# Patient Record
Sex: FEMALE | Race: WHITE | NOT HISPANIC OR LATINO | Employment: OTHER | URBAN - METROPOLITAN AREA
[De-identification: names, ages, dates, MRNs, and addresses within clinical notes are randomized per-mention and may not be internally consistent; named-entity substitution may affect disease eponyms.]

---

## 2017-01-23 DIAGNOSIS — I10 ESSENTIAL HYPERTENSION: ICD-10-CM

## 2017-01-23 RX ORDER — LOSARTAN POTASSIUM 100 MG/1
100 TABLET ORAL DAILY
Qty: 90 TAB | Refills: 3 | Status: SHIPPED | OUTPATIENT
Start: 2017-01-23 | End: 2018-01-26 | Stop reason: SDUPTHER

## 2017-04-10 ENCOUNTER — OFFICE VISIT (OUTPATIENT)
Dept: MEDICAL GROUP | Facility: LAB | Age: 72
End: 2017-04-10
Payer: COMMERCIAL

## 2017-04-10 VITALS
WEIGHT: 127.4 LBS | TEMPERATURE: 99.1 F | HEART RATE: 88 BPM | DIASTOLIC BLOOD PRESSURE: 68 MMHG | OXYGEN SATURATION: 94 % | RESPIRATION RATE: 12 BRPM | BODY MASS INDEX: 23.45 KG/M2 | SYSTOLIC BLOOD PRESSURE: 160 MMHG | HEIGHT: 62 IN

## 2017-04-10 DIAGNOSIS — M81.0 OSTEOPOROSIS, POST-MENOPAUSAL: ICD-10-CM

## 2017-04-10 DIAGNOSIS — R60.0 BILATERAL EDEMA OF LOWER EXTREMITY: ICD-10-CM

## 2017-04-10 DIAGNOSIS — Z12.31 ENCOUNTER FOR SCREENING MAMMOGRAM FOR BREAST CANCER: ICD-10-CM

## 2017-04-10 DIAGNOSIS — I73.9 PERIPHERAL VASCULAR DISEASE (HCC): ICD-10-CM

## 2017-04-10 DIAGNOSIS — I77.9 BILATERAL CAROTID ARTERY DISEASE (HCC): ICD-10-CM

## 2017-04-10 DIAGNOSIS — I10 ESSENTIAL HYPERTENSION: ICD-10-CM

## 2017-04-10 PROCEDURE — 99214 OFFICE O/P EST MOD 30 MIN: CPT | Performed by: NURSE PRACTITIONER

## 2017-04-10 RX ORDER — HYDROCHLOROTHIAZIDE 25 MG/1
25 TABLET ORAL DAILY
Qty: 90 TAB | Refills: 3 | Status: SHIPPED | OUTPATIENT
Start: 2017-04-10 | End: 2018-04-11 | Stop reason: SDUPTHER

## 2017-04-10 RX ORDER — ROSUVASTATIN CALCIUM 10 MG/1
TABLET, COATED ORAL
Qty: 90 TAB | Refills: 3 | Status: SHIPPED | OUTPATIENT
Start: 2017-04-10 | End: 2018-02-16 | Stop reason: SDUPTHER

## 2017-04-10 ASSESSMENT — PATIENT HEALTH QUESTIONNAIRE - PHQ9: CLINICAL INTERPRETATION OF PHQ2 SCORE: 0

## 2017-04-10 NOTE — PROGRESS NOTES
"Chief Complaint   Patient presents with   • Medication Refill   • Orders Needed     mammogram        VENESSA Quesada is a 71-year-old established female here to follow-up on a few issues:  #1-Hypertension  Chronic issue. Requesting a renewal of hydrochlorothiazide as she's been off of this for several months. She notices swelling in her ankles without hydrochlorothiazide. She is not having a chest pain. Not checking BP at home.  Taking losartan 100 mg and norvasc 10 mg daily.  Not eating high salt diet.  Walking for exercise.  She has known peripheral vascular disease. She has seen vascular surgery who recommended refraining from any surgical procedures at this time.  #2-carotid artery disease:  Chronic issue. Due for an updated ultrasound. Continues on Crestor at 10 mg daily.  #3-osteoporosis:  Chronic issue. Did poorly with oral bisphosphonates as well as IV Reclast. Does great with prolia without any side effects and needs it sent to her mail order which is much less expensive for her than going to our infusion center. No recent fractures.    Past medical, surgical, family, and social history is reviewed and updated in Epic chart by me today.   Medications and allergies reviewed and updated in Epic chart by me today.     ROS:   As documented in history of present illness above    Exam:  Blood pressure 160/68, pulse 88, temperature 37.3 °C (99.1 °F), resp. rate 12, height 1.575 m (5' 2\"), weight 57.788 kg (127 lb 6.4 oz), SpO2 94 %.  Constitutional: Alert, no distress, plus 3 vital signs  Skin:  Warm, dry. 7 mm x 6 mm scabbed lesion to left distal lateral calf.    Eye: Equal, round and reactive, conjunctiva clear  ENMT: Lips without lesions, good dentition, oropharynx clear    Neck: Trachea midline. Positive bilateral carotid bruits.  Respiratory: Unlabored respiration, lungs clear to auscultation, no wheezes, no rhonchi  Cardiovascular: Normal rate and rhythm  Abdomen: Soft, nontender  Psych: Alert, pleasant, " well-groomed, normal affect    A/P:  1. Essential hypertension  -Elevated today. She reports that she did take her medications this morning but that her blood pressures always more elevated in our office than it is on her friends blood pressure monitor or at the grocery store. She is agreeable to following up of her blood pressures consistently greater than 140/90 and is agreeable to checking her blood pressure more regularly at home.  - hydrochlorothiazide (HYDRODIURIL) 25 MG Tab; Take 1 Tab by mouth every day.  Dispense: 90 Tab; Refill: 3    2. Bilateral edema of lower extremity  -Discussed elevating her legs when seated. She typically wears compression stockings. She is up-to-date with vascular.  - hydrochlorothiazide (HYDRODIURIL) 25 MG Tab; Take 1 Tab by mouth every day.  Dispense: 90 Tab; Refill: 3    3. Bilateral carotid artery disease (CMS-HCC)  -Recommend an updated carotid artery ultrasound and continuation of Crestor.  - rosuvastatin (CRESTOR) 10 MG Tab; TAKE 1 TABLET BY MOUTH IN THE EVENING  Dispense: 90 Tab; Refill: 3  - US-CAROTID DOPPLER; Future    4. Encounter for screening mammogram for breast cancer  - MA-SCREEN MAMMO W/CAD-BILAT; Future    5. Peripheral vascular disease (CMS-HCC)  - CBC WITH DIFFERENTIAL; Future  - LIPID PROFILE; Future  - COMP METABOLIC PANEL; Future    6. Osteoporosis, post-menopausal  -Sent through prolia to her mail order pharmacy to make it more affordable for her. Recommended bone density next year.  - denosumab (PROLIA) 60 MG/ML Solution; Inject 1 mL as instructed Once for 1 dose.  Dispense: 1 mL; Refill: 0

## 2017-04-10 NOTE — MR AVS SNAPSHOT
"        Sangita Priceedna   4/10/2017 2:00 PM   Office Visit   MRN: [id number]    Department:  [facility]   Dept Phone:  [phone]    Description:  Female : 1945   Provider:  THALIA Guaman           Reason for Visit     Medication Refill     Orders Needed mammogram       Allergies as of 4/10/2017     Allergen Noted Reactions    Penicillins 2007   Hives    hands    Lipitor [Atorvastatin] 09/15/2014       myalgias    Other Environmental 2013   Hives    Hair dye      You were diagnosed with     Essential hypertension   [0357444]       Bilateral edema of lower extremity   [394640]       Bilateral carotid artery disease (CMS-HCC)   [226881]       Encounter for screening mammogram for breast cancer   [6934321]       Peripheral vascular disease (CMS-HCC)   [164954]       Osteoporosis, post-menopausal   [749001]         Vital Signs     Blood Pressure Pulse Temperature Respirations Height Weight    160/68 mmHg 88 37.3 °C (99.1 °F) 12 1.575 m (5' 2\") 57.788 kg (127 lb 6.4 oz)    Body Mass Index Oxygen Saturation Smoking Status             23.30 kg/m2 94% Current Some Day Smoker         Basic Information     Date Of Birth Sex Race Ethnicity Preferred Language    1945 Female White Non- English      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   Unknown - Present    Lumbar foraminal stenosis M99.83   2013 - Present    DDD (degenerative disc disease), lumbar M51.36   2013 - Present    Hypertension I10   2013 - Present    Carotid artery disease (CMS-HCC) I77.9   11/10/2013 - Present    Heart murmur R01.1   11/10/2013 - Present    Carotid artery stenosis I65.29   2015 - Present    S/P insertion of iliac artery stent Z95.828   2015 - Present    Peripheral vascular disease (CMS-HCC) I73.9   3/11/2016 - Present      Health Maintenance        Date Due Completion Dates    PAP SMEAR 1964 ---   " MAMMOGRAM 2/3/2017 2/3/2016, 5/15/2014, 2/12/2013, 9/23/2011, 6/4/2010, 6/4/2010, 1/29/2009, 1/29/2009, 9/4/2007, 9/4/2007, 6/27/2006    BONE DENSITY 2/13/2020 2/13/2015, 2/12/2013, 10/7/2011, 6/4/2010, 1/29/2009, 9/4/2007, 6/27/2006    IMM DTaP/Tdap/Td Vaccine (2 - Td) 1/17/2022 1/17/2012    COLONOSCOPY 2/2/2025 2/2/2017            Current Immunizations     13-VALENT PCV PREVNAR 12/31/2015 10:57 AM    Influenza TIV (IM) 10/28/2014, 11/13/2012    Influenza Vaccine Adult HD 12/31/2015 10:54 AM    Influenza Vaccine Pediatric 10/8/2010    Pneumococcal polysaccharide vaccine (PPSV-23) 9/9/2014    SHINGLES VACCINE 1/9/2012  2:53 PM    Tdap Vaccine 1/17/2012 10:25 AM      Below and/or attached are the medications your provider expects you to take. Review all of your home medications and newly ordered medications with your provider and/or pharmacist. Follow medication instructions as directed by your provider and/or pharmacist. Please keep your medication list with you and share with your provider. Update the information when medications are discontinued, doses are changed, or new medications (including over-the-counter products) are added; and carry medication information at all times in the event of emergency situations     Allergies:  PENICILLINS - Hives     LIPITOR - (reactions not documented)     OTHER ENVIRONMENTAL - Hives               Medications  Valid as of: April 10, 2017 -  3:28 PM    Generic Name Brand Name Tablet Size Instructions for use    AmLODIPine Besylate (Tab) NORVASC 10 MG Take 1 Tab by mouth every day.        Aspirin Buf(WuAnn-BiHwx-KoSor) (Tab) ASCRIPTIN 325 MG Take 650 mg by mouth every day.        Calcium Carbonate-Vitamin D (Tab) OSCAL-250 250-125 MG-UNIT Take 1 Tab by mouth every day.          Denosumab (Solution) PROLIA 60 MG/ML Inject 1 mL as instructed Once for 1 dose.        HydroCHLOROthiazide (Tab) HYDRODIURIL 25 MG Take 1 Tab by mouth every day.        HydrOXYzine HCl (Tab) ATARAX 25 MG  Take 1 Tab by mouth at bedtime as needed for Itching.        Losartan Potassium (Tab) COZAAR 100 MG Take 1 Tab by mouth every day.        Rosuvastatin Calcium (Tab) CRESTOR 10 MG TAKE 1 TABLET BY MOUTH IN THE EVENING        Simvastatin (Tab) ZOCOR 40 MG Take 1 Tab by mouth every evening.        Triamcinolone Acetonide (Cream) KENALOG 0.1 % AAA twice daily        .                 Medicines prescribed today were sent to:     [id number] - [place], [place] - [address]    [address] [place] [address]    Phone: [phone] Fax: [phone]    Open 24 Hours?: No    [facility] [place], [place] - [address] AT [address] TO [facility]    [address] [facility] [address]    Phone: [phone] Fax: [phone]    Open 24 Hours?: No      Medication refill instructions:       If your prescription bottle indicates you have medication refills left, it is not necessary to call your provider’s office. Please contact your pharmacy and they will refill your medication.    If your prescription bottle indicates you do not have any refills left, you may request refills at any time through one of the following ways: The online Happy Hour Pal system (except Urgent Care), by calling your provider’s office, or by asking your pharmacy to contact your provider’s office with a refill request. Medication refills are processed only during regular business hours and may not be available until the next business day. Your provider may request additional information or to have a follow-up visit with you prior to refilling your medication.   *Please Note: Medication refills are assigned a new Rx number when refilled electronically. Your pharmacy may indicate that no refills were authorized even though a new prescription for the same medication is available at the pharmacy. Please request the medicine by name with the pharmacy before contacting your provider for a refill.        Your To Do List      Future Labs/Procedures Complete By Expires    CBC WITH DIFFERENTIAL  As directed 4/11/2018    COMP METABOLIC PANEL  As directed 4/11/2018    LIPID PROFILE  As directed 4/11/2018    MA-SCREEN MAMMO W/CAD-BILAT  As directed 5/12/2018    US-CAROTID DOPPLER  As directed 10/11/2017         Tactics Cloud Access Code: [email or web address]  Expires: 5/10/2017  3:28 PM    Tactics Cloud  A secure, online tool to manage your health information     [employer]’s Tactics Cloud® is a secure, online tool that connects you to your personalized health information from the privacy of your home -- day or night - making it very easy for you to manage your healthcare. Once the activation process is completed, you can even access your medical information using the Tactics Cloud savannah, which is available for free in the Apple Savannah store or Google Play store.     Tactics Cloud provides the following levels of access (as shown below):   My Chart Features   [facility]  Specialists [facility]  Urgent  Care Non-[facility]  Primary Care  Doctor   Email your healthcare team securely and privately 24/7 X X X    Manage appointments: schedule your next appointment; view details of past/upcoming appointments X      Request prescription refills. X      View recent personal medical records, including lab and immunizations X X X X   View health record, including health history, allergies, medications X X X X   Read reports about your outpatient visits, procedures, consult and ER notes X X X X   See your discharge summary, which is a recap of your hospital and/or ER visit that includes your diagnosis, lab results, and care plan. X X       How to register for Tactics Cloud:  1. Go to  [email or web address].  2. Click on the Sign Up Now box, which takes you to the New Member Sign Up page. You will need to provide the following information:  a. Enter your Tactics Cloud Access Code exactly as it appears at the top of this page. (You will not need to use this code after you’ve  completed the sign-up process. If you do not sign up before the expiration date, you must request a new code.)   b. Enter your date of birth.   c. Enter your home email address.   d. Click Submit, and follow the next screen’s instructions.  3. Create a Resverlogixt ID. This will be your Resverlogixt login ID and cannot be changed, so think of one that is secure and easy to remember.  4. Create a Flit password. You can change your password at any time.  5. Enter your Password Reset Question and Answer. This can be used at a later time if you forget your password.   6. Enter your e-mail address. This allows you to receive e-mail notifications when new information is available in Flit.  7. Click Sign Up. You can now view your health information.    For assistance activating your Flit account, call [phone]

## 2017-04-10 NOTE — ASSESSMENT & PLAN NOTE
Not checking BP at home.  Taking losartan 100 mg and norvasc 10 mg daily.  Not eating high salt diet.  Walking for exercise .

## 2017-05-01 ENCOUNTER — HOSPITAL ENCOUNTER (OUTPATIENT)
Dept: RADIOLOGY | Facility: MEDICAL CENTER | Age: 72
End: 2017-05-01
Attending: NURSE PRACTITIONER
Payer: COMMERCIAL

## 2017-05-01 DIAGNOSIS — I77.9 BILATERAL CAROTID ARTERY DISEASE (HCC): ICD-10-CM

## 2017-05-01 PROCEDURE — 93880 EXTRACRANIAL BILAT STUDY: CPT

## 2017-05-02 ENCOUNTER — TELEPHONE (OUTPATIENT)
Dept: MEDICAL GROUP | Facility: LAB | Age: 72
End: 2017-05-02

## 2017-05-02 DIAGNOSIS — I77.9 BILATERAL CAROTID ARTERY DISEASE (HCC): ICD-10-CM

## 2017-05-02 NOTE — TELEPHONE ENCOUNTER
----- Message from THALIA Guaman sent at 5/1/2017  5:56 PM PDT -----  Please let Sangita know that her right carotid artery is between 50-69% blocked. I would like her to have a yearly follow up with a vascular surgeon - please let me know if she does not have one that she is est with.

## 2017-05-02 NOTE — TELEPHONE ENCOUNTER
Spoke to patient, she has seen Dr. Cabrera in the past and would like to go back to him. Would you like her to schedule an appt with him? Does she need a referral? Also, she states the sore on leg is still there but maybe a little better. Should she come back and see you for a possible biopsy, please advise

## 2017-05-03 NOTE — TELEPHONE ENCOUNTER
Referral placed - ask her to go ahead and call their office for an appt in 3-4 days.  Is the lesion scabbed?

## 2017-05-04 NOTE — TELEPHONE ENCOUNTER
Let's have her take a break from vaseline and allow the lesion to scab, then let me look at it in 2-3 weeks (4pm appt incase we need to biopsy)

## 2017-05-04 NOTE — TELEPHONE ENCOUNTER
Patient states that she has been putting Vasolin on lesions so she doesn't know if it has scabbed over yet.

## 2017-05-09 ENCOUNTER — NON-PROVIDER VISIT (OUTPATIENT)
Dept: MEDICAL GROUP | Facility: LAB | Age: 72
End: 2017-05-09
Payer: COMMERCIAL

## 2017-05-09 DIAGNOSIS — M81.0 OSTEOPOROSIS, POST-MENOPAUSAL: ICD-10-CM

## 2017-05-09 NOTE — MR AVS SNAPSHOT
Sangita Bolton   2017 2:40 PM   Appointment   MRN: [id number]    Department:  [facility]   Dept Phone:  [phone]    Description:  Female : 1945   Provider:  KALIE MENON MA           Allergies as of 2017     Allergen Noted Reactions    Penicillins 2007   Hives    hands    Lipitor [Atorvastatin] 09/15/2014       myalgias    Other Environmental 2013   Hives    Hair dye      Vital Signs     Smoking Status                   Current Some Day Smoker           Basic Information     Date Of Birth Sex Race Ethnicity Preferred Language    1945 Female White Non- English      Your appointments     May 17, 2017  4:00 PM   Established Patient with THALIA Guaman   [facility] (--)    [address]  [place] [id number] [phone]           You will be receiving a confirmation call a few days before your appointment from our automated call confirmation system.            2017 11:20 AM   MA [id number] with DAVI JONES MG 1   [facility] ([place])    [address]  [place] [address] [phone]           No deodorant, powder, perfume or lotion under the arm or breast area.              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   Unknown - Present    Lumbar foraminal stenosis M99.83   2013 - Present    DDD (degenerative disc disease), lumbar M51.36   2013 - Present    Hypertension I10   2013 - Present    Carotid artery disease (CMS-HCC) I77.9   11/10/2013 - Present    Heart murmur R01.1   11/10/2013 - Present    Carotid artery stenosis I65.29   2015 - Present    S/P insertion of iliac artery stent Z95.828   2015 - Present    Peripheral vascular disease (CMS-HCC) I73.9   3/11/2016 - Present      Health Maintenance        Date Due Completion Dates    PAP SMEAR 1964 ---    MAMMOGRAM 2/3/2017 2/3/2016, 5/15/2014, 2/12/2013, 9/23/2011, 6/4/2010, 6/4/2010, 1/29/2009, 1/29/2009, 9/4/2007, 9/4/2007, 6/27/2006    BONE DENSITY 2/13/2020 2/13/2015, 2/12/2013, 10/7/2011, 6/4/2010, 1/29/2009, 9/4/2007, 6/27/2006    IMM DTaP/Tdap/Td Vaccine (2 - Td) 1/17/2022 1/17/2012    COLONOSCOPY 2/2/2025 2/2/2017            Current Immunizations     13-VALENT PCV PREVNAR 12/31/2015 10:57 AM    Influenza TIV (IM) 10/28/2014, 11/13/2012    Influenza Vaccine Adult HD 12/31/2015 10:54 AM    Influenza Vaccine Pediatric 10/8/2010    Pneumococcal polysaccharide vaccine (PPSV-23) 9/9/2014    SHINGLES VACCINE 1/9/2012  2:53 PM    Tdap Vaccine 1/17/2012 10:25 AM      Below and/or attached are the medications your provider expects you to take. Review all of your home medications and newly ordered medications with your provider and/or pharmacist. Follow medication instructions as directed by your provider and/or pharmacist. Please keep your medication list with you and share with your provider. Update the information when medications are discontinued, doses are changed, or new medications (including over-the-counter products) are added; and carry medication information at all times in the event of emergency situations     Allergies:  PENICILLINS - Hives     LIPITOR - (reactions not documented)     OTHER ENVIRONMENTAL - Hives               Medications  Valid as of: May 09, 2017 -  3:44 PM    Generic Name Brand Name Tablet Size Instructions for use    AmLODIPine Besylate (Tab) NORVASC 10 MG Take 1 Tab by mouth every day.        Aspirin Buf(QkIla-EfQdh-VgIrk) (Tab) ASCRIPTIN 325 MG Take 650 mg by mouth every day.        Calcium Carbonate-Vitamin D (Tab) OSCAL-250 250-125 MG-UNIT Take 1 Tab by mouth every day.          HydroCHLOROthiazide (Tab) HYDRODIURIL 25 MG Take 1 Tab by mouth every day.        HydrOXYzine HCl (Tab) ATARAX 25 MG Take 1 Tab by mouth at bedtime as needed for Itching.        Losartan Potassium (Tab)  COZAAR 100 MG Take 1 Tab by mouth every day.        Rosuvastatin Calcium (Tab) CRESTOR 10 MG TAKE 1 TABLET BY MOUTH IN THE EVENING        Simvastatin (Tab) ZOCOR 40 MG Take 1 Tab by mouth every evening.        Triamcinolone Acetonide (Cream) KENALOG 0.1 % AAA twice daily        .                 Medicines prescribed today were sent to:     [facility] - [place], [place] - [address]    [address] [place] [address]    Phone: [phone] Fax: [phone]    Open 24 Hours?: No    [facility] - [place], [place] - [address] AT [address]AL TO [facility]    [address] [address]    Phone: [phone] Fax: [phone]    Open 24 Hours?: No      Medication refill instructions:       If your prescription bottle indicates you have medication refills left, it is not necessary to call your provider’s office. Please contact your pharmacy and they will refill your medication.    If your prescription bottle indicates you do not have any refills left, you may request refills at any time through one of the following ways: The online Surge Performance Training system (except Urgent Care), by calling your provider’s office, or by asking your pharmacy to contact your provider’s office with a refill request. Medication refills are processed only during regular business hours and may not be available until the next business day. Your provider may request additional information or to have a follow-up visit with you prior to refilling your medication.   *Please Note: Medication refills are assigned a new Rx number when refilled electronically. Your pharmacy may indicate that no refills were authorized even though a new prescription for the same medication is available at the pharmacy. Please request the medicine by name with the pharmacy before contacting your provider for a refill.           Surge Performance Training Access Code: [email or web address]  Expires: 5/10/2017  3:28 PM    Surge Performance Training  A secure, online tool to  manage your health information     [employer]’s Isomark® is a secure, online tool that connects you to your personalized health information from the privacy of your home -- day or night - making it very easy for you to manage your healthcare. Once the activation process is completed, you can even access your medical information using the Isomark savannah, which is available for free in the Apple Savannah store or Google Play store.     Isomark provides the following levels of access (as shown below):   My Chart Features   [facility] Doctor [facility]  Specialists [facility]  Urgent  Care Non-[facility]  Primary Care  Doctor   Email your healthcare team securely and privately 24/7 X X X    Manage appointments: schedule your next appointment; view details of past/upcoming appointments X      Request prescription refills. X      View recent personal medical records, including lab and immunizations X X X X   View health record, including health history, allergies, medications X X X X   Read reports about your outpatient visits, procedures, consult and ER notes X X X X   See your discharge summary, which is a recap of your hospital and/or ER visit that includes your diagnosis, lab results, and care plan. X X       How to register for Isomark:  1. Go to  [email or web address].  2. Click on the Sign Up Now box, which takes you to the New Member Sign Up page. You will need to provide the following information:  a. Enter your Isomark Access Code exactly as it appears at the top of this page. (You will not need to use this code after you’ve completed the sign-up process. If you do not sign up before the expiration date, you must request a new code.)   b. Enter your date of birth.   c. Enter your home email address.   d. Click Submit, and follow the next screen’s instructions.  3. Create a Isomark ID. This will be your Isomark login ID and cannot be changed, so think of one that is secure and easy to remember.  4. Create a Studio Katet  password. You can change your password at any time.  5. Enter your Password Reset Question and Answer. This can be used at a later time if you forget your password.   6. Enter your e-mail address. This allows you to receive e-mail notifications when new information is available in AdHack.  7. Click Sign Up. You can now view your health information.    For assistance activating your AdHack account, call [phone]

## 2017-05-17 ENCOUNTER — OFFICE VISIT (OUTPATIENT)
Dept: MEDICAL GROUP | Facility: LAB | Age: 72
End: 2017-05-17
Payer: COMMERCIAL

## 2017-05-17 VITALS
DIASTOLIC BLOOD PRESSURE: 68 MMHG | WEIGHT: 126 LBS | BODY MASS INDEX: 23.19 KG/M2 | HEIGHT: 62 IN | SYSTOLIC BLOOD PRESSURE: 142 MMHG | OXYGEN SATURATION: 91 % | HEART RATE: 86 BPM | TEMPERATURE: 98.2 F | RESPIRATION RATE: 12 BRPM

## 2017-05-17 DIAGNOSIS — D22.9 ATYPICAL NEVI: ICD-10-CM

## 2017-05-17 PROCEDURE — 99213 OFFICE O/P EST LOW 20 MIN: CPT | Performed by: NURSE PRACTITIONER

## 2017-05-17 NOTE — PROGRESS NOTES
"Chief Complaint   Patient presents with   • Nevus     left       HPI  Sangita is a 71-year-old established female here with complaint of a persistent lesion to her left distal calf. Denies any pain or bleeding from the area. No personal history of skin cancer. She is interested in seeing a dermatologist to have it removed. She does have a past medical history of peripheral vascular disease and has an appointment to see her vascular specialist on May 30.      Past medical, surgical, family, and social history is reviewed and updated in Epic chart by me today.   Medications and allergies reviewed and updated in Epic chart by me today.     ROS:   As documented in history of present illness above    Exam:  Blood pressure 142/68, pulse 86, temperature 36.8 °C (98.2 °F), resp. rate 12, height 1.575 m (5' 2.01\"), weight 57.153 kg (126 lb), SpO2 91 %.  Constitutional: Alert, no distress, plus 3 vital signs  Skin:  Warm, dry.  Erythematous, scaling to left outer distal calf - erythema measures 3 cm x 3 cm with scabbed center measuring 1 cm x 1 cm.  No bleeding, purulent d/c expressed with pressure to area or any other abnormalities.   Eye: Equal, round and reactive, conjunctiva clear  ENMT: Lips without lesions  Neck: Trachea midline  Respiratory: Unlabored respiration, lungs clear to auscultation, no wheezes, no rhonchi  Cardiovascular: Normal rate  Psych: Alert, pleasant, well-groomed, normal affect    A/P:  1. Atypical nevi  - REFERRAL TO DERMATOLOGY  -Discussed that it may take her up to 3 months to get in with dermatology and if the lesion is starting to bleed, become painful or growing quickly is agreeable to returning here for biopsy.      "

## 2017-05-17 NOTE — MR AVS SNAPSHOT
"        Sangita Priceedna   2017 4:00 PM   Office Visit   MRN: [id number]    Department:  [facility]   Dept Phone:  [phone]    Description:  Female : 1945   Provider:  THALIA Guaman           Reason for Visit     Nevus left      Allergies as of 2017     Allergen Noted Reactions    Penicillins 2007   Hives    hands    Lipitor [Atorvastatin] 09/15/2014       myalgias    Other Environmental 2013   Hives    Hair dye      You were diagnosed with     Atypical nevi   [679997]         Vital Signs     Blood Pressure Pulse Temperature Respirations Height Weight    142/68 mmHg 86 36.8 °C (98.2 °F) 12 1.575 m (5' 2.01\") 57.153 kg (126 lb)    Body Mass Index Oxygen Saturation Smoking Status             23.04 kg/m2 91% Current Some Day Smoker         Basic Information     Date Of Birth Sex Race Ethnicity Preferred Language    1945 Female White Non- English      Your appointments     2017 11:20 AM   MA SCRN10 with DAVI JONES MG 1   [facility] ([place])    [address]  [place] [id number] [phone]           No deodorant, powder, perfume or lotion under the arm or breast area.              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   Unknown - Present    Lumbar foraminal stenosis M99.83   2013 - Present    DDD (degenerative disc disease), lumbar M51.36   2013 - Present    Hypertension I10   2013 - Present    Carotid artery disease (CMS-HCC) I77.9   11/10/2013 - Present    Heart murmur R01.1   11/10/2013 - Present    Carotid artery stenosis I65.29   2015 - Present    S/P insertion of iliac artery stent Z95.828   2015 - Present    Peripheral vascular disease (CMS-HCC) I73.9   3/11/2016 - Present      Health Maintenance        Date Due Completion Dates    PAP SMEAR 1964 ---    MAMMOGRAM 2/3/2017 2/3/2016, " 5/15/2014, 2/12/2013, 9/23/2011, 6/4/2010, 6/4/2010, 1/29/2009, 1/29/2009, 9/4/2007, 9/4/2007, 6/27/2006    BONE DENSITY 2/13/2020 2/13/2015, 2/12/2013, 10/7/2011, 6/4/2010, 1/29/2009, 9/4/2007, 6/27/2006    IMM DTaP/Tdap/Td Vaccine (2 - Td) 1/17/2022 1/17/2012    COLONOSCOPY 2/2/2025 2/2/2017            Current Immunizations     13-VALENT PCV PREVNAR 12/31/2015 10:57 AM    Influenza TIV (IM) 10/28/2014, 11/13/2012    Influenza Vaccine Adult HD 12/31/2015 10:54 AM    Influenza Vaccine Pediatric 10/8/2010    Pneumococcal polysaccharide vaccine (PPSV-23) 9/9/2014    SHINGLES VACCINE 1/9/2012  2:53 PM    Tdap Vaccine 1/17/2012 10:25 AM      Below and/or attached are the medications your provider expects you to take. Review all of your home medications and newly ordered medications with your provider and/or pharmacist. Follow medication instructions as directed by your provider and/or pharmacist. Please keep your medication list with you and share with your provider. Update the information when medications are discontinued, doses are changed, or new medications (including over-the-counter products) are added; and carry medication information at all times in the event of emergency situations     Allergies:  PENICILLINS - Hives     LIPITOR - (reactions not documented)     OTHER ENVIRONMENTAL - Hives               Medications  Valid as of: May 17, 2017 -  4:24 PM    Generic Name Brand Name Tablet Size Instructions for use    AmLODIPine Besylate (Tab) NORVASC 10 MG Take 1 Tab by mouth every day.        Aspirin Buf(XtAfr-NzAwx-GbAny) (Tab) ASCRIPTIN 325 MG Take 650 mg by mouth every day.        Calcium Carbonate-Vitamin D (Tab) OSCAL-250 250-125 MG-UNIT Take 1 Tab by mouth every day.          HydroCHLOROthiazide (Tab) HYDRODIURIL 25 MG Take 1 Tab by mouth every day.        HydrOXYzine HCl (Tab) ATARAX 25 MG Take 1 Tab by mouth at bedtime as needed for Itching.        Losartan Potassium (Tab) COZAAR 100 MG Take 1 Tab by mouth  every day.        Rosuvastatin Calcium (Tab) CRESTOR 10 MG TAKE 1 TABLET BY MOUTH IN THE EVENING        Simvastatin (Tab) ZOCOR 40 MG Take 1 Tab by mouth every evening.        Triamcinolone Acetonide (Cream) KENALOG 0.1 % AAA twice daily        .                 Medicines prescribed today were sent to:     [facility] - [place], [place] - [address]    [address] [place] [address]    Phone: [phone] Fax: [phone]    Open 24 Hours?: No    [facility] - [place], [place] - [address] AT PORTAL TO REGISTERED [facility]    [address] [address]    Phone: [phone] Fax: [phone]    Open 24 Hours?: No      Medication refill instructions:       If your prescription bottle indicates you have medication refills left, it is not necessary to call your provider’s office. Please contact your pharmacy and they will refill your medication.    If your prescription bottle indicates you do not have any refills left, you may request refills at any time through one of the following ways: The online [employer] system (except Urgent Care), by calling your provider’s office, or by asking your pharmacy to contact your provider’s office with a refill request. Medication refills are processed only during regular business hours and may not be available until the next business day. Your provider may request additional information or to have a follow-up visit with you prior to refilling your medication.   *Please Note: Medication refills are assigned a new Rx number when refilled electronically. Your pharmacy may indicate that no refills were authorized even though a new prescription for the same medication is available at the pharmacy. Please request the medicine by name with the pharmacy before contacting your provider for a refill.        Referral     A referral request has been sent to our patient care coordination department. Please allow 3-5 business days for us to process this  request and contact you either by phone or mail. If you do not hear from us by the 5th business day, please call us at [phone].           Vendobots Access Code: [email or web address]  Expires: 6/16/2017  4:24 PM    Vendobots  A secure, online tool to manage your health information     [employer]’s Vendobots® is a secure, online tool that connects you to your personalized health information from the privacy of your home -- day or night - making it very easy for you to manage your healthcare. Once the activation process is completed, you can even access your medical information using the Vendobots savannah, which is available for free in the Apple Savannah store or Google Play store.     Vendobots provides the following levels of access (as shown below):   My Chart Features   [facility]  Specialists [facility]  Urgent  Care Non-[facility]  Primary Care  Doctor   Email your healthcare team securely and privately 24/7 X X X    Manage appointments: schedule your next appointment; view details of past/upcoming appointments X      Request prescription refills. X      View recent personal medical records, including lab and immunizations X X X X   View health record, including health history, allergies, medications X X X X   Read reports about your outpatient visits, procedures, consult and ER notes X X X X   See your discharge summary, which is a recap of your hospital and/or ER visit that includes your diagnosis, lab results, and care plan. X X       How to register for Vendobots:  1. Go to  [email or web address].  2. Click on the Sign Up Now box, which takes you to the New Member Sign Up page. You will need to provide the following information:  a. Enter your Vendobots Access Code exactly as it appears at the top of this page. (You will not need to use this code after you’ve completed the sign-up process. If you do not sign up before the expiration date, you must request a new code.)   b. Enter your date of birth.    c. Enter your home email address.   d. Click Submit, and follow the next screen’s instructions.  3. Create a My Study Rewardst ID. This will be your [employer] login ID and cannot be changed, so think of one that is secure and easy to remember.  4. Create a My Study Rewardst password. You can change your password at any time.  5. Enter your Password Reset Question and Answer. This can be used at a later time if you forget your password.   6. Enter your e-mail address. This allows you to receive e-mail notifications when new information is available in [employer].  7. Click Sign Up. You can now view your health information.    For assistance activating your [employer] account, call [phone]

## 2017-06-06 ENCOUNTER — OFFICE VISIT (OUTPATIENT)
Dept: MEDICAL GROUP | Facility: LAB | Age: 72
End: 2017-06-06
Payer: COMMERCIAL

## 2017-06-06 VITALS
OXYGEN SATURATION: 95 % | TEMPERATURE: 98.5 F | RESPIRATION RATE: 12 BRPM | SYSTOLIC BLOOD PRESSURE: 142 MMHG | HEART RATE: 87 BPM | HEIGHT: 62 IN | DIASTOLIC BLOOD PRESSURE: 56 MMHG | BODY MASS INDEX: 22.12 KG/M2 | WEIGHT: 120.2 LBS

## 2017-06-06 DIAGNOSIS — I65.23 BILATERAL CAROTID ARTERY STENOSIS: ICD-10-CM

## 2017-06-06 DIAGNOSIS — D22.9 ATYPICAL NEVI: ICD-10-CM

## 2017-06-06 PROCEDURE — 99213 OFFICE O/P EST LOW 20 MIN: CPT | Performed by: NURSE PRACTITIONER

## 2017-06-06 NOTE — PROGRESS NOTES
"Chief Complaint   Patient presents with   • Other     pt has lesion on the right side of her nose, & left lower leg, no pain, not sure of onset        HPI  Sangita is a 71-year-old established female here for recheck of a lesion on her left lower leg. The lesion is not itchy or painful. She did not make an appointment with dermatology. She would also like a raised area on her nose examined, she newly appreciated this in the mirror although it is not painful, bleeding or itching.    Carotid artery stenosis: Chronic issue for the patient. Recently followed up with vascular and was told that everything is stable, return in one year.    Past medical, surgical, family, and social history is reviewed and updated in Epic chart by me today.   Medications and allergies reviewed and updated in Epic chart by me today.     ROS:   As documented in history of present illness above    Exam:  Blood pressure 142/56, pulse 87, temperature 36.9 °C (98.5 °F), resp. rate 12, height 1.575 m (5' 2.01\"), weight 54.522 kg (120 lb 3.2 oz), SpO2 95 %.  Constitutional: Alert, no distress, plus 3 vital signs  Skin:  Warm, dry.  2 cm x 2 cm erythematous, scaling lesion to left outer calf. Make up removed from nose - raised, flesh colored lesion to left side of nose - measuring 7-8 mm    Eye: Equal, round and reactive, conjunctiva clear  ENMT: Lips without lesions  Neck: Trachea midline  Respiratory: Unlabored respiration  Cardiovascular: Normal rate   Psych: Alert, pleasant, well-groomed, normal affect    A/P:  1. Bilateral carotid artery stenosis  -stable.  Reviewed vascular note with pt.  F/u yearly as instructed    2. 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  Discussed moisturizers.  Given # for skin cancer screening with dermatology - Dr. eVga.  Discussed benign appearance of nasal lesion      "

## 2017-06-06 NOTE — MR AVS SNAPSHOT
"        Sangita Priceedna   2017 2:40 PM   Office Visit   MRN: [id number]    Department:  [facility]   Dept Phone:  [phone]    Description:  Female : 1945   Provider:  THALIA Guaman           Reason for Visit     Other pt has lesion on the right side of her nose, & left lower leg, no pain, not sure of onset       Allergies as of 2017     Allergen Noted Reactions    Penicillins 2007   Hives    hands    Lipitor [Atorvastatin] 09/15/2014       myalgias    Other Environmental 2013   Hives    Hair dye      Vital Signs     Blood Pressure Pulse Temperature Respirations Height Weight    142/56 mmHg 87 36.9 °C (98.5 °F) 12 1.575 m (5' 2.01\") 54.522 kg (120 lb 3.2 oz)    Body Mass Index Oxygen Saturation Smoking Status             21.98 kg/m2 95% Current Some Day Smoker         Basic Information     Date Of Birth Sex Race Ethnicity Preferred Language    1945 Female White Non- English      Your appointments     2017 11:20 AM   MA SCRN10 with DAVI JONES MG 1   [facility] ([place])    [address]  [place] [address] [phone]           No deodorant, powder, perfume or lotion under the arm or breast area.              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   Unknown - Present    Lumbar foraminal stenosis M99.83   2013 - Present    DDD (degenerative disc disease), lumbar M51.36   2013 - Present    Hypertension I10   2013 - Present    Carotid artery disease (CMS-[facility]) I77.9   11/10/2013 - Present    Heart murmur R01.1   11/10/2013 - Present    Carotid artery stenosis I65.29   2015 - Present    S/P insertion of iliac artery stent Z95.828   2015 - Present    Peripheral vascular disease (CMS-[facility]) I73.9   3/11/2016 - Present      Health Maintenance        Date Due Completion Dates    PAP SMEAR 1964 ---   " MAMMOGRAM 2/3/2017 2/3/2016, 5/15/2014, 2/12/2013, 9/23/2011, 6/4/2010, 6/4/2010, 1/29/2009, 1/29/2009, 9/4/2007, 9/4/2007, 6/27/2006    BONE DENSITY 2/13/2020 2/13/2015, 2/12/2013, 10/7/2011, 6/4/2010, 1/29/2009, 9/4/2007, 6/27/2006    IMM DTaP/Tdap/Td Vaccine (2 - Td) 1/17/2022 1/17/2012    COLONOSCOPY 2/2/2025 2/2/2017            Current Immunizations     13-VALENT PCV PREVNAR 12/31/2015 10:57 AM    Influenza TIV (IM) 10/28/2014, 11/13/2012    Influenza Vaccine Adult HD 12/31/2015 10:54 AM    Influenza Vaccine Pediatric 10/8/2010    Pneumococcal polysaccharide vaccine (PPSV-23) 9/9/2014    SHINGLES VACCINE 1/9/2012  2:53 PM    Tdap Vaccine 1/17/2012 10:25 AM      Below and/or attached are the medications your provider expects you to take. Review all of your home medications and newly ordered medications with your provider and/or pharmacist. Follow medication instructions as directed by your provider and/or pharmacist. Please keep your medication list with you and share with your provider. Update the information when medications are discontinued, doses are changed, or new medications (including over-the-counter products) are added; and carry medication information at all times in the event of emergency situations     Allergies:  PENICILLINS - Hives     LIPITOR - (reactions not documented)     OTHER ENVIRONMENTAL - Hives               Medications  Valid as of: June 06, 2017 -  3:01 PM    Generic Name Brand Name Tablet Size Instructions for use    AmLODIPine Besylate (Tab) NORVASC 10 MG Take 1 Tab by mouth every day.        Aspirin Buf(MoEfe-JvSyb-HvMoj) (Tab) ASCRIPTIN 325 MG Take 650 mg by mouth every day.        Calcium Carbonate-Vitamin D (Tab) OSCAL-250 250-125 MG-UNIT Take 1 Tab by mouth every day.          HydroCHLOROthiazide (Tab) HYDRODIURIL 25 MG Take 1 Tab by mouth every day.        HydrOXYzine HCl (Tab) ATARAX 25 MG Take 1 Tab by mouth at bedtime as needed for Itching.        Losartan Potassium (Tab)  COZAAR 100 MG Take 1 Tab by mouth every day.        Rosuvastatin Calcium (Tab) CRESTOR 10 MG TAKE 1 TABLET BY MOUTH IN THE EVENING        Simvastatin (Tab) ZOCOR 40 MG Take 1 Tab by mouth every evening.        Triamcinolone Acetonide (Cream) KENALOG 0.1 % AAA twice daily        .                 Medicines prescribed today were sent to:     [facility] - [place], [place] - [address]    [address] [place] [address]    Phone: [phone] Fax: [phone]    Open 24 Hours?: No    [facility] - [place], [place] - [address] AT PORTAL TO REGISTERED [facility]    [address] [facility] [address] [address]    Phone: [phone] Fax: [phone]    Open 24 Hours?: No      Medication refill instructions:       If your prescription bottle indicates you have medication refills left, it is not necessary to call your provider’s office. Please contact your pharmacy and they will refill your medication.    If your prescription bottle indicates you do not have any refills left, you may request refills at any time through one of the following ways: The online [employer] system (except Urgent Care), by calling your provider’s office, or by asking your pharmacy to contact your provider’s office with a refill request. Medication refills are processed only during regular business hours and may not be available until the next business day. Your provider may request additional information or to have a follow-up visit with you prior to refilling your medication.   *Please Note: Medication refills are assigned a new Rx number when refilled electronically. Your pharmacy may indicate that no refills were authorized even though a new prescription for the same medication is available at the pharmacy. Please request the medicine by name with the pharmacy before contacting your provider for a refill.           [employer] Access Code: [email or web address]  Expires: 6/16/2017  4:24 PM    [employer]  A secure, online tool to  manage your health information     [employer]’s Graftys® is a secure, online tool that connects you to your personalized health information from the privacy of your home -- day or night - making it very easy for you to manage your healthcare. Once the activation process is completed, you can even access your medical information using the Graftys savannah, which is available for free in the [employer] Savannah store or Google Play store.     Graftys provides the following levels of access (as shown below):   My Chart Features   [facility]  Specialists [facility]  Urgent  Care [facility]  Primary Care  Doctor   Email your healthcare team securely and privately 24/7 X X X    Manage appointments: schedule your next appointment; view details of past/upcoming appointments X      Request prescription refills. X      View recent personal medical records, including lab and immunizations X X X X   View health record, including health history, allergies, medications X X X X   Read reports about your outpatient visits, procedures, consult and ER notes X X X X   See your discharge summary, which is a recap of your hospital and/or ER visit that includes your diagnosis, lab results, and care plan. X X       How to register for Graftys:  1. Go to  [email or web address].  2. Click on the Sign Up Now box, which takes you to the New Member Sign Up page. You will need to provide the following information:  a. Enter your Graftys Access Code exactly as it appears at the top of this page. (You will not need to use this code after you’ve completed the sign-up process. If you do not sign up before the expiration date, you must request a new code.)   b. Enter your date of birth.   c. Enter your home email address.   d. Click Submit, and follow the next screen’s instructions.  3. Create a Graftys ID. This will be your Graftys login ID and cannot be changed, so think of one that is secure and easy to remember.  4. Create a [employer]  password. You can change your password at any time.  5. Enter your Password Reset Question and Answer. This can be used at a later time if you forget your password.   6. Enter your e-mail address. This allows you to receive e-mail notifications when new information is available in [employer].  7. Click Sign Up. You can now view your health information.    For assistance activating your [employer] account, call [phone]

## 2017-06-07 ENCOUNTER — HOSPITAL ENCOUNTER (OUTPATIENT)
Dept: RADIOLOGY | Facility: MEDICAL CENTER | Age: 72
End: 2017-06-07
Attending: NURSE PRACTITIONER
Payer: COMMERCIAL

## 2017-06-07 DIAGNOSIS — Z12.31 ENCOUNTER FOR SCREENING MAMMOGRAM FOR BREAST CANCER: ICD-10-CM

## 2017-06-07 PROCEDURE — G0202 SCR MAMMO BI INCL CAD: HCPCS

## 2017-06-14 DIAGNOSIS — M81.0 OSTEOPOROSIS, POST-MENOPAUSAL: ICD-10-CM

## 2017-06-14 NOTE — NON-PROVIDER
Sangita Bolton is a 71 y.o. female here for a non-provider visit for Prolia injection.    Reason for injection: Osteoporosis, post-menopausal   Order in MAR?: Yes  Patient supplied?:Yes  Minimum interval has been met for this injection (per MAR order): Yes    Order and dose verified by: TJF  Patient tolerated injection and no adverse effects were observed or reported: Yes    # of Administrations remaining in MAR: 0

## 2017-06-14 NOTE — PROGRESS NOTES
I have placed the below orders and discussed them with Dr. Lim. the MA is performing the below orders under the direction of Dr. Lim

## 2017-07-05 ENCOUNTER — RX ONLY (OUTPATIENT)
Age: 72
Setting detail: RX ONLY
End: 2017-07-05

## 2017-07-05 PROBLEM — D22.71 MELANOCYTIC NEVI OF RIGHT LOWER LIMB, INCLUDING HIP: Status: ACTIVE | Noted: 2017-07-05

## 2017-07-05 PROBLEM — D22.62 MELANOCYTIC NEVI OF LEFT UPPER LIMB, INCLUDING SHOULDER: Status: ACTIVE | Noted: 2017-07-05

## 2017-07-05 PROBLEM — D22.5 MELANOCYTIC NEVI OF TRUNK: Status: ACTIVE | Noted: 2017-07-05

## 2017-07-13 DIAGNOSIS — I10 ESSENTIAL HYPERTENSION: ICD-10-CM

## 2017-07-13 RX ORDER — AMLODIPINE BESYLATE 10 MG/1
10 TABLET ORAL DAILY
Qty: 90 TAB | Refills: 3 | Status: SHIPPED | OUTPATIENT
Start: 2017-07-13 | End: 2018-04-11 | Stop reason: SDUPTHER

## 2017-11-07 DIAGNOSIS — L30.9 DERMATITIS: ICD-10-CM

## 2017-11-08 RX ORDER — HYDROXYZINE HYDROCHLORIDE 25 MG/1
25 TABLET, FILM COATED ORAL NIGHTLY PRN
Qty: 30 TAB | Refills: 1 | Status: SHIPPED | OUTPATIENT
Start: 2017-11-08 | End: 2018-04-13 | Stop reason: SDUPTHER

## 2018-01-26 DIAGNOSIS — I10 ESSENTIAL HYPERTENSION: ICD-10-CM

## 2018-01-26 RX ORDER — LOSARTAN POTASSIUM 100 MG/1
100 TABLET ORAL DAILY
Qty: 90 TAB | Refills: 3 | Status: SHIPPED | OUTPATIENT
Start: 2018-01-26 | End: 2018-04-11 | Stop reason: SDUPTHER

## 2018-02-16 DIAGNOSIS — I77.9 BILATERAL CAROTID ARTERY DISEASE (HCC): ICD-10-CM

## 2018-02-20 RX ORDER — ROSUVASTATIN CALCIUM 10 MG/1
TABLET, COATED ORAL
Qty: 90 TAB | Refills: 3 | Status: SHIPPED | OUTPATIENT
Start: 2018-02-20 | End: 2018-04-11 | Stop reason: SDUPTHER

## 2018-04-11 ENCOUNTER — OFFICE VISIT (OUTPATIENT)
Dept: MEDICAL GROUP | Facility: MEDICAL CENTER | Age: 73
End: 2018-04-11
Payer: COMMERCIAL

## 2018-04-11 VITALS
OXYGEN SATURATION: 91 % | BODY MASS INDEX: 21.86 KG/M2 | HEIGHT: 63 IN | RESPIRATION RATE: 16 BRPM | TEMPERATURE: 99.6 F | SYSTOLIC BLOOD PRESSURE: 144 MMHG | DIASTOLIC BLOOD PRESSURE: 62 MMHG | HEART RATE: 86 BPM | WEIGHT: 123.35 LBS

## 2018-04-11 DIAGNOSIS — I10 ESSENTIAL HYPERTENSION: ICD-10-CM

## 2018-04-11 DIAGNOSIS — I77.9 BILATERAL CAROTID ARTERY DISEASE (HCC): ICD-10-CM

## 2018-04-11 DIAGNOSIS — I73.9 PERIPHERAL VASCULAR DISEASE (HCC): ICD-10-CM

## 2018-04-11 DIAGNOSIS — L85.3 DRY SKIN DERMATITIS: ICD-10-CM

## 2018-04-11 DIAGNOSIS — R60.0 BILATERAL EDEMA OF LOWER EXTREMITY: ICD-10-CM

## 2018-04-11 PROCEDURE — 99214 OFFICE O/P EST MOD 30 MIN: CPT | Mod: 25 | Performed by: FAMILY MEDICINE

## 2018-04-11 PROCEDURE — G0439 PPPS, SUBSEQ VISIT: HCPCS | Performed by: FAMILY MEDICINE

## 2018-04-11 RX ORDER — AMLODIPINE BESYLATE 10 MG/1
10 TABLET ORAL DAILY
Qty: 90 TAB | Refills: 3 | Status: SHIPPED | OUTPATIENT
Start: 2018-04-11 | End: 2019-06-10 | Stop reason: SDUPTHER

## 2018-04-11 RX ORDER — LOSARTAN POTASSIUM 100 MG/1
100 TABLET ORAL DAILY
Qty: 90 TAB | Refills: 3 | Status: SHIPPED | OUTPATIENT
Start: 2018-04-11 | End: 2018-05-11

## 2018-04-11 RX ORDER — HYDROCHLOROTHIAZIDE 25 MG/1
25 TABLET ORAL DAILY
Qty: 90 TAB | Refills: 3 | Status: SHIPPED | OUTPATIENT
Start: 2018-04-11 | End: 2018-05-11

## 2018-04-11 RX ORDER — ROSUVASTATIN CALCIUM 10 MG/1
TABLET, COATED ORAL
Qty: 90 TAB | Refills: 3 | Status: ON HOLD | OUTPATIENT
Start: 2018-04-11 | End: 2019-04-27

## 2018-04-11 ASSESSMENT — PATIENT HEALTH QUESTIONNAIRE - PHQ9: CLINICAL INTERPRETATION OF PHQ2 SCORE: 0

## 2018-04-11 NOTE — PROGRESS NOTES
This medical record contains text that has been entered with the assistance of computer voice recognition and dictation software.  Therefore, it may contain unintended errors in text, spelling, punctuation, or grammar        Chief Complaint   Patient presents with   • Establish Care   • Fifi Bolton is a 72 y.o. female here evaluation and management of: est care medicare wellness HTN HL h/o CAD chornic itchy skin from time to time when dry       HPI:     Her  worked internationally so she lived in [place] and [place]   She has been abscent from healthcare for a while ran out of 1 bp medication   No cp  No headache  No sob      Current Outpatient Prescriptions   Medication Sig Dispense Refill   • hydroCHLOROthiazide (HYDRODIURIL) 25 MG Tab Take 1 Tab by mouth every day. 90 Tab 3   • amLODIPine (NORVASC) 10 MG Tab Take 1 Tab by mouth every day. 90 Tab 3   • losartan (COZAAR) 100 MG Tab Take 1 Tab by mouth every day. 90 Tab 3   • rosuvastatin (CRESTOR) 10 MG Tab TAKE 1 TABLET BY MOUTH IN THE EVENING 90 Tab 3   • camphor-menthol (SARNA) 0.5-0.5 % lotion Apply pea sized amount to affected areas bid prn 1 Bottle 3   • hydrOXYzine HCl (ATARAX) 25 MG Tab Take 1 Tab by mouth at bedtime as needed for Itching. 30 Tab 1   • aspirin buffered (ASCRIPTIN) 325 MG TABS Take 650 mg by mouth every day.     • calcium-vitamin D (OSCAL-250) 250-125 MG-UNIT TABS Take 1 Tab by mouth every day.       • triamcinolone acetonide (KENALOG) 0.1 % Cream AAA twice daily 120 g 1     No current facility-administered medications for this visit.      Patient Active Problem List    Diagnosis Date Noted   • Dry skin dermatitis 04/11/2018   • Peripheral vascular disease (CMS-[facility]) 03/11/2016   • Carotid artery stenosis 12/31/2015   • S/P insertion of iliac artery stent 12/31/2015   • Carotid artery disease (CMS-[facility]) 11/10/2013   • Heart murmur 11/10/2013   • Hypertension 07/01/2013   • Lumbar foraminal stenosis 01/09/2013   • DDD  "(degenerative disc disease), lumbar 01/09/2013   • Osteoporosis, post-menopausal      Past Surgical History:   Procedure Laterality Date   • RECOVERY  2/9/2015    Performed by [facility] at SURGERY SAME DAY [facility] ORS   • FEMORAL ARTERY REPAIR  2/9/2015    Performed by Yuly Chirinos M.D. at SURGERY [facility] ORS   • HYSTEROSCOPY WITH VIDEO DIAGNOSTIC  11/17/2010    Performed by ALIS DEGROOT at SURGERY SAME DAY [facility] ORS   • DILATION AND CURETTAGE  11/17/2010    Performed by ALIS DEGROOT at SURGERY SAME DAY [facility] ORS   • HUMERUS ORIF  9/5/08    Performed by LAURA CARTER at SURGERY [facility] ORS   • COLONOSCOPY  2008    recheck 4 years   • OTHER ORTHOPEDIC SURGERY      rt leg fx   • OTHER ORTHOPEDIC SURGERY      broken right wrist      Social History   Substance Use Topics   • Smoking status: Current Some Day Smoker     Packs/day: 0.50     Years: 40.00     Types: Cigarettes     Start date: 2/2/1975   • Smokeless tobacco: Never Used   • Alcohol use Yes      Comment: 4/week     Family History   Problem Relation Age of Onset   • Cancer Mother      leukemia           ROS    all review of system completed and negative except for those listed above     Objective:     Blood pressure 144/62, pulse 86, temperature 37.6 °C (99.6 °F), resp. rate 16, height 1.6 m (5' 3\"), weight 56 kg (123 lb 5.6 oz), SpO2 91 %. Body mass index is 21.85 kg/m².  Physical Exam:    Constitutional: Alert, no distress.  Skin: Warm, dry, good turgor, no rashes in visible areas.  Eye: Equal, round and reactive, conjunctiva clear, lids normal.  ENMT: Lips without lesions, good dentition, oropharynx clear.  Neck: Trachea midline, no masses, no thyromegaly. No cervical or supraclavicular lymphadenopathy.  Respiratory: Unlabored respiratory effort, lungs clear to auscultation, no wheezes, no ronchi.  Cardiovascular: Normal S1, S2, no murmur, no edema.  Abdomen: Soft, non-tender, no masses, no " hepatosplenomegaly.  Psych: Alert and oriented x3, normal affect and mood.              Assessment and Plan:   The following treatment plan was discussed        Problem List Items Addressed This Visit     Hypertension     Will restart her HCTZ   Continue CCB and ARB    Labs follow up 1 mo                Relevant Medications    hydroCHLOROthiazide (HYDRODIURIL) 25 MG Tab    amLODIPine (NORVASC) 10 MG Tab    losartan (COZAAR) 100 MG Tab    rosuvastatin (CRESTOR) 10 MG Tab    Other Relevant Orders    BASIC METABOLIC PANEL    LDL, DIRECT    HDL CHOLESTEROL    CHOLESTEROL    Carotid artery disease (CMS-HCC)     Restart statin and asa             Relevant Medications    hydroCHLOROthiazide (HYDRODIURIL) 25 MG Tab    amLODIPine (NORVASC) 10 MG Tab    losartan (COZAAR) 100 MG Tab    rosuvastatin (CRESTOR) 10 MG Tab    Peripheral vascular disease (CMS-HCC)    Relevant Medications    hydroCHLOROthiazide (HYDRODIURIL) 25 MG Tab    amLODIPine (NORVASC) 10 MG Tab    losartan (COZAAR) 100 MG Tab    rosuvastatin (CRESTOR) 10 MG Tab    Dry skin dermatitis      Nl exam today  Can try sarna  rec lotion BID               Other Visit Diagnoses     Bilateral edema of lower extremity        Relevant Medications    hydroCHLOROthiazide (HYDRODIURIL) 25 MG Tab                Instructed to follow up if symptoms worsen or fail to improve, ER/UC precautions discussed as well    Claire Hernández MD  [facility], Family Medicine   [address] [place], [place] [address]   [place] [address]  Phone: [phone]

## 2018-04-13 DIAGNOSIS — L30.9 DERMATITIS: ICD-10-CM

## 2018-04-13 RX ORDER — HYDROXYZINE HYDROCHLORIDE 25 MG/1
TABLET, FILM COATED ORAL
Qty: 30 TAB | Refills: 0 | Status: SHIPPED | OUTPATIENT
Start: 2018-04-13 | End: 2018-08-27 | Stop reason: SDUPTHER

## 2018-05-11 ENCOUNTER — OFFICE VISIT (OUTPATIENT)
Dept: MEDICAL GROUP | Facility: MEDICAL CENTER | Age: 73
End: 2018-05-11
Payer: COMMERCIAL

## 2018-05-11 VITALS
TEMPERATURE: 98.7 F | BODY MASS INDEX: 21.79 KG/M2 | HEIGHT: 63 IN | RESPIRATION RATE: 14 BRPM | DIASTOLIC BLOOD PRESSURE: 60 MMHG | SYSTOLIC BLOOD PRESSURE: 140 MMHG | WEIGHT: 123 LBS | OXYGEN SATURATION: 95 % | HEART RATE: 80 BPM

## 2018-05-11 DIAGNOSIS — I10 HYPERTENSION, UNSPECIFIED TYPE: ICD-10-CM

## 2018-05-11 DIAGNOSIS — E87.1 LOW SODIUM LEVELS: ICD-10-CM

## 2018-05-11 LAB
BUN SERPL-MCNC: 19 MG/DL (ref 8–27)
BUN/CREAT SERPL: 20 (ref 12–28)
CALCIUM SERPL-MCNC: 10.4 MG/DL (ref 8.7–10.3)
CHLORIDE SERPL-SCNC: 84 MMOL/L (ref 96–106)
CHOLEST SERPL-MCNC: 247 MG/DL (ref 100–199)
CO2 SERPL-SCNC: 24 MMOL/L (ref 18–29)
CREAT SERPL-MCNC: 0.97 MG/DL (ref 0.57–1)
GFR SERPLBLD CREATININE-BSD FMLA CKD-EPI: 58 ML/MIN/1.73
GFR SERPLBLD CREATININE-BSD FMLA CKD-EPI: 67 ML/MIN/1.73
GLUCOSE SERPL-MCNC: 93 MG/DL (ref 65–99)
HDLC SERPL-MCNC: 150 MG/DL
LABORATORY COMMENT REPORT: NORMAL
LDLC SERPL DIRECT ASSAY-MCNC: 81 MG/DL (ref 0–99)
POTASSIUM SERPL-SCNC: 3.6 MMOL/L (ref 3.5–5.2)
SODIUM SERPL-SCNC: 129 MMOL/L (ref 134–144)

## 2018-05-11 PROCEDURE — 99214 OFFICE O/P EST MOD 30 MIN: CPT | Performed by: FAMILY MEDICINE

## 2018-05-11 RX ORDER — LOSARTAN POTASSIUM AND HYDROCHLOROTHIAZIDE 25; 100 MG/1; MG/1
1 TABLET ORAL DAILY
Qty: 90 TAB | Refills: 3 | Status: SHIPPED | OUTPATIENT
Start: 2018-05-11 | End: 2018-06-19

## 2018-05-11 RX ORDER — SPIRONOLACTONE 25 MG/1
25 TABLET ORAL DAILY
Qty: 30 TAB | Refills: 3 | Status: SHIPPED | OUTPATIENT
Start: 2018-05-11 | End: 2018-06-19

## 2018-05-11 NOTE — PROGRESS NOTES
This medical record contains text that has been entered with the assistance of computer voice recognition and dictation software.  Therefore, it may contain unintended errors in text, spelling, punctuation, or grammar        Chief Complaint   Patient presents with   • Establish Care   • Fifi Bolton is a 72 y.o. female here evaluation and management of:  HTN HL h/o CAD chronic itchy skin from time to time when dry       HPI:     Her  worked internationally so she lived in [place] [facility]   She has been abscent from healthcare for a while ran out of 1 bp medication   No cp  No headache  No sob    We increased bp med last visit  Here for follow up   Feels well no night sweats            Current Outpatient Prescriptions   Medication Sig Dispense Refill   • losartan-hydrochlorothiazide (HYZAAR) 100-25 MG per tablet Take 1 Tab by mouth every day. 90 Tab 3   • spironolactone (ALDACTONE) 25 MG Tab Take 1 Tab by mouth every day. 30 Tab 3   • amLODIPine (NORVASC) 10 MG Tab Take 1 Tab by mouth every day. 90 Tab 3   • rosuvastatin (CRESTOR) 10 MG Tab TAKE 1 TABLET BY MOUTH IN THE EVENING 90 Tab 3   • aspirin buffered (ASCRIPTIN) 325 MG TABS Take 650 mg by mouth every day.     • calcium-vitamin D (OSCAL-250) 250-125 MG-UNIT TABS Take 1 Tab by mouth every day.       • hydrOXYzine HCl (ATARAX) 25 MG Tab TAKE ONE TABLET BY MOUTH AT BEDTIME AS NEEDED FOR ITCHING 30 Tab 0   • camphor-menthol (SARNA) 0.5-0.5 % lotion Apply pea sized amount to affected areas bid prn 1 Bottle 3   • triamcinolone acetonide (KENALOG) 0.1 % Cream AAA twice daily 120 g 1     No current facility-administered medications for this visit.      Patient Active Problem List    Diagnosis Date Noted   • Dry skin dermatitis 04/11/2018   • Peripheral vascular disease (CMS-HCC) 03/11/2016   • Carotid artery stenosis 12/31/2015   • S/P insertion of iliac artery stent 12/31/2015   • Carotid artery disease (HCC) 11/10/2013   • Heart murmur  "11/10/2013   • Hypertension 07/01/2013   • Lumbar foraminal stenosis 01/09/2013   • DDD (degenerative disc disease), lumbar 01/09/2013   • Osteoporosis, post-menopausal      Past Surgical History:   Procedure Laterality Date   • RECOVERY  2/9/2015    Performed by [facility] at SURGERY SAME DAY [facility] ORS   • FEMORAL ARTERY REPAIR  2/9/2015    Performed by Yuly Chirinos M.D. at SURGERY [facility]   • HYSTEROSCOPY WITH VIDEO DIAGNOSTIC  11/17/2010    Performed by ALIS DEGROOT at SURGERY SAME DAY [facility] ORS   • DILATION AND CURETTAGE  11/17/2010    Performed by ALIS DEGROOT at SURGERY SAME DAY [facility] ORS   • HUMERUS ORIF  9/5/08    Performed by LAURA CARTER at SURGERY [facility]   • COLONOSCOPY  2008    recheck 4 years   • OTHER ORTHOPEDIC SURGERY      rt leg fx   • OTHER ORTHOPEDIC SURGERY      broken right wrist      Social History   Substance Use Topics   • Smoking status: Current Some Day Smoker     Packs/day: 0.50     Years: 40.00     Types: Cigarettes     Start date: 2/2/1975   • Smokeless tobacco: Never Used   • Alcohol use Yes      Comment: 4/week     Family History   Problem Relation Age of Onset   • Cancer Mother      leukemia           ROS    all review of system completed and negative except for those listed above     Objective:     Blood pressure 140/60, pulse 80, temperature 37.1 °C (98.7 °F), resp. rate 14, height 1.6 m (5' 3\"), weight 55.8 kg (123 lb), SpO2 95 %. Body mass index is 21.79 kg/m².  Physical Exam:    Constitutional: Alert, no distress.  Skin: Warm, dry, good turgor, no rashes in visible areas.  Eye: Equal, round and reactive, conjunctiva clear, lids normal.  ENMT: Lips without lesions, good dentition, oropharynx clear.  Neck: Trachea midline, no masses, no thyromegaly. No cervical or supraclavicular lymphadenopathy.  Respiratory: Unlabored respiratory effort, lungs clear to auscultation, no wheezes, no ronchi.  Cardiovascular: Normal S1, S2, " no murmur, no edema.  Abdomen: Soft, non-tender, no masses, no hepatosplenomegaly.  Psych: Alert and oriented x3, normal affect and mood.              Assessment and Plan:   The following treatment plan was discussed        Problem List Items Addressed This Visit     Hypertension     Will check renin/angiotensin  Will check morning cortisol  Start spirinolactone  Follow up1 mo   If we are not able to control bp will need to involve nephrology                  Relevant Medications    losartan-hydrochlorothiazide (HYZAAR) 100-25 MG per tablet    spironolactone (ALDACTONE) 25 MG Tab    Other Relevant Orders    RENIN ACTIVITY    ALDOSTERONE    BASIC METABOLIC PANEL    CORTISOL                Instructed to follow up if symptoms worsen or fail to improve, ER/UC precautions discussed as well    Clarie Hernández MD  [facility], Family Medicine   [address] [place] [address]   [place] [address]  Phone: [phone]

## 2018-05-11 NOTE — ASSESSMENT & PLAN NOTE
Will check renin/angiotensin  Will check morning cortisol  Start spirinolactone  Follow up1 mo   If we are not able to control bp will need to involve nephrology

## 2018-06-09 LAB
ALBUMIN SERPL-MCNC: 4.8 G/DL (ref 3.5–4.8)
ALBUMIN/GLOB SERPL: 1.5 {RATIO} (ref 1.2–2.2)
ALP SERPL-CCNC: 88 IU/L (ref 39–117)
ALT SERPL-CCNC: 30 IU/L (ref 0–32)
AST SERPL-CCNC: 34 IU/L (ref 0–40)
BASOPHILS # BLD AUTO: 0.1 X10E3/UL (ref 0–0.2)
BASOPHILS NFR BLD AUTO: 1 %
BILIRUB SERPL-MCNC: 0.8 MG/DL (ref 0–1.2)
BUN SERPL-MCNC: 29 MG/DL (ref 8–27)
BUN/CREAT SERPL: 23 (ref 12–28)
CALCIUM SERPL-MCNC: 10.6 MG/DL (ref 8.7–10.3)
CHLORIDE SERPL-SCNC: 86 MMOL/L (ref 96–106)
CHOLEST SERPL-MCNC: 230 MG/DL (ref 100–199)
CO2 SERPL-SCNC: 21 MMOL/L (ref 18–29)
CREAT SERPL-MCNC: 1.24 MG/DL (ref 0.57–1)
EOSINOPHIL # BLD AUTO: 0.5 X10E3/UL (ref 0–0.4)
EOSINOPHIL NFR BLD AUTO: 6 %
ERYTHROCYTE [DISTWIDTH] IN BLOOD BY AUTOMATED COUNT: 13.7 % (ref 12.3–15.4)
GFR SERPLBLD CREATININE-BSD FMLA CKD-EPI: 43 ML/MIN/1.73
GFR SERPLBLD CREATININE-BSD FMLA CKD-EPI: 49 ML/MIN/1.73
GLOBULIN SER CALC-MCNC: 3.3 G/DL (ref 1.5–4.5)
GLUCOSE SERPL-MCNC: 106 MG/DL (ref 65–99)
HCT VFR BLD AUTO: 42.3 % (ref 34–46.6)
HDLC SERPL-MCNC: 135 MG/DL
HGB BLD-MCNC: 15.3 G/DL (ref 11.1–15.9)
IMM GRANULOCYTES # BLD: 0 X10E3/UL (ref 0–0.1)
IMM GRANULOCYTES NFR BLD: 0 %
IMMATURE CELLS  115398: ABNORMAL
LABORATORY COMMENT REPORT: ABNORMAL
LDLC SERPL CALC-MCNC: 82 MG/DL (ref 0–99)
LYMPHOCYTES # BLD AUTO: 0.7 X10E3/UL (ref 0.7–3.1)
LYMPHOCYTES NFR BLD AUTO: 8 %
MCH RBC QN AUTO: 33.9 PG (ref 26.6–33)
MCHC RBC AUTO-ENTMCNC: 36.2 G/DL (ref 31.5–35.7)
MCV RBC AUTO: 94 FL (ref 79–97)
MONOCYTES # BLD AUTO: 0.9 X10E3/UL (ref 0.1–0.9)
MONOCYTES NFR BLD AUTO: 11 %
MORPHOLOGY BLD-IMP: ABNORMAL
NEUTROPHILS # BLD AUTO: 5.9 X10E3/UL (ref 1.4–7)
NEUTROPHILS NFR BLD AUTO: 74 %
NRBC BLD AUTO-RTO: ABNORMAL %
PLATELET # BLD AUTO: 295 X10E3/UL (ref 150–379)
POTASSIUM SERPL-SCNC: 4.5 MMOL/L (ref 3.5–5.2)
PROT SERPL-MCNC: 8.1 G/DL (ref 6–8.5)
RBC # BLD AUTO: 4.51 X10E6/UL (ref 3.77–5.28)
SODIUM SERPL-SCNC: 127 MMOL/L (ref 134–144)
TRIGL SERPL-MCNC: 67 MG/DL (ref 0–149)
VLDLC SERPL CALC-MCNC: 13 MG/DL (ref 5–40)
WBC # BLD AUTO: 8.1 X10E3/UL (ref 3.4–10.8)

## 2018-06-11 ENCOUNTER — OFFICE VISIT (OUTPATIENT)
Dept: MEDICAL GROUP | Facility: MEDICAL CENTER | Age: 73
End: 2018-06-11
Payer: COMMERCIAL

## 2018-06-11 VITALS
RESPIRATION RATE: 16 BRPM | TEMPERATURE: 99 F | SYSTOLIC BLOOD PRESSURE: 144 MMHG | HEIGHT: 63 IN | DIASTOLIC BLOOD PRESSURE: 60 MMHG | WEIGHT: 111 LBS | OXYGEN SATURATION: 94 % | BODY MASS INDEX: 19.67 KG/M2 | HEART RATE: 85 BPM

## 2018-06-11 DIAGNOSIS — I10 ESSENTIAL HYPERTENSION: ICD-10-CM

## 2018-06-11 PROCEDURE — 99214 OFFICE O/P EST MOD 30 MIN: CPT | Performed by: FAMILY MEDICINE

## 2018-06-11 NOTE — ASSESSMENT & PLAN NOTE
She did not do labs I ordered (work up for other cause of htn)   She is still not well controlled despite our interventions  (she has not been compliant with the spironolactone however)  Referral to nephrology   Follow up with me 3-4 mo after nephrology appointment   Encouraged her to do labs prior

## 2018-06-11 NOTE — PROGRESS NOTES
This medical record contains text that has been entered with the assistance of computer voice recognition and dictation software.  Therefore, it may contain unintended errors in text, spelling, punctuation, or grammar        Chief Complaint   Patient presents with   • Establish Care   • Fifi Bolton is a 72 y.o. female here evaluation and management of:  Follow up HTN HL h/o CAD       HPI:     Her  worked internationally so she [place] [facility]   She has been abscent from healthcare for a while ran out of 1 bp medication   No cp  No headache  No sob    We increased bp med a couple visits ago and started spirinolactone  She did not pick this up from pharmacy   Here for follow up   Feels well no night sweats  No headache  No angina        Current Outpatient Prescriptions   Medication Sig Dispense Refill   • losartan-hydrochlorothiazide (HYZAAR) 100-25 MG per tablet Take 1 Tab by mouth every day. 90 Tab 3   • amLODIPine (NORVASC) 10 MG Tab Take 1 Tab by mouth every day. 90 Tab 3   • rosuvastatin (CRESTOR) 10 MG Tab TAKE 1 TABLET BY MOUTH IN THE EVENING 90 Tab 3   • aspirin buffered (ASCRIPTIN) 325 MG TABS Take 650 mg by mouth every day.     • calcium-vitamin D (OSCAL-250) 250-125 MG-UNIT TABS Take 1 Tab by mouth every day.       • spironolactone (ALDACTONE) 25 MG Tab Take 1 Tab by mouth every day. 30 Tab 3   • hydrOXYzine HCl (ATARAX) 25 MG Tab TAKE ONE TABLET BY MOUTH AT BEDTIME AS NEEDED FOR ITCHING 30 Tab 0   • camphor-menthol (SARNA) 0.5-0.5 % lotion Apply pea sized amount to affected areas bid prn 1 Bottle 3   • triamcinolone acetonide (KENALOG) 0.1 % Cream AAA twice daily 120 g 1     No current facility-administered medications for this visit.      Patient Active Problem List    Diagnosis Date Noted   • Dry skin dermatitis 04/11/2018   • Peripheral vascular disease (CMS-HCC) 03/11/2016   • Carotid artery stenosis 12/31/2015   • S/P insertion of iliac artery stent 12/31/2015   •  "Carotid artery disease (HCC) 11/10/2013   • Heart murmur 11/10/2013   • Essential hypertension 07/01/2013   • Lumbar foraminal stenosis 01/09/2013   • DDD (degenerative disc disease), lumbar 01/09/2013   • Osteoporosis, post-menopausal      Past Surgical History:   Procedure Laterality Date   • RECOVERY  2/9/2015    Performed by -Recovery Surgery at SURGERY SAME DAY [facility]   • FEMORAL ARTERY REPAIR  2/9/2015    Performed by Yuly Chirinos M.D. at SURGERY [facility] ORS   • HYSTEROSCOPY WITH VIDEO DIAGNOSTIC  11/17/2010    Performed by ALIS DEGROOT at SURGERY SAME DAY [facility] ORS   • DILATION AND CURETTAGE  11/17/2010    Performed by ALIS DEGROOT at SURGERY SAME DAY [facility] ORS   • HUMERUS ORIF  9/5/08    Performed by LAURA CARTER at SURGERY [facility] ORS   • COLONOSCOPY  2008    recheck 4 years   • OTHER ORTHOPEDIC SURGERY      rt leg fx   • OTHER ORTHOPEDIC SURGERY      broken right wrist      Social History   Substance Use Topics   • Smoking status: Current Some Day Smoker     Packs/day: 0.50     Years: 40.00     Types: Cigarettes     Start date: 2/2/1975   • Smokeless tobacco: Never Used   • Alcohol use Yes      Comment: 4/week     Family History   Problem Relation Age of Onset   • Cancer Mother      leukemia           ROS    all review of system completed and negative except for those listed above     Objective:     Blood pressure 144/60, pulse 85, temperature 37.2 °C (99 °F), resp. rate 16, height 1.6 m (5' 3\"), weight 50.3 kg (111 lb), SpO2 94 %. Body mass index is 19.66 kg/m².  Physical Exam:    Constitutional: Alert, no distress.  Skin: Warm, dry, good turgor, no rashes in visible areas.  Eye: Equal, round and reactive, conjunctiva clear, lids normal.  ENMT: Lips without lesions, good dentition, oropharynx clear.  Neck: Trachea midline, no masses, no thyromegaly. No cervical or supraclavicular lymphadenopathy.  Respiratory: Unlabored respiratory effort, lungs clear to " auscultation, no wheezes, no ronchi.  Cardiovascular: Normal S1, S2, no murmur, no edema.  Abdomen: Soft, non-tender, no masses, no hepatosplenomegaly.  Psych: Alert and oriented x3, normal affect and mood.              Assessment and Plan:   The following treatment plan was discussed        Problem List Items Addressed This Visit     Essential hypertension     She did not do labs I ordered (work up for other cause of htn)   She is still not well controlled despite our interventions  (she has not been compliant with the spironolactone however)  Referral to nephrology   Follow up with me 3-4 mo after nephrology appointment   Encouraged her to do labs prior                    Relevant Orders    REFERRAL TO NEPHROLOGY                Instructed to follow up if symptoms worsen or fail to improve, ER/UC precautions discussed as well    Claire Hernández MD  [facility], Family Medicine   [address] [place] [address]   [place] [address]  Phone: [phone]

## 2018-06-14 LAB
ALBUMIN SERPL-MCNC: 5 G/DL (ref 3.5–4.8)
ALBUMIN/GLOB SERPL: 1.7 {RATIO} (ref 1.2–2.2)
ALDOST SERPL-MCNC: 26.8 NG/DL (ref 0–30)
ALP SERPL-CCNC: 90 IU/L (ref 39–117)
ALT SERPL-CCNC: 31 IU/L (ref 0–32)
AST SERPL-CCNC: 37 IU/L (ref 0–40)
BILIRUB SERPL-MCNC: 0.8 MG/DL (ref 0–1.2)
BUN SERPL-MCNC: 30 MG/DL (ref 8–27)
BUN/CREAT SERPL: 26 (ref 12–28)
CALCIUM SERPL-MCNC: 10.5 MG/DL (ref 8.7–10.3)
CHLORIDE SERPL-SCNC: 87 MMOL/L (ref 96–106)
CO2 SERPL-SCNC: 20 MMOL/L (ref 18–29)
CORTIS SERPL-MCNC: 23.7 UG/DL
CREAT SERPL-MCNC: 1.14 MG/DL (ref 0.57–1)
GFR SERPLBLD CREATININE-BSD FMLA CKD-EPI: 47 ML/MIN/1.73
GFR SERPLBLD CREATININE-BSD FMLA CKD-EPI: 55 ML/MIN/1.73
GLOBULIN SER CALC-MCNC: 3 G/DL (ref 1.5–4.5)
GLUCOSE SERPL-MCNC: 105 MG/DL (ref 65–99)
POTASSIUM SERPL-SCNC: 4.4 MMOL/L (ref 3.5–5.2)
PROT SERPL-MCNC: 8 G/DL (ref 6–8.5)
RENIN PLAS-CCNC: 35.1 NG/ML/HR (ref 0.17–5.38)
SODIUM SERPL-SCNC: 128 MMOL/L (ref 134–144)

## 2018-06-19 ENCOUNTER — OFFICE VISIT (OUTPATIENT)
Dept: NEPHROLOGY | Facility: MEDICAL CENTER | Age: 73
End: 2018-06-19
Payer: COMMERCIAL

## 2018-06-19 VITALS
BODY MASS INDEX: 19.31 KG/M2 | WEIGHT: 109 LBS | DIASTOLIC BLOOD PRESSURE: 62 MMHG | OXYGEN SATURATION: 99 % | RESPIRATION RATE: 14 BRPM | TEMPERATURE: 97.5 F | HEIGHT: 63 IN | HEART RATE: 74 BPM | SYSTOLIC BLOOD PRESSURE: 124 MMHG

## 2018-06-19 DIAGNOSIS — N17.9 AKI (ACUTE KIDNEY INJURY) (HCC): ICD-10-CM

## 2018-06-19 DIAGNOSIS — N18.30 STAGE 3 CHRONIC KIDNEY DISEASE (HCC): ICD-10-CM

## 2018-06-19 DIAGNOSIS — I10 ESSENTIAL HYPERTENSION: ICD-10-CM

## 2018-06-19 DIAGNOSIS — E87.1 HYPONATREMIA: ICD-10-CM

## 2018-06-19 PROCEDURE — 99204 OFFICE O/P NEW MOD 45 MIN: CPT | Performed by: INTERNAL MEDICINE

## 2018-06-19 RX ORDER — LOSARTAN POTASSIUM 100 MG/1
100 TABLET ORAL DAILY
Qty: 90 TAB | Refills: 3 | Status: SHIPPED | OUTPATIENT
Start: 2018-06-19 | End: 2019-05-29

## 2018-06-19 ASSESSMENT — ENCOUNTER SYMPTOMS
HYPERTENSION: 1
SHORTNESS OF BREATH: 0
NAUSEA: 0
VOMITING: 0
CHILLS: 0

## 2018-06-19 NOTE — PROGRESS NOTES
"Subjective:      Sangita Bolton is a 72 y.o. female who presents with Hypertension and Chronic Kidney Disease            Hypertension   This is a chronic problem. The current episode started more than 1 year ago. The problem has been waxing and waning since onset. The problem is controlled. Pertinent negatives include no chest pain, peripheral edema or shortness of breath. There are no associated agents to hypertension. Risk factors for coronary artery disease include post-menopausal state. Past treatments include angiotensin blockers and diuretics. The current treatment provides significant improvement. There are no compliance problems.  Hypertensive end-organ damage includes kidney disease. Identifiable causes of hypertension include chronic renal disease.   Chronic Kidney Disease   This is a chronic problem. The current episode started more than 1 year ago. The problem occurs constantly. The problem has been rapidly worsening. Pertinent negatives include no chest pain, chills, nausea, urinary symptoms or vomiting.       Review of Systems   Constitutional: Negative for chills.   Respiratory: Negative for shortness of breath.    Cardiovascular: Negative for chest pain and leg swelling.   Gastrointestinal: Negative for nausea and vomiting.   Genitourinary: Negative for dysuria, frequency and urgency.   All other systems reviewed and are negative.         Objective:     /62   Pulse 74   Temp 36.4 °C (97.5 °F)   Resp 14   Ht 1.6 m (5' 3\")   Wt 49.4 kg (109 lb)   SpO2 99%   BMI 19.31 kg/m²      Physical Exam   Constitutional: She appears well-developed and well-nourished. No distress.   HENT:   Head: Normocephalic and atraumatic.   Right Ear: External ear normal.   Left Ear: External ear normal.   Nose: Nose normal.   Mouth/Throat: No oropharyngeal exudate.   Eyes: Conjunctivae are normal. Right eye exhibits no discharge. Left eye exhibits no discharge. No scleral icterus.   Neck: Neck supple. No JVD " present. No tracheal deviation present. No thyromegaly present.   Cardiovascular: Normal rate, regular rhythm and normal heart sounds.    No murmur heard.  Pulmonary/Chest: Effort normal and breath sounds normal. No respiratory distress. She has no wheezes. She has no rales.   Abdominal: Soft. Bowel sounds are normal. She exhibits no distension. There is no tenderness.   Musculoskeletal: She exhibits no edema or tenderness.   Lymphadenopathy:     She has no cervical adenopathy.   Neurological: She is alert. No cranial nerve deficit.   Skin: Skin is warm and dry. She is not diaphoretic. No erythema.   Psychiatric: She has a normal mood and affect. Her behavior is normal. Thought content normal.   Nursing note and vitals reviewed.              Assessment/Plan:     1. Essential hypertension  Blood pressure is on the lower side  I advised the patient to cut down the diuretics  Continue low-sodium diet  Patient to check blood pressure at home regularly    2. RASHAD (acute kidney injury) (HCC)  Most likely prerenal component secondary to aggressive diuresis  Patient has no uremic symptoms  Renal dose all medication  Avoid nephrotoxins  No acute need for dialysis  Stopped diuretics  Recheck labs    3. Stage 3 chronic kidney disease  Secondary to hypertensive nephrosclerosis   No uremic symptoms  No acute need for dialysis    4. Hyponatremia  Most likely secondary to hydrochlorothiazide  Patient was advised to stop hydrochlorothiazide  Recheck labs

## 2018-07-27 NOTE — TELEPHONE ENCOUNTER
Was the patient seen in the last year in this department? Yes    Does patient have an active prescription for medications requested? No     Received Request Via: Pharmacy     Last visit:6/1/18

## 2018-07-30 RX ORDER — HYDROCHLOROTHIAZIDE 25 MG/1
TABLET ORAL
Qty: 90 TAB | Refills: 3 | Status: ON HOLD | OUTPATIENT
Start: 2018-07-30 | End: 2019-04-27

## 2018-08-27 DIAGNOSIS — L30.9 DERMATITIS: ICD-10-CM

## 2018-08-28 ENCOUNTER — OFFICE VISIT (OUTPATIENT)
Dept: NEPHROLOGY | Facility: MEDICAL CENTER | Age: 73
End: 2018-08-28
Payer: COMMERCIAL

## 2018-08-28 VITALS
WEIGHT: 114 LBS | HEART RATE: 92 BPM | OXYGEN SATURATION: 98 % | SYSTOLIC BLOOD PRESSURE: 122 MMHG | DIASTOLIC BLOOD PRESSURE: 62 MMHG | HEIGHT: 63 IN | BODY MASS INDEX: 20.2 KG/M2 | RESPIRATION RATE: 14 BRPM | TEMPERATURE: 98.2 F

## 2018-08-28 DIAGNOSIS — N17.9 AKI (ACUTE KIDNEY INJURY) (HCC): ICD-10-CM

## 2018-08-28 DIAGNOSIS — I10 ESSENTIAL HYPERTENSION: ICD-10-CM

## 2018-08-28 DIAGNOSIS — N18.9 CHRONIC KIDNEY DISEASE, UNSPECIFIED CKD STAGE: ICD-10-CM

## 2018-08-28 PROCEDURE — 99214 OFFICE O/P EST MOD 30 MIN: CPT | Performed by: INTERNAL MEDICINE

## 2018-08-28 ASSESSMENT — ENCOUNTER SYMPTOMS
NAUSEA: 0
SHORTNESS OF BREATH: 0
HYPERTENSION: 1
VOMITING: 0

## 2018-08-28 NOTE — PROGRESS NOTES
"Subjective:      Sangita Bolton is a 73 y.o. female who presents with Hypertension and Chronic Kidney Disease            Hypertension   This is a chronic problem. The current episode started more than 1 year ago. The problem is unchanged. The problem is controlled. Pertinent negatives include no chest pain, peripheral edema or shortness of breath. Risk factors for coronary artery disease include post-menopausal state. Past treatments include angiotensin blockers and diuretics. The current treatment provides significant improvement. There are no compliance problems.  Hypertensive end-organ damage includes kidney disease. Identifiable causes of hypertension include chronic renal disease.   Chronic Kidney Disease   This is a chronic problem. The current episode started more than 1 year ago. The problem occurs constantly. The problem has been unchanged. Pertinent negatives include no chest pain, nausea, urinary symptoms or vomiting.       Review of Systems   Respiratory: Negative for shortness of breath.    Cardiovascular: Negative for chest pain and leg swelling.   Gastrointestinal: Negative for nausea and vomiting.   Genitourinary: Negative for dysuria, frequency and urgency.          Objective:     /62   Pulse 92   Temp 36.8 °C (98.2 °F)   Resp 14   Ht 1.6 m (5' 3\")   Wt 51.7 kg (114 lb)   SpO2 98%   BMI 20.19 kg/m²      Physical Exam   Constitutional: She is oriented to person, place, and time. She appears well-developed and well-nourished. No distress.   HENT:   Head: Normocephalic and atraumatic.   Right Ear: External ear normal.   Left Ear: External ear normal.   Nose: Nose normal.   Eyes: Conjunctivae are normal. Right eye exhibits no discharge. Left eye exhibits no discharge. No scleral icterus.   Cardiovascular: Normal rate and regular rhythm.    Murmur heard.  Pulmonary/Chest: Effort normal and breath sounds normal. No respiratory distress.   Musculoskeletal: She exhibits no edema.   Neurological: " She is alert and oriented to person, place, and time.   Skin: Skin is warm. She is not diaphoretic.   Psychiatric: She has a normal mood and affect. Her behavior is normal.   Nursing note and vitals reviewed.              Assessment/Plan:     1. Essential hypertension  Blood pressure is controlled  Continue low-sodium diet    2. RASHAD (acute kidney injury) (HCC)  Unfortunately patient did not repeat blood test  We will check kidney function test    3. Chronic kidney disease, unspecified CKD stage  No uremic symptoms  Renal dose all medication  Avoid nephrotoxins

## 2018-08-29 RX ORDER — HYDROXYZINE HYDROCHLORIDE 25 MG/1
TABLET, FILM COATED ORAL
Qty: 30 TAB | Refills: 3 | Status: SHIPPED | OUTPATIENT
Start: 2018-08-29 | End: 2018-10-04 | Stop reason: SDUPTHER

## 2018-10-04 DIAGNOSIS — L30.9 DERMATITIS: ICD-10-CM

## 2018-10-04 RX ORDER — HYDROXYZINE HYDROCHLORIDE 25 MG/1
TABLET, FILM COATED ORAL
Qty: 30 TAB | Refills: 1 | Status: ON HOLD | OUTPATIENT
Start: 2018-10-04 | End: 2019-04-27

## 2019-04-27 ENCOUNTER — APPOINTMENT (OUTPATIENT)
Dept: RADIOLOGY | Facility: MEDICAL CENTER | Age: 74
DRG: 469 | End: 2019-04-27
Attending: EMERGENCY MEDICINE
Payer: MEDICARE

## 2019-04-27 ENCOUNTER — APPOINTMENT (OUTPATIENT)
Dept: RADIOLOGY | Facility: MEDICAL CENTER | Age: 74
DRG: 469 | End: 2019-04-27
Attending: ORTHOPAEDIC SURGERY
Payer: MEDICARE

## 2019-04-27 ENCOUNTER — HOSPITAL ENCOUNTER (INPATIENT)
Facility: MEDICAL CENTER | Age: 74
LOS: 5 days | DRG: 469 | End: 2019-05-02
Attending: EMERGENCY MEDICINE | Admitting: INTERNAL MEDICINE
Payer: MEDICARE

## 2019-04-27 DIAGNOSIS — S72.001A CLOSED FRACTURE OF NECK OF RIGHT FEMUR, INITIAL ENCOUNTER (HCC): ICD-10-CM

## 2019-04-27 DIAGNOSIS — S72.001A CLOSED FRACTURE OF RIGHT HIP, INITIAL ENCOUNTER (HCC): ICD-10-CM

## 2019-04-27 PROBLEM — F17.210 DEPENDENCE ON NICOTINE FROM CIGARETTES: Status: ACTIVE | Noted: 2019-04-27

## 2019-04-27 PROBLEM — E87.29 HIGH ANION GAP METABOLIC ACIDOSIS: Status: ACTIVE | Noted: 2019-04-27

## 2019-04-27 PROBLEM — F10.10 ALCOHOL ABUSE: Status: ACTIVE | Noted: 2019-04-27

## 2019-04-27 PROBLEM — E87.1 HYPONATREMIA: Status: ACTIVE | Noted: 2019-04-27

## 2019-04-27 PROBLEM — E87.6 HYPOKALEMIA: Status: ACTIVE | Noted: 2019-04-27

## 2019-04-27 PROBLEM — D72.829 LEUKOCYTOSIS: Status: ACTIVE | Noted: 2019-04-27

## 2019-04-27 LAB
ANION GAP SERPL CALC-SCNC: 20 MMOL/L (ref 0–11.9)
BASOPHILS # BLD AUTO: 0.3 % (ref 0–1.8)
BASOPHILS # BLD: 0.04 K/UL (ref 0–0.12)
BUN SERPL-MCNC: 15 MG/DL (ref 8–22)
CALCIUM SERPL-MCNC: 10.3 MG/DL (ref 8.5–10.5)
CHLORIDE SERPL-SCNC: 85 MMOL/L (ref 96–112)
CO2 SERPL-SCNC: 17 MMOL/L (ref 20–33)
CREAT SERPL-MCNC: 0.87 MG/DL (ref 0.5–1.4)
EOSINOPHIL # BLD AUTO: 0.04 K/UL (ref 0–0.51)
EOSINOPHIL NFR BLD: 0.3 % (ref 0–6.9)
ERYTHROCYTE [DISTWIDTH] IN BLOOD BY AUTOMATED COUNT: 38.4 FL (ref 35.9–50)
GLUCOSE SERPL-MCNC: 144 MG/DL (ref 65–99)
HCT VFR BLD AUTO: 37.4 % (ref 37–47)
HGB BLD-MCNC: 14 G/DL (ref 12–16)
IMM GRANULOCYTES # BLD AUTO: 0.06 K/UL (ref 0–0.11)
IMM GRANULOCYTES NFR BLD AUTO: 0.4 % (ref 0–0.9)
INR PPP: 0.91 (ref 0.87–1.13)
LYMPHOCYTES # BLD AUTO: 0.42 K/UL (ref 1–4.8)
LYMPHOCYTES NFR BLD: 2.8 % (ref 22–41)
MCH RBC QN AUTO: 33.8 PG (ref 27–33)
MCHC RBC AUTO-ENTMCNC: 37.4 G/DL (ref 33.6–35)
MCV RBC AUTO: 90.3 FL (ref 81.4–97.8)
MONOCYTES # BLD AUTO: 0.55 K/UL (ref 0–0.85)
MONOCYTES NFR BLD AUTO: 3.7 % (ref 0–13.4)
NEUTROPHILS # BLD AUTO: 13.86 K/UL (ref 2–7.15)
NEUTROPHILS NFR BLD: 92.5 % (ref 44–72)
NRBC # BLD AUTO: 0 K/UL
NRBC BLD-RTO: 0 /100 WBC
PLATELET # BLD AUTO: 324 K/UL (ref 164–446)
PMV BLD AUTO: 8.7 FL (ref 9–12.9)
POTASSIUM SERPL-SCNC: 2.7 MMOL/L (ref 3.6–5.5)
PROTHROMBIN TIME: 12.4 SEC (ref 12–14.6)
RBC # BLD AUTO: 4.14 M/UL (ref 4.2–5.4)
SODIUM SERPL-SCNC: 122 MMOL/L (ref 135–145)
WBC # BLD AUTO: 15 K/UL (ref 4.8–10.8)

## 2019-04-27 PROCEDURE — 700102 HCHG RX REV CODE 250 W/ 637 OVERRIDE(OP): Performed by: PHYSICIAN ASSISTANT

## 2019-04-27 PROCEDURE — 96365 THER/PROPH/DIAG IV INF INIT: CPT

## 2019-04-27 PROCEDURE — 80048 BASIC METABOLIC PNL TOTAL CA: CPT

## 2019-04-27 PROCEDURE — 770020 HCHG ROOM/CARE - TELE (206)

## 2019-04-27 PROCEDURE — 71045 X-RAY EXAM CHEST 1 VIEW: CPT

## 2019-04-27 PROCEDURE — 700105 HCHG RX REV CODE 258: Performed by: EMERGENCY MEDICINE

## 2019-04-27 PROCEDURE — 72170 X-RAY EXAM OF PELVIS: CPT

## 2019-04-27 PROCEDURE — 700111 HCHG RX REV CODE 636 W/ 250 OVERRIDE (IP): Performed by: EMERGENCY MEDICINE

## 2019-04-27 PROCEDURE — 85025 COMPLETE CBC W/AUTO DIFF WBC: CPT

## 2019-04-27 PROCEDURE — 73552 X-RAY EXAM OF FEMUR 2/>: CPT | Mod: RT

## 2019-04-27 PROCEDURE — 93970 EXTREMITY STUDY: CPT

## 2019-04-27 PROCEDURE — A9270 NON-COVERED ITEM OR SERVICE: HCPCS | Performed by: INTERNAL MEDICINE

## 2019-04-27 PROCEDURE — 96375 TX/PRO/DX INJ NEW DRUG ADDON: CPT

## 2019-04-27 PROCEDURE — 700111 HCHG RX REV CODE 636 W/ 250 OVERRIDE (IP): Performed by: INTERNAL MEDICINE

## 2019-04-27 PROCEDURE — A9270 NON-COVERED ITEM OR SERVICE: HCPCS | Performed by: PHYSICIAN ASSISTANT

## 2019-04-27 PROCEDURE — 93005 ELECTROCARDIOGRAM TRACING: CPT | Performed by: EMERGENCY MEDICINE

## 2019-04-27 PROCEDURE — 700101 HCHG RX REV CODE 250: Performed by: INTERNAL MEDICINE

## 2019-04-27 PROCEDURE — 99285 EMERGENCY DEPT VISIT HI MDM: CPT

## 2019-04-27 PROCEDURE — 99407 BEHAV CHNG SMOKING > 10 MIN: CPT | Performed by: INTERNAL MEDICINE

## 2019-04-27 PROCEDURE — 700102 HCHG RX REV CODE 250 W/ 637 OVERRIDE(OP): Performed by: INTERNAL MEDICINE

## 2019-04-27 PROCEDURE — 85610 PROTHROMBIN TIME: CPT

## 2019-04-27 PROCEDURE — 51701 INSERT BLADDER CATHETER: CPT

## 2019-04-27 PROCEDURE — 93970 EXTREMITY STUDY: CPT | Mod: 26 | Performed by: SURGERY

## 2019-04-27 PROCEDURE — 36415 COLL VENOUS BLD VENIPUNCTURE: CPT

## 2019-04-27 PROCEDURE — 99223 1ST HOSP IP/OBS HIGH 75: CPT | Mod: 25 | Performed by: INTERNAL MEDICINE

## 2019-04-27 RX ORDER — CHLORDIAZEPOXIDE HYDROCHLORIDE 25 MG/1
50 CAPSULE, GELATIN COATED ORAL EVERY 6 HOURS
Status: COMPLETED | OUTPATIENT
Start: 2019-04-27 | End: 2019-04-28

## 2019-04-27 RX ORDER — BISACODYL 10 MG
10 SUPPOSITORY, RECTAL RECTAL
Status: DISCONTINUED | OUTPATIENT
Start: 2019-04-27 | End: 2019-05-02 | Stop reason: HOSPADM

## 2019-04-27 RX ORDER — SODIUM CHLORIDE 9 MG/ML
2000 INJECTION, SOLUTION INTRAVENOUS ONCE
Status: COMPLETED | OUTPATIENT
Start: 2019-04-27 | End: 2019-04-27

## 2019-04-27 RX ORDER — CHLORDIAZEPOXIDE HYDROCHLORIDE 25 MG/1
25 CAPSULE, GELATIN COATED ORAL EVERY 6 HOURS
Status: DISCONTINUED | OUTPATIENT
Start: 2019-04-28 | End: 2019-04-29

## 2019-04-27 RX ORDER — ONDANSETRON 4 MG/1
4 TABLET, ORALLY DISINTEGRATING ORAL EVERY 4 HOURS PRN
Status: DISCONTINUED | OUTPATIENT
Start: 2019-04-27 | End: 2019-05-02 | Stop reason: HOSPADM

## 2019-04-27 RX ORDER — POTASSIUM CHLORIDE 7.45 MG/ML
10 INJECTION INTRAVENOUS
Status: COMPLETED | OUTPATIENT
Start: 2019-04-27 | End: 2019-04-27

## 2019-04-27 RX ORDER — FOLIC ACID 1 MG/1
1 TABLET ORAL DAILY
Status: COMPLETED | OUTPATIENT
Start: 2019-04-27 | End: 2019-04-30

## 2019-04-27 RX ORDER — POLYETHYLENE GLYCOL 3350 17 G/17G
1 POWDER, FOR SOLUTION ORAL
Status: DISCONTINUED | OUTPATIENT
Start: 2019-04-27 | End: 2019-05-02 | Stop reason: HOSPADM

## 2019-04-27 RX ORDER — ACETAMINOPHEN 325 MG/1
650 TABLET ORAL EVERY 6 HOURS PRN
Status: DISCONTINUED | OUTPATIENT
Start: 2019-04-27 | End: 2019-05-02 | Stop reason: HOSPADM

## 2019-04-27 RX ORDER — MORPHINE SULFATE 4 MG/ML
2 INJECTION, SOLUTION INTRAMUSCULAR; INTRAVENOUS EVERY 4 HOURS PRN
Status: DISCONTINUED | OUTPATIENT
Start: 2019-04-27 | End: 2019-05-02 | Stop reason: HOSPADM

## 2019-04-27 RX ORDER — ONDANSETRON 2 MG/ML
4 INJECTION INTRAMUSCULAR; INTRAVENOUS EVERY 4 HOURS PRN
Status: DISCONTINUED | OUTPATIENT
Start: 2019-04-27 | End: 2019-04-28

## 2019-04-27 RX ORDER — ROSUVASTATIN CALCIUM 10 MG/1
10 TABLET, COATED ORAL EVERY EVENING
Status: DISCONTINUED | OUTPATIENT
Start: 2019-04-27 | End: 2019-05-02 | Stop reason: HOSPADM

## 2019-04-27 RX ORDER — HEPARIN SODIUM 5000 [USP'U]/ML
5000 INJECTION, SOLUTION INTRAVENOUS; SUBCUTANEOUS EVERY 8 HOURS
Status: DISCONTINUED | OUTPATIENT
Start: 2019-04-27 | End: 2019-05-01

## 2019-04-27 RX ORDER — OXYCODONE HYDROCHLORIDE 5 MG/1
5 TABLET ORAL
Status: DISCONTINUED | OUTPATIENT
Start: 2019-04-27 | End: 2019-05-02 | Stop reason: HOSPADM

## 2019-04-27 RX ORDER — SODIUM CHLORIDE AND POTASSIUM CHLORIDE 150; 900 MG/100ML; MG/100ML
INJECTION, SOLUTION INTRAVENOUS CONTINUOUS
Status: DISCONTINUED | OUTPATIENT
Start: 2019-04-27 | End: 2019-04-29

## 2019-04-27 RX ORDER — AMOXICILLIN 250 MG
2 CAPSULE ORAL 2 TIMES DAILY
Status: DISCONTINUED | OUTPATIENT
Start: 2019-04-27 | End: 2019-05-02 | Stop reason: HOSPADM

## 2019-04-27 RX ORDER — OXYCODONE HYDROCHLORIDE 10 MG/1
10 TABLET ORAL
Status: DISCONTINUED | OUTPATIENT
Start: 2019-04-27 | End: 2019-05-02 | Stop reason: HOSPADM

## 2019-04-27 RX ORDER — THIAMINE MONONITRATE (VIT B1) 100 MG
100 TABLET ORAL DAILY
Status: COMPLETED | OUTPATIENT
Start: 2019-04-27 | End: 2019-04-30

## 2019-04-27 RX ADMIN — Medication 100 MG: at 10:33

## 2019-04-27 RX ADMIN — POTASSIUM CHLORIDE AND SODIUM CHLORIDE: 900; 150 INJECTION, SOLUTION INTRAVENOUS at 09:23

## 2019-04-27 RX ADMIN — ONDANSETRON 4 MG: 4 TABLET, ORALLY DISINTEGRATING ORAL at 17:09

## 2019-04-27 RX ADMIN — POTASSIUM CHLORIDE 10 MEQ: 7.46 INJECTION, SOLUTION INTRAVENOUS at 11:49

## 2019-04-27 RX ADMIN — OXYCODONE HYDROCHLORIDE 10 MG: 10 TABLET ORAL at 17:06

## 2019-04-27 RX ADMIN — HEPARIN SODIUM 5000 UNITS: 5000 INJECTION, SOLUTION INTRAVENOUS; SUBCUTANEOUS at 22:43

## 2019-04-27 RX ADMIN — CHLORDIAZEPOXIDE HYDROCHLORIDE 50 MG: 25 CAPSULE ORAL at 10:32

## 2019-04-27 RX ADMIN — FENTANYL CITRATE 50 MCG: 50 INJECTION, SOLUTION INTRAMUSCULAR; INTRAVENOUS at 07:19

## 2019-04-27 RX ADMIN — OXYCODONE HYDROCHLORIDE 10 MG: 10 TABLET ORAL at 12:55

## 2019-04-27 RX ADMIN — ROSUVASTATIN CALCIUM 10 MG: 20 TABLET, FILM COATED ORAL at 17:06

## 2019-04-27 RX ADMIN — HEPARIN SODIUM 5000 UNITS: 5000 INJECTION, SOLUTION INTRAVENOUS; SUBCUTANEOUS at 13:06

## 2019-04-27 RX ADMIN — POTASSIUM CHLORIDE 10 MEQ: 7.46 INJECTION, SOLUTION INTRAVENOUS at 10:35

## 2019-04-27 RX ADMIN — POTASSIUM CHLORIDE 10 MEQ: 7.46 INJECTION, SOLUTION INTRAVENOUS at 08:04

## 2019-04-27 RX ADMIN — POTASSIUM CHLORIDE AND SODIUM CHLORIDE: 900; 150 INJECTION, SOLUTION INTRAVENOUS at 22:41

## 2019-04-27 RX ADMIN — SENNOSIDES,DOCUSATE SODIUM 2 TABLET: 8.6; 5 TABLET, FILM COATED ORAL at 17:07

## 2019-04-27 RX ADMIN — FOLIC ACID 1 MG: 1 TABLET ORAL at 10:34

## 2019-04-27 RX ADMIN — CHLORDIAZEPOXIDE HYDROCHLORIDE 50 MG: 25 CAPSULE ORAL at 17:06

## 2019-04-27 RX ADMIN — THERA TABS 1 TABLET: TAB at 10:33

## 2019-04-27 RX ADMIN — MORPHINE SULFATE 2 MG: 4 INJECTION INTRAVENOUS at 09:32

## 2019-04-27 RX ADMIN — SODIUM CHLORIDE 2000 ML: 9 INJECTION, SOLUTION INTRAVENOUS at 07:55

## 2019-04-27 RX ADMIN — POTASSIUM CHLORIDE 10 MEQ: 7.46 INJECTION, SOLUTION INTRAVENOUS at 09:27

## 2019-04-27 RX ADMIN — ONDANSETRON 4 MG: 2 INJECTION INTRAMUSCULAR; INTRAVENOUS at 11:51

## 2019-04-27 RX ADMIN — MORPHINE SULFATE 2 MG: 4 INJECTION INTRAVENOUS at 14:41

## 2019-04-27 RX ADMIN — CHLORDIAZEPOXIDE HYDROCHLORIDE 50 MG: 25 CAPSULE ORAL at 22:40

## 2019-04-27 ASSESSMENT — COGNITIVE AND FUNCTIONAL STATUS - GENERAL
MOBILITY SCORE: 15
SUGGESTED CMS G CODE MODIFIER DAILY ACTIVITY: CN
TOILETING: TOTAL
CLIMB 3 TO 5 STEPS WITH RAILING: TOTAL
SUGGESTED CMS G CODE MODIFIER MOBILITY: CK
WALKING IN HOSPITAL ROOM: TOTAL
DRESSING REGULAR LOWER BODY CLOTHING: TOTAL
EATING MEALS: TOTAL
PERSONAL GROOMING: TOTAL
DRESSING REGULAR UPPER BODY CLOTHING: TOTAL
HELP NEEDED FOR BATHING: TOTAL
DAILY ACTIVITIY SCORE: 6
STANDING UP FROM CHAIR USING ARMS: TOTAL

## 2019-04-27 ASSESSMENT — LIFESTYLE VARIABLES
EVER_SMOKED: YES
TOTAL SCORE: 0
ANXIETY: NO ANXIETY (AT EASE)
ALCOHOL_USE: YES
HAVE YOU EVER FELT YOU SHOULD CUT DOWN ON YOUR DRINKING: NO
TOTAL SCORE: 4
AGITATION: NORMAL ACTIVITY
PAROXYSMAL SWEATS: NO SWEAT VISIBLE
TOTAL SCORE: 0
ORIENTATION AND CLOUDING OF SENSORIUM: DATE DISORIENTATION BY MORE THAN TWO CALENDAR DAYS
ANXIETY: NO ANXIETY (AT EASE)
TREMOR: NO TREMOR
AUDITORY DISTURBANCES: NOT PRESENT
TOTAL SCORE: 4
NAUSEA AND VOMITING: MILD NAUSEA WITH NO VOMITING
VISUAL DISTURBANCES: NOT PRESENT
ORIENTATION AND CLOUDING OF SENSORIUM: DATE DISORIENTATION BY MORE THAN TWO CALENDAR DAYS
HEADACHE, FULLNESS IN HEAD: NOT PRESENT
HEADACHE, FULLNESS IN HEAD: NOT PRESENT
AUDITORY DISTURBANCES: NOT PRESENT
NAUSEA AND VOMITING: MILD NAUSEA WITH NO VOMITING
TOTAL SCORE: 0
HOW MANY TIMES IN THE PAST YEAR HAVE YOU HAD 5 OR MORE DRINKS IN A DAY: 0
HAVE PEOPLE ANNOYED YOU BY CRITICIZING YOUR DRINKING: NO
AGITATION: NORMAL ACTIVITY
VISUAL DISTURBANCES: NOT PRESENT
TREMOR: NO TREMOR
PAROXYSMAL SWEATS: NO SWEAT VISIBLE
ON A TYPICAL DAY WHEN YOU DRINK ALCOHOL HOW MANY DRINKS DO YOU HAVE: 2
EVER HAD A DRINK FIRST THING IN THE MORNING TO STEADY YOUR NERVES TO GET RID OF A HANGOVER: NO
AVERAGE NUMBER OF DAYS PER WEEK YOU HAVE A DRINK CONTAINING ALCOHOL: 7
EVER FELT BAD OR GUILTY ABOUT YOUR DRINKING: NO
CONSUMPTION TOTAL: POSITIVE

## 2019-04-27 ASSESSMENT — ENCOUNTER SYMPTOMS
ORTHOPNEA: 0
NECK PAIN: 0
VOMITING: 0
HEARTBURN: 0
SEIZURES: 0
FALLS: 1
BLURRED VISION: 0
EYE REDNESS: 0
DEPRESSION: 0
FEVER: 0
WEIGHT LOSS: 0
NERVOUS/ANXIOUS: 0
DIZZINESS: 0
HEADACHES: 0
ABDOMINAL PAIN: 0
SPUTUM PRODUCTION: 0
PALPITATIONS: 0
NAUSEA: 0
SHORTNESS OF BREATH: 0
INSOMNIA: 0
COUGH: 0
DIARRHEA: 0
BACK PAIN: 0
CHILLS: 0
STRIDOR: 0
EYE DISCHARGE: 0
EYE PAIN: 0
FOCAL WEAKNESS: 0
MYALGIAS: 0

## 2019-04-27 ASSESSMENT — COPD QUESTIONNAIRES
IN THE PAST 12 MONTHS DO YOU DO LESS THAN YOU USED TO BECAUSE OF YOUR BREATHING PROBLEMS: DISAGREE/UNSURE
HAVE YOU SMOKED AT LEAST 100 CIGARETTES IN YOUR ENTIRE LIFE: YES
DO YOU EVER COUGH UP ANY MUCUS OR PHLEGM?: NO/ONLY WITH OCCASIONAL COLDS OR INFECTIONS
DURING THE PAST 4 WEEKS HOW MUCH DID YOU FEEL SHORT OF BREATH: NONE/LITTLE OF THE TIME
COPD SCREENING SCORE: 4

## 2019-04-27 ASSESSMENT — PATIENT HEALTH QUESTIONNAIRE - PHQ9
1. LITTLE INTEREST OR PLEASURE IN DOING THINGS: NOT AT ALL
SUM OF ALL RESPONSES TO PHQ9 QUESTIONS 1 AND 2: 0
2. FEELING DOWN, DEPRESSED, IRRITABLE, OR HOPELESS: NOT AT ALL

## 2019-04-27 NOTE — H&P
Hospital Medicine History & Physical Note    Date of Service  4/27/2019    Primary Care Physician  Claire Hernández M.D.    Consultants  ortho    Code Status  full    Chief Complaint  fall    History of Presenting Illness  73 y.o. female with history of chronic alcohol drinking, essential hypertension who presented 4/27/2019 with fall.  She stated that she woke up in the middle of the night to get up to use the restroom and suddenly she tripped and fell on her right hip.  Right after the fall she started having acute pain in the area.  Therefore she decided to come to ER for checkup.  In the ER she had imaging done and found to have Acute angulated and impacted fracture of the right femoral neck.  She stated that she did drink a few short scotch every day and her last drink was yesterday.  In the ER she was found to have hyponatremia, hypokalemia, anion gap metabolic acidosis.  She will will be given 2 L of IV fluid bolus followed by maintenance fluid.  She will be admitted for further management.    Review of Systems  Review of Systems   Constitutional: Negative for chills, fever and weight loss.   HENT: Negative for congestion and nosebleeds.    Eyes: Negative for blurred vision, pain, discharge and redness.   Respiratory: Negative for cough, sputum production, shortness of breath and stridor.    Cardiovascular: Negative for chest pain, palpitations and orthopnea.   Gastrointestinal: Negative for abdominal pain, diarrhea, heartburn, nausea and vomiting.   Genitourinary: Negative for dysuria, frequency and urgency.   Musculoskeletal: Positive for falls and joint pain. Negative for back pain, myalgias and neck pain.   Skin: Negative for itching and rash.   Neurological: Negative for dizziness, focal weakness, seizures and headaches.   Psychiatric/Behavioral: Negative for depression. The patient is not nervous/anxious and does not have insomnia.        Past Medical History   has a past medical history of Arthritis;  Carotid artery stenosis (12/31/2015); Gluten intolerance; Hypertension; OSTEOPOROSIS; Other specified symptom associated with female genital organs; Pain (02/06/15); and Unspecified cataract. She also has no past medical history of Arrhythmia; Asthma; Back pain; Breast cancer ([facility]); Bronchitis; Chronic airway obstruction, not elsewhere classified; Cold; Congestive heart failure ([facility]); Dental disorder; Encounter for renal dialysis; Fall; Glaucoma; Heart valve disease; Indigestion; Infectious disease; Jaundice; Myocardial infarct ([facility]); Other and unspecified angina pectoris; Pacemaker; Personal history of venous thrombosis and embolism; Pneumonia; Psychiatric problem; Renal disorder; Rheumatic fever; Seizure ([facility]); Type II or unspecified type diabetes mellitus without mention of complication, not stated as uncontrolled; Unspecified disorder of thyroid; Unspecified hemorrhagic conditions; or Unspecified urinary incontinence.    Surgical History   has a past surgical history that includes colonoscopy (2008); other orthopedic surgery; humerus orif (9/5/08); other orthopedic surgery; hysteroscopy with video diagnostic (11/17/2010); dilation and curettage (11/17/2010); recovery (2/9/2015); and femoral artery repair (2/9/2015).     Family History  family history includes Cancer in her mother.     Social History   reports that she has been smoking Cigarettes.  She started smoking about 44 years ago. She has a 20.00 pack-year smoking history. She has never used smokeless tobacco. She reports that she drinks alcohol. She reports that she does not use drugs.    Allergies  Allergies   Allergen Reactions   • Penicillins Hives     hands   • Lipitor [Atorvastatin]      myalgias   • Other Environmental Hives     Hair dye       Medications  Prior to Admission Medications   Prescriptions Last Dose Informant Patient Reported? Taking?   amLODIPine (NORVASC) 10 MG Tab   No No   Sig: Take 1 Tab by mouth every day.   aspirin buffered  (ASCRIPTIN) 325 MG TABS   Yes No   Sig: Take 650 mg by mouth every day.   calcium-vitamin D (OSCAL-250) 250-125 MG-UNIT TABS   Yes No   Sig: Take 1 Tab by mouth every day.     camphor-menthol (SARNA) 0.5-0.5 % lotion   No No   Sig: Apply pea sized amount to affected areas bid prn   hydrOXYzine HCl (ATARAX) 25 MG Tab   No No   Sig: TAKE ONE TABLET BY MOUTH AT BEDTIME AS NEEDED FOR ITCHING   hydroCHLOROthiazide (HYDRODIURIL) 25 MG Tab   No No   Sig: TAKE ONE (1) TABLET BY MOUTH EVERY DAY   losartan (COZAAR) 100 MG Tab   No No   Sig: Take 1 Tab by mouth every day.   rosuvastatin (CRESTOR) 10 MG Tab   No No   Sig: TAKE 1 TABLET BY MOUTH IN THE EVENING   triamcinolone acetonide (KENALOG) 0.1 % Cream   No No   Sig: AAA twice daily      Facility-Administered Medications: None       Physical Exam  Temp:  [36.7 °C (98.1 °F)] 36.7 °C (98.1 °F)  Pulse:  [78-85] 78  Resp:  [20-24] 24  BP: (116)/(62) 116/62  SpO2:  [99 %-100 %] 100 %    Physical Exam   Constitutional: She is oriented to person, place, and time. No distress.   HENT:   Head: Normocephalic and atraumatic.   Mouth/Throat: Oropharynx is clear and moist.   Eyes: Pupils are equal, round, and reactive to light. Conjunctivae and EOM are normal.   Neck: Normal range of motion. Neck supple. No tracheal deviation present. No thyromegaly present.   Cardiovascular: Normal rate and regular rhythm.    No murmur heard.  Pulmonary/Chest: Effort normal and breath sounds normal. No respiratory distress. She has no wheezes.   Abdominal: Soft. Bowel sounds are normal. She exhibits no distension. There is no tenderness.   Musculoskeletal: She exhibits tenderness. She exhibits no edema.   Right hip pain   Neurological: She is alert and oriented to person, place, and time. No cranial nerve deficit.   Skin: Skin is warm and dry. She is not diaphoretic. No erythema.   Psychiatric: She has a normal mood and affect. Her behavior is normal. Thought content normal.       Laboratory:  Recent  Labs      04/27/19   0652   WBC  15.0*   RBC  4.14*   HEMOGLOBIN  14.0   HEMATOCRIT  37.4   MCV  90.3   MCH  33.8*   MCHC  37.4*   RDW  38.4   PLATELETCT  324   MPV  8.7*     Recent Labs      04/27/19   0652   SODIUM  122*   POTASSIUM  2.7*   CHLORIDE  85*   CO2  17*   GLUCOSE  144*   BUN  15   CREATININE  0.87   CALCIUM  10.3     Recent Labs      04/27/19   0652   GLUCOSE  144*     Recent Labs      04/27/19   0652   INR  0.91             No results for input(s): TROPONINI in the last 72 hours.    Urinalysis:    No results found     Imaging:  DX-FEMUR-2+ RIGHT   Final Result            Acute impacted fracture of the right femoral neck with foreshortening      DX-PELVIS-1 OR 2 VIEWS   Final Result         Acute angulated and impacted fracture of the right femoral neck      DX-CHEST-PORTABLE (1 VIEW)   Final Result         Ill-defined nodular opacities in the right lung are favored to represent healing rib fractures rather than pulmonary nodules. CT could be helpful for confirmation.            Assessment/Plan:  I anticipate this patient will require at least two midnights for appropriate medical management, necessitating inpatient admission.    Closed fracture of neck of right femur (HCC)- (present on admission)   Assessment & Plan    Acute angulated and impacted fracture of the right femoral neck on x-ray  N.p.o.  Pain control  Orthopedic consulted  PT OT after surgery     High anion gap metabolic acidosis- (present on admission)   Assessment & Plan    Could be related to alcohol  Plan as above     Hyponatremia- (present on admission)   Assessment & Plan    On IV fluid with normal saline  Follow CMP in the morning     Hypokalemia- (present on admission)   Assessment & Plan    Repeating potassium as needed     Leukocytosis- (present on admission)   Assessment & Plan    Likely reactive     Dependence on nicotine from cigarettes- (present on admission)   Assessment & Plan    The patient smokes half a pack a day for her  whole life  Tobacco cessation education provided for more than 11 minutes, discussed options of nicotine patch, medical treatment with wellbutrin and chantix. Discussed the risks of smoking including increased risk of heart disease, stroke, cancer and COPD. Discussed the benefits of quitting smoking. Nicotine replacement protocol will be provided to the patient.     Alcohol abuse- (present on admission)   Assessment & Plan    Monitor closely for possible alcohol withdrawal symptoms         VTE prophylaxis: heparin

## 2019-04-27 NOTE — PROGRESS NOTES
Med rec complete per pt S/O   Pt has not filled scripts sine 11/2018 but states she takes them daily   Allergies reviewed  No oral ABX within last 30 days

## 2019-04-27 NOTE — ED TRIAGE NOTES
Pt bib ems c/o right hip pain. Pt states it started this morning with no fall or trauma occurring to cause this pain. Pt was given 4mg zofran and 100 mcg fentanyl IV enroute to hospital by ems. Upon assessment, right leg is slightly shorter and externally rotated. Pt hooked to monitor, resting on room air and changed into gown. Erp to see.

## 2019-04-27 NOTE — ASSESSMENT & PLAN NOTE
Monitored for possible alcohol withdrawal symptoms on tele  More than 4 days have passed from last drink

## 2019-04-27 NOTE — ED PROVIDER NOTES
ED Provider Note    Scribed for Gary Rogers M.D. by Nadia Romero. 4/27/2019  6:53 AM    Primary care provider: Claire Hernández M.D.  Means of arrival: Ambulance  History obtained from: Patient  History limited by: None    CHIEF COMPLAINT  Chief Complaint   Patient presents with   • Hip Pain     right hip       HPI  Sangita Bolton is a 73 y.o. female who was brought via EMS who presents to the Emergency Department with right hip pain secondary to a fall that occurred earlier today. Patient does not remember falling. She states she remembers being in her bed and then waking up on the floor. Her current pain is at a 7/10 on the pain scale. She denies chest pain, shortness of breath, headache, abdominal pain, fever, chills, vomiting, diarrhea, or any other injuries. She currently lives with her . Patient endorses being an everyday smoker and drinker. She drinks about three drinks per day.     REVIEW OF SYSTEMS  Pertinent positives include right hip pain, altered level of consciusness. Pertinent negatives include no chest pain, shortness of breath, headache, abdominal pain, fever, chills, vomiting, diarrhea.  All other systems reviewed and negative.    PAST MEDICAL HISTORY   has a past medical history of Arthritis; Carotid artery stenosis (12/31/2015); Gluten intolerance; Hypertension; OSTEOPOROSIS; Other specified symptom associated with female genital organs; Pain (02/06/15); and Unspecified cataract.    SURGICAL HISTORY   has a past surgical history that includes colonoscopy (2008); other orthopedic surgery; humerus orif (9/5/08); other orthopedic surgery; hysteroscopy with video diagnostic (11/17/2010); dilation and curettage (11/17/2010); recovery (2/9/2015); and femoral artery repair (2/9/2015).    SOCIAL HISTORY  Social History   Substance Use Topics   • Smoking status: Current Some Day Smoker     Packs/day: 0.50     Years: 40.00     Types: Cigarettes     Start date: 2/2/1975   • Smokeless tobacco:  "Never Used   • Alcohol use Yes      Comment: 4/week      History   Drug Use No       FAMILY HISTORY  Family History   Problem Relation Age of Onset   • Cancer Mother         leukemia       CURRENT MEDICATIONS  Home Medications    **Home medications have not yet been reviewed for this encounter**         ALLERGIES  Allergies   Allergen Reactions   • Penicillins Hives     hands   • Lipitor [Atorvastatin]      myalgias   • Other Environmental Hives     Hair dye       PHYSICAL EXAM  VITAL SIGNS: /62   Pulse 85   Temp 36.7 °C (98.1 °F) (Temporal)   Resp 20   Ht 1.6 m (5' 3\")   Wt 54.6 kg (120 lb 7.7 oz)   SpO2 99%   BMI 21.34 kg/m²     Vital signs reviewed.  Constitutional:  73 year old female who appears to be in pain.   Head: Pupils 3 mm minimally reactive  Mouth/Throat: Oropharynx dry  Cardiovascular: Regular rate and rhythm   Pulmonary/Chest: Clear to auscultation   Abdominal: Soft, nontender  Musculoskeletal: Right lower extremity rotated with marked pain. Neurovascular intact  Back: No cervical spine tenderness  Lymphadenopathy: No lymphadenopathy  Skin: Warm, dry, no rash    LABS  Results for orders placed or performed during the hospital encounter of 04/27/19   CBC WITH DIFFERENTIAL   Result Value Ref Range    WBC 15.0 (H) 4.8 - 10.8 K/uL    RBC 4.14 (L) 4.20 - 5.40 M/uL    Hemoglobin 14.0 12.0 - 16.0 g/dL    Hematocrit 37.4 37.0 - 47.0 %    MCV 90.3 81.4 - 97.8 fL    MCH 33.8 (H) 27.0 - 33.0 pg    MCHC 37.4 (H) 33.6 - 35.0 g/dL    RDW 38.4 35.9 - 50.0 fL    Platelet Count 324 164 - 446 K/uL    MPV 8.7 (L) 9.0 - 12.9 fL    Neutrophils-Polys 92.50 (H) 44.00 - 72.00 %    Lymphocytes 2.80 (L) 22.00 - 41.00 %    Monocytes 3.70 0.00 - 13.40 %    Eosinophils 0.30 0.00 - 6.90 %    Basophils 0.30 0.00 - 1.80 %    Immature Granulocytes 0.40 0.00 - 0.90 %    Nucleated RBC 0.00 /100 WBC    Neutrophils (Absolute) 13.86 (H) 2.00 - 7.15 K/uL    Lymphs (Absolute) 0.42 (L) 1.00 - 4.80 K/uL    Monos (Absolute) 0.55 " 0.00 - 0.85 K/uL    Eos (Absolute) 0.04 0.00 - 0.51 K/uL    Baso (Absolute) 0.04 0.00 - 0.12 K/uL    Immature Granulocytes (abs) 0.06 0.00 - 0.11 K/uL    NRBC (Absolute) 0.00 K/uL   BASIC METABOLIC PANEL   Result Value Ref Range    Sodium 122 (L) 135 - 145 mmol/L    Potassium 2.7 (LL) 3.6 - 5.5 mmol/L    Chloride 85 (L) 96 - 112 mmol/L    Co2 17 (L) 20 - 33 mmol/L    Glucose 144 (H) 65 - 99 mg/dL    Bun 15 8 - 22 mg/dL    Creatinine 0.87 0.50 - 1.40 mg/dL    Calcium 10.3 8.5 - 10.5 mg/dL    Anion Gap 20.0 (H) 0.0 - 11.9   PROTHROMBIN TIME (INR)   Result Value Ref Range    PT 12.4 12.0 - 14.6 sec    INR 0.91 0.87 - 1.13   ESTIMATED GFR   Result Value Ref Range    GFR If African American >60 >60 mL/min/1.73 m 2    GFR If Non African American >60 >60 mL/min/1.73 m 2   EKG (NOW)   Result Value Ref Range    Report       [facility] Emergency Dept.    Test Date:  2019  Pt Name:    AGUSTIN PORRAS                Department: ER  MRN:        [id number]                      Room:       [facility]  Gender:     Female                       Technician: [id number]  :        [id number]                   Requested By:HUSSEIN RODRIGUEZ  Order #:    [id number]                    Reading MD:    Measurements  Intervals                                Axis  Rate:       84                           P:          -17  TX:         144                          QRS:        -14  QRSD:       100                          T:          63  QT:         388  QTc:        459    Interpretive Statements  SINUS RHYTHM  ATRIAL PREMATURE COMPLEX  PROBABLE LVH WITH SECONDARY REPOL ABNRM  ARTIFACT IN LEAD(S) I,III,aVR,aVL,aVF,V3  Compared to ECG 10/21/2010 12:34:38  Atrial premature complex(es) now present       All labs reviewed by me.    EKG  12 Lead EKG interpreted by me to show sinus rhythm at 80. Normal P waves. Abnormal QRS with LVH. St depression in V5 and V6. Normal T waves. Abnormal EKG. Compared to an old EKG there is no acute  change.    RADIOLOGY  DX-FEMUR-2+ RIGHT   Final Result            Acute impacted fracture of the right femoral neck with foreshortening      DX-PELVIS-1 OR 2 VIEWS   Final Result         Acute angulated and impacted fracture of the right femoral neck      DX-CHEST-PORTABLE (1 VIEW)   Final Result         Ill-defined nodular opacities in the right lung are favored to represent healing rib fractures rather than pulmonary nodules. CT could be helpful for confirmation.        The radiologist's interpretation of all radiological studies have been reviewed by me.    COURSE & MEDICAL DECISION MAKING  Pertinent Labs & Imaging studies reviewed. (See chart for details) The patient's [facility] and past medical  records were reviewed    6:53 AM - Patient seen and examined at bedside. Ordered estimated GFR, CBC with differential, basic metabolic panel, prothrombin time, DX-chest-portable, DX-hip-complete-unilateral 2+  Right, DX-pelvis-1 or 2 views, IV saline lock, and an EKG to evaluate her symptoms. The differential diagnoses include but are not limited to: Fracture versus dislocation versus metabolic abnormality    7:30 AM - Ordered 50 mcg fentanyl injection.  Patient was given IV fluids as well as potassium.    7:45 AM - I discussed the patient's case and the above findings with Dr. Franky Zaidi (Hospitalist) who is going to admit the patient.     HYDRATION: Based on the patient's presentation of Dehydration as well as hypo-natremia the patient was given IV fluids. IV Hydration was used because oral hydration was not adequate alone. Upon recheck following hydration, the patient was improved.        DISPOSITION:  Patient will be admitted to Dr. Franky Zaidi in guarded condition.    FINAL IMPRESSION  1. Closed fracture of right hip, initial encounter ([facility])    2.  Hyponatremia  3.  Hypokalemia      Nadia MORA (Scribe), am scribing for, and in the presence of, Gary Rogers M.D..    Electronically signed by:  Nadia Romero (Scribe), 4/27/2019    IGary M.D. personally performed the services described in this documentation, as scribed by Nadia Romero in my presence, and it is both accurate and complete.    The note accurately reflects work and decisions made by me.  Gary Rogers  4/27/2019  11:31 AM

## 2019-04-27 NOTE — CONSULTS
ORTHOPEDIC SURGERY CONSULT NOTE  4/27/19    Reason for Consult  R hip pain    Consulting Physician  Dr. Rogers    HPI  73 y.o. female with h/o alcohol abuse, osteoporosis, and HTN presented to the ER after falling late last night.  The patient does not remember what happened but states that she woke up on the floor with R hip pain.  She states that she normally ambulates at baseline without assistive devices and denies previous right hip pain before the fall.  She states that she drinks about 3 drinks per day and smokes 1 ppd.  She lives with her .    PMH/PSH  Past Medical History:   Diagnosis Date   • Alcohol abuse 4/27/2019   • Arthritis     generalized-Osteo   • Carotid artery stenosis 12/31/2015   • Gluten intolerance    • Hypertension    • OSTEOPOROSIS    • Other specified symptom associated with female genital organs     post menaupausal bleeding   • Pain 02/06/15    bilateral legs-chronic>4/10   • Unspecified cataract       Past Surgical History:   Procedure Laterality Date   • RECOVERY  2/9/2015    Performed by [facility] at SURGERY SAME DAY [facility] ORS   • FEMORAL ARTERY REPAIR  2/9/2015    Performed by Yuly Chirinos M.D. at SURGERY [facility] ORS   • HYSTEROSCOPY WITH VIDEO DIAGNOSTIC  11/17/2010    Performed by ALIS DEGROOT at SURGERY SAME DAY [facility] ORS   • DILATION AND CURETTAGE  11/17/2010    Performed by ALIS DEGROOT at SURGERY SAME DAY [facility] ORS   • HUMERUS ORIF  9/5/08    Performed by LAURA CARTER at SURGERY [facility] ORS   • COLONOSCOPY  2008    recheck 4 years   • OTHER ORTHOPEDIC SURGERY      rt leg fx   • OTHER ORTHOPEDIC SURGERY      broken right wrist        Meds  Current Discharge Medication List      CONTINUE these medications which have NOT CHANGED    Details   hydrOXYzine HCl (ATARAX) 25 MG Tab TAKE ONE TABLET BY MOUTH AT BEDTIME AS NEEDED FOR ITCHING  Qty: 30 Tab, Refills: 1    Associated Diagnoses: Dermatitis      hydroCHLOROthiazide  "(HYDRODIURIL) 25 MG Tab TAKE ONE (1) TABLET BY MOUTH EVERY DAY  Qty: 90 Tab, Refills: 3      losartan (COZAAR) 100 MG Tab Take 1 Tab by mouth every day.  Qty: 90 Tab, Refills: 3    Associated Diagnoses: Essential hypertension; RASHAD (acute kidney injury) (HCC); Stage 3 chronic kidney disease (HCC); Hyponatremia      amLODIPine (NORVASC) 10 MG Tab Take 1 Tab by mouth every day.  Qty: 90 Tab, Refills: 3    Associated Diagnoses: Essential hypertension      rosuvastatin (CRESTOR) 10 MG Tab TAKE 1 TABLET BY MOUTH IN THE EVENING  Qty: 90 Tab, Refills: 3    Associated Diagnoses: Bilateral carotid artery disease (HCC)      camphor-menthol (SARNA) 0.5-0.5 % lotion Apply pea sized amount to affected areas bid prn  Qty: 1 Bottle, Refills: 3      triamcinolone acetonide (KENALOG) 0.1 % Cream AAA twice daily  Qty: 120 g, Refills: 1    Associated Diagnoses: Dermatitis      aspirin buffered (ASCRIPTIN) 325 MG TABS Take 650 mg by mouth every day.      calcium-vitamin D (OSCAL-250) 250-125 MG-UNIT TABS Take 1 Tab by mouth every day.                Allergies  Allergies   Allergen Reactions   • Penicillins Hives       hands   • Lipitor [Atorvastatin]         myalgias   • Other Environmental Hives       Hair dye          Social History  Smokinppd x 40 years.  Alcohol: 3 drinks per day      ROS  Constitutional: Denies fevers, chills, weight loss   ENT: negative for eye changes, oral lesions, difficulty swallowing   Cardiac: Denies chest pain or palpitations   Lungs: Denies cough or shortness of breath   GI: Negative except noted in the HPI   : Denies dysuria, hematuria   Skin: Denies rashes or other skin lesions   MSK: R hip pain  Lymph: Denies enlarged nodes, night sweats, abnormal bleeding   Psych: negative for depressed mood, anxiety   Neuro: negative for confusion, weakness, sensory changes      Physical Exam  /67   Pulse 91   Temp 36.8 °C (98.3 °F) (Temporal)   Resp 18   Ht 1.6 m (5' 3\")   Wt 54.6 kg (120 lb 7.7 oz)  "  SpO2 93%   BMI 21.34 kg/m²   General - alert, oriented, NAD  HEENT - AT/NC  CV - RRR  Respiratory - no increased work of breath  Abdominal - no bloating  RLE - leg shortened, skin intact over hip, but she does have a pressure ulcer posteriorly that is purple and non-blanchable, TTP over hip, + log roll, + stinchfield, 5/5 ehl/fhl/g/s/ta, SILT s/s/sp/dp/t, 1+ dp/pt, toes wwp    Laboratory:      Recent Labs      04/27/19   0652   WBC  15.0*   RBC  4.14*   HEMOGLOBIN  14.0   HEMATOCRIT  37.4   MCV  90.3   MCH  33.8*   MCHC  37.4*   RDW  38.4   PLATELETCT  324   MPV  8.7*      Recent Labs      04/27/19   0652   SODIUM  122*   POTASSIUM  2.7*   CHLORIDE  85*   CO2  17*   GLUCOSE  144*   BUN  15   CREATININE  0.87   CALCIUM  10.3          Recent Labs      04/27/19   0652   GLUCOSE  144*          Recent Labs      04/27/19   0652   INR  0.91            Imaging  XRAY R hip/femur - displaced femoral neck fracture       Assessment  73 y.o. female s/p GLF with R displaced femoral neck fracture, history of alcohol abuse and smoking    Plan  - please medically optimize prior to surgery  - NPO at midnight for surgery tomorrow (4/28/19), ok to eat today  - please get BLE ultrasound as patient was on the ground for an unknown period of time, r/o DVT  - please check UA/culture  - pain control per primary team        Felisha Ramirez MD

## 2019-04-27 NOTE — ASSESSMENT & PLAN NOTE
Acute angulated and impacted fracture of the right femoral neck on x-ray  Diet as tolerated   Pain control   Orthopedic consulted, status post surgery  PT OT > SNF

## 2019-04-28 ENCOUNTER — APPOINTMENT (OUTPATIENT)
Dept: RADIOLOGY | Facility: MEDICAL CENTER | Age: 74
DRG: 469 | End: 2019-04-28
Attending: ORTHOPAEDIC SURGERY
Payer: MEDICARE

## 2019-04-28 ENCOUNTER — ANESTHESIA EVENT (OUTPATIENT)
Dept: SURGERY | Facility: MEDICAL CENTER | Age: 74
DRG: 469 | End: 2019-04-28
Payer: MEDICARE

## 2019-04-28 ENCOUNTER — ANESTHESIA (OUTPATIENT)
Dept: SURGERY | Facility: MEDICAL CENTER | Age: 74
DRG: 469 | End: 2019-04-28
Payer: MEDICARE

## 2019-04-28 LAB
ALBUMIN SERPL BCP-MCNC: 3.9 G/DL (ref 3.2–4.9)
ALBUMIN/GLOB SERPL: 1.4 G/DL
ALP SERPL-CCNC: 88 U/L (ref 30–99)
ALT SERPL-CCNC: 11 U/L (ref 2–50)
ANION GAP SERPL CALC-SCNC: 11 MMOL/L (ref 0–11.9)
AST SERPL-CCNC: 20 U/L (ref 12–45)
BILIRUB SERPL-MCNC: 1 MG/DL (ref 0.1–1.5)
BUN SERPL-MCNC: 13 MG/DL (ref 8–22)
CALCIUM SERPL-MCNC: 9.3 MG/DL (ref 8.5–10.5)
CHLORIDE SERPL-SCNC: 93 MMOL/L (ref 96–112)
CO2 SERPL-SCNC: 24 MMOL/L (ref 20–33)
CREAT SERPL-MCNC: 0.85 MG/DL (ref 0.5–1.4)
ERYTHROCYTE [DISTWIDTH] IN BLOOD BY AUTOMATED COUNT: 41.9 FL (ref 35.9–50)
GLOBULIN SER CALC-MCNC: 2.8 G/DL (ref 1.9–3.5)
GLUCOSE SERPL-MCNC: 98 MG/DL (ref 65–99)
HCT VFR BLD AUTO: 37.1 % (ref 37–47)
HGB BLD-MCNC: 13 G/DL (ref 12–16)
MCH RBC QN AUTO: 32.7 PG (ref 27–33)
MCHC RBC AUTO-ENTMCNC: 35 G/DL (ref 33.6–35)
MCV RBC AUTO: 93.2 FL (ref 81.4–97.8)
PLATELET # BLD AUTO: 252 K/UL (ref 164–446)
PMV BLD AUTO: 8.9 FL (ref 9–12.9)
POTASSIUM SERPL-SCNC: 3.4 MMOL/L (ref 3.6–5.5)
PROT SERPL-MCNC: 6.7 G/DL (ref 6–8.2)
RBC # BLD AUTO: 3.98 M/UL (ref 4.2–5.4)
SODIUM SERPL-SCNC: 128 MMOL/L (ref 135–145)
WBC # BLD AUTO: 9.3 K/UL (ref 4.8–10.8)

## 2019-04-28 PROCEDURE — 0SRR01A REPLACEMENT OF RIGHT HIP JOINT, FEMORAL SURFACE WITH METAL SYNTHETIC SUBSTITUTE, UNCEMENTED, OPEN APPROACH: ICD-10-PCS | Performed by: ORTHOPAEDIC SURGERY

## 2019-04-28 PROCEDURE — 700111 HCHG RX REV CODE 636 W/ 250 OVERRIDE (IP): Performed by: ANESTHESIOLOGY

## 2019-04-28 PROCEDURE — 85027 COMPLETE CBC AUTOMATED: CPT

## 2019-04-28 PROCEDURE — 501838 HCHG SUTURE GENERAL: Performed by: ORTHOPAEDIC SURGERY

## 2019-04-28 PROCEDURE — 160002 HCHG RECOVERY MINUTES (STAT): Performed by: ORTHOPAEDIC SURGERY

## 2019-04-28 PROCEDURE — 700101 HCHG RX REV CODE 250: Performed by: ANESTHESIOLOGY

## 2019-04-28 PROCEDURE — 700111 HCHG RX REV CODE 636 W/ 250 OVERRIDE (IP): Performed by: HOSPITALIST

## 2019-04-28 PROCEDURE — 160022 HCHG BLOCK: Performed by: ORTHOPAEDIC SURGERY

## 2019-04-28 PROCEDURE — 160035 HCHG PACU - 1ST 60 MINS PHASE I: Performed by: ORTHOPAEDIC SURGERY

## 2019-04-28 PROCEDURE — 160036 HCHG PACU - EA ADDL 30 MINS PHASE I: Performed by: ORTHOPAEDIC SURGERY

## 2019-04-28 PROCEDURE — 36415 COLL VENOUS BLD VENIPUNCTURE: CPT

## 2019-04-28 PROCEDURE — A9270 NON-COVERED ITEM OR SERVICE: HCPCS | Performed by: ANESTHESIOLOGY

## 2019-04-28 PROCEDURE — 700101 HCHG RX REV CODE 250

## 2019-04-28 PROCEDURE — 160009 HCHG ANES TIME/MIN: Performed by: ORTHOPAEDIC SURGERY

## 2019-04-28 PROCEDURE — 700111 HCHG RX REV CODE 636 W/ 250 OVERRIDE (IP): Performed by: INTERNAL MEDICINE

## 2019-04-28 PROCEDURE — A9270 NON-COVERED ITEM OR SERVICE: HCPCS | Performed by: INTERNAL MEDICINE

## 2019-04-28 PROCEDURE — 80053 COMPREHEN METABOLIC PANEL: CPT

## 2019-04-28 PROCEDURE — 502240 HCHG MISC OR SUPPLY RC 0272: Performed by: ORTHOPAEDIC SURGERY

## 2019-04-28 PROCEDURE — 700102 HCHG RX REV CODE 250 W/ 637 OVERRIDE(OP): Performed by: ANESTHESIOLOGY

## 2019-04-28 PROCEDURE — 160042 HCHG SURGERY MINUTES - EA ADDL 1 MIN LEVEL 5: Performed by: ORTHOPAEDIC SURGERY

## 2019-04-28 PROCEDURE — 700105 HCHG RX REV CODE 258: Performed by: ANESTHESIOLOGY

## 2019-04-28 PROCEDURE — 160048 HCHG OR STATISTICAL LEVEL 1-5: Performed by: ORTHOPAEDIC SURGERY

## 2019-04-28 PROCEDURE — 700101 HCHG RX REV CODE 250: Performed by: INTERNAL MEDICINE

## 2019-04-28 PROCEDURE — 502000 HCHG MISC OR IMPLANTS RC 0278: Performed by: ORTHOPAEDIC SURGERY

## 2019-04-28 PROCEDURE — 770020 HCHG ROOM/CARE - TELE (206)

## 2019-04-28 PROCEDURE — 160031 HCHG SURGERY MINUTES - 1ST 30 MINS LEVEL 5: Performed by: ORTHOPAEDIC SURGERY

## 2019-04-28 PROCEDURE — 99232 SBSQ HOSP IP/OBS MODERATE 35: CPT | Performed by: HOSPITALIST

## 2019-04-28 PROCEDURE — 72170 X-RAY EXAM OF PELVIS: CPT

## 2019-04-28 PROCEDURE — 700102 HCHG RX REV CODE 250 W/ 637 OVERRIDE(OP): Performed by: INTERNAL MEDICINE

## 2019-04-28 DEVICE — IMPLANT TANDEM BIPOLAR COCR 46OD 28ID: Type: IMPLANTABLE DEVICE | Site: HIP | Status: FUNCTIONAL

## 2019-04-28 DEVICE — IMPLANTABLE DEVICE: Type: IMPLANTABLE DEVICE | Site: HIP | Status: FUNCTIONAL

## 2019-04-28 DEVICE — STEM POLAR CEMENTLESS WITH COLLAR 2: Type: IMPLANTABLE DEVICE | Site: HIP | Status: FUNCTIONAL

## 2019-04-28 DEVICE — IMPLANT COCR 12/14 FEM HEAD 28 +0: Type: IMPLANTABLE DEVICE | Site: HIP | Status: FUNCTIONAL

## 2019-04-28 RX ORDER — OXYCODONE HCL 5 MG/5 ML
10 SOLUTION, ORAL ORAL
Status: DISCONTINUED | OUTPATIENT
Start: 2019-04-28 | End: 2019-04-28 | Stop reason: HOSPADM

## 2019-04-28 RX ORDER — SODIUM CHLORIDE, SODIUM LACTATE, POTASSIUM CHLORIDE, CALCIUM CHLORIDE 600; 310; 30; 20 MG/100ML; MG/100ML; MG/100ML; MG/100ML
INJECTION, SOLUTION INTRAVENOUS CONTINUOUS
Status: DISCONTINUED | OUTPATIENT
Start: 2019-04-28 | End: 2019-04-28 | Stop reason: HOSPADM

## 2019-04-28 RX ORDER — HALOPERIDOL 5 MG/ML
1 INJECTION INTRAMUSCULAR EVERY 6 HOURS PRN
Status: DISCONTINUED | OUTPATIENT
Start: 2019-04-28 | End: 2019-05-02 | Stop reason: HOSPADM

## 2019-04-28 RX ORDER — IPRATROPIUM BROMIDE AND ALBUTEROL SULFATE 2.5; .5 MG/3ML; MG/3ML
3 SOLUTION RESPIRATORY (INHALATION)
Status: DISCONTINUED | OUTPATIENT
Start: 2019-04-28 | End: 2019-04-28 | Stop reason: HOSPADM

## 2019-04-28 RX ORDER — PHENYLEPHRINE HYDROCHLORIDE 10 MG/ML
INJECTION, SOLUTION INTRAMUSCULAR; INTRAVENOUS; SUBCUTANEOUS PRN
Status: DISCONTINUED | OUTPATIENT
Start: 2019-04-28 | End: 2019-04-28 | Stop reason: SURG

## 2019-04-28 RX ORDER — ONDANSETRON 2 MG/ML
4 INJECTION INTRAMUSCULAR; INTRAVENOUS
Status: DISCONTINUED | OUTPATIENT
Start: 2019-04-28 | End: 2019-04-28 | Stop reason: HOSPADM

## 2019-04-28 RX ORDER — POTASSIUM CHLORIDE 7.45 MG/ML
10 INJECTION INTRAVENOUS
Status: COMPLETED | OUTPATIENT
Start: 2019-04-28 | End: 2019-04-28

## 2019-04-28 RX ORDER — HYDROMORPHONE HYDROCHLORIDE 1 MG/ML
0.5 INJECTION, SOLUTION INTRAMUSCULAR; INTRAVENOUS; SUBCUTANEOUS
Status: DISCONTINUED | OUTPATIENT
Start: 2019-04-28 | End: 2019-04-28 | Stop reason: HOSPADM

## 2019-04-28 RX ORDER — MEPERIDINE HYDROCHLORIDE 25 MG/ML
12.5 INJECTION INTRAMUSCULAR; INTRAVENOUS; SUBCUTANEOUS
Status: DISCONTINUED | OUTPATIENT
Start: 2019-04-28 | End: 2019-04-28 | Stop reason: HOSPADM

## 2019-04-28 RX ORDER — ONDANSETRON 2 MG/ML
INJECTION INTRAMUSCULAR; INTRAVENOUS PRN
Status: DISCONTINUED | OUTPATIENT
Start: 2019-04-28 | End: 2019-04-28 | Stop reason: SURG

## 2019-04-28 RX ORDER — TRANEXAMIC ACID 100 MG/ML
INJECTION, SOLUTION INTRAVENOUS PRN
Status: DISCONTINUED | OUTPATIENT
Start: 2019-04-28 | End: 2019-04-28 | Stop reason: SURG

## 2019-04-28 RX ORDER — CEFAZOLIN SODIUM 1 G/3ML
INJECTION, POWDER, FOR SOLUTION INTRAMUSCULAR; INTRAVENOUS PRN
Status: DISCONTINUED | OUTPATIENT
Start: 2019-04-28 | End: 2019-04-28 | Stop reason: SURG

## 2019-04-28 RX ORDER — MIDAZOLAM HYDROCHLORIDE 1 MG/ML
1 INJECTION INTRAMUSCULAR; INTRAVENOUS
Status: DISCONTINUED | OUTPATIENT
Start: 2019-04-28 | End: 2019-04-28 | Stop reason: HOSPADM

## 2019-04-28 RX ORDER — DEXAMETHASONE SODIUM PHOSPHATE 4 MG/ML
INJECTION, SOLUTION INTRA-ARTICULAR; INTRALESIONAL; INTRAMUSCULAR; INTRAVENOUS; SOFT TISSUE PRN
Status: DISCONTINUED | OUTPATIENT
Start: 2019-04-28 | End: 2019-04-28 | Stop reason: SURG

## 2019-04-28 RX ORDER — HYDROMORPHONE HYDROCHLORIDE 1 MG/ML
0.2 INJECTION, SOLUTION INTRAMUSCULAR; INTRAVENOUS; SUBCUTANEOUS
Status: DISCONTINUED | OUTPATIENT
Start: 2019-04-28 | End: 2019-04-28 | Stop reason: HOSPADM

## 2019-04-28 RX ORDER — SCOLOPAMINE TRANSDERMAL SYSTEM 1 MG/1
1 PATCH, EXTENDED RELEASE TRANSDERMAL
Status: DISCONTINUED | OUTPATIENT
Start: 2019-04-28 | End: 2019-05-02 | Stop reason: HOSPADM

## 2019-04-28 RX ORDER — CELECOXIB 200 MG/1
200 CAPSULE ORAL ONCE
Status: COMPLETED | OUTPATIENT
Start: 2019-04-28 | End: 2019-04-28

## 2019-04-28 RX ORDER — HALOPERIDOL 5 MG/ML
1 INJECTION INTRAMUSCULAR
Status: DISCONTINUED | OUTPATIENT
Start: 2019-04-28 | End: 2019-04-28 | Stop reason: HOSPADM

## 2019-04-28 RX ORDER — HYDROMORPHONE HYDROCHLORIDE 2 MG/ML
INJECTION, SOLUTION INTRAMUSCULAR; INTRAVENOUS; SUBCUTANEOUS PRN
Status: DISCONTINUED | OUTPATIENT
Start: 2019-04-28 | End: 2019-04-28 | Stop reason: SURG

## 2019-04-28 RX ORDER — HYDRALAZINE HYDROCHLORIDE 20 MG/ML
5 INJECTION INTRAMUSCULAR; INTRAVENOUS
Status: DISCONTINUED | OUTPATIENT
Start: 2019-04-28 | End: 2019-04-28 | Stop reason: HOSPADM

## 2019-04-28 RX ORDER — HYDROMORPHONE HYDROCHLORIDE 1 MG/ML
0.4 INJECTION, SOLUTION INTRAMUSCULAR; INTRAVENOUS; SUBCUTANEOUS
Status: DISCONTINUED | OUTPATIENT
Start: 2019-04-28 | End: 2019-04-28 | Stop reason: HOSPADM

## 2019-04-28 RX ORDER — OXYCODONE HCL 5 MG/5 ML
5 SOLUTION, ORAL ORAL
Status: DISCONTINUED | OUTPATIENT
Start: 2019-04-28 | End: 2019-04-28 | Stop reason: HOSPADM

## 2019-04-28 RX ORDER — BUPIVACAINE HYDROCHLORIDE 2.5 MG/ML
INJECTION, SOLUTION EPIDURAL; INFILTRATION; INTRACAUDAL PRN
Status: DISCONTINUED | OUTPATIENT
Start: 2019-04-28 | End: 2019-04-28 | Stop reason: SURG

## 2019-04-28 RX ORDER — ACETAMINOPHEN 500 MG
1000 TABLET ORAL ONCE
Status: COMPLETED | OUTPATIENT
Start: 2019-04-28 | End: 2019-04-28

## 2019-04-28 RX ORDER — ONDANSETRON 2 MG/ML
4 INJECTION INTRAMUSCULAR; INTRAVENOUS EVERY 4 HOURS PRN
Status: DISCONTINUED | OUTPATIENT
Start: 2019-04-28 | End: 2019-05-02 | Stop reason: HOSPADM

## 2019-04-28 RX ORDER — GABAPENTIN 300 MG/1
300 CAPSULE ORAL ONCE
Status: COMPLETED | OUTPATIENT
Start: 2019-04-28 | End: 2019-04-28

## 2019-04-28 RX ORDER — DEXAMETHASONE SODIUM PHOSPHATE 4 MG/ML
4 INJECTION, SOLUTION INTRA-ARTICULAR; INTRALESIONAL; INTRAMUSCULAR; INTRAVENOUS; SOFT TISSUE
Status: DISCONTINUED | OUTPATIENT
Start: 2019-04-28 | End: 2019-05-02 | Stop reason: HOSPADM

## 2019-04-28 RX ORDER — SODIUM CHLORIDE, SODIUM LACTATE, POTASSIUM CHLORIDE, CALCIUM CHLORIDE 600; 310; 30; 20 MG/100ML; MG/100ML; MG/100ML; MG/100ML
INJECTION, SOLUTION INTRAVENOUS
Status: DISCONTINUED | OUTPATIENT
Start: 2019-04-28 | End: 2019-04-28 | Stop reason: SURG

## 2019-04-28 RX ADMIN — CHLORDIAZEPOXIDE HYDROCHLORIDE 25 MG: 25 CAPSULE ORAL at 20:26

## 2019-04-28 RX ADMIN — POTASSIUM CHLORIDE 10 MEQ: 7.46 INJECTION, SOLUTION INTRAVENOUS at 08:34

## 2019-04-28 RX ADMIN — ONDANSETRON 4 MG: 2 INJECTION INTRAMUSCULAR; INTRAVENOUS at 13:54

## 2019-04-28 RX ADMIN — HYDROMORPHONE HYDROCHLORIDE 0.5 MG: 2 INJECTION, SOLUTION INTRAMUSCULAR; INTRAVENOUS; SUBCUTANEOUS at 12:46

## 2019-04-28 RX ADMIN — FOLIC ACID 1 MG: 1 TABLET ORAL at 06:03

## 2019-04-28 RX ADMIN — CEFAZOLIN 2 G: 330 INJECTION, POWDER, FOR SOLUTION INTRAMUSCULAR; INTRAVENOUS at 12:43

## 2019-04-28 RX ADMIN — PHENYLEPHRINE HYDROCHLORIDE 100 MCG: 10 INJECTION INTRAVENOUS at 12:48

## 2019-04-28 RX ADMIN — DEXAMETHASONE SODIUM PHOSPHATE 4 MG: 4 INJECTION, SOLUTION INTRA-ARTICULAR; INTRALESIONAL; INTRAMUSCULAR; INTRAVENOUS; SOFT TISSUE at 13:18

## 2019-04-28 RX ADMIN — HEPARIN SODIUM 5000 UNITS: 5000 INJECTION, SOLUTION INTRAVENOUS; SUBCUTANEOUS at 21:02

## 2019-04-28 RX ADMIN — Medication 100 MG: at 06:03

## 2019-04-28 RX ADMIN — POTASSIUM CHLORIDE AND SODIUM CHLORIDE: 900; 150 INJECTION, SOLUTION INTRAVENOUS at 18:02

## 2019-04-28 RX ADMIN — CHLORDIAZEPOXIDE HYDROCHLORIDE 50 MG: 25 CAPSULE ORAL at 05:36

## 2019-04-28 RX ADMIN — MIDAZOLAM HYDROCHLORIDE 2 MG: 1 INJECTION, SOLUTION INTRAMUSCULAR; INTRAVENOUS at 12:43

## 2019-04-28 RX ADMIN — SENNOSIDES,DOCUSATE SODIUM 2 TABLET: 8.6; 5 TABLET, FILM COATED ORAL at 20:25

## 2019-04-28 RX ADMIN — POTASSIUM CHLORIDE 10 MEQ: 7.46 INJECTION, SOLUTION INTRAVENOUS at 10:10

## 2019-04-28 RX ADMIN — DEXAMETHASONE SODIUM PHOSPHATE 2 MG: 4 INJECTION, SOLUTION INTRA-ARTICULAR; INTRALESIONAL; INTRAMUSCULAR; INTRAVENOUS; SOFT TISSUE at 12:51

## 2019-04-28 RX ADMIN — SODIUM CHLORIDE, POTASSIUM CHLORIDE, SODIUM LACTATE AND CALCIUM CHLORIDE: 600; 310; 30; 20 INJECTION, SOLUTION INTRAVENOUS at 12:43

## 2019-04-28 RX ADMIN — SODIUM CHLORIDE, POTASSIUM CHLORIDE, SODIUM LACTATE AND CALCIUM CHLORIDE: 600; 310; 30; 20 INJECTION, SOLUTION INTRAVENOUS at 13:33

## 2019-04-28 RX ADMIN — ACETAMINOPHEN 1000 MG: 500 TABLET, FILM COATED ORAL at 10:59

## 2019-04-28 RX ADMIN — ROSUVASTATIN CALCIUM 10 MG: 20 TABLET, FILM COATED ORAL at 20:26

## 2019-04-28 RX ADMIN — GABAPENTIN 300 MG: 300 CAPSULE ORAL at 11:00

## 2019-04-28 RX ADMIN — PROPOFOL 100 MG: 10 INJECTION, EMULSION INTRAVENOUS at 12:46

## 2019-04-28 RX ADMIN — PHENYLEPHRINE HYDROCHLORIDE 100 MCG: 10 INJECTION INTRAVENOUS at 13:45

## 2019-04-28 RX ADMIN — PHENYLEPHRINE HYDROCHLORIDE 100 MCG: 10 INJECTION INTRAVENOUS at 13:03

## 2019-04-28 RX ADMIN — THERA TABS 1 TABLET: TAB at 06:03

## 2019-04-28 RX ADMIN — TRANEXAMIC ACID 1000 MG: 100 INJECTION, SOLUTION INTRAVENOUS at 12:43

## 2019-04-28 RX ADMIN — BUPIVACAINE HYDROCHLORIDE 30 ML: 2.5 INJECTION, SOLUTION EPIDURAL; INFILTRATION; INTRACAUDAL; PERINEURAL at 12:51

## 2019-04-28 RX ADMIN — LIDOCAINE HYDROCHLORIDE 60 MG: 20 INJECTION, SOLUTION INFILTRATION; PERINEURAL at 12:46

## 2019-04-28 RX ADMIN — CELECOXIB 200 MG: 200 CAPSULE ORAL at 10:59

## 2019-04-28 RX ADMIN — HYDROMORPHONE HYDROCHLORIDE 0.5 MG: 2 INJECTION, SOLUTION INTRAMUSCULAR; INTRAVENOUS; SUBCUTANEOUS at 13:13

## 2019-04-28 ASSESSMENT — ENCOUNTER SYMPTOMS
PSYCHIATRIC NEGATIVE: 1
EYES NEGATIVE: 1
CARDIOVASCULAR NEGATIVE: 1
NEUROLOGICAL NEGATIVE: 1
MYALGIAS: 1
CONSTITUTIONAL NEGATIVE: 1

## 2019-04-28 ASSESSMENT — PAIN SCALES - GENERAL: PAIN_LEVEL: 0

## 2019-04-28 NOTE — ANESTHESIA PROCEDURE NOTES
Airway  Date/Time: 4/28/2019 12:46 PM  Performed by: MUNIRA LOWE  Authorized by: MUNIRA LOWE     Location:  OR  Urgency:  Elective  Indications for Airway Management:  Anesthesia  Spontaneous Ventilation: absent    Sedation Level:  Deep  Preoxygenated: Yes    Final Airway Type:  Supraglottic airway  Final Supraglottic Airway:  Standard LMA  SGA Size:  3  Number of Attempts at Approach:  1   Atraumatic insertion, good seal

## 2019-04-28 NOTE — CARE PLAN
Problem: Safety  Goal: Will remain free from falls  Outcome: PROGRESSING AS EXPECTED  Pt safety precautions in place; bed in lowest lock position, 2 side rails up, non slip socks on, call light within reach- educated on when and how to use call light.      Problem: Pain Management  Goal: Pain level will decrease to patient's comfort goal  Outcome: PROGRESSING AS EXPECTED   04/28/19 1250   OTHER   Comfort Goal Comfort at Rest;Comfort with Movement;Sleep Comfortably

## 2019-04-28 NOTE — PROGRESS NOTES
Called Cricket at pharmacy to clarify librium dosage timing.   50mg was scheduled at 430 which was given, but he said to skip the  6am dosage of 25mg of librium as it is too close together

## 2019-04-28 NOTE — PROGRESS NOTES
· 2 RN skin check complete with STEPHANI Hurst.  · Devices in place silicone oxygen tubing.  · Skin assessed under devices intact.  · Confirmed pressure ulcers found on n/a.  · New potential pressure ulcers noted on sacrum and R elbow. Wound consult placed and wound reported. LDAs opened up and pictures uploaded 4/27  · The following interventions in place pt placed on a waffle cushion, Q2 turns, silicone oxygen tubing, R elbow has mepilex in place, sacrum has mepilex in place    R elbow is red, non blanching. Sacrum is purple bruised areas of non blanching

## 2019-04-28 NOTE — ANESTHESIA PREPROCEDURE EVALUATION
Chronic alcoholism dependence and abuse, smoker, HTN, peripheral artery disease, hyponatremia (stable, mental status ok), prior GA with no issues.    Relevant Problems   (+) Essential hypertension   (+) Heart murmur       Physical Exam    Airway   Mallampati: II  TM distance: >3 FB  Neck ROM: full       Cardiovascular - normal exam  Rhythm: regular  Rate: normal  (-) murmur     Dental - normal exam         Pulmonary - normal exam  Breath sounds clear to auscultation     Abdominal    Neurological - normal exam                 Anesthesia Plan    ASA 3   ASA physical status 3 criteria: alcohol and/or substance dependence or abuse    Plan - general and peripheral nerve block     Peripheral nerve block will be post-op pain control  Airway plan will be LMA        Induction: intravenous    Postoperative Plan: Postoperative administration of opioids is intended.    Pertinent diagnostic labs and testing reviewed    Informed Consent:    Anesthetic plan and risks discussed with patient.    Use of blood products discussed with: patient whom consented to blood products.

## 2019-04-28 NOTE — ANESTHESIA TIME REPORT
Anesthesia Start and Stop Event Times     Date Time Event    4/28/2019 1243 Anesthesia Start     1415 Anesthesia Stop        Responsible Staff  04/28/19    Name Role Begin End    Darrell Marino M.D. Anesth 1243 1415        Preop Diagnosis (Free Text):  Pre-op Diagnosis     RIGHT DISPLACED FEMORAL NECK FRACTURE        Preop Diagnosis (Codes):  Diagnosis Information     Diagnosis Code(s):         Post op Diagnosis  Fracture of femoral neck, right, closed (HCC)  closed, displaced right femoral neck fracture    Premium Reason  E. Weekend    Comments:

## 2019-04-28 NOTE — PROGRESS NOTES
· 2 RN skin check complete with STEPHANI Mack.  · Devices in place silicone oxygen tubing.  · Skin assessed under devices intact.  · Confirmed pressure ulcers found on n/a.  · New potential pressure ulcers noted on scrum, and R elbow. Wound consult placed and wound reported. Pictures taken, and uploaded  · The following interventions in place waffle cushion, Q2 turns, silicone oxygen tubing, R elbow mepilex in place, sacrum mepilex in pleace    Elbow right side red, non blanching; sacrum bruised purple, non blanching

## 2019-04-28 NOTE — ANESTHESIA POSTPROCEDURE EVALUATION
Patient: Sangita Bolton    Procedure Summary     Date:  04/28/19 Room / Location:  [place] [address] / SURGERY [facility]    Anesthesia Start:  1243 Anesthesia Stop:  1415    Procedure:  HEMIARTHROPLASTY, HIP (Right Hip) Diagnosis:  (RIGHT DISPLACED FEMORAL NECK FRACTURE)    Surgeon:  Evens Sarabia M.D. Responsible Provider:  Darrell Marino M.D.    Anesthesia Type:  general, peripheral nerve block ASA Status:  3          Final Anesthesia Type: general, peripheral nerve block  Last vitals  BP   Blood Pressure : 154/63, NIBP: 133/51    Temp   36.8 °C (98.2 °F)    Pulse   Pulse: 67, Heart Rate (Monitored): 71   Resp   (!) 11    SpO2   98 %      Anesthesia Post Evaluation    Patient location during evaluation: PACU  Patient participation: complete - patient participated  Level of consciousness: awake and alert  Pain score: 0    Airway patency: patent  Anesthetic complications: no  Cardiovascular status: hemodynamically stable  Respiratory status: acceptable  Hydration status: euvolemic    PONV: none           Nurse Pain Score: 0 (NPRS)

## 2019-04-28 NOTE — PROGRESS NOTES
"Assuming care for Dr. Ramirez.  Right displaced femoral neck fracture.  Pain controlled.  Denies antecedent pain.    /63   Pulse 78   Temp 36.6 °C (97.8 °F) (Temporal)   Resp 18   Ht 1.6 m (5' 3\")   Wt 55.4 kg (122 lb 2.2 oz)   SpO2 98%     Exam: Pain with movement of the right lower extremity, 2+ DP on right    #1 Right hip femoral neck fracture    Plan:  - To OR today for R hip hemiarthroplasty  - NPO  "

## 2019-04-28 NOTE — OP REPORT
DATE OF OPERATION: 4/28/2019     PREOPERATIVE DIAGNOSIS: right displaced femoral neck fracture    POSTOPERATIVE DIAGNOSIS: Same    PROCEDURE PERFORMED: right hip hemiarthroplasty    SURGEON: Evens Sarabia M.D.     ASSISTANT:  Shawn Bennett PA-C    ANESTHESIOLOGIST: Darrell Marino MD.    ANESTHESIA: General    ASA CLASSIFICATION: IV    INDICATIONS: The patient is a 73 y.o. female with a right femoral neck fracture resulting from a ground level fall.  The patient denies antecedent pain, and was found to have a normal neurovascular exam and skin envelope.  Radiographs demonstrated the displaced femoral neck fracture.  Given these findings, the patient is an appropriately indicated candidate for surgical treatment of the femoral neck fracture with hemiarthroplasty.  I discussed the risks and benefits of the procedure, including the risks of infection, wound healing complication, neurovascular injury, instability, limb length discrepancy, need for revision surgery, and the medical risks of anesthesia including MI, stroke, and death.  Benefits include early mobilization, improved chance of union, and reduction in the medical risks of hip fractures.  Alternatives to surgery were also discussed, including non-operative management, which I did not recommend.  The patient was in agreement with the plan to proceed, and the informed consent was signed and documented.  I met with the patient pre-operatively and marked the operative extremity with their agreement.  We proceeded to the operating room.     WOUND CLASSIFICATION: Class I    SPECIMEN: None    ESTIMATED BLOOD LOSS: 75 mL    PROCEDURE:  The patient underwent anesthesia, and was positioned in the left lateral decubitus position with all bony prominences well padded and an axillary roll placed.  Sequential compression devices were employed.  The correct operative site was prepped and draped into a sterile field, and the surgical team scrubbed in.  A procedural  pause was conducted to verify correct patient, correct extremity, presence of the surgeons initials on the operative extremity, and administration of IV antibiotics, in this case, Ancef.    After generalized agreement, a posterior, or Southern, approach to the hip was performed.  The fascia was split in line with the incision to the tip of the greater troch, then curved posteriorly to parallel the gluteal fibers.  There short external rotators were taken down subperiosteally using cautery, and a posterior capsulotomy was performed, preserving the labrum.    The femoral head was then removed, and sized to a size 46 mm.  We selected a size 46 mm bipolar shell.  The acetabulum was inspected and cleared of foreign material.  We then placed our femoral neck retractor, and sequentially reamed and broached the femur to a size 2 Polar collared broach trial.  We selected a size 2 collared standard Polar Smith and Nephew press fit stem.  This was impacted into place, achieving good fit with no motion.     We trialed with a +0 mm inner head trial and size 46 bipolar shell trial, and were quite satisfied with limb lengths and soft tissue tension.  The implant was stable in all positions, dislocating only with maximal flexion, adduction, and internal rotation.    We then thoroughly cleaned the trunion, and impacted the real +0, 28 mm head and 46 mm shell into place.  The hip was reduced, and the wound was flooded with a dilute betadine solution.  This was irrigated away after several minutes.  We performed our side to side posterior capsule repair with #5 Ticron sutures.  The wound was then closed in layers, with #1 Vicryl in the fascia, 2-0 vicryl in the subcutaneous tissue, and staples in the skin.  Sterile dressings were applied, and the patient was transferred to PACU in stable condition.    The patient tolerated the procedure well. There were no apparent complications. All sponge, needle, and instrument counts were correct  on two separate occasions. She was awakened, extubated, and transferred to the recovery room in satisfactory condition.     The use of Shawn Bennett PA-C as a surgical assistant was necessary for assistance with exposure, retraction, fracture reduction, instrumentation, and closure.      Post-Operative Plan:    1.  The patient should bear weight as tolerated on their operative extremity.  Gait aids (crutch or crutches, cane, walker) may be used as needed, and may be discontinued when no longer required.  2.  IV antibiotics - may be continued for 24 hours, but are not required.  3.  DVT prophylaxis - SCD's and Lovenox 40 mg SQ daily while inpatient.  The patient may transition to Aspirin 325 mg PO BID as an outpatient  4.  Discharge planning  ____________________________________   Evens Sarabia M.D.   DD: 4/28/2019  2:30 PM

## 2019-04-28 NOTE — ANESTHESIA PROCEDURE NOTES
Peripheral Block  Performed by: MUNIRA LOWE  Authorized by: MUNIRA LOWE     Patient Location:  OR  Start Time:  4/28/2019 12:49 PM  End Time:  4/28/2019 12:52 PM  Reason for Block: at surgeon's request and post-op pain management    patient identified, IV checked, site marked, risks and benefits discussed, surgical consent, monitors and equipment checked, pre-op evaluation and timeout performed    Patient Position:  Supine  Prep: ChloraPrep    Monitoring:  Heart rate, continuous pulse ox and cardiac monitor  Block Region:  Lower Extremity  Lower Extremity - Block Type:  Fascia iliaca (right superinguinal fascia iliaca block (type of femoral block))  Laterality:  Right  Procedures: ultrasound guided  Image captured, interpreted and electronically stored.  Anesthesia block local: no skin infiltration.  Block Type:  Single-shot  Needle Length:  100mm  Needle Gauge:  21 G  Needle Localization:  Ultrasound guidance  Injection Assessment:  Negative aspiration for heme, incremental injection and local visualized surrounding nerve on ultrasound  Evidence of intravascular injection: No     **Please note, this block was done after induction of general anesthesia    Suprainguinal Fascia Iliaca Block   With the patient supine, the US transducer was placed perpendicular to the ASIS of the operative side. The ASIS was identified at a point where the internal oblique, transversus abdominis and psoas major are stacked medial to the iliacus muscle. The plane in between was the fascia iliaca. A nerve block needle was advanced into the fascia iliaca and local anesthetic was injected deep/lateral to the fascia iliaca, just above the iliacus muscle.

## 2019-04-28 NOTE — PROGRESS NOTES
Bedside report received from night RN; assumed pt care. Pt assessment complete. Pt A&OX3 disoriented to time (year). Reviewed plan of care with pt. Tele box on and rhythm verified. Chart and labs reviewed. Bed in lowest position, and 2 side rails up. Pt educated on call light; call light with in reach.

## 2019-04-28 NOTE — PROGRESS NOTES
Bedside report received from day shift RN, assumed pt care. Pt assessment complete. Pt disoriented to time, no signs of distress. Reviewed plan of care with pt. Tele box on and rhythm verified.  Chart and labs reviewed.  Bed in lowest position, call light within reach. Hourly rounding in place. Educated about calling for assistance.

## 2019-04-28 NOTE — CARE PLAN
Problem: Safety  Goal: Will remain free from injury  Outcome: PROGRESSING AS EXPECTED  Pt calls appropriately, call light within reach. Bed in lowest and locked position, with hourly rounding in place. Bed alarm on and treaded slipper socks on. Mobility assessed with appropriate signs in place. Hourly rounding in place.        Problem: Infection  Goal: Will remain free from infection  Outcome: PROGRESSING AS EXPECTED  Standard precautions in place. Hand hygiene completed before entering room and exiting room.

## 2019-04-29 LAB
ANION GAP SERPL CALC-SCNC: 12 MMOL/L (ref 0–11.9)
BUN SERPL-MCNC: 17 MG/DL (ref 8–22)
CALCIUM SERPL-MCNC: 8.8 MG/DL (ref 8.5–10.5)
CHLORIDE SERPL-SCNC: 99 MMOL/L (ref 96–112)
CO2 SERPL-SCNC: 19 MMOL/L (ref 20–33)
CREAT SERPL-MCNC: 0.89 MG/DL (ref 0.5–1.4)
ERYTHROCYTE [DISTWIDTH] IN BLOOD BY AUTOMATED COUNT: 44.6 FL (ref 35.9–50)
GLUCOSE SERPL-MCNC: 103 MG/DL (ref 65–99)
HCT VFR BLD AUTO: 36.7 % (ref 37–47)
HGB BLD-MCNC: 12.1 G/DL (ref 12–16)
MCH RBC QN AUTO: 32.8 PG (ref 27–33)
MCHC RBC AUTO-ENTMCNC: 33 G/DL (ref 33.6–35)
MCV RBC AUTO: 99.5 FL (ref 81.4–97.8)
PLATELET # BLD AUTO: 223 K/UL (ref 164–446)
PMV BLD AUTO: 8.9 FL (ref 9–12.9)
POTASSIUM SERPL-SCNC: 4.4 MMOL/L (ref 3.6–5.5)
RBC # BLD AUTO: 3.69 M/UL (ref 4.2–5.4)
SODIUM SERPL-SCNC: 130 MMOL/L (ref 135–145)
WBC # BLD AUTO: 10.6 K/UL (ref 4.8–10.8)

## 2019-04-29 PROCEDURE — 700101 HCHG RX REV CODE 250: Performed by: INTERNAL MEDICINE

## 2019-04-29 PROCEDURE — 97162 PT EVAL MOD COMPLEX 30 MIN: CPT

## 2019-04-29 PROCEDURE — 36415 COLL VENOUS BLD VENIPUNCTURE: CPT

## 2019-04-29 PROCEDURE — 97165 OT EVAL LOW COMPLEX 30 MIN: CPT

## 2019-04-29 PROCEDURE — 80048 BASIC METABOLIC PNL TOTAL CA: CPT

## 2019-04-29 PROCEDURE — 99232 SBSQ HOSP IP/OBS MODERATE 35: CPT | Performed by: HOSPITALIST

## 2019-04-29 PROCEDURE — 51798 US URINE CAPACITY MEASURE: CPT

## 2019-04-29 PROCEDURE — 85027 COMPLETE CBC AUTOMATED: CPT

## 2019-04-29 PROCEDURE — A9270 NON-COVERED ITEM OR SERVICE: HCPCS | Performed by: INTERNAL MEDICINE

## 2019-04-29 PROCEDURE — 770020 HCHG ROOM/CARE - TELE (206)

## 2019-04-29 PROCEDURE — 700111 HCHG RX REV CODE 636 W/ 250 OVERRIDE (IP): Performed by: INTERNAL MEDICINE

## 2019-04-29 PROCEDURE — 700102 HCHG RX REV CODE 250 W/ 637 OVERRIDE(OP): Performed by: INTERNAL MEDICINE

## 2019-04-29 RX ADMIN — Medication 100 MG: at 05:34

## 2019-04-29 RX ADMIN — HEPARIN SODIUM 5000 UNITS: 5000 INJECTION, SOLUTION INTRAVENOUS; SUBCUTANEOUS at 21:54

## 2019-04-29 RX ADMIN — HEPARIN SODIUM 5000 UNITS: 5000 INJECTION, SOLUTION INTRAVENOUS; SUBCUTANEOUS at 13:30

## 2019-04-29 RX ADMIN — CHLORDIAZEPOXIDE HYDROCHLORIDE 25 MG: 25 CAPSULE ORAL at 13:30

## 2019-04-29 RX ADMIN — ROSUVASTATIN CALCIUM 10 MG: 20 TABLET, FILM COATED ORAL at 17:11

## 2019-04-29 RX ADMIN — HEPARIN SODIUM 5000 UNITS: 5000 INJECTION, SOLUTION INTRAVENOUS; SUBCUTANEOUS at 05:33

## 2019-04-29 RX ADMIN — POTASSIUM CHLORIDE AND SODIUM CHLORIDE: 900; 150 INJECTION, SOLUTION INTRAVENOUS at 05:33

## 2019-04-29 RX ADMIN — CHLORDIAZEPOXIDE HYDROCHLORIDE 25 MG: 25 CAPSULE ORAL at 05:34

## 2019-04-29 RX ADMIN — CHLORDIAZEPOXIDE HYDROCHLORIDE 25 MG: 25 CAPSULE ORAL at 00:15

## 2019-04-29 RX ADMIN — THERA TABS 1 TABLET: TAB at 05:34

## 2019-04-29 RX ADMIN — FOLIC ACID 1 MG: 1 TABLET ORAL at 05:34

## 2019-04-29 ASSESSMENT — COGNITIVE AND FUNCTIONAL STATUS - GENERAL
TOILETING: A LITTLE
STANDING UP FROM CHAIR USING ARMS: A LITTLE
HELP NEEDED FOR BATHING: A LOT
SUGGESTED CMS G CODE MODIFIER MOBILITY: CK
DRESSING REGULAR LOWER BODY CLOTHING: A LOT
SUGGESTED CMS G CODE MODIFIER DAILY ACTIVITY: CK
WALKING IN HOSPITAL ROOM: A LITTLE
MOVING FROM LYING ON BACK TO SITTING ON SIDE OF FLAT BED: A LITTLE
MOVING TO AND FROM BED TO CHAIR: A LITTLE
CLIMB 3 TO 5 STEPS WITH RAILING: A LOT
DAILY ACTIVITIY SCORE: 19
TURNING FROM BACK TO SIDE WHILE IN FLAT BAD: A LITTLE
MOBILITY SCORE: 17

## 2019-04-29 ASSESSMENT — GAIT ASSESSMENTS
GAIT LEVEL OF ASSIST: MINIMAL ASSIST
DEVIATION: STEP TO;DECREASED BASE OF SUPPORT;DECREASED HEEL STRIKE;DECREASED TOE OFF;OTHER (COMMENT)
ASSISTIVE DEVICE: FRONT WHEEL WALKER
DISTANCE (FEET): 20

## 2019-04-29 ASSESSMENT — ENCOUNTER SYMPTOMS
PSYCHIATRIC NEGATIVE: 1
CONSTITUTIONAL NEGATIVE: 1
CARDIOVASCULAR NEGATIVE: 1
NEUROLOGICAL NEGATIVE: 1
MYALGIAS: 1
EYES NEGATIVE: 1

## 2019-04-29 ASSESSMENT — ACTIVITIES OF DAILY LIVING (ADL): TOILETING: INDEPENDENT

## 2019-04-29 NOTE — CARE PLAN
Problem: Venous Thromboembolism (VTW)/Deep Vein Thrombosis (DVT) Prevention:  Goal: Patient will participate in Venous Thrombosis (VTE)/Deep Vein Thrombosis (DVT)Prevention Measures  Outcome: PROGRESSING AS EXPECTED  SQ Heparin administered    Problem: Pain Management  Goal: Pain level will decrease to patient's comfort goal  Outcome: PROGRESSING AS EXPECTED  Ice pack utilized as non pharmacological intervention for pain

## 2019-04-29 NOTE — OR NURSING
Dr Marino at bedside. Tolerated nasal cannula wean at 3l. Placed back on oxymask per ERAS protocol. Okay to go to floor per MD. Pt repsonds to voice. Denies pain or nausea. Prevena to right hip CDI with seal intact.

## 2019-04-29 NOTE — PROGRESS NOTES
[facility] Medicine Daily Progress Note    Date of Service  4/28/2019    Chief Complaint  73 y.o. female admitted 4/27/2019 with traumatic right hip fracture    Hospital Course        Interval Problem Update  Low potassium  3.4    Afebrile    Right hip pain, intensity 9/10, sharp, constant, worse with any movement, no radiation      Surgery today    Consultants/Specialty  ortho    Code Status  full    Disposition  pending    Review of Systems  Review of Systems   Constitutional: Negative.    HENT: Negative.    Eyes: Negative.    Cardiovascular: Negative.    Genitourinary: Negative.    Musculoskeletal: Positive for joint pain and myalgias.   Skin: Negative.    Neurological: Negative.    Psychiatric/Behavioral: Negative.         Physical Exam  Temp:  [36.1 °C (96.9 °F)-36.8 °C (98.3 °F)] 36.1 °C (96.9 °F)  Pulse:  [61-92] 63  Resp:  [10-20] 12  BP: (121-157)/(60-73) 121/60  SpO2:  [92 %-100 %] 98 %    Physical Exam   Constitutional: She is oriented to person, place, and time. She appears distressed.   Eyes: Left eye exhibits no discharge.   Neck: No tracheal deviation present. No thyromegaly present.   Cardiovascular: Normal rate and regular rhythm.  Exam reveals no gallop and no friction rub.    No murmur heard.  Pulmonary/Chest: Effort normal and breath sounds normal. No respiratory distress. She has no rales.   Abdominal: She exhibits no distension. There is no tenderness. There is no guarding.   Musculoskeletal: She exhibits tenderness.   Neurological: She is alert and oriented to person, place, and time.   Skin: No rash noted. She is not diaphoretic.       Fluids    Intake/Output Summary (Last 24 hours) at 04/28/19 1953  Last data filed at 04/28/19 1940   Gross per 24 hour   Intake             2700 ml   Output             2325 ml   Net              375 ml       Laboratory  Recent Labs      04/27/19   0652  04/28/19   0232   WBC  15.0*  9.3   RBC  4.14*  3.98*   HEMOGLOBIN  14.0  13.0   HEMATOCRIT  37.4  37.1   MCV   90.3  93.2   MCH  33.8*  32.7   MCHC  37.4*  35.0   RDW  38.4  41.9   PLATELETCT  324  252   MPV  8.7*  8.9*     Recent Labs      04/27/19   0652  04/28/19   0232   SODIUM  122*  128*   POTASSIUM  2.7*  3.4*   CHLORIDE  85*  93*   CO2  17*  24   GLUCOSE  144*  98   BUN  15  13   CREATININE  0.87  0.85   CALCIUM  10.3  9.3     Recent Labs      04/27/19   0652   INR  0.91               Imaging  DX-PELVIS-1 OR 2 VIEWS   Final Result      New baseline status post right hip hemiarthroplasty, which appears well seated.      US-EXTREMITY VENOUS LOWER BILAT   Final Result      DX-FEMUR-2+ RIGHT   Final Result            Acute impacted fracture of the right femoral neck with foreshortening      DX-PELVIS-1 OR 2 VIEWS   Final Result         Acute angulated and impacted fracture of the right femoral neck      DX-CHEST-PORTABLE (1 VIEW)   Final Result         Ill-defined nodular opacities in the right lung are favored to represent healing rib fractures rather than pulmonary nodules. CT could be helpful for confirmation.           Assessment/Plan  Closed fracture of neck of right femur (HCC)- (present on admission)   Assessment & Plan    Acute angulated and impacted fracture of the right femoral neck on x-ray  N.p.o.  Pain control  Orthopedic consulted  PT OT after surgery     High anion gap metabolic acidosis- (present on admission)   Assessment & Plan    Could be related to alcohol  hydrate     Hyponatremia- (present on admission)   Assessment & Plan    On IV fluid with normal saline  Follow bMP in the morning     Hypokalemia- (present on admission)   Assessment & Plan    Replace 20 meq iv x 2 today         Leukocytosis- (present on admission)   Assessment & Plan    Likely reactive     Dependence on nicotine from cigarettes- (present on admission)   Assessment & Plan    Smoking cessation advised     Alcohol abuse- (present on admission)   Assessment & Plan    Monitor closely for possible alcohol withdrawal symptoms           VTE prophylaxis: scd

## 2019-04-29 NOTE — PROGRESS NOTES
"   Orthopaedic Progress Note    Interval changes:  Patient doing well post op  Vac dressing CDI with no leak  Cleared for DC to SNF vs home pending therapy recs    ROS - Patient denies any new issues.  Pain well controlled.    /66   Pulse 83   Temp 36.6 °C (97.8 °F) (Temporal)   Resp 16   Ht 1.6 m (5' 3\")   Wt 60.2 kg (132 lb 11.5 oz)   SpO2 99%       Patient seen and examined  No acute distress  Breathing non labored  RRR  Right hip provina dressing in place with no leak. Patient clearly fires tibialis anterior, EHL, and gastrocnemius/soleus. Sensation is intact to light touch throughout superficial peroneal, deep peroneal, tibial, saphenous, and sural nerve distributions. Strong and palpable 2+ dorsalis pedis and posterior tibial pulses with capillary refill less than 2 seconds. No lower leg tenderness or discomfort.        Recent Labs      04/27/19   0652  04/28/19   0232  04/29/19   0345   WBC  15.0*  9.3  10.6   RBC  4.14*  3.98*  3.69*   HEMOGLOBIN  14.0  13.0  12.1   HEMATOCRIT  37.4  37.1  36.7*   MCV  90.3  93.2  99.5*   MCH  33.8*  32.7  32.8   MCHC  37.4*  35.0  33.0*   RDW  38.4  41.9  44.6   PLATELETCT  324  252  223   MPV  8.7*  8.9*  8.9*       Active Hospital Problems    Diagnosis   • Closed fracture of neck of right femur (HCC) [S72.001A]     Priority: High   • Alcohol abuse [F10.10]   • Dependence on nicotine from cigarettes [F17.210]   • Leukocytosis [D72.829]   • Hypokalemia [E87.6]   • Hyponatremia [E87.1]   • High anion gap metabolic acidosis [E87.2]       Assessment/Plan:  Cleared for DC to SNF vs home pending therapy recs  POD#1 S/P right hip hemiarthroplasty  Wt bearing status - WBAT  Wound care/Drains - vac dressing left in place for 7 days then remove  Future Procedures - none planned   Sutures/Staples out- 10-14 days post operatively  PT/OT-initiated  Antibiotics: completed  DVT Prophylaxis- TEDS/SCDs/Foot pumps  Hernandez-none  Case Coordination for Discharge Planning - " Disposition home vs SNF

## 2019-04-29 NOTE — PROGRESS NOTES
Notified MD of pt being confused. Pt AOx3, disoriented to time. Pt was A&Ox4 during morning assessment. No new orders received at this time.

## 2019-04-29 NOTE — PROGRESS NOTES
Bedside report received, pt care assumed, tele box on. Pt aaox4, no signs of distress noted at this time. POC discussed with pt and verbalizes no questions. Pt denies any additional needs at this time. Bed in lowest position, bed alarm on, pt educated on fall risk and verbalized understanding, call light within reach.

## 2019-04-29 NOTE — DISCHARGE PLANNING
Anticipated Discharge Disposition: SNF vs Home with home health for skilled therapies.    Action: RN JUVENTINO met with pt to discuss dc plan. Pt states she is agreeable to snf if therapies recommend that instead of home first with home health. If pt dc'ing to home, spouse will be ride home. [facility] [place] [facility] [place].    Barriers to Discharge: Medical clearance, dispo unknown.    Plan: Monitor therapy notes and continue to assist with dc planning.    Care Transition Team Assessment    Information Source  Orientation : Oriented x 4  Information Given By: Patient  Who is responsible for making decisions for patient? : Patient    Readmission Evaluation  Is this a readmission?: No    Elopement Risk  Legal Hold: No  Ambulatory or Self Mobile in Wheelchair: No-Not an Elopement Risk  Elopement Risk: Not at Risk for Elopement    Interdisciplinary Discharge Planning  Does Admitting Nurse Feel This Could be a Complex Discharge?: No  Primary Care Physician: not sure of the name  Lives with - Patient's Self Care Capacity: Spouse  Patient or legal guardian wants to designate a caregiver (see row info): No  Support Systems: Spouse / Significant Other  Housing / Facility: [address]  Do You Take your Prescribed Medications Regularly: Yes  Able to Return to Previous ADL's: Other  Mobility Issues: Yes  Prior Services: None  Patient Expects to be Discharged to:: home vs snf  Assistance Needed: Yes  Durable Medical Equipment: Walker    Discharge Preparedness  What is your plan after discharge?: Home with help, Skilled nursing facility  What are your discharge supports?: Spouse  Prior Functional Level: Independent with Activities of Daily Living  Difficulity with ADLs: Toileting, Walking  Difficulty with ADLs Comment:  (fracture right hip)  Difficulity with IADLs: Driving, Shopping  Difficulity with IADL Comments:  (fracture right hip)    Functional Assesment  Prior Functional Level: Independent with Activities of Daily  Living    Finances  Financial Barriers to Discharge: No  Prescription Coverage: No    Vision / Hearing Impairment  Vision Impairment : Yes  Right Eye Vision: Wears Glasses  Left Eye Vision: Wears Glasses  Hearing Impairment : No         Advance Directive  Advance Directive?: None    Domestic Abuse  Have you ever been the victim of abuse or violence?: No    Psychological Assessment  History of Substance Abuse: Alcohol  Date Last Used - Alcohol: 4/27/19    Discharge Risks or Barriers  Discharge risks or barriers?: Post-acute placement / services    Anticipated Discharge Information  Anticipated discharge disposition: SNF

## 2019-04-29 NOTE — PROGRESS NOTES
Bedside report received from night RN; assumed pt care. Pt assessment complete.Reviewed plan of care with pt. Tele box on and rhythm verified. Chart and labs reviewed. Bed in lowest position, and 2 side rails up. Pt educated on call light; call light with in reach.

## 2019-04-29 NOTE — PROGRESS NOTES
[facility] Medicine Daily Progress Note    Date of Service  4/29/2019    Chief Complaint  73 y.o. female admitted 4/27/2019 with traumatic right hip fracture    Hospital Course        Interval Problem Update      Afebrile    POD #1    She has no pain if she does not move    Right leg pain intensity 7/10, sharp , with any movement          Consultants/Specialty  ortho    Code Status  full    Disposition  pending    Review of Systems  Review of Systems   Constitutional: Negative.    HENT: Negative.    Eyes: Negative.    Cardiovascular: Negative.    Genitourinary: Negative.    Musculoskeletal: Positive for joint pain and myalgias.   Skin: Negative.    Neurological: Negative.    Psychiatric/Behavioral: Negative.         Physical Exam  Temp:  [35.8 °C (96.5 °F)-36.6 °C (97.8 °F)] 36.6 °C (97.8 °F)  Pulse:  [61-83] 83  Resp:  [10-17] 16  BP: (105-133)/(50-70) 133/66  SpO2:  [96 %-100 %] 99 %    Physical Exam   Constitutional: She is oriented to person, place, and time. She appears distressed.   Eyes: Left eye exhibits no discharge.   Neck: No tracheal deviation present. No thyromegaly present.   Cardiovascular: Normal rate and regular rhythm.  Exam reveals no gallop and no friction rub.    No murmur heard.  Pulmonary/Chest: Effort normal and breath sounds normal. No respiratory distress. She has no rales.   Abdominal: She exhibits no distension. There is no tenderness. There is no guarding.   Musculoskeletal: She exhibits tenderness.   Neurological: She is alert and oriented to person, place, and time.   Skin: No rash noted. She is not diaphoretic.       Fluids    Intake/Output Summary (Last 24 hours) at 04/29/19 1442  Last data filed at 04/29/19 0800   Gross per 24 hour   Intake              340 ml   Output             1350 ml   Net            -1010 ml       Laboratory  Recent Labs      04/27/19   0652  04/28/19   0232  04/29/19   0345   WBC  15.0*  9.3  10.6   RBC  4.14*  3.98*  3.69*   HEMOGLOBIN  14.0  13.0  12.1    HEMATOCRIT  37.4  37.1  36.7*   MCV  90.3  93.2  99.5*   MCH  33.8*  32.7  32.8   MCHC  37.4*  35.0  33.0*   RDW  38.4  41.9  44.6   PLATELETCT  324  252  223   MPV  8.7*  8.9*  8.9*     Recent Labs      04/27/19   0652  04/28/19   0232  04/29/19   0345   SODIUM  122*  128*  130*   POTASSIUM  2.7*  3.4*  4.4   CHLORIDE  85*  93*  99   CO2  17*  24  19*   GLUCOSE  144*  98  103*   BUN  15  13  17   CREATININE  0.87  0.85  0.89   CALCIUM  10.3  9.3  8.8     Recent Labs      04/27/19   0652   INR  0.91               Imaging  DX-PELVIS-1 OR 2 VIEWS   Final Result      New baseline status post right hip hemiarthroplasty, which appears well seated.      US-EXTREMITY VENOUS LOWER BILAT   Final Result      DX-FEMUR-2+ RIGHT   Final Result            Acute impacted fracture of the right femoral neck with foreshortening      DX-PELVIS-1 OR 2 VIEWS   Final Result         Acute angulated and impacted fracture of the right femoral neck      DX-CHEST-PORTABLE (1 VIEW)   Final Result         Ill-defined nodular opacities in the right lung are favored to represent healing rib fractures rather than pulmonary nodules. CT could be helpful for confirmation.           Assessment/Plan  Closed fracture of neck of right femur (HCC)- (present on admission)   Assessment & Plan    Acute angulated and impacted fracture of the right femoral neck on x-ray  Diet as tolerated  Pain control  Orthopedic consulted  PT OT after surgery     High anion gap metabolic acidosis- (present on admission)   Assessment & Plan    Could be related to alcohol  Already hydrated     Hyponatremia- (present on admission)   Assessment & Plan    heplock IV  Follow bMP in the morning     Hypokalemia- (present on admission)   Assessment & Plan    Replaced         Leukocytosis- (present on admission)   Assessment & Plan    Likely reactive     Dependence on nicotine from cigarettes- (present on admission)   Assessment & Plan    Smoking cessation advised     Alcohol abuse-  (present on admission)   Assessment & Plan    Monitor closely for possible alcohol withdrawal symptoms          VTE prophylaxis: scd

## 2019-04-29 NOTE — PROGRESS NOTES
Patient resting between care. No complaints of pain. Fall precautions in place. Call light in reach. Will continue to monitor closely.
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
## 2019-04-29 NOTE — CARE PLAN
Problem: Communication  Goal: The ability to communicate needs accurately and effectively will improve  Outcome: PROGRESSING AS EXPECTED    Intervention: [place] patient and significant other/support system to call light to alert staff of needs   04/29/19 5086   OTHER   Oriented to: All of the Following : Location of Bathroom, Visiting Policy, Unit Routine, Call Light and Bedside Controls, Bedside Rail Policy, Smoking Policy, Rights and Responsibilities, Bedside Report, and Patient Education Notebook         Problem: Safety  Goal: Will remain free from falls  Outcome: PROGRESSING AS EXPECTED  Fall risk assessed, fall precautions in place, bed alarm on, pt verbalizes understanding of fall risk.

## 2019-04-30 PROBLEM — I16.0 HYPERTENSIVE URGENCY: Status: ACTIVE | Noted: 2019-04-30

## 2019-04-30 PROCEDURE — 770020 HCHG ROOM/CARE - TELE (206)

## 2019-04-30 PROCEDURE — A9270 NON-COVERED ITEM OR SERVICE: HCPCS | Performed by: INTERNAL MEDICINE

## 2019-04-30 PROCEDURE — 306263 VAC CANNISTER W/GEL 500ML: Performed by: HOSPITALIST

## 2019-04-30 PROCEDURE — 700111 HCHG RX REV CODE 636 W/ 250 OVERRIDE (IP): Performed by: INTERNAL MEDICINE

## 2019-04-30 PROCEDURE — A9270 NON-COVERED ITEM OR SERVICE: HCPCS | Performed by: HOSPITALIST

## 2019-04-30 PROCEDURE — 700102 HCHG RX REV CODE 250 W/ 637 OVERRIDE(OP): Performed by: INTERNAL MEDICINE

## 2019-04-30 PROCEDURE — 99232 SBSQ HOSP IP/OBS MODERATE 35: CPT | Performed by: HOSPITALIST

## 2019-04-30 PROCEDURE — 700102 HCHG RX REV CODE 250 W/ 637 OVERRIDE(OP): Performed by: HOSPITALIST

## 2019-04-30 RX ORDER — AMLODIPINE BESYLATE 5 MG/1
5 TABLET ORAL
Status: DISCONTINUED | OUTPATIENT
Start: 2019-04-30 | End: 2019-04-30

## 2019-04-30 RX ORDER — LOSARTAN POTASSIUM 50 MG/1
100 TABLET ORAL DAILY
Status: DISCONTINUED | OUTPATIENT
Start: 2019-04-30 | End: 2019-05-02 | Stop reason: HOSPADM

## 2019-04-30 RX ORDER — AMLODIPINE BESYLATE 10 MG/1
10 TABLET ORAL DAILY
Status: DISCONTINUED | OUTPATIENT
Start: 2019-04-30 | End: 2019-04-30

## 2019-04-30 RX ORDER — HYDRALAZINE HYDROCHLORIDE 10 MG/1
5 TABLET, FILM COATED ORAL EVERY 6 HOURS PRN
Status: DISCONTINUED | OUTPATIENT
Start: 2019-04-30 | End: 2019-05-02 | Stop reason: HOSPADM

## 2019-04-30 RX ORDER — AMLODIPINE BESYLATE 10 MG/1
10 TABLET ORAL DAILY
Status: DISCONTINUED | OUTPATIENT
Start: 2019-05-01 | End: 2019-05-02 | Stop reason: HOSPADM

## 2019-04-30 RX ADMIN — HEPARIN SODIUM 5000 UNITS: 5000 INJECTION, SOLUTION INTRAVENOUS; SUBCUTANEOUS at 06:09

## 2019-04-30 RX ADMIN — ROSUVASTATIN CALCIUM 10 MG: 20 TABLET, FILM COATED ORAL at 17:51

## 2019-04-30 RX ADMIN — AMLODIPINE BESYLATE 5 MG: 5 TABLET ORAL at 12:56

## 2019-04-30 RX ADMIN — HEPARIN SODIUM 5000 UNITS: 5000 INJECTION, SOLUTION INTRAVENOUS; SUBCUTANEOUS at 12:56

## 2019-04-30 RX ADMIN — FOLIC ACID 1 MG: 1 TABLET ORAL at 06:09

## 2019-04-30 RX ADMIN — LOSARTAN POTASSIUM 100 MG: 50 TABLET ORAL at 17:51

## 2019-04-30 RX ADMIN — THERA TABS 1 TABLET: TAB at 06:09

## 2019-04-30 RX ADMIN — Medication 100 MG: at 06:09

## 2019-04-30 RX ADMIN — HEPARIN SODIUM 5000 UNITS: 5000 INJECTION, SOLUTION INTRAVENOUS; SUBCUTANEOUS at 22:00

## 2019-04-30 RX ADMIN — SENNOSIDES,DOCUSATE SODIUM 2 TABLET: 8.6; 5 TABLET, FILM COATED ORAL at 17:51

## 2019-04-30 ASSESSMENT — ENCOUNTER SYMPTOMS
LOSS OF CONSCIOUSNESS: 0
EYE PAIN: 0
WEAKNESS: 1
PSYCHIATRIC NEGATIVE: 1
FEVER: 0
SEIZURES: 0
EYE DISCHARGE: 0
CHILLS: 0
CLAUDICATION: 0
HALLUCINATIONS: 0
EYE REDNESS: 0
POLYDIPSIA: 0
BLOOD IN STOOL: 0
PALPITATIONS: 0
MYALGIAS: 1
NERVOUS/ANXIOUS: 0
STRIDOR: 0
SHORTNESS OF BREATH: 0
COUGH: 0
SORE THROAT: 0
VOMITING: 0
HEMOPTYSIS: 0
SINUS PAIN: 0
FLANK PAIN: 0
ABDOMINAL PAIN: 0

## 2019-04-30 NOTE — PROGRESS NOTES
Hospital Medicine Daily Progress Note    Date of Service  4/30/2019    Chief Complaint  73 y.o. female admitted 4/27/2019 with traumatic right hip fracture    Hospital Course      73 y.o. female admitted 4/27/2019 with traumatic right hip fracture.  The patient underwent right hip hemiarthroplasty April 28.  Physical and Occupational Therapy recommending rehab at a skilled nursing facility.      Interval Problem Update  Heart rate 80s-90s, saturating well on room air.  Blood pressure slowly increasing systolic running 152-182.  I am adding as needed hydralazine and labetalol.  I am starting losartan 100 mg, and amlodipine 10 mg.    Still having pain with movement.  Wound VAC on the right hip.  Need to stay in place for 7 days.   Skilled nursing facility was recommended, ordered  Discussed with patient, patient's nurse and with multidisciplinary team during rounds including , pharmacist and charge nurse.      Consultants/Specialty  Ortho    Code Status  Full    Disposition  Skilled nursing facility    Review of Systems  Review of Systems   Constitutional: Positive for malaise/fatigue. Negative for chills and fever.   HENT: Negative for sinus pain and sore throat.    Eyes: Negative for pain, discharge and redness.   Respiratory: Negative for cough, hemoptysis, shortness of breath and stridor.    Cardiovascular: Negative for chest pain, palpitations and claudication.   Gastrointestinal: Negative for abdominal pain, blood in stool, melena and vomiting.   Genitourinary: Negative for flank pain and hematuria.   Musculoskeletal: Positive for joint pain and myalgias.   Skin: Negative.    Neurological: Positive for weakness. Negative for seizures and loss of consciousness.   Endo/Heme/Allergies: Negative for polydipsia.   Psychiatric/Behavioral: Negative.  Negative for hallucinations. The patient is not nervous/anxious.         Physical Exam  Temp:  [36.1 °C (97 °F)-36.8 °C (98.2 °F)] 36.3 °C (97.3 °F)  Pulse:   [82-93] 84  Resp:  [17-18] 18  BP: (147-182)/(55-80) 152/55  SpO2:  [90 %-98 %] 92 %    Physical Exam   Constitutional:   Elderly woman   HENT:   Right Ear: External ear normal.   Left Ear: External ear normal.   Eyes: Right eye exhibits no discharge. Left eye exhibits no discharge. No scleral icterus.   Neck: No tracheal deviation present. No thyromegaly present.   Cardiovascular: Normal rate and regular rhythm.  Exam reveals no gallop and no friction rub.    No murmur heard.  Pulmonary/Chest: Effort normal and breath sounds normal. No respiratory distress. She has no rales.   Abdominal: She exhibits no distension. There is no tenderness. There is no guarding.   Musculoskeletal: She exhibits tenderness.   Neurological: She is alert.   Skin: No rash noted. She is not diaphoretic.   Psychiatric: She has a normal mood and affect.   Vitals reviewed.      Fluids    Intake/Output Summary (Last 24 hours) at 04/30/19 1717  Last data filed at 04/30/19 1300   Gross per 24 hour   Intake              660 ml   Output              390 ml   Net              270 ml       Laboratory  Recent Labs      04/28/19   0232  04/29/19   0345   WBC  9.3  10.6   RBC  3.98*  3.69*   HEMOGLOBIN  13.0  12.1   HEMATOCRIT  37.1  36.7*   MCV  93.2  99.5*   MCH  32.7  32.8   MCHC  35.0  33.0*   RDW  41.9  44.6   PLATELETCT  252  223   MPV  8.9*  8.9*     Recent Labs      04/28/19   0232  04/29/19   0345   SODIUM  128*  130*   POTASSIUM  3.4*  4.4   CHLORIDE  93*  99   CO2  24  19*   GLUCOSE  98  103*   BUN  13  17   CREATININE  0.85  0.89   CALCIUM  9.3  8.8                   Imaging  DX-PELVIS-1 OR 2 VIEWS   Final Result      New baseline status post right hip hemiarthroplasty, which appears well seated.      US-EXTREMITY VENOUS LOWER BILAT   Final Result      DX-FEMUR-2+ RIGHT   Final Result            Acute impacted fracture of the right femoral neck with foreshortening      DX-PELVIS-1 OR 2 VIEWS   Final Result         Acute angulated and  impacted fracture of the right femoral neck      DX-CHEST-PORTABLE (1 VIEW)   Final Result         Ill-defined nodular opacities in the right lung are favored to represent healing rib fractures rather than pulmonary nodules. CT could be helpful for confirmation.           Assessment/Plan  Closed fracture of neck of right femur (HCC)- (present on admission)   Assessment & Plan    Acute angulated and impacted fracture of the right femoral neck on x-ray  Diet as tolerated   Pain control  Orthopedic consulted, status post surgery  PT OT > SNF      Hypertensive urgency   Assessment & Plan    I am adding as needed hydralazine and labetalol.  I am starting losartan 100 mg, and amlodipine 10 mg.        High anion gap metabolic acidosis- (present on admission)   Assessment & Plan    Could be related to alcohol  Re-hydrated       Hyponatremia- (present on admission)   Assessment & Plan    Hypovolemic, improving with intravenous fluids.       Hypokalemia- (present on admission)   Assessment & Plan    Replaced  Continue to monitor and replace as needed      Leukocytosis- (present on admission)   Assessment & Plan    Likely reactive       Dependence on nicotine from cigarettes- (present on admission)   Assessment & Plan    Smoking cessation advised      Alcohol abuse- (present on admission)   Assessment & Plan    Monitor closely for possible alcohol withdrawal symptoms      Essential hypertension- (present on admission)   Assessment & Plan    I am adding as needed hydralazine and labetalol.  I am starting losartan 100 mg, and amlodipine 10 mg.             VTE prophylaxis: SCDs, as recommended by orthopedics

## 2019-04-30 NOTE — THERAPY
"Occupational Therapy Evaluation completed.   Functional Status:  Pt required Min A for supine to sit EOB.  Pt was Max A to don socks.  Pt had difficulty following 1 step commands.  Pt was not oriented to month or year, thought it was 2005?  Pt stated she has problems with her memory.  Pt amb with FWW & CGA to bathroom with constant V/Q's to sequence FWW.  Pt was Min A for toilet transfer with Max V/Q's to maintain Post Hip precautions.  Pt left seated up in chair for lunch.  Pt with poor insight into deficits & unable to recall Post hip precautions despite being educated multiple times during tx.  Plan of Care: Will benefit from Occupational Therapy 3 times per week  Discharge Recommendations:  Equipment: Will Continue to Assess for Equipment Needs. Post-acute therapy Discharge to a transitional care facility for continued skilled therapy services.    Highly recommend Post Acute OT services prior to D/C home.  If pt was to D/C home she would need close 24/7 supervision & assist with ADL's & functional mobility.    See \"Rehab Therapy-Acute\" Patient Summary Report for complete documentation.    "

## 2019-04-30 NOTE — DISCHARGE PLANNING
RN CM went to discuss dc plan with pt. Pt on bsc and getting ready for lunch. RN CM will visit after meal time.

## 2019-04-30 NOTE — DISCHARGE PLANNING
Anticipated Discharge Disposition: SNF for skilled therapies then home with spouse.    Action: RN CM met with pt and spouse to discuss dc plan. Spouse gave Choice which was faxed to [facility] at ext. [phone]. Pt in agreement.    Barriers to Discharge: Medical clearance, accepting snf.    Plan: Notify team when an accepting snf has been obtained.

## 2019-04-30 NOTE — DISCHARGE PLANNING
Received Choice form at 3580  Agency/Facility Name: [facility]  Referral sent per Choice form @ [phone]

## 2019-04-30 NOTE — PROGRESS NOTES
Patient resting between care. No complaints of pain. Patient still confused, no evidence of learning regarding patient not pulling medical equipment. Restraints continued. Fall precautions in place. Call light in reach. Will continue to monitor closely

## 2019-04-30 NOTE — PROGRESS NOTES
Notified Dr. Barnes of pt's /71 and 175/73. HR 83s. No scheduled or prn BP meds. MD to enter in orders.

## 2019-04-30 NOTE — WOUND TEAM
In to see patient for wound consult of sacrum and right elbow.  Patient also has prevena incisional VAC to right hip.  Bilateral elbows assessed, left intact, right with scattered bruising, no pressure injuries noted.  Patient turned to left side, buttocks and sacrum assessed.  Buttocks is discolored, appears more as venous pooling from sitting in chair long periods.  Patient incontinent of urine, linens changed with RN, barrier paste applied.  No pressure noted.  Prevena incisional VAC running loudly.  Was reinforced by RN with tegaderm.  Foam no longer suctioned down.  Removed track pad and replaced.  Order hospital VAC and dickson, RN agreed to attach when arrives and RN agreed to call ortho PA with update.

## 2019-04-30 NOTE — PROGRESS NOTES
0700-RN notified wound RN that the prevena is not working properly. Machine making loud noise and flashing yellow cautionary button. RN noticed this during change of shift report. Per wound RN, to reinforce dressing.       1110-Wound RN at bedside to assess prevena wound vac. Per Wound RN, prevena vac not working despite reinforcement. VacUlta has been ordered and to be placed on pt.     1115-RN notified Shawn SMITH of this switch. No further orders ordered.

## 2019-04-30 NOTE — PROGRESS NOTES
Pt attempting to jump out of bed, pulling off heart monitor multiple times, and attempted to pull off wound vac. Pt A&Ox2, disoriented to time and situation. MD notified. New orders received for bilateral wrist restraints. Educated pt on discontinuation criteria. Notified pts , Jeremy of restraints.

## 2019-04-30 NOTE — CARE PLAN
Problem: Infection  Goal: Will remain free from infection  Outcome: PROGRESSING AS EXPECTED  Hand hygiene performed before and after each contact with this patient.    Problem: Safety - Medical Restraint  Goal: Remains free of injury from restraints (Restraint for Interference with Medical Device)  INTERVENTIONS:  1. Determine that other, less restrictive measures have been tried or would not be effective before applying the restraint  2. Evaluate the patient's condition at the time of restraint application  3. Inform patient/family regarding the reason for restraint  4. Q2H: Monitor safety, psychosocial status, comfort, nutrition and hydration   Outcome: PROGRESSING AS EXPECTED  Patient monitored every hour and safety, psychosocial status, comfort, nutrition, and hydration assessed every two hours.

## 2019-04-30 NOTE — PROGRESS NOTES
Bedside report received from night RN; assumed pt care. Pt assessment complete.Reviewed plan of care with pt. Tele box on and rhythm verified. Patient disoriented to situation and time. In restraint due to pulling medical equipment. Chart and labs reviewed. Bed in lowest position, and 2 side rails up. Pt educated on call light; call light with in reach.

## 2019-05-01 PROCEDURE — 770006 HCHG ROOM/CARE - MED/SURG/GYN SEMI*

## 2019-05-01 PROCEDURE — 700111 HCHG RX REV CODE 636 W/ 250 OVERRIDE (IP): Performed by: INTERNAL MEDICINE

## 2019-05-01 PROCEDURE — A9270 NON-COVERED ITEM OR SERVICE: HCPCS | Performed by: INTERNAL MEDICINE

## 2019-05-01 PROCEDURE — 97116 GAIT TRAINING THERAPY: CPT

## 2019-05-01 PROCEDURE — A9270 NON-COVERED ITEM OR SERVICE: HCPCS | Performed by: HOSPITALIST

## 2019-05-01 PROCEDURE — 99232 SBSQ HOSP IP/OBS MODERATE 35: CPT | Performed by: HOSPITALIST

## 2019-05-01 PROCEDURE — 700102 HCHG RX REV CODE 250 W/ 637 OVERRIDE(OP): Performed by: INTERNAL MEDICINE

## 2019-05-01 PROCEDURE — 700102 HCHG RX REV CODE 250 W/ 637 OVERRIDE(OP): Performed by: HOSPITALIST

## 2019-05-01 PROCEDURE — 97530 THERAPEUTIC ACTIVITIES: CPT

## 2019-05-01 RX ADMIN — HEPARIN SODIUM 5000 UNITS: 5000 INJECTION, SOLUTION INTRAVENOUS; SUBCUTANEOUS at 13:59

## 2019-05-01 RX ADMIN — SENNOSIDES,DOCUSATE SODIUM 2 TABLET: 8.6; 5 TABLET, FILM COATED ORAL at 05:34

## 2019-05-01 RX ADMIN — ROSUVASTATIN CALCIUM 10 MG: 20 TABLET, FILM COATED ORAL at 17:16

## 2019-05-01 RX ADMIN — HEPARIN SODIUM 5000 UNITS: 5000 INJECTION, SOLUTION INTRAVENOUS; SUBCUTANEOUS at 05:35

## 2019-05-01 RX ADMIN — SENNOSIDES,DOCUSATE SODIUM 2 TABLET: 8.6; 5 TABLET, FILM COATED ORAL at 17:16

## 2019-05-01 RX ADMIN — LOSARTAN POTASSIUM 100 MG: 50 TABLET ORAL at 05:34

## 2019-05-01 RX ADMIN — AMLODIPINE BESYLATE 10 MG: 10 TABLET ORAL at 05:34

## 2019-05-01 ASSESSMENT — GAIT ASSESSMENTS
ASSISTIVE DEVICE: FRONT WHEEL WALKER
DISTANCE (FEET): 25
GAIT LEVEL OF ASSIST: MINIMAL ASSIST
DEVIATION: STEP TO;DECREASED HEEL STRIKE;DECREASED TOE OFF

## 2019-05-01 ASSESSMENT — ENCOUNTER SYMPTOMS
CHILLS: 0
SEIZURES: 0
EYE REDNESS: 0
BLOOD IN STOOL: 0
PSYCHIATRIC NEGATIVE: 1
WEAKNESS: 1
COUGH: 0
POLYDIPSIA: 0
SORE THROAT: 0
FEVER: 0
LOSS OF CONSCIOUSNESS: 0
ABDOMINAL PAIN: 0
VOMITING: 0
MYALGIAS: 1
EYE PAIN: 0
SHORTNESS OF BREATH: 0
NERVOUS/ANXIOUS: 0
STRIDOR: 0
PALPITATIONS: 0
HALLUCINATIONS: 0
FLANK PAIN: 0
SINUS PAIN: 0
CLAUDICATION: 0
EYE DISCHARGE: 0
HEMOPTYSIS: 0

## 2019-05-01 ASSESSMENT — COGNITIVE AND FUNCTIONAL STATUS - GENERAL
SUGGESTED CMS G CODE MODIFIER MOBILITY: CK
MOVING TO AND FROM BED TO CHAIR: A LITTLE
WALKING IN HOSPITAL ROOM: A LITTLE
STANDING UP FROM CHAIR USING ARMS: A LITTLE
CLIMB 3 TO 5 STEPS WITH RAILING: A LOT
MOVING FROM LYING ON BACK TO SITTING ON SIDE OF FLAT BED: A LITTLE
MOBILITY SCORE: 17
TURNING FROM BACK TO SIDE WHILE IN FLAT BAD: A LITTLE

## 2019-05-01 NOTE — PROGRESS NOTES
[facility] Medicine Daily Progress Note    Date of Service  5/1/2019    Chief Complaint  73 y.o. female admitted 4/27/2019 with traumatic right hip fracture    Hospital Course      73 y.o. female admitted 4/27/2019 with traumatic right hip fracture.  The patient underwent right hip hemiarthroplasty April 28.  Physical and Occupational Therapy recommending rehab at a skilled nursing facility.       Interval Problem Update  Heart rate 70s-80s, saturating well on room air.  Blood pressure better controlled today, after starting losartan 100 mg, and amlodipine 10 mg.    Wound VAC on the right hip.  Need to stay in place for 7 days.   Skilled nursing facility was recommended, ordered  Discussed with patient, patient's nurse and with multidisciplinary team during rounds including , pharmacist and charge nurse.      Consultants/Specialty  Ortho    Code Status  Full    Disposition  Skilled nursing facility    Review of Systems  Review of Systems   Constitutional: Positive for malaise/fatigue. Negative for chills and fever.   HENT: Negative for sinus pain and sore throat.    Eyes: Negative for pain, discharge and redness.   Respiratory: Negative for cough, hemoptysis, shortness of breath and stridor.    Cardiovascular: Negative for chest pain, palpitations and claudication.   Gastrointestinal: Negative for abdominal pain, blood in stool, melena and vomiting.   Genitourinary: Negative for flank pain and hematuria.   Musculoskeletal: Positive for joint pain and myalgias.   Skin: Negative.    Neurological: Positive for weakness. Negative for seizures and loss of consciousness.   Endo/Heme/Allergies: Negative for polydipsia.   Psychiatric/Behavioral: Negative.  Negative for hallucinations. The patient is not nervous/anxious.         Physical Exam  Temp:  [35.9 °C (96.6 °F)-37 °C (98.6 °F)] 36.4 °C (97.5 °F)  Pulse:  [75-85] 83  Resp:  [17-18] 17  BP: (130-164)/(53-70) 144/68  SpO2:  [91 %-95 %] 94 %    Physical Exam    Constitutional:   Elderly woman   HENT:   Right Ear: External ear normal.   Left Ear: External ear normal.   Eyes: Right eye exhibits no discharge. Left eye exhibits no discharge. No scleral icterus.   Neck: No tracheal deviation present. No thyromegaly present.   Cardiovascular: Normal rate and regular rhythm.  Exam reveals no gallop and no friction rub.    No murmur heard.  Pulmonary/Chest: Effort normal and breath sounds normal. No respiratory distress. She has no rales.   Abdominal: She exhibits no distension. There is no tenderness. There is no guarding.   Musculoskeletal: She exhibits tenderness.   Neurological: She is alert.   Skin: No rash noted. She is not diaphoretic.   Psychiatric: She has a normal mood and affect.   Vitals reviewed.      Fluids    Intake/Output Summary (Last 24 hours) at 05/01/19 1548  Last data filed at 05/01/19 1340   Gross per 24 hour   Intake              240 ml   Output              300 ml   Net              -60 ml       Laboratory  Recent Labs      04/29/19   0345   WBC  10.6   RBC  3.69*   HEMOGLOBIN  12.1   HEMATOCRIT  36.7*   MCV  99.5*   MCH  32.8   MCHC  33.0*   RDW  44.6   PLATELETCT  223   MPV  8.9*     Recent Labs      04/29/19   0345   SODIUM  130*   POTASSIUM  4.4   CHLORIDE  99   CO2  19*   GLUCOSE  103*   BUN  17   CREATININE  0.89   CALCIUM  8.8                   Imaging  DX-PELVIS-1 OR 2 VIEWS   Final Result      New baseline status post right hip hemiarthroplasty, which appears well seated.      US-EXTREMITY VENOUS LOWER BILAT   Final Result      DX-FEMUR-2+ RIGHT   Final Result            Acute impacted fracture of the right femoral neck with foreshortening      DX-PELVIS-1 OR 2 VIEWS   Final Result         Acute angulated and impacted fracture of the right femoral neck      DX-CHEST-PORTABLE (1 VIEW)   Final Result         Ill-defined nodular opacities in the right lung are favored to represent healing rib fractures rather than pulmonary nodules. CT could be  helpful for confirmation.           Assessment/Plan  Closed fracture of neck of right femur (HCC)- (present on admission)   Assessment & Plan    Acute angulated and impacted fracture of the right femoral neck on x-ray  Diet as tolerated   Pain control   Orthopedic consulted, status post surgery  PT OT > SNF      Hypertensive urgency   Assessment & Plan    As needed hydralazine and labetalol.  Losartan 100 mg, and amlodipine 10 mg.         High anion gap metabolic acidosis- (present on admission)   Assessment & Plan    Could be related to alcohol  Re-hydrated        Hyponatremia- (present on admission)   Assessment & Plan    Hypovolemic, improving with intravenous fluids.       Hypokalemia- (present on admission)   Assessment & Plan    Replaced  Continue to monitor and replace as needed      Leukocytosis- (present on admission)   Assessment & Plan    Likely reactive       Dependence on nicotine from cigarettes- (present on admission)   Assessment & Plan    Smoking cessation advised       Alcohol abuse- (present on admission)   Assessment & Plan    Monitored for possible alcohol withdrawal symptoms on tele  More than 4 days have passed from last drink      Essential hypertension- (present on admission)   Assessment & Plan    As needed hydralazine and labetalol.  Losartan 100 mg, and amlodipine 10 mg.              VTE prophylaxis: SCDs, Lovenox

## 2019-05-01 NOTE — DISCHARGE PLANNING
Anticipated Discharge Disposition: SNF    Action: CCA notified LSW late in the afternoon that pt transferred from Tele to Ortho and transport arrangements have been arranged for tomorrow 5/2/19 to transfer to [facility] at 11:30am.     LSW attempted to call Tele RN CM/LSW for report but was not able to reach anyone.     Barriers to Discharge: N/A.     Plan: As above, left report for unit LSW to f/u in the am with COBRA, chart copy and d/c summary.

## 2019-05-01 NOTE — PROGRESS NOTES
RN called down to central supply for any updates on the wound vac. Per dept, it was sent up earlier. RN searched the unit and was unable to locate it. RN notified dept again and then day charge, Meenakshi STYLES. Meenakshi elizondo RN spoke with supply dept. Per dept, they will contact transport dept to attempt to locate the wound vac and the personnel who handled it. In the mean time, they will send up another wound vac.

## 2019-05-01 NOTE — PROGRESS NOTES
"S:  Seen and examined.  POD #2 s/p R hip ernie.  Sleeping comfortably.    O: BP (!) 164/70   Pulse 82   Temp 35.9 °C (96.6 °F) (Temporal)   Resp 18   Ht 1.6 m (5' 3\")   Wt 65.6 kg (144 lb 10 oz)   SpO2 92% .    Intake/Output Summary (Last 24 hours) at 04/30/19 1854  Last data filed at 04/30/19 1300   Gross per 24 hour   Intake              660 ml   Output              390 ml   Net              270 ml   .    Operative/injured extremity examined.  VacUlta to suction, wound OK    Recent Labs      04/28/19   0232  04/29/19   0345   WBC  9.3  10.6   RBC  3.98*  3.69*   HEMOGLOBIN  13.0  12.1   HEMATOCRIT  37.1  36.7*   MCV  93.2  99.5*   MCH  32.7  32.8   MCHC  35.0  33.0*   RDW  41.9  44.6   PLATELETCT  252  223   MPV  8.9*  8.9*       A/P:    POD #2 s/p R hip ernie    Antibiotics: None required  Activity: WBAT, posterior precautions operative extremity.  PT today.  Diet: General  DVT: Mechanical (SCDs) + Pharmacologic (Lovenox or per medicine)  Dispo: D/C planning    "

## 2019-05-01 NOTE — DISCHARGE PLANNING
Agency/Facility Name: [facility]  Outcome: Accepted    @0940  Agency/Facility Name: [facility]  Spoke To: Kalie  Outcome: Accepted pending bed.

## 2019-05-01 NOTE — DISCHARGE PLANNING
Agency/Facility Name: [facility]  Outcome: Left message, awaiting call back.    @1140  Agency/Facility Name: [facility]  Spoke To: Arthur  Outcome: Accepted as long as not going through active withdrawl.    @141  Agency/Facility Name: [facility]  Spoke To:  Tatiana  Outcome: Accepted, accepting MD Sun.

## 2019-05-01 NOTE — CARE PLAN
Problem: Communication  Goal: The ability to communicate needs accurately and effectively will improve  Outcome: PROGRESSING SLOWER THAN EXPECTED  Patient educated to utilize call light. Patient and family oriented to hospital room. Patient encouraged to ask questions about plan of care. Patient effectively uses call light and is involved in POC. Patient is often forgetful and is not always able to repeat plan of care or show understanding of plan of care.     Problem: Safety  Goal: Will remain free from injury  Outcome: PROGRESSING AS EXPECTED  Patient's risk for injury and falls assessed. Appropriate safety precautions in place. Patient educated to utilize call light for needs. Patient verbalizes understanding.

## 2019-05-01 NOTE — DISCHARGE PLANNING
Received Transport Form @ 7842  Spoke to Sugey @ [facility]    Transport is scheduled for 5/2 @1130 going to [facility].    STEVEN Khan notified.    Left voicemail with Arthur at [facility] to confirm 1130 transport time.

## 2019-05-01 NOTE — DISCHARGE PLANNING
Care Transition Team Discharge Planning     Anticipated Discharge Disposition: [facility]     Action: This RN CM spoke with Tatiana in admissions. Patient has been accepted and accepting MD is Patrice.     Tatiana will call back with transport time for tomorrow morning.       Barriers to Discharge: Transport time.      Plan: Waiting for returned call from Tatiana regarding transport time.

## 2019-05-01 NOTE — PROGRESS NOTES
"   Orthopaedic PA Progress Note    Interval changes:Did well overnight, so s/sx active withdrawal. OK for transfer from Ortho standpoint    ROS - Patient denies any new issues. No chest pain, dyspnea, or fever.  Pain well controlled.    /63   Pulse 76   Temp 36.2 °C (97.1 °F) (Temporal)   Resp 18   Ht 1.6 m (5' 3\")   Wt 59.6 kg (131 lb 6.3 oz)   SpO2 95%     Patient seen and examined  No acute distress  Breathing non labored  RRR  Surgical dressing is clean, dry, and intact. Patient clearly fires tibialis anterior, EHL, and gastrocnemius/soleus. Sensation is intact to light touch throughout superficial peroneal, deep peroneal, tibial, saphenous, and sural nerve distributions. Strong and palpable 2+ dorsalis pedis and posterior tibial pulses with capillary refill less than 2 seconds. No lower leg tenderness or discomfort.    Recent Labs      04/29/19   0345   WBC  10.6   RBC  3.69*   HEMOGLOBIN  12.1   HEMATOCRIT  36.7*   MCV  99.5*   MCH  32.8   MCHC  33.0*   RDW  44.6   PLATELETCT  223   MPV  8.9*       Active Hospital Problems    Diagnosis   • Closed fracture of neck of right femur (HCC) [S72.001A]     Priority: High   • Hypertensive urgency [I16.0]   • Alcohol abuse [F10.10]   • Dependence on nicotine from cigarettes [F17.210]   • Leukocytosis [D72.829]   • Hypokalemia [E87.6]   • Hyponatremia [E87.1]   • High anion gap metabolic acidosis [E87.2]   • Essential hypertension [I10]       Assessment/Plan:  POD#3 S/ P R hip ernie  Wt bearing status - AT with PHP  PT/OT-initiated  Wound care:Prevena system on DC  Drains - out   Hernandez-no  Sutures/Staples out- 10-14 days post operatively  Antibiotics: complete  DVT Prophylaxis- TEDS/SCDs/Foot pumps.   Lovenox: Start UFH, Duration-until ambulatory > 150' of discharge, then transition to  PO BID if tolerated   Future Procedures - none anticipated  Case Coordination for Discharge Planning - Disposition OK for SNf from Ortho standpoint      "

## 2019-05-01 NOTE — PROGRESS NOTES
Bilateral soft wrist restraints discontinued on pt. Pt a/o x3, assisted pt to chair for breakfast. Spouse at bedside.

## 2019-05-01 NOTE — PROGRESS NOTES
12 hour chart check   Increase po fluids wash hands cover mouth when coughing. Motrin or tylenol for pain and fever see pcp in 3 days.  Fill and take po meds

## 2019-05-01 NOTE — PROGRESS NOTES
Patient supply dept stating they need pt's MRN number so they can send wound vac to pt. MRN number given to them. Waiting for the wound vac.

## 2019-05-01 NOTE — DISCHARGE PLANNING
Care Transition Team Discharge Planning     Anticipated Discharge Disposition: [facility]     Action:   1500: This RN CM left VM for Tatiana in admissions to follow up on tomorrow AM transport time.     1550: This RN CM left second VM for Tatiana in admissions to follow up on tomorrow AM transport time.     1600: Tatiana requesting [facility] transport set-up due to [facility] not having any available transport until 1800 tomorrow night.     This RN CM faced transport communication form to MERLENE Pisano and left VM explaining D/C plan.     Barriers to Discharge: Transport set-up     Plan: Follow up with [facility] on transport time.

## 2019-05-01 NOTE — THERAPY
"Physical Therapy Treatment completed.   Bed Mobility:  Supine to Sit:  (NT, in chair upon entry and exit)  Transfers: Sit to Stand: Moderate Assist  Gait: Level Of Assist: Minimal Assist with Front-Wheel Walker       Plan of Care: Will benefit from Physical Therapy 3 times per week  Discharge Recommendations: Equipment: Will Continue to Assess for Equipment Needs. Post-acute therapy: see below.    See \"Rehab Therapy-Acute\" Patient Summary Report for complete documentation.     Patient continuing to demonstrate progress with functional mobility. Pain appeared to be increased as compared to initial evaluation but patient not able to state what caused discomfort. She ambulated approximately 25ft x2 with FWW and min A. Patient with difficulty maintaining R posterior hip precautions, required physical assist and repetitive verbal cues for R hip ROM with sit<>stand. Continue to recommend post acute placement. Will continue to follow.  "

## 2019-05-01 NOTE — PROGRESS NOTES
Received bedside report from RN, pt care assumed, VSS, pt assessment complete. Pt AAOx1-2, pt c/o mild right leg pain at this time. No signs of acute distress noted at this time. POC discussed with pt. Pt denies any additional needs at this time. Bed in lowest position, bed alarm on, pt educated on fall risk, call light within reach, hourly rounding initiated.

## 2019-05-01 NOTE — PROGRESS NOTES
Received bedside report from RN, pt care assumed, VSS, pt assessment complete. Pt AAOx4, pt c/o mild right leg pain at this time. No signs of acute distress noted at this time. POC discussed with pt and verbalizes no questions. Pt denies any additional needs at this time. Bed in lowest position, bed alarm on, pt educated on fall risk and verbalized understanding, call light within reach, hourly rounding initiated.

## 2019-05-02 VITALS
WEIGHT: 131.39 LBS | RESPIRATION RATE: 17 BRPM | SYSTOLIC BLOOD PRESSURE: 117 MMHG | HEIGHT: 63 IN | TEMPERATURE: 97.5 F | HEART RATE: 70 BPM | BODY MASS INDEX: 23.28 KG/M2 | OXYGEN SATURATION: 94 % | DIASTOLIC BLOOD PRESSURE: 65 MMHG

## 2019-05-02 LAB
ANION GAP SERPL CALC-SCNC: 12 MMOL/L (ref 0–11.9)
BUN SERPL-MCNC: 17 MG/DL (ref 8–22)
CALCIUM SERPL-MCNC: 8.8 MG/DL (ref 8.5–10.5)
CHLORIDE SERPL-SCNC: 98 MMOL/L (ref 96–112)
CO2 SERPL-SCNC: 22 MMOL/L (ref 20–33)
CREAT SERPL-MCNC: 0.95 MG/DL (ref 0.5–1.4)
GLUCOSE SERPL-MCNC: 107 MG/DL (ref 65–99)
POTASSIUM SERPL-SCNC: 3.1 MMOL/L (ref 3.6–5.5)
SODIUM SERPL-SCNC: 132 MMOL/L (ref 135–145)

## 2019-05-02 PROCEDURE — 80048 BASIC METABOLIC PNL TOTAL CA: CPT

## 2019-05-02 PROCEDURE — 700102 HCHG RX REV CODE 250 W/ 637 OVERRIDE(OP): Performed by: HOSPITALIST

## 2019-05-02 PROCEDURE — 700111 HCHG RX REV CODE 636 W/ 250 OVERRIDE (IP): Performed by: HOSPITALIST

## 2019-05-02 PROCEDURE — A9270 NON-COVERED ITEM OR SERVICE: HCPCS | Performed by: HOSPITALIST

## 2019-05-02 PROCEDURE — 99239 HOSP IP/OBS DSCHRG MGMT >30: CPT | Performed by: HOSPITALIST

## 2019-05-02 PROCEDURE — 700102 HCHG RX REV CODE 250 W/ 637 OVERRIDE(OP): Performed by: INTERNAL MEDICINE

## 2019-05-02 PROCEDURE — 36415 COLL VENOUS BLD VENIPUNCTURE: CPT

## 2019-05-02 PROCEDURE — 700105 HCHG RX REV CODE 258

## 2019-05-02 PROCEDURE — A9270 NON-COVERED ITEM OR SERVICE: HCPCS | Performed by: INTERNAL MEDICINE

## 2019-05-02 RX ORDER — ROSUVASTATIN CALCIUM 10 MG/1
10 TABLET, COATED ORAL EVERY EVENING
Qty: 30 TAB | Refills: 11
Start: 2019-05-02 | End: 2019-06-20 | Stop reason: SDUPTHER

## 2019-05-02 RX ORDER — POTASSIUM CHLORIDE 7.45 MG/ML
10 INJECTION INTRAVENOUS
Status: COMPLETED | OUTPATIENT
Start: 2019-05-02 | End: 2019-05-02

## 2019-05-02 RX ORDER — ACETAMINOPHEN 325 MG/1
650 TABLET ORAL EVERY 6 HOURS PRN
Qty: 30 TAB | Refills: 0
Start: 2019-05-02 | End: 2022-09-23

## 2019-05-02 RX ORDER — POTASSIUM CHLORIDE 20 MEQ/1
40 TABLET, EXTENDED RELEASE ORAL ONCE
Status: COMPLETED | OUTPATIENT
Start: 2019-05-02 | End: 2019-05-02

## 2019-05-02 RX ORDER — SODIUM CHLORIDE 9 MG/ML
INJECTION, SOLUTION INTRAVENOUS
Status: COMPLETED
Start: 2019-05-02 | End: 2019-05-02

## 2019-05-02 RX ORDER — OXYCODONE HYDROCHLORIDE 5 MG/1
5 TABLET ORAL EVERY 8 HOURS PRN
Qty: 9 TAB | Refills: 0 | Status: SHIPPED | OUTPATIENT
Start: 2019-05-02 | End: 2019-05-05

## 2019-05-02 RX ADMIN — AMLODIPINE BESYLATE 10 MG: 10 TABLET ORAL at 05:40

## 2019-05-02 RX ADMIN — POTASSIUM CHLORIDE 40 MEQ: 1500 TABLET, EXTENDED RELEASE ORAL at 07:34

## 2019-05-02 RX ADMIN — POTASSIUM CHLORIDE 10 MEQ: 7.46 INJECTION, SOLUTION INTRAVENOUS at 08:53

## 2019-05-02 RX ADMIN — ACETAMINOPHEN 650 MG: 325 TABLET, FILM COATED ORAL at 05:40

## 2019-05-02 RX ADMIN — ENOXAPARIN SODIUM 40 MG: 100 INJECTION SUBCUTANEOUS at 05:39

## 2019-05-02 RX ADMIN — SENNOSIDES,DOCUSATE SODIUM 2 TABLET: 8.6; 5 TABLET, FILM COATED ORAL at 05:39

## 2019-05-02 RX ADMIN — POTASSIUM CHLORIDE 10 MEQ: 7.46 INJECTION, SOLUTION INTRAVENOUS at 07:34

## 2019-05-02 RX ADMIN — LOSARTAN POTASSIUM 100 MG: 50 TABLET ORAL at 05:40

## 2019-05-02 RX ADMIN — SODIUM CHLORIDE 500 ML: 9 INJECTION, SOLUTION INTRAVENOUS at 07:33

## 2019-05-02 NOTE — PROGRESS NOTES
Notified Dr. Barnes regarding potassium supplements completed, no recheck lab potassium indicated per Dr. Barnes.     [facility] packet completed, attempted to give report to RN at [facility], multiple calls made to phone number provided by  to [phone], no answer. Extension provided in packet for RN to call for report. Patient discharged with [facility] via wheelchair.

## 2019-05-02 NOTE — DISCHARGE INSTRUCTIONS
Discharge Instructions    Discharged to other by medical transportation with escort. Discharged via wheelchair, hospital escort: Yes.  Special equipment needed: Walker    Be sure to schedule a follow-up appointment with your primary care doctor or any specialists as instructed.     Discharge Plan:   Diet Plan: Discussed  Activity Level: Discussed  Smoking Cessation Offered: Patient Refused  Confirmed Follow up Appointment: Patient to Call and Schedule Appointment  Confirmed Symptoms Management: Discussed  Medication Reconciliation Updated: Yes  Pneumococcal Vaccine Administered/Refused: Not given - Patient refused pneumococcal vaccine  Influenza Vaccine Indication: Patient Refuses    I understand that a diet low in cholesterol, fat, and sodium is recommended for good health. Unless I have been given specific instructions below for another diet, I accept this instruction as my diet prescription.   Other diet: Regular    Special Instructions: Discharge instructions for the Orthopedic Patient    Follow up with Primary Care Physician within 2 weeks of discharge to home, regarding:  Review of medications and diagnostic testing.  Surveillance for medical complications.  Workup and treatment of osteoporosis, if appropriate.     -Is this a Joint Replacement patient? No    -Is this patient being discharged with medication to prevent blood clots?  Yes, Lovenox Enoxaparin injection  What is this medicine?  ENOXAPARIN (ee nox a PA rin) is used after knee, hip, or abdominal surgeries to prevent blood clotting. It is also used to treat existing blood clots in the lungs or in the veins.  This medicine may be used for other purposes; ask your health care provider or pharmacist if you have questions.  COMMON BRAND NAME(S): Lovenox  What should I tell my health care provider before I take this medicine?  They need to know if you have any of these conditions:  -bleeding disorders, hemorrhage, or hemophilia  -infection of the heart or  heart valves  -kidney or liver disease  -previous stroke  -prosthetic heart valve  -recent surgery or delivery of a baby  -ulcer in the stomach or intestine, diverticulitis, or other bowel disease  -an unusual or allergic reaction to enoxaparin, heparin, pork or pork products, other medicines, foods, dyes, or preservatives  -pregnant or trying to get pregnant  -breast-feeding  How should I use this medicine?  This medicine is for injection under the skin. It is usually given by a health-care professional. You or a family member may be trained on how to give the injections. If you are to give yourself injections, make sure you understand how to use the syringe, measure the dose if necessary, and give the injection. To avoid bruising, do not rub the site where this medicine has been injected. Do not take your medicine more often than directed. Do not stop taking except on the advice of your doctor or health care professional.  Make sure you receive a puncture-resistant container to dispose of the needles and syringes once you have finished with them. Do not reuse these items. Return the container to your doctor or health care professional for proper disposal.  Talk to your pediatrician regarding the use of this medicine in children. Special care may be needed.  Overdosage: If you think you have taken too much of this medicine contact a poison control center or emergency room at once.  NOTE: This medicine is only for you. Do not share this medicine with others.  What if I miss a dose?  If you miss a dose, take it as soon as you can. If it is almost time for your next dose, take only that dose. Do not take double or extra doses.  What may interact with this medicine?  -aspirin and aspirin-like medicines  -certain medicines that treat or prevent blood clots  -dipyridamole  -NSAIDs, medicines for pain and inflammation, like ibuprofen or naproxen  This list may not describe all possible interactions. Give your health care  provider a list of all the medicines, herbs, non-prescription drugs, or dietary supplements you use. Also tell them if you smoke, drink alcohol, or use illegal drugs. Some items may interact with your medicine.  What should I watch for while using this medicine?  Visit your doctor or health care professional for regular checks on your progress. Your condition will be monitored carefully while you are receiving this medicine.  Notify your doctor or health care professional and seek emergency treatment if you develop breathing problems; changes in vision; chest pain; severe, sudden headache; pain, swelling, warmth in the leg; trouble speaking; sudden numbness or weakness of the face, arm, or leg. These can be signs that your condition has gotten worse.  If you are going to have surgery, tell your doctor or health care professional that you are taking this medicine.  Do not stop taking this medicine without first talking to your doctor. Be sure to refill your prescription before you run out of medicine.  Avoid sports and activities that might cause injury while you are using this medicine. Severe falls or injuries can cause unseen bleeding. Be careful when using sharp tools or knives. Consider using an electric razor. Take special care brushing or flossing your teeth. Report any injuries, bruising, or red spots on the skin to your doctor or health care professional.  What side effects may I notice from receiving this medicine?  Side effects that you should report to your doctor or health care professional as soon as possible:  -allergic reactions like skin rash, itching or hives, swelling of the face, lips, or tongue  -feeling faint or lightheaded, falls  -signs and symptoms of bleeding such as bloody or black, tarry stools; red or dark-brown urine; spitting up blood or brown material that looks like coffee grounds; red spots on the skin; unusual bruising or bleeding from the eye, gums, or nose  Side effects that  "usually do not require medical attention (report to your doctor or health care professional if they continue or are bothersome):  -pain, redness, or irritation at site where injected  This list may not describe all possible side effects. Call your doctor for medical advice about side effects. You may report side effects to FDA at 0-853-GLQ-9612.  Where should I keep my medicine?  Keep out of the reach of children.  Store at room temperature between 15 and 30 degrees C (59 and 86 degrees F). Do not freeze. If your injections have been specially prepared, you may need to store them in the refrigerator. Ask your pharmacist. Throw away any unused medicine after the expiration date.  NOTE: This sheet is a summary. It may not cover all possible information. If you have questions about this medicine, talk to your doctor, pharmacist, or health care provider.  © 2018 Elsevier/Gold Standard (2015-04-21 16:06:21)      · Is patient discharged on Warfarin / Coumadin?   No       Hip Hemiarthroplasty  The hip joint is located where the upper end of the femur meets the pelvis socket (acetabulum). The femur, or thigh bone, looks like a long stem with a ball on the end. The acetabulum is a socket or cup-like structure in the pelvis, or hip bone. This \"ball and socket\" allows your hip to move.  During Hip hemiarthroplasty, your surgeon removes the diseased bone tissue and cartilage from the hip joint. The healthy parts of the hip are left intact. The head of the femur (the ball) and the socket (acetabulum) is replaced with new, artificial parts. The new hip is made of materials that allow a normal movement of the joint. This surgery usually lasts 2 to 3 hours.   The purpose of this surgery is to reduce pain and improve range of motion. It is one of the most successful joint replacement surgeries. It most often greatly improves the quality of life.  LET YOUR CAREGIVERS KNOW ABOUT:  · Allergies  · Medications taken including herbs, eye " drops, over the counter medications, and creams  · Use of steroids (by mouth or creams)  · Previous problems with anesthetics or Novocaine  · Possibility of pregnancy, if this applies  · History of blood clots (thrombophlebitis)  · History of bleeding or blood problems  · Previous surgery  · Other health problems  BEFORE THE PROCEDURE  · Do not eat or drink anything for as long as directed by your caregiver prior to surgery.  · You should be present 60 minutes prior to your procedure or as directed.  Prior to surgery an IV (intravenous line for giving fluids) may be started. You may be given an anesthetic (medications and gas to help you sleep) during the procedure.   AFTER THE PROCEDURE  After surgery, you will be taken to the recovery area. There a nurse will watch and check your progress. You may have a catheter (a long, narrow, hollow tube) in your bladder following surgery. The catheter helps you pass your water. Once you're awake, stable, and taking fluids well, barring other problems you'll be returned to your room. You will receive physical therapy until you are doing well and your caregiver feels it is safe for you to be transferred home or to an extended care facility.  HOME CARE INSTRUCTIONS   · You may resume normal diet and activities as directed or allowed.  · Change dressings if necessary or as directed.  · Only take over-the-counter or prescription medicines for pain, discomfort, or fever as directed by your caregiver.  SEEK IMMEDIATE MEDICAL CARE IF:  You develop:  · Swelling of your calf or leg or develop shortness of breath or chest pain.  · Redness, swelling, or increasing pain in the wound.  · Pus coming from wound.  · An unexplained oral temperature over 102° F (38.9° C) develops.  · A foul smell coming from the wound or dressing.  · A breaking open of the wound (edges not staying together) after sutures or staples have been removed.  MAKE SURE YOU:   · Understand these instructions.  · Will  watch your condition.  · Will get help right away if you are not doing well or get worse.  Document Released: 01/02/2008 Document Revised: 03/11/2013 Document Reviewed: 01/29/2008  ExitCare® Patient Information ©2014 [email or web address].      Depression / Suicide Risk    As you are discharged from this [facility] facility, it is important to learn how to keep safe from harming yourself.    Recognize the warning signs:  · Abrupt changes in personality, positive or negative- including increase in energy   · Giving away possessions  · Change in eating patterns- significant weight changes-  positive or negative  · Change in sleeping patterns- unable to sleep or sleeping all the time   · Unwillingness or inability to communicate  · Depression  · Unusual sadness, discouragement and loneliness  · Talk of wanting to die  · Neglect of personal appearance   · Rebelliousness- reckless behavior  · Withdrawal from people/activities they love  · Confusion- inability to concentrate     If you or a loved one observes any of these behaviors or has concerns about self-harm, here's what you can do:  · Talk about it- your feelings and reasons for harming yourself  · Remove any means that you might use to hurt yourself (examples: pills, rope, extension cords, firearm)  · Get professional help from the community (Mental Health, Substance Abuse, psychological counseling)  · Do not be alone:Call your Safe Contact- someone whom you trust who will be there for you.  · Call your local CRISIS HOTLINE 169-9417 or 948-215-0339  · Call your local Children's Mobile Crisis Response Team [place] (173) 737-9157 or www.[employer]  · Call the toll free National Suicide Prevention Hotlines   · National Suicide Prevention Lifeline 147-646-EKOS (4302)  · National Hope Line Network 800-SUICIDE (028-5700)

## 2019-05-02 NOTE — CARE PLAN
Problem: Safety  Goal: Will remain free from falls    Intervention: Implement fall precautions  Call light and belongings within reach, bed down and locked, hourly rounding in progress. No DME at bedside. Pt calls appropriately. Bed alarm in place.       Problem: Mobility  Goal: Risk for activity intolerance will decrease    Intervention: Assess and monitor signs of activity intolerance  Pt stood and ambulated to the commode and back to the bed, gait unsteady, needed frequent prompting in proper use of the FWW.

## 2019-05-02 NOTE — DISCHARGE PLANNING
Agency/Facility Name: [facility]   Spoke To: Arthur   Outcome: Voicemail left confirming transport time.

## 2019-05-02 NOTE — DOCUMENTATION QUERY
"                                                                         [facility]                                                                       Query Response Note      PATIENT:               AGUSTIN PORRAS  ACCT #:                  [id number]  MRN:                     [id number]  :                      1945  ADMIT DATE:       2019 6:43 AM  DISCH DATE:        2019 11:40 AM  RESPONDING  PROVIDER #:        [id number]           QUERY TEXT:    \"Developed worsening confusion after the procedure required restraints for 1-2 days\" is documented in the Medical Record.  Can a more specific diagnosis be provided?    NOTE:  If the appropriate response is not listed below, please respond with a new note.    The patient's Clinical Indicators include:   Anesthesia Note: Chronic alcoholism dependence and abuse   RN Note: Pt confused. Attempting to jump out of bed, pulling off heart monitors and  attempted to pull off wound vac. Order for maría elena wrist restraints.  DC Summary: patient developed worsening confusion after the procedure required restraints for 1-2 days  Treatment: Restraints, PT/OT eval, Monitor labs, Cozaar, Librium, Thiamine, Multivitamin, Folic Acid  Risk Factors: Hypertensive urgency, Alcohol abuse, s/p Rt hip replacement    Query created by: Charisse Roberts on 2019 9:40 AM    RESPONSE TEXT:    Acute metabolic encephalopathy          Electronically signed by:  ANDREIA SALAZAR MD 2019 1:01 PM              "

## 2019-05-02 NOTE — DISCHARGE PLANNING
Anticipated Discharge Disposition: [facility]    Action: LSW met with the Pt and her spouse and informed her of the discharge transportation time. Pt signed COBRA form.    Barriers to Discharge: none    Plan: DC to [facility]

## 2019-05-02 NOTE — DISCHARGE PLANNING
Agency/Facility Name: [facility]   Spoke To: Arthur   Outcome: Confirmed 1130 transport time. DC Summary faxed at 0915. LSW Georgiana notified.

## 2019-05-02 NOTE — CARE PLAN
Problem: Knowledge Deficit  Goal: Knowledge of disease process/condition, treatment plan, diagnostic tests, and medications will improve    Intervention: Explain information regarding disease process/condition, treatment plan, diagnostic tests, and medications and document in education  Explained indication for potassium supplements, patient verbalized understanding, potassium IV bag currently running.       Problem: Discharge Barriers/Planning  Goal: Patient's continuum of care needs will be met    Intervention: Involve patient and significant other/support system in setting and prioritizing goals for hospital stay and discharge  Plan for discharge today to [facility], discharge planning in progress.

## 2019-05-02 NOTE — DISCHARGE SUMMARY
Discharge Summary    CHIEF COMPLAINT ON ADMISSION  Chief Complaint   Patient presents with   • Hip Pain     right hip       Reason for Admission  Right hip pain    Admission Date  4/27/2019    CODE STATUS  Full Code    HPI & HOSPITAL COURSE  73 y.o. female admitted 4/27/2019 with traumatic right hip fracture.  The patient underwent right hip hemiarthroplasty April 28.  Physical and Occupational Therapy recommending rehab at a skilled nursing facility.  The patient developed worsening confusion after the procedure required restraints for 1-2 days, then gradually improved to the point that she was no longer requiring restraints. Blood pressures were high on her postoperative day.  Then they have been controlled with losartan and amlodipine.  Patient had hypokalemia that was replaced.  Patient had leukocytosis that was mostly reactive no sign of focal infection. The patient had hypokalem and was replaced, it is recommended that she gets a follow up lab in 2-3 days.Therefore, she is discharged in fair and stable condition to skilled nursing facility.    The patient met 2-midnight criteria for an inpatient stay at the time of discharge.    Discharge Date  5/2/2019     FOLLOW UP ITEMS POST DISCHARGE  Follow-up with primary care within 1 week after discharge from skilled nursing.  Follow-up with orthopedics as instructed    DISCHARGE DIAGNOSES  Active Problems:    Closed fracture of neck of right femur (HCC) POA: Yes    Essential hypertension POA: Yes    Alcohol abuse POA: Yes    Dependence on nicotine from cigarettes POA: Yes    Leukocytosis POA: Yes    Hypokalemia POA: Yes    Hyponatremia POA: Yes    High anion gap metabolic acidosis POA: Yes    Hypertensive urgency POA: No  Resolved Problems:    * No resolved hospital problems. *      FOLLOW UP  Follow-up with primary care within 1 week after discharge from skilled nursing.  Follow-up with orthopedics as instructed    MEDICATIONS ON DISCHARGE     Medication List       START taking these medications      Instructions   acetaminophen 325 MG Tabs  Commonly known as:  TYLENOL   Take 2 Tabs by mouth every 6 hours as needed (Mild Pain; (Pain scale 1-3); Temp greater than 100.5 F).  Dose:  650 mg     enoxaparin 40 MG/0.4ML Soln inj  Start taking on:  5/3/2019  Commonly known as:  LOVENOX   Inject 40 mg as instructed every day.  Dose:  40 mg     oxyCODONE immediate-release 5 MG Tabs  Commonly known as:  ROXICODONE   Take 1 Tab by mouth every 8 hours as needed for up to 3 days.  Dose:  5 mg        CHANGE how you take these medications      Instructions   rosuvastatin 10 MG Tabs  What changed:  · how much to take  · how to take this  · when to take this  · additional instructions  Commonly known as:  CRESTOR   Take 1 Tab by mouth every evening.  Dose:  10 mg        CONTINUE taking these medications      Instructions   amLODIPine 10 MG Tabs  Commonly known as:  NORVASC   Take 1 Tab by mouth every day.  Dose:  10 mg     aspirin EC 81 MG Tbec  Commonly known as:  ECOTRIN   Take 81 mg by mouth every day.  Dose:  81 mg     losartan 100 MG Tabs  Commonly known as:  COZAAR   Take 1 Tab by mouth every day.  Dose:  100 mg          In prescribing controlled substances to this patient, I certify that I have obtained and reviewed the medical history of Sangita Bolton. I have also made a good joseph effort to obtain applicable records from other providers who have treated the patient and records did not demonstrate any increased risk of substance abuse that would prevent me from prescribing controlled substances.     I have conducted a physical exam and documented it. I have reviewed Ms. Bolton’s prescription history as maintained by the [place] Prescription Monitoring Program.     I have assessed the patient’s risk for abuse, dependency, and addiction using the validated Opioid Risk Tool available at https://www.mdcalc.com/fnwbrh-duun-joky-ort-narcotic-abuse.     Given the above, I believe the  "benefits of controlled substance therapy outweigh the risks. The reasons for prescribing controlled substances include non-narcotic, oral analgesic alternatives have been inadequate for pain control. Accordingly, I have discussed the risk and benefits, treatment plan, and alternative therapies with the patient.         Allergies  Allergies   Allergen Reactions   • Penicillins Hives     hands   • Other Environmental Hives     Hair dye       DIET  Orders Placed This Encounter   Procedures   • Diet Order Regular     Standing Status:   Standing     Number of Occurrences:   1     Order Specific Question:   Diet:     Answer:   Regular [1]       ACTIVITY  As tolerated and directed by skilled nursing.  Weight bearing as instructed by orthopedics \"AT with PHP\"    CONSULTATIONS  Orthopedic surgery    PROCEDURES  Right hip hemiarthroplasty     LABORATORY  Lab Results   Component Value Date    SODIUM 132 (L) 05/02/2019    POTASSIUM 3.1 (L) 05/02/2019    CHLORIDE 98 05/02/2019    CO2 22 05/02/2019    GLUCOSE 107 (H) 05/02/2019    BUN 17 05/02/2019    CREATININE 0.95 05/02/2019    CREATININE 0.58 02/22/2013    GLOMRATE >59 06/22/2010        Lab Results   Component Value Date    WBC 10.6 04/29/2019    WBC 6.6 02/22/2013    HEMOGLOBIN 12.1 04/29/2019    HEMATOCRIT 36.7 (L) 04/29/2019    PLATELETCT 223 04/29/2019        Total time of the discharge process exceeds 34 minutes.  "

## 2019-05-22 ENCOUNTER — HOME HEALTH ADMISSION (OUTPATIENT)
Dept: HOME HEALTH SERVICES | Facility: HOME HEALTHCARE | Age: 74
End: 2019-05-22
Payer: MEDICARE

## 2019-05-25 ENCOUNTER — HOME CARE VISIT (OUTPATIENT)
Dept: HOME HEALTH SERVICES | Facility: HOME HEALTHCARE | Age: 74
End: 2019-05-25
Payer: MEDICARE

## 2019-05-25 PROCEDURE — 665999 HH PPS REVENUE DEBIT

## 2019-05-25 PROCEDURE — G0493 RN CARE EA 15 MIN HH/HOSPICE: HCPCS

## 2019-05-25 PROCEDURE — 665998 HH PPS REVENUE CREDIT

## 2019-05-25 PROCEDURE — 665001 SOC-HOME HEALTH

## 2019-05-26 VITALS
WEIGHT: 130 LBS | SYSTOLIC BLOOD PRESSURE: 132 MMHG | OXYGEN SATURATION: 98 % | TEMPERATURE: 98.3 F | HEIGHT: 63 IN | BODY MASS INDEX: 23.04 KG/M2 | DIASTOLIC BLOOD PRESSURE: 70 MMHG | HEART RATE: 78 BPM | RESPIRATION RATE: 18 BRPM

## 2019-05-26 PROCEDURE — 665998 HH PPS REVENUE CREDIT

## 2019-05-26 PROCEDURE — 665999 HH PPS REVENUE DEBIT

## 2019-05-26 ASSESSMENT — PATIENT HEALTH QUESTIONNAIRE - PHQ9
1. LITTLE INTEREST OR PLEASURE IN DOING THINGS: 00
CLINICAL INTERPRETATION OF PHQ2 SCORE: 0
2. FEELING DOWN, DEPRESSED, IRRITABLE, OR HOPELESS: 00

## 2019-05-26 ASSESSMENT — ACTIVITIES OF DAILY LIVING (ADL): OASIS_M1830: 02

## 2019-05-27 PROCEDURE — 665998 HH PPS REVENUE CREDIT

## 2019-05-27 PROCEDURE — 665999 HH PPS REVENUE DEBIT

## 2019-05-28 ENCOUNTER — HOME CARE VISIT (OUTPATIENT)
Dept: HOME HEALTH SERVICES | Facility: HOME HEALTHCARE | Age: 74
End: 2019-05-28
Payer: MEDICARE

## 2019-05-28 VITALS
OXYGEN SATURATION: 98 % | SYSTOLIC BLOOD PRESSURE: 134 MMHG | RESPIRATION RATE: 16 BRPM | DIASTOLIC BLOOD PRESSURE: 72 MMHG | TEMPERATURE: 97.5 F | HEART RATE: 85 BPM

## 2019-05-28 PROCEDURE — 665998 HH PPS REVENUE CREDIT

## 2019-05-28 PROCEDURE — 665999 HH PPS REVENUE DEBIT

## 2019-05-28 PROCEDURE — G0151 HHCP-SERV OF PT,EA 15 MIN: HCPCS

## 2019-05-28 PROCEDURE — G0495 RN CARE TRAIN/EDU IN HH: HCPCS

## 2019-05-28 ASSESSMENT — ENCOUNTER SYMPTOMS
NAUSEA: DENIES
VOMITING: DENIES

## 2019-05-28 ASSESSMENT — ACTIVITIES OF DAILY LIVING (ADL)
ADLS_COMMENTS: <!--EPICS-->SEE OT EVAL<!--EPICE-->
IADLS_COMMENTS: <!--EPICS-->SEE OT EVAL<!--EPICE-->

## 2019-05-29 ENCOUNTER — OFFICE VISIT (OUTPATIENT)
Dept: MEDICAL GROUP | Facility: MEDICAL CENTER | Age: 74
End: 2019-05-29
Payer: COMMERCIAL

## 2019-05-29 VITALS
DIASTOLIC BLOOD PRESSURE: 82 MMHG | RESPIRATION RATE: 14 BRPM | HEIGHT: 63 IN | WEIGHT: 128 LBS | TEMPERATURE: 98.1 F | SYSTOLIC BLOOD PRESSURE: 138 MMHG | OXYGEN SATURATION: 98 % | HEART RATE: 89 BPM | BODY MASS INDEX: 22.68 KG/M2

## 2019-05-29 DIAGNOSIS — I77.9 BILATERAL CAROTID ARTERY DISEASE, UNSPECIFIED TYPE (HCC): ICD-10-CM

## 2019-05-29 DIAGNOSIS — I73.9 PERIPHERAL VASCULAR DISEASE (HCC): ICD-10-CM

## 2019-05-29 DIAGNOSIS — I10 ESSENTIAL HYPERTENSION: ICD-10-CM

## 2019-05-29 DIAGNOSIS — S72.001A CLOSED FRACTURE OF NECK OF RIGHT FEMUR, INITIAL ENCOUNTER (HCC): ICD-10-CM

## 2019-05-29 PROCEDURE — 665998 HH PPS REVENUE CREDIT

## 2019-05-29 PROCEDURE — 99214 OFFICE O/P EST MOD 30 MIN: CPT | Performed by: FAMILY MEDICINE

## 2019-05-29 PROCEDURE — 665999 HH PPS REVENUE DEBIT

## 2019-05-29 ASSESSMENT — PAIN SCALES - GENERAL: PAINLEVEL: 2=MINIMAL-SLIGHT

## 2019-05-29 NOTE — ASSESSMENT & PLAN NOTE
Had surgery I the past   Taking statin   If an additional agent is needed for BP would choose BB      Stable continue current management

## 2019-05-29 NOTE — ASSESSMENT & PLAN NOTE
Home from SNF   Doing well   Stable continue current management plan extra time spent on forms   She needs DMV forms filled out for handicap placcard  Ref to home health placed they are coming out tomorrow  Follow up 1 mo       Over 25 minutes spent with patient face to face, greater than 50% time spent with plan/coordination of care regarding that which is discussed in the HPI and A&P

## 2019-05-29 NOTE — PROGRESS NOTES
"This medical record contains text that has been entered with the assistance of computer voice recognition and dictation software.  Therefore, it may contain unintended errors in text, spelling, punctuation, or grammar        Chief Complaint   Patient presents with   follow up SNF                 Sangita Bolton is a 73 y.o. female here evaluation and management of:  Follow up HTN HL h/o CAD and follow up after discharge from SNf           \"CHIEF COMPLAINT ON ADMISSION       Chief Complaint   Patient presents with   • Hip Pain       right hip         Reason for Admission  Right hip pain     Admission Date  4/27/2019     CODE STATUS  Full Code     HPI & HOSPITAL COURSE  73 y.o. female admitted 4/27/2019 with traumatic right hip fracture.  The patient underwent right hip hemiarthroplasty April 28.  Physical and Occupational Therapy recommending rehab at a skilled nursing facility.  The patient developed worsening confusion after the procedure required restraints for 1-2 days, then gradually improved to the point that she was no longer requiring restraints. Blood pressures were high on her postoperative day.  Then they have been controlled with losartan and amlodipine.  Patient had hypokalemia that was replaced.  Patient had leukocytosis that was mostly reactive no sign of focal infection. The patient had hypokalem and was replaced, it is recommended that she gets a follow up lab in 2-3 days.Therefore, she is discharged in fair and stable condition to skilled nursing facility.     The patient met 2-midnight criteria for an inpatient stay at the time of discharge.     Discharge Date  5/2/2019      FOLLOW UP ITEMS POST DISCHARGE  Follow-up with primary care within 1 week after discharge from skilled nursing.  Follow-up with orthopedics as instructed     DISCHARGE DIAGNOSES  Active Problems:    Closed fracture of neck of right femur (HCC) POA: Yes    Essential hypertension POA: Yes    Alcohol abuse POA: Yes    Dependence on " "nicotine from cigarettes POA: Yes    Leukocytosis POA: Yes    Hypokalemia POA: Yes    Hyponatremia POA: Yes    High anion gap metabolic acidosis POA: Yes    Hypertensive urgency POA: No  Resolved Problems:    * No resolved hospital problems. *        FOLLOW UP  Follow-up with primary care within 1 week after discharge from skilled nursing.  Follow-up with orthopedics as instructed\"                  Social history   Her  worked internationally so she lived in [place] and [place]               Current Outpatient Prescriptions   Medication Sig Dispense Refill   • losartan-hydrochlorothiazide (HYZAAR) 100-25 MG per tablet Take 1 Tab by mouth every day.     • hydrOXYzine HCl (ATARAX) 25 MG Tab Take 25 mg by mouth at bedtime as needed for Itching.     • acetaminophen (TYLENOL) 325 MG Tab Take 2 Tabs by mouth every 6 hours as needed (Mild Pain; (Pain scale 1-3); Temp greater than 100.5 F). 30 Tab 0   • rosuvastatin (CRESTOR) 10 MG Tab Take 1 Tab by mouth every evening. 30 Tab 11   • aspirin EC (ECOTRIN) 81 MG Tablet Delayed Response Take 81 mg by mouth every day.     • amLODIPine (NORVASC) 10 MG Tab Take 1 Tab by mouth every day. 90 Tab 3     No current facility-administered medications for this visit.      Patient Active Problem List    Diagnosis Date Noted   • Closed fracture of neck of right femur ([facility]) 04/27/2019     Priority: High   • Hypertensive urgency 04/30/2019   • Alcohol abuse 04/27/2019   • Dependence on nicotine from cigarettes 04/27/2019   • Leukocytosis 04/27/2019   • Hypokalemia 04/27/2019   • Hyponatremia 04/27/2019   • High anion gap metabolic acidosis 04/27/2019   • Dry skin dermatitis 04/11/2018   • Peripheral vascular disease (CMS-[facility]) 03/11/2016   • Carotid artery stenosis 12/31/2015   • S/P insertion of iliac artery stent 12/31/2015   • Carotid artery disease ([facility]) 11/10/2013   • Heart murmur 11/10/2013   • Essential hypertension 07/01/2013   • Lumbar foraminal stenosis 01/09/2013   • DDD " "(degenerative disc disease), lumbar 01/09/2013   • Osteoporosis, post-menopausal      Past Surgical History:   Procedure Laterality Date   • PB PARTIAL HIP REPLACEMENT Right 4/28/2019    Procedure: HEMIARTHROPLASTY, HIP;  Surgeon: Evens Sarabia M.D.;  Location: SURGERY [facility];  Service: Orthopedics   • RECOVERY  2/9/2015    Performed by [facility] at SURGERY SAME DAY [facility]   • FEMORAL ARTERY REPAIR  2/9/2015    Performed by Yuly Chirinos M.D. at SURGERY [facility]   • HYSTEROSCOPY WITH VIDEO DIAGNOSTIC  11/17/2010    Performed by ALIS DEGROOT at SURGERY SAME DAY [facility]   • DILATION AND CURETTAGE  11/17/2010    Performed by ALIS DEGROOT at SURGERY SAME DAY [facility]   • HUMERUS ORIF  9/5/08    Performed by LAURA CARTER at SURGERY [facility]   • COLONOSCOPY  2008    recheck 4 years   • OTHER ORTHOPEDIC SURGERY      rt leg fx   • OTHER ORTHOPEDIC SURGERY      broken right wrist      Social History   Substance Use Topics   • Smoking status: Current Some Day Smoker     Packs/day: 0.50     Years: 40.00     Types: Cigarettes     Start date: 2/2/1975   • Smokeless tobacco: Never Used   • Alcohol use Yes      Comment: 4/week     Family History   Problem Relation Age of Onset   • Cancer Mother         leukemia           ROS    all review of system completed and negative except for those listed above     Objective:     /82 (BP Location: Right arm, Patient Position: Sitting, BP Cuff Size: Adult)   Pulse 89   Temp 36.7 °C (98.1 °F) (Temporal)   Resp 14   Ht 1.6 m (5' 3\")   Wt 58.1 kg (128 lb)   SpO2 98%  Body mass index is 22.67 kg/m².  Physical Exam:    Constitutional: Alert, no distress.  Skin: Warm, dry, good turgor, no rashes in visible areas.  Eye: Equal, round and reactive, conjunctiva clear, lids normal.  ENMT: Lips without lesions, good dentition, oropharynx clear.  Neck: Trachea midline, no masses, no thyromegaly. No cervical or " supraclavicular lymphadenopathy.  Respiratory: Unlabored respiratory effort, lungs clear to auscultation, no wheezes, no ronchi.  Cardiovascular: Normal S1, S2, no murmur, no edema.  Abdomen: Soft, non-tender, no masses, no hepatosplenomegaly.  Psych: Alert and oriented x3, normal affect and mood.              Assessment and Plan:   The following treatment plan was discussed        Problem List Items Addressed This Visit     Closed fracture of neck of right femur (HCC)     Home from SNF   Doing well   Stable continue current management plan extra time spent on forms   She needs DMV forms filled out for handicap placcard  Ref to home health placed they are coming out tomorrow  Follow up 1 mo       Over 25 minutes spent with patient face to face, greater than 50% time spent with plan/coordination of care regarding that which is discussed in the HPI and A&P           Relevant Orders    REFERRAL TO HOME HEALTH    Essential hypertension     Follow up 6 mo labs prior for BP check               Relevant Orders    Lipid Profile    Basic Metabolic Panel    Carotid artery disease (HCC)     On statin   On asa  Stable continue current management plan             Peripheral vascular disease (CMS-HCC)     Had surgery I the past   Taking statin   If an additional agent is needed for BP would choose BB      Stable continue current management                            Instructed to follow up if symptoms worsen or fail to improve, ER/UC precautions discussed as well    Claire Hernández MD  [facility], Family Medicine   [address] [place] [address]   [place] [address]  Phone: [phone]

## 2019-05-30 PROCEDURE — 665999 HH PPS REVENUE DEBIT

## 2019-05-30 PROCEDURE — 665998 HH PPS REVENUE CREDIT

## 2019-05-31 ENCOUNTER — HOME CARE VISIT (OUTPATIENT)
Dept: HOME HEALTH SERVICES | Facility: HOME HEALTHCARE | Age: 74
End: 2019-05-31
Payer: MEDICARE

## 2019-05-31 VITALS
RESPIRATION RATE: 14 BRPM | DIASTOLIC BLOOD PRESSURE: 70 MMHG | OXYGEN SATURATION: 96 % | SYSTOLIC BLOOD PRESSURE: 136 MMHG | TEMPERATURE: 98.3 F | HEART RATE: 77 BPM

## 2019-05-31 PROCEDURE — 665999 HH PPS REVENUE DEBIT

## 2019-05-31 PROCEDURE — 665998 HH PPS REVENUE CREDIT

## 2019-05-31 PROCEDURE — G0299 HHS/HOSPICE OF RN EA 15 MIN: HCPCS

## 2019-05-31 ASSESSMENT — ENCOUNTER SYMPTOMS
NAUSEA: DENIES
VOMITING: DENIES

## 2019-06-01 PROCEDURE — 665999 HH PPS REVENUE DEBIT

## 2019-06-01 PROCEDURE — 665998 HH PPS REVENUE CREDIT

## 2019-06-02 PROCEDURE — 665998 HH PPS REVENUE CREDIT

## 2019-06-02 PROCEDURE — 665999 HH PPS REVENUE DEBIT

## 2019-06-03 ENCOUNTER — HOME CARE VISIT (OUTPATIENT)
Dept: HOME HEALTH SERVICES | Facility: HOME HEALTHCARE | Age: 74
End: 2019-06-03
Payer: MEDICARE

## 2019-06-03 VITALS
TEMPERATURE: 98 F | HEART RATE: 88 BPM | SYSTOLIC BLOOD PRESSURE: 155 MMHG | OXYGEN SATURATION: 98 % | DIASTOLIC BLOOD PRESSURE: 70 MMHG | RESPIRATION RATE: 16 BRPM

## 2019-06-03 VITALS
DIASTOLIC BLOOD PRESSURE: 70 MMHG | HEART RATE: 88 BPM | TEMPERATURE: 98 F | RESPIRATION RATE: 16 BRPM | SYSTOLIC BLOOD PRESSURE: 155 MMHG | OXYGEN SATURATION: 98 %

## 2019-06-03 PROCEDURE — 665999 HH PPS REVENUE DEBIT

## 2019-06-03 PROCEDURE — G0151 HHCP-SERV OF PT,EA 15 MIN: HCPCS

## 2019-06-03 PROCEDURE — 665998 HH PPS REVENUE CREDIT

## 2019-06-03 PROCEDURE — G0152 HHCP-SERV OF OT,EA 15 MIN: HCPCS

## 2019-06-03 ASSESSMENT — ACTIVITIES OF DAILY LIVING (ADL)
DRESSING_UB_ASSISTANCE: 0
TRANSPORTATION_ASSISTANCE: 6
TOILETING_EQUIPMENT_NEEDED: GRAB BAR
HOUSEKEEPING_ASSISTANCE: 6
TOILETING_EQUIPMENT_USED: ELEVATED TOILET
SHOPPING_ASSISTANCE: 6
ORAL_CARE_ASSISTANCE: 0
MEAL_PREP_ASSISTANCE: 4
TOILETING_ASSISTANCE: 0
LAUNDRY_ASSISTANCE: 6
EATING_ASSISTANCE: 0
BATHING_ASSISTANCE: 6
DRESSING_LB_ASSISTANCE: 6
TELEPHONE_ASSISTANCE: 0
GROOMING_ASSISTANCE: 0

## 2019-06-04 ENCOUNTER — HOME CARE VISIT (OUTPATIENT)
Dept: HOME HEALTH SERVICES | Facility: HOME HEALTHCARE | Age: 74
End: 2019-06-04
Payer: MEDICARE

## 2019-06-04 VITALS
OXYGEN SATURATION: 98 % | TEMPERATURE: 97.5 F | SYSTOLIC BLOOD PRESSURE: 150 MMHG | DIASTOLIC BLOOD PRESSURE: 70 MMHG | RESPIRATION RATE: 16 BRPM | HEART RATE: 83 BPM

## 2019-06-04 PROCEDURE — 665998 HH PPS REVENUE CREDIT

## 2019-06-04 PROCEDURE — G0495 RN CARE TRAIN/EDU IN HH: HCPCS

## 2019-06-04 PROCEDURE — 665999 HH PPS REVENUE DEBIT

## 2019-06-04 SDOH — ECONOMIC STABILITY: HOUSING INSECURITY: HOME SAFETY: CAUTIONED RE: FALL RISK W/ THROW RUGS.

## 2019-06-04 ASSESSMENT — ENCOUNTER SYMPTOMS
VOMITING: DENIES
NAUSEA: DENIES

## 2019-06-05 ENCOUNTER — HOME CARE VISIT (OUTPATIENT)
Dept: HOME HEALTH SERVICES | Facility: HOME HEALTHCARE | Age: 74
End: 2019-06-05
Payer: MEDICARE

## 2019-06-05 VITALS
DIASTOLIC BLOOD PRESSURE: 70 MMHG | SYSTOLIC BLOOD PRESSURE: 160 MMHG | RESPIRATION RATE: 16 BRPM | HEART RATE: 84 BPM | OXYGEN SATURATION: 94 % | TEMPERATURE: 98.4 F

## 2019-06-05 PROCEDURE — G0157 HHC PT ASSISTANT EA 15: HCPCS

## 2019-06-05 PROCEDURE — 665998 HH PPS REVENUE CREDIT

## 2019-06-05 PROCEDURE — 665999 HH PPS REVENUE DEBIT

## 2019-06-06 ENCOUNTER — TELEPHONE (OUTPATIENT)
Dept: VASCULAR LAB | Facility: MEDICAL CENTER | Age: 74
End: 2019-06-06

## 2019-06-06 ENCOUNTER — HOME CARE VISIT (OUTPATIENT)
Dept: HOME HEALTH SERVICES | Facility: HOME HEALTHCARE | Age: 74
End: 2019-06-06
Payer: MEDICARE

## 2019-06-06 PROCEDURE — G0152 HHCP-SERV OF OT,EA 15 MIN: HCPCS

## 2019-06-06 PROCEDURE — 665999 HH PPS REVENUE DEBIT

## 2019-06-06 PROCEDURE — 665998 HH PPS REVENUE CREDIT

## 2019-06-06 PROCEDURE — G0180 MD CERTIFICATION HHA PATIENT: HCPCS | Performed by: FAMILY MEDICINE

## 2019-06-06 NOTE — TELEPHONE ENCOUNTER
Medications reviewed  No clinically significant interactions          Brittany Merrill, Clinical Pharmacist, CDE, CACP

## 2019-06-07 VITALS
OXYGEN SATURATION: 98 % | TEMPERATURE: 98.8 F | SYSTOLIC BLOOD PRESSURE: 162 MMHG | DIASTOLIC BLOOD PRESSURE: 72 MMHG | HEART RATE: 79 BPM | RESPIRATION RATE: 16 BRPM

## 2019-06-07 PROCEDURE — 665999 HH PPS REVENUE DEBIT

## 2019-06-07 PROCEDURE — 665998 HH PPS REVENUE CREDIT

## 2019-06-07 ASSESSMENT — ENCOUNTER SYMPTOMS: DIFFICULTY THINKING: 1

## 2019-06-08 PROCEDURE — 665998 HH PPS REVENUE CREDIT

## 2019-06-08 PROCEDURE — 665999 HH PPS REVENUE DEBIT

## 2019-06-09 PROCEDURE — 665999 HH PPS REVENUE DEBIT

## 2019-06-09 PROCEDURE — 665998 HH PPS REVENUE CREDIT

## 2019-06-10 ENCOUNTER — HOME CARE VISIT (OUTPATIENT)
Dept: HOME HEALTH SERVICES | Facility: HOME HEALTHCARE | Age: 74
End: 2019-06-10
Payer: MEDICARE

## 2019-06-10 ENCOUNTER — TELEPHONE (OUTPATIENT)
Dept: MEDICAL GROUP | Facility: MEDICAL CENTER | Age: 74
End: 2019-06-10

## 2019-06-10 VITALS
DIASTOLIC BLOOD PRESSURE: 72 MMHG | SYSTOLIC BLOOD PRESSURE: 164 MMHG | HEART RATE: 83 BPM | RESPIRATION RATE: 16 BRPM | OXYGEN SATURATION: 96 % | TEMPERATURE: 98.4 F

## 2019-06-10 DIAGNOSIS — I10 ESSENTIAL HYPERTENSION: ICD-10-CM

## 2019-06-10 PROCEDURE — 665998 HH PPS REVENUE CREDIT

## 2019-06-10 PROCEDURE — 665999 HH PPS REVENUE DEBIT

## 2019-06-10 PROCEDURE — G0152 HHCP-SERV OF OT,EA 15 MIN: HCPCS

## 2019-06-10 RX ORDER — AMLODIPINE BESYLATE 10 MG/1
10 TABLET ORAL DAILY
Qty: 90 TAB | Refills: 3 | Status: SHIPPED | OUTPATIENT
Start: 2019-06-10 | End: 2021-03-19 | Stop reason: SDUPTHER

## 2019-06-10 RX ORDER — SPIRONOLACTONE 25 MG/1
25 TABLET ORAL DAILY
Qty: 90 TAB | Refills: 3 | Status: SHIPPED | OUTPATIENT
Start: 2019-06-10 | End: 2021-04-11 | Stop reason: SDUPTHER

## 2019-06-10 ASSESSMENT — ENCOUNTER SYMPTOMS: DIFFICULTY THINKING: 1

## 2019-06-11 ENCOUNTER — HOME CARE VISIT (OUTPATIENT)
Dept: HOME HEALTH SERVICES | Facility: HOME HEALTHCARE | Age: 74
End: 2019-06-11
Payer: MEDICARE

## 2019-06-11 VITALS
DIASTOLIC BLOOD PRESSURE: 60 MMHG | TEMPERATURE: 98.6 F | OXYGEN SATURATION: 94 % | SYSTOLIC BLOOD PRESSURE: 120 MMHG | HEART RATE: 92 BPM | RESPIRATION RATE: 16 BRPM

## 2019-06-11 PROCEDURE — G0157 HHC PT ASSISTANT EA 15: HCPCS

## 2019-06-11 PROCEDURE — 665998 HH PPS REVENUE CREDIT

## 2019-06-11 PROCEDURE — 665999 HH PPS REVENUE DEBIT

## 2019-06-12 PROCEDURE — 665999 HH PPS REVENUE DEBIT

## 2019-06-12 PROCEDURE — 665998 HH PPS REVENUE CREDIT

## 2019-06-13 ENCOUNTER — HOME CARE VISIT (OUTPATIENT)
Dept: HOME HEALTH SERVICES | Facility: HOME HEALTHCARE | Age: 74
End: 2019-06-13
Payer: MEDICARE

## 2019-06-13 PROCEDURE — 665999 HH PPS REVENUE DEBIT

## 2019-06-13 PROCEDURE — G0152 HHCP-SERV OF OT,EA 15 MIN: HCPCS

## 2019-06-13 PROCEDURE — 665998 HH PPS REVENUE CREDIT

## 2019-06-14 ENCOUNTER — HOME CARE VISIT (OUTPATIENT)
Dept: HOME HEALTH SERVICES | Facility: HOME HEALTHCARE | Age: 74
End: 2019-06-14
Payer: MEDICARE

## 2019-06-14 VITALS
SYSTOLIC BLOOD PRESSURE: 130 MMHG | OXYGEN SATURATION: 96 % | HEART RATE: 86 BPM | RESPIRATION RATE: 16 BRPM | TEMPERATURE: 98.1 F | DIASTOLIC BLOOD PRESSURE: 62 MMHG

## 2019-06-14 VITALS
OXYGEN SATURATION: 97 % | TEMPERATURE: 98.5 F | RESPIRATION RATE: 16 BRPM | SYSTOLIC BLOOD PRESSURE: 148 MMHG | HEART RATE: 85 BPM | DIASTOLIC BLOOD PRESSURE: 68 MMHG

## 2019-06-14 PROCEDURE — 665998 HH PPS REVENUE CREDIT

## 2019-06-14 PROCEDURE — G0151 HHCP-SERV OF PT,EA 15 MIN: HCPCS

## 2019-06-14 PROCEDURE — 665999 HH PPS REVENUE DEBIT

## 2019-06-14 ASSESSMENT — ENCOUNTER SYMPTOMS: DIFFICULTY THINKING: 1

## 2019-06-15 PROCEDURE — 665999 HH PPS REVENUE DEBIT

## 2019-06-15 PROCEDURE — 665998 HH PPS REVENUE CREDIT

## 2019-06-16 PROCEDURE — 665999 HH PPS REVENUE DEBIT

## 2019-06-16 PROCEDURE — 665998 HH PPS REVENUE CREDIT

## 2019-06-17 LAB — EKG IMPRESSION: NORMAL

## 2019-06-17 PROCEDURE — 665999 HH PPS REVENUE DEBIT

## 2019-06-17 PROCEDURE — 665998 HH PPS REVENUE CREDIT

## 2019-06-18 PROCEDURE — 665999 HH PPS REVENUE DEBIT

## 2019-06-18 PROCEDURE — 665998 HH PPS REVENUE CREDIT

## 2019-06-19 ENCOUNTER — HOME CARE VISIT (OUTPATIENT)
Dept: HOME HEALTH SERVICES | Facility: HOME HEALTHCARE | Age: 74
End: 2019-06-19
Payer: MEDICARE

## 2019-06-19 VITALS
OXYGEN SATURATION: 96 % | TEMPERATURE: 97.8 F | DIASTOLIC BLOOD PRESSURE: 64 MMHG | HEART RATE: 83 BPM | RESPIRATION RATE: 18 BRPM | SYSTOLIC BLOOD PRESSURE: 122 MMHG

## 2019-06-19 PROCEDURE — G0151 HHCP-SERV OF PT,EA 15 MIN: HCPCS

## 2019-06-19 PROCEDURE — 665999 HH PPS REVENUE DEBIT

## 2019-06-19 PROCEDURE — 665997 HH PPS REVENUE ADJ

## 2019-06-19 PROCEDURE — 665998 HH PPS REVENUE CREDIT

## 2019-06-19 ASSESSMENT — PATIENT HEALTH QUESTIONNAIRE - PHQ9: CLINICAL INTERPRETATION OF PHQ2 SCORE: 0

## 2019-06-19 ASSESSMENT — ACTIVITIES OF DAILY LIVING (ADL)
OASIS_M1830: 01
HOME_HEALTH_OASIS: 00

## 2019-06-20 PROCEDURE — 665998 HH PPS REVENUE CREDIT

## 2019-06-20 PROCEDURE — 665999 HH PPS REVENUE DEBIT

## 2019-06-21 PROCEDURE — 665998 HH PPS REVENUE CREDIT

## 2019-06-21 PROCEDURE — 665999 HH PPS REVENUE DEBIT

## 2019-06-22 PROCEDURE — 665998 HH PPS REVENUE CREDIT

## 2019-06-22 PROCEDURE — 665999 HH PPS REVENUE DEBIT

## 2019-06-23 PROCEDURE — 665999 HH PPS REVENUE DEBIT

## 2019-06-23 PROCEDURE — 665998 HH PPS REVENUE CREDIT

## 2019-06-24 PROCEDURE — 665998 HH PPS REVENUE CREDIT

## 2019-06-24 PROCEDURE — 665999 HH PPS REVENUE DEBIT

## 2019-06-24 RX ORDER — ROSUVASTATIN CALCIUM 10 MG/1
10 TABLET, COATED ORAL EVERY EVENING
Qty: 30 TAB | Refills: 0 | Status: SHIPPED | OUTPATIENT
Start: 2019-06-24 | End: 2019-09-02 | Stop reason: SDUPTHER

## 2019-06-25 PROCEDURE — 665998 HH PPS REVENUE CREDIT

## 2019-06-25 PROCEDURE — 665999 HH PPS REVENUE DEBIT

## 2019-08-20 ENCOUNTER — OFFICE VISIT (OUTPATIENT)
Dept: NEPHROLOGY | Facility: MEDICAL CENTER | Age: 74
End: 2019-08-20
Payer: COMMERCIAL

## 2019-08-20 VITALS
SYSTOLIC BLOOD PRESSURE: 140 MMHG | WEIGHT: 104 LBS | RESPIRATION RATE: 14 BRPM | HEART RATE: 61 BPM | BODY MASS INDEX: 18.43 KG/M2 | DIASTOLIC BLOOD PRESSURE: 70 MMHG | HEIGHT: 63 IN | TEMPERATURE: 98.4 F | OXYGEN SATURATION: 96 %

## 2019-08-20 DIAGNOSIS — R63.4 WEIGHT LOSS: ICD-10-CM

## 2019-08-20 DIAGNOSIS — N18.9 CHRONIC KIDNEY DISEASE, UNSPECIFIED CKD STAGE: ICD-10-CM

## 2019-08-20 DIAGNOSIS — E87.1 HYPONATREMIA: ICD-10-CM

## 2019-08-20 DIAGNOSIS — I10 ESSENTIAL HYPERTENSION: ICD-10-CM

## 2019-08-20 PROCEDURE — 99214 OFFICE O/P EST MOD 30 MIN: CPT | Performed by: INTERNAL MEDICINE

## 2019-08-20 ASSESSMENT — ENCOUNTER SYMPTOMS
FEVER: 0
NAUSEA: 0
WEIGHT LOSS: 1
COUGH: 0
SHORTNESS OF BREATH: 0
VOMITING: 0
HYPERTENSION: 1
CHILLS: 0

## 2019-08-20 NOTE — PROGRESS NOTES
"Subjective:      Sangita Bolton is a 74 y.o. female who presents with Chronic Kidney Disease and Hypertension            Chronic Kidney Disease   This is a chronic problem. The current episode started more than 1 year ago. The problem occurs constantly. The problem has been unchanged. Pertinent negatives include no chest pain, chills, coughing, fever, nausea, urinary symptoms or vomiting.   Hypertension   This is a chronic problem. The current episode started more than 1 year ago. The problem has been waxing and waning since onset. The problem is uncontrolled. Pertinent negatives include no chest pain, malaise/fatigue, peripheral edema or shortness of breath. Risk factors for coronary artery disease include post-menopausal state. Past treatments include calcium channel blockers, angiotensin blockers and lifestyle changes. The current treatment provides moderate improvement. Compliance problems include diet.  Hypertensive end-organ damage includes kidney disease. Identifiable causes of hypertension include chronic renal disease.       Review of Systems   Constitutional: Positive for weight loss. Negative for chills, fever and malaise/fatigue.   Respiratory: Negative for cough and shortness of breath.    Cardiovascular: Negative for chest pain and leg swelling.   Gastrointestinal: Negative for nausea and vomiting.   Genitourinary: Negative for dysuria, frequency and urgency.          Objective:     /70   Pulse 61   Temp 36.9 °C (98.4 °F) (Temporal)   Resp 14   Ht 1.6 m (5' 3\")   Wt 47.2 kg (104 lb)   SpO2 96%   BMI 18.42 kg/m²      Physical Exam   Constitutional: She is oriented to person, place, and time. She appears well-developed and well-nourished. No distress.   HENT:   Head: Normocephalic and atraumatic.   Right Ear: External ear normal.   Left Ear: External ear normal.   Nose: Nose normal.   Eyes: Conjunctivae are normal. Right eye exhibits no discharge. Left eye exhibits no discharge. No scleral " icterus.   Cardiovascular: Normal rate and regular rhythm.   Pulmonary/Chest: Effort normal and breath sounds normal. No respiratory distress.   Musculoskeletal: She exhibits no edema.   Neurological: She is alert and oriented to person, place, and time.   Skin: Skin is warm. She is not diaphoretic.   Psychiatric: She has a normal mood and affect. Her behavior is normal.   Nursing note and vitals reviewed.    Past Medical History:   Diagnosis Date   • Alcohol abuse 4/27/2019   • Arthritis     generalized-Osteo   • Carotid artery stenosis 12/31/2015   • Gluten intolerance    • Hypertension    • OSTEOPOROSIS    • Other specified symptom associated with female genital organs     post menaupausal bleeding   • Pain 02/06/15    bilateral legs-chronic>4/10   • Unspecified cataract      Social History     Socioeconomic History   • Marital status:      Spouse name: Not on file   • Number of children: Not on file   • Years of education: Not on file   • Highest education level: Not on file   Occupational History   • Not on file   Social Needs   • Financial resource strain: Not on file   • Food insecurity:     Worry: Not on file     Inability: Not on file   • Transportation needs:     Medical: Not on file     Non-medical: Not on file   Tobacco Use   • Smoking status: Current Some Day Smoker     Packs/day: 0.50     Years: 40.00     Pack years: 20.00     Types: Cigarettes     Start date: 2/2/1975   • Smokeless tobacco: Never Used   Substance and Sexual Activity   • Alcohol use: Yes     Comment: 4/week   • Drug use: No   • Sexual activity: Not on file     Comment: ,    Lifestyle   • Physical activity:     Days per week: Not on file     Minutes per session: Not on file   • Stress: Not on file   Relationships   • Social connections:     Talks on phone: Not on file     Gets together: Not on file     Attends L[employer] service: Not on file     Active member of club or organization: Not on file     Attends meetings of  clubs or organizations: Not on file     Relationship status: Not on file   • Intimate partner violence:     Fear of current or ex partner: Not on file     Emotionally abused: Not on file     Physically abused: Not on file     Forced sexual activity: Not on file   Other Topics Concern   • Not on file   Social History Narrative   • Not on file     Family History   Problem Relation Age of Onset   • Cancer Mother         leukemia     Recent Labs     04/28/19  0232 04/29/19  0345 05/02/19  0315   ALBUMIN 3.9  --   --    SODIUM 128* 130* 132*   POTASSIUM 3.4* 4.4 3.1*   CHLORIDE 93* 99 98   CO2 24 19* 22   BUN 13 17 17   CREATININE 0.85 0.89 0.95               Assessment/Plan:     1. Chronic kidney disease, unspecified CKD stage  Stable  No uremic symptoms  Renal dose of medication  Avoid nephrotoxins  Continue same medication regimen      2. Hyponatremia  No clear etiology  Check chest CT scan to rule out any malignancy(considering she had a chest x-ray done in April 2019 which was suggestive for possible lung nodule)      3. Essential hypertension  Uncontrolled  Continue low-sodium diet  Patient was advise to take her medication regularly and check her blood pressure at home and call us with the results    4. Weight loss  No clear etiology  Rule out malignancy by age-appropriate screening  Rule out liver disease /malnutrition as a side effect of alcohol(patient states she drinks 2 glasses of wine on a daily basis)

## 2019-08-21 ENCOUNTER — TELEPHONE (OUTPATIENT)
Dept: MEDICAL GROUP | Facility: MEDICAL CENTER | Age: 74
End: 2019-08-21

## 2019-08-21 LAB
ALBUMIN/CREAT UR: 1862.5 MG/G CREAT (ref 0–30)
BASOPHILS # BLD AUTO: 0.1 X10E3/UL (ref 0–0.2)
BASOPHILS NFR BLD AUTO: 1 %
BUN SERPL-MCNC: 17 MG/DL (ref 8–27)
BUN/CREAT SERPL: 14 (ref 12–28)
CALCIUM SERPL-MCNC: 10.2 MG/DL (ref 8.7–10.3)
CHLORIDE SERPL-SCNC: 96 MMOL/L (ref 96–106)
CO2 SERPL-SCNC: 17 MMOL/L (ref 20–29)
CREAT SERPL-MCNC: 1.2 MG/DL (ref 0.57–1)
CREAT UR-MCNC: 54.7 MG/DL
EOSINOPHIL # BLD AUTO: 0.2 X10E3/UL (ref 0–0.4)
EOSINOPHIL NFR BLD AUTO: 2 %
ERYTHROCYTE [DISTWIDTH] IN BLOOD BY AUTOMATED COUNT: 14.1 % (ref 12.3–15.4)
GLUCOSE SERPL-MCNC: 99 MG/DL (ref 65–99)
HCT VFR BLD AUTO: 41 % (ref 34–46.6)
HGB BLD-MCNC: 13.5 G/DL (ref 11.1–15.9)
IMM GRANULOCYTES # BLD AUTO: 0 X10E3/UL (ref 0–0.1)
IMM GRANULOCYTES NFR BLD AUTO: 0 %
IMMATURE CELLS  115398: ABNORMAL
LYMPHOCYTES # BLD AUTO: 1.6 X10E3/UL (ref 0.7–3.1)
LYMPHOCYTES NFR BLD AUTO: 13 %
MCH RBC QN AUTO: 29.7 PG (ref 26.6–33)
MCHC RBC AUTO-ENTMCNC: 32.9 G/DL (ref 31.5–35.7)
MCV RBC AUTO: 90 FL (ref 79–97)
MICROALBUMIN UR-MCNC: 1018.8 UG/ML
MONOCYTES # BLD AUTO: 0.7 X10E3/UL (ref 0.1–0.9)
MONOCYTES NFR BLD AUTO: 6 %
MORPHOLOGY BLD-IMP: ABNORMAL
NEUTROPHILS # BLD AUTO: 9.4 X10E3/UL (ref 1.4–7)
NEUTROPHILS NFR BLD AUTO: 78 %
NRBC BLD AUTO-RTO: ABNORMAL %
PLATELET # BLD AUTO: 454 X10E3/UL (ref 150–450)
POTASSIUM SERPL-SCNC: 4.1 MMOL/L (ref 3.5–5.2)
RBC # BLD AUTO: 4.55 X10E6/UL (ref 3.77–5.28)
SODIUM SERPL-SCNC: 134 MMOL/L (ref 134–144)
WBC # BLD AUTO: 12 X10E3/UL (ref 3.4–10.8)

## 2019-08-21 NOTE — TELEPHONE ENCOUNTER
----- Message from Claire Hernández M.D. sent at 8/21/2019 11:37 AM PDT -----  Can we schedule appointment with her to focus on wt loss       ----- Message -----  From: Fadi Najjar, M.D.  Sent: 8/20/2019   2:29 PM PDT  To: Claire Hernández M.D.    Good afternoon Dr Hernández,  I saw Mrs Bolton in my office today, I am concerned about her weight loss and hyponatremia.  I am repeating labs, checking chest CT(question lung nodule on CXR).  She mentioned that she drinks wine daily which might be a part of the problem.  I just want to share my concerns with you.  Regards  Fadi Najjar

## 2019-08-21 NOTE — TELEPHONE ENCOUNTER
Phone Number Called: [phone] (home) [phone] (work)    Call outcome: spoke to patient regarding message below    Message: Pt scheduled for next Wednesday

## 2019-08-28 ENCOUNTER — OFFICE VISIT (OUTPATIENT)
Dept: MEDICAL GROUP | Facility: MEDICAL CENTER | Age: 74
End: 2019-08-28
Payer: COMMERCIAL

## 2019-08-28 VITALS
SYSTOLIC BLOOD PRESSURE: 126 MMHG | HEIGHT: 62 IN | TEMPERATURE: 97.7 F | BODY MASS INDEX: 19.32 KG/M2 | RESPIRATION RATE: 14 BRPM | WEIGHT: 105 LBS | DIASTOLIC BLOOD PRESSURE: 62 MMHG | HEART RATE: 82 BPM | OXYGEN SATURATION: 97 %

## 2019-08-28 DIAGNOSIS — R63.4 WEIGHT LOSS: ICD-10-CM

## 2019-08-28 DIAGNOSIS — R79.89 ABNORMAL CBC: ICD-10-CM

## 2019-08-28 DIAGNOSIS — Z12.39 SCREENING FOR BREAST CANCER: ICD-10-CM

## 2019-08-28 DIAGNOSIS — Z87.891 HISTORY OF SMOKING: ICD-10-CM

## 2019-08-28 DIAGNOSIS — Z12.2 ENCOUNTER FOR SCREENING FOR LUNG CANCER: ICD-10-CM

## 2019-08-28 DIAGNOSIS — I10 ESSENTIAL HYPERTENSION: ICD-10-CM

## 2019-08-28 DIAGNOSIS — S72.001A CLOSED FRACTURE OF NECK OF RIGHT FEMUR, INITIAL ENCOUNTER (HCC): ICD-10-CM

## 2019-08-28 DIAGNOSIS — F10.10 ALCOHOL ABUSE: ICD-10-CM

## 2019-08-28 PROCEDURE — 99214 OFFICE O/P EST MOD 30 MIN: CPT | Performed by: FAMILY MEDICINE

## 2019-08-28 NOTE — PROGRESS NOTES
This medical record contains text that has been entered with the assistance of computer voice recognition and dictation software.  Therefore, it may contain unintended errors in text, spelling, punctuation, or grammar        Chief Complaint   Patient presents with   Wt loss contacted by nephrology     Sangita Bolton is a 73 y.o. female here evaluation and management of:  Follow up HTN HL h/o CAD and follow up SNF and fracture recovery     New issue   Was seen by nephrology who contacted me over concenrs for wt loss  No cough   No stomach pain   No Lad  No b symptoms no fatigue fever malaise              Current Outpatient Medications   Medication Sig Dispense Refill   • rosuvastatin (CRESTOR) 10 MG Tab Take 1 Tab by mouth every evening. 30 Tab 0   • amLODIPine (NORVASC) 10 MG Tab Take 1 Tab by mouth every day. 90 Tab 3   • spironolactone (ALDACTONE) 25 MG Tab Take 1 Tab by mouth every day. 90 Tab 3   • losartan-hydrochlorothiazide (HYZAAR) 100-25 MG per tablet Take 1 Tab by mouth every day.     • hydrOXYzine HCl (ATARAX) 25 MG Tab Take 25 mg by mouth at bedtime as needed for Itching.     • aspirin EC (ECOTRIN) 81 MG Tablet Delayed Response Take 81 mg by mouth every day.     • acetaminophen (TYLENOL) 325 MG Tab Take 2 Tabs by mouth every 6 hours as needed (Mild Pain; (Pain scale 1-3); Temp greater than 100.5 F). 30 Tab 0     No current facility-administered medications for this visit.      Patient Active Problem List    Diagnosis Date Noted   • Closed fracture of neck of right femur (HCC) 04/27/2019     Priority: High   • Weight loss 08/28/2019   • Hypertensive urgency 04/30/2019   • Alcohol abuse 04/27/2019   • Dependence on nicotine from cigarettes 04/27/2019   • Leukocytosis 04/27/2019   • Hypokalemia 04/27/2019   • Hyponatremia 04/27/2019   • High anion gap metabolic acidosis 04/27/2019   • Dry skin dermatitis 04/11/2018   • Peripheral vascular disease (CMS-HCC) 03/11/2016   • Carotid artery stenosis 12/31/2015  "  • S/P insertion of iliac artery stent 12/31/2015   • Carotid artery disease (HCC) 11/10/2013   • Heart murmur 11/10/2013   • Essential hypertension 07/01/2013   • Lumbar foraminal stenosis 01/09/2013   • DDD (degenerative disc disease), lumbar 01/09/2013   • Osteoporosis, post-menopausal      Past Surgical History:   Procedure Laterality Date   • PB PARTIAL HIP REPLACEMENT Right 4/28/2019    Procedure: HEMIARTHROPLASTY, HIP;  Surgeon: Evens Sarabia M.D.;  Location: SURGERY [facility];  Service: Orthopedics   • RECOVERY  2/9/2015    Performed by [facility] at SURGERY SAME DAY [facility]   • FEMORAL ARTERY REPAIR  2/9/2015    Performed by Yuly Chirinos M.D. at SURGERY [facility]   • HYSTEROSCOPY WITH VIDEO DIAGNOSTIC  11/17/2010    Performed by ALIS DEGROOT at SURGERY SAME DAY [facility]   • DILATION AND CURETTAGE  11/17/2010    Performed by ALIS DEGROOT at SURGERY SAME DAY [facility]   • HUMERUS ORIF  9/5/08    Performed by LAURA CARTER at SURGERY [facility]   • COLONOSCOPY  2008    recheck 4 years   • OTHER ORTHOPEDIC SURGERY      rt leg fx   • OTHER ORTHOPEDIC SURGERY      broken right wrist      Social History     Tobacco Use   • Smoking status: Current Some Day Smoker     Packs/day: 0.50     Years: 40.00     Pack years: 20.00     Types: Cigarettes     Start date: 2/2/1975   • Smokeless tobacco: Never Used   Substance Use Topics   • Alcohol use: Yes     Comment: 4/week   • Drug use: No     Family History   Problem Relation Age of Onset   • Cancer Mother         leukemia           ROS  No n/v  all review of system completed and negative except for those listed above     Objective:     /62 (BP Location: Left arm, Patient Position: Sitting, BP Cuff Size: Adult)   Pulse 82   Temp 36.5 °C (97.7 °F) (Temporal)   Resp 14   Ht 1.575 m (5' 2\")   Wt 47.6 kg (105 lb)   SpO2 97%  Body mass index is 19.2 kg/m².  Physical Exam:    Constitutional: Alert, no " distress.  Skin: Warm, dry, good turgor, no rashes in visible areas.  Eye: Equal, round and reactive, conjunctiva clear, lids normal.  ENMT: Lips without lesions, good dentition, oropharynx clear.  Neck: Trachea midline, no masses, no thyromegaly. No cervical or supraclavicular lymphadenopathy.  Respiratory: Unlabored respiratory effort, lungs clear to auscultation, no wheezes, no ronchi.  Cardiovascular: Normal S1, S2, no murmur, no edema.  Abdomen: Soft, non-tender, no masses, no hepatosplenomegaly.  Psych: Alert and oriented x3, normal affect and mood.              Assessment and Plan:   The following treatment plan was discussed        Problem List Items Addressed This Visit     Closed fracture of neck of right femur (HCC)     She is doing very well from this standpoint  No longer using assisted device               Essential hypertension     With the help of nephrology well controlled now  I would like her to continue to see nephrology q6 mo                Alcohol abuse    Weight loss     Unintentional wt loss   Most likely 2/2 etoh use  However we should make sure cancer screening is up to date including lung cancer screening and mammogram   Her colon cancer screening is up to date    Cbc a little abnormal will repeat and including smear review      Over 25 minutes spent with patient face to face, greater than 50% time spent with plan/coordination of care regarding that which is discussed in the HPI and A&P        Follow up for result review             Relevant Orders    CBC WITH DIFFERENTIAL    PERIPHERAL SMEAR REVIEW      Other Visit Diagnoses     Encounter for screening for lung cancer        Relevant Orders    REFERRAL TO LUNG CANCER SCREENING PROGRAM    History of smoking        Relevant Orders    REFERRAL TO LUNG CANCER SCREENING PROGRAM    Screening for breast cancer        Relevant Orders    MA-SCREEN MAMMO W/CAD-BILAT    Abnormal CBC                    Instructed to follow up if symptoms worsen or  fail to improve, ER/UC precautions discussed as well    Claire Hernández MD  [facility], Family [address]   [place] [address]  Phone: [phone]

## 2019-08-28 NOTE — ASSESSMENT & PLAN NOTE
Unintentional wt loss   Most likely 2/2 etoh use  However we should make sure cancer screening is up to date including lung cancer screening and mammogram   Her colon cancer screening is up to date    Cbc a little abnormal will repeat and including smear review      Over 25 minutes spent with patient face to face, greater than 50% time spent with plan/coordination of care regarding that which is discussed in the HPI and A&P        Follow up for result review

## 2019-09-04 ENCOUNTER — TELEPHONE (OUTPATIENT)
Dept: HEMATOLOGY ONCOLOGY | Facility: MEDICAL CENTER | Age: 74
End: 2019-09-04

## 2019-09-04 RX ORDER — ROSUVASTATIN CALCIUM 10 MG/1
TABLET, COATED ORAL
Qty: 30 TAB | Refills: 0 | Status: SHIPPED | OUTPATIENT
Start: 2019-09-04 | End: 2019-10-09 | Stop reason: SDUPTHER

## 2019-09-07 LAB
ALBUMIN/CREAT UR: 950.9 MG/G CREAT (ref 0–30)
BASOPHILS # BLD AUTO: 0.1 X10E3/UL (ref 0–0.2)
BASOPHILS # BLD MANUAL: 0.4 X10E3/UL (ref 0–0.2)
BASOPHILS NFR BLD AUTO: 1 %
BASOPHILS NFR BLD MANUAL: 4 %
BUN SERPL-MCNC: 32 MG/DL (ref 8–27)
BUN/CREAT SERPL: 21 (ref 12–28)
CALCIUM SERPL-MCNC: 9.5 MG/DL (ref 8.7–10.3)
CHLORIDE SERPL-SCNC: 94 MMOL/L (ref 96–106)
CO2 SERPL-SCNC: 19 MMOL/L (ref 20–29)
CREAT SERPL-MCNC: 1.5 MG/DL (ref 0.57–1)
CREAT UR-MCNC: 32.4 MG/DL
EOSINOPHIL # BLD AUTO: 0.1 X10E3/UL (ref 0–0.4)
EOSINOPHIL # BLD MANUAL: 0.2 X10E3/UL (ref 0–0.4)
EOSINOPHIL NFR BLD AUTO: 1 %
EOSINOPHIL NFR BLD MANUAL: 2 %
ERYTHROCYTE [DISTWIDTH] IN BLOOD BY AUTOMATED COUNT: 14.8 % (ref 12.3–15.4)
GLUCOSE SERPL-MCNC: 148 MG/DL (ref 65–99)
HCT VFR BLD AUTO: 40 % (ref 34–46.6)
HEMATOLOGY STUDIES (SET): ABNORMAL
HGB BLD-MCNC: 13.2 G/DL (ref 11.1–15.9)
IMM GRANULOCYTES # BLD AUTO: 0 X10E3/UL (ref 0–0.1)
IMM GRANULOCYTES NFR BLD AUTO: 0 %
IMMATURE CELLS  115398: ABNORMAL
LYMPHOCYTES # BLD AUTO: 1.6 X10E3/UL (ref 0.7–3.1)
LYMPHOCYTES # BLD MANUAL: 1.8 X10E3/UL (ref 0.7–3.1)
LYMPHOCYTES NFR BLD AUTO: 17 %
LYMPHOCYTES NFR BLD MANUAL: 19 %
MCH RBC QN AUTO: 30.3 PG (ref 26.6–33)
MCHC RBC AUTO-ENTMCNC: 33 G/DL (ref 31.5–35.7)
MCV RBC AUTO: 92 FL (ref 79–97)
MICROALBUMIN UR-MCNC: 308.1 UG/ML
MONOCYTES # BLD AUTO: 0.7 X10E3/UL (ref 0.1–0.9)
MONOCYTES # BLD MANUAL: 0.5 X10E3/UL (ref 0.1–0.9)
MONOCYTES NFR BLD AUTO: 7 %
MONOCYTES NFR BLD MANUAL: 5 %
MORPHOLOGY BLD-IMP: ABNORMAL
NEUTROPHILS # BLD AUTO: 7.1 X10E3/UL (ref 1.4–7)
NEUTROPHILS # BLD MANUAL: 6.7 X10E3/UL (ref 1.4–7)
NEUTROPHILS NFR BLD AUTO: 74 %
NEUTROPHILS NFR BLD MANUAL: 70 %
NRBC BLD AUTO-RTO: ABNORMAL %
ONE SPECIMEN IDENTIFIER 977740: NORMAL
PLATELET # BLD AUTO: 398 X10E3/UL (ref 150–450)
PLATELET BLD QL SMEAR: ABNORMAL
PLEASE NOTE   199999: NORMAL
POTASSIUM SERPL-SCNC: 3.9 MMOL/L (ref 3.5–5.2)
RBC # BLD AUTO: 4.35 X10E6/UL (ref 3.77–5.28)
RBC MORPH BLD: ABNORMAL
SODIUM SERPL-SCNC: 135 MMOL/L (ref 134–144)
WBC # BLD AUTO: 9.6 X10E3/UL (ref 3.4–10.8)

## 2019-09-09 ENCOUNTER — TELEPHONE (OUTPATIENT)
Dept: NEPHROLOGY | Facility: MEDICAL CENTER | Age: 74
End: 2019-09-09

## 2019-09-09 DIAGNOSIS — N17.9 AKI (ACUTE KIDNEY INJURY) (HCC): ICD-10-CM

## 2019-09-09 DIAGNOSIS — R80.9 PROTEINURIA, UNSPECIFIED TYPE: ICD-10-CM

## 2019-09-09 NOTE — TELEPHONE ENCOUNTER
I spoke to patient about her recent kidney function tests, creatinine has worsened, also patient has nonnephrotic range proteinuria.  Considering her recent weight loss I recommended to the patient to undergo a kidney biopsy, also we will check OPHELIA, complement level and ANCA level.

## 2019-10-07 ENCOUNTER — HOSPITAL ENCOUNTER (OUTPATIENT)
Dept: RADIOLOGY | Facility: MEDICAL CENTER | Age: 74
End: 2019-10-07
Attending: INTERNAL MEDICINE
Payer: COMMERCIAL

## 2019-10-07 ENCOUNTER — HOSPITAL ENCOUNTER (OUTPATIENT)
Dept: RADIOLOGY | Facility: MEDICAL CENTER | Age: 74
End: 2019-10-07
Attending: FAMILY MEDICINE
Payer: COMMERCIAL

## 2019-10-07 DIAGNOSIS — E87.1 HYPONATREMIA: ICD-10-CM

## 2019-10-07 DIAGNOSIS — Z12.31 OTHER SCREENING MAMMOGRAM: ICD-10-CM

## 2019-10-07 PROCEDURE — 77063 BREAST TOMOSYNTHESIS BI: CPT

## 2019-10-07 PROCEDURE — 71250 CT THORAX DX C-: CPT

## 2019-10-09 RX ORDER — ROSUVASTATIN CALCIUM 10 MG/1
TABLET, COATED ORAL
Qty: 90 TAB | Refills: 0 | Status: ON HOLD | OUTPATIENT
Start: 2019-10-09 | End: 2020-02-24

## 2019-10-09 RX ORDER — ROSUVASTATIN CALCIUM 10 MG/1
TABLET, COATED ORAL
Qty: 30 TAB | Refills: 0 | Status: SHIPPED | OUTPATIENT
Start: 2019-10-09 | End: 2021-02-22 | Stop reason: SDUPTHER

## 2019-10-09 NOTE — TELEPHONE ENCOUNTER
----- Message from Malissa Falcon sent at 10/9/2019 11:17 AM PDT -----  Regarding: Refill [facility]  Contact: [phone]  Hello!    Patient came by today and would like a refill on medication: rosuvastatin Calcium 10 MG tabs    Same pharmacy as Neema khalil    Thank you  Malissa

## 2019-10-09 NOTE — TELEPHONE ENCOUNTER
Phone Number Called: [phone] (home) [phone] (work)    Call outcome: spoke to patient regarding message below    Message: Pt notified medication sent to the pharmacy.

## 2020-02-11 ENCOUNTER — OFFICE VISIT (OUTPATIENT)
Dept: NEPHROLOGY | Facility: MEDICAL CENTER | Age: 75
End: 2020-02-11
Payer: COMMERCIAL

## 2020-02-11 VITALS
RESPIRATION RATE: 16 BRPM | HEIGHT: 63 IN | HEART RATE: 86 BPM | BODY MASS INDEX: 20.02 KG/M2 | OXYGEN SATURATION: 96 % | TEMPERATURE: 97.8 F | DIASTOLIC BLOOD PRESSURE: 74 MMHG | WEIGHT: 113 LBS | SYSTOLIC BLOOD PRESSURE: 134 MMHG

## 2020-02-11 DIAGNOSIS — I10 ESSENTIAL HYPERTENSION: ICD-10-CM

## 2020-02-11 DIAGNOSIS — R63.4 WEIGHT LOSS: ICD-10-CM

## 2020-02-11 DIAGNOSIS — N17.9 AKI (ACUTE KIDNEY INJURY) (HCC): ICD-10-CM

## 2020-02-11 DIAGNOSIS — R80.9 PROTEINURIA, UNSPECIFIED TYPE: ICD-10-CM

## 2020-02-11 PROCEDURE — 99214 OFFICE O/P EST MOD 30 MIN: CPT | Performed by: INTERNAL MEDICINE

## 2020-02-11 ASSESSMENT — ENCOUNTER SYMPTOMS
NAUSEA: 0
SHORTNESS OF BREATH: 0
HYPERTENSION: 1
FEVER: 0
VOMITING: 0
COUGH: 0
CHILLS: 0

## 2020-02-12 RX ORDER — SODIUM CHLORIDE 9 MG/ML
INJECTION, SOLUTION INTRAVENOUS CONTINUOUS
Status: CANCELLED | OUTPATIENT
Start: 2020-02-24

## 2020-02-12 NOTE — PROGRESS NOTES
"Subjective:      Sangita Bolton is a 74 y.o. female who presents with Hypertension and Chronic Kidney Disease            Hypertension   This is a chronic problem. The current episode started more than 1 year ago. The problem is unchanged. The problem is controlled. Pertinent negatives include no chest pain, malaise/fatigue, peripheral edema or shortness of breath. Risk factors for coronary artery disease include post-menopausal state. Past treatments include angiotensin blockers and diuretics. The current treatment provides significant improvement. There are no compliance problems.  Hypertensive end-organ damage includes kidney disease. Identifiable causes of hypertension include chronic renal disease.   Chronic Kidney Disease   This is a chronic problem. The current episode started more than 1 year ago. The problem occurs constantly. The problem has been gradually worsening. Pertinent negatives include no chest pain, chills, coughing, fever, nausea, urinary symptoms or vomiting.       Review of Systems   Constitutional: Negative for chills, fever and malaise/fatigue.   Respiratory: Negative for cough and shortness of breath.    Cardiovascular: Negative for chest pain and leg swelling.   Gastrointestinal: Negative for nausea and vomiting.   Genitourinary: Negative for dysuria, frequency and urgency.          Objective:     /74 (BP Location: Right arm, Patient Position: Sitting)   Pulse 86   Temp 36.6 °C (97.8 °F)   Resp 16   Ht 1.6 m (5' 3\")   Wt 51.3 kg (113 lb)   SpO2 96%   BMI 20.02 kg/m²      Physical Exam  Vitals signs and nursing note reviewed.   Constitutional:       General: She is not in acute distress.     Appearance: Normal appearance. She is well-developed. She is not diaphoretic.   HENT:      Head: Normocephalic and atraumatic.      Right Ear: External ear normal.      Left Ear: External ear normal.      Nose: Nose normal.   Eyes:      General: No scleral icterus.        Right eye: No " discharge.         Left eye: No discharge.      Conjunctiva/sclera: Conjunctivae normal.   Cardiovascular:      Rate and Rhythm: Normal rate and regular rhythm.      Heart sounds: No murmur.   Pulmonary:      Effort: Pulmonary effort is normal. No respiratory distress.      Breath sounds: Normal breath sounds.   Musculoskeletal:         General: No swelling or tenderness.   Skin:     General: Skin is warm and dry.      Findings: No erythema.   Neurological:      General: No focal deficit present.      Mental Status: She is alert and oriented to person, place, and time.      Cranial Nerves: No cranial nerve deficit.   Psychiatric:         Mood and Affect: Mood normal.         Behavior: Behavior normal.       Past Medical History:   Diagnosis Date   • Alcohol abuse 4/27/2019   • Arthritis     generalized-Osteo   • Carotid artery stenosis 12/31/2015   • Gluten intolerance    • Hypertension    • OSTEOPOROSIS    • Other specified symptom associated with female genital organs     post menaupausal bleeding   • Pain 02/06/15    bilateral legs-chronic>4/10   • Unspecified cataract      Social History     Socioeconomic History   • Marital status:      Spouse name: Not on file   • Number of children: Not on file   • Years of education: Not on file   • Highest education level: Not on file   Occupational History   • Not on file   Social Needs   • Financial resource strain: Not on file   • Food insecurity:     Worry: Not on file     Inability: Not on file   • Transportation needs:     Medical: Not on file     Non-medical: Not on file   Tobacco Use   • Smoking status: Current Some Day Smoker     Packs/day: 0.50     Years: 40.00     Pack years: 20.00     Types: Cigarettes     Start date: 2/2/1975   • Smokeless tobacco: Never Used   Substance and Sexual Activity   • Alcohol use: Yes     Comment: 4/week   • Drug use: No   • Sexual activity: Not on file     Comment: ,    Lifestyle   • Physical activity:     Days per  week: Not on file     Minutes per session: Not on file   • Stress: Not on file   Relationships   • Social connections:     Talks on phone: Not on file     Gets together: Not on file     Attends Restorationism service: Not on file     Active member of club or organization: Not on file     Attends meetings of clubs or organizations: Not on file     Relationship status: Not on file   • Intimate partner violence:     Fear of current or ex partner: Not on file     Emotionally abused: Not on file     Physically abused: Not on file     Forced sexual activity: Not on file   Other Topics Concern   • Not on file   Social History Narrative   • Not on file     Family History   Problem Relation Age of Onset   • Cancer Mother         leukemia     Recent Labs     04/28/19  0232  05/02/19  0315 08/20/19  0759 09/05/19  1102   ALBUMIN 3.9  --   --   --   --    SODIUM 128*   < > 132* 134 135   POTASSIUM 3.4*   < > 3.1* 4.1 3.9   CHLORIDE 93*   < > 98 96 94*   CO2 24   < > 22 17* 19*   BUN 13   < > 17 17 32*   CREATININE 0.85   < > 0.95 1.20* 1.50*    < > = values in this interval not displayed.               Assessment/Plan:       1. RASHAD (acute kidney injury) (HCC)  Patient had labs done yesterday, results are pending  Check labs  Plan for kidney biopsy because of significant proteinuria  No uremic symptoms  Renal dose of medication  Avoid nephrotoxins  Continue same medication regimen    2. Proteinuria, unspecified type  Continue losartan  - CT-NEEDLE BX-RENAL; Future    3. Essential hypertension  Controlled  Continue same medication regimen  Continue low-sodium diet      4. Weight loss

## 2020-02-13 LAB
ANA SER QL: POSITIVE
C-ANCA TITR SER IF: NORMAL TITER
C3 SERPL-MCNC: 93 MG/DL (ref 82–167)
C4 SERPL-MCNC: 28 MG/DL (ref 14–44)
CCP IGA+IGG SERPL IA-ACNC: 13 UNITS (ref 0–19)
CH50 SERPL-ACNC: >60 U/ML
MYELOPEROXIDASE AB SER IA-ACNC: <9 U/ML (ref 0–9)
P-ANCA ATYPICAL TITR SER IF: NORMAL TITER
P-ANCA TITR SER IF: NORMAL TITER
PROTEINASE3 AB SER IA-ACNC: <3.5 U/ML (ref 0–3.5)

## 2020-02-24 ENCOUNTER — HOSPITAL ENCOUNTER (OUTPATIENT)
Facility: MEDICAL CENTER | Age: 75
End: 2020-02-24
Attending: INTERNAL MEDICINE | Admitting: INTERNAL MEDICINE
Payer: COMMERCIAL

## 2020-02-24 ENCOUNTER — APPOINTMENT (OUTPATIENT)
Dept: RADIOLOGY | Facility: MEDICAL CENTER | Age: 75
End: 2020-02-24
Attending: INTERNAL MEDICINE
Payer: COMMERCIAL

## 2020-02-24 VITALS
WEIGHT: 111.11 LBS | BODY MASS INDEX: 19.69 KG/M2 | DIASTOLIC BLOOD PRESSURE: 58 MMHG | TEMPERATURE: 98.7 F | RESPIRATION RATE: 18 BRPM | HEIGHT: 63 IN | OXYGEN SATURATION: 95 % | SYSTOLIC BLOOD PRESSURE: 155 MMHG | HEART RATE: 77 BPM

## 2020-02-24 DIAGNOSIS — N17.9 AKI (ACUTE KIDNEY INJURY) (HCC): ICD-10-CM

## 2020-02-24 DIAGNOSIS — R80.9 PROTEINURIA, UNSPECIFIED TYPE: ICD-10-CM

## 2020-02-24 LAB
ERYTHROCYTE [DISTWIDTH] IN BLOOD BY AUTOMATED COUNT: 42.4 FL (ref 35.9–50)
HCT VFR BLD AUTO: 40 % (ref 37–47)
HGB BLD-MCNC: 14.4 G/DL (ref 12–16)
INR PPP: 0.87 (ref 0.87–1.13)
MCH RBC QN AUTO: 33.3 PG (ref 27–33)
MCHC RBC AUTO-ENTMCNC: 36 G/DL (ref 33.6–35)
MCV RBC AUTO: 92.6 FL (ref 81.4–97.8)
PATHOLOGY CONSULT NOTE: NORMAL
PLATELET # BLD AUTO: 219 K/UL (ref 164–446)
PMV BLD AUTO: 8.7 FL (ref 9–12.9)
PROTHROMBIN TIME: 12 SEC (ref 12–14.6)
RBC # BLD AUTO: 4.32 M/UL (ref 4.2–5.4)
WBC # BLD AUTO: 7.9 K/UL (ref 4.8–10.8)

## 2020-02-24 PROCEDURE — 77012 CT SCAN FOR NEEDLE BIOPSY: CPT

## 2020-02-24 PROCEDURE — 88348 ELECTRON MICROSCOPY DX: CPT

## 2020-02-24 PROCEDURE — 88300 SURGICAL PATH GROSS: CPT

## 2020-02-24 PROCEDURE — 700111 HCHG RX REV CODE 636 W/ 250 OVERRIDE (IP): Performed by: RADIOLOGY

## 2020-02-24 PROCEDURE — 700111 HCHG RX REV CODE 636 W/ 250 OVERRIDE (IP)

## 2020-02-24 PROCEDURE — 88313 SPECIAL STAINS GROUP 2: CPT

## 2020-02-24 PROCEDURE — 85027 COMPLETE CBC AUTOMATED: CPT

## 2020-02-24 PROCEDURE — 700105 HCHG RX REV CODE 258: Performed by: NURSE PRACTITIONER

## 2020-02-24 PROCEDURE — 85610 PROTHROMBIN TIME: CPT

## 2020-02-24 PROCEDURE — 88305 TISSUE EXAM BY PATHOLOGIST: CPT

## 2020-02-24 PROCEDURE — 88346 IMFLUOR 1ST 1ANTB STAIN PX: CPT

## 2020-02-24 PROCEDURE — 160002 HCHG RECOVERY MINUTES (STAT)

## 2020-02-24 RX ORDER — MIDAZOLAM HYDROCHLORIDE 1 MG/ML
INJECTION INTRAMUSCULAR; INTRAVENOUS
Status: COMPLETED
Start: 2020-02-24 | End: 2020-02-24

## 2020-02-24 RX ORDER — OXYCODONE HYDROCHLORIDE 5 MG/1
2.5 TABLET ORAL
Status: DISCONTINUED | OUTPATIENT
Start: 2020-02-24 | End: 2020-02-24 | Stop reason: HOSPADM

## 2020-02-24 RX ORDER — NALOXONE HYDROCHLORIDE 0.4 MG/ML
INJECTION, SOLUTION INTRAMUSCULAR; INTRAVENOUS; SUBCUTANEOUS
Status: COMPLETED
Start: 2020-02-24 | End: 2020-02-24

## 2020-02-24 RX ORDER — MIDAZOLAM HYDROCHLORIDE 1 MG/ML
.5-2 INJECTION INTRAMUSCULAR; INTRAVENOUS PRN
Status: DISCONTINUED | OUTPATIENT
Start: 2020-02-24 | End: 2020-02-24 | Stop reason: HOSPADM

## 2020-02-24 RX ORDER — HYDROMORPHONE HYDROCHLORIDE 2 MG/ML
0.25 INJECTION, SOLUTION INTRAMUSCULAR; INTRAVENOUS; SUBCUTANEOUS
Status: DISCONTINUED | OUTPATIENT
Start: 2020-02-24 | End: 2020-02-24 | Stop reason: HOSPADM

## 2020-02-24 RX ORDER — OXYCODONE HYDROCHLORIDE 5 MG/1
5 TABLET ORAL
Status: DISCONTINUED | OUTPATIENT
Start: 2020-02-24 | End: 2020-02-24 | Stop reason: HOSPADM

## 2020-02-24 RX ORDER — SODIUM CHLORIDE 9 MG/ML
INJECTION, SOLUTION INTRAVENOUS CONTINUOUS
Status: DISCONTINUED | OUTPATIENT
Start: 2020-02-24 | End: 2020-02-24 | Stop reason: HOSPADM

## 2020-02-24 RX ORDER — SODIUM CHLORIDE 9 MG/ML
500 INJECTION, SOLUTION INTRAVENOUS
Status: DISCONTINUED | OUTPATIENT
Start: 2020-02-24 | End: 2020-02-24 | Stop reason: HOSPADM

## 2020-02-24 RX ADMIN — MIDAZOLAM HYDROCHLORIDE 1 MG: 1 INJECTION, SOLUTION INTRAMUSCULAR; INTRAVENOUS at 11:34

## 2020-02-24 RX ADMIN — MIDAZOLAM HYDROCHLORIDE 1 MG: 1 INJECTION, SOLUTION INTRAMUSCULAR; INTRAVENOUS at 11:39

## 2020-02-24 RX ADMIN — SODIUM CHLORIDE: 9 INJECTION, SOLUTION INTRAVENOUS at 10:00

## 2020-02-24 RX ADMIN — FENTANYL CITRATE 50 MCG: 0.05 INJECTION, SOLUTION INTRAMUSCULAR; INTRAVENOUS at 11:34

## 2020-02-24 NOTE — DISCHARGE INSTRUCTIONS
ACTIVITY: Rest and take it easy for the first 24 hours.  A responsible adult is recommended to remain with you during that time.  It is normal to feel sleepy.  We encourage you to not do anything that requires balance, judgment or coordination.    MILD FLU-LIKE SYMPTOMS ARE NORMAL. YOU MAY EXPERIENCE GENERALIZED MUSCLE ACHES, THROAT IRRITATION, HEADACHE AND/OR SOME NAUSEA.    FOR 24 HOURS DO NOT:  Drive, operate machinery or run household appliances.  Drink beer or alcoholic beverages.   Make important decisions or sign legal documents.    SPECIAL INSTRUCTIONS:     Discharge Instructions for Kidney Biopsy  You had a procedure called a kidney biopsy. Your healthcare provider used a needle to remove a small piece of tissue from your kidney. The tissue will be checked for signs of damage and disease. A kidney biopsy is done if other tests have shown that there may be a problem with your kidney. A kidney biopsy is also done if a healthcare provider thinks you may have kidney disease. And it's done to check for cancer.   Home care  · Rest for 24 hours. Get up only to use the bathroom.   · Don’t drive for 24 hours.   · Don’t shower for 24 hours. If you wish, you may wash with a sponge or washcloth. When you are able to shower, don’t scrub the site. Gently wash the area and pat it dry.   · Remove the bandage from the biopsy site 24 hours after the procedure.   · Don’t lift anything heavier than 10 pounds for 3 to 4 days.   · Ask your healthcare provider when you can return to work. Tell him or her if your job involves heavy lifting.   · If you take blood thinner medicine (anticoagulant or antiplatelet medicine) and you stopped taking it a few days before your procedure, ask when you can take it again.     When to call your healthcare provider  Call your healthcare provider right away if you have any of these:   · Blood in your urine  · Extreme tiredness   · Extreme weakness  · Dizziness or lightheadedness   · Sudden or  worse shortness of breath   · Sudden chest pain  · Fever of 100.4°F (38°C) or higher, or as directed by your healthcare provider   · Chills  · Redness, tenderness, or swelling at the biopsy site that gets worse   · Opening up of the biopsy site   · Fluid or bleeding from the biopsy site   · Pain that gets worse, with or without activity     DIET: To avoid nausea, slowly advance diet as tolerated, avoiding spicy or greasy foods for the first day.  Add more substantial food to your diet according to your physician's instructions.  Babies can be fed formula or breast milk as soon as they are hungry.  INCREASE FLUIDS AND FIBER TO AVOID CONSTIPATION.    SURGICAL DRESSING/BATHING: Keep dressing and incision dry and intact for 24 hours, may remove dressing and shower after 12 PM on 2/25, do not need to replace.  Do not submerge site in water for 7 days.    FOLLOW-UP APPOINTMENT:  A follow-up appointment should be arranged with your Dr. Najjar [phone]; call to schedule.    You should CALL YOUR PHYSICIAN if you develop:  Fever greater than 101 degrees F.  Pain not relieved by medication, or persistent nausea or vomiting.  Excessive bleeding (blood soaking through dressing) or unexpected drainage from the wound.  Extreme redness or swelling around the incision site, drainage of pus or foul smelling drainage.  Inability to urinate or empty your bladder within 8 hours.  Problems with breathing or chest pain.    You should call 911 if you develop problems with breathing or chest pain.  If you are unable to contact your doctor or surgical center, you should go to the nearest emergency room or urgent care center.  Physician's telephone #: [phone]    If any questions arise, call your doctor.  If your doctor is not available, please feel free to call the Surgical Center at [phone].  The Center is open Monday through Friday from 7AM to 7PM.  You can also call the [employer] HOTLINE open 24 hours/day, 7 days/week and speak to a  nurse at [phone], or toll free at [phone].    A registered nurse may call you a few days after your surgery to see how you are doing after your procedure.    MEDICATIONS: Resume taking daily medication.  Take prescribed pain medication with food.  If no medication is prescribed, you may take non-aspirin pain medication if needed.  PAIN MEDICATION CAN BE VERY CONSTIPATING.  Take a stool softener or laxative such as senokot, pericolace, or milk of magnesia if needed.    If your physician has prescribed pain medication that includes Acetaminophen (Tylenol), do not take additional Acetaminophen (Tylenol) while taking the prescribed medication.    Depression / Suicide Risk    As you are discharged from this [facility] facility, it is important to learn how to keep safe from harming yourself.    Recognize the warning signs:  · Abrupt changes in personality, positive or negative- including increase in energy   · Giving away possessions  · Change in eating patterns- significant weight changes-  positive or negative  · Change in sleeping patterns- unable to sleep or sleeping all the time   · Unwillingness or inability to communicate  · Depression  · Unusual sadness, discouragement and loneliness  · Talk of wanting to die  · Neglect of personal appearance   · Rebelliousness- reckless behavior  · Withdrawal from people/activities they love  · Confusion- inability to concentrate     If you or a loved one observes any of these behaviors or has concerns about self-harm, here's what you can do:  · Talk about it- your feelings and reasons for harming yourself  · Remove any means that you might use to hurt yourself (examples: pills, rope, extension cords, firearm)  · Get professional help from the community (Mental Health, Substance Abuse, psychological counseling)  · Do not be alone:Call your Safe Contact- someone whom you trust who will be there for you.  · Call your local CRISIS HOTLINE 389-9246 or  853-153-6072  · Call your local Children's Mobile Crisis Response Team [place] (936) 424-8670 or www.[employer].MedLink  · Call the toll free National Suicide Prevention Hotlines   · National Suicide Prevention Lifeline 880-313-FFIT (1656)  · National [employer] 800-SUICIDE (677-0233)

## 2020-02-24 NOTE — OR NURSING
1201 Patient arrived to unit, awake and alert.  Left flank access site clean, dry and soft.  Patient denies pain at this time.  Updated on plan of care, verbalized understanding.  1208 Patient ambulated to bathroom, voided clear yellow urine.  Back to [facility], denies discomfort.  1225 Patient's  Jeremy updated on patient status and expected discharge time.  1250 [place], access site clean, dry and soft.  1325 Patient taken out to meet  via wheelchair by RN.  All lines and monitors discontinued. Reviewed discharge paperwork with pt and . Discussed diet, activity, medications, follow up care and worsening symptoms. No questions at this time.  Pt discharged home with .

## 2020-02-24 NOTE — PROGRESS NOTES
IR RN note    Patient underwent a Renal biopsy- nontargeted by Dr. Weston.  Procedure site was verified by MD using imaging guidance. Consent was signed.  MIKEY Lopez assisted. Patient was placed in a prone position.  Vitals were taken every 5 minutes and remained stable during procedure (see doc flow sheet for results).  CO2 waveform capnography was monitored throughout procedure, see chart.  Left flank  access site.   A gauze and tegaderm dressing was placed over surgical site. Report called to Dona STYLES. Pt transported by anibal with RN to [facility], with monitor .     x2 core biopsy of left kidney.

## 2020-02-24 NOTE — OR SURGEON
Immediate Post- Operative Note        PostOp Diagnosis: Proteinuria      Procedure(s):CT guided left renal bx      Estimated Blood Loss: Less than 5 ml        Complications: None            2/24/2020     11:46 AM     Elliot Weston M.D.

## 2020-05-19 RX ORDER — LOSARTAN POTASSIUM AND HYDROCHLOROTHIAZIDE 25; 100 MG/1; MG/1
1 TABLET ORAL DAILY
Qty: 90 TAB | Refills: 3 | Status: SHIPPED
Start: 2020-05-19 | End: 2021-03-31

## 2020-05-20 ENCOUNTER — OFFICE VISIT (OUTPATIENT)
Dept: NEPHROLOGY | Facility: MEDICAL CENTER | Age: 75
End: 2020-05-20
Payer: COMMERCIAL

## 2020-05-20 ENCOUNTER — APPOINTMENT (OUTPATIENT)
Dept: MEDICAL GROUP | Facility: MEDICAL CENTER | Age: 75
End: 2020-05-20
Payer: COMMERCIAL

## 2020-05-20 VITALS
SYSTOLIC BLOOD PRESSURE: 130 MMHG | RESPIRATION RATE: 14 BRPM | HEART RATE: 84 BPM | BODY MASS INDEX: 20.02 KG/M2 | HEIGHT: 63 IN | TEMPERATURE: 98 F | WEIGHT: 113 LBS | OXYGEN SATURATION: 96 % | DIASTOLIC BLOOD PRESSURE: 74 MMHG

## 2020-05-20 DIAGNOSIS — R80.9 PROTEINURIA, UNSPECIFIED TYPE: ICD-10-CM

## 2020-05-20 DIAGNOSIS — I10 ESSENTIAL HYPERTENSION: ICD-10-CM

## 2020-05-20 DIAGNOSIS — N18.30 STAGE 3 CHRONIC KIDNEY DISEASE: ICD-10-CM

## 2020-05-20 PROCEDURE — 99214 OFFICE O/P EST MOD 30 MIN: CPT | Performed by: INTERNAL MEDICINE

## 2020-05-20 ASSESSMENT — ENCOUNTER SYMPTOMS
SHORTNESS OF BREATH: 0
CHILLS: 0
VOMITING: 0
NAUSEA: 0
HYPERTENSION: 1
COUGH: 0
FEVER: 0

## 2020-05-20 ASSESSMENT — FIBROSIS 4 INDEX: FIB4 SCORE: 2.09

## 2020-05-20 NOTE — PROGRESS NOTES
"Subjective:      Sangita Bolton is a 74 y.o. female who presents with Hypertension and Chronic Kidney Disease            Hypertension   This is a chronic problem. The current episode started more than 1 year ago. The problem is unchanged. The problem is controlled. Pertinent negatives include no chest pain, malaise/fatigue, peripheral edema or shortness of breath. Risk factors for coronary artery disease include post-menopausal state. Past treatments include angiotensin blockers and diuretics. The current treatment provides significant improvement. There are no compliance problems.  Hypertensive end-organ damage includes kidney disease. Identifiable causes of hypertension include chronic renal disease.   Chronic Kidney Disease   This is a new problem. The current episode started more than 1 month ago. The problem occurs constantly. The problem has been unchanged. Pertinent negatives include no chest pain, chills, coughing, fever, nausea, urinary symptoms or vomiting.       Review of Systems   Constitutional: Negative for chills, fever and malaise/fatigue.   Respiratory: Negative for cough and shortness of breath.    Cardiovascular: Negative for chest pain and leg swelling.   Gastrointestinal: Negative for nausea and vomiting.   Genitourinary: Negative for dysuria, frequency and urgency.          Objective:     /74 (BP Location: Right arm, Patient Position: Sitting)   Pulse 84   Temp 36.7 °C (98 °F)   Resp 14   Ht 1.6 m (5' 3\")   Wt 51.3 kg (113 lb)   SpO2 96%   BMI 20.02 kg/m²      Physical Exam  Vitals signs and nursing note reviewed.   Constitutional:       General: She is not in acute distress.     Appearance: Normal appearance. She is well-developed. She is not diaphoretic.   HENT:      Head: Normocephalic and atraumatic.      Right Ear: External ear normal.      Left Ear: External ear normal.      Nose: Nose normal.   Eyes:      General: No scleral icterus.        Right eye: No discharge.         " Left eye: No discharge.      Conjunctiva/sclera: Conjunctivae normal.   Cardiovascular:      Rate and Rhythm: Normal rate and regular rhythm.      Heart sounds: No murmur.   Pulmonary:      Effort: Pulmonary effort is normal. No respiratory distress.      Breath sounds: Normal breath sounds.   Musculoskeletal:         General: No tenderness.      Right lower leg: No edema.      Left lower leg: No edema.   Skin:     General: Skin is warm and dry.      Findings: No erythema.   Neurological:      General: No focal deficit present.      Mental Status: She is alert and oriented to person, place, and time.      Cranial Nerves: No cranial nerve deficit.   Psychiatric:         Mood and Affect: Mood normal.         Behavior: Behavior normal.       Past Medical History:   Diagnosis Date   • Alcohol abuse 4/27/2019   • Arthritis     generalized-Osteo   • Carotid artery stenosis 12/31/2015   • Gluten intolerance    • Hypertension    • OSTEOPOROSIS    • Other specified symptom associated with female genital organs     post menaupausal bleeding   • Pain 02/06/15    bilateral legs-chronic>4/10   • Unspecified cataract      Social History     Socioeconomic History   • Marital status:      Spouse name: Not on file   • Number of children: Not on file   • Years of education: Not on file   • Highest education level: Not on file   Occupational History   • Not on file   Social Needs   • Financial resource strain: Not on file   • Food insecurity     Worry: Not on file     Inability: Not on file   • Transportation needs     Medical: Not on file     Non-medical: Not on file   Tobacco Use   • Smoking status: Current Some Day Smoker     Packs/day: 0.50     Years: 40.00     Pack years: 20.00     Types: Cigarettes     Start date: 2/2/1975   • Smokeless tobacco: Never Used   Substance and Sexual Activity   • Alcohol use: Yes     Comment: 4/week   • Drug use: No   • Sexual activity: Not on file     Comment: ,    Lifestyle   •  Physical activity     Days per week: Not on file     Minutes per session: Not on file   • Stress: Not on file   Relationships   • Social connections     Talks on phone: Not on file     Gets together: Not on file     Attends Sikh service: Not on file     Active member of club or organization: Not on file     Attends meetings of clubs or organizations: Not on file     Relationship status: Not on file   • Intimate partner violence     Fear of current or ex partner: Not on file     Emotionally abused: Not on file     Physically abused: Not on file     Forced sexual activity: Not on file   Other Topics Concern   • Not on file   Social History Narrative   • Not on file     Family History   Problem Relation Age of Onset   • Cancer Mother         leukemia     Recent Labs     08/20/19  0759 09/05/19  1102   SODIUM 134 135   POTASSIUM 4.1 3.9   CHLORIDE 96 94*   CO2 17* 19*   BUN 17 32*   CREATININE 1.20* 1.50*                 Assessment/Plan:       1. Essential hypertension  Controlled  Continue same medication regimen  Continue low-sodium diet      2. Stage 3 chronic kidney disease (HCC)  Biopsy showed arterionephrosclerosis with possible component of glucose intolerance/diabetic nephropathy  Unfortunately patient did not do lab tests  No uremic symptoms  Renal dose of medication  Avoid nephrotoxins  Continue same medication regimen  Check kidney function test  Check hemoglobin A1c    3. Proteinuria, unspecified type  Continue angiotensin receptor blocker  - HEMOGLOBIN A1C; Future

## 2020-11-13 LAB
BASOPHILS # BLD AUTO: 0.1 X10E3/UL (ref 0–0.2)
BASOPHILS NFR BLD AUTO: 1 %
BUN SERPL-MCNC: 22 MG/DL (ref 8–27)
BUN/CREAT SERPL: 15 (ref 12–28)
CALCIUM SERPL-MCNC: 9.7 MG/DL (ref 8.7–10.3)
CHLORIDE SERPL-SCNC: 84 MMOL/L (ref 96–106)
CO2 SERPL-SCNC: 20 MMOL/L (ref 20–29)
CREAT SERPL-MCNC: 1.48 MG/DL (ref 0.57–1)
EOSINOPHIL # BLD AUTO: 0 X10E3/UL (ref 0–0.4)
EOSINOPHIL NFR BLD AUTO: 0 %
ERYTHROCYTE [DISTWIDTH] IN BLOOD BY AUTOMATED COUNT: 12.7 % (ref 11.7–15.4)
GLUCOSE SERPL-MCNC: 111 MG/DL (ref 65–99)
HCT VFR BLD AUTO: 38.9 % (ref 34–46.6)
HGB BLD-MCNC: 13.7 G/DL (ref 11.1–15.9)
IMM GRANULOCYTES # BLD AUTO: 0 X10E3/UL (ref 0–0.1)
IMM GRANULOCYTES NFR BLD AUTO: 0 %
IMMATURE CELLS  115398: ABNORMAL
LYMPHOCYTES # BLD AUTO: 0.5 X10E3/UL (ref 0.7–3.1)
LYMPHOCYTES NFR BLD AUTO: 5 %
MCH RBC QN AUTO: 33.2 PG (ref 26.6–33)
MCHC RBC AUTO-ENTMCNC: 35.2 G/DL (ref 31.5–35.7)
MCV RBC AUTO: 94 FL (ref 79–97)
MONOCYTES # BLD AUTO: 0.6 X10E3/UL (ref 0.1–0.9)
MONOCYTES NFR BLD AUTO: 6 %
MORPHOLOGY BLD-IMP: ABNORMAL
NEUTROPHILS # BLD AUTO: 8.5 X10E3/UL (ref 1.4–7)
NEUTROPHILS NFR BLD AUTO: 88 %
NRBC BLD AUTO-RTO: ABNORMAL %
PLATELET # BLD AUTO: 328 X10E3/UL (ref 150–450)
POTASSIUM SERPL-SCNC: 2.9 MMOL/L (ref 3.5–5.2)
RBC # BLD AUTO: 4.13 X10E6/UL (ref 3.77–5.28)
SODIUM SERPL-SCNC: 126 MMOL/L (ref 134–144)
WBC # BLD AUTO: 9.7 X10E3/UL (ref 3.4–10.8)

## 2020-11-14 LAB
ALBUMIN/CREAT UR: 510 MG/G CREAT (ref 0–29)
CREAT UR-MCNC: 60.2 MG/DL
MICROALBUMIN UR-MCNC: 306.9 UG/ML

## 2020-11-25 ENCOUNTER — APPOINTMENT (OUTPATIENT)
Dept: RADIOLOGY | Facility: MEDICAL CENTER | Age: 75
DRG: 467 | End: 2020-11-25
Attending: EMERGENCY MEDICINE
Payer: MEDICARE

## 2020-11-25 ENCOUNTER — HOSPITAL ENCOUNTER (INPATIENT)
Facility: MEDICAL CENTER | Age: 75
LOS: 7 days | DRG: 467 | End: 2020-12-04
Attending: EMERGENCY MEDICINE | Admitting: STUDENT IN AN ORGANIZED HEALTH CARE EDUCATION/TRAINING PROGRAM
Payer: MEDICARE

## 2020-11-25 DIAGNOSIS — S72.001A CLOSED FRACTURE OF NECK OF RIGHT FEMUR, INITIAL ENCOUNTER (HCC): ICD-10-CM

## 2020-11-25 PROBLEM — N18.30 STAGE 3 CHRONIC KIDNEY DISEASE: Status: ACTIVE | Noted: 2020-11-25

## 2020-11-25 PROBLEM — I25.10 CAD (CORONARY ARTERY DISEASE): Status: ACTIVE | Noted: 2020-11-25
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
## 2020-11-25 LAB
25(OH)D3 SERPL-MCNC: 9 NG/ML (ref 30–100)
ALBUMIN SERPL BCP-MCNC: 4 G/DL (ref 3.2–4.9)
ALBUMIN/GLOB SERPL: 1.5 G/DL
ALP SERPL-CCNC: 82 U/L (ref 30–99)
ALT SERPL-CCNC: 24 U/L (ref 2–50)
ANION GAP SERPL CALC-SCNC: 17 MMOL/L (ref 7–16)
APTT PPP: 27 SEC (ref 24.7–36)
AST SERPL-CCNC: 34 U/L (ref 12–45)
BASOPHILS # BLD AUTO: 0.1 % (ref 0–1.8)
BASOPHILS # BLD: 0.02 K/UL (ref 0–0.12)
BILIRUB SERPL-MCNC: 1.4 MG/DL (ref 0.1–1.5)
BUN SERPL-MCNC: 16 MG/DL (ref 8–22)
CALCIUM SERPL-MCNC: 9.8 MG/DL (ref 8.5–10.5)
CHLORIDE SERPL-SCNC: 86 MMOL/L (ref 96–112)
CK SERPL-CCNC: 870 U/L (ref 0–154)
CO2 SERPL-SCNC: 21 MMOL/L (ref 20–33)
COVID ORDER STATUS COVID19: NORMAL
CREAT SERPL-MCNC: 1.08 MG/DL (ref 0.5–1.4)
EKG IMPRESSION: NORMAL
EOSINOPHIL # BLD AUTO: 0 K/UL (ref 0–0.51)
EOSINOPHIL NFR BLD: 0 % (ref 0–6.9)
ERYTHROCYTE [DISTWIDTH] IN BLOOD BY AUTOMATED COUNT: 43.6 FL (ref 35.9–50)
FOLATE SERPL-MCNC: 12.5 NG/ML
GLOBULIN SER CALC-MCNC: 2.7 G/DL (ref 1.9–3.5)
GLUCOSE SERPL-MCNC: 145 MG/DL (ref 65–99)
HCT VFR BLD AUTO: 30.9 % (ref 37–47)
HGB BLD-MCNC: 10.9 G/DL (ref 12–16)
IMM GRANULOCYTES # BLD AUTO: 0.06 K/UL (ref 0–0.11)
IMM GRANULOCYTES NFR BLD AUTO: 0.4 % (ref 0–0.9)
INR PPP: 0.99 (ref 0.87–1.13)
LYMPHOCYTES # BLD AUTO: 0.22 K/UL (ref 1–4.8)
LYMPHOCYTES NFR BLD: 1.6 % (ref 22–41)
MAGNESIUM SERPL-MCNC: 1.6 MG/DL (ref 1.5–2.5)
MCH RBC QN AUTO: 33.2 PG (ref 27–33)
MCHC RBC AUTO-ENTMCNC: 35.3 G/DL (ref 33.6–35)
MCV RBC AUTO: 94.2 FL (ref 81.4–97.8)
MONOCYTES # BLD AUTO: 0.88 K/UL (ref 0–0.85)
MONOCYTES NFR BLD AUTO: 6.5 % (ref 0–13.4)
NEUTROPHILS # BLD AUTO: 12.46 K/UL (ref 2–7.15)
NEUTROPHILS NFR BLD: 91.4 % (ref 44–72)
NRBC # BLD AUTO: 0 K/UL
NRBC BLD-RTO: 0 /100 WBC
PHOSPHATE SERPL-MCNC: 3.4 MG/DL (ref 2.5–4.5)
PLATELET # BLD AUTO: 286 K/UL (ref 164–446)
PMV BLD AUTO: 9.1 FL (ref 9–12.9)
POTASSIUM SERPL-SCNC: 3.1 MMOL/L (ref 3.6–5.5)
PROT SERPL-MCNC: 6.7 G/DL (ref 6–8.2)
PROTHROMBIN TIME: 13.4 SEC (ref 12–14.6)
RBC # BLD AUTO: 3.28 M/UL (ref 4.2–5.4)
SARS-COV+SARS-COV-2 AG RESP QL IA.RAPID: NOTDETECTED
SODIUM SERPL-SCNC: 124 MMOL/L (ref 135–145)
SODIUM SERPL-SCNC: 126 MMOL/L (ref 135–145)
SPECIMEN SOURCE: NORMAL
TSH SERPL DL<=0.005 MIU/L-ACNC: 2.47 UIU/ML (ref 0.38–5.33)
VIT B12 SERPL-MCNC: 347 PG/ML (ref 211–911)
WBC # BLD AUTO: 13.6 K/UL (ref 4.8–10.8)

## 2020-11-25 PROCEDURE — 73552 X-RAY EXAM OF FEMUR 2/>: CPT | Mod: RT

## 2020-11-25 PROCEDURE — 700111 HCHG RX REV CODE 636 W/ 250 OVERRIDE (IP): Performed by: EMERGENCY MEDICINE

## 2020-11-25 PROCEDURE — 82746 ASSAY OF FOLIC ACID SERUM: CPT

## 2020-11-25 PROCEDURE — 84295 ASSAY OF SERUM SODIUM: CPT

## 2020-11-25 PROCEDURE — 82550 ASSAY OF CK (CPK): CPT

## 2020-11-25 PROCEDURE — G0378 HOSPITAL OBSERVATION PER HR: HCPCS

## 2020-11-25 PROCEDURE — 700111 HCHG RX REV CODE 636 W/ 250 OVERRIDE (IP): Performed by: STUDENT IN AN ORGANIZED HEALTH CARE EDUCATION/TRAINING PROGRAM

## 2020-11-25 PROCEDURE — 80053 COMPREHEN METABOLIC PANEL: CPT

## 2020-11-25 PROCEDURE — 83735 ASSAY OF MAGNESIUM: CPT

## 2020-11-25 PROCEDURE — 85730 THROMBOPLASTIN TIME PARTIAL: CPT

## 2020-11-25 PROCEDURE — 700102 HCHG RX REV CODE 250 W/ 637 OVERRIDE(OP): Performed by: STUDENT IN AN ORGANIZED HEALTH CARE EDUCATION/TRAINING PROGRAM

## 2020-11-25 PROCEDURE — 70450 CT HEAD/BRAIN W/O DYE: CPT

## 2020-11-25 PROCEDURE — 36415 COLL VENOUS BLD VENIPUNCTURE: CPT

## 2020-11-25 PROCEDURE — 96375 TX/PRO/DX INJ NEW DRUG ADDON: CPT

## 2020-11-25 PROCEDURE — 84443 ASSAY THYROID STIM HORMONE: CPT

## 2020-11-25 PROCEDURE — 82607 VITAMIN B-12: CPT

## 2020-11-25 PROCEDURE — 96365 THER/PROPH/DIAG IV INF INIT: CPT

## 2020-11-25 PROCEDURE — 82306 VITAMIN D 25 HYDROXY: CPT

## 2020-11-25 PROCEDURE — 73700 CT LOWER EXTREMITY W/O DYE: CPT | Mod: RT

## 2020-11-25 PROCEDURE — 72170 X-RAY EXAM OF PELVIS: CPT

## 2020-11-25 PROCEDURE — 87426 SARSCOV CORONAVIRUS AG IA: CPT

## 2020-11-25 PROCEDURE — 85025 COMPLETE CBC W/AUTO DIFF WBC: CPT

## 2020-11-25 PROCEDURE — 700101 HCHG RX REV CODE 250: Performed by: STUDENT IN AN ORGANIZED HEALTH CARE EDUCATION/TRAINING PROGRAM

## 2020-11-25 PROCEDURE — 84100 ASSAY OF PHOSPHORUS: CPT

## 2020-11-25 PROCEDURE — 99220 PR INITIAL OBSERVATION CARE,LEVL III: CPT | Performed by: STUDENT IN AN ORGANIZED HEALTH CARE EDUCATION/TRAINING PROGRAM

## 2020-11-25 PROCEDURE — 85610 PROTHROMBIN TIME: CPT

## 2020-11-25 PROCEDURE — 96376 TX/PRO/DX INJ SAME DRUG ADON: CPT

## 2020-11-25 PROCEDURE — A9270 NON-COVERED ITEM OR SERVICE: HCPCS | Performed by: STUDENT IN AN ORGANIZED HEALTH CARE EDUCATION/TRAINING PROGRAM

## 2020-11-25 PROCEDURE — 99285 EMERGENCY DEPT VISIT HI MDM: CPT

## 2020-11-25 PROCEDURE — 96372 THER/PROPH/DIAG INJ SC/IM: CPT

## 2020-11-25 PROCEDURE — 71045 X-RAY EXAM CHEST 1 VIEW: CPT

## 2020-11-25 PROCEDURE — 93005 ELECTROCARDIOGRAM TRACING: CPT | Performed by: EMERGENCY MEDICINE

## 2020-11-25 RX ORDER — SPIRONOLACTONE 25 MG/1
25 TABLET ORAL
Status: DISCONTINUED | OUTPATIENT
Start: 2020-11-26 | End: 2020-11-29

## 2020-11-25 RX ORDER — VITAMIN B COMPLEX
1000 TABLET ORAL DAILY
Status: DISCONTINUED | OUTPATIENT
Start: 2020-11-25 | End: 2020-11-26

## 2020-11-25 RX ORDER — POTASSIUM CHLORIDE 20 MEQ/1
40 TABLET, EXTENDED RELEASE ORAL ONCE
Status: COMPLETED | OUTPATIENT
Start: 2020-11-25 | End: 2020-11-25

## 2020-11-25 RX ORDER — ONDANSETRON 2 MG/ML
4 INJECTION INTRAMUSCULAR; INTRAVENOUS EVERY 4 HOURS PRN
Status: DISCONTINUED | OUTPATIENT
Start: 2020-11-25 | End: 2020-12-04 | Stop reason: HOSPADM

## 2020-11-25 RX ORDER — HYDROCHLOROTHIAZIDE 25 MG/1
25 TABLET ORAL
Status: DISCONTINUED | OUTPATIENT
Start: 2020-11-26 | End: 2020-11-29

## 2020-11-25 RX ORDER — HYDROXYZINE 50 MG/1
25 TABLET, FILM COATED ORAL
Status: DISCONTINUED | OUTPATIENT
Start: 2020-11-25 | End: 2020-11-30

## 2020-11-25 RX ORDER — ACETAMINOPHEN 325 MG/1
650 TABLET ORAL EVERY 6 HOURS PRN
Status: DISCONTINUED | OUTPATIENT
Start: 2020-11-25 | End: 2020-12-04 | Stop reason: HOSPADM

## 2020-11-25 RX ORDER — ONDANSETRON 2 MG/ML
4 INJECTION INTRAMUSCULAR; INTRAVENOUS ONCE
Status: COMPLETED | OUTPATIENT
Start: 2020-11-25 | End: 2020-11-25

## 2020-11-25 RX ORDER — ROSUVASTATIN CALCIUM 10 MG/1
10 TABLET, COATED ORAL EVERY EVENING
Status: DISCONTINUED | OUTPATIENT
Start: 2020-11-25 | End: 2020-12-04 | Stop reason: HOSPADM

## 2020-11-25 RX ORDER — HEPARIN SODIUM 5000 [USP'U]/ML
5000 INJECTION, SOLUTION INTRAVENOUS; SUBCUTANEOUS EVERY 8 HOURS
Status: DISCONTINUED | OUTPATIENT
Start: 2020-11-25 | End: 2020-11-27

## 2020-11-25 RX ORDER — MORPHINE SULFATE 4 MG/ML
2 INJECTION, SOLUTION INTRAMUSCULAR; INTRAVENOUS EVERY 6 HOURS PRN
Status: DISCONTINUED | OUTPATIENT
Start: 2020-11-25 | End: 2020-11-29

## 2020-11-25 RX ORDER — AMLODIPINE BESYLATE 10 MG/1
10 TABLET ORAL DAILY
Status: DISCONTINUED | OUTPATIENT
Start: 2020-11-25 | End: 2020-12-04 | Stop reason: HOSPADM

## 2020-11-25 RX ORDER — LABETALOL HYDROCHLORIDE 5 MG/ML
10 INJECTION, SOLUTION INTRAVENOUS EVERY 6 HOURS PRN
Status: DISCONTINUED | OUTPATIENT
Start: 2020-11-25 | End: 2020-12-04 | Stop reason: HOSPADM

## 2020-11-25 RX ORDER — LOSARTAN POTASSIUM 50 MG/1
100 TABLET ORAL
Status: DISCONTINUED | OUTPATIENT
Start: 2020-11-26 | End: 2020-12-04 | Stop reason: HOSPADM

## 2020-11-25 RX ORDER — OXYCODONE HYDROCHLORIDE 5 MG/1
5 TABLET ORAL EVERY 4 HOURS PRN
Status: DISCONTINUED | OUTPATIENT
Start: 2020-11-25 | End: 2020-12-04 | Stop reason: HOSPADM

## 2020-11-25 RX ORDER — SODIUM CHLORIDE AND POTASSIUM CHLORIDE 300; 900 MG/100ML; MG/100ML
INJECTION, SOLUTION INTRAVENOUS CONTINUOUS
Status: DISCONTINUED | OUTPATIENT
Start: 2020-11-25 | End: 2020-11-27

## 2020-11-25 RX ORDER — ONDANSETRON 4 MG/1
4 TABLET, ORALLY DISINTEGRATING ORAL EVERY 4 HOURS PRN
Status: DISCONTINUED | OUTPATIENT
Start: 2020-11-25 | End: 2020-12-04 | Stop reason: HOSPADM

## 2020-11-25 RX ORDER — HYDROMORPHONE HYDROCHLORIDE 1 MG/ML
0.5 INJECTION, SOLUTION INTRAMUSCULAR; INTRAVENOUS; SUBCUTANEOUS
Status: DISCONTINUED | OUTPATIENT
Start: 2020-11-25 | End: 2020-11-29

## 2020-11-25 RX ADMIN — POTASSIUM CHLORIDE 40 MEQ: 1500 TABLET, EXTENDED RELEASE ORAL at 15:08

## 2020-11-25 RX ADMIN — ROSUVASTATIN CALCIUM 10 MG: 10 TABLET, FILM COATED ORAL at 17:33

## 2020-11-25 RX ADMIN — ONDANSETRON 4 MG: 2 INJECTION INTRAMUSCULAR; INTRAVENOUS at 18:35

## 2020-11-25 RX ADMIN — HYDROMORPHONE HYDROCHLORIDE 0.5 MG: 1 INJECTION, SOLUTION INTRAMUSCULAR; INTRAVENOUS; SUBCUTANEOUS at 13:30

## 2020-11-25 RX ADMIN — LABETALOL HYDROCHLORIDE 10 MG: 5 INJECTION, SOLUTION INTRAVENOUS at 16:47

## 2020-11-25 RX ADMIN — Medication 1000 UNITS: at 16:51

## 2020-11-25 RX ADMIN — HEPARIN SODIUM 5000 UNITS: 5000 INJECTION, SOLUTION INTRAVENOUS; SUBCUTANEOUS at 16:59

## 2020-11-25 RX ADMIN — AMLODIPINE BESYLATE 10 MG: 10 TABLET ORAL at 16:51

## 2020-11-25 RX ADMIN — ASPIRIN 81 MG: 81 TABLET, COATED ORAL at 16:50

## 2020-11-25 RX ADMIN — POTASSIUM CHLORIDE AND SODIUM CHLORIDE: 900; 300 INJECTION, SOLUTION INTRAVENOUS at 15:08

## 2020-11-25 RX ADMIN — ONDANSETRON 4 MG: 2 INJECTION INTRAMUSCULAR; INTRAVENOUS at 13:29

## 2020-11-25 ASSESSMENT — ENCOUNTER SYMPTOMS
NAUSEA: 1
VOMITING: 0
FOCAL WEAKNESS: 0
DEPRESSION: 0
DIARRHEA: 0
FEVER: 0
COUGH: 0
DOUBLE VISION: 0
ABDOMINAL PAIN: 0
SHORTNESS OF BREATH: 0
BLURRED VISION: 0
FLANK PAIN: 0
DIZZINESS: 0
SEIZURES: 0
CHILLS: 0
FALLS: 1

## 2020-11-25 ASSESSMENT — COGNITIVE AND FUNCTIONAL STATUS - GENERAL
TOILETING: A LITTLE
DRESSING REGULAR UPPER BODY CLOTHING: A LITTLE
DRESSING REGULAR LOWER BODY CLOTHING: A LITTLE
MOVING TO AND FROM BED TO CHAIR: A LITTLE
HELP NEEDED FOR BATHING: A LITTLE
WALKING IN HOSPITAL ROOM: A LOT
STANDING UP FROM CHAIR USING ARMS: A LOT
DAILY ACTIVITIY SCORE: 19
CLIMB 3 TO 5 STEPS WITH RAILING: A LOT
SUGGESTED CMS G CODE MODIFIER DAILY ACTIVITY: CK
MOVING FROM LYING ON BACK TO SITTING ON SIDE OF FLAT BED: A LITTLE
PERSONAL GROOMING: A LITTLE
SUGGESTED CMS G CODE MODIFIER MOBILITY: CK
MOBILITY SCORE: 16

## 2020-11-25 ASSESSMENT — LIFESTYLE VARIABLES
TOTAL SCORE: 0
DOES PATIENT WANT TO STOP DRINKING: NO
EVER FELT BAD OR GUILTY ABOUT YOUR DRINKING: NO
TOTAL SCORE: 0
ALCOHOL_USE: YES
HAVE PEOPLE ANNOYED YOU BY CRITICIZING YOUR DRINKING: NO
AVERAGE NUMBER OF DAYS PER WEEK YOU HAVE A DRINK CONTAINING ALCOHOL: 7
HAVE YOU EVER FELT YOU SHOULD CUT DOWN ON YOUR DRINKING: NO
HOW MANY TIMES IN THE PAST YEAR HAVE YOU HAD 5 OR MORE DRINKS IN A DAY: 0
DO YOU DRINK ALCOHOL: NO
CONSUMPTION TOTAL: POSITIVE
EVER HAD A DRINK FIRST THING IN THE MORNING TO STEADY YOUR NERVES TO GET RID OF A HANGOVER: NO
TOTAL SCORE: 0
ON A TYPICAL DAY WHEN YOU DRINK ALCOHOL HOW MANY DRINKS DO YOU HAVE: 2

## 2020-11-25 ASSESSMENT — FIBROSIS 4 INDEX: FIB4 SCORE: 1.42

## 2020-11-25 ASSESSMENT — PAIN DESCRIPTION - PAIN TYPE: TYPE: ACUTE PAIN

## 2020-11-25 NOTE — H&P
Hospital Medicine History & Physical Note    Date of Service  11/25/2020    Primary Care Physician  Claire Hernández M.D.    Consultants  Orthopedic    Code Status  Full Code    Chief Complaint  Chief Complaint   Patient presents with   • Fall     deforminty right upper leg   • Leg Pain       History of Presenting Illness  75 y.o. female who presented 11/25/2020 with ground-level fall. She has h/o HTN, HLD, CAD and multiple orthopedic surgical interventions, most recent right hemiarthoplasty in 4/2019. Patient reported falling earlier prior to arrival, but unable to provide much detail. I personally called patient's , who stated he saw patient in bed around 10:15AM, she had told him that she fell, in pain - EMS was called. Patient denies chest pain, SOB, palpitations, dizziness/lightheadedness at time of my evaluation. No tongue bite, loss of bladder/bowel incontinence. In ED, she was hypertensive but otherwise VS WNL. Xray of right hip noted periprosthetic fracture of proximal right femoral diaphysis. Orthopedic was notified by ED provider - requested CT hip and keep patient NPO. I requested CT brain for complete workup.  Na 124, K 3.1 - IVF with KCL  Replacement, pain meds. Patient was admitted to medicine service for further evaluation.    Review of Systems  Review of Systems   Constitutional: Negative for chills and fever.   HENT: Positive for hearing loss. Negative for tinnitus.    Eyes: Negative for blurred vision and double vision.   Respiratory: Negative for cough and shortness of breath.    Cardiovascular: Negative for chest pain and leg swelling.   Gastrointestinal: Positive for nausea. Negative for abdominal pain, diarrhea and vomiting.   Genitourinary: Negative for dysuria and flank pain.   Musculoskeletal: Positive for falls and joint pain.   Neurological: Negative for dizziness, focal weakness and seizures.   Psychiatric/Behavioral: Negative for depression.       Past Medical History   has a past  medical history of Alcohol abuse (4/27/2019), Arthritis, Carotid artery stenosis (12/31/2015), Fall (11/25/2020), Gluten intolerance, Hypertension, OSTEOPOROSIS, Other specified symptom associated with female genital organs, Pain (02/06/15), Stage 3 chronic kidney disease (11/25/2020), and Unspecified cataract.    Surgical History   has a past surgical history that includes colonoscopy (2008); other orthopedic surgery; humerus orif (9/5/08); other orthopedic surgery; hysteroscopy with video diagnostic (11/17/2010); dilation and curettage (11/17/2010); recovery (2/9/2015); femoral artery repair (2/9/2015); and pr partial hip replacement (Right, 4/28/2019).     Family History  family history includes Cancer in her mother.     Social History   reports that she has been smoking cigarettes. She started smoking about 45 years ago. She has a 20.00 pack-year smoking history. She has never used smokeless tobacco. She reports current alcohol use. She reports that she does not use drugs.    Allergies  Allergies   Allergen Reactions   • Penicillins Hives     hands   • Other Environmental Hives     Hair dye       Medications  Prior to Admission Medications   Prescriptions Last Dose Informant Patient Reported? Taking?   acetaminophen (TYLENOL) 325 MG Tab   No No   Sig: Take 2 Tabs by mouth every 6 hours as needed (Mild Pain; (Pain scale 1-3); Temp greater than 100.5 F).   amLODIPine (NORVASC) 10 MG Tab   No No   Sig: Take 1 Tab by mouth every day.   aspirin EC (ECOTRIN) 81 MG Tablet Delayed Response  Significant Other Yes No   Sig: Take 81 mg by mouth every day.   hydrOXYzine HCl (ATARAX) 25 MG Tab   Yes No   Sig: Take 25 mg by mouth at bedtime as needed for Itching.   losartan-hydrochlorothiazide (HYZAAR) 100-25 MG per tablet   No No   Sig: Take 1 Tab by mouth every day.   rosuvastatin (CRESTOR) 10 MG Tab   No No   Sig: TAKE ONE TABLET BY MOUTH EVERY EVENING   spironolactone (ALDACTONE) 25 MG Tab   No No   Sig: Take 1 Tab by  mouth every day.      Facility-Administered Medications: None       Physical Exam  Temp:  [36.4 °C (97.5 °F)-36.8 °C (98.3 °F)] 36.4 °C (97.5 °F)  Pulse:  [68-97] 68  Resp:  [15-20] 15  BP: (152-201)/(65-91) 157/78  SpO2:  [86 %-100 %] 100 %    Physical Exam  Vitals signs and nursing note reviewed.   Constitutional:       General: She is not in acute distress.     Appearance: Normal appearance.   HENT:      Head: Atraumatic.      Mouth/Throat:      Mouth: Mucous membranes are dry.   Eyes:      Extraocular Movements: Extraocular movements intact.   Neck:      Musculoskeletal: Neck supple. No muscular tenderness.   Cardiovascular:      Rate and Rhythm: Normal rate and regular rhythm.      Pulses: Normal pulses.      Heart sounds: Normal heart sounds. No murmur.   Pulmonary:      Effort: Pulmonary effort is normal.      Breath sounds: No wheezing or rales.   Abdominal:      Palpations: Abdomen is soft.      Tenderness: There is no abdominal tenderness. There is no guarding or rebound.   Musculoskeletal:      Comments: Tender in right hip, limited ROM of RLE secondary to pain   Skin:     General: Skin is warm and dry.      Capillary Refill: Capillary refill takes 2 to 3 seconds.   Neurological:      General: No focal deficit present.      Mental Status: She is alert.      Cranial Nerves: No cranial nerve deficit.      Comments: AAO to self and place. Follows commands         Laboratory:  Recent Labs     11/25/20  1253   WBC 13.6*   RBC 3.28*   HEMOGLOBIN 10.9*   HEMATOCRIT 30.9*   MCV 94.2   MCH 33.2*   MCHC 35.3*   RDW 43.6   PLATELETCT 286   MPV 9.1     Recent Labs     11/25/20  1253 11/25/20  1805   SODIUM 124* 126*   POTASSIUM 3.1*  --    CHLORIDE 86*  --    CO2 21  --    GLUCOSE 145*  --    BUN 16  --    CREATININE 1.08  --    CALCIUM 9.8  --      Recent Labs     11/25/20  1253   ALTSGPT 24   ASTSGOT 34   ALKPHOSPHAT 82   TBILIRUBIN 1.4   GLUCOSE 145*     Recent Labs     11/25/20  1253   APTT 27.0   INR 0.99     No  results for input(s): NTPROBNP in the last 72 hours.      No results for input(s): TROPONINT in the last 72 hours.    Imaging:  CT-HIP W/O PLUS RECONS RIGHT   Final Result      Periprosthetic fracture in the proximal right femur.      CT-HEAD W/O   Final Result      No acute intracranial findings.         DX-FEMUR-2+ RIGHT   Final Result      1.  Periprosthetic fracture of the proximal RIGHT femoral diaphysis   2.  Osteopenia      DX-PELVIS-1 OR 2 VIEWS   Final Result      1.  Periprosthetic fracture of the proximal RIGHT femur   2.  Osteopenia   3.  LEFT hip osteoarthritis      DX-CHEST-PORTABLE (1 VIEW)   Final Result      1.  No acute cardiac or pulmonary abnormalities are identified.   2.  Emphysema   3.  Atherosclerosis      EC-ECHOCARDIOGRAM COMPLETE W/O CONT    (Results Pending)         Assessment/Plan:  I anticipate this patient is appropriate for observation status at this time.    * Closed fracture of neck of right femur (HCC)- (present on admission)  Assessment & Plan  H/o right hemiarthroplasty on 4/28/19  Admission Xray hip and pelvis: periprosthetic fracture of proximal right femoral diaphysis  CT right hip: Periprosthetic fracture in the proximal right femur  Pain control: PRN tylenol/oxycodone/morphine  PT/OT, likely SNF as pt lives alone at home with   Orthopedic f/u appreciated - planned for revision + ORIF Friday (11/27)    Fall  Assessment & Plan  Likely mechanical  CT head: negative acute  F/u echo, UDS, ETOH  Fall precautions  Orthostatic VS  PT/OT  Vitamin D    CAD (coronary artery disease)  Assessment & Plan  Cont ASA/statin, ARB, spironolactone  F/u echo    Stage 3 chronic kidney disease  Assessment & Plan  CKD III, followed by nephrology and recent renal bx  Admission Cr 1.08, monitor    Hypertensive urgency- (present on admission)  Assessment & Plan  SBP > 180 in ED  Pain likely contributory - pain control  Resume home anti-HTN meds: amlodipine, losartan, HCTZ,  spironolactone    Hyponatremia- (present on admission)  Assessment & Plan  Na 124  F/u Na q6hr with correction goal of 6-8mEq over 24hrs  IVF with NS-KCL 40mEq @75cc/hr, adjust IVF accordingly    Hypokalemia- (present on admission)  Assessment & Plan  K3.1 - PO 40mEq replacement and on NS-KCl 40mEq  Replace as appropriate    Leukocytosis- (present on admission)  Assessment & Plan  Likely reactive, monitor    VTE ppx: heparin SQ  Diet: renal, diabetic  Code: full - confirmed status with   Dispo: admit for observation

## 2020-11-25 NOTE — ASSESSMENT & PLAN NOTE
SBP > 180 in ED likely related to pain and new fall  Due to risk of fall and hypotension we will hold hydrochlorothiazide and spironolactone  Hold losartan due to RASHAD  Continue monitoring: And amlodipine,

## 2020-11-25 NOTE — ED NOTES
Chief Complaint   Patient presents with   • Fall     deforminty right upper leg   • Leg Pain     Pt bib ems, per report pt had glf unwitnessed . +deformity, + swelling right upper leg, shortening and rotation. Cms+. She has history of dementia, pt could not tell me what happened. Denies hitting head or loss of consciousness.

## 2020-11-25 NOTE — DISCHARGE PLANNING
VALERIE completed assessment with Pt spouse, Pt noted to have Dementia.  Pt lives with spouse in their home,he is her primary caregiver. She had a fall today at home.    Jeremy states he is having increased difficulty caring for Pt with her Dementia and may have to look at alternate care. They have no children, so little support. Pt had a previous fracture in 2019 she was discharged to [facility]. Currently at home she uses a walker, she does not use oxygen. PCP is Omayra Hernández and She sees Dr. Najjar for her Kidney Disease.                 Care Transition Team Assessment    Jeremy Bolton  (Spouse)   [phone] or [phone]    Information Source  Orientation : Unable to Assess(Noted to have Dementia)  Information Given By: Spouse  Informant's Name: Jeremy Bolton  Who is responsible for making decisions for patient? : Patient    Readmission Evaluation  Is this a readmission?: No    Elopement Risk  Legal Hold: No         Discharge Preparedness  What is your plan after discharge?: Uncertain - pending medical team collaboration, Skilled nursing facility, Memory Care Group Home or Facility.   What are your discharge supports?: Spouse  Prior Functional Level: Ambulatory, Needs Assist with Activities of Daily Living, Needs Assist with Medication Management, Uses Walker  Difficulity with ADLs: (Does Bathing, Dressing with promting)  Difficulity with IADLs: Cooking, Driving, Keeping track of finances, Laundry, Managing medication, Shopping, Using the telephone or computer  Difficulity with IADL Comments: (Spouse helps with all of this)    Functional Assesment  Prior Functional Level: Ambulatory, Needs Assist with Activities of Daily Living, Needs Assist with Medication Management, Uses Walker    Finances  Financial Barriers to Discharge: No  Prescription Coverage: Yes([facility]              Advance Directive  Advance Directive?: None    Domestic Abuse  Have you ever been the victim of  abuse or violence?: No    Psychological Assessment  History of Substance Abuse: Alcohol(Noted in assessment with visit in 2019)  Date Last Used - Alcohol: unknown  History of Psychiatric Problems: No  Newly Diagnosed Illness: Yes    Discharge Risks or Barriers  Discharge risks or barriers?: Post-acute placement / services, Complex medical needs  Patient risk factors: Cognitive / sensory / physical deficit    Anticipated Discharge Information  Discharge Disposition: Still a Patient (30)  Discharge Address: [address] [place] [address]  Discharge Contact Phone Number: [phone] or [phone]

## 2020-11-25 NOTE — ASSESSMENT & PLAN NOTE
Likely mechanical  Echo is pending  EKG no arrhythmia  PT and OT after surgery  Start vitamin D and B12 supplementation

## 2020-11-25 NOTE — ASSESSMENT & PLAN NOTE
Worsening leukocytosis with inflammation markers  UA showed white blood cell  Waiting for chest x-ray and blood culture  Will start ceftriaxone and IV fluid  Monitor closely

## 2020-11-25 NOTE — ED NOTES
Unable to complete med rec at this time   Pt unable to participate in an interview   Called pt's  no answer   Called pharmacy and pt has only filled Amlodipine and Losartan/Hydrochlorothiazide recently

## 2020-11-25 NOTE — ED PROVIDER NOTES
ED Provider Note    CHIEF COMPLAINT  Chief Complaint   Patient presents with   • Fall     deforminty right upper leg   • Leg Pain       HPI  Sangita Bolton is a 75 y.o. female who presents For evaluation of ground-level fall with right leg deformity.  The patient was brought in from a care facility, apparently she had a ground-level fall. There is a deformity noted to the right proximal femur.  There is no report of injury to the head neck chest abdomen or pelvis.  Patient does have some underlying dementia.  She cannot provide any meaningful history.    REVIEW OF SYSTEMS  See HPI for further details.No loss of consciousness numbness weakness tingling All other systems are negative.     PAST MEDICAL HISTORY  Past Medical History:   Diagnosis Date   • Alcohol abuse 4/27/2019   • Carotid artery stenosis 12/31/2015   • Pain 02/06/15    bilateral legs-chronic>4/10   • Arthritis     generalized-Osteo   • Gluten intolerance    • Hypertension    • OSTEOPOROSIS    • Other specified symptom associated with female genital organs     post menaupausal bleeding   • Unspecified cataract        FAMILY HISTORY  Noncontributory    SOCIAL HISTORY  Social History     Socioeconomic History   • Marital status:      Spouse name: Not on file   • Number of children: Not on file   • Years of education: Not on file   • Highest education level: Not on file   Occupational History   • Not on file   Social Needs   • Financial resource strain: Not on file   • Food insecurity     Worry: Not on file     Inability: Not on file   • Transportation needs     Medical: Not on file     Non-medical: Not on file   Tobacco Use   • Smoking status: Current Some Day Smoker     Packs/day: 0.50     Years: 40.00     Pack years: 20.00     Types: Cigarettes     Start date: 2/2/1975   • Smokeless tobacco: Never Used   Substance and Sexual Activity   • Alcohol use: Yes     Comment: 4/week   • Drug use: No   • Sexual activity: Not on file     Comment: ,     Lifestyle   • Physical activity     Days per week: Not on file     Minutes per session: Not on file   • Stress: Not on file   Relationships   • Social connections     Talks on phone: Not on file     Gets together: Not on file     Attends [employer]ism service: Not on file     Active member of club or organization: Not on file     Attends meetings of clubs or organizations: Not on file     Relationship status: Not on file   • Intimate partner violence     Fear of current or ex partner: Not on file     Emotionally abused: Not on file     Physically abused: Not on file     Forced sexual activity: Not on file   Other Topics Concern   • Not on file   Social History Narrative   • Not on file     Former smoker  SURGICAL HISTORY  Past Surgical History:   Procedure Laterality Date   • PB PARTIAL HIP REPLACEMENT Right 4/28/2019    Procedure: HEMIARTHROPLASTY, HIP;  Surgeon: Evens Sarabia M.D.;  Location: SURGERY [facility];  Service: Orthopedics   • RECOVERY  2/9/2015    Performed by [facility] at SURGERY SAME DAY [facility]   • FEMORAL ARTERY REPAIR  2/9/2015    Performed by Yuly Chirinos M.D. at SURGERY [facility]   • HYSTEROSCOPY WITH VIDEO DIAGNOSTIC  11/17/2010    Performed by ALIS DEGROOT at SURGERY SAME DAY [facility]   • DILATION AND CURETTAGE  11/17/2010    Performed by ALIS DEGROOT at SURGERY SAME DAY [facility]   • HUMERUS ORIF  9/5/08    Performed by LAURA CARTER at SURGERY [facility]   • COLONOSCOPY  2008    recheck 4 years   • OTHER ORTHOPEDIC SURGERY      rt leg fx   • OTHER ORTHOPEDIC SURGERY      broken right wrist       CURRENT MEDICATIONS  Home Medications     Reviewed by Celine Hermosillo R.N. (Registered Nurse) on 11/25/20 at 1242  Med List Status: Unable to Obtain   Medication Last Dose Status   acetaminophen (TYLENOL) 325 MG Tab  Active   amLODIPine (NORVASC) 10 MG Tab  Active   aspirin EC (ECOTRIN) 81 MG Tablet Delayed Response  Active   hydrOXYzine  "HCl (ATARAX) 25 MG Tab  Active   losartan-hydrochlorothiazide (HYZAAR) 100-25 MG per tablet  Active   rosuvastatin (CRESTOR) 10 MG Tab  Active   spironolactone (ALDACTONE) 25 MG Tab  Active                ALLERGIES  Allergies   Allergen Reactions   • Penicillins Hives     hands   • Other Environmental Hives     Hair dye       PHYSICAL EXAM  VITAL SIGNS: /65   Pulse 84   Temp 36.5 °C (97.7 °F) (Temporal)   Resp 17   Ht 1.6 m (5' 3\")   Wt 59 kg (130 lb)   SpO2 93%   BMI 23.03 kg/m²       Constitutional: Elderly frail patient appears uncomfortable  HENT: Normocephalic, Atraumatic, Bilateral external ears normal, Oropharynx moist, No oral exudates, Nose normal.   Eyes: PERRLA, EOMI, Conjunctiva normal, No discharge.   Neck: Normal range of motion, No tenderness, Supple, No stridor.   Cardiovascular: Normal heart rate, Normal rhythm, No murmurs, No rubs, No gallops.   Thorax & Lungs: Normal breath sounds, No respiratory distress, No wheezing, No chest tenderness.   Abdomen: Bowel sounds normal, Soft, No tenderness, No masses, No pulsatile masses.   Skin: Warm, Dry, No erythema, No rash.   Back: No tenderness, No CVA tenderness.   Extremities:Right lower extremity exam is notable for a deformity to the proximal right hip with hematoma on the anterior proximal thigh.  The leg is shortened and externally rotated.  Dorsalis pedal pulses intact sensation is normal  Neurologic: Alert & oriented x 3, Normal motor function, Normal sensory function, No focal deficits noted.   Psychiatric: Affect normal, Judgment normal, Mood normal.     DX-FEMUR-2+ RIGHT   Final Result      1.  Periprosthetic fracture of the proximal RIGHT femoral diaphysis   2.  Osteopenia      DX-PELVIS-1 OR 2 VIEWS   Final Result      1.  Periprosthetic fracture of the proximal RIGHT femur   2.  Osteopenia   3.  LEFT hip osteoarthritis      DX-CHEST-PORTABLE (1 VIEW)   Final Result      1.  No acute cardiac or pulmonary abnormalities are " identified.   2.  Emphysema   3.  Atherosclerosis        Results for orders placed or performed during the hospital encounter of 11/25/20   COVID/SARS CoV-2 PCR    Specimen: Nasopharyngeal; Respirate   Result Value Ref Range    COVID Order Status Received    CBC WITH DIFFERENTIAL   Result Value Ref Range    WBC 13.6 (H) 4.8 - 10.8 K/uL    RBC 3.28 (L) 4.20 - 5.40 M/uL    Hemoglobin 10.9 (L) 12.0 - 16.0 g/dL    Hematocrit 30.9 (L) 37.0 - 47.0 %    MCV 94.2 81.4 - 97.8 fL    MCH 33.2 (H) 27.0 - 33.0 pg    MCHC 35.3 (H) 33.6 - 35.0 g/dL    RDW 43.6 35.9 - 50.0 fL    Platelet Count 286 164 - 446 K/uL    MPV 9.1 9.0 - 12.9 fL    Neutrophils-Polys 91.40 (H) 44.00 - 72.00 %    Lymphocytes 1.60 (L) 22.00 - 41.00 %    Monocytes 6.50 0.00 - 13.40 %    Eosinophils 0.00 0.00 - 6.90 %    Basophils 0.10 0.00 - 1.80 %    Immature Granulocytes 0.40 0.00 - 0.90 %    Nucleated RBC 0.00 /100 WBC    Neutrophils (Absolute) 12.46 (H) 2.00 - 7.15 K/uL    Lymphs (Absolute) 0.22 (L) 1.00 - 4.80 K/uL    Monos (Absolute) 0.88 (H) 0.00 - 0.85 K/uL    Eos (Absolute) 0.00 0.00 - 0.51 K/uL    Baso (Absolute) 0.02 0.00 - 0.12 K/uL    Immature Granulocytes (abs) 0.06 0.00 - 0.11 K/uL    NRBC (Absolute) 0.00 K/uL   Comp Metabolic Panel   Result Value Ref Range    Sodium 124 (L) 135 - 145 mmol/L    Potassium 3.1 (L) 3.6 - 5.5 mmol/L    Chloride 86 (L) 96 - 112 mmol/L    Co2 21 20 - 33 mmol/L    Anion Gap 17.0 (H) 7.0 - 16.0    Glucose 145 (H) 65 - 99 mg/dL    Bun 16 8 - 22 mg/dL    Creatinine 1.08 0.50 - 1.40 mg/dL    Calcium 9.8 8.5 - 10.5 mg/dL    AST(SGOT) 34 12 - 45 U/L    ALT(SGPT) 24 2 - 50 U/L    Alkaline Phosphatase 82 30 - 99 U/L    Total Bilirubin 1.4 0.1 - 1.5 mg/dL    Albumin 4.0 3.2 - 4.9 g/dL    Total Protein 6.7 6.0 - 8.2 g/dL    Globulin 2.7 1.9 - 3.5 g/dL    A-G Ratio 1.5 g/dL   SARS-COV Antigen ARNAUD   Result Value Ref Range    SARS-CoV-2 Source Nasal Swab     SARS-COV ANTIGEN ARNAUD NotDetected Not-Detected   ESTIMATED GFR   Result  Value Ref Range    GFR If  59 (A) >60 mL/min/1.73 m 2    GFR If Non African American 49 (A) >60 mL/min/1.73 m 2   EKG   Result Value Ref Range    Report       [facility] Emergency Dept.    Test Date:  2020  Pt Name:    AGUSTIN PORRAS                Department: ER  MRN:        [id number]                      Room:       [facility]  Gender:     Female                       Technician: [id number]  :        [id number]                   Requested By:MANOLO DOVE  Order #:    [id number]                    Reading MD:    Measurements  Intervals                                Axis  Rate:       83                           P:          0  NH:         124                          QRS:        54  QRSD:       90                           T:          263  QT:         404  QTc:        475    Interpretive Statements  SINUS RHYTHM  ATRIAL PREMATURE COMPLEX  CONSIDER RIGHT ATRIAL ABNORMALITY  REPOL ABNRM SUGGESTS ISCHEMIA, LATERAL LEADS  Compared to ECG 2019 07:41:36  Possible ischemia now present       COURSE & MEDICAL DECISION MAKING  Pertinent Labs & Imaging studies reviewed. (See chart for details)  An IV is established.  The patient was given scheduled doses of pain medicine as needed.  Consultation with Dr. Pratt orthopedics was obtained.  He is recommended admission to internal medicine as well as a CT scan of the right hip.  The patient will be admitted.  They expect to fix the hip likely tomorrow and patient can eat and drink normally.  Covid test is negative  FINAL IMPRESSION  1.Acute right periprosthetic hip fracture      Electronically signed by: Manolo Dove M.D., 2020 12:44 PM

## 2020-11-25 NOTE — ASSESSMENT & PLAN NOTE
Likely after mechanical fall   The patient has history of right hemiarthroplasty on 4/28/19  X-ray showed periprosthetic fracture of proximal right femoral diaphysis  surgery on 11/28 for ORIF and revision  PT and OT SNF, medically and surgically cleared  Pain control and prevent constipation  No pharmacological for possible delirium  Lovenox for DVT prophylaxis

## 2020-11-25 NOTE — ASSESSMENT & PLAN NOTE
Acute on chronic kidney disease stage III   Avoid nephrotoxic medication  IV hydration   labs daily

## 2020-11-26 ENCOUNTER — APPOINTMENT (OUTPATIENT)
Dept: CARDIOLOGY | Facility: MEDICAL CENTER | Age: 75
DRG: 467 | End: 2020-11-26
Attending: STUDENT IN AN ORGANIZED HEALTH CARE EDUCATION/TRAINING PROGRAM
Payer: MEDICARE

## 2020-11-26 PROBLEM — F03.90 DEMENTIA (HCC): Status: ACTIVE | Noted: 2020-11-26

## 2020-11-26 LAB
ALBUMIN SERPL BCP-MCNC: 3.4 G/DL (ref 3.2–4.9)
BASOPHILS # BLD AUTO: 0.3 % (ref 0–1.8)
BASOPHILS # BLD: 0.03 K/UL (ref 0–0.12)
BUN SERPL-MCNC: 20 MG/DL (ref 8–22)
CALCIUM SERPL-MCNC: 9.2 MG/DL (ref 8.5–10.5)
CHLORIDE SERPL-SCNC: 93 MMOL/L (ref 96–112)
CO2 SERPL-SCNC: 22 MMOL/L (ref 20–33)
CREAT SERPL-MCNC: 1.25 MG/DL (ref 0.5–1.4)
EOSINOPHIL # BLD AUTO: 0 K/UL (ref 0–0.51)
EOSINOPHIL NFR BLD: 0 % (ref 0–6.9)
ERYTHROCYTE [DISTWIDTH] IN BLOOD BY AUTOMATED COUNT: 46.1 FL (ref 35.9–50)
GLUCOSE SERPL-MCNC: 107 MG/DL (ref 65–99)
HCT VFR BLD AUTO: 24.7 % (ref 37–47)
HGB BLD-MCNC: 8.7 G/DL (ref 12–16)
IMM GRANULOCYTES # BLD AUTO: 0.02 K/UL (ref 0–0.11)
IMM GRANULOCYTES NFR BLD AUTO: 0.2 % (ref 0–0.9)
LV EJECT FRACT  99904: 70
LV EJECT FRACT MOD 2C 99903: 70.09
LV EJECT FRACT MOD 4C 99902: 71.77
LV EJECT FRACT MOD BP 99901: 71.35
LYMPHOCYTES # BLD AUTO: 0.77 K/UL (ref 1–4.8)
LYMPHOCYTES NFR BLD: 8.7 % (ref 22–41)
MAGNESIUM SERPL-MCNC: 1.7 MG/DL (ref 1.5–2.5)
MCH RBC QN AUTO: 34.5 PG (ref 27–33)
MCHC RBC AUTO-ENTMCNC: 35.2 G/DL (ref 33.6–35)
MCV RBC AUTO: 98 FL (ref 81.4–97.8)
MONOCYTES # BLD AUTO: 0.94 K/UL (ref 0–0.85)
MONOCYTES NFR BLD AUTO: 10.7 % (ref 0–13.4)
NEUTROPHILS # BLD AUTO: 7.05 K/UL (ref 2–7.15)
NEUTROPHILS NFR BLD: 80.1 % (ref 44–72)
NRBC # BLD AUTO: 0 K/UL
NRBC BLD-RTO: 0 /100 WBC
PHOSPHATE SERPL-MCNC: 3.1 MG/DL (ref 2.5–4.5)
PLATELET # BLD AUTO: 220 K/UL (ref 164–446)
PMV BLD AUTO: 9.3 FL (ref 9–12.9)
POTASSIUM SERPL-SCNC: 4.4 MMOL/L (ref 3.6–5.5)
RBC # BLD AUTO: 2.52 M/UL (ref 4.2–5.4)
SODIUM SERPL-SCNC: 127 MMOL/L (ref 135–145)
SODIUM SERPL-SCNC: 127 MMOL/L (ref 135–145)
SODIUM SERPL-SCNC: 128 MMOL/L (ref 135–145)
WBC # BLD AUTO: 8.8 K/UL (ref 4.8–10.8)

## 2020-11-26 PROCEDURE — 80069 RENAL FUNCTION PANEL: CPT

## 2020-11-26 PROCEDURE — 99226 PR SUBSEQUENT OBSERVATION CARE,LEVEL III: CPT | Performed by: INTERNAL MEDICINE

## 2020-11-26 PROCEDURE — 700101 HCHG RX REV CODE 250: Performed by: STUDENT IN AN ORGANIZED HEALTH CARE EDUCATION/TRAINING PROGRAM

## 2020-11-26 PROCEDURE — 83735 ASSAY OF MAGNESIUM: CPT

## 2020-11-26 PROCEDURE — 85025 COMPLETE CBC W/AUTO DIFF WBC: CPT

## 2020-11-26 PROCEDURE — 700102 HCHG RX REV CODE 250 W/ 637 OVERRIDE(OP): Performed by: INTERNAL MEDICINE

## 2020-11-26 PROCEDURE — A9270 NON-COVERED ITEM OR SERVICE: HCPCS | Performed by: INTERNAL MEDICINE

## 2020-11-26 PROCEDURE — 96372 THER/PROPH/DIAG INJ SC/IM: CPT

## 2020-11-26 PROCEDURE — 93306 TTE W/DOPPLER COMPLETE: CPT | Mod: 26 | Performed by: INTERNAL MEDICINE

## 2020-11-26 PROCEDURE — 700111 HCHG RX REV CODE 636 W/ 250 OVERRIDE (IP): Performed by: STUDENT IN AN ORGANIZED HEALTH CARE EDUCATION/TRAINING PROGRAM

## 2020-11-26 PROCEDURE — 36415 COLL VENOUS BLD VENIPUNCTURE: CPT

## 2020-11-26 PROCEDURE — 93306 TTE W/DOPPLER COMPLETE: CPT

## 2020-11-26 PROCEDURE — 84295 ASSAY OF SERUM SODIUM: CPT

## 2020-11-26 PROCEDURE — 700102 HCHG RX REV CODE 250 W/ 637 OVERRIDE(OP): Performed by: STUDENT IN AN ORGANIZED HEALTH CARE EDUCATION/TRAINING PROGRAM

## 2020-11-26 PROCEDURE — A9270 NON-COVERED ITEM OR SERVICE: HCPCS | Performed by: STUDENT IN AN ORGANIZED HEALTH CARE EDUCATION/TRAINING PROGRAM

## 2020-11-26 PROCEDURE — G0378 HOSPITAL OBSERVATION PER HR: HCPCS

## 2020-11-26 RX ORDER — VITAMIN B COMPLEX
2000 TABLET ORAL DAILY
Status: DISCONTINUED | OUTPATIENT
Start: 2020-11-27 | End: 2020-12-04 | Stop reason: HOSPADM

## 2020-11-26 RX ORDER — SODIUM CHLORIDE 1 G/1
1 TABLET ORAL 2 TIMES DAILY WITH MEALS
Status: DISCONTINUED | OUTPATIENT
Start: 2020-11-26 | End: 2020-11-28

## 2020-11-26 RX ORDER — CHOLECALCIFEROL (VITAMIN D3) 125 MCG
1000 CAPSULE ORAL DAILY
Status: DISCONTINUED | OUTPATIENT
Start: 2020-11-26 | End: 2020-12-04 | Stop reason: HOSPADM

## 2020-11-26 RX ADMIN — ROSUVASTATIN CALCIUM 10 MG: 10 TABLET, FILM COATED ORAL at 17:23

## 2020-11-26 RX ADMIN — POTASSIUM CHLORIDE AND SODIUM CHLORIDE: 900; 300 INJECTION, SOLUTION INTRAVENOUS at 05:39

## 2020-11-26 RX ADMIN — HEPARIN SODIUM 5000 UNITS: 5000 INJECTION, SOLUTION INTRAVENOUS; SUBCUTANEOUS at 09:51

## 2020-11-26 RX ADMIN — OXYCODONE HYDROCHLORIDE 5 MG: 5 TABLET ORAL at 21:00

## 2020-11-26 RX ADMIN — POTASSIUM CHLORIDE AND SODIUM CHLORIDE: 900; 300 INJECTION, SOLUTION INTRAVENOUS at 17:25

## 2020-11-26 RX ADMIN — CYANOCOBALAMIN TAB 500 MCG 1000 MCG: 500 TAB at 17:23

## 2020-11-26 RX ADMIN — HYDROCHLOROTHIAZIDE 25 MG: 25 TABLET ORAL at 05:05

## 2020-11-26 RX ADMIN — Medication 1000 UNITS: at 05:04

## 2020-11-26 RX ADMIN — Medication 1 G: at 17:23

## 2020-11-26 RX ADMIN — ACETAMINOPHEN 650 MG: 325 TABLET, FILM COATED ORAL at 21:00

## 2020-11-26 RX ADMIN — LOSARTAN POTASSIUM 100 MG: 50 TABLET, FILM COATED ORAL at 05:05

## 2020-11-26 RX ADMIN — AMLODIPINE BESYLATE 10 MG: 10 TABLET ORAL at 05:04

## 2020-11-26 RX ADMIN — HEPARIN SODIUM 5000 UNITS: 5000 INJECTION, SOLUTION INTRAVENOUS; SUBCUTANEOUS at 00:50

## 2020-11-26 RX ADMIN — HEPARIN SODIUM 5000 UNITS: 5000 INJECTION, SOLUTION INTRAVENOUS; SUBCUTANEOUS at 21:00

## 2020-11-26 RX ADMIN — SPIRONOLACTONE 25 MG: 25 TABLET ORAL at 05:05

## 2020-11-26 RX ADMIN — HEPARIN SODIUM 5000 UNITS: 5000 INJECTION, SOLUTION INTRAVENOUS; SUBCUTANEOUS at 13:41

## 2020-11-26 RX ADMIN — ASPIRIN 81 MG: 81 TABLET, COATED ORAL at 05:04

## 2020-11-26 RX ADMIN — Medication 1 G: at 10:40

## 2020-11-26 ASSESSMENT — ENCOUNTER SYMPTOMS
RESPIRATORY NEGATIVE: 1
NEUROLOGICAL NEGATIVE: 1
EYES NEGATIVE: 1
FEVER: 0
BACK PAIN: 0
CARDIOVASCULAR NEGATIVE: 1
MYALGIAS: 0
FALLS: 1
PSYCHIATRIC NEGATIVE: 1
CHILLS: 0
GASTROINTESTINAL NEGATIVE: 1
WEIGHT LOSS: 0

## 2020-11-26 NOTE — PROGRESS NOTES
[facility] Medicine Daily Progress Note    Date of Service  11/26/2020    Chief Complaint  75 y.o. female admitted 11/25/2020 with fall    Hospital Course    75-year-old female with history of dementia, hypertension and coronary artery disease presented 11/25 after ground-level fall, the patient had dementia not able to give a good history of her fall however she denied any dizziness or loss of consciousness, denied any chest pain, the patient lives with her  who did not see her when she fell, on admission the patient was found to have dehydration with RASHAD and x-ray showed right hip fracture, the patient was evaluated by Ortho plan for surgery on 11/27.      Interval Problem Update  -Evaluated examined the patient at bedside, she has some pain on her right hip.   -The patient is alert to herself and place however confused, and not oriented to time, likely her baseline.  -Plan for surgery tomorrow.  -Labs reviewed continue IV fluid and start with salt tablets for hyponatremia.    Consultants/Specialty  Orthopedic    Code Status  Full Code    Disposition  The patient needs PT and OT after surgery.    Review of Systems  Review of Systems   Constitutional: Positive for malaise/fatigue. Negative for chills, fever and weight loss.   HENT: Negative.    Eyes: Negative.    Respiratory: Negative.    Cardiovascular: Negative.    Gastrointestinal: Negative.    Genitourinary: Negative.    Musculoskeletal: Positive for falls and joint pain. Negative for back pain and myalgias.   Neurological: Negative.    Psychiatric/Behavioral: Negative.         Physical Exam  Temp:  [36.4 °C (97.5 °F)-36.8 °C (98.3 °F)] 36.6 °C (97.8 °F)  Pulse:  [65-97] 76  Resp:  [15-20] 16  BP: (104-201)/(65-91) 104/70  SpO2:  [86 %-100 %] 90 %    Physical Exam  Constitutional:       Appearance: She is not ill-appearing.   HENT:      Head: Normocephalic and atraumatic.   Eyes:      General: No scleral icterus.  Neck:      Musculoskeletal: No neck  rigidity.   Cardiovascular:      Rate and Rhythm: Normal rate.      Heart sounds: No murmur.   Pulmonary:      Effort: No respiratory distress.      Breath sounds: No wheezing or rales.   Abdominal:      General: Abdomen is flat. Bowel sounds are normal. There is no distension.      Palpations: Abdomen is soft.      Tenderness: There is no abdominal tenderness.   Musculoskeletal:         General: No swelling or deformity.      Right lower leg: No edema.      Left lower leg: No edema.   Skin:     Coloration: Skin is not pale.      Findings: No bruising or lesion.   Neurological:      General: No focal deficit present.      Mental Status: She is alert. Mental status is at baseline.      Cranial Nerves: No cranial nerve deficit.      Sensory: No sensory deficit.      Motor: No weakness.      Gait: Gait normal.         Fluids    Intake/Output Summary (Last 24 hours) at 11/26/2020 1044  Last data filed at 11/26/2020 0539  Gross per 24 hour   Intake 990 ml   Output 100 ml   Net 890 ml       Laboratory  Recent Labs     11/25/20  1253 11/26/20  0129   WBC 13.6* 8.8   RBC 3.28* 2.52*   HEMOGLOBIN 10.9* 8.7*   HEMATOCRIT 30.9* 24.7*   MCV 94.2 98.0*   MCH 33.2* 34.5*   MCHC 35.3* 35.2*   RDW 43.6 46.1   PLATELETCT 286 220   MPV 9.1 9.3     Recent Labs     11/25/20  1253 11/25/20  1805 11/26/20  0558   SODIUM 124* 126* 127*   POTASSIUM 3.1*  --  4.4   CHLORIDE 86*  --  93*   CO2 21  --  22   GLUCOSE 145*  --  107*   BUN 16  --  20   CREATININE 1.08  --  1.25   CALCIUM 9.8  --  9.2     Recent Labs     11/25/20  1253   APTT 27.0   INR 0.99               Imaging  CT-HIP W/O PLUS RECONS RIGHT   Final Result      Periprosthetic fracture in the proximal right femur.      CT-HEAD W/O   Final Result      No acute intracranial findings.         DX-FEMUR-2+ RIGHT   Final Result      1.  Periprosthetic fracture of the proximal RIGHT femoral diaphysis   2.  Osteopenia      DX-PELVIS-1 OR 2 VIEWS   Final Result      1.  Periprosthetic  fracture of the proximal RIGHT femur   2.  Osteopenia   3.  LEFT hip osteoarthritis      DX-CHEST-PORTABLE (1 VIEW)   Final Result      1.  No acute cardiac or pulmonary abnormalities are identified.   2.  Emphysema   3.  Atherosclerosis      EC-ECHOCARDIOGRAM COMPLETE W/O CONT    (Results Pending)        Assessment/Plan  * Closed fracture of neck of right femur (HCC)- (present on admission)  Assessment & Plan  Likely after mechanical fall   The patient has history of right hemiarthroplasty on 4/28/19  X-ray showed periprosthetic fracture of proximal right femoral diaphysis  Orthopedic, plan for surgery on 11/27 for ORIF and revision  PT and OT after surgery and placement if needed  Pain control and prevent constipation  No pharmacological for possible delirium  Lovenox after surgery    Dementia (HCC)  Assessment & Plan  Like mild to moderate  The patient is at high risk for delirium during this hospitalization  Pain control and nonpharmacological treatment  Avoid benzo and antihistamine  PT and OT and placement      Fall  Assessment & Plan  Likely mechanical  Echo is pending  EKG no arrhythmia  PT and OT after surgery  Start vitamin D and B12 supplementation    Stage 3 chronic kidney disease  Assessment & Plan  Acute on chronic kidney disease stage III   Avoid nephrotoxic medication  IV hydration   labs daily    Leukocytosis- (present on admission)  Assessment & Plan  Likely reactive.   Procalcitonin is negative  No signs of infection and no need for antibiotics  Monitoring    Hyponatremia- (present on admission)  Assessment & Plan  Mild, chronic with no symptoms   Salt tablets  Continue monitoring and start with free water restriction if needed.    CAD (coronary artery disease)  Assessment & Plan  Cont ASA/statin, ARB, spironolactone  F/u echo    Hypertensive urgency- (present on admission)  Assessment & Plan  SBP > 180 in ED likely related to pain and new fall  Resume home anti-HTN meds: amlodipine, losartan,  HCTZ, spironolactone with good control    Hypokalemia- (present on admission)  Assessment & Plan  Replaced       VTE prophylaxis: lovenox

## 2020-11-26 NOTE — THERAPY
Physical Therapy Contact Note    PT consult received and acknowledged. Per chart review patient has R proximal femur fracture and is planned for revision of hip ernie and ORIF 11/27. Will initiate PT eval post op as able and appropriate.    Ida Hunt, PT, DPT  [phone]

## 2020-11-26 NOTE — PROGRESS NOTES
"   Orthopaedic PA Progress Note    Interval changes:States she is prepared for surgery tomorrow with Dr. Thomas. All questions answered.    ROS - Patient denies any new issues. No chest pain, dyspnea, or fever.  Pain well controlled.    /70   Pulse 76   Temp 36.6 °C (97.8 °F) (Temporal)   Resp 16   Ht 1.6 m (5' 3\")   Wt 59 kg (130 lb)   SpO2 90%     Patient seen and examined  No acute distress  Breathing non labored  RRR  ACE wrap is clean, dry, and intact. Patient clearly fires tibialis anterior, EHL, and gastrocnemius/soleus. Sensation is intact to light touch throughout superficial peroneal, deep peroneal, tibial, saphenous, and sural nerve distributions. Strong and palpable 2+ dorsalis pedis and posterior tibial pulses with capillary refill less than 2 seconds. No lower leg tenderness or discomfort.    Recent Labs     11/25/20  1253 11/26/20  0129   WBC 13.6* 8.8   RBC 3.28* 2.52*   HEMOGLOBIN 10.9* 8.7*   HEMATOCRIT 30.9* 24.7*   MCV 94.2 98.0*   MCH 33.2* 34.5*   MCHC 35.3* 35.2*   RDW 43.6 46.1   PLATELETCT 286 220   MPV 9.1 9.3     Active Hospital Problems    Diagnosis   • Dementia (HCC) [F03.90]     Priority: High   • Fall [W19.XXXA]     Priority: High   • Closed fracture of neck of right femur (HCC) [S72.001A]     Priority: High   • Stage 3 chronic kidney disease [N18.30]     Priority: Medium   • Leukocytosis [D72.829]     Priority: Medium   • CAD (coronary artery disease) [I25.10]     Priority: Low   • Hypertensive urgency [I16.0]     Priority: Low   • Hypokalemia [E87.6]     Priority: Low   • Hyponatremia [E87.1]       Assessment/Plan:  Periprosthetic Mid-shaft R femur fracture ; 2 mo post-op R cemented hip ernie; 12 years post right humeral shaft ORIF; 15 years post hardware removal, R tibia; 16 years S/P IMN R tibia    Wt bearing status - NWB RLE  PT/OT-initiated  Wound care:NA  OR tomorrow with Dr. Thomas for revision R ernie + ORIF    "

## 2020-11-26 NOTE — ASSESSMENT & PLAN NOTE
Like mild to moderate  The patient is at high risk for delirium during this hospitalization  Pain control and nonpharmacological treatment  Avoid benzo and antihistamine  PT and OT and placement

## 2020-11-26 NOTE — CARE PLAN
Problem: Pain Management  Goal: Pain level will decrease to patient's comfort goal  Outcome: PROGRESSING AS EXPECTED  Note: Pt declines PRN pain meds.     Problem: Safety  Goal: Will remain free from injury  Outcome: PROGRESSING AS EXPECTED  Goal: Will remain free from falls  Outcome: PROGRESSING AS EXPECTED  Note: Pt calls for help and has made no out of bed attempts this shift.     Problem: Venous Thromboembolism (VTW)/Deep Vein Thrombosis (DVT) Prevention:  Goal: Patient will participate in Venous Thrombosis (VTE)/Deep Vein Thrombosis (DVT)Prevention Measures  Outcome: PROGRESSING AS EXPECTED  Note: Heparin given as ordered

## 2020-11-26 NOTE — PROGRESS NOTES
Comminuted right periprosthetic right proximal femur fx around uncemented hemiarthroplasty after GLF, no pre-existing problems  Limited ambulator usually with walker  Looks like stem is subsided and probably destabilized  Will plan revision + ORIF for Friday  Discussed with patient

## 2020-11-26 NOTE — PROGRESS NOTES
2 RN Skin Check    2 RN skin check complete with Aylin RN  Devices in place: Nasal Cannula.  Skin assessed under devices: yes.  Confirmed pressure ulcers found on: none.  New potential pressure ulcers noted on none. Wound consult placed No.  The following interventions in place Pillows, pt turns self in bed independently, picture taken, silicone dressing to left elbow skin tear, frequent incontinence checks, silicone O2 tubing with grey foam, barrier cream.    · Bruising to BUE  · Bruising to right posterior thigh. Right thigh edema.  · Skin tear to LUE-silicone dressing applied  · Sacrum, behind ears, b/l heels-pink and blanching  · Old abrasion WILL.

## 2020-11-26 NOTE — THERAPY
OT consult received and acknowledged. Per chart review, pt with planned R hip ernie revision and ORIF 11/27. OT to see pt post op as medically appropriate to accurately assess functional status and discharge needs.

## 2020-11-27 ENCOUNTER — ANESTHESIA EVENT (OUTPATIENT)
Dept: SURGERY | Facility: MEDICAL CENTER | Age: 75
End: 2020-11-27

## 2020-11-27 ENCOUNTER — APPOINTMENT (OUTPATIENT)
Dept: RADIOLOGY | Facility: MEDICAL CENTER | Age: 75
DRG: 467 | End: 2020-11-27
Attending: ORTHOPAEDIC SURGERY
Payer: MEDICARE

## 2020-11-27 ENCOUNTER — ANESTHESIA (OUTPATIENT)
Dept: SURGERY | Facility: MEDICAL CENTER | Age: 75
End: 2020-11-27

## 2020-11-27 PROBLEM — D64.9 ANEMIA: Status: ACTIVE | Noted: 2020-11-27

## 2020-11-27 LAB
ABO GROUP BLD: NORMAL
ALBUMIN SERPL BCP-MCNC: 3.4 G/DL (ref 3.2–4.9)
ALBUMIN/GLOB SERPL: 1.3 G/DL
ALP SERPL-CCNC: 63 U/L (ref 30–99)
ALT SERPL-CCNC: 20 U/L (ref 2–50)
ANION GAP SERPL CALC-SCNC: 11 MMOL/L (ref 7–16)
ANION GAP SERPL CALC-SCNC: 9 MMOL/L (ref 7–16)
AST SERPL-CCNC: 42 U/L (ref 12–45)
BARCODED ABORH UBTYP: 5100
BARCODED ABORH UBTYP: 9500
BARCODED PRD CODE UBPRD: NORMAL
BARCODED PRD CODE UBPRD: NORMAL
BARCODED UNIT NUM UBUNT: NORMAL
BARCODED UNIT NUM UBUNT: NORMAL
BASOPHILS # BLD AUTO: 0.3 % (ref 0–1.8)
BASOPHILS # BLD: 0.04 K/UL (ref 0–0.12)
BILIRUB SERPL-MCNC: 0.8 MG/DL (ref 0.1–1.5)
BLD GP AB SCN SERPL QL: NORMAL
BUN SERPL-MCNC: 18 MG/DL (ref 8–22)
BUN SERPL-MCNC: 18 MG/DL (ref 8–22)
CALCIUM SERPL-MCNC: 9 MG/DL (ref 8.5–10.5)
CALCIUM SERPL-MCNC: 9.1 MG/DL (ref 8.5–10.5)
CHLORIDE SERPL-SCNC: 96 MMOL/L (ref 96–112)
CHLORIDE SERPL-SCNC: 97 MMOL/L (ref 96–112)
CO2 SERPL-SCNC: 21 MMOL/L (ref 20–33)
CO2 SERPL-SCNC: 22 MMOL/L (ref 20–33)
COMPONENT R 8504R: NORMAL
COMPONENT R 8504R: NORMAL
CREAT SERPL-MCNC: 1.07 MG/DL (ref 0.5–1.4)
CREAT SERPL-MCNC: 1.11 MG/DL (ref 0.5–1.4)
EOSINOPHIL # BLD AUTO: 0.01 K/UL (ref 0–0.51)
EOSINOPHIL NFR BLD: 0.1 % (ref 0–6.9)
ERYTHROCYTE [DISTWIDTH] IN BLOOD BY AUTOMATED COUNT: 49.1 FL (ref 35.9–50)
ERYTHROCYTE [DISTWIDTH] IN BLOOD BY AUTOMATED COUNT: 50.1 FL (ref 35.9–50)
ERYTHROCYTE [DISTWIDTH] IN BLOOD BY AUTOMATED COUNT: 51.3 FL (ref 35.9–50)
GLOBULIN SER CALC-MCNC: 2.6 G/DL (ref 1.9–3.5)
GLUCOSE SERPL-MCNC: 107 MG/DL (ref 65–99)
GLUCOSE SERPL-MCNC: 145 MG/DL (ref 65–99)
HCT VFR BLD AUTO: 22.1 % (ref 37–47)
HCT VFR BLD AUTO: 34.9 % (ref 37–47)
HCT VFR BLD AUTO: 36.6 % (ref 37–47)
HGB BLD-MCNC: 12 G/DL (ref 12–16)
HGB BLD-MCNC: 12.5 G/DL (ref 12–16)
HGB BLD-MCNC: 7.6 G/DL (ref 12–16)
IMM GRANULOCYTES # BLD AUTO: 0.1 K/UL (ref 0–0.11)
IMM GRANULOCYTES NFR BLD AUTO: 0.8 % (ref 0–0.9)
LYMPHOCYTES # BLD AUTO: 0.75 K/UL (ref 1–4.8)
LYMPHOCYTES NFR BLD: 6.4 % (ref 22–41)
MCH RBC QN AUTO: 32.3 PG (ref 27–33)
MCH RBC QN AUTO: 32.5 PG (ref 27–33)
MCH RBC QN AUTO: 34.2 PG (ref 27–33)
MCHC RBC AUTO-ENTMCNC: 34.2 G/DL (ref 33.6–35)
MCHC RBC AUTO-ENTMCNC: 34.3 G/DL (ref 33.6–35)
MCHC RBC AUTO-ENTMCNC: 34.4 G/DL (ref 33.6–35)
MCV RBC AUTO: 94.1 FL (ref 81.4–97.8)
MCV RBC AUTO: 94.8 FL (ref 81.4–97.8)
MCV RBC AUTO: 99.5 FL (ref 81.4–97.8)
MONOCYTES # BLD AUTO: 1.18 K/UL (ref 0–0.85)
MONOCYTES NFR BLD AUTO: 10 % (ref 0–13.4)
NEUTROPHILS # BLD AUTO: 9.69 K/UL (ref 2–7.15)
NEUTROPHILS NFR BLD: 82.4 % (ref 44–72)
NRBC # BLD AUTO: 0 K/UL
NRBC BLD-RTO: 0 /100 WBC
OSMOLALITY SERPL: 284 MOSM/KG H2O (ref 278–298)
PLATELET # BLD AUTO: 210 K/UL (ref 164–446)
PLATELET # BLD AUTO: 214 K/UL (ref 164–446)
PLATELET # BLD AUTO: 224 K/UL (ref 164–446)
PMV BLD AUTO: 10 FL (ref 9–12.9)
PMV BLD AUTO: 9.5 FL (ref 9–12.9)
PMV BLD AUTO: 9.6 FL (ref 9–12.9)
POTASSIUM SERPL-SCNC: 4.5 MMOL/L (ref 3.6–5.5)
POTASSIUM SERPL-SCNC: 4.5 MMOL/L (ref 3.6–5.5)
PRODUCT TYPE UPROD: NORMAL
PRODUCT TYPE UPROD: NORMAL
PROT SERPL-MCNC: 6 G/DL (ref 6–8.2)
RBC # BLD AUTO: 2.22 M/UL (ref 4.2–5.4)
RBC # BLD AUTO: 3.72 M/UL (ref 4.2–5.4)
RBC # BLD AUTO: 3.85 M/UL (ref 4.2–5.4)
RH BLD: NORMAL
SODIUM SERPL-SCNC: 128 MMOL/L (ref 135–145)
SODIUM SERPL-SCNC: 128 MMOL/L (ref 135–145)
UNIT STATUS USTAT: NORMAL
UNIT STATUS USTAT: NORMAL
WBC # BLD AUTO: 11.2 K/UL (ref 4.8–10.8)
WBC # BLD AUTO: 11.3 K/UL (ref 4.8–10.8)
WBC # BLD AUTO: 11.8 K/UL (ref 4.8–10.8)

## 2020-11-27 PROCEDURE — 700102 HCHG RX REV CODE 250 W/ 637 OVERRIDE(OP): Performed by: STUDENT IN AN ORGANIZED HEALTH CARE EDUCATION/TRAINING PROGRAM

## 2020-11-27 PROCEDURE — 83930 ASSAY OF BLOOD OSMOLALITY: CPT

## 2020-11-27 PROCEDURE — A9270 NON-COVERED ITEM OR SERVICE: HCPCS | Performed by: STUDENT IN AN ORGANIZED HEALTH CARE EDUCATION/TRAINING PROGRAM

## 2020-11-27 PROCEDURE — 30233N1 TRANSFUSION OF NONAUTOLOGOUS RED BLOOD CELLS INTO PERIPHERAL VEIN, PERCUTANEOUS APPROACH: ICD-10-PCS | Performed by: STUDENT IN AN ORGANIZED HEALTH CARE EDUCATION/TRAINING PROGRAM

## 2020-11-27 PROCEDURE — 36430 TRANSFUSION BLD/BLD COMPNT: CPT

## 2020-11-27 PROCEDURE — 86850 RBC ANTIBODY SCREEN: CPT

## 2020-11-27 PROCEDURE — 36415 COLL VENOUS BLD VENIPUNCTURE: CPT

## 2020-11-27 PROCEDURE — 80053 COMPREHEN METABOLIC PANEL: CPT

## 2020-11-27 PROCEDURE — 85025 COMPLETE CBC W/AUTO DIFF WBC: CPT

## 2020-11-27 PROCEDURE — A9270 NON-COVERED ITEM OR SERVICE: HCPCS | Performed by: INTERNAL MEDICINE

## 2020-11-27 PROCEDURE — P9016 RBC LEUKOCYTES REDUCED: HCPCS | Mod: 91

## 2020-11-27 PROCEDURE — 86901 BLOOD TYPING SEROLOGIC RH(D): CPT

## 2020-11-27 PROCEDURE — 700111 HCHG RX REV CODE 636 W/ 250 OVERRIDE (IP): Performed by: INTERNAL MEDICINE

## 2020-11-27 PROCEDURE — 80048 BASIC METABOLIC PNL TOTAL CA: CPT

## 2020-11-27 PROCEDURE — 99233 SBSQ HOSP IP/OBS HIGH 50: CPT | Performed by: INTERNAL MEDICINE

## 2020-11-27 PROCEDURE — 86900 BLOOD TYPING SEROLOGIC ABO: CPT

## 2020-11-27 PROCEDURE — 700102 HCHG RX REV CODE 250 W/ 637 OVERRIDE(OP): Performed by: INTERNAL MEDICINE

## 2020-11-27 PROCEDURE — 85027 COMPLETE CBC AUTOMATED: CPT

## 2020-11-27 PROCEDURE — 86923 COMPATIBILITY TEST ELECTRIC: CPT

## 2020-11-27 PROCEDURE — 770006 HCHG ROOM/CARE - MED/SURG/GYN SEMI*

## 2020-11-27 RX ADMIN — AMLODIPINE BESYLATE 10 MG: 10 TABLET ORAL at 04:45

## 2020-11-27 RX ADMIN — ENOXAPARIN SODIUM 40 MG: 40 INJECTION SUBCUTANEOUS at 14:51

## 2020-11-27 RX ADMIN — ROSUVASTATIN CALCIUM 10 MG: 10 TABLET, FILM COATED ORAL at 16:17

## 2020-11-27 RX ADMIN — Medication 1 G: at 16:17

## 2020-11-27 RX ADMIN — Medication 1 G: at 08:33

## 2020-11-27 ASSESSMENT — ENCOUNTER SYMPTOMS
MYALGIAS: 0
BACK PAIN: 0
PSYCHIATRIC NEGATIVE: 1
FALLS: 1
EYES NEGATIVE: 1
GASTROINTESTINAL NEGATIVE: 1
RESPIRATORY NEGATIVE: 1
CARDIOVASCULAR NEGATIVE: 1
WEIGHT LOSS: 0
FEVER: 0
CHILLS: 0
NEUROLOGICAL NEGATIVE: 1

## 2020-11-27 ASSESSMENT — PAIN DESCRIPTION - PAIN TYPE
TYPE: ACUTE PAIN

## 2020-11-27 NOTE — THERAPY
Missed Therapy     Patient Name: Sangita Bolton  Age:  75 y.o., Sex:  female  Medical Record #: [id number]  Today's Date: 11/27/2020 11/27/20 0821   Interdisciplinary Plan of Care Collaboration   IDT Collaboration with  Other (See Comments)  (EMR)   Collaboration Comments Swallow evaluation order acknowledged. Patient having surgery this morning. Will hold for now and complete clinical swallow evaluation order as able/appropriate.

## 2020-11-27 NOTE — DIETARY
"Nutrition services: Day 2 of admit.  Sangita Bolton is a 75 y.o. female with admitting DX of fall  History includes dementia, hypertension, CAD  Patient seen for possible weight loss per admit nutrition screening;  Poor PO intake PTA not noted.  Per nursing, patient is confused and not likely able to give nutrition history due to dementia.    Assessment:  Height: 160 cm (5' 3\")  Weight: 59 kg (130 lb)  Body mass index is 23.03 kg/m². Normal BMI.  If stated weight is correct  Diet/Intake: Regular diet.  Was NPO for surgery.  Surgery postponed until tomorrow.  Patient eating 25-75% of recent meals.    Evaluation:   1. Per Epic chart review, weight has increased from 104# to 113# from Aug of 2019 to May of this year  2. Current weight of 130# was stated; need current measured weight  3. Patient needs to eat 50-55% of meals to meet estimated needs.  4. Pertinent Labs:  Sodium 128 today - she is receiving salt tabls    Malnutrition Risk: Not yet determined    Recommendations/Interventions/Plan:  1. Encourage intake of meals before surgery  2. Consider adding supplements  3. Consider adding a multivitamin with minerals.  4. Obtain measured weight when possible.  5. Document intake of all PO as % taken in ADL's to provide interdisciplinary communication across all shifts.   6. Monitor weight.  7. Nutrition rep will continue to see patient for ongoing meal and snack preferences.     RD following for adequacy of PO intake and plan of care.    "

## 2020-11-27 NOTE — PROGRESS NOTES
Per analy Pastrana surgery will be postponed due to anemia.   Pt  Michael updated and left message that surgery will be postponed until tomorrow due to anemia

## 2020-11-27 NOTE — PROGRESS NOTES
Second unit of blood completed, no transfusion reaction noted during and after transfusion. Vital signs per flowsheet.

## 2020-11-27 NOTE — ANESTHESIA PREPROCEDURE EVALUATION
Case cancelled due to severe anemia, pt to be transfused and have serial hematocrits    Relevant Problems   CARDIAC   (+) CAD (coronary artery disease)   (+) Carotid artery disease (HCC)   (+) Carotid artery stenosis   (+) Essential hypertension   (+) Heart murmur   (+) Hypertensive urgency         (+) Stage 3 chronic kidney disease       Physical Exam    Airway   Mallampati: II  TM distance: >3 FB  Neck ROM: full       Cardiovascular - normal exam  Rhythm: regular  Rate: normal  (-) murmur     Dental - normal exam           Pulmonary - normal exam  Breath sounds clear to auscultation     Abdominal    Neurological - normal exam                 Anesthesia Plan    ASA 3       Plan - general       Airway plan will be ETT        Induction: intravenous    Postoperative Plan: Postoperative administration of opioids is intended.    Pertinent diagnostic labs and testing reviewed    Informed Consent:    Anesthetic plan and risks discussed with patient.    Use of blood products discussed with: patient whom consented to blood products.

## 2020-11-27 NOTE — PROGRESS NOTES
Plan revision R hip hemiarthroplasty + ORIF R femur  Right leg marked  Consent signed  Risks discussed    Only 75% done with first unit PRBC  Too anemic to proceed with extensive surgery today  Continue transfusion of 2 units, then recheck CBC and may require 2 additional units tonight  Plan for OR tomorrow  Stop aspirin/heparin

## 2020-11-27 NOTE — PROGRESS NOTES
[facility] Medicine Daily Progress Note    Date of Service  11/27/2020    Chief Complaint  75 y.o. female admitted 11/25/2020 with fall    Hospital Course    75-year-old female with history of dementia, hypertension and coronary artery disease presented 11/25 after ground-level fall, the patient had dementia not able to give a good history of her fall however she denied any dizziness or loss of consciousness, denied any chest pain, the patient lives with her  who did not see her when she fell, on admission the patient was found to have dehydration with RASHAD and x-ray showed right hip fracture, the patient was evaluated by Ortho plan for surgery on 11/27 however due to severe anemia the surgery was postponed until 11/28.      Interval Problem Update  -Evaluated examined the patient at bedside, she has some pain on her right hip.   -The patient is alert to herself and place only, no changes  -Received blood transfusion for surgery tomorrow, surgery was postponed till tomorrow due to anemia  -Continue IV fluid.  -Start B12 supplementation  -Labs reviewed around baseline.    Consultants/Specialty  Orthopedic    Code Status  Full Code    Disposition  The patient needs PT and OT after surgery.    Review of Systems  Review of Systems   Constitutional: Positive for malaise/fatigue. Negative for chills, fever and weight loss.   HENT: Negative.    Eyes: Negative.    Respiratory: Negative.    Cardiovascular: Negative.    Gastrointestinal: Negative.    Genitourinary: Negative.    Musculoskeletal: Positive for falls and joint pain. Negative for back pain and myalgias.   Neurological: Negative.    Psychiatric/Behavioral: Negative.         Physical Exam  Temp:  [36.1 °C (97 °F)-37.2 °C (99 °F)] 36.1 °C (97 °F)  Pulse:  [78-88] 88  Resp:  [16-18] 17  BP: (133-159)/(49-76) 133/64  SpO2:  [91 %-97 %] 96 %    Physical Exam  Constitutional:       Appearance: She is not ill-appearing.   HENT:      Head: Normocephalic and atraumatic.    Eyes:      General: No scleral icterus.  Neck:      Musculoskeletal: No neck rigidity.   Cardiovascular:      Rate and Rhythm: Normal rate.      Heart sounds: No murmur.   Pulmonary:      Effort: No respiratory distress.      Breath sounds: No wheezing or rales.   Abdominal:      General: Abdomen is flat. Bowel sounds are normal. There is no distension.      Palpations: Abdomen is soft.      Tenderness: There is no abdominal tenderness.   Musculoskeletal:         General: Deformity present. No swelling.      Right lower leg: No edema.      Left lower leg: No edema.   Skin:     Coloration: Skin is not pale.      Findings: No bruising or lesion.   Neurological:      General: No focal deficit present.      Mental Status: She is alert. Mental status is at baseline.      Cranial Nerves: No cranial nerve deficit.      Sensory: No sensory deficit.      Motor: No weakness.      Gait: Gait normal.         Fluids    Intake/Output Summary (Last 24 hours) at 11/27/2020 1352  Last data filed at 11/26/2020 2100  Gross per 24 hour   Intake 240 ml   Output --   Net 240 ml       Laboratory  Recent Labs     11/25/20  1253 11/26/20  0129 11/27/20  0133   WBC 13.6* 8.8 11.2*   RBC 3.28* 2.52* 2.22*   HEMOGLOBIN 10.9* 8.7* 7.6*   HEMATOCRIT 30.9* 24.7* 22.1*   MCV 94.2 98.0* 99.5*   MCH 33.2* 34.5* 34.2*   MCHC 35.3* 35.2* 34.4   RDW 43.6 46.1 49.1   PLATELETCT 286 220 224   MPV 9.1 9.3 10.0     Recent Labs     11/25/20  1253 11/25/20  1253 11/26/20  0558 11/26/20  1208 11/26/20  1829 11/27/20  0133   SODIUM 124*   < > 127* 127* 128* 128*   POTASSIUM 3.1*  --  4.4  --   --  4.5   CHLORIDE 86*  --  93*  --   --  97   CO2 21  --  22  --   --  22   GLUCOSE 145*  --  107*  --   --  107*   BUN 16  --  20  --   --  18   CREATININE 1.08  --  1.25  --   --  1.11   CALCIUM 9.8  --  9.2  --   --  9.1    < > = values in this interval not displayed.     Recent Labs     11/25/20  1253   APTT 27.0   INR 0.99               Imaging  EC-ECHOCARDIOGRAM  COMPLETE W/O CONT   Final Result      CT-HIP W/O PLUS RECONS RIGHT   Final Result      Periprosthetic fracture in the proximal right femur.      CT-HEAD W/O   Final Result      No acute intracranial findings.         DX-FEMUR-2+ RIGHT   Final Result      1.  Periprosthetic fracture of the proximal RIGHT femoral diaphysis   2.  Osteopenia      DX-PELVIS-1 OR 2 VIEWS   Final Result      1.  Periprosthetic fracture of the proximal RIGHT femur   2.  Osteopenia   3.  LEFT hip osteoarthritis      DX-CHEST-PORTABLE (1 VIEW)   Final Result      1.  No acute cardiac or pulmonary abnormalities are identified.   2.  Emphysema   3.  Atherosclerosis      DX-HIP-UNILATERAL-W/O PELVIS-2/3 VIEWS RIGHT    (Results Pending)   DX-PORTABLE FLUORO > 1 HOUR    (Results Pending)        Assessment/Plan  * Closed fracture of neck of right femur (HCC)- (present on admission)  Assessment & Plan  Likely after mechanical fall   The patient has history of right hemiarthroplasty on 4/28/19  X-ray showed periprosthetic fracture of proximal right femoral diaphysis  Orthopedic, plan for surgery on 11/27 for ORIF and revision  PT and OT after surgery and placement if needed  Pain control and prevent constipation  No pharmacological for possible delirium  Lovenox after surgery    Anemia  Assessment & Plan  With elevated MCV  Around 7  B12 around 300  The patient received 2 units of blood transfusion  Cannot order iron panel due to blood transfusion  We will start with B12 supplementation  Follow-up    Dementia (HCC)  Assessment & Plan  Like mild to moderate  The patient is at high risk for delirium during this hospitalization  Pain control and nonpharmacological treatment  Avoid benzo and antihistamine  PT and OT and placement      Fall  Assessment & Plan  Likely mechanical  Echo is pending  EKG no arrhythmia  PT and OT after surgery  Start vitamin D and B12 supplementation    Stage 3 chronic kidney disease  Assessment & Plan  Acute on chronic kidney  disease stage III   Avoid nephrotoxic medication  IV hydration   labs daily    Leukocytosis- (present on admission)  Assessment & Plan  Likely reactive.   Procalcitonin is negative  No signs of infection and no need for antibiotics  Monitoring    Hyponatremia- (present on admission)  Assessment & Plan  Mild, chronic with no symptoms   Salt tablets  Continue monitoring and start with free water restriction if needed.    CAD (coronary artery disease)  Assessment & Plan  Cont ASA/statin, ARB, spironolactone  Echo showed diastolic dysfunction with normal systolic function    Hypertensive urgency- (present on admission)  Assessment & Plan  SBP > 180 in ED likely related to pain and new fall  Resume home anti-HTN meds: amlodipine, losartan, HCTZ, spironolactone with good control    Hypokalemia- (present on admission)  Assessment & Plan  Replaced       VTE prophylaxis: lovenox

## 2020-11-27 NOTE — OR NURSING
Dr. Pastrana cancelled surgery today and postponed until tomorrow due to low h/h and pt's need to have blood transfusion. Aylin STYLES notified.

## 2020-11-27 NOTE — ASSESSMENT & PLAN NOTE
With elevated MCV  Around 7  B12 around 300  The patient received 2 units of blood transfusion  Cannot order iron panel due to blood transfusion  Continue B12 supplementation  Follow-up with PCP as outpatient

## 2020-11-27 NOTE — THERAPY
Missed Therapy     Patient Name: Sangita Bolton  Age:  75 y.o., Sex:  female  Medical Record #: [id number]  Today's Date: 11/27/2020    Discussed missed therapy with nursing.        11/27/20 9326   Interdisciplinary Plan of Care Collaboration   IDT Collaboration with  Nursing   Collaboration Comments Pt passed bedside swallow evaluation and currently tolerating a regular texture diet. CSE is not indicated at this time and RN to cancel orders. Please reconsult with any difficulties or change in status.

## 2020-11-27 NOTE — PROGRESS NOTES
"   Orthopaedic PA Progress Note    Interval changes:Hb dropped, nursing to coordinate with Anesthesia regarding transfusion  May have TXA if appropriate  To OR this AM with Dr. Thomas for revision hip/ORIF periprosthetic femur fx    /49   Pulse 80   Temp 37.2 °C (98.9 °F) (Temporal)   Resp 18   Ht 1.6 m (5' 3\")   Wt 59 kg (130 lb)   SpO2 97%     Recent Labs     11/25/20  1253 11/26/20  0129 11/27/20  0133   WBC 13.6* 8.8 11.2*   RBC 3.28* 2.52* 2.22*   HEMOGLOBIN 10.9* 8.7* 7.6*   HEMATOCRIT 30.9* 24.7* 22.1*   MCV 94.2 98.0* 99.5*   MCH 33.2* 34.5* 34.2*   MCHC 35.3* 35.2* 34.4   RDW 43.6 46.1 49.1   PLATELETCT 286 220 224   MPV 9.1 9.3 10.0     Recent Labs     11/25/20  1253 11/25/20  1253 11/26/20  0558 11/26/20  1208 11/26/20  1829 11/27/20  0133   SODIUM 124*   < > 127* 127* 128* 128*   POTASSIUM 3.1*  --  4.4  --   --  4.5   CHLORIDE 86*  --  93*  --   --  97   CO2 21  --  22  --   --  22   GLUCOSE 145*  --  107*  --   --  107*   BUN 16  --  20  --   --  18   CPKTOTAL 870*  --   --   --   --   --     < > = values in this interval not displayed.     Recent Labs     11/25/20  1253 11/26/20  0558 11/27/20  0133   ALBUMIN 4.0 3.4 3.4   TBILIRUBIN 1.4  --  0.8   ALKPHOSPHAT 82  --  63   TOTPROTEIN 6.7  --  6.0   ALTSGPT 24  --  20   ASTSGOT 34  --  42   CREATININE 1.08 1.25 1.11       Intake/Output Summary (Last 24 hours) at 11/27/2020 0755  Last data filed at 11/26/2020 2100  Gross per 24 hour   Intake 240 ml   Output --   Net 240 ml       Active Hospital Problems    Diagnosis   • Dementia (HCC) [F03.90]     Priority: High   • Fall [W19.XXXA]     Priority: High   • Closed fracture of neck of right femur (HCC) [S72.001A]     Priority: High   • Stage 3 chronic kidney disease [N18.30]     Priority: Medium   • Leukocytosis [D72.829]     Priority: Medium   • CAD (coronary artery disease) [I25.10]     Priority: Low   • Hypertensive urgency [I16.0]     Priority: Low   • Hypokalemia [E87.6]     Priority: Low "   • Hyponatremia [E87.1]

## 2020-11-28 ENCOUNTER — ANESTHESIA EVENT (OUTPATIENT)
Dept: SURGERY | Facility: MEDICAL CENTER | Age: 75
DRG: 467 | End: 2020-11-28
Payer: MEDICARE

## 2020-11-28 ENCOUNTER — APPOINTMENT (OUTPATIENT)
Dept: RADIOLOGY | Facility: MEDICAL CENTER | Age: 75
DRG: 467 | End: 2020-11-28
Attending: ORTHOPAEDIC SURGERY
Payer: MEDICARE

## 2020-11-28 ENCOUNTER — ANESTHESIA (OUTPATIENT)
Dept: SURGERY | Facility: MEDICAL CENTER | Age: 75
DRG: 467 | End: 2020-11-28
Payer: MEDICARE

## 2020-11-28 LAB
ANION GAP SERPL CALC-SCNC: 13 MMOL/L (ref 7–16)
BUN SERPL-MCNC: 19 MG/DL (ref 8–22)
CALCIUM SERPL-MCNC: 9.4 MG/DL (ref 8.5–10.5)
CHLORIDE SERPL-SCNC: 93 MMOL/L (ref 96–112)
CO2 SERPL-SCNC: 22 MMOL/L (ref 20–33)
CREAT SERPL-MCNC: 0.86 MG/DL (ref 0.5–1.4)
ERYTHROCYTE [DISTWIDTH] IN BLOOD BY AUTOMATED COUNT: 50 FL (ref 35.9–50)
GLUCOSE SERPL-MCNC: 106 MG/DL (ref 65–99)
HCT VFR BLD AUTO: 37.7 % (ref 37–47)
HGB BLD-MCNC: 13.2 G/DL (ref 12–16)
MAGNESIUM SERPL-MCNC: 1.3 MG/DL (ref 1.5–2.5)
MCH RBC QN AUTO: 32.5 PG (ref 27–33)
MCHC RBC AUTO-ENTMCNC: 35 G/DL (ref 33.6–35)
MCV RBC AUTO: 92.9 FL (ref 81.4–97.8)
PLATELET # BLD AUTO: 224 K/UL (ref 164–446)
PMV BLD AUTO: 9.9 FL (ref 9–12.9)
POTASSIUM SERPL-SCNC: 4 MMOL/L (ref 3.6–5.5)
RBC # BLD AUTO: 4.06 M/UL (ref 4.2–5.4)
SODIUM SERPL-SCNC: 128 MMOL/L (ref 135–145)
WBC # BLD AUTO: 11 K/UL (ref 4.8–10.8)

## 2020-11-28 PROCEDURE — 85027 COMPLETE CBC AUTOMATED: CPT

## 2020-11-28 PROCEDURE — 500367 HCHG DRAIN KIT, HEMOVAC: Performed by: ORTHOPAEDIC SURGERY

## 2020-11-28 PROCEDURE — 700101 HCHG RX REV CODE 250: Performed by: ANESTHESIOLOGY

## 2020-11-28 PROCEDURE — 160035 HCHG PACU - 1ST 60 MINS PHASE I: Performed by: ORTHOPAEDIC SURGERY

## 2020-11-28 PROCEDURE — 36415 COLL VENOUS BLD VENIPUNCTURE: CPT

## 2020-11-28 PROCEDURE — 72170 X-RAY EXAM OF PELVIS: CPT

## 2020-11-28 PROCEDURE — 700111 HCHG RX REV CODE 636 W/ 250 OVERRIDE (IP): Performed by: INTERNAL MEDICINE

## 2020-11-28 PROCEDURE — 160048 HCHG OR STATISTICAL LEVEL 1-5: Performed by: ORTHOPAEDIC SURGERY

## 2020-11-28 PROCEDURE — 700102 HCHG RX REV CODE 250 W/ 637 OVERRIDE(OP): Performed by: ORTHOPAEDIC SURGERY

## 2020-11-28 PROCEDURE — 700102 HCHG RX REV CODE 250 W/ 637 OVERRIDE(OP): Performed by: INTERNAL MEDICINE

## 2020-11-28 PROCEDURE — 160009 HCHG ANES TIME/MIN: Performed by: ORTHOPAEDIC SURGERY

## 2020-11-28 PROCEDURE — 99232 SBSQ HOSP IP/OBS MODERATE 35: CPT | Performed by: INTERNAL MEDICINE

## 2020-11-28 PROCEDURE — 700105 HCHG RX REV CODE 258: Performed by: ANESTHESIOLOGY

## 2020-11-28 PROCEDURE — A9270 NON-COVERED ITEM OR SERVICE: HCPCS | Performed by: ANESTHESIOLOGY

## 2020-11-28 PROCEDURE — 700111 HCHG RX REV CODE 636 W/ 250 OVERRIDE (IP): Performed by: EMERGENCY MEDICINE

## 2020-11-28 PROCEDURE — 502240 HCHG MISC OR SUPPLY RC 0272: Performed by: ORTHOPAEDIC SURGERY

## 2020-11-28 PROCEDURE — 700102 HCHG RX REV CODE 250 W/ 637 OVERRIDE(OP): Performed by: ANESTHESIOLOGY

## 2020-11-28 PROCEDURE — 502000 HCHG MISC OR IMPLANTS RC 0278: Performed by: ORTHOPAEDIC SURGERY

## 2020-11-28 PROCEDURE — 700111 HCHG RX REV CODE 636 W/ 250 OVERRIDE (IP): Performed by: ANESTHESIOLOGY

## 2020-11-28 PROCEDURE — 83735 ASSAY OF MAGNESIUM: CPT

## 2020-11-28 PROCEDURE — 700102 HCHG RX REV CODE 250 W/ 637 OVERRIDE(OP): Performed by: STUDENT IN AN ORGANIZED HEALTH CARE EDUCATION/TRAINING PROGRAM

## 2020-11-28 PROCEDURE — 110371 HCHG SHELL REV 272: Performed by: ORTHOPAEDIC SURGERY

## 2020-11-28 PROCEDURE — 160031 HCHG SURGERY MINUTES - 1ST 30 MINS LEVEL 5: Performed by: ORTHOPAEDIC SURGERY

## 2020-11-28 PROCEDURE — A9270 NON-COVERED ITEM OR SERVICE: HCPCS | Performed by: STUDENT IN AN ORGANIZED HEALTH CARE EDUCATION/TRAINING PROGRAM

## 2020-11-28 PROCEDURE — 770006 HCHG ROOM/CARE - MED/SURG/GYN SEMI*

## 2020-11-28 PROCEDURE — 80048 BASIC METABOLIC PNL TOTAL CA: CPT

## 2020-11-28 PROCEDURE — 160036 HCHG PACU - EA ADDL 30 MINS PHASE I: Performed by: ORTHOPAEDIC SURGERY

## 2020-11-28 PROCEDURE — 160042 HCHG SURGERY MINUTES - EA ADDL 1 MIN LEVEL 5: Performed by: ORTHOPAEDIC SURGERY

## 2020-11-28 PROCEDURE — A9270 NON-COVERED ITEM OR SERVICE: HCPCS | Performed by: ORTHOPAEDIC SURGERY

## 2020-11-28 PROCEDURE — 160002 HCHG RECOVERY MINUTES (STAT): Performed by: ORTHOPAEDIC SURGERY

## 2020-11-28 PROCEDURE — 501838 HCHG SUTURE GENERAL: Performed by: ORTHOPAEDIC SURGERY

## 2020-11-28 PROCEDURE — A9270 NON-COVERED ITEM OR SERVICE: HCPCS | Performed by: INTERNAL MEDICINE

## 2020-11-28 PROCEDURE — C1776 JOINT DEVICE (IMPLANTABLE): HCPCS | Performed by: ORTHOPAEDIC SURGERY

## 2020-11-28 PROCEDURE — 502578 HCHG PACK, TOTAL HIP: Performed by: ORTHOPAEDIC SURGERY

## 2020-11-28 PROCEDURE — 700111 HCHG RX REV CODE 636 W/ 250 OVERRIDE (IP): Performed by: ORTHOPAEDIC SURGERY

## 2020-11-28 DEVICE — IMPLANTABLE DEVICE: Type: IMPLANTABLE DEVICE | Site: HIP | Status: FUNCTIONAL

## 2020-11-28 RX ORDER — SODIUM CHLORIDE 1 G/1
1 TABLET ORAL
Status: DISCONTINUED | OUTPATIENT
Start: 2020-11-28 | End: 2020-12-03

## 2020-11-28 RX ORDER — LABETALOL HYDROCHLORIDE 5 MG/ML
5 INJECTION, SOLUTION INTRAVENOUS
Status: DISCONTINUED | OUTPATIENT
Start: 2020-11-28 | End: 2020-11-28 | Stop reason: HOSPADM

## 2020-11-28 RX ORDER — NEOSTIGMINE METHYLSULFATE 1 MG/ML
INJECTION, SOLUTION INTRAVENOUS PRN
Status: DISCONTINUED | OUTPATIENT
Start: 2020-11-28 | End: 2020-11-28 | Stop reason: SURG

## 2020-11-28 RX ORDER — MIDAZOLAM HYDROCHLORIDE 1 MG/ML
1 INJECTION INTRAMUSCULAR; INTRAVENOUS
Status: DISCONTINUED | OUTPATIENT
Start: 2020-11-28 | End: 2020-11-28 | Stop reason: HOSPADM

## 2020-11-28 RX ORDER — DIPHENHYDRAMINE HYDROCHLORIDE 50 MG/ML
12.5 INJECTION INTRAMUSCULAR; INTRAVENOUS
Status: DISCONTINUED | OUTPATIENT
Start: 2020-11-28 | End: 2020-11-28 | Stop reason: HOSPADM

## 2020-11-28 RX ORDER — MEPERIDINE HYDROCHLORIDE 25 MG/ML
INJECTION INTRAMUSCULAR; INTRAVENOUS; SUBCUTANEOUS PRN
Status: DISCONTINUED | OUTPATIENT
Start: 2020-11-28 | End: 2020-11-28 | Stop reason: SURG

## 2020-11-28 RX ORDER — VANCOMYCIN HYDROCHLORIDE 1 G/20ML
INJECTION, POWDER, LYOPHILIZED, FOR SOLUTION INTRAVENOUS
Status: COMPLETED | OUTPATIENT
Start: 2020-11-28 | End: 2020-11-28

## 2020-11-28 RX ORDER — SODIUM CHLORIDE, SODIUM LACTATE, POTASSIUM CHLORIDE, CALCIUM CHLORIDE 600; 310; 30; 20 MG/100ML; MG/100ML; MG/100ML; MG/100ML
INJECTION, SOLUTION INTRAVENOUS
Status: DISCONTINUED | OUTPATIENT
Start: 2020-11-28 | End: 2020-11-28 | Stop reason: SURG

## 2020-11-28 RX ORDER — GENTAMICIN SULFATE 40 MG/ML
INJECTION, SOLUTION INTRAMUSCULAR; INTRAVENOUS
Status: DISCONTINUED | OUTPATIENT
Start: 2020-11-28 | End: 2020-11-28 | Stop reason: HOSPADM

## 2020-11-28 RX ORDER — CEFAZOLIN SODIUM 1 G/50ML
1 INJECTION, SOLUTION INTRAVENOUS EVERY 8 HOURS
Status: COMPLETED | OUTPATIENT
Start: 2020-11-28 | End: 2020-11-29

## 2020-11-28 RX ORDER — ONDANSETRON 2 MG/ML
4 INJECTION INTRAMUSCULAR; INTRAVENOUS
Status: DISCONTINUED | OUTPATIENT
Start: 2020-11-28 | End: 2020-11-28 | Stop reason: HOSPADM

## 2020-11-28 RX ORDER — OXYCODONE HCL 5 MG/5 ML
10 SOLUTION, ORAL ORAL
Status: COMPLETED | OUTPATIENT
Start: 2020-11-28 | End: 2020-11-28

## 2020-11-28 RX ORDER — OXYCODONE HCL 5 MG/5 ML
5 SOLUTION, ORAL ORAL
Status: COMPLETED | OUTPATIENT
Start: 2020-11-28 | End: 2020-11-28

## 2020-11-28 RX ORDER — ONDANSETRON 2 MG/ML
INJECTION INTRAMUSCULAR; INTRAVENOUS PRN
Status: DISCONTINUED | OUTPATIENT
Start: 2020-11-28 | End: 2020-11-28 | Stop reason: SURG

## 2020-11-28 RX ORDER — SODIUM CHLORIDE, SODIUM LACTATE, POTASSIUM CHLORIDE, CALCIUM CHLORIDE 600; 310; 30; 20 MG/100ML; MG/100ML; MG/100ML; MG/100ML
INJECTION, SOLUTION INTRAVENOUS CONTINUOUS
Status: DISCONTINUED | OUTPATIENT
Start: 2020-11-28 | End: 2020-11-28 | Stop reason: HOSPADM

## 2020-11-28 RX ORDER — METOPROLOL TARTRATE 1 MG/ML
INJECTION, SOLUTION INTRAVENOUS PRN
Status: DISCONTINUED | OUTPATIENT
Start: 2020-11-28 | End: 2020-11-28 | Stop reason: SURG

## 2020-11-28 RX ORDER — MAGNESIUM SULFATE HEPTAHYDRATE 40 MG/ML
4 INJECTION, SOLUTION INTRAVENOUS ONCE
Status: COMPLETED | OUTPATIENT
Start: 2020-11-28 | End: 2020-11-28

## 2020-11-28 RX ORDER — MEPERIDINE HYDROCHLORIDE 25 MG/ML
25 INJECTION INTRAMUSCULAR; INTRAVENOUS; SUBCUTANEOUS
Status: DISCONTINUED | OUTPATIENT
Start: 2020-11-28 | End: 2020-11-28 | Stop reason: HOSPADM

## 2020-11-28 RX ORDER — CEFAZOLIN SODIUM 1 G/3ML
INJECTION, POWDER, FOR SOLUTION INTRAMUSCULAR; INTRAVENOUS PRN
Status: DISCONTINUED | OUTPATIENT
Start: 2020-11-28 | End: 2020-11-28 | Stop reason: SURG

## 2020-11-28 RX ORDER — TRANEXAMIC ACID 100 MG/ML
INJECTION, SOLUTION INTRAVENOUS PRN
Status: DISCONTINUED | OUTPATIENT
Start: 2020-11-28 | End: 2020-11-28 | Stop reason: SURG

## 2020-11-28 RX ORDER — HALOPERIDOL 5 MG/ML
1 INJECTION INTRAMUSCULAR
Status: DISCONTINUED | OUTPATIENT
Start: 2020-11-28 | End: 2020-11-28 | Stop reason: HOSPADM

## 2020-11-28 RX ADMIN — MEPERIDINE HYDROCHLORIDE 12.5 MG: 25 INJECTION INTRAMUSCULAR; INTRAVENOUS; SUBCUTANEOUS at 11:30

## 2020-11-28 RX ADMIN — HYDROMORPHONE HYDROCHLORIDE 0.5 MG: 1 INJECTION, SOLUTION INTRAMUSCULAR; INTRAVENOUS; SUBCUTANEOUS at 08:59

## 2020-11-28 RX ADMIN — TRANEXAMIC ACID 600 MG: 100 INJECTION, SOLUTION INTRAVENOUS at 09:54

## 2020-11-28 RX ADMIN — PROPOFOL 20 MG: 10 INJECTION, EMULSION INTRAVENOUS at 09:52

## 2020-11-28 RX ADMIN — ONDANSETRON 4 MG: 2 INJECTION INTRAMUSCULAR; INTRAVENOUS at 11:52

## 2020-11-28 RX ADMIN — ROCURONIUM BROMIDE 15 MG: 10 INJECTION, SOLUTION INTRAVENOUS at 10:08

## 2020-11-28 RX ADMIN — METOPROLOL TARTRATE 0.5 MG: 5 INJECTION, SOLUTION INTRAVENOUS at 10:29

## 2020-11-28 RX ADMIN — Medication 1 G: at 18:02

## 2020-11-28 RX ADMIN — LABETALOL HYDROCHLORIDE 5 MG: 5 INJECTION, SOLUTION INTRAVENOUS at 12:15

## 2020-11-28 RX ADMIN — ROSUVASTATIN CALCIUM 10 MG: 10 TABLET, FILM COATED ORAL at 18:02

## 2020-11-28 RX ADMIN — GLYCOPYRROLATE 0.4 MG: 0.2 INJECTION INTRAMUSCULAR; INTRAVENOUS at 11:52

## 2020-11-28 RX ADMIN — HALOPERIDOL LACTATE 1 MG: 5 INJECTION, SOLUTION INTRAMUSCULAR at 12:14

## 2020-11-28 RX ADMIN — NEOSTIGMINE METHYLSULFATE 2 MG: 1 INJECTION INTRAVENOUS at 11:52

## 2020-11-28 RX ADMIN — CEFAZOLIN SODIUM 1 G: 1 INJECTION, SOLUTION INTRAVENOUS at 18:48

## 2020-11-28 RX ADMIN — SODIUM CHLORIDE, POTASSIUM CHLORIDE, SODIUM LACTATE AND CALCIUM CHLORIDE: 600; 310; 30; 20 INJECTION, SOLUTION INTRAVENOUS at 09:48

## 2020-11-28 RX ADMIN — PROPOFOL 10 MG: 10 INJECTION, EMULSION INTRAVENOUS at 09:50

## 2020-11-28 RX ADMIN — MEPERIDINE HYDROCHLORIDE 12.5 MG: 25 INJECTION INTRAMUSCULAR; INTRAVENOUS; SUBCUTANEOUS at 10:12

## 2020-11-28 RX ADMIN — ALFENTANIL HYDROCHLORIDE 500 MCG: 500 INJECTION INTRAVENOUS at 09:50

## 2020-11-28 RX ADMIN — ROCURONIUM BROMIDE 10 MG: 10 INJECTION, SOLUTION INTRAVENOUS at 10:53

## 2020-11-28 RX ADMIN — OXYCODONE HYDROCHLORIDE 10 MG: 5 SOLUTION ORAL at 12:14

## 2020-11-28 RX ADMIN — ROCURONIUM BROMIDE 5 MG: 10 INJECTION, SOLUTION INTRAVENOUS at 09:50

## 2020-11-28 RX ADMIN — CEFAZOLIN 2 G: 330 INJECTION, POWDER, FOR SOLUTION INTRAMUSCULAR; INTRAVENOUS at 09:48

## 2020-11-28 RX ADMIN — METOPROLOL TARTRATE 0.5 MG: 5 INJECTION, SOLUTION INTRAVENOUS at 10:26

## 2020-11-28 RX ADMIN — ASPIRIN 81 MG: 81 TABLET, COATED ORAL at 18:02

## 2020-11-28 RX ADMIN — Medication 400 MG: at 14:23

## 2020-11-28 RX ADMIN — MAGNESIUM SULFATE IN WATER 4 G: 40 INJECTION, SOLUTION INTRAVENOUS at 14:23

## 2020-11-28 RX ADMIN — METOPROLOL TARTRATE 0.5 MG: 5 INJECTION, SOLUTION INTRAVENOUS at 10:41

## 2020-11-28 RX ADMIN — Medication 80 MG: at 09:52

## 2020-11-28 ASSESSMENT — ENCOUNTER SYMPTOMS
PSYCHIATRIC NEGATIVE: 1
MYALGIAS: 0
NEUROLOGICAL NEGATIVE: 1
GASTROINTESTINAL NEGATIVE: 1
CARDIOVASCULAR NEGATIVE: 1
EYES NEGATIVE: 1
WEIGHT LOSS: 0
BACK PAIN: 0
RESPIRATORY NEGATIVE: 1
FALLS: 1
CHILLS: 0
FEVER: 0

## 2020-11-28 ASSESSMENT — PAIN DESCRIPTION - PAIN TYPE
TYPE: SURGICAL PAIN
TYPE: ACUTE PAIN
TYPE: SURGICAL PAIN
TYPE: ACUTE PAIN
TYPE: SURGICAL PAIN

## 2020-11-28 ASSESSMENT — FIBROSIS 4 INDEX: FIB4 SCORE: 3.23

## 2020-11-28 ASSESSMENT — PAIN SCALES - GENERAL: PAIN_LEVEL: 0

## 2020-11-28 NOTE — CONSULTS
DATE OF SERVICE:  11/25/2020    REQUESTING PHYSICIAN:  Manolo Dove MD    CHIEF COMPLAINT:  Right hip pain.    HISTORY:  The patient is a 75-year-old woman who had a right hip   hemiarthroplasty for femoral neck fracture done by Dr. Sarabia about 20 months   ago.  She is apparently doing fine until today when she fell, injuring her   right hip.  She could not bear weight, was seen in the emergency room, found   to have a periprosthetic femur fracture.  Orthopedic consultation was   requested.  She does have some pain with movement, not much pain is she is   just lying in bed.    ALLERGIES:  PENICILLIN.    MEDICATIONS:  Tylenol, amlodipine, aspirin, hydroxyzine, losartan/HCTZ,   rosuvastatin, and spironolactone.    PAST MEDICAL HISTORY:  Coronary artery disease, carotid artery stenosis,   history of alcohol abuse, chronic renal disease, hypercholesterolemia,   hypertension.    PAST SURGICAL HISTORY:  Right hip hemiarthroplasty, right knee surgery,   femoral artery repair, ORIF of humerus.    SOCIAL HISTORY:  The patient smokes.  She drinks alcohol, does not use any   drugs.  She lives with her .    FAMILY HISTORY:  Positive for cancer in mother.    REVIEW OF SYSTEMS:  No loss of consciousness, nausea, vomiting, diarrhea,   constipation, polyuria, dysuria, fevers, chills, weight loss, weight gain,   abdominal pain, chest pain or shortness of breath.    PHYSICAL EXAMINATION:  GENERAL:  The patient is in no acute distress.  She is lying in her bed, alert   and oriented.  VITAL SIGNS:  Her blood pressure is 163/68, heart rate 88, respirations 17,   temperature on presentation 97.7.  HEENT:  Normocephalic, atraumatic.  NECK:  Supple, nontender.  CHEST AND ABDOMEN:  Nontender.  No labored breathing.  EXTREMITIES:  The left lower and bilateral upper extremities without   tenderness or deformity.  Right lower extremity shortened and externally   rotated.  Skin over the right hip is intact.  She is able to dorsiflex  and   plantarflex her toes.    LABORATORY DATA:  Include a white blood cell count of 13,600, hematocrit   30.9%, platelet count 286,000.  Sodium 124, potassium 3.1, creatinine 1.08.    AST and ALT are normal.  Albumin is 4.0.    RADIOGRAPHS:  Radiographs of the right hip show displaced periprosthetic   proximal femur fracture.  It looks like the femoral stem is loose.  A CT scan   was done and this confirms the same.    ASSESSMENT:  1.  Right periprosthetic proximal femur fracture with likely loose femoral   stem.  2.  History of right hip hemiarthroplasty.  3.  Hyponatremia.  4.  Hypokalemia.  5.  Anemia.  6.  Tobacco abuse.    PLAN:  I recommended open reduction and internal fixation of the femur with   revision hemiarthroplasty.  Plan for surgery on Friday.  Risks were discussed   with the patient and these include bleeding, infection, neurovascular injury,   pain, stiffness, fracture, dislocation, leg length discrepancy, implant   failure, thromboembolic phenomena, anesthetic and medical complications, etc.       ____________________________________     MD EKATERINA SAVAGE / STEPHANIE    DD:  11/28/2020 15:26:40  DT:  11/28/2020 15:39:55    D#:  [id number]  Job#:  [id number]

## 2020-11-28 NOTE — CARE PLAN
Problem: Pain Management  Goal: Pain level will decrease to patient's comfort goal  Outcome: PROGRESSING AS EXPECTED  Interventions: rest, repositioned      Problem: Safety  Goal: Will remain free from falls  Outcome: PROGRESSING AS EXPECTED  Intervention: Implement fall precautions  Flowsheets (Taken 11/27/2020 2000)  Environmental Precautions:   Treaded Slipper Socks on Patient   Personal Belongings, Wastebasket, Call Bell etc. in Easy Reach   Transferred to Stronger Side   Report Given to Other Health Care Providers Regarding Fall Risk   Bed in Low Position   Communication Sign for Patients & Families   Mobility Assessed & Appropriate Sign Placed

## 2020-11-28 NOTE — ANESTHESIA PROCEDURE NOTES
Airway    Date/Time: 11/28/2020 9:53 AM  Performed by: Robert Mackay M.D.  Authorized by: Robert Mackay M.D.     Location:  OR  Urgency:  Elective  Indications for Airway Management:  Anesthesia      Spontaneous Ventilation: absent    Sedation Level:  Deep  Preoxygenated: Yes    Patient Position:  Sniffing  Final Airway Type:  Endotracheal airway  Final Endotracheal Airway:  ETT  Cuffed: Yes    Technique Used for Successful ETT Placement:  Direct laryngoscopy  Devices/Methods Used in Placement:  Intubating stylet    Insertion Site:  Oral  Blade Type:  Campbell  Laryngoscope Blade/Videolaryngoscope Blade Size:  2  ETT Size (mm):  7.0  Measured from:  Lips  ETT to Lips (cm):  21  Placement Verified by: auscultation and capnometry    Cormack-Lehane Classification:  Grade I - full view of glottis  Number of Attempts at Approach:  1

## 2020-11-28 NOTE — ANESTHESIA PREPROCEDURE EVALUATION
Relevant Problems   CARDIAC   (+) CAD (coronary artery disease)   (+) Carotid artery disease (HCC)   (+) Carotid artery stenosis   (+) Essential hypertension   (+) Heart murmur   (+) Hypertensive urgency         (+) Stage 3 chronic kidney disease       Physical Exam    Airway   Mallampati: II  TM distance: >3 FB  Neck ROM: full       Cardiovascular - normal exam  Rhythm: regular  Rate: normal     Dental - normal exam           Pulmonary - normal exam  Breath sounds clear to auscultation  (+) wheezes     Abdominal    Neurological - normal exam                 Anesthesia Plan    ASA 4       Plan - general       Airway plan will be ETT        Induction: intravenous    Postoperative Plan: Postoperative administration of opioids is intended.    Pertinent diagnostic labs and testing reviewed    Informed Consent:    Anesthetic plan and risks discussed with patient.    Use of blood products discussed with: patient whom consented to blood products.

## 2020-11-28 NOTE — THERAPY
Missed Therapy     Patient Name: Sangita Bolton  Age:  75 y.o., Sex:  female  Medical Record #: [id number]  Today's Date: 11/28/2020    OT consult received and acknowledged. Per chart review, surgery was moved from 11/27 to 11/28 d/t anemia and subsequent transfusion. OT to see pt post op as medically appropriate to accurately assess functional status and d/c needs.

## 2020-11-28 NOTE — ANESTHESIA QCDR
2019 [facility] Clinical Data Registry (for Quality Improvement)     Postoperative nausea/vomiting risk protocol (Adult = 18 yrs and Pediatric 3-17 yrs)- (430 and 463)  General inhalation anesthetic (NOT TIVA) with PONV risk factors: Yes  Provision of anti-emetic therapy with at least 2 different classes of agents: Yes   Patient DID NOT receive anti-emetic therapy and reason is documented in Medical Record:  N/A    Multimodal Pain Management- (477)  Non-emergent surgery AND patient age >= 18: No  Use of Multimodal Pain Management, two or more drugs and/or interventions, NOT including systemic opioids:   Exception: Documented allergy to multiple classes of analgesics:     Smoking Abstinence (404)  Patient is current smoker (cigarette, pipe, e-cig, marijuanna): Yes  Elective Surgery: No  Abstinence instructions provided prior to day of surgery:   Patient abstained from smoking on day of surgery:     Pre-Op Beta-Blocker in Isolated CABG (44)  Isolated CABG AND patient age >= 18: No  Beta-blocker admin within 24 hours of surgical incision:   Exception:of medical reason(s) for not administering beta blocker within 24 hours prior to surgical incision (e.g., not  indicated,other medical reason):     PACU assessment of acute postoperative pain prior to Anesthesia Care End- Applies to Patients Age = 18- (ABG7)  Initial PACU pain score is which of the following: < 7/10  Patient unable to report pain score: N/A    Post-anesthetic transfer of care checklist/protocol to PACU/ICU- (426 and 427)  Upon conclusion of case, patient transferred to which of the following locations: PACU/Non-ICU  Use of transfer checklist/protocol: Yes  Exclusion: Service Performed in Patient Hospital Room (and thus did not require transfer): N/A  Unplanned admission to ICU related to anesthesia service up through end of PACU care- (MD51)  Unplanned admission to ICU (not initially anticipated at anesthesia start time): No

## 2020-11-28 NOTE — ANESTHESIA POSTPROCEDURE EVALUATION
Patient: Sangita Bolton    Procedure Summary     Date: 11/28/20 Room / Location: [place] [address] / SURGERY [facility]    Anesthesia Start: 0948 Anesthesia Stop: 1203    Procedure: HEMIARTHROPLASTY, HIP REVISION (Hip) Diagnosis: (Comminuted right periprosthetic right proximal femur fractre around uncemented hemiarthroplasty )    Surgeons: Mohsen Thomas M.D. Responsible Provider: Robert Mackay M.D.    Anesthesia Type: general ASA Status: 4          Final Anesthesia Type: general  Last vitals  BP   Blood Pressure : 147/67    Temp   37.1 °C (98.8 °F)    Pulse   Pulse: 80   Resp   18    SpO2   96 %      Anesthesia Post Evaluation    Patient location during evaluation: PACU  Patient participation: complete - patient participated  Level of consciousness: awake and alert  Pain score: 0    Airway patency: patent  Anesthetic complications: no  Cardiovascular status: hemodynamically stable  Respiratory status: acceptable  Hydration status: euvolemic    PONV: none           Nurse Pain Score: 6 (NPRS)

## 2020-11-28 NOTE — PROGRESS NOTES
[facility] Medicine Daily Progress Note    Date of Service  11/28/2020    Chief Complaint  75 y.o. female admitted 11/25/2020 with fall    Hospital Course    75-year-old female with history of dementia, hypertension and coronary artery disease presented 11/25 after ground-level fall, the patient had dementia not able to give a good history of her fall however she denied any dizziness or loss of consciousness, denied any chest pain, the patient lives with her  who did not see her when she fell, on admission the patient was found to have dehydration with RASHAD and x-ray showed right hip fracture, the patient was evaluated by Ortho plan for surgery on 11/27 however due to severe anemia the surgery was postponed until 11/28.      Interval Problem Update  -Evaluated examined the patient at bedside, around her baseline mental status  -Plan for surgery today  -PT and OT after surgery for placement.  -Hyponatremia and hypomagnesemia.       Consultants/Specialty  Orthopedic    Code Status  Full Code    Disposition  The patient needs PT and OT after surgery.    Review of Systems  Review of Systems   Constitutional: Positive for malaise/fatigue. Negative for chills, fever and weight loss.   HENT: Negative.    Eyes: Negative.    Respiratory: Negative.    Cardiovascular: Negative.    Gastrointestinal: Negative.    Genitourinary: Negative.    Musculoskeletal: Positive for falls and joint pain. Negative for back pain and myalgias.   Neurological: Negative.    Psychiatric/Behavioral: Negative.         Physical Exam  Temp:  [36.2 °C (97.2 °F)-37.1 °C (98.8 °F)] 36.2 °C (97.2 °F)  Pulse:  [] 67  Resp:  [14-19] 16  BP: (126-184)/() 148/61  SpO2:  [93 %-100 %] 100 %    Physical Exam  Constitutional:       Appearance: She is not ill-appearing.   HENT:      Head: Normocephalic and atraumatic.   Eyes:      General: No scleral icterus.  Neck:      Musculoskeletal: No neck rigidity.   Cardiovascular:      Rate and Rhythm:  Normal rate.      Heart sounds: No murmur.   Pulmonary:      Effort: No respiratory distress.      Breath sounds: No wheezing or rales.   Abdominal:      General: Abdomen is flat. Bowel sounds are normal. There is no distension.      Palpations: Abdomen is soft.      Tenderness: There is no abdominal tenderness.   Musculoskeletal:         General: Deformity present. No swelling.      Right lower leg: No edema.      Left lower leg: No edema.   Skin:     Coloration: Skin is not pale.      Findings: No bruising or lesion.   Neurological:      General: No focal deficit present.      Mental Status: She is alert. Mental status is at baseline.      Cranial Nerves: No cranial nerve deficit.      Sensory: No sensory deficit.      Motor: No weakness.      Gait: Gait normal.         Fluids    Intake/Output Summary (Last 24 hours) at 11/28/2020 1328  Last data filed at 11/28/2020 1203  Gross per 24 hour   Intake 800 ml   Output 415 ml   Net 385 ml       Laboratory  Recent Labs     11/27/20  1542 11/27/20  1613 11/28/20  0616   WBC 11.8* 11.3* 11.0*   RBC 3.72* 3.85* 4.06*   HEMOGLOBIN 12.0 12.5 13.2   HEMATOCRIT 34.9* 36.6* 37.7   MCV 94.1 94.8 92.9   MCH 32.3 32.5 32.5   MCHC 34.3 34.2 35.0   RDW 50.1* 51.3* 50.0   PLATELETCT 214 210 224   MPV 9.6 9.5 9.9     Recent Labs     11/27/20  0133 11/27/20  1613 11/28/20  0616   SODIUM 128* 128* 128*   POTASSIUM 4.5 4.5 4.0   CHLORIDE 97 96 93*   CO2 22 21 22   GLUCOSE 107* 145* 106*   BUN 18 18 19   CREATININE 1.11 1.07 0.86   CALCIUM 9.1 9.0 9.4                   Imaging  EC-ECHOCARDIOGRAM COMPLETE W/O CONT   Final Result      CT-HIP W/O PLUS RECONS RIGHT   Final Result      Periprosthetic fracture in the proximal right femur.      CT-HEAD W/O   Final Result      No acute intracranial findings.         DX-FEMUR-2+ RIGHT   Final Result      1.  Periprosthetic fracture of the proximal RIGHT femoral diaphysis   2.  Osteopenia      DX-PELVIS-1 OR 2 VIEWS   Final Result      1.   Periprosthetic fracture of the proximal RIGHT femur   2.  Osteopenia   3.  LEFT hip osteoarthritis      DX-CHEST-PORTABLE (1 VIEW)   Final Result      1.  No acute cardiac or pulmonary abnormalities are identified.   2.  Emphysema   3.  Atherosclerosis      DX-PELVIS-1 OR 2 VIEWS    (Results Pending)   DX-HIP-COMPLETE - UNILATERAL 2+ RIGHT    (Results Pending)        Assessment/Plan  * Closed fracture of neck of right femur (HCC)- (present on admission)  Assessment & Plan  Likely after mechanical fall   The patient has history of right hemiarthroplasty on 4/28/19  X-ray showed periprosthetic fracture of proximal right femoral diaphysis  Orthopedic, plan for surgery on 11/27 for ORIF and revision  PT and OT after surgery and placement if needed  Pain control and prevent constipation  No pharmacological for possible delirium  Lovenox after surgery    Anemia  Assessment & Plan  With elevated MCV  Around 7  B12 around 300  The patient received 2 units of blood transfusion  Cannot order iron panel due to blood transfusion  We will start with B12 supplementation  Follow-up    Dementia (HCC)  Assessment & Plan  Like mild to moderate  The patient is at high risk for delirium during this hospitalization  Pain control and nonpharmacological treatment  Avoid benzo and antihistamine  PT and OT and placement      Fall  Assessment & Plan  Likely mechanical  Echo is pending  EKG no arrhythmia  PT and OT after surgery  Start vitamin D and B12 supplementation    Stage 3 chronic kidney disease  Assessment & Plan  Acute on chronic kidney disease stage III   Avoid nephrotoxic medication  IV hydration   labs daily    Leukocytosis- (present on admission)  Assessment & Plan  Likely reactive.   Procalcitonin is negative  No signs of infection and no need for antibiotics  Monitoring    Hyponatremia- (present on admission)  Assessment & Plan  Mild, chronic with no symptoms   Salt tablets  Continue monitoring and start with free water  restriction if needed.    CAD (coronary artery disease)  Assessment & Plan  Cont ASA/statin, ARB, spironolactone  Echo showed diastolic dysfunction with normal systolic function    Hypertensive urgency- (present on admission)  Assessment & Plan  SBP > 180 in ED likely related to pain and new fall  Resume home anti-HTN meds: amlodipine, losartan, HCTZ, spironolactone with good control    Hypokalemia- (present on admission)  Assessment & Plan  Replaced       VTE prophylaxis: lovenox

## 2020-11-28 NOTE — PROGRESS NOTES
Pt arrived back to unit from PACU. Pt AAOx4, VSS, denies SOB and chest pain. Prevena dressing in place to RLE, CD&I. Bed locked and in lowest position. Call light and belongings within reach.

## 2020-11-28 NOTE — ANESTHESIA TIME REPORT
Anesthesia Start and Stop Event Times     Date Time Event    11/28/2020 0710 Ready for Procedure     0948 Anesthesia Start     1203 Anesthesia Stop        Responsible Staff  11/28/20    Name Role Begin End    Robert Mackay M.D. Anesth 0948 1203        Preop Diagnosis (Free Text):  Pre-op Diagnosis     Comminuted right periprosthetic right proximal femur fractre around uncemented hemiarthroplasty         Preop Diagnosis (Codes):    Post op Diagnosis  Periprosthetic fracture of femur following total replacement of hip      Premium Reason  E. Weekend    Comments: REVISION

## 2020-11-28 NOTE — OR NURSING
12:01  Patient arrived from the OR to PACU 15B.  Bedside report received.  VSS.  Will continue to monitor for pain/nausea.      12:30 attempted to contact family--no answer at this time.      12:32  Report called to STEPHANI Sotner.  Pt prepared for transport to room.

## 2020-11-29 ENCOUNTER — APPOINTMENT (OUTPATIENT)
Dept: RADIOLOGY | Facility: MEDICAL CENTER | Age: 75
DRG: 467 | End: 2020-11-29
Attending: INTERNAL MEDICINE
Payer: MEDICARE

## 2020-11-29 LAB
ALBUMIN SERPL BCP-MCNC: 2.9 G/DL (ref 3.2–4.9)
ALBUMIN/GLOB SERPL: 1 G/DL
ALP SERPL-CCNC: 65 U/L (ref 30–99)
ALT SERPL-CCNC: 13 U/L (ref 2–50)
ANION GAP SERPL CALC-SCNC: 11 MMOL/L (ref 7–16)
AST SERPL-CCNC: 31 U/L (ref 12–45)
BASOPHILS # BLD AUTO: 0.2 % (ref 0–1.8)
BASOPHILS # BLD: 0.02 K/UL (ref 0–0.12)
BILIRUB SERPL-MCNC: 0.8 MG/DL (ref 0.1–1.5)
BUN SERPL-MCNC: 26 MG/DL (ref 8–22)
CALCIUM SERPL-MCNC: 9.3 MG/DL (ref 8.5–10.5)
CHLORIDE SERPL-SCNC: 93 MMOL/L (ref 96–112)
CO2 SERPL-SCNC: 21 MMOL/L (ref 20–33)
CREAT SERPL-MCNC: 1.34 MG/DL (ref 0.5–1.4)
CRP SERPL HS-MCNC: 9.7 MG/DL (ref 0–0.75)
EOSINOPHIL # BLD AUTO: 0.01 K/UL (ref 0–0.51)
EOSINOPHIL NFR BLD: 0.1 % (ref 0–6.9)
ERYTHROCYTE [DISTWIDTH] IN BLOOD BY AUTOMATED COUNT: 51.1 FL (ref 35.9–50)
GLOBULIN SER CALC-MCNC: 2.9 G/DL (ref 1.9–3.5)
GLUCOSE SERPL-MCNC: 113 MG/DL (ref 65–99)
HCT VFR BLD AUTO: 30.6 % (ref 37–47)
HGB BLD-MCNC: 10.6 G/DL (ref 12–16)
IMM GRANULOCYTES # BLD AUTO: 0.05 K/UL (ref 0–0.11)
IMM GRANULOCYTES NFR BLD AUTO: 0.4 % (ref 0–0.9)
LYMPHOCYTES # BLD AUTO: 0.54 K/UL (ref 1–4.8)
LYMPHOCYTES NFR BLD: 4.6 % (ref 22–41)
MAGNESIUM SERPL-MCNC: 2.9 MG/DL (ref 1.5–2.5)
MCH RBC QN AUTO: 33.2 PG (ref 27–33)
MCHC RBC AUTO-ENTMCNC: 34.6 G/DL (ref 33.6–35)
MCV RBC AUTO: 95.9 FL (ref 81.4–97.8)
MONOCYTES # BLD AUTO: 1.54 K/UL (ref 0–0.85)
MONOCYTES NFR BLD AUTO: 13.3 % (ref 0–13.4)
NEUTROPHILS # BLD AUTO: 9.46 K/UL (ref 2–7.15)
NEUTROPHILS NFR BLD: 81.4 % (ref 44–72)
NRBC # BLD AUTO: 0 K/UL
NRBC BLD-RTO: 0 /100 WBC
PHOSPHATE SERPL-MCNC: 4.2 MG/DL (ref 2.5–4.5)
PLATELET # BLD AUTO: 216 K/UL (ref 164–446)
PMV BLD AUTO: 9.7 FL (ref 9–12.9)
POTASSIUM SERPL-SCNC: 4 MMOL/L (ref 3.6–5.5)
PROT SERPL-MCNC: 5.8 G/DL (ref 6–8.2)
RBC # BLD AUTO: 3.19 M/UL (ref 4.2–5.4)
SODIUM SERPL-SCNC: 125 MMOL/L (ref 135–145)
SODIUM SERPL-SCNC: 126 MMOL/L (ref 135–145)
WBC # BLD AUTO: 11.6 K/UL (ref 4.8–10.8)

## 2020-11-29 PROCEDURE — 700101 HCHG RX REV CODE 250: Performed by: INTERNAL MEDICINE

## 2020-11-29 PROCEDURE — 97166 OT EVAL MOD COMPLEX 45 MIN: CPT

## 2020-11-29 PROCEDURE — 97161 PT EVAL LOW COMPLEX 20 MIN: CPT

## 2020-11-29 PROCEDURE — 87040 BLOOD CULTURE FOR BACTERIA: CPT | Mod: 91

## 2020-11-29 PROCEDURE — 83735 ASSAY OF MAGNESIUM: CPT

## 2020-11-29 PROCEDURE — A9270 NON-COVERED ITEM OR SERVICE: HCPCS | Performed by: ORTHOPAEDIC SURGERY

## 2020-11-29 PROCEDURE — 85025 COMPLETE CBC W/AUTO DIFF WBC: CPT

## 2020-11-29 PROCEDURE — 84100 ASSAY OF PHOSPHORUS: CPT

## 2020-11-29 PROCEDURE — 86140 C-REACTIVE PROTEIN: CPT

## 2020-11-29 PROCEDURE — 84295 ASSAY OF SERUM SODIUM: CPT

## 2020-11-29 PROCEDURE — A9270 NON-COVERED ITEM OR SERVICE: HCPCS | Performed by: INTERNAL MEDICINE

## 2020-11-29 PROCEDURE — 80053 COMPREHEN METABOLIC PANEL: CPT

## 2020-11-29 PROCEDURE — 0SPR0JZ REMOVAL OF SYNTHETIC SUBSTITUTE FROM RIGHT HIP JOINT, FEMORAL SURFACE, OPEN APPROACH: ICD-10-PCS | Performed by: ORTHOPAEDIC SURGERY

## 2020-11-29 PROCEDURE — 700111 HCHG RX REV CODE 636 W/ 250 OVERRIDE (IP): Performed by: ORTHOPAEDIC SURGERY

## 2020-11-29 PROCEDURE — 0SRR0JZ REPLACEMENT OF RIGHT HIP JOINT, FEMORAL SURFACE WITH SYNTHETIC SUBSTITUTE, OPEN APPROACH: ICD-10-PCS | Performed by: ORTHOPAEDIC SURGERY

## 2020-11-29 PROCEDURE — 770006 HCHG ROOM/CARE - MED/SURG/GYN SEMI*

## 2020-11-29 PROCEDURE — 36415 COLL VENOUS BLD VENIPUNCTURE: CPT

## 2020-11-29 PROCEDURE — 51798 US URINE CAPACITY MEASURE: CPT

## 2020-11-29 PROCEDURE — 0QS604Z REPOSITION RIGHT UPPER FEMUR WITH INTERNAL FIXATION DEVICE, OPEN APPROACH: ICD-10-PCS | Performed by: ORTHOPAEDIC SURGERY

## 2020-11-29 PROCEDURE — 700102 HCHG RX REV CODE 250 W/ 637 OVERRIDE(OP): Performed by: STUDENT IN AN ORGANIZED HEALTH CARE EDUCATION/TRAINING PROGRAM

## 2020-11-29 PROCEDURE — 99232 SBSQ HOSP IP/OBS MODERATE 35: CPT | Performed by: INTERNAL MEDICINE

## 2020-11-29 PROCEDURE — 71045 X-RAY EXAM CHEST 1 VIEW: CPT

## 2020-11-29 PROCEDURE — 700111 HCHG RX REV CODE 636 W/ 250 OVERRIDE (IP): Performed by: INTERNAL MEDICINE

## 2020-11-29 PROCEDURE — 700102 HCHG RX REV CODE 250 W/ 637 OVERRIDE(OP): Performed by: ORTHOPAEDIC SURGERY

## 2020-11-29 PROCEDURE — A9270 NON-COVERED ITEM OR SERVICE: HCPCS | Performed by: STUDENT IN AN ORGANIZED HEALTH CARE EDUCATION/TRAINING PROGRAM

## 2020-11-29 PROCEDURE — 700102 HCHG RX REV CODE 250 W/ 637 OVERRIDE(OP): Performed by: INTERNAL MEDICINE

## 2020-11-29 RX ORDER — HYDROMORPHONE HYDROCHLORIDE 1 MG/ML
0.5 INJECTION, SOLUTION INTRAMUSCULAR; INTRAVENOUS; SUBCUTANEOUS EVERY 4 HOURS PRN
Status: DISCONTINUED | OUTPATIENT
Start: 2020-11-29 | End: 2020-11-30

## 2020-11-29 RX ORDER — POLYETHYLENE GLYCOL 3350 17 G/17G
1 POWDER, FOR SOLUTION ORAL DAILY
Status: DISCONTINUED | OUTPATIENT
Start: 2020-11-29 | End: 2020-11-30

## 2020-11-29 RX ADMIN — LOSARTAN POTASSIUM 100 MG: 50 TABLET, FILM COATED ORAL at 05:35

## 2020-11-29 RX ADMIN — ENOXAPARIN SODIUM 30 MG: 30 INJECTION SUBCUTANEOUS at 12:10

## 2020-11-29 RX ADMIN — HYDROCHLOROTHIAZIDE 25 MG: 25 TABLET ORAL at 05:35

## 2020-11-29 RX ADMIN — Medication 1 G: at 12:10

## 2020-11-29 RX ADMIN — CEFAZOLIN SODIUM 1 G: 1 INJECTION, SOLUTION INTRAVENOUS at 14:04

## 2020-11-29 RX ADMIN — Medication 1 G: at 17:59

## 2020-11-29 RX ADMIN — Medication 1 G: at 08:00

## 2020-11-29 RX ADMIN — ASPIRIN 81 MG: 81 TABLET, COATED ORAL at 05:35

## 2020-11-29 RX ADMIN — AMLODIPINE BESYLATE 10 MG: 10 TABLET ORAL at 05:35

## 2020-11-29 RX ADMIN — Medication 400 MG: at 05:35

## 2020-11-29 RX ADMIN — CYANOCOBALAMIN TAB 500 MCG 1000 MCG: 500 TAB at 05:35

## 2020-11-29 RX ADMIN — ROSUVASTATIN CALCIUM 10 MG: 10 TABLET, FILM COATED ORAL at 17:59

## 2020-11-29 RX ADMIN — OXYCODONE HYDROCHLORIDE 5 MG: 5 TABLET ORAL at 12:10

## 2020-11-29 RX ADMIN — Medication 2000 UNITS: at 05:35

## 2020-11-29 RX ADMIN — POLYETHYLENE GLYCOL 3350 1 PACKET: 17 POWDER, FOR SOLUTION ORAL at 10:08

## 2020-11-29 RX ADMIN — SPIRONOLACTONE 25 MG: 25 TABLET ORAL at 05:36

## 2020-11-29 RX ADMIN — OXYCODONE HYDROCHLORIDE 5 MG: 5 TABLET ORAL at 07:59

## 2020-11-29 RX ADMIN — ASPIRIN 81 MG: 81 TABLET, COATED ORAL at 17:58

## 2020-11-29 RX ADMIN — CEFAZOLIN SODIUM 1 G: 1 INJECTION, SOLUTION INTRAVENOUS at 06:43

## 2020-11-29 ASSESSMENT — COGNITIVE AND FUNCTIONAL STATUS - GENERAL
MOBILITY SCORE: 8
CLIMB 3 TO 5 STEPS WITH RAILING: TOTAL
STANDING UP FROM CHAIR USING ARMS: A LOT
WALKING IN HOSPITAL ROOM: TOTAL
MOVING TO AND FROM BED TO CHAIR: UNABLE
DRESSING REGULAR UPPER BODY CLOTHING: A LOT
HELP NEEDED FOR BATHING: A LOT
SUGGESTED CMS G CODE MODIFIER DAILY ACTIVITY: CK
TOILETING: A LOT
EATING MEALS: A LITTLE
SUGGESTED CMS G CODE MODIFIER MOBILITY: CM
PERSONAL GROOMING: A LITTLE
DAILY ACTIVITIY SCORE: 14
TURNING FROM BACK TO SIDE WHILE IN FLAT BAD: A LOT
MOVING FROM LYING ON BACK TO SITTING ON SIDE OF FLAT BED: UNABLE
DRESSING REGULAR LOWER BODY CLOTHING: A LOT

## 2020-11-29 ASSESSMENT — ENCOUNTER SYMPTOMS
FEVER: 0
MYALGIAS: 0
EYES NEGATIVE: 1
WEIGHT LOSS: 0
CHILLS: 0
GASTROINTESTINAL NEGATIVE: 1
FALLS: 1
NEUROLOGICAL NEGATIVE: 1
BACK PAIN: 0
PSYCHIATRIC NEGATIVE: 1
CARDIOVASCULAR NEGATIVE: 1
RESPIRATORY NEGATIVE: 1

## 2020-11-29 ASSESSMENT — GAIT ASSESSMENTS: GAIT LEVEL OF ASSIST: UNABLE TO PARTICIPATE

## 2020-11-29 ASSESSMENT — ACTIVITIES OF DAILY LIVING (ADL): TOILETING: INDEPENDENT

## 2020-11-29 ASSESSMENT — PAIN DESCRIPTION - PAIN TYPE
TYPE: ACUTE PAIN;SURGICAL PAIN
TYPE: SURGICAL PAIN
TYPE: ACUTE PAIN;SURGICAL PAIN

## 2020-11-29 NOTE — PROGRESS NOTES
2 RN Skin Check    2 RN skin check completed with STEPHANI Neville.   Devices in place: SCDs, Nasal Cannula and prevena wound vac, charles catheter.  Skin assessed under devices: yes.  Confirmed pressure ulcers found on: none.  New potential pressure ulcers noted on none. Wound consult placed No.  The following interventions in place Pillows.    Skin overall intact. Noted bruising to BUE and BLE. Prevena wound vac in place to RLE, dressing CD&I. Bilateral heels red and blanching. Unable to assess sacrum and back as pt refused to turn. Pillows in use for support.

## 2020-11-29 NOTE — OP REPORT
DATE OF SERVICE:  11/29/2020    PREOPERATIVE DIAGNOSES:  1.  Right periprosthetic proximal femur fracture.  2.  Failed right hip hemiarthroplasty.  3.  Tobacco abuse.  4.  Osteoporosis.  5.  Acute blood loss anemia.  6.  Hyponatremia.    POSTOPERATIVE DIAGNOSES:  1.  Right periprosthetic proximal femur fracture.  2.  Failed right hip hemiarthroplasty.  3.  Tobacco abuse.  4.  Osteoporosis.  5.  Acute blood loss anemia.  6.  Hyponatremia.    SURGICAL PROCEDURES:  Revision right hip hemiarthroplasty with ORIF of right   proximal femur.    SURGEON:  Mohsen Thomas MD    ASSISTANTS:  1.  Caleb Soares DO  2.  Sandy Emery MS3.    ANESTHESIOLOGIST:  Robert Mackay MD    ANESTHETIC:  General.    ESTIMATED BLOOD LOSS:  250 mL    IMPLANTS:  1.  Keli 14 x 190 STS blind distal stem.  2.  Two cables.  3.  B60 standard cone body.  4.  46 bipolar shell with standard (+0) 28 mm head.    INDICATIONS:  The patient is 75 years old.  She had a right hip   hemiarthroplasty about 20 months ago by Dr. Sarabia.  She was doing fine until   she fell about 3 days ago injuring her right hip.  She has a periprosthetic   proximal femur fracture with loose hemiarthroplasty stem.  Recommend revision   arthroplasty with ORIF of the proximal femur.  Risks include bleeding,   infection, neurovascular injury, pain, stiffness, fracture, dislocation, leg   length discrepancy, implant failure, thromboembolic phenomena, anesthetic and   medical complications, etc.    DESCRIPTION OF PROCEDURE:  The patient was identified in the preoperative   holding area, right leg was marked.  She was taken to the operating room where   general anesthetic was administered.  Intravenous antibiotics and tranexamic   acid were given.  She was placed in the lateral decubitus position on the bed   with the right side up.  The right hindquarter was prepped and draped in   sterile fashion.  Time-out was held to confirm the patient identity and   correct surgical  site.    Incision was made through the previous scar, was extended distally, carried   this down to the IT band, which was split longitudinally.  We encountered   hematoma.  This was evacuated.  We then elevated the vastus lateralis from its   origin and then elevated anteriorly off the intermuscular septum, dividing a   few perforating vessels.  The gluteus marquise tendon was also divided at the   insertion into the proximal femur.  We exposed the distal aspect of the   femoral shaft fracture.  We then elevated this trochanteric component and   exposed the bipolar component.  We then tried to dislocate the hip.  The stem   came out easily.  There was no ingrowth at all.    We then reduced the greater trochanter component and had a good reduction   distally.  We placed clamps and then 2 cables, which we did not quite sure   which we tightened but did not cut.  We then reamed for 190 mm Keli stem, had   good cortical chatter at 14.  We then inserted our stem and then reamed   proximally at A and B where there was good contact.  We then placed our body   trial, placed our 46 mm with 28 mm/-6 head.  This reduced easily after we   divided the inferior capsule.  It was too unstable, so we went to a +0 head,   which had much improved stability and leg lengths.    We then placed our body in appropriate position, tightened it and then tapped   our bipolar component into place in standard fashion.  The hip was reduced.    During the reduction and dislocation maneuvers, there was completion of   comminution into the trochanteric components.  The trochanteric bolt would   have gone through this comminution and not anchored anything, so we did not   use it.  Instead, once we had reduced the hip, we sutured soft tissue   overlying the trochanteric fragment using #1 Vicryl and then closed the   capsule with #1 Vicryl and closed the scar external rotators to the   trochanter.  This anchored the trochanteric components.  We then  tightened the   cables and cut them.  We repaired the gluteus marquise tendon.  I then   repaired the IT band with #1 Vicryl.  The patient's skin was closed with 2-0   Vicryl and staples.  A Prevena VAC dressing was applied.  The patient was   transferred to her bed in supine position, extubated and taken to recovery   room in stable condition.    POSTOPERATIVE PLAN:  1.  Intravenous antibiotics for 24 hours.  2.  DVT prophylaxis with early mobilization, SCDs and aspirin 81 mg p.o.   b.i.d. for 4 weeks.  3.  Toe touch weightbearing right lower extremity for 4 weeks with posterior   hip precautions.  4.  AP pelvis, AP hip radiographs in recovery room.  5.  Ongoing preoperative medical management per hospitalist.       ____________________________________     MD EKATERINA SAVAGE / STEPHANIE    DD:  11/29/2020 13:17:14  DT:  11/29/2020 13:51:39    D#:  [id number]  Job#:  [id number]

## 2020-11-29 NOTE — PROGRESS NOTES
[facility] Medicine Daily Progress Note    Date of Service  11/29/2020    Chief Complaint  75 y.o. female admitted 11/25/2020 with fall    Hospital Course    75-year-old female with history of dementia, hypertension and coronary artery disease presented 11/25 after ground-level fall, the patient had dementia not able to give a good history of her fall however she denied any dizziness or loss of consciousness, denied any chest pain, the patient lives with her  who did not see her when she fell, on admission the patient was found to have dehydration with RASHAD and x-ray showed right hip fracture, the patient was evaluated by Ortho plan for surgery on 11/27 however due to severe anemia the surgery was postponed until 11/28, with no complication after surgery, PT and OT evaluation recommended SNF.    Due to high risk of fall and hypotension, and acute neurologic abnormality we will discontinue spironolactone and hydrochlorothiazide.      Interval Problem Update  -Evaluated examined the patient at bedside, around her baseline mental status  -PT and OT recommended SNF  -Labs are reviewed, mild leukocytosis with no symptoms and no signs of infection  -Hyponatremia chronic and no symptoms, free fluid restriction.  -We will discontinue spironolactone and hydrochlorothiazide due to hypotension and electrolyte abnormalities.    Consultants/Specialty  Orthopedic    Code Status  Full Code    Disposition  SNF, medically cleared for discharge    Review of Systems  Review of Systems   Constitutional: Positive for malaise/fatigue. Negative for chills, fever and weight loss.   HENT: Negative.    Eyes: Negative.    Respiratory: Negative.    Cardiovascular: Negative.    Gastrointestinal: Negative.    Genitourinary: Negative.    Musculoskeletal: Positive for falls and joint pain. Negative for back pain and myalgias.   Neurological: Negative.    Psychiatric/Behavioral: Negative.         Physical Exam  Temp:  [36.1 °C (97 °F)-37.1 °C  (98.7 °F)] 36.5 °C (97.7 °F)  Pulse:  [] 74  Resp:  [14-19] 16  BP: (119-184)/(51-87) 119/68  SpO2:  [95 %-100 %] 99 %    Physical Exam  Constitutional:       Appearance: She is not ill-appearing.   HENT:      Head: Normocephalic and atraumatic.   Eyes:      General: No scleral icterus.  Neck:      Musculoskeletal: No neck rigidity.   Cardiovascular:      Rate and Rhythm: Normal rate.      Heart sounds: No murmur.   Pulmonary:      Effort: No respiratory distress.      Breath sounds: No wheezing or rales.   Abdominal:      General: Abdomen is flat. Bowel sounds are normal. There is no distension.      Palpations: Abdomen is soft.      Tenderness: There is no abdominal tenderness.   Musculoskeletal:         General: Deformity present. No swelling.      Right lower leg: No edema.      Left lower leg: No edema.   Skin:     Coloration: Skin is not pale.      Findings: No bruising or lesion.   Neurological:      General: No focal deficit present.      Mental Status: She is alert. Mental status is at baseline.      Cranial Nerves: No cranial nerve deficit.      Sensory: No sensory deficit.      Motor: No weakness.      Gait: Gait normal.      Comments: Oriented x2         Fluids    Intake/Output Summary (Last 24 hours) at 11/29/2020 1200  Last data filed at 11/29/2020 0800  Gross per 24 hour   Intake 600 ml   Output 0 ml   Net 600 ml       Laboratory  Recent Labs     11/27/20  1613 11/28/20  0616 11/29/20  0625   WBC 11.3* 11.0* 11.6*   RBC 3.85* 4.06* 3.19*   HEMOGLOBIN 12.5 13.2 10.6*   HEMATOCRIT 36.6* 37.7 30.6*   MCV 94.8 92.9 95.9   MCH 32.5 32.5 33.2*   MCHC 34.2 35.0 34.6   RDW 51.3* 50.0 51.1*   PLATELETCT 210 224 216   MPV 9.5 9.9 9.7     Recent Labs     11/27/20  1613 11/28/20  0616 11/29/20  0625 11/29/20  0915   SODIUM 128* 128* 125* 126*   POTASSIUM 4.5 4.0 4.0  --    CHLORIDE 96 93* 93*  --    CO2 21 22 21  --    GLUCOSE 145* 106* 113*  --    BUN 18 19 26*  --    CREATININE 1.07 0.86 1.34  --     CALCIUM 9.0 9.4 9.3  --                    Imaging  DX-CHEST-LIMITED (1 VIEW)   Final Result         No acute cardiac or pulmonary abnormality is identified.      DX-PELVIS-1 OR 2 VIEWS   Final Result      1.  Interval placement of femoral component across periprosthetic fracture      2.  Presumed antibiotic beads present      EC-ECHOCARDIOGRAM COMPLETE W/O CONT   Final Result      CT-HIP W/O PLUS RECONS RIGHT   Final Result      Periprosthetic fracture in the proximal right femur.      CT-HEAD W/O   Final Result      No acute intracranial findings.         DX-FEMUR-2+ RIGHT   Final Result      1.  Periprosthetic fracture of the proximal RIGHT femoral diaphysis   2.  Osteopenia      DX-PELVIS-1 OR 2 VIEWS   Final Result      1.  Periprosthetic fracture of the proximal RIGHT femur   2.  Osteopenia   3.  LEFT hip osteoarthritis      DX-CHEST-PORTABLE (1 VIEW)   Final Result      1.  No acute cardiac or pulmonary abnormalities are identified.   2.  Emphysema   3.  Atherosclerosis           Assessment/Plan  * Closed fracture of neck of right femur (HCC)- (present on admission)  Assessment & Plan  Likely after mechanical fall   The patient has history of right hemiarthroplasty on 4/28/19  X-ray showed periprosthetic fracture of proximal right femoral diaphysis  surgery on 11/28 for ORIF and revision  PT and OT SNF, medically and surgically cleared  Pain control and prevent constipation  No pharmacological for possible delirium  Lovenox for DVT prophylaxis    Anemia  Assessment & Plan  With elevated MCV  Around 7  B12 around 300  The patient received 2 units of blood transfusion  Cannot order iron panel due to blood transfusion  Continue B12 supplementation  Follow-up with PCP as outpatient    Dementia (HCC)  Assessment & Plan  Like mild to moderate  The patient is at high risk for delirium during this hospitalization  Pain control and nonpharmacological treatment  Avoid benzo and antihistamine  PT and OT and  placement      Fall  Assessment & Plan  Likely mechanical  Echo is pending  EKG no arrhythmia  PT and OT after surgery  Start vitamin D and B12 supplementation    Stage 3 chronic kidney disease  Assessment & Plan  Acute on chronic kidney disease stage III   Avoid nephrotoxic medication  IV hydration   labs daily    Leukocytosis- (present on admission)  Assessment & Plan  Likely reactive.   Procalcitonin is negative  No signs of infection and no need for antibiotics  Monitoring    Hypertension  Assessment & Plan  Due to high risk of fall, dementia and age we will discontinue hydrochlorothiazide and  Continue amlodipine and losartan  Close monitoring.    Hyponatremia- (present on admission)  Assessment & Plan  Mild, chronic with no symptoms   Salt tablets and free water restriction  Continue monitoring with labs daily    CAD (coronary artery disease)  Assessment & Plan  Cont ASA/statin, ARB, spironolactone  Echo showed diastolic dysfunction with normal systolic function    Hypertensive urgency- (present on admission)  Assessment & Plan  SBP > 180 in ED likely related to pain and new fall  Resume home anti-HTN meds: amlodipine, losartan, HCTZ, spironolactone with good control    Hypokalemia- (present on admission)  Assessment & Plan  Replaced       VTE prophylaxis: lovenox

## 2020-11-29 NOTE — ASSESSMENT & PLAN NOTE
Due to high risk of fall, dementia and age we will discontinue hydrochlorothiazide and  Continue amlodipine and losartan  Close monitoring.

## 2020-11-29 NOTE — THERAPY
Occupational Therapy   Initial Evaluation     Patient Name: Sangita Bolton  Age:  75 y.o., Sex:  female  Medical Record #: [id number]  Today's Date: 11/29/2020     Precautions  Precautions: (P) Fall Risk, Toe Touch Weight Bearing Right Lower Extremity, Posterior Hip Precautions  Comments: dementia    Assessment  Patient is 75 y.o. female with a diagnosis of GLF periprosthetic fracture of right hip, underwent R hip hemiarthroplasty revision and ORIF. .  Additional factors influencing patient status / progress: spouse at home to assist. Unknown level of independence prior , history of multiple falls.      Plan    Recommend Occupational Therapy 3 times per week until therapy goals are met for the following treatments:  Adaptive Equipment, Cognitive Skill Development, Self Care/Activities of Daily Living, Therapeutic Activities and Therapeutic Exercises.    DC Equipment Recommendations: (P) Unable to determine at this time  Discharge Recommendations: (P) Recommend post-acute placement for additional occupational therapy services prior to discharge home     Subjective    Pleasant and cooperative. Very anxious about moving out of supine/ bed. Did well with steady encouragement     Objective       11/29/20 1110   Total Time Spent   Total Time Spent (Mins) 40   Charge Group   OT Evaluation OT Evaluation Mod   Initial Contact Note    Initial Contact Note Order Received and Verified, Occupational Therapy Evaluation in Progress with Full Report to Follow.   Prior Living Situation   Prior Services Intermittent Physical Support for ADL Per Family   Housing / Facility 1 Story House   Steps Into Home 2   Steps In Home 0   Equipment Owned Front-Wheel Walker   Lives with - Patient's Self Care Capacity Spouse   Comments limited historian, spouse appears to asssit with all activity as needed   Prior Level of ADL Function   Self Feeding Independent   Grooming / Hygiene Independent   Bathing Unable To Determine At This Time  (reports  "independence, unsure of reliability)   Dressing Independent   Toileting Independent   Prior Level of IADL Function   Medication Management Requires Assist   Laundry Requires Assist   Kitchen Mobility Requires Assist   Finances Requires Assist   Home Management Requires Assist   Shopping Requires Assist   Prior Level Of Mobility Supervision With Device in Home   Driving / Transportation Relatives / Others Provide Transportation   History of Falls   History of Falls Yes   Date of Last Fall   (chart and patient report \" multiple falls\")   Precautions   Precautions Fall Risk;Toe Touch Weight Bearing Right Lower Extremity;Posterior Hip Precautions   Vitals   O2 (LPM) 2   O2 Delivery Device Nasal Cannula   Pain 0 - 10 Group   Therapist Pain Assessment During Activity;Post Activity Pain Same as Prior to Activity;Nurse Notified  (reports pain with activity, did not rate)   Cognition    Cognition / Consciousness X   Level of Consciousness Alert   Ability To Follow Commands 2 Step   Safety Awareness Impaired   Initiation Impaired   Passive ROM Upper Body   Passive ROM Upper Body WDL   Active ROM Upper Body   Active ROM Upper Body  WDL   Dominant Hand Right   Strength Upper Body   Upper Body Strength  X   Gross Strength Generalized Weakness, Equal Bilaterally.    Sensation Upper Body   Upper Extremity Sensation  WDL   Upper Body Muscle Tone   Upper Body Muscle Tone  WDL   Coordination Upper Body   Coordination WDL   Balance Assessment   Sitting Balance (Static) Fair   Sitting Balance (Dynamic) Fair -   Standing Balance (Static) Poor +   Standing Balance (Dynamic) Poor -   Weight Shift Sitting Fair   Weight Shift Standing Absent   Bed Mobility    Supine to Sit Maximal Assist   Sit to Supine Maximal Assist   Scooting Moderate Assist   ADL Assessment   Eating Supervision   Grooming Supervision;Seated   Bathing Maximal Assist   Upper Body Dressing Moderate Assist   Lower Body Dressing Maximal Assist   Toileting Maximal Assist "   How much help from another person does the patient currently need...   Putting on and taking off regular lower body clothing? 2   Bathing (including washing, rinsing, and drying)? 2   Toileting, which includes using a toilet, bedpan, or urinal? 2   Putting on and taking off regular upper body clothing? 2   Taking care of personal grooming such as brushing teeth? 3   Eating meals? 3   6 Clicks Daily Activity Score 14   Functional Mobility   Sit to Stand Moderate Assist   Bed, Chair, Wheelchair Transfer Unable to Participate   Toilet Transfers Unable to Participate   Visual Perception   Visual Perception  WDL   Patient / Family Goals   Patient / Family Goal #1 none stated   Short Term Goals   Short Term Goal # 1 min assist for supine to sit edge of bed and transfer to commode   Short Term Goal # 2 set up for seated UB ADLs   Short Term Goal # 3 min assist for LB ADLS with good adherence to hip precautions/ use of AE   Education Group   Education Provided Hip Precautions   Role of Occupational Therapist Patient Response Patient;Acceptance;Explanation;Demonstration;Reinforcement Needed   Hip Precautions Patient Response Patient;Acceptance;Explanation;Demonstration;Reinforcement Needed   Problem List   Problem List Decreased Active Daily Living Skills;Decreased Upper Extremity Strength Right;Decreased Upper Extremity Strength Left;Decreased Activity Tolerance;Decreased Functional Mobility;Limited Knowledge of Post Op Precautions;Impaired Postural Control / Balance;Safety Awareness Deficits / Cognition   Anticipated Discharge Equipment and Recommendations   DC Equipment Recommendations Unable to determine at this time   Discharge Recommendations Recommend post-acute placement for additional occupational therapy services prior to discharge home   Interdisciplinary Plan of Care Collaboration   IDT Collaboration with  Nursing;Physical Therapist   Patient Position at End of Therapy In Bed;Call Light within Reach;Tray Table  within Reach;Phone within Reach   Collaboration Comments report given   Session Information   Date / Session Number  11/29,1( 1/3,12/5)   Priority 3

## 2020-11-29 NOTE — PROGRESS NOTES
No c/o  AVSS  + DF/PF  SCDs on  Hct 30  XR OK    Plan:    PT/OT  TTWB RLE x 4 weeks  DVT proph (SCDs, ASA)  D/c planning

## 2020-11-29 NOTE — THERAPY
Physical Therapy   Initial Evaluation     Patient Name: Sangita Bolton  Age:  75 y.o., Sex:  female  Medical Record #: [id number]  Today's Date: 11/29/2020     Precautions: Fall Risk, Toe Touch Weight Bearing Right Lower Extremity (x4 weeks)  Comments: Mild Dementia    Assessment  Patient is 75 y.o. female admitted after GLF and now s/p right hip ernie revision and ORIF (TTWB x4 weeks). Pt cooperative and willing to work with therapist when given some encouragement. Pt did express that she is very fearful of falling again. Pt required max assist for bed mobility primarily due to pain. Pt required mod assist with STS with cues for TTWB. Pt refused further mobility due to pain, assisted back to supine. PT will cont while in acute care setting.    Plan    Recommend Physical Therapy 4 times per week until therapy goals are met for the following treatments:  Bed Mobility, Community Re-integration, Gait Training, Neuro Re-Education / Balance, Self Care/Home Evaluation, Therapeutic Activities and Therapeutic Exercises    DC Equipment Recommendations: Unable to determine at this time  Discharge Recommendations: Recommend post-acute placement for additional physical therapy services prior to discharge home          11/29/20 1006   Vitals   O2 (LPM) 2   O2 Delivery Device Nasal Cannula   Prior Living Situation   Prior Services Intermittent Physical Support for ADL Per Family  (from )   Housing / Facility 1 Story House   Steps Into Home 2   Steps In Home 0   Equipment Owned Front-Wheel Walker   Lives with - Patient's Self Care Capacity Spouse   Comments pt lives with her  who provides supervision   Prior Level of Functional Mobility   Bed Mobility Independent   Transfer Status Independent   Ambulation Independent   Distance Ambulation (Feet)   (household)   Assistive Devices Used Front-Wheel Walker   Comments pt reported her  is with her when ambulating   History of Falls   History of Falls Yes   Date of Last  Fall   (frequent falls)   Cognition    Level of Consciousness Alert   Comments receptive to education   Passive ROM Lower Body   Passive ROM Lower Body X   Comments R LE limited due to pain   Active ROM Lower Body    Active ROM Lower Body  X   Comments R LE limited due to pain   Strength Lower Body   Lower Body Strength  X   Comments unable to assess R LE   Gait Analysis   Gait Level Of Assist Unable to Participate   Weight Bearing Status TTWB R LE   Comments unable to attempt due to pain and fear   Bed Mobility    Supine to Sit Maximal Assist   Sit to Supine Maximal Assist   Scooting Moderate Assist   Functional Mobility   Sit to Stand Moderate Assist   Bed, Chair, Wheelchair Transfer Unable to Participate   Toilet Transfers Unable to Participate   Mobility bed mob, STS   Short Term Goals    Short Term Goal # 1 Pt will be able to complete bed mobility with min assist in 6tx in order to return home with    Short Term Goal # 2 Pt will be able to complete functional transfers with min assist in 6tx in order to progress back to ambulating   Short Term Goal # 3 Pt will be able to ambulate 10ft with mod assist and FWW in 6tx in order to progress back   Anticipated Discharge Equipment and Recommendations   DC Equipment Recommendations Unable to determine at this time   Discharge Recommendations Recommend post-acute placement for additional physical therapy services prior to discharge home

## 2020-11-29 NOTE — DISCHARGE PLANNING
Anticipated Discharge Disposition: SNF    Action: Spoke to spouse, Jeremy about SNF placement. He agrees with plan. Pt has been to [facility] in the past. Currently [facility] and [facility] are the only accepting SNFs. He chose [facility] #1 and [facility].     Barriers to Discharge: Pending SNF acceptance.    Plan:F/U on SNF referrals Monday.

## 2020-11-29 NOTE — CARE PLAN
Problem: Pain Management  Goal: Pain level will decrease to patient's comfort goal  Outcome: PROGRESSING AS EXPECTED  Intervention: Educate and implement non-pharmacologic comfort measures. Examples: relaxation, distration, play therapy, activity therapy, massage, etc.  Flowsheets (Taken 11/29/2020 0351)  Intervention:   [place]   Rest     Problem: Safety  Goal: Will remain free from falls  Outcome: PROGRESSING AS EXPECTED  Intervention: Implement fall precautions  Flowsheets (Taken 11/28/2020 2030)  Environmental Precautions:   Treaded Slipper Socks on Patient   Personal Belongings, Wastebasket, Call Bell etc. in Easy Reach   Report Given to Other Health Care Providers Regarding Fall Risk   Communication Sign for Patients & Families   Mobility Assessed & Appropriate Sign Placed

## 2020-11-29 NOTE — DISCHARGE PLANNING
Received Choice form at 1133  Agency/Facility Name: (1) [facility] (2) [facility]  Referral sent per Choice form @ 1200

## 2020-11-30 ENCOUNTER — APPOINTMENT (OUTPATIENT)
Dept: RADIOLOGY | Facility: MEDICAL CENTER | Age: 75
DRG: 467 | End: 2020-11-30
Attending: INTERNAL MEDICINE
Payer: MEDICARE

## 2020-11-30 LAB
ANION GAP SERPL CALC-SCNC: 9 MMOL/L (ref 7–16)
ANION GAP SERPL CALC-SCNC: 9 MMOL/L (ref 7–16)
APPEARANCE UR: ABNORMAL
BACTERIA #/AREA URNS HPF: NEGATIVE /HPF
BASOPHILS # BLD AUTO: 0.1 % (ref 0–1.8)
BASOPHILS # BLD: 0.01 K/UL (ref 0–0.12)
BILIRUB UR QL STRIP.AUTO: NEGATIVE
BUN SERPL-MCNC: 32 MG/DL (ref 8–22)
BUN SERPL-MCNC: 34 MG/DL (ref 8–22)
CALCIUM SERPL-MCNC: 10 MG/DL (ref 8.5–10.5)
CALCIUM SERPL-MCNC: 9.4 MG/DL (ref 8.5–10.5)
CHLORIDE SERPL-SCNC: 90 MMOL/L (ref 96–112)
CHLORIDE SERPL-SCNC: 94 MMOL/L (ref 96–112)
CO2 SERPL-SCNC: 23 MMOL/L (ref 20–33)
CO2 SERPL-SCNC: 23 MMOL/L (ref 20–33)
COLOR UR: YELLOW
CREAT SERPL-MCNC: 1.57 MG/DL (ref 0.5–1.4)
CREAT SERPL-MCNC: 1.76 MG/DL (ref 0.5–1.4)
CRP SERPL HS-MCNC: 16.95 MG/DL (ref 0–0.75)
EOSINOPHIL # BLD AUTO: 0.01 K/UL (ref 0–0.51)
EOSINOPHIL NFR BLD: 0.1 % (ref 0–6.9)
EPI CELLS #/AREA URNS HPF: ABNORMAL /HPF
ERYTHROCYTE [DISTWIDTH] IN BLOOD BY AUTOMATED COUNT: 51.2 FL (ref 35.9–50)
GLUCOSE SERPL-MCNC: 102 MG/DL (ref 65–99)
GLUCOSE SERPL-MCNC: 95 MG/DL (ref 65–99)
GLUCOSE UR STRIP.AUTO-MCNC: NEGATIVE MG/DL
HCT VFR BLD AUTO: 27.8 % (ref 37–47)
HGB BLD-MCNC: 9.5 G/DL (ref 12–16)
HYALINE CASTS #/AREA URNS LPF: ABNORMAL /LPF
IMM GRANULOCYTES # BLD AUTO: 0.05 K/UL (ref 0–0.11)
IMM GRANULOCYTES NFR BLD AUTO: 0.4 % (ref 0–0.9)
KETONES UR STRIP.AUTO-MCNC: ABNORMAL MG/DL
LEUKOCYTE ESTERASE UR QL STRIP.AUTO: ABNORMAL
LYMPHOCYTES # BLD AUTO: 0.71 K/UL (ref 1–4.8)
LYMPHOCYTES NFR BLD: 5.8 % (ref 22–41)
MAGNESIUM SERPL-MCNC: 2.7 MG/DL (ref 1.5–2.5)
MCH RBC QN AUTO: 33.2 PG (ref 27–33)
MCHC RBC AUTO-ENTMCNC: 34.2 G/DL (ref 33.6–35)
MCV RBC AUTO: 97.2 FL (ref 81.4–97.8)
MICRO URNS: ABNORMAL
MONOCYTES # BLD AUTO: 1.8 K/UL (ref 0–0.85)
MONOCYTES NFR BLD AUTO: 14.6 % (ref 0–13.4)
NEUTROPHILS # BLD AUTO: 9.73 K/UL (ref 2–7.15)
NEUTROPHILS NFR BLD: 79 % (ref 44–72)
NITRITE UR QL STRIP.AUTO: NEGATIVE
NRBC # BLD AUTO: 0 K/UL
NRBC BLD-RTO: 0 /100 WBC
OSMOLALITY UR: 368 MOSM/KG H2O (ref 300–900)
PH UR STRIP.AUTO: 5 [PH] (ref 5–8)
PLATELET # BLD AUTO: 238 K/UL (ref 164–446)
PMV BLD AUTO: 9.9 FL (ref 9–12.9)
POTASSIUM SERPL-SCNC: 3.4 MMOL/L (ref 3.6–5.5)
POTASSIUM SERPL-SCNC: 4 MMOL/L (ref 3.6–5.5)
PROCALCITONIN SERPL-MCNC: 1.14 NG/ML
PROT UR QL STRIP: 30 MG/DL
RBC # BLD AUTO: 2.86 M/UL (ref 4.2–5.4)
RBC # URNS HPF: ABNORMAL /HPF
RBC UR QL AUTO: ABNORMAL
SODIUM SERPL-SCNC: 122 MMOL/L (ref 135–145)
SODIUM SERPL-SCNC: 126 MMOL/L (ref 135–145)
SODIUM UR-SCNC: 51 MMOL/L
SP GR UR STRIP.AUTO: 1.01
UROBILINOGEN UR STRIP.AUTO-MCNC: 0.2 MG/DL
WBC # BLD AUTO: 12.3 K/UL (ref 4.8–10.8)
WBC #/AREA URNS HPF: ABNORMAL /HPF

## 2020-11-30 PROCEDURE — A9270 NON-COVERED ITEM OR SERVICE: HCPCS | Performed by: STUDENT IN AN ORGANIZED HEALTH CARE EDUCATION/TRAINING PROGRAM

## 2020-11-30 PROCEDURE — 80048 BASIC METABOLIC PNL TOTAL CA: CPT

## 2020-11-30 PROCEDURE — 83935 ASSAY OF URINE OSMOLALITY: CPT

## 2020-11-30 PROCEDURE — 700111 HCHG RX REV CODE 636 W/ 250 OVERRIDE (IP): Performed by: INTERNAL MEDICINE

## 2020-11-30 PROCEDURE — 85025 COMPLETE CBC W/AUTO DIFF WBC: CPT

## 2020-11-30 PROCEDURE — 86140 C-REACTIVE PROTEIN: CPT

## 2020-11-30 PROCEDURE — A9270 NON-COVERED ITEM OR SERVICE: HCPCS | Performed by: INTERNAL MEDICINE

## 2020-11-30 PROCEDURE — 83735 ASSAY OF MAGNESIUM: CPT

## 2020-11-30 PROCEDURE — 84145 PROCALCITONIN (PCT): CPT

## 2020-11-30 PROCEDURE — 71045 X-RAY EXAM CHEST 1 VIEW: CPT

## 2020-11-30 PROCEDURE — 700105 HCHG RX REV CODE 258: Performed by: INTERNAL MEDICINE

## 2020-11-30 PROCEDURE — 87040 BLOOD CULTURE FOR BACTERIA: CPT | Mod: 91

## 2020-11-30 PROCEDURE — A9270 NON-COVERED ITEM OR SERVICE: HCPCS | Performed by: ORTHOPAEDIC SURGERY

## 2020-11-30 PROCEDURE — 87086 URINE CULTURE/COLONY COUNT: CPT

## 2020-11-30 PROCEDURE — 770006 HCHG ROOM/CARE - MED/SURG/GYN SEMI*

## 2020-11-30 PROCEDURE — 51798 US URINE CAPACITY MEASURE: CPT

## 2020-11-30 PROCEDURE — 81001 URINALYSIS AUTO W/SCOPE: CPT

## 2020-11-30 PROCEDURE — 700102 HCHG RX REV CODE 250 W/ 637 OVERRIDE(OP): Performed by: INTERNAL MEDICINE

## 2020-11-30 PROCEDURE — 84300 ASSAY OF URINE SODIUM: CPT

## 2020-11-30 PROCEDURE — 36415 COLL VENOUS BLD VENIPUNCTURE: CPT

## 2020-11-30 PROCEDURE — 700102 HCHG RX REV CODE 250 W/ 637 OVERRIDE(OP): Performed by: ORTHOPAEDIC SURGERY

## 2020-11-30 PROCEDURE — 99233 SBSQ HOSP IP/OBS HIGH 50: CPT | Performed by: INTERNAL MEDICINE

## 2020-11-30 PROCEDURE — 700101 HCHG RX REV CODE 250: Performed by: INTERNAL MEDICINE

## 2020-11-30 PROCEDURE — 700102 HCHG RX REV CODE 250 W/ 637 OVERRIDE(OP): Performed by: STUDENT IN AN ORGANIZED HEALTH CARE EDUCATION/TRAINING PROGRAM

## 2020-11-30 RX ORDER — SODIUM CHLORIDE 9 MG/ML
250 INJECTION, SOLUTION INTRAVENOUS ONCE
Status: COMPLETED | OUTPATIENT
Start: 2020-11-30 | End: 2020-11-30

## 2020-11-30 RX ORDER — POLYETHYLENE GLYCOL 3350 17 G/17G
1 POWDER, FOR SOLUTION ORAL 2 TIMES DAILY
Status: DISCONTINUED | OUTPATIENT
Start: 2020-11-30 | End: 2020-12-04 | Stop reason: HOSPADM

## 2020-11-30 RX ORDER — SODIUM CHLORIDE 9 MG/ML
INJECTION, SOLUTION INTRAVENOUS CONTINUOUS
Status: DISCONTINUED | OUTPATIENT
Start: 2020-11-30 | End: 2020-12-04 | Stop reason: HOSPADM

## 2020-11-30 RX ORDER — POTASSIUM CHLORIDE 20 MEQ/1
40 TABLET, EXTENDED RELEASE ORAL ONCE
Status: COMPLETED | OUTPATIENT
Start: 2020-11-30 | End: 2020-11-30

## 2020-11-30 RX ADMIN — ENOXAPARIN SODIUM 30 MG: 30 INJECTION SUBCUTANEOUS at 04:29

## 2020-11-30 RX ADMIN — LOSARTAN POTASSIUM 100 MG: 50 TABLET, FILM COATED ORAL at 04:29

## 2020-11-30 RX ADMIN — Medication 1 G: at 12:16

## 2020-11-30 RX ADMIN — Medication 1 G: at 17:48

## 2020-11-30 RX ADMIN — ASPIRIN 81 MG: 81 TABLET, COATED ORAL at 17:48

## 2020-11-30 RX ADMIN — POLYETHYLENE GLYCOL 3350 1 PACKET: 17 POWDER, FOR SOLUTION ORAL at 04:30

## 2020-11-30 RX ADMIN — Medication 2000 UNITS: at 04:29

## 2020-11-30 RX ADMIN — CYANOCOBALAMIN TAB 500 MCG 1000 MCG: 500 TAB at 04:29

## 2020-11-30 RX ADMIN — SODIUM CHLORIDE: 9 INJECTION, SOLUTION INTRAVENOUS at 17:54

## 2020-11-30 RX ADMIN — Medication 1 G: at 09:04

## 2020-11-30 RX ADMIN — AMLODIPINE BESYLATE 10 MG: 10 TABLET ORAL at 04:29

## 2020-11-30 RX ADMIN — OXYCODONE HYDROCHLORIDE 5 MG: 5 TABLET ORAL at 12:16

## 2020-11-30 RX ADMIN — ASPIRIN 81 MG: 81 TABLET, COATED ORAL at 04:29

## 2020-11-30 RX ADMIN — SODIUM CHLORIDE: 9 INJECTION, SOLUTION INTRAVENOUS at 09:24

## 2020-11-30 RX ADMIN — POLYETHYLENE GLYCOL 3350 1 PACKET: 17 POWDER, FOR SOLUTION ORAL at 17:48

## 2020-11-30 RX ADMIN — ROSUVASTATIN CALCIUM 10 MG: 10 TABLET, FILM COATED ORAL at 17:48

## 2020-11-30 RX ADMIN — SODIUM CHLORIDE 250 ML: 9 INJECTION, SOLUTION INTRAVENOUS at 09:04

## 2020-11-30 RX ADMIN — POTASSIUM CHLORIDE 40 MEQ: 1500 TABLET, EXTENDED RELEASE ORAL at 20:15

## 2020-11-30 RX ADMIN — CEFTRIAXONE SODIUM 2 G: 2 INJECTION, POWDER, FOR SOLUTION INTRAMUSCULAR; INTRAVENOUS at 09:55

## 2020-11-30 ASSESSMENT — ENCOUNTER SYMPTOMS
FALLS: 1
RESPIRATORY NEGATIVE: 1
GASTROINTESTINAL NEGATIVE: 1
CHILLS: 0
PSYCHIATRIC NEGATIVE: 1
FEVER: 0
MYALGIAS: 0
BACK PAIN: 0
EYES NEGATIVE: 1
WEIGHT LOSS: 0
NEUROLOGICAL NEGATIVE: 1
CARDIOVASCULAR NEGATIVE: 1

## 2020-11-30 ASSESSMENT — PAIN DESCRIPTION - PAIN TYPE
TYPE: SURGICAL PAIN

## 2020-11-30 NOTE — PROGRESS NOTES
"   Orthopaedic PA Progress Note    Interval changes:Doing well, pain free, may DC LMWH as ASA should be sufficient    ROS - Patient denies any new issues. No chest pain, dyspnea, or fever.  Pain well controlled.    /57   Pulse 70   Temp 36.3 °C (97.3 °F) (Temporal)   Resp 16   Ht 1.6 m (5' 3\")   Wt 47.1 kg (103 lb 13.4 oz)   SpO2 94%     Patient seen and examined  No acute distress  Breathing non labored  RRR  Surgical dressing is clean, dry, and intact. Patient clearly fires tibialis anterior, EHL, and gastrocnemius/soleus. Sensation is intact to light touch throughout superficial peroneal, deep peroneal, tibial, saphenous, and sural nerve distributions. Strong and palpable 2+ dorsalis pedis and posterior tibial pulses with capillary refill less than 2 seconds. No lower leg tenderness or discomfort.    Recent Labs     11/28/20  0616 11/29/20  0625 11/30/20  0645   WBC 11.0* 11.6* 12.3*   RBC 4.06* 3.19* 2.86*   HEMOGLOBIN 13.2 10.6* 9.5*   HEMATOCRIT 37.7 30.6* 27.8*   MCV 92.9 95.9 97.2   MCH 32.5 33.2* 33.2*   MCHC 35.0 34.6 34.2   RDW 50.0 51.1* 51.2*   PLATELETCT 224 216 238   MPV 9.9 9.7 9.9     Active Hospital Problems    Diagnosis   • Anemia [D64.9]     Priority: High   • Dementia (HCC) [F03.90]     Priority: High   • Fall [W19.XXXA]     Priority: High   • Closed fracture of neck of right femur (HCC) [S72.001A]     Priority: High   • Stage 3 chronic kidney disease [N18.30]     Priority: Medium   • Leukocytosis [D72.829]     Priority: Medium   • Hypertension [I10]     Priority: Medium   • CAD (coronary artery disease) [I25.10]     Priority: Low   • Hypertensive urgency [I16.0]     Priority: Low   • Hypokalemia [E87.6]     Priority: Low   • Hyponatremia [E87.1]       Assessment/Plan:  POD#2 S/P revision hemiarthroplasty RLE with ORIF  Wt bearing status - TTWB x 4 wks  PT/OT-initiated  Wound care:Dressing CDI  Drains - no  Hernandez-no  Sutures/Staples out- 10-14 days post operatively  Antibiotics: " complete  DVT Prophylaxis- TEDS/SCDs/Foot pumps.   Lovenox: Stop now, continue ECASA low dose BID x 30d  Future Procedures - none planned  Case Coordination for Discharge Planning - Disposition per Med/PT/OT.   Follow-Up: needs appointment with Dr. Thomas or Dr. Soares at  [facility] at 10-14 days post-op for re-evaluation, staple removal and radiographs.

## 2020-11-30 NOTE — CARE PLAN
Problem: Nutritional:  Goal: Achieve adequate nutritional intake  Description: Patient will consume 50% of meals/snacks  Outcome: PROGRESSING SLOWER THAN EXPECTED    Pt is eating mostly 25-50% of meals per ADLs. Pt reports eats more at lunch (50%) and less at breakfast/dinner (25%). Refusing supplements but agreeable to having snacks with fresh fruit. Encouraged PO intake.

## 2020-11-30 NOTE — PROGRESS NOTES
[facility] Medicine Daily Progress Note    Date of Service  11/30/2020    Chief Complaint  75 y.o. female admitted 11/25/2020 with fall    Hospital Course    75-year-old female with history of dementia, hypertension and coronary artery disease presented 11/25 after ground-level fall, the patient had dementia not able to give a good history of her fall however she denied any dizziness or loss of consciousness, denied any chest pain, the patient lives with her  who did not see her when she fell, on admission the patient was found to have dehydration with RASHAD and x-ray showed right hip fracture, the patient was evaluated by Ortho plan for surgery on 11/27 however due to severe anemia the surgery was postponed and it was done on 11/28, PT and OT evaluation recommended SNF.    The patient developed leukocytosis with some worsening on her confusion and dementia, also elevated inflammation markers including procalcitonin and CRP, the patient denied any symptoms however start with ceftriaxone for 3 days for possible UTI, urine and blood culture are pending.    The patient developed hyponatremia around 122, with RASHAD after surgery, IV fluid was started.      Interval Problem Update  -Evaluated examined the patient at bedside, around her baseline mental status  -Worsening leukocytosis and inflammation markers, urine showed white blood cell, still confused, will start with ceftriaxone IV fluid.  -Worsening hyponatremia with worsening creatinine, start with IV fluid and close monitoring  -PT and OT recommended SNF      Consultants/Specialty  Orthopedic    Code Status  Full Code    Disposition  SNF,   Medically is not cleared due to worsening leukocytosis and possible UTI.    Review of Systems  Review of Systems   Constitutional: Positive for malaise/fatigue. Negative for chills, fever and weight loss.   HENT: Negative.    Eyes: Negative.    Respiratory: Negative.    Cardiovascular: Negative.    Gastrointestinal: Negative.     Genitourinary: Negative.    Musculoskeletal: Positive for falls and joint pain. Negative for back pain and myalgias.   Neurological: Negative.    Psychiatric/Behavioral: Negative.         Physical Exam  Temp:  [36.3 °C (97.3 °F)-36.8 °C (98.2 °F)] 36.3 °C (97.3 °F)  Pulse:  [70-92] 70  Resp:  [16] 16  BP: ()/(54-66) 121/57  SpO2:  [94 %-97 %] 94 %    Physical Exam  Constitutional:       Appearance: She is not ill-appearing.   HENT:      Head: Normocephalic and atraumatic.   Eyes:      General: No scleral icterus.  Neck:      Musculoskeletal: No neck rigidity.   Cardiovascular:      Rate and Rhythm: Normal rate.      Heart sounds: No murmur.   Pulmonary:      Effort: No respiratory distress.      Breath sounds: No wheezing or rales.   Abdominal:      General: Abdomen is flat. Bowel sounds are normal. There is no distension.      Palpations: Abdomen is soft.      Tenderness: There is no abdominal tenderness.   Musculoskeletal:         General: Deformity present. No swelling.      Right lower leg: No edema.      Left lower leg: No edema.   Skin:     Coloration: Skin is not pale.      Findings: No bruising or lesion.   Neurological:      General: No focal deficit present.      Mental Status: She is alert. Mental status is at baseline.      Cranial Nerves: No cranial nerve deficit.      Sensory: No sensory deficit.      Motor: No weakness.      Gait: Gait normal.      Comments: Oriented x2         Fluids    Intake/Output Summary (Last 24 hours) at 11/30/2020 0947  Last data filed at 11/30/2020 0750  Gross per 24 hour   Intake --   Output 100 ml   Net -100 ml       Laboratory  Recent Labs     11/28/20  0616 11/29/20  0625 11/30/20  0645   WBC 11.0* 11.6* 12.3*   RBC 4.06* 3.19* 2.86*   HEMOGLOBIN 13.2 10.6* 9.5*   HEMATOCRIT 37.7 30.6* 27.8*   MCV 92.9 95.9 97.2   MCH 32.5 33.2* 33.2*   MCHC 35.0 34.6 34.2   RDW 50.0 51.1* 51.2*   PLATELETCT 224 216 238   MPV 9.9 9.7 9.9     Recent Labs     11/28/20  0616  11/29/20  0625 11/29/20  0915 11/30/20  0645   SODIUM 128* 125* 126* 122*   POTASSIUM 4.0 4.0  --  4.0   CHLORIDE 93* 93*  --  90*   CO2 22 21  --  23   GLUCOSE 106* 113*  --  95   BUN 19 26*  --  34*   CREATININE 0.86 1.34  --  1.76*   CALCIUM 9.4 9.3  --  10.0                   Imaging  DX-CHEST-LIMITED (1 VIEW)   Final Result         No acute cardiac or pulmonary abnormality is identified.      DX-PELVIS-1 OR 2 VIEWS   Final Result      1.  Interval placement of femoral component across periprosthetic fracture      2.  Presumed antibiotic beads present      EC-ECHOCARDIOGRAM COMPLETE W/O CONT   Final Result      CT-HIP W/O PLUS RECONS RIGHT   Final Result      Periprosthetic fracture in the proximal right femur.      CT-HEAD W/O   Final Result      No acute intracranial findings.         DX-FEMUR-2+ RIGHT   Final Result      1.  Periprosthetic fracture of the proximal RIGHT femoral diaphysis   2.  Osteopenia      DX-PELVIS-1 OR 2 VIEWS   Final Result      1.  Periprosthetic fracture of the proximal RIGHT femur   2.  Osteopenia   3.  LEFT hip osteoarthritis      DX-CHEST-PORTABLE (1 VIEW)   Final Result      1.  No acute cardiac or pulmonary abnormalities are identified.   2.  Emphysema   3.  Atherosclerosis      DX-CHEST-PORTABLE (1 VIEW)    (Results Pending)        Assessment/Plan  * Closed fracture of neck of right femur (HCC)- (present on admission)  Assessment & Plan  Likely after mechanical fall   The patient has history of right hemiarthroplasty on 4/28/19  X-ray showed periprosthetic fracture of proximal right femoral diaphysis  surgery on 11/28 for ORIF and revision  PT and OT SNF, medically and surgically cleared  Pain control and prevent constipation  No pharmacological for possible delirium  Lovenox for DVT prophylaxis    Anemia  Assessment & Plan  With elevated MCV  Around 7  B12 around 300  The patient received 2 units of blood transfusion  Cannot order iron panel due to blood transfusion  Continue  B12 supplementation  Follow-up with PCP as outpatient    Dementia (HCC)  Assessment & Plan  Like mild to moderate  The patient is at high risk for delirium during this hospitalization  Pain control and nonpharmacological treatment  Avoid benzo and antihistamine  PT and OT and placement      Fall  Assessment & Plan  Likely mechanical  Echo is pending  EKG no arrhythmia  PT and OT after surgery  Start vitamin D and B12 supplementation    Hyponatremia- (present on admission)  Assessment & Plan  Mild, chronic with no symptoms   Worsening with worsening RASHAD  IV fluid and close monitoring  Continue salt tablets  Continue monitoring with labs every 12 hours    Stage 3 chronic kidney disease  Assessment & Plan  Acute on chronic kidney disease stage III   Avoid nephrotoxic medication  IV hydration   labs daily    Leukocytosis- (present on admission)  Assessment & Plan  Worsening leukocytosis with inflammation markers  UA showed white blood cell  Waiting for chest x-ray and blood culture  Will start ceftriaxone and IV fluid  Monitor closely      Hypertension  Assessment & Plan  Due to high risk of fall, dementia and age we will discontinue hydrochlorothiazide and  Continue amlodipine and losartan  Close monitoring.    CAD (coronary artery disease)  Assessment & Plan  Cont ASA/statin, ARB, spironolactone  Echo showed diastolic dysfunction with normal systolic function    Hypertensive urgency- (present on admission)  Assessment & Plan  SBP > 180 in ED likely related to pain and new fall  Due to risk of fall and hypotension we will hold hydrochlorothiazide and spironolactone  Hold losartan due to RASHAD  Continue monitoring: And amlodipine,       Hypokalemia- (present on admission)  Assessment & Plan  Replaced       VTE prophylaxis: lovenox

## 2020-11-30 NOTE — PROGRESS NOTES
0821: Delfino Alcantara MD. Received a call back. Notified MD Quinton about Na of 122 and worsening renal labs. Also notified MD Quinton about pt only voiding 100 mL of urine after having had charles removed yesterday (Sunday) morning. Per MD Quinton, will take a look at pt's labs and will see pt. No new orders given.

## 2020-12-01 ENCOUNTER — APPOINTMENT (OUTPATIENT)
Dept: RADIOLOGY | Facility: MEDICAL CENTER | Age: 75
DRG: 467 | End: 2020-12-01
Attending: INTERNAL MEDICINE
Payer: MEDICARE

## 2020-12-01 PROBLEM — N17.9 AKI (ACUTE KIDNEY INJURY) (HCC): Status: ACTIVE | Noted: 2020-12-01

## 2020-12-01 LAB
ANION GAP SERPL CALC-SCNC: 11 MMOL/L (ref 7–16)
BASOPHILS # BLD AUTO: 0.2 % (ref 0–1.8)
BASOPHILS # BLD: 0.02 K/UL (ref 0–0.12)
BUN SERPL-MCNC: 30 MG/DL (ref 8–22)
CALCIUM SERPL-MCNC: 9.8 MG/DL (ref 8.5–10.5)
CHLORIDE SERPL-SCNC: 97 MMOL/L (ref 96–112)
CO2 SERPL-SCNC: 23 MMOL/L (ref 20–33)
CREAT SERPL-MCNC: 1.44 MG/DL (ref 0.5–1.4)
CRP SERPL HS-MCNC: 15.06 MG/DL (ref 0–0.75)
EOSINOPHIL # BLD AUTO: 0.02 K/UL (ref 0–0.51)
EOSINOPHIL NFR BLD: 0.2 % (ref 0–6.9)
ERYTHROCYTE [DISTWIDTH] IN BLOOD BY AUTOMATED COUNT: 52.4 FL (ref 35.9–50)
GLUCOSE SERPL-MCNC: 95 MG/DL (ref 65–99)
HCT VFR BLD AUTO: 27.8 % (ref 37–47)
HGB BLD-MCNC: 9.5 G/DL (ref 12–16)
IMM GRANULOCYTES # BLD AUTO: 0.08 K/UL (ref 0–0.11)
IMM GRANULOCYTES NFR BLD AUTO: 0.7 % (ref 0–0.9)
LYMPHOCYTES # BLD AUTO: 0.45 K/UL (ref 1–4.8)
LYMPHOCYTES NFR BLD: 3.8 % (ref 22–41)
MAGNESIUM SERPL-MCNC: 2.3 MG/DL (ref 1.5–2.5)
MCH RBC QN AUTO: 33.7 PG (ref 27–33)
MCHC RBC AUTO-ENTMCNC: 34.2 G/DL (ref 33.6–35)
MCV RBC AUTO: 98.6 FL (ref 81.4–97.8)
MONOCYTES # BLD AUTO: 1.67 K/UL (ref 0–0.85)
MONOCYTES NFR BLD AUTO: 14.1 % (ref 0–13.4)
NEUTROPHILS # BLD AUTO: 9.59 K/UL (ref 2–7.15)
NEUTROPHILS NFR BLD: 81 % (ref 44–72)
NRBC # BLD AUTO: 0 K/UL
NRBC BLD-RTO: 0 /100 WBC
PLATELET # BLD AUTO: 266 K/UL (ref 164–446)
PMV BLD AUTO: 9.9 FL (ref 9–12.9)
POTASSIUM SERPL-SCNC: 4.2 MMOL/L (ref 3.6–5.5)
PROCALCITONIN SERPL-MCNC: 0.85 NG/ML
RBC # BLD AUTO: 2.82 M/UL (ref 4.2–5.4)
SODIUM SERPL-SCNC: 131 MMOL/L (ref 135–145)
WBC # BLD AUTO: 11.8 K/UL (ref 4.8–10.8)

## 2020-12-01 PROCEDURE — 700102 HCHG RX REV CODE 250 W/ 637 OVERRIDE(OP): Performed by: INTERNAL MEDICINE

## 2020-12-01 PROCEDURE — A9270 NON-COVERED ITEM OR SERVICE: HCPCS | Performed by: ORTHOPAEDIC SURGERY

## 2020-12-01 PROCEDURE — 85025 COMPLETE CBC W/AUTO DIFF WBC: CPT

## 2020-12-01 PROCEDURE — 36415 COLL VENOUS BLD VENIPUNCTURE: CPT

## 2020-12-01 PROCEDURE — 700105 HCHG RX REV CODE 258: Performed by: INTERNAL MEDICINE

## 2020-12-01 PROCEDURE — A9270 NON-COVERED ITEM OR SERVICE: HCPCS | Performed by: STUDENT IN AN ORGANIZED HEALTH CARE EDUCATION/TRAINING PROGRAM

## 2020-12-01 PROCEDURE — 700101 HCHG RX REV CODE 250: Performed by: INTERNAL MEDICINE

## 2020-12-01 PROCEDURE — 80048 BASIC METABOLIC PNL TOTAL CA: CPT

## 2020-12-01 PROCEDURE — 97530 THERAPEUTIC ACTIVITIES: CPT

## 2020-12-01 PROCEDURE — A9270 NON-COVERED ITEM OR SERVICE: HCPCS | Performed by: INTERNAL MEDICINE

## 2020-12-01 PROCEDURE — 84145 PROCALCITONIN (PCT): CPT

## 2020-12-01 PROCEDURE — 97535 SELF CARE MNGMENT TRAINING: CPT

## 2020-12-01 PROCEDURE — 770006 HCHG ROOM/CARE - MED/SURG/GYN SEMI*

## 2020-12-01 PROCEDURE — 700102 HCHG RX REV CODE 250 W/ 637 OVERRIDE(OP): Performed by: ORTHOPAEDIC SURGERY

## 2020-12-01 PROCEDURE — 700111 HCHG RX REV CODE 636 W/ 250 OVERRIDE (IP): Performed by: INTERNAL MEDICINE

## 2020-12-01 PROCEDURE — 99232 SBSQ HOSP IP/OBS MODERATE 35: CPT | Performed by: STUDENT IN AN ORGANIZED HEALTH CARE EDUCATION/TRAINING PROGRAM

## 2020-12-01 PROCEDURE — 700102 HCHG RX REV CODE 250 W/ 637 OVERRIDE(OP): Performed by: STUDENT IN AN ORGANIZED HEALTH CARE EDUCATION/TRAINING PROGRAM

## 2020-12-01 PROCEDURE — 83735 ASSAY OF MAGNESIUM: CPT

## 2020-12-01 PROCEDURE — 86140 C-REACTIVE PROTEIN: CPT

## 2020-12-01 RX ADMIN — CYANOCOBALAMIN TAB 500 MCG 1000 MCG: 500 TAB at 04:26

## 2020-12-01 RX ADMIN — OXYCODONE HYDROCHLORIDE 5 MG: 5 TABLET ORAL at 20:32

## 2020-12-01 RX ADMIN — ASPIRIN 81 MG: 81 TABLET, COATED ORAL at 04:26

## 2020-12-01 RX ADMIN — ROSUVASTATIN CALCIUM 10 MG: 10 TABLET, FILM COATED ORAL at 16:58

## 2020-12-01 RX ADMIN — CEFTRIAXONE SODIUM 2 G: 2 INJECTION, POWDER, FOR SOLUTION INTRAMUSCULAR; INTRAVENOUS at 10:02

## 2020-12-01 RX ADMIN — Medication 1 G: at 16:58

## 2020-12-01 RX ADMIN — AMLODIPINE BESYLATE 10 MG: 10 TABLET ORAL at 04:26

## 2020-12-01 RX ADMIN — ASPIRIN 81 MG: 81 TABLET, COATED ORAL at 16:58

## 2020-12-01 RX ADMIN — Medication 1 G: at 08:28

## 2020-12-01 RX ADMIN — SODIUM CHLORIDE: 9 INJECTION, SOLUTION INTRAVENOUS at 17:01

## 2020-12-01 RX ADMIN — POLYETHYLENE GLYCOL 3350 1 PACKET: 17 POWDER, FOR SOLUTION ORAL at 04:26

## 2020-12-01 RX ADMIN — Medication 2000 UNITS: at 04:26

## 2020-12-01 RX ADMIN — Medication 1 G: at 12:19

## 2020-12-01 ASSESSMENT — ENCOUNTER SYMPTOMS
COUGH: 0
WHEEZING: 0
EYE PAIN: 0
FEVER: 0
FALLS: 1
DIARRHEA: 0
FOCAL WEAKNESS: 0
MYALGIAS: 0
PALPITATIONS: 0
CARDIOVASCULAR NEGATIVE: 1
SENSORY CHANGE: 0
EYE REDNESS: 0
HEMOPTYSIS: 0
FLANK PAIN: 0
PSYCHIATRIC NEGATIVE: 1
NEUROLOGICAL NEGATIVE: 1
ABDOMINAL PAIN: 0
HEADACHES: 0
HALLUCINATIONS: 0
DIZZINESS: 0
CONSTIPATION: 0
NERVOUS/ANXIOUS: 0
NECK PAIN: 0
DEPRESSION: 0
SHORTNESS OF BREATH: 0
RESPIRATORY NEGATIVE: 1
SORE THROAT: 0
SPEECH CHANGE: 0
VOMITING: 0
WEIGHT LOSS: 0
NAUSEA: 0
GASTROINTESTINAL NEGATIVE: 1
LOSS OF CONSCIOUSNESS: 0
EYE DISCHARGE: 0
DIAPHORESIS: 0
TREMORS: 0
BACK PAIN: 0
CHILLS: 0
EYES NEGATIVE: 1

## 2020-12-01 ASSESSMENT — COGNITIVE AND FUNCTIONAL STATUS - GENERAL
HELP NEEDED FOR BATHING: A LOT
DAILY ACTIVITIY SCORE: 15
PERSONAL GROOMING: A LITTLE
DRESSING REGULAR UPPER BODY CLOTHING: A LITTLE
DRESSING REGULAR LOWER BODY CLOTHING: A LOT
TOILETING: A LOT
SUGGESTED CMS G CODE MODIFIER DAILY ACTIVITY: CK
EATING MEALS: A LITTLE

## 2020-12-01 ASSESSMENT — PAIN DESCRIPTION - PAIN TYPE: TYPE: SURGICAL PAIN

## 2020-12-01 ASSESSMENT — LIFESTYLE VARIABLES: SUBSTANCE_ABUSE: 0

## 2020-12-01 NOTE — PROGRESS NOTES
[facility] Medicine Daily Progress Note    Date of Service  12/1/2020    Chief Complaint  75 y.o. female admitted 11/25/2020 with fall    Hospital Course    75-year-old female with history of dementia, hypertension and coronary artery disease presented 11/25 after ground-level fall, the patient had dementia not able to give a good history of her fall however she denied any dizziness or loss of consciousness, denied any chest pain, the patient lives with her  who did not see her when she fell, on admission the patient was found to have dehydration with RASHAD and x-ray showed right hip fracture, the patient was evaluated by Ortho plan for surgery on 11/27 however due to severe anemia the surgery was postponed and it was done on 11/28, PT and OT evaluation recommended SNF.    The patient developed leukocytosis with some worsening on her confusion and dementia, also elevated inflammation markers including procalcitonin and CRP, the patient denied any symptoms however start with ceftriaxone for 3 days for possible UTI, urine and blood culture are pending.    The patient developed hyponatremia around 122, with RASHAD after surgery, IV fluid was started.      Interval Problem Update  I evaluated and examined the patient at the bedside.  Labs and imaging were reviewed.    Leukocytosis and RASHAD has been improving.  Continue Rocephin    Hyponatremia has been improved, continue normal saline and monitor    -Worsening hyponatremia with worsening creatinine, start with IV fluid and close monitoring    Wbc 12.3-> 11.8  cre 0.8-> 1.7-> 1.57-> 1.44  Na 126-> 131      Consultants/Specialty  Orthopedic    Code Status  Full Code    Disposition  SNF,   Medically is not cleared due to worsening leukocytosis and possible UTI.    Review of Systems  Review of Systems   Constitutional: Positive for malaise/fatigue. Negative for chills, diaphoresis, fever and weight loss.   HENT: Negative.  Negative for congestion, ear discharge, ear pain and  sore throat.    Eyes: Negative.  Negative for pain, discharge and redness.   Respiratory: Negative.  Negative for cough, hemoptysis, shortness of breath and wheezing.    Cardiovascular: Negative.  Negative for chest pain, palpitations and leg swelling.   Gastrointestinal: Negative.  Negative for abdominal pain, constipation, diarrhea, nausea and vomiting.   Genitourinary: Negative.  Negative for dysuria, flank pain, frequency, hematuria and urgency.   Musculoskeletal: Positive for falls and joint pain. Negative for back pain, myalgias and neck pain.   Skin: Negative for itching.   Neurological: Negative.  Negative for dizziness, tremors, sensory change, speech change, focal weakness, loss of consciousness and headaches.   Psychiatric/Behavioral: Negative.  Negative for depression, hallucinations and substance abuse. The patient is not nervous/anxious.         Physical Exam  Temp:  [36.5 °C (97.7 °F)-36.7 °C (98.1 °F)] 36.7 °C (98.1 °F)  Pulse:  [74-84] 84  Resp:  [16] 16  BP: (118-133)/(54-63) 133/55  SpO2:  [94 %-97 %] 94 %    Physical Exam  Constitutional:       General: She is not in acute distress.     Appearance: Normal appearance. She is not ill-appearing.   HENT:      Head: Normocephalic and atraumatic.      Right Ear: External ear normal.      Left Ear: External ear normal.      Nose: Nose normal. No congestion or rhinorrhea.      Mouth/Throat:      Mouth: Mucous membranes are moist.   Eyes:      General: No scleral icterus.     Extraocular Movements: Extraocular movements intact.      Conjunctiva/sclera: Conjunctivae normal.      Pupils: Pupils are equal, round, and reactive to light.   Neck:      Musculoskeletal: Normal range of motion and neck supple. No neck rigidity.   Cardiovascular:      Rate and Rhythm: Normal rate and regular rhythm.      Pulses: Normal pulses.      Heart sounds: Normal heart sounds. No murmur.   Pulmonary:      Effort: Pulmonary effort is normal. No respiratory distress.       Breath sounds: Normal breath sounds. No stridor. No wheezing, rhonchi or rales.   Chest:      Chest wall: No tenderness.   Abdominal:      General: Abdomen is flat. Bowel sounds are normal. There is no distension.      Palpations: Abdomen is soft.      Tenderness: There is no abdominal tenderness. There is no guarding or rebound.   Musculoskeletal: Normal range of motion.         General: Deformity present. No swelling or tenderness.      Right lower leg: No edema.      Left lower leg: No edema.   Skin:     General: Skin is warm and dry.      Coloration: Skin is not pale.      Findings: No bruising or lesion.   Neurological:      General: No focal deficit present.      Mental Status: She is alert and oriented to person, place, and time. Mental status is at baseline.      Cranial Nerves: No cranial nerve deficit.      Sensory: No sensory deficit.      Motor: No weakness.      Coordination: Coordination normal.      Gait: Gait normal.      Comments: Oriented x2   Psychiatric:         Mood and Affect: Mood normal.         Fluids    Intake/Output Summary (Last 24 hours) at 12/1/2020 1704  Last data filed at 12/1/2020 1030  Gross per 24 hour   Intake 240 ml   Output 20 ml   Net 220 ml       Laboratory  Recent Labs     11/29/20  0625 11/30/20  0645 12/01/20  0338   WBC 11.6* 12.3* 11.8*   RBC 3.19* 2.86* 2.82*   HEMOGLOBIN 10.6* 9.5* 9.5*   HEMATOCRIT 30.6* 27.8* 27.8*   MCV 95.9 97.2 98.6*   MCH 33.2* 33.2* 33.7*   MCHC 34.6 34.2 34.2   RDW 51.1* 51.2* 52.4*   PLATELETCT 216 238 266   MPV 9.7 9.9 9.9     Recent Labs     11/30/20  0645 11/30/20  1643 12/01/20  0338   SODIUM 122* 126* 131*   POTASSIUM 4.0 3.4* 4.2   CHLORIDE 90* 94* 97   CO2 23 23 23   GLUCOSE 95 102* 95   BUN 34* 32* 30*   CREATININE 1.76* 1.57* 1.44*   CALCIUM 10.0 9.4 9.8                   Imaging  IR-US GUIDED PIV   Final Result    Ultrasound-guided PERIPHERAL IV INSERTION performed by    qualified nursing staff as above.      DX-CHEST-PORTABLE (1  VIEW)   Final Result      1.  No acute cardiac or pulmonary abnormalities are identified.   2.  Atherosclerosis   3.  Emphysema      DX-CHEST-LIMITED (1 VIEW)   Final Result         No acute cardiac or pulmonary abnormality is identified.      DX-PELVIS-1 OR 2 VIEWS   Final Result      1.  Interval placement of femoral component across periprosthetic fracture      2.  Presumed antibiotic beads present      EC-ECHOCARDIOGRAM COMPLETE W/O CONT   Final Result      CT-HIP W/O PLUS RECONS RIGHT   Final Result      Periprosthetic fracture in the proximal right femur.      CT-HEAD W/O   Final Result      No acute intracranial findings.         DX-FEMUR-2+ RIGHT   Final Result      1.  Periprosthetic fracture of the proximal RIGHT femoral diaphysis   2.  Osteopenia      DX-PELVIS-1 OR 2 VIEWS   Final Result      1.  Periprosthetic fracture of the proximal RIGHT femur   2.  Osteopenia   3.  LEFT hip osteoarthritis      DX-CHEST-PORTABLE (1 VIEW)   Final Result      1.  No acute cardiac or pulmonary abnormalities are identified.   2.  Emphysema   3.  Atherosclerosis           Assessment/Plan  * Closed fracture of neck of right femur (HCC)- (present on admission)  Assessment & Plan  Likely after mechanical fall   The patient has history of right hemiarthroplasty on 4/28/19  X-ray showed periprosthetic fracture of proximal right femoral diaphysis  surgery on 11/28 for ORIF and revision  PT and OT SNF, medically and surgically cleared  Pain control and prevent constipation  No pharmacological for possible delirium  Lovenox for DVT prophylaxis    RASHAD (acute kidney injury) ([facility])  Assessment & Plan  Improving  Continue IV fluid and monitor    Anemia  Assessment & Plan  With elevated MCV  Around 7  B12 around 300  The patient received 2 units of blood transfusion  Cannot order iron panel due to blood transfusion  Continue B12 supplementation  Follow-up with PCP as outpatient    Dementia ([facility])  Assessment & Plan  Like mild to  moderate  The patient is at high risk for delirium during this hospitalization  Pain control and nonpharmacological treatment  Avoid benzo and antihistamine  PT and OT and placement      Fall  Assessment & Plan  Likely mechanical  Echo is pending  EKG no arrhythmia  PT and OT after surgery  Start vitamin D and B12 supplementation    Hyponatremia- (present on admission)  Assessment & Plan    IV fluid and close monitoring  Continue salt tablets    -Improving    Stage 3 chronic kidney disease  Assessment & Plan  Acute on chronic kidney disease stage III   Avoid nephrotoxic medication  IV hydration   labs daily    Leukocytosis- (present on admission)  Assessment & Plan  Worsening leukocytosis with inflammation markers  UA showed white blood cell  Waiting for chest x-ray and blood culture  Will start ceftriaxone and IV fluid  Monitor closely      Hypertension  Assessment & Plan  Due to high risk of fall, dementia and age we will discontinue hydrochlorothiazide and  Continue amlodipine and losartan  Close monitoring.    CAD (coronary artery disease)  Assessment & Plan  Cont ASA/statin, ARB, spironolactone  Echo showed diastolic dysfunction with normal systolic function    Hypertensive urgency- (present on admission)  Assessment & Plan  SBP > 180 in ED likely related to pain and new fall  Due to risk of fall and hypotension we will hold hydrochlorothiazide and spironolactone  Hold losartan due to RASHAD  Continue monitoring: And amlodipine,       Hypokalemia- (present on admission)  Assessment & Plan  Replaced       VTE prophylaxis: lovenox

## 2020-12-01 NOTE — THERAPY
Occupational Therapy  Daily Treatment     Patient Name: Sangita Bolton  Age:  75 y.o., Sex:  female  Medical Record #: [id number]  Today's Date: 12/1/2020     Precautions  Precautions: (P) Fall Risk, Toe Touch Weight Bearing Right Lower Extremity, Posterior Hip Precautions  Comments: dementia    Assessment    Encouragement required to participate in EOB/OOB activity. demonstrates progress since last visit, slowly improving. Continues to resist stand/ transfer activity    Plan    Continue current treatment plan.    DC Equipment Recommendations: (P) Unable to determine at this time  Discharge Recommendations: (P) Recommend post-acute placement for additional occupational therapy services prior to discharge home    Subjective    No new issues reported. Will benefit from continued OT to focus on ADLS, transfers, strengthening     Objective       12/01/20 1245   Total Time Spent   Total Time Spent (Mins) 40   Treatment Charges   Charges Yes   OT Self Care / ADL 2   OT Therapy Activity 1   Precautions   Precautions Fall Risk;Toe Touch Weight Bearing Right Lower Extremity;Posterior Hip Precautions   Vitals   O2 Delivery Device None - Room Air   Pain 0 - 10 Group   Therapist Pain Assessment Post Activity Pain Same as Prior to Activity;Nurse Notified;2  (no overt signs of pain during activity)   Cognition    Level of Consciousness Alert   Ability To Follow Commands 2 Step   Safety Awareness Impaired   Initiation Impaired   Comments self limits participation, fearful of OOB activity   Passive ROM Upper Body   Passive ROM Upper Body WDL   Active ROM Upper Body   Active ROM Upper Body  WDL   Dominant Hand Right   Strength Upper Body   Gross Strength Generalized Weakness, Equal Bilaterally.    Sensation Upper Body   Upper Extremity Sensation  WDL   Upper Body Muscle Tone   Upper Body Muscle Tone  WDL   Sitting Upper Body Exercises   Sitting Upper Body Exercises Yes   Other Exercise seated AROM to both UEs. encouragement to sit  without propping from B UEs   Balance   Sitting Balance (Static) Fair   Sitting Balance (Dynamic) Fair   Standing Balance (Static) Poor +   Standing Balance (Dynamic) Poor +   Weight Shift Sitting Fair   Bed Mobility    Supine to Sit Moderate Assist   Sit to Supine Moderate Assist   Scooting Moderate Assist   Activities of Daily Living   Eating Supervision   Grooming Supervision;Seated   Bathing Moderate Assist  (seated UB)   Upper Body Dressing Moderate Assist   Skilled Intervention Compensatory Strategies;Verbal Cuing;Tactile Cuing   How much help from another person does the patient currently need...   Putting on and taking off regular lower body clothing? 2   Bathing (including washing, rinsing, and drying)? 2   Toileting, which includes using a toilet, bedpan, or urinal? 2   Putting on and taking off regular upper body clothing? 3   Taking care of personal grooming such as brushing teeth? 3   Eating meals? 3   6 Clicks Daily Activity Score 15   Functional Mobility   Sit to Stand Moderate Assist   Bed, Chair, Wheelchair Transfer Unable to Participate   Toilet Transfers Unable to Participate   Visual Perception   Visual Perception  WDL   Patient / Family Goals   Patient / Family Goal #1 none stated   Short Term Goals   Short Term Goal # 1 min assist for supine to sit edge of bed and transfer to commode   Goal Outcome # 1 Progressing as expected   Short Term Goal # 2 set up for seated UB ADLs   Goal Outcome # 2 Progressing as expected   Short Term Goal # 3 min assist for LB ADLS with good adherence to hip precautions/ use of AE   Goal Outcome # 3 Progressing as expected   Education Group   Role of Occupational Therapist Patient Response Patient;Acceptance;Explanation;Demonstration;Reinforcement Needed   Hip Precautions Patient Response Patient;Acceptance;Explanation;Demonstration;Reinforcement Needed   Anticipated Discharge Equipment and Recommendations   DC Equipment Recommendations Unable to determine at this  time   Discharge Recommendations Recommend post-acute placement for additional occupational therapy services prior to discharge home   Interdisciplinary Plan of Care Collaboration   IDT Collaboration with  Nursing   Patient Position at End of Therapy In Bed;Call Light within Reach;Tray Table within Reach;Phone within Reach   Collaboration Comments report given   Session Information   Date / Session Number  12/1,2( 2/,12/5)   Priority 3

## 2020-12-01 NOTE — DISCHARGE PLANNING
Anticipated Discharge Disposition: SNF    Action: Notified by Veronica BLUNT that [facility] do not have beds for the next week.    Per Veronica, [facility] does not have beds today.    Barriers to Discharge: Pending bed availability.    Plan: Will follow up with SNFs.

## 2020-12-01 NOTE — PROGRESS NOTES
DATE OF SERVICE: 11/30/2020    TIME:  Approximately 6:25 a.m.    SUBJECTIVE:  The patient is postop day #2 from a complex right total hip   arthroplasty for a periprosthetic fracture with loose implant.  She feels like   she is getting better.  She is currently on the bedpan, did not spend a lot   of time talking to her.  Pain is improved.    PHYSICAL EXAMINATION:  VITAL SIGNS:  Blood pressure 145/66, heart rate 80, respirations 16,   temperature 97.9.  EXTREMITIES:  She is able to dorsiflex and plantarflex her toes.    LABORATORY DATA:  Include white blood cell count 12,300, hematocrit 27.8%,   platelet count 238,000.  Sodium 122, potassium 4.0, creatinine 1.76.    ASSESSMENT:  Right periprosthetic proximal femur fracture -- status post   revision arthroplasty.    PLAN:  Toe-touch weightbearing right lower extremity for 4 weeks.  DVT   prophylaxis with SCDs and aspirin 81 mg p.o. b.i.d. for 4 weeks.  Continue   with posterior hip precautions.  She will need discharge planning and likely   will require SNF.       ____________________________________     MD EKATERINA SAVAGE / STEPHANIE    DD:  11/30/2020 09:25:53  DT:  11/30/2020 20:04:48    D#:  [id number]  Job#:  [id number]

## 2020-12-01 NOTE — PROGRESS NOTES
"12/1/2020    S: Patient was seen and examined this morning. Doing well overall. Confused about overall treatment plan. Asking when we are going to fix her and when her surgery is. Her pain overall is controlled when in bed. Says she has been unable to get up out of bed thus far. Reinforced weight bearing precautions. All questions answered    Physical Exam  Vitals  /54   Pulse 78   Temp 36.5 °C (97.7 °F) (Temporal)   Resp 16   Ht 1.6 m (5' 3\")   Wt 47.1 kg (103 lb 13.4 oz)   SpO2 94%   General: A&Ox3, NAD  Chest: Symmetric expansion of the chest wall, no distress.  Heart: RRR, palpable peripheral pulses  Extremities:   RLE   Provena dressing is in place, no leaks   Compartments soft and compressible   EHL/FHL/GSC/TA intact   SILT DP/SP/Kartik/Saph/Tib   DP/PT 2+, Brisk cap refill    Laboratory Values  Recent Labs     11/29/20  0625 11/30/20  0645 12/01/20  0338   WBC 11.6* 12.3* 11.8*   RBC 3.19* 2.86* 2.82*   HEMOGLOBIN 10.6* 9.5* 9.5*   HEMATOCRIT 30.6* 27.8* 27.8*   MCV 95.9 97.2 98.6*   MCH 33.2* 33.2* 33.7*   MCHC 34.6 34.2 34.2   RDW 51.1* 51.2* 52.4*   PLATELETCT 216 238 266   MPV 9.7 9.9 9.9     Recent Labs     11/30/20  0645 11/30/20  1643 12/01/20  0338   SODIUM 122* 126* 131*   POTASSIUM 4.0 3.4* 4.2   CHLORIDE 90* 94* 97   CO2 23 23 23   GLUCOSE 95 102* 95   BUN 34* 32* 30*           Impression:  Patient is a 75 y.o. F s/p revision hemiarthroplasty for right periprosthetic proximal femur fx on 11/28    Plan:  Pain control  Weight bearing: TTWB x 4wks  DVT Ppx: ASA 81mg BID x4 wks, SCDs  PT/OT: Ambulation training, hip precaution training  DC Planning: Likely require SNF    DO [facility]  Trauma Fellow      "

## 2020-12-01 NOTE — PROGRESS NOTES
SNF pending  No new c/o   AVSS  + DF/PF  Mobilize  TTWB RLE x 4 weeks  Staples out 7-10 days  Change provena to dry dressing once it stops working  DVT proph with SCDs, ASA

## 2020-12-01 NOTE — DISCHARGE PLANNING
Agency/Facility Name: [facility]  Spoke To: Anne Marie  Outcome: Referral accepted.  Not accepting new admissions for about a week due to staffing issues.  Patient will be placed on a waiting list.

## 2020-12-02 ENCOUNTER — APPOINTMENT (OUTPATIENT)
Dept: RADIOLOGY | Facility: MEDICAL CENTER | Age: 75
DRG: 467 | End: 2020-12-02
Attending: STUDENT IN AN ORGANIZED HEALTH CARE EDUCATION/TRAINING PROGRAM
Payer: MEDICARE

## 2020-12-02 LAB
ANION GAP SERPL CALC-SCNC: 10 MMOL/L (ref 7–16)
ANION GAP SERPL CALC-SCNC: 7 MMOL/L (ref 7–16)
BACTERIA UR CULT: NORMAL
BASOPHILS # BLD AUTO: 0.2 % (ref 0–1.8)
BASOPHILS # BLD: 0.02 K/UL (ref 0–0.12)
BUN SERPL-MCNC: 25 MG/DL (ref 8–22)
BUN SERPL-MCNC: 26 MG/DL (ref 8–22)
CALCIUM SERPL-MCNC: 10.1 MG/DL (ref 8.5–10.5)
CALCIUM SERPL-MCNC: 10.1 MG/DL (ref 8.5–10.5)
CHLORIDE SERPL-SCNC: 98 MMOL/L (ref 96–112)
CHLORIDE SERPL-SCNC: 99 MMOL/L (ref 96–112)
CO2 SERPL-SCNC: 21 MMOL/L (ref 20–33)
CO2 SERPL-SCNC: 25 MMOL/L (ref 20–33)
CREAT SERPL-MCNC: 1.14 MG/DL (ref 0.5–1.4)
CREAT SERPL-MCNC: 1.28 MG/DL (ref 0.5–1.4)
EOSINOPHIL # BLD AUTO: 0.14 K/UL (ref 0–0.51)
EOSINOPHIL NFR BLD: 1.2 % (ref 0–6.9)
ERYTHROCYTE [DISTWIDTH] IN BLOOD BY AUTOMATED COUNT: 52.1 FL (ref 35.9–50)
GLUCOSE SERPL-MCNC: 100 MG/DL (ref 65–99)
GLUCOSE SERPL-MCNC: 92 MG/DL (ref 65–99)
HCT VFR BLD AUTO: 26.6 % (ref 37–47)
HGB BLD-MCNC: 9 G/DL (ref 12–16)
IMM GRANULOCYTES # BLD AUTO: 0.11 K/UL (ref 0–0.11)
IMM GRANULOCYTES NFR BLD AUTO: 0.9 % (ref 0–0.9)
LYMPHOCYTES # BLD AUTO: 1.02 K/UL (ref 1–4.8)
LYMPHOCYTES NFR BLD: 8.5 % (ref 22–41)
MCH RBC QN AUTO: 34 PG (ref 27–33)
MCHC RBC AUTO-ENTMCNC: 33.8 G/DL (ref 33.6–35)
MCV RBC AUTO: 100.4 FL (ref 81.4–97.8)
MONOCYTES # BLD AUTO: 1.83 K/UL (ref 0–0.85)
MONOCYTES NFR BLD AUTO: 15.3 % (ref 0–13.4)
NEUTROPHILS # BLD AUTO: 8.83 K/UL (ref 2–7.15)
NEUTROPHILS NFR BLD: 73.9 % (ref 44–72)
NRBC # BLD AUTO: 0 K/UL
NRBC BLD-RTO: 0 /100 WBC
PLATELET # BLD AUTO: 328 K/UL (ref 164–446)
PMV BLD AUTO: 10 FL (ref 9–12.9)
POTASSIUM SERPL-SCNC: 4 MMOL/L (ref 3.6–5.5)
POTASSIUM SERPL-SCNC: 4.1 MMOL/L (ref 3.6–5.5)
RBC # BLD AUTO: 2.65 M/UL (ref 4.2–5.4)
SIGNIFICANT IND 70042: NORMAL
SITE SITE: NORMAL
SODIUM SERPL-SCNC: 130 MMOL/L (ref 135–145)
SODIUM SERPL-SCNC: 130 MMOL/L (ref 135–145)
SOURCE SOURCE: NORMAL
WBC # BLD AUTO: 12 K/UL (ref 4.8–10.8)

## 2020-12-02 PROCEDURE — 700111 HCHG RX REV CODE 636 W/ 250 OVERRIDE (IP): Performed by: STUDENT IN AN ORGANIZED HEALTH CARE EDUCATION/TRAINING PROGRAM

## 2020-12-02 PROCEDURE — 700102 HCHG RX REV CODE 250 W/ 637 OVERRIDE(OP): Performed by: STUDENT IN AN ORGANIZED HEALTH CARE EDUCATION/TRAINING PROGRAM

## 2020-12-02 PROCEDURE — 85025 COMPLETE CBC W/AUTO DIFF WBC: CPT

## 2020-12-02 PROCEDURE — A9270 NON-COVERED ITEM OR SERVICE: HCPCS | Performed by: STUDENT IN AN ORGANIZED HEALTH CARE EDUCATION/TRAINING PROGRAM

## 2020-12-02 PROCEDURE — 97530 THERAPEUTIC ACTIVITIES: CPT

## 2020-12-02 PROCEDURE — 770006 HCHG ROOM/CARE - MED/SURG/GYN SEMI*

## 2020-12-02 PROCEDURE — 99232 SBSQ HOSP IP/OBS MODERATE 35: CPT | Performed by: STUDENT IN AN ORGANIZED HEALTH CARE EDUCATION/TRAINING PROGRAM

## 2020-12-02 PROCEDURE — 80048 BASIC METABOLIC PNL TOTAL CA: CPT | Mod: 91

## 2020-12-02 PROCEDURE — 700102 HCHG RX REV CODE 250 W/ 637 OVERRIDE(OP): Performed by: ORTHOPAEDIC SURGERY

## 2020-12-02 PROCEDURE — A9270 NON-COVERED ITEM OR SERVICE: HCPCS | Performed by: INTERNAL MEDICINE

## 2020-12-02 PROCEDURE — 700101 HCHG RX REV CODE 250: Performed by: INTERNAL MEDICINE

## 2020-12-02 PROCEDURE — 36415 COLL VENOUS BLD VENIPUNCTURE: CPT

## 2020-12-02 PROCEDURE — 97535 SELF CARE MNGMENT TRAINING: CPT

## 2020-12-02 PROCEDURE — 700102 HCHG RX REV CODE 250 W/ 637 OVERRIDE(OP): Performed by: INTERNAL MEDICINE

## 2020-12-02 PROCEDURE — A9270 NON-COVERED ITEM OR SERVICE: HCPCS | Performed by: ORTHOPAEDIC SURGERY

## 2020-12-02 RX ORDER — AMOXICILLIN 250 MG
1 CAPSULE ORAL DAILY
Status: DISCONTINUED | OUTPATIENT
Start: 2020-12-02 | End: 2020-12-04 | Stop reason: HOSPADM

## 2020-12-02 RX ORDER — BISACODYL 5 MG
5 TABLET, DELAYED RELEASE (ENTERIC COATED) ORAL DAILY
Status: DISCONTINUED | OUTPATIENT
Start: 2020-12-02 | End: 2020-12-04 | Stop reason: HOSPADM

## 2020-12-02 RX ADMIN — Medication 1 G: at 11:22

## 2020-12-02 RX ADMIN — CEFTRIAXONE SODIUM 1 G: 10 INJECTION, POWDER, FOR SOLUTION INTRAVENOUS at 11:23

## 2020-12-02 RX ADMIN — POLYETHYLENE GLYCOL 3350 1 PACKET: 17 POWDER, FOR SOLUTION ORAL at 16:51

## 2020-12-02 RX ADMIN — ASPIRIN 81 MG: 81 TABLET, COATED ORAL at 16:51

## 2020-12-02 RX ADMIN — AMLODIPINE BESYLATE 10 MG: 10 TABLET ORAL at 04:22

## 2020-12-02 RX ADMIN — CYANOCOBALAMIN TAB 500 MCG 1000 MCG: 500 TAB at 04:22

## 2020-12-02 RX ADMIN — BISACODYL 5 MG: 5 TABLET, COATED ORAL at 11:22

## 2020-12-02 RX ADMIN — Medication 2000 UNITS: at 04:23

## 2020-12-02 RX ADMIN — ASPIRIN 81 MG: 81 TABLET, COATED ORAL at 04:23

## 2020-12-02 RX ADMIN — POLYETHYLENE GLYCOL 3350 1 PACKET: 17 POWDER, FOR SOLUTION ORAL at 04:22

## 2020-12-02 RX ADMIN — Medication 1 G: at 16:51

## 2020-12-02 RX ADMIN — DOCUSATE SODIUM 50 MG AND SENNOSIDES 8.6 MG 1 TABLET: 8.6; 5 TABLET, FILM COATED ORAL at 11:22

## 2020-12-02 RX ADMIN — Medication 1 G: at 09:25

## 2020-12-02 RX ADMIN — ROSUVASTATIN CALCIUM 10 MG: 10 TABLET, FILM COATED ORAL at 16:51

## 2020-12-02 ASSESSMENT — ENCOUNTER SYMPTOMS
ABDOMINAL PAIN: 0
EYES NEGATIVE: 1
SPEECH CHANGE: 0
CONSTIPATION: 0
BACK PAIN: 0
VOMITING: 0
DIZZINESS: 0
CARDIOVASCULAR NEGATIVE: 1
HALLUCINATIONS: 0
COUGH: 0
WHEEZING: 0
FEVER: 0
FALLS: 1
CHILLS: 0
EYE DISCHARGE: 0
WEIGHT LOSS: 0
DIARRHEA: 0
EYE PAIN: 0
NAUSEA: 0
TREMORS: 0
MYALGIAS: 0
FOCAL WEAKNESS: 0
PALPITATIONS: 0
FLANK PAIN: 0
NEUROLOGICAL NEGATIVE: 1
DIAPHORESIS: 0
LOSS OF CONSCIOUSNESS: 0
PSYCHIATRIC NEGATIVE: 1
GASTROINTESTINAL NEGATIVE: 1
SORE THROAT: 0
HEMOPTYSIS: 0
HEADACHES: 0
SENSORY CHANGE: 0
RESPIRATORY NEGATIVE: 1
NERVOUS/ANXIOUS: 0
EYE REDNESS: 0
DEPRESSION: 0
SHORTNESS OF BREATH: 0
NECK PAIN: 0

## 2020-12-02 ASSESSMENT — COGNITIVE AND FUNCTIONAL STATUS - GENERAL
PERSONAL GROOMING: A LITTLE
HELP NEEDED FOR BATHING: A LOT
DAILY ACTIVITIY SCORE: 15
SUGGESTED CMS G CODE MODIFIER MOBILITY: CM
DRESSING REGULAR UPPER BODY CLOTHING: A LITTLE
STANDING UP FROM CHAIR USING ARMS: A LOT
WALKING IN HOSPITAL ROOM: A LOT
CLIMB 3 TO 5 STEPS WITH RAILING: TOTAL
MOVING FROM LYING ON BACK TO SITTING ON SIDE OF FLAT BED: UNABLE
DRESSING REGULAR LOWER BODY CLOTHING: A LOT
MOBILITY SCORE: 9
TURNING FROM BACK TO SIDE WHILE IN FLAT BAD: A LOT
EATING MEALS: A LITTLE
MOVING TO AND FROM BED TO CHAIR: UNABLE
SUGGESTED CMS G CODE MODIFIER DAILY ACTIVITY: CK
TOILETING: A LOT

## 2020-12-02 ASSESSMENT — LIFESTYLE VARIABLES: SUBSTANCE_ABUSE: 0

## 2020-12-02 ASSESSMENT — PAIN DESCRIPTION - PAIN TYPE
TYPE: ACUTE PAIN;SURGICAL PAIN
TYPE: ACUTE PAIN

## 2020-12-02 ASSESSMENT — GAIT ASSESSMENTS: GAIT LEVEL OF ASSIST: UNABLE TO PARTICIPATE

## 2020-12-02 NOTE — PROGRESS NOTES
"   Orthopaedic PA Progress Note    Interval changes:Doing well, pain free, may DC LMWH as ASA should be sufficient    ROS - Patient denies any new issues. No chest pain, dyspnea, or fever.  Pain well controlled.    /54   Pulse 76   Temp 36 °C (96.8 °F) (Temporal)   Resp 18   Ht 1.6 m (5' 3\")   Wt 47.1 kg (103 lb 13.4 oz)   SpO2 94%     Patient seen and examined  No acute distress  Breathing non labored  RRR  Surgical dressing is clean, dry, and intact. Patient clearly fires tibialis anterior, EHL, and gastrocnemius/soleus. Sensation is intact to light touch throughout superficial peroneal, deep peroneal, tibial, saphenous, and sural nerve distributions. Strong and palpable 2+ dorsalis pedis and posterior tibial pulses with capillary refill less than 2 seconds. No lower leg tenderness or discomfort.    Recent Labs     11/30/20  0645 12/01/20  0338 12/02/20  0353   WBC 12.3* 11.8* 12.0*   RBC 2.86* 2.82* 2.65*   HEMOGLOBIN 9.5* 9.5* 9.0*   HEMATOCRIT 27.8* 27.8* 26.6*   MCV 97.2 98.6* 100.4*   MCH 33.2* 33.7* 34.0*   MCHC 34.2 34.2 33.8   RDW 51.2* 52.4* 52.1*   PLATELETCT 238 266 328   MPV 9.9 9.9 10.0     Active Hospital Problems    Diagnosis   • RASHAD (acute kidney injury) ([facility]) [N17.9]     Priority: High   • Anemia [D64.9]     Priority: High   • Dementia (HCC) [F03.90]     Priority: High   • Fall [W19.XXXA]     Priority: High   • Closed fracture of neck of right femur ([facility]) [S72.001A]     Priority: High   • Hyponatremia [E87.1]     Priority: High   • Stage 3 chronic kidney disease [N18.30]     Priority: Medium   • Leukocytosis [D72.829]     Priority: Medium   • Hypertension [I10]     Priority: Medium   • CAD (coronary artery disease) [I25.10]     Priority: Low   • Hypertensive urgency [I16.0]     Priority: Low   • Hypokalemia [E87.6]     Priority: Low       Assessment/Plan:  POD#4 S/P revision hemiarthroplasty RLE with ORIF  Wt bearing status - TTWB x 4 wks  PT/OT-initiated  Wound care:Leave Prevena " until it malfunctions  Drains - no  Hernandez-no  Sutures/Staples out- 10-14 days post operatively  Antibiotics: complete  DVT Prophylaxis- TEDS/SCDs/Foot pumps.   Lovenox: Stop now, continue ECASA low dose BID x 30d  Future Procedures - none planned  Case Coordination for Discharge Planning - Disposition per Med/PT/OT.   Follow-Up: needs appointment with Dr. Thomas or Dr. Soares at  [facility] at 10-14 days post-op for re-evaluation, staple removal and radiographs.

## 2020-12-02 NOTE — PROGRESS NOTES
[facility] Medicine Daily Progress Note    Date of Service  12/2/2020    Chief Complaint  75 y.o. female admitted 11/25/2020 with fall    Hospital Course    75-year-old female with history of dementia, hypertension and coronary artery disease presented 11/25 after ground-level fall, the patient had dementia not able to give a good history of her fall however she denied any dizziness or loss of consciousness, denied any chest pain, the patient lives with her  who did not see her when she fell, on admission the patient was found to have dehydration with RASHAD and x-ray showed right hip fracture, the patient was evaluated by Ortho plan for surgery on 11/27 however due to severe anemia the surgery was postponed and it was done on 11/28, PT and OT evaluation recommended SNF.    The patient developed leukocytosis with some worsening on her confusion and dementia, also elevated inflammation markers including procalcitonin and CRP, the patient denied any symptoms however start with ceftriaxone for 3 days for possible UTI, urine and blood culture are pending.    The patient developed hyponatremia around 122, with RASHAD after surgery, IV fluid was started.      Interval Problem Update  I evaluated and examined the patient at the bedside.  Labs and imaging were reviewed.    Patient reported of constipation, add Dulcolax, Senokot, continue MiraLAX    Leukocytosis and RASHAD has been improving.  Continue Rocephin    Hyponatremia has been improved, continue normal saline and monitor    Wbc 12.3-> 11.8->12  cre 0.8-> 1.7-> 1.57-> 1.44->1.29  Na 126-> 130      Consultants/Specialty  Orthopedic    Code Status  Full Code    Disposition  SNF    Review of Systems  Review of Systems   Constitutional: Positive for malaise/fatigue. Negative for chills, diaphoresis, fever and weight loss.   HENT: Negative.  Negative for congestion, ear discharge, ear pain and sore throat.    Eyes: Negative.  Negative for pain, discharge and redness.    Respiratory: Negative.  Negative for cough, hemoptysis, shortness of breath and wheezing.    Cardiovascular: Negative.  Negative for chest pain, palpitations and leg swelling.   Gastrointestinal: Negative.  Negative for abdominal pain, constipation, diarrhea, nausea and vomiting.   Genitourinary: Negative.  Negative for dysuria, flank pain, frequency, hematuria and urgency.   Musculoskeletal: Positive for falls and joint pain. Negative for back pain, myalgias and neck pain.   Skin: Negative for itching.   Neurological: Negative.  Negative for dizziness, tremors, sensory change, speech change, focal weakness, loss of consciousness and headaches.   Psychiatric/Behavioral: Negative.  Negative for depression, hallucinations and substance abuse. The patient is not nervous/anxious.         Physical Exam  Temp:  [36 °C (96.8 °F)-36.7 °C (98 °F)] 36.7 °C (98 °F)  Pulse:  [76-98] 93  Resp:  [16-18] 16  BP: (133-157)/(51-72) 157/57  SpO2:  [94 %-96 %] 95 %    Physical Exam  Constitutional:       General: She is not in acute distress.     Appearance: Normal appearance. She is not ill-appearing.   HENT:      Head: Normocephalic and atraumatic.      Right Ear: External ear normal.      Left Ear: External ear normal.      Nose: Nose normal. No congestion or rhinorrhea.      Mouth/Throat:      Mouth: Mucous membranes are moist.   Eyes:      General: No scleral icterus.     Extraocular Movements: Extraocular movements intact.      Conjunctiva/sclera: Conjunctivae normal.      Pupils: Pupils are equal, round, and reactive to light.   Neck:      Musculoskeletal: Normal range of motion and neck supple. No neck rigidity.   Cardiovascular:      Rate and Rhythm: Normal rate and regular rhythm.      Pulses: Normal pulses.      Heart sounds: Normal heart sounds. No murmur.   Pulmonary:      Effort: Pulmonary effort is normal. No respiratory distress.      Breath sounds: Normal breath sounds. No stridor. No wheezing, rhonchi or rales.    Chest:      Chest wall: No tenderness.   Abdominal:      General: Abdomen is flat. Bowel sounds are normal. There is no distension.      Palpations: Abdomen is soft.      Tenderness: There is no abdominal tenderness. There is no guarding or rebound.   Musculoskeletal: Normal range of motion.         General: Deformity present. No swelling or tenderness.      Right lower leg: No edema.      Left lower leg: No edema.   Skin:     General: Skin is warm and dry.      Coloration: Skin is not pale.      Findings: No bruising or lesion.   Neurological:      General: No focal deficit present.      Mental Status: She is alert and oriented to person, place, and time. Mental status is at baseline.      Cranial Nerves: No cranial nerve deficit.      Sensory: No sensory deficit.      Motor: No weakness.      Coordination: Coordination normal.      Gait: Gait normal.      Comments: Oriented x2   Psychiatric:         Mood and Affect: Mood normal.         Fluids    Intake/Output Summary (Last 24 hours) at 12/2/2020 1230  Last data filed at 12/1/2020 1430  Gross per 24 hour   Intake 240 ml   Output --   Net 240 ml       Laboratory  Recent Labs     11/30/20  0645 12/01/20  0338 12/02/20  0353   WBC 12.3* 11.8* 12.0*   RBC 2.86* 2.82* 2.65*   HEMOGLOBIN 9.5* 9.5* 9.0*   HEMATOCRIT 27.8* 27.8* 26.6*   MCV 97.2 98.6* 100.4*   MCH 33.2* 33.7* 34.0*   MCHC 34.2 34.2 33.8   RDW 51.2* 52.4* 52.1*   PLATELETCT 238 266 328   MPV 9.9 9.9 10.0     Recent Labs     12/01/20  0338 12/02/20  0353 12/02/20  0915   SODIUM 131* 130* 130*   POTASSIUM 4.2 4.1 4.0   CHLORIDE 97 98 99   CO2 23 25 21   GLUCOSE 95 100* 92   BUN 30* 26* 25*   CREATININE 1.44* 1.28 1.14   CALCIUM 9.8 10.1 10.1                   Imaging  IR-US GUIDED PIV   Final Result    Ultrasound-guided PERIPHERAL IV INSERTION performed by    qualified nursing staff as above.      IR-US GUIDED PIV   Final Result    Ultrasound-guided PERIPHERAL IV INSERTION performed by    qualified nursing  staff as above.      DX-CHEST-PORTABLE (1 VIEW)   Final Result      1.  No acute cardiac or pulmonary abnormalities are identified.   2.  Atherosclerosis   3.  Emphysema      DX-CHEST-LIMITED (1 VIEW)   Final Result         No acute cardiac or pulmonary abnormality is identified.      DX-PELVIS-1 OR 2 VIEWS   Final Result      1.  Interval placement of femoral component across periprosthetic fracture      2.  Presumed antibiotic beads present      EC-ECHOCARDIOGRAM COMPLETE W/O CONT   Final Result      CT-HIP W/O PLUS RECONS RIGHT   Final Result      Periprosthetic fracture in the proximal right femur.      CT-HEAD W/O   Final Result      No acute intracranial findings.         DX-FEMUR-2+ RIGHT   Final Result      1.  Periprosthetic fracture of the proximal RIGHT femoral diaphysis   2.  Osteopenia      DX-PELVIS-1 OR 2 VIEWS   Final Result      1.  Periprosthetic fracture of the proximal RIGHT femur   2.  Osteopenia   3.  LEFT hip osteoarthritis      DX-CHEST-PORTABLE (1 VIEW)   Final Result      1.  No acute cardiac or pulmonary abnormalities are identified.   2.  Emphysema   3.  Atherosclerosis           Assessment/Plan  * Closed fracture of neck of right femur (HCC)- (present on admission)  Assessment & Plan  Likely after mechanical fall   The patient has history of right hemiarthroplasty on 4/28/19  X-ray showed periprosthetic fracture of proximal right femoral diaphysis  surgery on 11/28 for ORIF and revision  PT and OT SNF, medically and surgically cleared  Pain control and prevent constipation  No pharmacological for possible delirium  Lovenox for DVT prophylaxis    RASHAD (acute kidney injury) (HCC)  Assessment & Plan  Improving  Continue IV fluid and monitor    Anemia  Assessment & Plan  With elevated MCV  Around 7  B12 around 300  The patient received 2 units of blood transfusion  Cannot order iron panel due to blood transfusion  Continue B12 supplementation  Follow-up with PCP as outpatient    Dementia  (HCC)  Assessment & Plan  Like mild to moderate  The patient is at high risk for delirium during this hospitalization  Pain control and nonpharmacological treatment  Avoid benzo and antihistamine  PT and OT and placement      Fall  Assessment & Plan  Likely mechanical  Echo is pending  EKG no arrhythmia  PT and OT after surgery  Start vitamin D and B12 supplementation    Hyponatremia- (present on admission)  Assessment & Plan    IV fluid and close monitoring  Continue salt tablets    -Improving    Stage 3 chronic kidney disease  Assessment & Plan  Acute on chronic kidney disease stage III   Avoid nephrotoxic medication  IV hydration   labs daily    Leukocytosis- (present on admission)  Assessment & Plan  Worsening leukocytosis with inflammation markers  UA showed white blood cell  Waiting for chest x-ray and blood culture  Will start ceftriaxone and IV fluid  Monitor closely      Hypertension  Assessment & Plan  Due to high risk of fall, dementia and age we will discontinue hydrochlorothiazide and  Continue amlodipine and losartan  Close monitoring.    CAD (coronary artery disease)  Assessment & Plan  Cont ASA/statin, ARB, spironolactone  Echo showed diastolic dysfunction with normal systolic function    Hypertensive urgency- (present on admission)  Assessment & Plan  SBP > 180 in ED likely related to pain and new fall  Due to risk of fall and hypotension we will hold hydrochlorothiazide and spironolactone  Hold losartan due to RASHAD  Continue monitoring: And amlodipine,       Hypokalemia- (present on admission)  Assessment & Plan  Replaced       VTE prophylaxis: lovenox

## 2020-12-02 NOTE — THERAPY
Occupational Therapy  Daily Treatment     Patient Name: Sangita Bolton  Age:  75 y.o., Sex:  female  Medical Record #: [id number]  Today's Date: 12/2/2020     Precautions  Precautions: (P) Fall Risk, Posterior Hip Precautions, Toe Touch Weight Bearing Right Lower Extremity  Comments: dementia    Assessment    Tolerated improved activity, demonstrates slowly increasing participation in therapy sessions. Continues to refuse attempts to transfer to bedside chair    Plan    Continue current treatment plan.    DC Equipment Recommendations: (P) Unable to determine at this time  Discharge Recommendations: (P) Recommend post-acute placement for additional occupational therapy services prior to discharge home    Subjective    No new issues reported. Slowly progressing.     Objective       12/02/20 1255   Total Time Spent   Total Time Spent (Mins) 35   Treatment Charges   OT Self Care / ADL 1   OT Therapy Activity 1   Precautions   Precautions Fall Risk;Posterior Hip Precautions;Toe Touch Weight Bearing Right Lower Extremity   Vitals   O2 Delivery Device None - Room Air   Pain 0 - 10 Group   Therapist Pain Assessment Post Activity Pain Same as Prior to Activity;Nurse Notified;0   Cognition    Level of Consciousness Alert   Ability To Follow Commands 2 Step   Safety Awareness Impaired   Initiation Impaired   Passive ROM Upper Body   Passive ROM Upper Body WDL   Active ROM Upper Body   Active ROM Upper Body  WDL   Dominant Hand Right   Strength Upper Body   Gross Strength Generalized Weakness, Equal Bilaterally.    Sensation Upper Body   Upper Extremity Sensation  WDL   Upper Body Muscle Tone   Upper Body Muscle Tone  WDL   Sitting Upper Body Exercises   Other Exercise seated AROM to B UEs   Balance   Sitting Balance (Static) Fair   Sitting Balance (Dynamic) Fair   Standing Balance (Static) Fair -   Standing Balance (Dynamic) Poor -   Weight Shift Sitting Fair   Weight Shift Standing Absent   Skilled Intervention Verbal  Cuing;Tactile Cuing;Compensatory Strategies   Bed Mobility    Supine to Sit Minimal Assist   Sit to Supine Minimal Assist   Scooting Minimal Assist   Skilled Intervention Verbal Cuing;Tactile Cuing   Activities of Daily Living   Eating Supervision   Grooming Supervision;Seated   Upper Body Dressing Moderate Assist  (seated EOB)   Skilled Intervention Verbal Cuing;Tactile Cuing;Compensatory Strategies   How much help from another person does the patient currently need...   Putting on and taking off regular lower body clothing? 2   Bathing (including washing, rinsing, and drying)? 2   Toileting, which includes using a toilet, bedpan, or urinal? 2   Putting on and taking off regular upper body clothing? 3   Taking care of personal grooming such as brushing teeth? 3   Eating meals? 3   6 Clicks Daily Activity Score 15   Functional Mobility   Sit to Stand Minimal Assist   Bed, Chair, Wheelchair Transfer Refused   Toilet Transfers Refused   Mobility bed mobility, STS   Skilled Intervention Compensatory Strategies;Verbal Cuing;Tactile Cuing   Comments tolerated stand x 2 for period of 1 minute each.   Visual Perception   Visual Perception  WDL   Activity Tolerance   Sitting Edge of Bed 15 minutes   Patient / Family Goals   Patient / Family Goal #1 none stated   Short Term Goals   Short Term Goal # 1 min assist for supine to sit edge of bed and transfer to commode   Goal Outcome # 1 Progressing as expected   Short Term Goal # 2 set up for seated UB ADLs   Goal Outcome # 2 Progressing as expected   Short Term Goal # 3 min assist for LB ADLS with good adherence to hip precautions/ use of AE   Goal Outcome # 3 Progressing as expected   Education Group   Role of Occupational Therapist Patient Response Patient;Acceptance;Explanation;Demonstration;Reinforcement Needed   Hip Precautions Patient Response Patient;Acceptance;Explanation;Demonstration;Reinforcement Needed   Anticipated Discharge Equipment and Recommendations   DC  Equipment Recommendations Unable to determine at this time   Discharge Recommendations Recommend post-acute placement for additional occupational therapy services prior to discharge home   Interdisciplinary Plan of Care Collaboration   IDT Collaboration with  Nursing;Certified Nursing Assistant;Physician   Patient Position at End of Therapy In Bed;Call Light within Reach;Tray Table within Reach;Phone within Reach   Collaboration Comments report given   Session Information   Date / Session Number  12/2,3 ( 3/3, 12/5)   Priority 3      12/02/20 1255   Total Time Spent   Total Time Spent (Mins) 35   Treatment Charges   OT Self Care / ADL 1   OT Therapy Activity 1   Precautions   Precautions Fall Risk;Posterior Hip Precautions;Toe Touch Weight Bearing Right Lower Extremity   Vitals   O2 Delivery Device None - Room Air   Pain 0 - 10 Group   Therapist Pain Assessment Post Activity Pain Same as Prior to Activity;Nurse Notified;0   Cognition    Level of Consciousness Alert   Ability To Follow Commands 2 Step   Safety Awareness Impaired   Initiation Impaired   Passive ROM Upper Body   Passive ROM Upper Body WDL   Active ROM Upper Body   Active ROM Upper Body  WDL   Dominant Hand Right   Strength Upper Body   Gross Strength Generalized Weakness, Equal Bilaterally.    Sensation Upper Body   Upper Extremity Sensation  WDL   Upper Body Muscle Tone   Upper Body Muscle Tone  WDL   Sitting Upper Body Exercises   Other Exercise seated AROM to B UEs   Balance   Sitting Balance (Static) Fair   Sitting Balance (Dynamic) Fair   Standing Balance (Static) Fair -   Standing Balance (Dynamic) Poor -   Weight Shift Sitting Fair   Weight Shift Standing Absent   Skilled Intervention Verbal Cuing;Tactile Cuing;Compensatory Strategies   Bed Mobility    Supine to Sit Minimal Assist   Sit to Supine Minimal Assist   Scooting Minimal Assist   Skilled Intervention Verbal Cuing;Tactile Cuing   Activities of Daily Living   Eating Supervision   Grooming  Supervision;Seated   Upper Body Dressing Moderate Assist  (seated EOB)   Skilled Intervention Verbal Cuing;Tactile Cuing;Compensatory Strategies   How much help from another person does the patient currently need...   Putting on and taking off regular lower body clothing? 2   Bathing (including washing, rinsing, and drying)? 2   Toileting, which includes using a toilet, bedpan, or urinal? 2   Putting on and taking off regular upper body clothing? 3   Taking care of personal grooming such as brushing teeth? 3   Eating meals? 3   6 Clicks Daily Activity Score 15   Functional Mobility   Sit to Stand Minimal Assist   Bed, Chair, Wheelchair Transfer Refused   Toilet Transfers Refused   Mobility bed mobility, STS   Skilled Intervention Compensatory Strategies;Verbal Cuing;Tactile Cuing   Comments tolerated stand x 2 for period of 1 minute each.   Visual Perception   Visual Perception  WDL   Activity Tolerance   Sitting Edge of Bed 15 minutes   Patient / Family Goals   Patient / Family Goal #1 none stated   Short Term Goals   Short Term Goal # 1 min assist for supine to sit edge of bed and transfer to commode   Goal Outcome # 1 Progressing as expected   Short Term Goal # 2 set up for seated UB ADLs   Goal Outcome # 2 Progressing as expected   Short Term Goal # 3 min assist for LB ADLS with good adherence to hip precautions/ use of AE   Goal Outcome # 3 Progressing as expected   Education Group   Role of Occupational Therapist Patient Response Patient;Acceptance;Explanation;Demonstration;Reinforcement Needed   Hip Precautions Patient Response Patient;Acceptance;Explanation;Demonstration;Reinforcement Needed   Anticipated Discharge Equipment and Recommendations   DC Equipment Recommendations Unable to determine at this time   Discharge Recommendations Recommend post-acute placement for additional occupational therapy services prior to discharge home   Interdisciplinary Plan of Care Collaboration   IDT Collaboration with   Nursing;Certified Nursing Assistant;Physician   Patient Position at End of Therapy In Bed;Call Light within Reach;Tray Table within Reach;Phone within Reach   Collaboration Comments report given   Session Information   Date / Session Number  12/2,3 ( 3/3, 12/5)   Priority 3

## 2020-12-03 PROBLEM — D72.829 LEUKOCYTOSIS: Status: RESOLVED | Noted: 2019-04-27 | Resolved: 2020-12-03

## 2020-12-03 PROBLEM — E87.6 HYPOKALEMIA: Status: RESOLVED | Noted: 2019-04-27 | Resolved: 2020-12-03

## 2020-12-03 PROBLEM — K59.00 CONSTIPATION: Status: ACTIVE | Noted: 2020-12-03

## 2020-12-03 PROBLEM — I16.0 HYPERTENSIVE URGENCY: Status: RESOLVED | Noted: 2019-04-30 | Resolved: 2020-12-03

## 2020-12-03 PROBLEM — N17.9 AKI (ACUTE KIDNEY INJURY) (HCC): Status: RESOLVED | Noted: 2020-12-01 | Resolved: 2020-12-03

## 2020-12-03 LAB
ANION GAP SERPL CALC-SCNC: 10 MMOL/L (ref 7–16)
ANION GAP SERPL CALC-SCNC: 9 MMOL/L (ref 7–16)
BASOPHILS # BLD AUTO: 0.3 % (ref 0–1.8)
BASOPHILS # BLD: 0.03 K/UL (ref 0–0.12)
BUN SERPL-MCNC: 22 MG/DL (ref 8–22)
BUN SERPL-MCNC: 24 MG/DL (ref 8–22)
CALCIUM SERPL-MCNC: 10.2 MG/DL (ref 8.5–10.5)
CALCIUM SERPL-MCNC: 9.8 MG/DL (ref 8.5–10.5)
CHLORIDE SERPL-SCNC: 100 MMOL/L (ref 96–112)
CHLORIDE SERPL-SCNC: 98 MMOL/L (ref 96–112)
CO2 SERPL-SCNC: 21 MMOL/L (ref 20–33)
CO2 SERPL-SCNC: 22 MMOL/L (ref 20–33)
CREAT SERPL-MCNC: 1.05 MG/DL (ref 0.5–1.4)
CREAT SERPL-MCNC: 1.13 MG/DL (ref 0.5–1.4)
EOSINOPHIL # BLD AUTO: 0.16 K/UL (ref 0–0.51)
EOSINOPHIL NFR BLD: 1.4 % (ref 0–6.9)
ERYTHROCYTE [DISTWIDTH] IN BLOOD BY AUTOMATED COUNT: 53 FL (ref 35.9–50)
GLUCOSE SERPL-MCNC: 93 MG/DL (ref 65–99)
GLUCOSE SERPL-MCNC: 96 MG/DL (ref 65–99)
HCT VFR BLD AUTO: 27.5 % (ref 37–47)
HGB BLD-MCNC: 8.8 G/DL (ref 12–16)
IMM GRANULOCYTES # BLD AUTO: 0.13 K/UL (ref 0–0.11)
IMM GRANULOCYTES NFR BLD AUTO: 1.2 % (ref 0–0.9)
LYMPHOCYTES # BLD AUTO: 1.03 K/UL (ref 1–4.8)
LYMPHOCYTES NFR BLD: 9.2 % (ref 22–41)
MCH RBC QN AUTO: 32.8 PG (ref 27–33)
MCHC RBC AUTO-ENTMCNC: 32 G/DL (ref 33.6–35)
MCV RBC AUTO: 102.6 FL (ref 81.4–97.8)
MONOCYTES # BLD AUTO: 1.69 K/UL (ref 0–0.85)
MONOCYTES NFR BLD AUTO: 15 % (ref 0–13.4)
NEUTROPHILS # BLD AUTO: 8.19 K/UL (ref 2–7.15)
NEUTROPHILS NFR BLD: 72.9 % (ref 44–72)
NRBC # BLD AUTO: 0 K/UL
NRBC BLD-RTO: 0 /100 WBC
PLATELET # BLD AUTO: 354 K/UL (ref 164–446)
PMV BLD AUTO: 9.7 FL (ref 9–12.9)
POTASSIUM SERPL-SCNC: 3.3 MMOL/L (ref 3.6–5.5)
POTASSIUM SERPL-SCNC: 3.4 MMOL/L (ref 3.6–5.5)
RBC # BLD AUTO: 2.68 M/UL (ref 4.2–5.4)
SODIUM SERPL-SCNC: 130 MMOL/L (ref 135–145)
SODIUM SERPL-SCNC: 130 MMOL/L (ref 135–145)
WBC # BLD AUTO: 11.2 K/UL (ref 4.8–10.8)

## 2020-12-03 PROCEDURE — A9270 NON-COVERED ITEM OR SERVICE: HCPCS | Performed by: ORTHOPAEDIC SURGERY

## 2020-12-03 PROCEDURE — 700105 HCHG RX REV CODE 258: Performed by: INTERNAL MEDICINE

## 2020-12-03 PROCEDURE — 85025 COMPLETE CBC W/AUTO DIFF WBC: CPT

## 2020-12-03 PROCEDURE — 36415 COLL VENOUS BLD VENIPUNCTURE: CPT

## 2020-12-03 PROCEDURE — 700102 HCHG RX REV CODE 250 W/ 637 OVERRIDE(OP): Performed by: INTERNAL MEDICINE

## 2020-12-03 PROCEDURE — 700102 HCHG RX REV CODE 250 W/ 637 OVERRIDE(OP): Performed by: ORTHOPAEDIC SURGERY

## 2020-12-03 PROCEDURE — 770006 HCHG ROOM/CARE - MED/SURG/GYN SEMI*

## 2020-12-03 PROCEDURE — 700102 HCHG RX REV CODE 250 W/ 637 OVERRIDE(OP): Performed by: STUDENT IN AN ORGANIZED HEALTH CARE EDUCATION/TRAINING PROGRAM

## 2020-12-03 PROCEDURE — A9270 NON-COVERED ITEM OR SERVICE: HCPCS | Performed by: STUDENT IN AN ORGANIZED HEALTH CARE EDUCATION/TRAINING PROGRAM

## 2020-12-03 PROCEDURE — 99232 SBSQ HOSP IP/OBS MODERATE 35: CPT | Performed by: STUDENT IN AN ORGANIZED HEALTH CARE EDUCATION/TRAINING PROGRAM

## 2020-12-03 PROCEDURE — 80048 BASIC METABOLIC PNL TOTAL CA: CPT

## 2020-12-03 PROCEDURE — A9270 NON-COVERED ITEM OR SERVICE: HCPCS | Performed by: INTERNAL MEDICINE

## 2020-12-03 RX ORDER — BISACODYL 10 MG
10 SUPPOSITORY, RECTAL RECTAL 2 TIMES DAILY PRN
Status: DISCONTINUED | OUTPATIENT
Start: 2020-12-03 | End: 2020-12-04 | Stop reason: HOSPADM

## 2020-12-03 RX ORDER — SODIUM CHLORIDE 1 G/1
1 TABLET ORAL
Qty: 15 TAB | Refills: 0 | Status: SHIPPED | OUTPATIENT
Start: 2020-12-03 | End: 2020-12-08

## 2020-12-03 RX ORDER — ASPIRIN 81 MG/1
81 TABLET ORAL 2 TIMES DAILY
Qty: 60 TAB | Refills: 0 | Status: SHIPPED | OUTPATIENT
Start: 2020-12-03 | End: 2021-01-02

## 2020-12-03 RX ORDER — AMOXICILLIN 250 MG
1 CAPSULE ORAL DAILY
Qty: 30 TAB | Refills: 0 | Status: SHIPPED | OUTPATIENT
Start: 2020-12-04 | End: 2021-01-03

## 2020-12-03 RX ORDER — SODIUM CHLORIDE 1 G/1
2 TABLET ORAL
Status: DISCONTINUED | OUTPATIENT
Start: 2020-12-03 | End: 2020-12-04 | Stop reason: HOSPADM

## 2020-12-03 RX ORDER — POTASSIUM CHLORIDE 750 MG/1
40 TABLET, FILM COATED, EXTENDED RELEASE ORAL ONCE
Status: COMPLETED | OUTPATIENT
Start: 2020-12-03 | End: 2020-12-03

## 2020-12-03 RX ORDER — BISACODYL 10 MG
10 SUPPOSITORY, RECTAL RECTAL ONCE
Status: COMPLETED | OUTPATIENT
Start: 2020-12-03 | End: 2020-12-03

## 2020-12-03 RX ADMIN — Medication 2 G: at 09:29

## 2020-12-03 RX ADMIN — ROSUVASTATIN CALCIUM 10 MG: 10 TABLET, FILM COATED ORAL at 18:22

## 2020-12-03 RX ADMIN — Medication 2 G: at 13:56

## 2020-12-03 RX ADMIN — MAGNESIUM HYDROXIDE 30 ML: 400 SUSPENSION ORAL at 10:52

## 2020-12-03 RX ADMIN — ASPIRIN 81 MG: 81 TABLET, COATED ORAL at 04:54

## 2020-12-03 RX ADMIN — ASPIRIN 81 MG: 81 TABLET, COATED ORAL at 18:22

## 2020-12-03 RX ADMIN — Medication 2000 UNITS: at 04:56

## 2020-12-03 RX ADMIN — BISACODYL 10 MG: 10 SUPPOSITORY RECTAL at 09:29

## 2020-12-03 RX ADMIN — AMLODIPINE BESYLATE 10 MG: 10 TABLET ORAL at 04:54

## 2020-12-03 RX ADMIN — DOCUSATE SODIUM 50 MG AND SENNOSIDES 8.6 MG 1 TABLET: 8.6; 5 TABLET, FILM COATED ORAL at 04:55

## 2020-12-03 RX ADMIN — CYANOCOBALAMIN TAB 500 MCG 1000 MCG: 500 TAB at 05:00

## 2020-12-03 RX ADMIN — Medication 2 G: at 18:22

## 2020-12-03 RX ADMIN — BISACODYL 5 MG: 5 TABLET, COATED ORAL at 04:54

## 2020-12-03 RX ADMIN — POLYETHYLENE GLYCOL 3350 1 PACKET: 17 POWDER, FOR SOLUTION ORAL at 04:54

## 2020-12-03 RX ADMIN — POLYETHYLENE GLYCOL 3350 1 PACKET: 17 POWDER, FOR SOLUTION ORAL at 18:00

## 2020-12-03 RX ADMIN — POTASSIUM CHLORIDE 40 MEQ: 750 TABLET, FILM COATED, EXTENDED RELEASE ORAL at 13:55

## 2020-12-03 RX ADMIN — SODIUM CHLORIDE: 9 INJECTION, SOLUTION INTRAVENOUS at 19:45

## 2020-12-03 ASSESSMENT — ENCOUNTER SYMPTOMS
RESPIRATORY NEGATIVE: 1
SORE THROAT: 0
CARDIOVASCULAR NEGATIVE: 1
GASTROINTESTINAL NEGATIVE: 1
COUGH: 0
FEVER: 0
WHEEZING: 0
HEADACHES: 0
CHILLS: 0
TREMORS: 0
PALPITATIONS: 0
SPEECH CHANGE: 0
NECK PAIN: 0
FOCAL WEAKNESS: 0
FALLS: 1
FLANK PAIN: 0
HEMOPTYSIS: 0
HALLUCINATIONS: 0
DIZZINESS: 0
EYES NEGATIVE: 1
SENSORY CHANGE: 0
EYE PAIN: 0
DIAPHORESIS: 0
NERVOUS/ANXIOUS: 0
SHORTNESS OF BREATH: 0
CONSTIPATION: 0
EYE DISCHARGE: 0
BACK PAIN: 0
WEIGHT LOSS: 0
DIARRHEA: 0
PSYCHIATRIC NEGATIVE: 1
VOMITING: 0
EYE REDNESS: 0
NEUROLOGICAL NEGATIVE: 1
ABDOMINAL PAIN: 0
DEPRESSION: 0
MYALGIAS: 0
NAUSEA: 0
LOSS OF CONSCIOUSNESS: 0

## 2020-12-03 ASSESSMENT — PAIN DESCRIPTION - PAIN TYPE: TYPE: ACUTE PAIN

## 2020-12-03 ASSESSMENT — LIFESTYLE VARIABLES: SUBSTANCE_ABUSE: 0

## 2020-12-03 NOTE — DISCHARGE SUMMARY
Discharge Summary    CHIEF COMPLAINT ON ADMISSION  Chief Complaint   Patient presents with   • Fall     deforminty right upper leg   • Leg Pain       Reason for Admission  EMS     CODE STATUS  Full Code    HPI & HOSPITAL COURSE  75-year-old female with history of dementia, hypertension and coronary artery disease presented 11/25 after ground-level fall, the patient had dementia not able to give a good history of her fall however she denied any dizziness or loss of consciousness, denied any chest pain, the patient lives with her  who did not see her when she fell, on admission the patient was found to have dehydration with RASHAD and x-ray showed right hip fracture, the patient was evaluated by Ortho plan for surgery on 11/27 however due to severe anemia the surgery was postponed and ORIF was done on 11/28, PT and OT evaluation recommended SNF.    Patient received 2 units RBC during hospitalization, hemoglobin stable at discharge.  RASHAD recovered with IV fluid.     The patient developed leukocytosis with some worsening on her confusion and dementia, also elevated inflammation markers including procalcitonin and CRP, the patient denied any symptoms however start with ceftriaxone for 3 days for possible UTI, urine and blood culture were negative.      The patient developed hyponatremia around 122, in the setting of RASHAD and post surgery.  Improved with IV fluid and sodium tablets.    Therefore, she is discharged in good and stable condition to skilled nursing facility.    The patient met 2-midnight criteria for an inpatient stay at the time of discharge.      FOLLOW UP ITEMS POST DISCHARGE  -Follow-up with Dr. Thomas or Dr. Soares at  [facility] at 10-14 days post-op for re-evaluation, staple removal and radiographs.  -Follow-up with PCP in 2 weeks  -Continue 81 mg twice daily for 30 days for DVT prophylaxis    DISCHARGE DIAGNOSES  Principal Problem:    Closed fracture of neck of right femur (HCC) POA:  Yes  Active Problems:    Hyponatremia POA: Yes    Fall POA: Unknown    Dementia (HCC) POA: Unknown    Anemia POA: Unknown    Hypertension POA: Unknown    Stage 3 chronic kidney disease POA: Unknown    Constipation POA: Unknown    CAD (coronary artery disease) POA: Unknown  Resolved Problems:    RASHAD (acute kidney injury) (HCC) POA: Unknown    Leukocytosis POA: Yes    Hypokalemia POA: Yes    Hypertensive urgency POA: Yes      FOLLOW UP  -Follow-up with Dr. Thomas or Dr. Soares at  [facility] at 10-14 days post-op for re-evaluation, staple removal and radiographs.  -Follow-up with PCP in 2 weeks  -Continue 81 mg twice daily for 30 days for DVT prophylaxis    Caleb Soares D.O.  [address] [facility] [id number] [phone]    Schedule an appointment as soon as possible for a visit in 9 days  12/9 AM, For suture removal, For wound re-check, As needed    [address] [id number] [phone]        Claire Hernández M.D.  [address]  [address]  [facility] [id number] [phone]    Schedule an appointment as soon as possible for a visit in 2 weeks  As needed      MEDICATIONS ON DISCHARGE     Medication List      START taking these medications      Instructions   Cholecalciferol 2000 UNIT Tabs   Take 1 Tab by mouth every day for 30 days.  Dose: 2,000 Units     senna-docusate 8.6-50 MG Tabs  Commonly known as: PERICOLACE or SENOKOT S   Take 1 Tab by mouth every day for 30 days.  Dose: 1 Tab     sodium chloride 1 GM Tabs  Commonly known as: SALT   Take 1 Tab by mouth 3 times a day, with meals for 5 days.  Dose: 1 g        CHANGE how you take these medications      Instructions   aspirin 81 MG EC tablet  What changed: when to take this   Take 1 Tab by mouth 2 Times a Day for 30 days.  Dose: 81 mg        CONTINUE taking these medications      Instructions   acetaminophen 325 MG Tabs  Commonly known as: Tylenol   Take 2 Tabs by mouth every 6 hours as  needed (Mild Pain; (Pain scale 1-3); Temp greater than 100.5 F).  Dose: 650 mg     amLODIPine 10 MG Tabs  Commonly known as: NORVASC   Take 1 Tab by mouth every day.  Dose: 10 mg     hydrOXYzine HCl 25 MG Tabs  Commonly known as: ATARAX   Take 25 mg by mouth at bedtime as needed for Itching.  Dose: 25 mg     losartan-hydrochlorothiazide 100-25 MG per tablet  Commonly known as: HYZAAR   Doctor's comments: Patient taking combo med instead of Cozaar and HCTZ seperately  Take 1 Tab by mouth every day.  Dose: 1 Tab     rosuvastatin 10 MG Tabs  Commonly known as: CRESTOR   TAKE ONE TABLET BY MOUTH EVERY EVENING     spironolactone 25 MG Tabs  Commonly known as: ALDACTONE   Take 1 Tab by mouth every day.  Dose: 25 mg            Allergies  Allergies   Allergen Reactions   • Penicillins Hives     Hands; tolerates cephalosporins (Rocephin Nov 2020)   • Other Environmental Hives     Hair dye       DIET  Orders Placed This Encounter   Procedures   • Diet Order Diet: Regular     Standing Status:   Standing     Number of Occurrences:   1     Order Specific Question:   Diet:     Answer:   Regular [1]       ACTIVITY  As tolerated.  Weight bearing as tolerated    LINES, DRAINS, AND WOUNDS  This is an automated list. Peripheral IVs will be removed prior to discharge.  Peripheral IV 12/02/20 20 G Left Upper arm (Active)   Site Assessment Clean;Dry;Intact 12/02/20 1945   Dressing Type Transparent;Occlusive 12/02/20 1945   Line Status Scrubbed the hub prior to access;Flushed;Infusing 12/02/20 1945   Dressing Status Clean;Dry;Intact 12/02/20 1945   Dressing Intervention N/A 12/02/20 1945   Dressing Change Due 12/05/20 12/02/20 0912   Infiltration Grading ([facility], [facility]) 0 12/02/20 1945   Phlebitis Scale ([facility] Only) 0 12/02/20 1945       Wound 11/25/20 Pressure Injury Buttocks;Sacrum;Leg (Active)       Wound 11/25/20 (Active)   Wound Image   11/25/20 1918   Site Assessment SHOLA 12/02/20 1945   Periwound Assessment SHOLA 12/02/20 1945    Margins SHOLA 12/02/20 1945   Closure SHOLA 12/02/20 1945   Drainage Amount None 12/02/20 1945   Drainage Description Serous 11/27/20 0815   Treatments Cleansed 11/25/20 2107   Wound Cleansing Normal Saline Irrigation 11/27/20 0815   Dressing Cleansing/Solutions Not Applicable 12/02/20 1945   Dressing Options Wound Vac 12/01/20 2030   Dressing Changed Observed 12/02/20 1945   Dressing Status Clean;Dry;Intact 12/02/20 1945       Wound 11/28/20 Incision Hip Right staples, prevena wound vac (Active)   Site Assessment SHOLA 12/02/20 1945   Periwound Assessment SHOLA 12/02/20 1945   Margins SHOLA 12/02/20 1945   Closure SHOLA 12/02/20 1945   Drainage Amount SHOLA 12/02/20 1945   Drainage Description SHOLA 12/02/20 0928   Dressing Options Wound Vac 12/02/20 1945   Dressing Changed Observed 12/02/20 1945   Dressing Status Clean;Dry;Intact 12/02/20 1945       Peripheral IV 12/02/20 20 G Left Upper arm (Active)   Site Assessment Clean;Dry;Intact 12/02/20 1945   Dressing Type Transparent;Occlusive 12/02/20 1945   Line Status Scrubbed the hub prior to access;Flushed;Infusing 12/02/20 1945   Dressing Status Clean;Dry;Intact 12/02/20 1945   Dressing Intervention N/A 12/02/20 1945   Dressing Change Due 12/05/20 12/02/20 0912   Infiltration Grading ([facility]) 0 12/02/20 1945   Phlebitis Scale ([facility] Only) 0 12/02/20 1945               MENTAL STATUS ON TRANSFER  Level of Consciousness: Alert  Orientation : Oriented x 4  Speech: Speech Clear    CONSULTATIONS  Orthopedics    PROCEDURES  ORIF was done on 11/28    LABORATORY  Lab Results   Component Value Date    SODIUM 134 (L) 12/04/2020    POTASSIUM 4.0 12/04/2020    CHLORIDE 103 12/04/2020    CO2 22 12/04/2020    GLUCOSE 95 12/04/2020    BUN 20 12/04/2020    CREATININE 0.98 12/04/2020    CREATININE 0.58 02/22/2013    GLOMRATE >59 06/22/2010        Lab Results   Component Value Date    WBC 11.8 (H) 12/04/2020    WBC 6.6 02/22/2013    HEMOGLOBIN 9.3 (L) 12/04/2020    HEMATOCRIT 28.0 (L)  12/04/2020    PLATELETCT 406 12/04/2020        Total time of the discharge process exceeds 32 minutes.

## 2020-12-03 NOTE — CARE PLAN
Problem: Safety  Goal: Will remain free from injury  Outcome: PROGRESSING AS EXPECTED  Pt called for assistance and when needing help. Protective Mepilex applied to heels and elbows.      Problem: Venous Thromboembolism (VTW)/Deep Vein Thrombosis (DVT) Prevention:  Goal: Patient will participate in Venous Thrombosis (VTE)/Deep Vein Thrombosis (DVT)Prevention Measures  Outcome: PROGRESSING AS EXPECTED  Pt wore SCDs throughout shift.

## 2020-12-03 NOTE — THERAPY
Physical Therapy   Daily Treatment     Patient Name: Sangita Bolton  Age:  75 y.o., Sex:  female  Medical Record #: [id number]  Today's Date: 12/2/2020     Precautions: Fall Risk, Toe Touch Weight Bearing Right Lower Extremity, Posterior Hip Precautions    Assessment    Pt progressing slowly though as expected given musculoskeletal co-morbidities and cognitive impairments. She was agreeable and able to demonstrate bed mobility, sit<>stands, and stand pivot transfers with mod A. She requires assist and cueing/facilitation to maintain TTWB RLE and posterior precautions. She will benefit from continued acute PT while here with details/recs below.    Plan    Continue current treatment plan.    DC Equipment Recommendations: Unable to determine at this time  Discharge Recommendations: Recommend post-acute placement for additional physical therapy services prior to discharge home       12/02/20 1801   Cognition    Cognition / Consciousness X   Level of Consciousness Alert   Ability To Follow Commands 2 Step   Safety Awareness Impaired   Sequencing Impaired   Initiation Impaired   Comments unable to effectively compensate for precuations without cueing/hands on assist (2' to cog)   Balance   Sitting Balance (Static) Fair   Sitting Balance (Dynamic) Fair   Standing Balance (Static) Poor +   Standing Balance (Dynamic) Poor   Weight Shift Sitting Fair   Weight Shift Standing Poor   Skilled Intervention Compensatory Strategies;Verbal Cuing;Tactile Cuing;Sequencing   Comments needed faciltiation and cueing to promote TTWB RLE and compensatory strategies; cognition is limiting ability to maintain preucations (as her STM is poor)   Gait Analysis   Gait Level Of Assist Unable to Participate   Weight Bearing Status TTWB RLE   Comments see balance; stand pivot only    Bed Mobility    Supine to Sit Moderate Assist   Sit to Supine   (left in chair)   Scooting Moderate Assist   Skilled Intervention Compensatory  Strategies;Facilitation;Postural Facilitation;Sequencing;Tactile Cuing;Verbal Cuing   Comments cueing and facilitation of mechancis to maintain posterior precautions; pt essentialy does not maintain wihtout physical assist and cueing   Functional Mobility   Sit to Stand Minimal Assist   Bed, Chair, Wheelchair Transfer Moderate Assist   Transfer Method Stand Pivot   Mobility bed mobility, EOB, sit<>stands, stand pivot   Skilled Intervention Compensatory Strategies;Facilitation;Postural Facilitation;Sequencing;Verbal Cuing;Tactile Cuing   Activity Tolerance   Comments pain limiting to some degree   Short Term Goals    Short Term Goal # 1 Pt will be able to complete bed mobility with min assist in 6tx in order to return home with    Goal Outcome # 1 goal not met   Short Term Goal # 2 Pt will be able to complete functional transfers with min assist in 6tx in order to progress back to ambulating   Goal Outcome # 2 Goal not met   Short Term Goal # 3 Pt will be able to ambulate 10ft with mod assist and FWW in 6tx in order to progress back   Goal Outcome # 3 Goal not met   Education Group   Hip Precautions Posterior Patient Response Patient;Explanation;Verbal Demonstration;Reinforcement Needed;Action Demonstration;No Learning Evidence   Role of Physical Therapist Patient Response Patient;Acceptance;Explanation;Verbal Demonstration;Reinforcement Needed   Weight Bearing Status Patient Response Patient;Acceptance;Explanation;Demonstration;Verbal Demonstration;Action Demonstration;Reinforcement Needed;No Learning Evidence   Additional Comments limited carryover with precuations/education given cog deficits; needs consitent reinforcement

## 2020-12-03 NOTE — CARE PLAN
Problem: Nutritional:  Goal: Achieve adequate nutritional intake  Description: Patient will consume 50% of meals/snacks  Outcome: MET   Pt with variable PO intake <% (mostly %) of documented meals per flowsheets. Pt reports eating fresh fruit between meals. Pt states she is eating at her baseline.     RD to monitor per department guidelines.

## 2020-12-03 NOTE — CARE PLAN
Problem: Skin Integrity  Goal: Risk for impaired skin integrity will decrease  Outcome: PROGRESSING AS EXPECTED  Note: Mepilex on heels, barrier paste used, Q2 turns implemented     Problem: Mobility  Goal: Risk for activity intolerance will decrease  Outcome: PROGRESSING SLOWER THAN EXPECTED  Note: Pt requires 2 person assist, is unsteady with FWW and tires quicky

## 2020-12-03 NOTE — CARE PLAN
Problem: Pain Management  Goal: Pain level will decrease to patient's comfort goal  Outcome: PROGRESSING AS EXPECTED     Problem: Safety  Goal: Will remain free from falls  Outcome: PROGRESSING AS EXPECTED   Treaded socks in place, bed in the lowest position, bed alarm on, call light and belongings within reach, pt call for assistance appropriately   Problem: Skin Integrity  Goal: Risk for impaired skin integrity will decrease  Outcome: PROGRESSING AS EXPECTED   [id number], waffle overlay in place.

## 2020-12-03 NOTE — DISCHARGE PLANNING
Agency/Facility Name: [facility]  Spoke To: Veronica  Outcome: Due to patient not having a BM, transport is rescheduled for tomorrow, 12/04/2020 at 1200 via [facility] van.

## 2020-12-03 NOTE — DISCHARGE PLANNING
Agency/Facility Name: [facility]  Spoke To: Veronica  Outcome: Bed available today.  Transport is scheduled for 1200 via [facility].

## 2020-12-03 NOTE — PROGRESS NOTES
"   Orthopaedic PA Progress Note    Interval changes:Doing well, pain free, may DC LMWH as ASA should be sufficient    ROS - Patient denies any new issues. No chest pain, dyspnea, or fever.  Pain well controlled.    /52   Pulse 71   Temp 36.8 °C (98.2 °F) (Temporal)   Resp 16   Ht 1.6 m (5' 3\")   Wt 47.1 kg (103 lb 13.4 oz)   SpO2 98%     Patient seen and examined  No acute distress  Breathing non labored  RRR  Surgical dressing is clean, dry, and intact. Patient clearly fires tibialis anterior, EHL, and gastrocnemius/soleus. Sensation is intact to light touch throughout superficial peroneal, deep peroneal, tibial, saphenous, and sural nerve distributions. Strong and palpable 2+ dorsalis pedis and posterior tibial pulses with capillary refill less than 2 seconds. No lower leg tenderness or discomfort.    Recent Labs     12/01/20  0338 12/02/20  0353 12/03/20  0051   WBC 11.8* 12.0* 11.2*   RBC 2.82* 2.65* 2.68*   HEMOGLOBIN 9.5* 9.0* 8.8*   HEMATOCRIT 27.8* 26.6* 27.5*   MCV 98.6* 100.4* 102.6*   MCH 33.7* 34.0* 32.8   MCHC 34.2 33.8 32.0*   RDW 52.4* 52.1* 53.0*   PLATELETCT 266 328 354   MPV 9.9 10.0 9.7     Active Hospital Problems    Diagnosis   • RASHAD (acute kidney injury) (HCC) [N17.9]     Priority: High   • Anemia [D64.9]     Priority: High   • Dementia (HCC) [F03.90]     Priority: High   • Fall [W19.XXXA]     Priority: High   • Closed fracture of neck of right femur ([facility]) [S72.001A]     Priority: High   • Hyponatremia [E87.1]     Priority: High   • Stage 3 chronic kidney disease [N18.30]     Priority: Medium   • Leukocytosis [D72.829]     Priority: Medium   • Hypertension [I10]     Priority: Medium   • CAD (coronary artery disease) [I25.10]     Priority: Low   • Hypertensive urgency [I16.0]     Priority: Low   • Hypokalemia [E87.6]     Priority: Low       Assessment/Plan:  POD#5 S/P revision hemiarthroplasty RLE with ORIF  Wt bearing status - TTWB x 4 wks  PT/OT-initiated  Wound care:Leave Prevena " until it malfunctions  Drains - no  Hernandez-no  Sutures/Staples out- 10-14 days post operatively  Antibiotics: complete  DVT Prophylaxis- TEDS/SCDs/Foot pumps.   Lovenox: Stop now, continue ECASA low dose BID x 30d  Future Procedures - none planned  Case Coordination for Discharge Planning - Disposition per Med/PT/OT.   Follow-Up: needs appointment with Dr. Thomas or Dr. Soares at  [facility] at 10-14 days post-op for re-evaluation, staple removal and radiographs.

## 2020-12-03 NOTE — PROGRESS NOTES
[facility] Medicine Daily Progress Note    Date of Service  12/3/2020    Chief Complaint  75 y.o. female admitted 11/25/2020 with fall    Hospital Course    75-year-old female with history of dementia, hypertension and coronary artery disease presented 11/25 after ground-level fall, the patient had dementia not able to give a good history of her fall however she denied any dizziness or loss of consciousness, denied any chest pain, the patient lives with her  who did not see her when she fell, on admission the patient was found to have dehydration with RASHAD and x-ray showed right hip fracture, the patient was evaluated by Ortho plan for surgery on 11/27 however due to severe anemia the surgery was postponed and it was done on 11/28, PT and OT evaluation recommended SNF.    The patient developed leukocytosis with some worsening on her confusion and dementia, also elevated inflammation markers including procalcitonin and CRP, the patient denied any symptoms however start with ceftriaxone for 3 days for possible UTI, urine and blood culture are pending.    The patient developed hyponatremia around 122, with RASHAD after surgery, IV fluid was started.      Interval Problem Update  I evaluated and examined the patient at the bedside.  Labs and imaging were reviewed.    Patient reported of constipation, add Dulcolax, Senokot, continue MiraLAX.  Still no bowel movement, added Dulcolax suppository, magnesium hydroxide as needed.  Will try Fleet enema if still no bowel movement    Leukocytosis and RASHAD has been improving.  Completed a course of Rocephin 11/30-12/2    Hyponatremia has been improved, continue normal saline and monitor    Increase sodium tablets to 2 g 3 times daily    Wbc 12.3-> 11.8->12-> 11  cre 0.8-> 1.7-> 1.57-> 1.44->1.29-> 1.1  Na 126-> 130-> 130      Consultants/Specialty  Orthopedic    Code Status  Full Code    Disposition  SNF     Review of Systems  Review of Systems   Constitutional: Positive for  malaise/fatigue. Negative for chills, diaphoresis, fever and weight loss.   HENT: Negative.  Negative for congestion, ear discharge, ear pain and sore throat.    Eyes: Negative.  Negative for pain, discharge and redness.   Respiratory: Negative.  Negative for cough, hemoptysis, shortness of breath and wheezing.    Cardiovascular: Negative.  Negative for chest pain, palpitations and leg swelling.   Gastrointestinal: Negative.  Negative for abdominal pain, constipation, diarrhea, nausea and vomiting.   Genitourinary: Negative.  Negative for dysuria, flank pain, frequency, hematuria and urgency.   Musculoskeletal: Positive for falls and joint pain. Negative for back pain, myalgias and neck pain.   Skin: Negative for itching.   Neurological: Negative.  Negative for dizziness, tremors, sensory change, speech change, focal weakness, loss of consciousness and headaches.   Psychiatric/Behavioral: Negative.  Negative for depression, hallucinations and substance abuse. The patient is not nervous/anxious.         Physical Exam  Temp:  [36.5 °C (97.7 °F)-36.8 °C (98.2 °F)] 36.8 °C (98.2 °F)  Pulse:  [71-85] 71  Resp:  [16-17] 16  BP: (112-147)/(52-57) 112/52  SpO2:  [94 %-98 %] 98 %    Physical Exam  Constitutional:       General: She is not in acute distress.     Appearance: Normal appearance. She is not ill-appearing.   HENT:      Head: Normocephalic and atraumatic.      Right Ear: External ear normal.      Left Ear: External ear normal.      Nose: Nose normal. No congestion or rhinorrhea.      Mouth/Throat:      Mouth: Mucous membranes are moist.   Eyes:      General: No scleral icterus.     Extraocular Movements: Extraocular movements intact.      Conjunctiva/sclera: Conjunctivae normal.      Pupils: Pupils are equal, round, and reactive to light.   Neck:      Musculoskeletal: Normal range of motion and neck supple. No neck rigidity.   Cardiovascular:      Rate and Rhythm: Normal rate and regular rhythm.      Pulses: Normal  pulses.      Heart sounds: Normal heart sounds. No murmur.   Pulmonary:      Effort: Pulmonary effort is normal. No respiratory distress.      Breath sounds: Normal breath sounds. No stridor. No wheezing, rhonchi or rales.   Chest:      Chest wall: No tenderness.   Abdominal:      General: Abdomen is flat. Bowel sounds are normal. There is no distension.      Palpations: Abdomen is soft.      Tenderness: There is no abdominal tenderness. There is no guarding or rebound.   Musculoskeletal: Normal range of motion.         General: Deformity present. No swelling or tenderness.      Right lower leg: No edema.      Left lower leg: No edema.   Skin:     General: Skin is warm and dry.      Coloration: Skin is not pale.      Findings: No bruising or lesion.   Neurological:      General: No focal deficit present.      Mental Status: She is alert and oriented to person, place, and time. Mental status is at baseline.      Cranial Nerves: No cranial nerve deficit.      Sensory: No sensory deficit.      Motor: No weakness.      Coordination: Coordination normal.      Gait: Gait normal.      Comments: Oriented x2   Psychiatric:         Mood and Affect: Mood normal.         Fluids    Intake/Output Summary (Last 24 hours) at 12/3/2020 1450  Last data filed at 12/3/2020 1000  Gross per 24 hour   Intake 240 ml   Output --   Net 240 ml       Laboratory  Recent Labs     12/01/20  0338 12/02/20  0353 12/03/20  0051   WBC 11.8* 12.0* 11.2*   RBC 2.82* 2.65* 2.68*   HEMOGLOBIN 9.5* 9.0* 8.8*   HEMATOCRIT 27.8* 26.6* 27.5*   MCV 98.6* 100.4* 102.6*   MCH 33.7* 34.0* 32.8   MCHC 34.2 33.8 32.0*   RDW 52.4* 52.1* 53.0*   PLATELETCT 266 328 354   MPV 9.9 10.0 9.7     Recent Labs     12/02/20  0915 12/03/20  0051 12/03/20  0922   SODIUM 130* 130* 130*   POTASSIUM 4.0 3.4* 3.3*   CHLORIDE 99 100 98   CO2 21 21 22   GLUCOSE 92 93 96   BUN 25* 24* 22   CREATININE 1.14 1.13 1.05   CALCIUM 10.1 9.8 10.2                   Imaging  IR-US GUIDED PIV    Final Result    Ultrasound-guided PERIPHERAL IV INSERTION performed by    qualified nursing staff as above.      IR-US GUIDED PIV   Final Result    Ultrasound-guided PERIPHERAL IV INSERTION performed by    qualified nursing staff as above.      DX-CHEST-PORTABLE (1 VIEW)   Final Result      1.  No acute cardiac or pulmonary abnormalities are identified.   2.  Atherosclerosis   3.  Emphysema      DX-CHEST-LIMITED (1 VIEW)   Final Result         No acute cardiac or pulmonary abnormality is identified.      DX-PELVIS-1 OR 2 VIEWS   Final Result      1.  Interval placement of femoral component across periprosthetic fracture      2.  Presumed antibiotic beads present      EC-ECHOCARDIOGRAM COMPLETE W/O CONT   Final Result      CT-HIP W/O PLUS RECONS RIGHT   Final Result      Periprosthetic fracture in the proximal right femur.      CT-HEAD W/O   Final Result      No acute intracranial findings.         DX-FEMUR-2+ RIGHT   Final Result      1.  Periprosthetic fracture of the proximal RIGHT femoral diaphysis   2.  Osteopenia      DX-PELVIS-1 OR 2 VIEWS   Final Result      1.  Periprosthetic fracture of the proximal RIGHT femur   2.  Osteopenia   3.  LEFT hip osteoarthritis      DX-CHEST-PORTABLE (1 VIEW)   Final Result      1.  No acute cardiac or pulmonary abnormalities are identified.   2.  Emphysema   3.  Atherosclerosis           Assessment/Plan  * Closed fracture of neck of right femur (HCC)- (present on admission)  Assessment & Plan  Likely after mechanical fall   The patient has history of right hemiarthroplasty on 4/28/19  X-ray showed periprosthetic fracture of proximal right femoral diaphysis  surgery on 11/28 for ORIF and revision  PT and OT SNF, medically and surgically cleared  Pain control and prevent constipation  No pharmacological for possible delirium  Lovenox for DVT prophylaxis    Anemia  Assessment & Plan  With elevated MCV  Around 7  B12 around 300  The patient received 2 units of blood  transfusion  Cannot order iron panel due to blood transfusion  Continue B12 supplementation  Follow-up with PCP as outpatient    Dementia (HCC)  Assessment & Plan  Like mild to moderate  The patient is at high risk for delirium during this hospitalization  Pain control and nonpharmacological treatment  Avoid benzo and antihistamine  PT and OT and placement      Fall  Assessment & Plan  Likely mechanical  Echo is pending  EKG no arrhythmia  PT and OT after surgery  Start vitamin D and B12 supplementation    Hyponatremia- (present on admission)  Assessment & Plan    IV fluid and close monitoring  Continue salt tablets    -Improving    Constipation  Assessment & Plan  Continue bowel management  Fleet enema as needed    Stage 3 chronic kidney disease  Assessment & Plan  Acute on chronic kidney disease stage III   Avoid nephrotoxic medication  IV hydration   labs daily    Hypertension  Assessment & Plan  Due to high risk of fall, dementia and age we will discontinue hydrochlorothiazide and  Continue amlodipine and losartan  Close monitoring.    CAD (coronary artery disease)  Assessment & Plan  Cont ASA/statin, ARB, spironolactone  Echo showed diastolic dysfunction with normal systolic function       VTE prophylaxis: lovenox

## 2020-12-04 VITALS
RESPIRATION RATE: 16 BRPM | BODY MASS INDEX: 18.4 KG/M2 | TEMPERATURE: 98.1 F | HEIGHT: 63 IN | OXYGEN SATURATION: 97 % | WEIGHT: 103.84 LBS | HEART RATE: 78 BPM | DIASTOLIC BLOOD PRESSURE: 59 MMHG | SYSTOLIC BLOOD PRESSURE: 129 MMHG

## 2020-12-04 LAB
ANION GAP SERPL CALC-SCNC: 9 MMOL/L (ref 7–16)
BACTERIA BLD CULT: NORMAL
BACTERIA BLD CULT: NORMAL
BASOPHILS # BLD AUTO: 0.4 % (ref 0–1.8)
BASOPHILS # BLD: 0.05 K/UL (ref 0–0.12)
BUN SERPL-MCNC: 20 MG/DL (ref 8–22)
CALCIUM SERPL-MCNC: 10 MG/DL (ref 8.5–10.5)
CHLORIDE SERPL-SCNC: 103 MMOL/L (ref 96–112)
CO2 SERPL-SCNC: 22 MMOL/L (ref 20–33)
CREAT SERPL-MCNC: 0.98 MG/DL (ref 0.5–1.4)
EOSINOPHIL # BLD AUTO: 0.16 K/UL (ref 0–0.51)
EOSINOPHIL NFR BLD: 1.4 % (ref 0–6.9)
ERYTHROCYTE [DISTWIDTH] IN BLOOD BY AUTOMATED COUNT: 50.5 FL (ref 35.9–50)
GLUCOSE SERPL-MCNC: 95 MG/DL (ref 65–99)
HCT VFR BLD AUTO: 28 % (ref 37–47)
HGB BLD-MCNC: 9.3 G/DL (ref 12–16)
IMM GRANULOCYTES # BLD AUTO: 0.18 K/UL (ref 0–0.11)
IMM GRANULOCYTES NFR BLD AUTO: 1.5 % (ref 0–0.9)
LYMPHOCYTES # BLD AUTO: 1.16 K/UL (ref 1–4.8)
LYMPHOCYTES NFR BLD: 9.8 % (ref 22–41)
MCH RBC QN AUTO: 33 PG (ref 27–33)
MCHC RBC AUTO-ENTMCNC: 33.2 G/DL (ref 33.6–35)
MCV RBC AUTO: 99.3 FL (ref 81.4–97.8)
MONOCYTES # BLD AUTO: 1.41 K/UL (ref 0–0.85)
MONOCYTES NFR BLD AUTO: 11.9 % (ref 0–13.4)
NEUTROPHILS # BLD AUTO: 8.85 K/UL (ref 2–7.15)
NEUTROPHILS NFR BLD: 75 % (ref 44–72)
NRBC # BLD AUTO: 0 K/UL
NRBC BLD-RTO: 0 /100 WBC
PLATELET # BLD AUTO: 406 K/UL (ref 164–446)
PMV BLD AUTO: 9.5 FL (ref 9–12.9)
POTASSIUM SERPL-SCNC: 4 MMOL/L (ref 3.6–5.5)
RBC # BLD AUTO: 2.82 M/UL (ref 4.2–5.4)
SIGNIFICANT IND 70042: NORMAL
SIGNIFICANT IND 70042: NORMAL
SITE SITE: NORMAL
SITE SITE: NORMAL
SODIUM SERPL-SCNC: 134 MMOL/L (ref 135–145)
SOURCE SOURCE: NORMAL
SOURCE SOURCE: NORMAL
WBC # BLD AUTO: 11.8 K/UL (ref 4.8–10.8)

## 2020-12-04 PROCEDURE — A9270 NON-COVERED ITEM OR SERVICE: HCPCS | Performed by: STUDENT IN AN ORGANIZED HEALTH CARE EDUCATION/TRAINING PROGRAM

## 2020-12-04 PROCEDURE — 80048 BASIC METABOLIC PNL TOTAL CA: CPT

## 2020-12-04 PROCEDURE — 97530 THERAPEUTIC ACTIVITIES: CPT

## 2020-12-04 PROCEDURE — 700102 HCHG RX REV CODE 250 W/ 637 OVERRIDE(OP): Performed by: INTERNAL MEDICINE

## 2020-12-04 PROCEDURE — 700102 HCHG RX REV CODE 250 W/ 637 OVERRIDE(OP): Performed by: ORTHOPAEDIC SURGERY

## 2020-12-04 PROCEDURE — A9270 NON-COVERED ITEM OR SERVICE: HCPCS | Performed by: ORTHOPAEDIC SURGERY

## 2020-12-04 PROCEDURE — A9270 NON-COVERED ITEM OR SERVICE: HCPCS | Performed by: INTERNAL MEDICINE

## 2020-12-04 PROCEDURE — 700102 HCHG RX REV CODE 250 W/ 637 OVERRIDE(OP): Performed by: STUDENT IN AN ORGANIZED HEALTH CARE EDUCATION/TRAINING PROGRAM

## 2020-12-04 PROCEDURE — 97535 SELF CARE MNGMENT TRAINING: CPT

## 2020-12-04 PROCEDURE — 36415 COLL VENOUS BLD VENIPUNCTURE: CPT

## 2020-12-04 PROCEDURE — 85025 COMPLETE CBC W/AUTO DIFF WBC: CPT

## 2020-12-04 PROCEDURE — 99239 HOSP IP/OBS DSCHRG MGMT >30: CPT | Performed by: STUDENT IN AN ORGANIZED HEALTH CARE EDUCATION/TRAINING PROGRAM

## 2020-12-04 RX ADMIN — Medication 2 G: at 08:45

## 2020-12-04 RX ADMIN — AMLODIPINE BESYLATE 10 MG: 10 TABLET ORAL at 04:37

## 2020-12-04 RX ADMIN — Medication 2 G: at 10:24

## 2020-12-04 RX ADMIN — Medication 2000 UNITS: at 04:37

## 2020-12-04 RX ADMIN — CYANOCOBALAMIN TAB 500 MCG 1000 MCG: 500 TAB at 04:37

## 2020-12-04 RX ADMIN — ASPIRIN 81 MG: 81 TABLET, COATED ORAL at 04:37

## 2020-12-04 ASSESSMENT — COGNITIVE AND FUNCTIONAL STATUS - GENERAL
EATING MEALS: A LITTLE
SUGGESTED CMS G CODE MODIFIER DAILY ACTIVITY: CK
DAILY ACTIVITIY SCORE: 14
PERSONAL GROOMING: A LITTLE
TOILETING: A LOT
DRESSING REGULAR LOWER BODY CLOTHING: A LOT
DRESSING REGULAR UPPER BODY CLOTHING: A LOT
HELP NEEDED FOR BATHING: A LOT

## 2020-12-04 ASSESSMENT — PAIN DESCRIPTION - PAIN TYPE
TYPE: ACUTE PAIN
TYPE: ACUTE PAIN

## 2020-12-04 NOTE — DISCHARGE PLANNING
Anticipated Discharge Disposition: [facility] today at 1200    Action: LSW met w/ pt at bedside, pt provided verbal consent for tx.  Pt requested to go via w/c, BS RN Emily confirmed that she can transport via w/c van.  LSW called and left msg w/ pt's spouse Jeremy to notify him of tx.    Barriers to Discharge: N/A    Plan: Completed transport packet to be placed in pt's chart, pending dc summary.

## 2020-12-04 NOTE — DISCHARGE PLANNING
Agency/Facility Name: [facility]  Outcome: Received a message from Veronica.  Has a bed today.  Transport is scheduled for 1430 via [facility].    @[phone]  Notified RN of transport time.

## 2020-12-04 NOTE — DISCHARGE INSTRUCTIONS
-Follow-up with Dr. Thomas or Dr. Soares at  [facility] at 10-14 days post-op for re-evaluation, staple removal and radiographs.  -Follow-up with PCP in 2 weeks  -Continue 81 mg twice daily for 30 days for DVT prophylaxis  - Toe-touch bearing bearing restriction to right leg for 4 weeks    Discharge Instructions    Discharged to other by medical transportation with escort. Discharged via wheelchair, hospital escort: Yes.  Special equipment needed: Not Applicable    Be sure to schedule a follow-up appointment with your primary care doctor or any specialists as instructed.     Discharge Plan:   Diet Plan: Discussed  Activity Level: Discussed  Confirmed Symptoms Management: Discussed  Medication Reconciliation Updated: Yes  Influenza Vaccine Indication: Indicated: 65 years and older  Influenza Vaccine Given - only chart on this line when given: Influenza Vaccine Given (See MAR)    I understand that a diet low in cholesterol, fat, and sodium is recommended for good health. Unless I have been given specific instructions below for another diet, I accept this instruction as my diet prescription.   Other diet: Resume Diet as tolerated.     Special Instructions: Discharge instructions for the Orthopedic Patient    Follow up with Primary Care Physician within 2 weeks of discharge to home, regarding:  Review of medications and diagnostic testing.  Surveillance for medical complications.  Workup and treatment of osteoporosis, if appropriate.       Femoral Shaft Fracture Treated With ORIF, Care After  This sheet gives you information about how to care for yourself after your procedure. Your health care provider may also give you more specific instructions. If you have problems or questions, contact your health care provider.  What can I expect after the procedure?  After the procedure, it is common to have:  Pain.  Swelling.  Some redness or bruising around the incision.  A small amount of fluid or blood coming from  your incision.  Stiffness in your leg.  Follow these instructions at home:  Medicines  Take over-the-counter and prescription medicines only as told by your health care provider. This includes medicines to prevent blood clots from forming.  Ask your health care provider if the medicine prescribed to you:  Requires you to avoid driving or using heavy machinery.  Can cause constipation. You may need to take these actions to prevent or treat constipation:  Drink enough fluid to keep your urine pale yellow.  Take over-the-counter or prescription medicines.  Eat foods that are high in fiber, such as beans, whole grains, and fresh fruits and vegetables.  Limit foods that are high in fat and processed sugars, such as fried or sweet foods.  Bathing  Do not take baths, swim, or use a hot tub until your health care provider approves. Ask your health care provider if you may take showers. You may only be allowed to take sponge baths.  Keep your bandage (dressing) dry. Cover it with a waterproof covering if you take a sponge bath or shower.  Incision care    Follow instructions from your health care provider about how to take care of your incision. Make sure you:  Wash your hands with soap and water before and after you change your bandage. If soap and water are not available, use hand .  Change your dressing as told by your health care provider.  Leave stitches (sutures), staples, or adhesive strips in place. These skin closures may need to stay in place for 2 weeks or longer. If adhesive strip edges start to loosen and curl up, you may trim the loose edges. Do not remove adhesive strips completely unless your health care provider tells you to do that.  Check your incision area every day for signs of infection. Check for:  More redness, swelling, or pain.  More fluid or blood.  Warmth.  Pus or a bad smell.  Managing pain, stiffness, and swelling    If directed, put ice on your leg.  Put ice in a plastic bag.  Place a  towel between your skin or dressing and the bag.  Leave the ice on for 20 minutes, 2-3 times a day.  Move your toes often to reduce stiffness and swelling.  Raise (elevate) your leg when sitting or lying down.  Activity  Do not use the injured limb to support your body weight until your health care provider says that you can. Use crutches or a walker as told by your health care provider.  Do exercises as told by your health care provider.  Ask your health care provider when it is safe to drive.  Return to your normal activities as told by your health care provider. Ask your health care provider what activities are safe for you.  General instructions  Do not use any products that contain nicotine or tobacco, such as cigarettes, e-cigarettes, and chewing tobacco. These can delay bone healing. If you need help quitting, ask your health care provider.  Keep all follow-up visits as told by your health care provider. This is important. This includes visits for physical therapy. Physical therapy is an important part of recovery as complete healing may take 3 to 6 months.  Contact a health care provider if:  You have chills or fever.  You have more redness, swelling, or pain around your incision.  You have more fluid or blood coming from your incision.  Your incision feels warm to touch.  You have pus or a bad smell coming from your incision.  You have more bruising around your incision.  Get help right away if:  You have warmth, redness, tenderness, and swelling develop in your calf or thigh.  You have chest pain or trouble breathing.  Your incision breaks open.  You have severe pain that does not get better with medicine.  Summary  After this procedure, it is common to have pain, swelling, and stiffness.  Take over-the-counter and prescription medicines only as told by your health care provider. This includes medicines to prevent blood clots from forming.  Follow instructions from your health care provider about how to  "take care of your incision. Check your incision area every day for signs of infection.  Keep all follow-up visits as told by your health care provider. This is important. This includes visits for physical therapy.  This information is not intended to replace advice given to you by your health care provider. Make sure you discuss any questions you have with your health care provider.  Document Released: 03/05/2020 Document Revised: 03/05/2020 Document Reviewed: 03/05/2020  Elsevier Patient Education © 2020 [employer]  Hip Hemiarthroplasty  The hip joint is located where the upper end of the femur meets the pelvis socket (acetabulum). The femur, or thigh bone, looks like a long stem with a ball on the end. The acetabulum is a socket or cup-like structure in the pelvis, or hip bone. This \"ball and socket\" allows your hip to move.  During Hip hemiarthroplasty, your surgeon removes the diseased bone tissue and cartilage from the hip joint. The healthy parts of the hip are left intact. The head of the femur (the ball) and the socket (acetabulum) is replaced with new, artificial parts. The new hip is made of materials that allow a normal movement of the joint. This surgery usually lasts 2 to 3 hours.   The purpose of this surgery is to reduce pain and improve range of motion. It is one of the most successful joint replacement surgeries. It most often greatly improves the quality of life.  LET YOUR CAREGIVERS KNOW ABOUT:  Allergies  Medications taken including herbs, eye drops, over the counter medications, and creams  Use of steroids (by mouth or creams)  Previous problems with anesthetics or Novocaine  Possibility of pregnancy, if this applies  History of blood clots (thrombophlebitis)  History of bleeding or blood problems  Previous surgery  Other health problems  BEFORE THE PROCEDURE  Do not eat or drink anything for as long as directed by your caregiver prior to surgery.  You should be present 60 minutes prior to " your procedure or as directed.  Prior to surgery an IV (intravenous line for giving fluids) may be started. You may be given an anesthetic (medications and gas to help you sleep) during the procedure.   AFTER THE PROCEDURE  After surgery, you will be taken to the recovery area. There a nurse will watch and check your progress. You may have a catheter (a long, narrow, hollow tube) in your bladder following surgery. The catheter helps you pass your water. Once you're awake, stable, and taking fluids well, barring other problems you'll be returned to your room. You will receive physical therapy until you are doing well and your caregiver feels it is safe for you to be transferred home or to an extended care facility.  HOME CARE INSTRUCTIONS   You may resume normal diet and activities as directed or allowed.  Change dressings if necessary or as directed.  Only take over-the-counter or prescription medicines for pain, discomfort, or fever as directed by your caregiver.  SEEK IMMEDIATE MEDICAL CARE IF:  You develop:  Swelling of your calf or leg or develop shortness of breath or chest pain.  Redness, swelling, or increasing pain in the wound.  Pus coming from wound.  An unexplained oral temperature over 102° F (38.9° C) develops.  A foul smell coming from the wound or dressing.  A breaking open of the wound (edges not staying together) after sutures or staples have been removed.  MAKE SURE YOU:   Understand these instructions.  Will watch your condition.  Will get help right away if you are not doing well or get worse.  Document Released: 01/02/2008 Document Revised: 03/11/2013 Document Reviewed: 01/29/2008  TrackIF® Patient Information ©2014 [employer].  -Is this patient being discharged with medication to prevent blood clots?  Yes, Aspirin Aspirin, ASA oral tablets  What is this medicine?  ASPIRIN (AS pir in) is a pain reliever. It is used to treat mild pain and fever. This medicine is also used as directed by a  doctor to prevent and to treat heart attacks, to prevent strokes and blood clots, and to treat arthritis or inflammation.  This medicine may be used for other purposes; ask your health care provider or pharmacist if you have questions.  COMMON BRAND NAME(S): Aspir-Low, Aspir-Mecca, Aspirtab, Alisia Advanced Aspirin, Alisia Aspirin, Alisia Aspirin Extra Strength, Alisia Aspirin Plus, Alisia Extra Strength, Alisia Extra Strength Plus, Alisia Genuine Aspirin, Alisia Womens Aspirin, Bufferin, Bufferin Extra Strength, Bufferin Low Dose  What should I tell my health care provider before I take this medicine?  They need to know if you have any of these conditions:  · anemia  · asthma  · bleeding problems  · child with chickenpox, the flu, or other viral infection  · diabetes  · gout  · if you frequently drink alcohol containing drinks  · kidney disease  · liver disease  · low level of vitamin K  · lupus  · smoke tobacco  · stomach ulcers or other problems  · an unusual or allergic reaction to aspirin, tartrazine dye, other medicines, dyes, or preservatives  · pregnant or trying to get pregnant  · breast-feeding  How should I use this medicine?  Take this medicine by mouth with a glass of water. Follow the directions on the package or prescription label. You can take this medicine with or without food. If it upsets your stomach, take it with food. Do not take your medicine more often than directed.  Talk to your pediatrician regarding the use of this medicine in children. While this drug may be prescribed for children as young as 12 years of age for selected conditions, precautions do apply. Children and teenagers should not use this medicine to treat chicken pox or flu symptoms unless directed by a doctor.  Patients over 65 years old may have a stronger reaction and need a smaller dose.  Overdosage: If you think you have taken too much of this medicine contact a poison control center or emergency room at once.  NOTE: This medicine  is only for you. Do not share this medicine with others.  What if I miss a dose?  If you are taking this medicine on a regular schedule and miss a dose, take it as soon as you can. If it is almost time for your next dose, take only that dose. Do not take double or extra doses.  What may interact with this medicine?  Do not take this medicine with any of the following medications:  · cidofovir  · ketorolac  · probenecid  This medicine may also interact with the following medications:  · alcohol  · alendronate  · bismuth subsalicylate  · flavocoxid  · herbal supplements like feverfew, garlic, steve, ginkgo biloba, horse chestnut  · medicines for diabetes or glaucoma like acetazolamide, methazolamide  · medicines for gout  · medicines that treat or prevent blood clots like enoxaparin, heparin, ticlopidine, warfarin  · other aspirin and aspirin-like medicines  · NSAIDs, medicines for pain and inflammation, like ibuprofen or naproxen  · pemetrexed  · sulfinpyrazone  · varicella live vaccine  This list may not describe all possible interactions. Give your health care provider a list of all the medicines, herbs, non-prescription drugs, or dietary supplements you use. Also tell them if you smoke, drink alcohol, or use illegal drugs. Some items may interact with your medicine.  What should I watch for while using this medicine?  If you are treating yourself for pain, tell your doctor or health care professional if the pain lasts more than 10 days, if it gets worse, or if there is a new or different kind of pain. Tell your doctor if you see redness or swelling. Also, check with your doctor if you have a fever that lasts for more than 3 days. Only take this medicine to prevent heart attacks or blood clotting if prescribed by your doctor or health care professional.  Do not take aspirin or aspirin-like medicines with this medicine. Too much aspirin can be dangerous. Always read the labels carefully.  This medicine can irritate  your stomach or cause bleeding problems. Do not smoke cigarettes or drink alcohol while taking this medicine. Do not lie down for 30 minutes after taking this medicine to prevent irritation to your throat.  If you are scheduled for any medical or dental procedure, tell your healthcare provider that you are taking this medicine. You may need to stop taking this medicine before the procedure.  This medicine may be used to treat migraines. If you take migraine medicines for 10 or more days a month, your migraines may get worse. Keep a diary of headache days and medicine use. Contact your healthcare professional if your migraine attacks occur more frequently.  What side effects may I notice from receiving this medicine?  Side effects that you should report to your doctor or health care professional as soon as possible:  · allergic reactions like skin rash, itching or hives, swelling of the face, lips, or tongue  · breathing problems  · changes in hearing, ringing in the ears  · confusion  · general ill feeling or flu-like symptoms  · pain on swallowing  · redness, blistering, peeling or loosening of the skin, including inside the mouth or nose  · signs and symptoms of bleeding such as bloody or black, tarry stools; red or dark-brown urine; spitting up blood or brown material that looks like coffee grounds; red spots on the skin; unusual bruising or bleeding from the eye, gums, or nose  · trouble passing urine or change in the amount of urine  · unusually weak or tired  · yellowing of the eyes or skin  Side effects that usually do not require medical attention (report to your doctor or health care professional if they continue or are bothersome):  · diarrhea or constipation  · headache  · nausea, vomiting  · stomach gas, heartburn  This list may not describe all possible side effects. Call your doctor for medical advice about side effects. You may report side effects to FDA at 9-001-FDA-0739.  Where should I keep my  medicine?  Keep out of the reach of children.  Store at room temperature between 15 and 30 degrees C (59 and 86 degrees F). Protect from heat and moisture. Do not use this medicine if it has a strong vinegar smell. Throw away any unused medicine after the expiration date.  NOTE: This sheet is a summary. It may not cover all possible information. If you have questions about this medicine, talk to your doctor, pharmacist, or health care provider.  © 2020 Elsevier/Gold Standard (2018-01-30 10:42:13)      · Is patient discharged on Warfarin / Coumadin?   No     Depression / Suicide Risk    As you are discharged from this [facility] facility, it is important to learn how to keep safe from harming yourself.    Recognize the warning signs:  · Abrupt changes in personality, positive or negative- including increase in energy   · Giving away possessions  · Change in eating patterns- significant weight changes-  positive or negative  · Change in sleeping patterns- unable to sleep or sleeping all the time   · Unwillingness or inability to communicate  · Depression  · Unusual sadness, discouragement and loneliness  · Talk of wanting to die  · Neglect of personal appearance   · Rebelliousness- reckless behavior  · Withdrawal from people/activities they love  · Confusion- inability to concentrate     If you or a loved one observes any of these behaviors or has concerns about self-harm, here's what you can do:  · Talk about it- your feelings and reasons for harming yourself  · Remove any means that you might use to hurt yourself (examples: pills, rope, extension cords, firearm)  · Get professional help from the community (Mental Health, Substance Abuse, psychological counseling)  · Do not be alone:Call your Safe Contact- someone whom you trust who will be there for you.  · Call your local CRISIS HOTLINE 515-6197 or 762-290-7138  · Call your local Children's Mobile Crisis Response Team [place] (233) 559-1103 or  "www.[employer]  · Call the toll free National Suicide Prevention Hotlines   · National Suicide Prevention Lifeline 132-368-XZJK (0902)  · Dress Code Line Network 800-SUICIDE (421-3939)      Hip Hemiarthroplasty  The hip joint is located where the upper end of the femur meets the pelvis socket (acetabulum). The femur, or thigh bone, looks like a long stem with a ball on the end. The acetabulum is a socket or cup-like structure in the pelvis, or hip bone. This \"ball and socket\" allows your hip to move.  During Hip hemiarthroplasty, your surgeon removes the diseased bone tissue and cartilage from the hip joint. The healthy parts of the hip are left intact. The head of the femur (the ball) and the socket (acetabulum) is replaced with new, artificial parts. The new hip is made of materials that allow a normal movement of the joint. This surgery usually lasts 2 to 3 hours.   The purpose of this surgery is to reduce pain and improve range of motion. It is one of the most successful joint replacement surgeries. It most often greatly improves the quality of life.  LET YOUR CAREGIVERS KNOW ABOUT:  · Allergies  · Medications taken including herbs, eye drops, over the counter medications, and creams  · Use of steroids (by mouth or creams)  · Previous problems with anesthetics or Novocaine  · Possibility of pregnancy, if this applies  · History of blood clots (thrombophlebitis)  · History of bleeding or blood problems  · Previous surgery  · Other health problems  BEFORE THE PROCEDURE  · Do not eat or drink anything for as long as directed by your caregiver prior to surgery.  · You should be present 60 minutes prior to your procedure or as directed.  Prior to surgery an IV (intravenous line for giving fluids) may be started. You may be given an anesthetic (medications and gas to help you sleep) during the procedure.   AFTER THE PROCEDURE  After surgery, you will be taken to the recovery area. There a nurse will watch and " check your progress. You may have a catheter (a long, narrow, hollow tube) in your bladder following surgery. The catheter helps you pass your water. Once you're awake, stable, and taking fluids well, barring other problems you'll be returned to your room. You will receive physical therapy until you are doing well and your caregiver feels it is safe for you to be transferred home or to an extended care facility.  HOME CARE INSTRUCTIONS   · You may resume normal diet and activities as directed or allowed.  · Change dressings if necessary or as directed.  · Only take over-the-counter or prescription medicines for pain, discomfort, or fever as directed by your caregiver.  SEEK IMMEDIATE MEDICAL CARE IF:  You develop:  · Swelling of your calf or leg or develop shortness of breath or chest pain.  · Redness, swelling, or increasing pain in the wound.  · Pus coming from wound.  · An unexplained oral temperature over 102° F (38.9° C) develops.  · A foul smell coming from the wound or dressing.  · A breaking open of the wound (edges not staying together) after sutures or staples have been removed.  MAKE SURE YOU:   · Understand these instructions.  · Will watch your condition.  · Will get help right away if you are not doing well or get worse.  Document Released: 01/02/2008 Document Revised: 03/11/2013 Document Reviewed: 01/29/2008  [employer]® Patient Information ©2014 [employer].      Hip Fracture Treated With ORIF, Care After  This sheet gives you information about how to care for yourself after your procedure. Your health care provider may also give you more specific instructions. If you have problems or questions, contact your health care provider.  What can I expect after the procedure?  After the procedure, it is common to have:  · Pain. You will be given medicines to treat this.  · Swelling.  · Difficulty walking.  · Some redness or bruising around the incision.  · A small amount of fluid or blood from the  incision.  Follow these instructions at home:  Medicines  · Take over-the-counter and prescription medicines only as told by your health care provider.  · You may be given a blood thinner to take for up to six weeks. This will help reduce the risk of developing a blood clot. It is important to use this medicine exactly as directed.  · You may be given calcium and vitamin D supplements to strengthen your bones.  · If you are taking prescription pain medicine, take actions to prevent or treat constipation. Your health care provider may recommend that you:  ? Drink enough fluid to keep your urine pale yellow.  ? Eat foods that are high in fiber, such as fresh fruits and vegetables, whole grains, and beans.  ? Limit foods that are high in fat and processed sugars, such as fried or sweet foods.  ? Take an over-the-counter or prescription medicine for constipation.  Bathing  · Do not take baths, swim, or use a hot tub until your health care provider approves. Ask your health care provider if you can take showers. You may only be allowed to take sponge baths.  · Keep the bandage (dressing) dry until your health care provider says it can be removed.  Incision care    · Follow instructions from your health care provider about how to take care of your incision. Make sure you:  ? Wash your hands with soap and water before you change your dressing. If soap and water are not available, use hand .  ? Change your dressing as told by your health care provider.  ? Leave stitches (sutures), skin glue, or adhesive strips in place. These skin closures may need to stay in place for 2 weeks or longer. If adhesive strip edges start to loosen and curl up, you may trim the loose edges. Do not remove adhesive strips completely unless your health care provider tells you to do that.  · Check your incision area every day for signs of infection. Check for:  ? More redness, swelling, or pain.  ? More fluid or blood.  ? Warmth.  ? Pus or  a bad smell.  Managing pain, stiffness, and swelling    · If directed, put ice on the affected area to prevent pain and swelling.  ? Put ice in a plastic bag.  ? Place a towel between your skin and the bag.  ? Leave the ice on for 20 minutes, 2-3 times a day.  · Move your toes often to avoid stiffness and to lessen swelling.  · Raise (elevate) your leg above the level of your heart while you are sitting or lying down. To do this, try putting a few pillows under your leg.  Activity    · Return to your normal activities as told by your health care provider. Ask your health care provider what activities are safe for you.  · Do exercises as told by your health care provider or physical therapist. This will help make your hip stronger and help you recover more quickly.  · Do not use your injured limb to support (bear) your body weight until your health care provider says that you can. Follow weight-bearing restrictions as told. Use crutches or other devices to help you move around (assistive devices) as directed.  · You may feel most comfortable using a raised surface when sitting on the toilet or in a chair.  · Consider using a toilet seat riser over the toilet for comfort.  Driving  · Do not drive or use heavy machinery while taking prescription pain medicine.  · Ask your health care provider when it is safe for you to drive.  General instructions  · Wear compression stockings as told by your health care provider. These stockings help to prevent blood clots and reduce swelling in your legs.  · Do not use any products that contain nicotine or tobacco, such as cigarettes and e-cigarettes. These can delay bone healing. If you need help quitting, ask your health care provider.  · Keep all follow-up visits as told by your health care provider. This is important. This may include visits for:  ? Physical therapy.  ? Screening for osteoporosis. Osteoporosis is thinning and loss of density in your bones.  Contact a health care  provider if you:  · Have a fever.  · Have pain that is not helped with medicine.  · Have more redness, swelling, or pain at your incision area.  · Have more fluid or blood coming from your incision or leaking through your dressing.  · Notice that your incision feels warm to the touch.  · Have pus or a bad smell coming from your incision area.  Get help right away if you:  · Notice that the edges of your incision have come apart after the sutures or staples have been removed.  · Have pain, warmth, or tenderness in the back of your lower leg (calf).  · Have tingling or numbness in your leg.  · Have a pale and cold leg.  · Have trouble breathing.  · Have chest pain.  Summary  · After the procedure, it is common to have some pain and swelling.  · Take pain medicines as directed by your health care provider. Icing may also help with pain control.  · Contact your health care provider if you have signs of infection, severe pain, or more fluid or blood coming from your incision.  This information is not intended to replace advice given to you by your health care provider. Make sure you discuss any questions you have with your health care provider.  Document Released: 07/15/2015 Document Revised: 09/07/2019 Document Reviewed: 01/28/2019  [employer] Patient Education © 2020 [employer]      Infection Prevention in the Home  If you have an infection, may have been exposed to an infection, or are taking care of someone who has an infection, it is important to know how to keep the infection from spreading. Follow your health care provider's instructions and use these guidelines to help stop the spread of infection.  How infections are spread  In order for an infection to spread, the following must be present:  · A germ. This may be a virus, bacteria, fungus, or parasite.  · A place for the germ to live. This may be:  ? On or in a person, animal, plant, or food.  ? In soil or water.  ? On surfaces, such as a door handle.  · A  person or animal who can develop a disease if the germ enters the body (host). The host does not have resistance to the germ.  · A way for the germ to enter the host. This may occur by:  ? Direct contact with an infected person or animal. This can happen through shaking hands or hugging. Some germs can also travel through the air and spread to others. This can happen when an infected person coughs or sneezes on or near other people.  ? Indirect contact. This occurs when the germ enters the host through contact with an infected object. Examples include:  § Eating or drinking food or water that has the germ (is contaminated).  § Touching a contaminated surface with your hands, and then touching your face, eyes, nose, or mouth.  Supplies needed:  · Soap.  · Alcohol-based hand .  · Standard cleaning products.  · Disinfectants, such as bleach.  · Reusable cleaning cloths, sponges, or paper towels.  · Disposable or reusable utility gloves.  How to prevent infection from spreading  There are several things that you can do to help prevent infection from spreading.  Take these general actions  Everyone should take the following actions to prevent the spread of infection:  · Wash your hands often with soap and water for at least 20 seconds. If soap and water are not available, use alcohol-based hand .  · Avoid touching your face, mouth, nose, or eyes.  · Cough or sneeze into a tissue, sleeve, or elbow instead of into your hand or into the air.  ? If you cough or sneeze into a tissue, throw it away immediately and wash your hands.    Keep your bathroom clean  · Provide soap.  · Change towels and washcloths frequently.  · Change toothbrushes often and store them separately in a clean, dry place.  · Clean and disinfect all surfaces, including the toilet, floor, tub, shower, and sink.  · Do not share personal items, such as razors, toothbrushes, deodorant, lopez, brushes, towels, and washcloths.  Maintain  hygiene in the kitchen    · Wash your hands before and after preparing food and before you eat.  · Clean the inside of your refrigerator each week.  · Keep your refrigerator set at 40°F (4°C) or less, and set your freezer at 0°F (-18°C) or less.  · Keep work surfaces clean. Disinfect them regularly.  · Wash your dishes in hot, soapy water. Air-dry your dishes or use a .  · Do not share dishes or eating utensils.  Handle food safely  · Store food carefully.  · Refrigerate leftovers promptly in covered containers.  · Throw out stale or spoiled food.  · Thaw foods in the refrigerator or microwave, not at room temperature.  · Serve foods at the proper temperature. Do not eat raw meat. Make sure it is cooked to the appropriate temperature. Cook eggs until they are firm.  · Wash fruits and vegetables under running water.  · Use separate cutting boards, plates, and utensils for raw foods and cooked foods.  · Use a clean spoon each time you sample food while cooking.  Do laundry the right way  · Wear gloves if laundry is visibly soiled.  · Do not shake soiled laundry. Doing that may send germs into the air.  · Wash laundry in hot water.  · If you cannot wash the laundry right away, place it in a plastic bag and wash it as soon as possible.  Be careful around animals and pets  · Wash your hands before and after touching animals.  · If you have a pet, ensure that your pet stays clean. Do not let people with weak immune systems touch bird droppings, fish tank water, or a litter box.  ? If you have a pet cage or litter box, be sure to clean it every day.  · If you are sick, stay away from animals and have someone else care for them if possible.  How to clean and disinfect objects and surfaces  Precautions  · Some disinfectants work for certain germs and not others. Read the 's instructions or read online resources to determine if the product you are using will work for the germ you are trying to  remove.  · If you choose to use bleach, use it safely. Never mix it with other cleaning products, especially those that contain ammonia. This mixture can create a dangerous gas that may be deadly.  · Keep proper movement of fresh air in your home (ventilation).  · Pour used mop water down the utility sink or toilet. Do not pour this water down the kitchen sink.  Objects and surfaces    · If surfaces are visibly soiled, clean them first with soap and water before disinfecting.  · Disinfect surfaces that are frequently touched every day. This may include:  ? Counters.  ? Tables.  ? Doorknobs.  ? Sinks and faucets.  ? Electronics, such as:  § Phones.  § Remote controls.  § Keyboards.  § Computers and tablets.  Cleaning supplies  Some cleaning supplies can breed germs. Take good care of them to prevent germs from spreading. To do this:  · Soak toilet brushes, mops, and sponges in bleach and water for 5 minutes after each use, or according to 's instructions.  · Wash reusable cleaning cloths and sanitize sponges after each use.  · Throw away disposable gloves after one use.  · Replace reusable utility gloves if they are cracked or torn or if they start to peel.  Additional actions if you are sick  If you live with other people:    · Avoid close contact with those around you. Stay at least 3 ft (1 m) away from others, if possible.  · Use a separate bathroom, if possible.  · If possible, sleep in a separate bedroom or in a separate bed to prevent infecting other household members.  ? Change bedroom linens each week or whenever they are soiled.  · Have everyone in the household wash hands often with soap and water. If soap and water are not available, use alcohol-based hand .  In general:  · Stay home except to get medical care. Call ahead before visiting your health care provider.  · Ask others to get groceries and household supplies and to refill prescriptions for you.  · Avoid public areas. Try not  to take public transportation.  · If you can, wear a mask if you need to go out of the house, or if you are in close contact with someone who is not sick.  · Avoid visitors until you have completely recovered, or until you have no signs and symptoms of infection.  · Avoid preparing food or providing care for others. If you must prepare food or provide care for others, wear a mask and wash your hands before and after doing these things.  Where to find more information  · Centers for Disease Control and Prevention: www.cdc.gov/nonpharmaceutical-interventions/index.html  · World Health Organization (WHO): www.who.int/infection-prevention/about/en/  · Association for Professionals in Infection Control and Epidemiology: professionals.site.apic.org/settings-of-care/non-healthcare-setting/home/  Summary  · It is important to know how to keep the infection from spreading.  · Make sure everyone in your household washes their hands often with soap and water.  · Disinfect surfaces that are frequently touched every day.  · If you are sick, stay home except to get medical care.  This information is not intended to replace advice given to you by your health care provider. Make sure you discuss any questions you have with your health care provider.  Document Released: 09/26/2009 Document Revised: 04/14/2020 Document Reviewed: 03/13/2020  [employer] Patient Education © 2020 [employer]

## 2020-12-04 NOTE — PROGRESS NOTES
"   Orthopaedic PA Progress Note    Interval changes:Doing well, pain free, may DC LMWH as ASA should be sufficient    ROS - Patient denies any new issues. No chest pain, dyspnea, or fever.  Pain well controlled.    /59   Pulse 78   Temp 36.7 °C (98.1 °F) (Temporal)   Resp 16   Ht 1.6 m (5' 3\")   Wt 47.1 kg (103 lb 13.4 oz)   SpO2 97%     Patient seen and examined  No acute distress  Breathing non labored  RRR  Surgical Prevena dressing is clean, dry, and intact. Patient clearly fires tibialis anterior, EHL, and gastrocnemius/soleus. Sensation is intact to light touch throughout superficial peroneal, deep peroneal, tibial, saphenous, and sural nerve distributions. Strong and palpable 2+ dorsalis pedis and posterior tibial pulses with capillary refill less than 2 seconds. No lower leg tenderness or discomfort.    Recent Labs     12/02/20  0353 12/03/20  0051 12/04/20  0326   WBC 12.0* 11.2* 11.8*   RBC 2.65* 2.68* 2.82*   HEMOGLOBIN 9.0* 8.8* 9.3*   HEMATOCRIT 26.6* 27.5* 28.0*   .4* 102.6* 99.3*   MCH 34.0* 32.8 33.0   MCHC 33.8 32.0* 33.2*   RDW 52.1* 53.0* 50.5*   PLATELETCT 328 354 406   MPV 10.0 9.7 9.5     Active Hospital Problems    Diagnosis   • Anemia [D64.9]     Priority: High   • Dementia (HCC) [F03.90]     Priority: High   • Fall [W19.XXXA]     Priority: High   • Closed fracture of neck of right femur (HCC) [S72.001A]     Priority: High   • Hyponatremia [E87.1]     Priority: High   • Constipation [K59.00]     Priority: Medium   • Stage 3 chronic kidney disease [N18.30]     Priority: Medium   • Hypertension [I10]     Priority: Medium   • CAD (coronary artery disease) [I25.10]     Priority: Low       Assessment/Plan:  POD#6 S/P revision hemiarthroplasty RLE with ORIF  Wt bearing status - TTWB x 4 wks  PT/OT-initiated  Wound care:Leave Prevena until it malfunctions; change at 2 weeks  Drains - no  Hernandez-no  Sutures/Staples out- 10-14 days post operatively  Antibiotics: complete  DVT " Prophylaxis- TEDS/SCDs/Foot pumps.   Lovenox: Stop now, continue ECASA low dose BID x 30d  Future Procedures - none planned  Case Coordination for Discharge Planning - Disposition per Med/PT/OT.   Follow-Up: needs appointment with Dr. Thomas or Dr. Soares at  [facility] 12/9  for re-evaluation, staple removal and radiographs.

## 2020-12-04 NOTE — DISCHARGE PLANNING
Agency/Facility Name: [facility]  Outcome: [facility] left message for Veronica stating patient had a BM and can transport today at 1200.

## 2020-12-04 NOTE — DISCHARGE PLANNING
Agency/Facility Name: [facility]  Spoke To: Veronica  Outcome: Waiting to make sure there is bed availability today.  Will know within an hour or so.

## 2020-12-04 NOTE — PROGRESS NOTES
7087 Went over discharge paperwork with patient. Patient is agreeable to discharge plan, states no further questions. Removed IV. Gathered patient items, returned patient glasses that were placed in locked drawer.    Per Michel SMITH, previna is okay to leave with patient to [facility].    Gave report to Pedrito STYLES at [facility]     1452 Patient was discharged and transported with all belongings in possession.

## 2020-12-04 NOTE — THERAPY
"Occupational Therapy  Daily Treatment     Patient Name: Sangita Bolton  Age:  75 y.o., Sex:  female  Medical Record #: [id number]  Today's Date: 12/4/2020     Precautions  Precautions: Fall Risk, Toe Touch Weight Bearing Right Lower Extremity, Posterior Hip Precautions  Comments: Pt not maintaining precautions    Assessment  Pt seen for OT treatment with emphasis on transfer training and ADLs. Pt able to use BSC and perform own toilet hygiene when given assist for xfer and clothing mgmt. Pt still not able to maintain precautions and is easily distracted and confused. Pt can continue to benefit from acute OT to increase independence in ADLs.   Plan    Continue current treatment plan.    DC Equipment Recommendations: Unable to determine at this time  Discharge Recommendations: Recommend post-acute placement for additional occupational therapy services prior to discharge home       12/04/20 1343   Precautions   Precautions Fall Risk;Toe Touch Weight Bearing Right Lower Extremity;Posterior Hip Precautions   Comments Pt not maintaining precautions   Cognition    Cognition / Consciousness X   Level of Consciousness Alert   Ability To Follow Commands 2 Step   Safety Awareness Impaired   Sequencing Impaired   Initiation Impaired   Comments Unable to maintain precautions; forgetful from moment to moment \"What are we doing?\"   Balance   Sitting Balance (Static) Fair   Sitting Balance (Dynamic) Fair   Standing Balance (Static) Poor +   Standing Balance (Dynamic) Poor   Weight Shift Sitting Fair   Weight Shift Standing Poor   Skilled Intervention Compensatory Strategies;Verbal Cuing;Tactile Cuing   Comments cues and physical assist to maintain TTWB   Bed Mobility    Supine to Sit Moderate Assist   Sit to Supine Moderate Assist   Scooting Moderate Assist   Skilled Intervention Compensatory Strategies;Facilitation;Tactile Cuing;Verbal Cuing   Comments Pt needs constant cues for precautions when moving in bed or to BSC   Activities of " Daily Living   Eating Supervision   Grooming Supervision;Seated   Lower Body Dressing Maximal Assist   Toileting Moderate Assist  (supervision hygiene, mod a xfer and clothing mgmt)   Skilled Intervention Verbal Cuing;Tactile Cuing;Facilitation   Comments Pt quickly forgetting what she was trying to do; needing cues for precautions and safety   Functional Mobility   Sit to Stand Minimal Assist   Bed, Chair, Wheelchair Transfer Moderate Assist   Toilet Transfers Moderate Assist   Transfer Method Stand Pivot   Skilled Intervention Verbal Cuing;Tactile Cuing;Compensatory Strategies   Comments xfer to BSC and chair to use toilet and change sheets   Activity Tolerance   Sitting in Chair 5 min; BSC 10 min   Sitting Edge of Bed 10 min   Standing 1 min x 3   Comments pain and confusion limiting xfers   Short Term Goals   Short Term Goal # 1 min assist for supine to sit edge of bed and transfer to commode   Goal Outcome # 1 Progressing as expected   Short Term Goal # 2 set up for seated UB ADLs   Goal Outcome # 2 Progressing as expected   Short Term Goal # 3 min assist for LB ADLS with good adherence to hip precautions/ use of AE   Goal Outcome # 3 Progressing as expected   Anticipated Discharge Equipment and Recommendations   DC Equipment Recommendations Unable to determine at this time   Discharge Recommendations Recommend post-acute placement for additional occupational therapy services prior to discharge home

## 2020-12-04 NOTE — CARE PLAN
Problem: Pain Management  Goal: Pain level will decrease to patient's comfort goal  Outcome: PROGRESSING AS EXPECTED  Note: Administering pain mediations as ordered (see MAR). Providing patient with extra blankets, pillows for support and repositioning as needed.     Problem: Communication  Goal: The ability to communicate needs accurately and effectively will improve  Outcome: PROGRESSING AS EXPECTED  Note: Discussed POC with patient; discharge plan, transportation to [facility] scheduled at 1200, pain management. pt verbalized understanding. Encouraging pt to voice questions and concerns. Oriented pt to use of call light.      Problem: Safety  Goal: Will remain free from injury  Outcome: PROGRESSING AS EXPECTED  Note: Call light/belongings in reach, bed in low position and locked, front wheel walker out of sight, siderails up x 2, pt wearing non-slip footwear, adequate lighting, clutter free environment, TTWB to RLE. Educate on level of risk, oriented to use of call light and encouraged pt to call before attempting to get out of bed.      Problem: Bowel/Gastric:  Goal: Normal bowel function is maintained or improved  Outcome: PROGRESSING AS EXPECTED  Flowsheets (Taken 12/4/2020 0825)  Last BM: 12/03/20     Problem: Skin Integrity  Goal: Risk for impaired skin integrity will decrease  Outcome: PROGRESSING AS EXPECTED  Note: [id number], mepilex to elbows/heels, and barrier paste in place.

## 2020-12-04 NOTE — CARE PLAN
Problem: Bowel/Gastric:  Goal: Normal bowel function is maintained or improved  Outcome: PROGRESSING AS EXPECTED  Note: Pt had 2 BMs today     Problem: Discharge Barriers/Planning  Goal: Patient's continuum of care needs will be met  Outcome: PROGRESSING AS EXPECTED  Note: BM was last barrier to discharge, pt had BM tonight

## 2020-12-05 LAB
BACTERIA BLD CULT: NORMAL
BACTERIA BLD CULT: NORMAL
SIGNIFICANT IND 70042: NORMAL
SIGNIFICANT IND 70042: NORMAL
SITE SITE: NORMAL
SITE SITE: NORMAL
SOURCE SOURCE: NORMAL
SOURCE SOURCE: NORMAL

## 2020-12-05 NOTE — PROGRESS NOTES
DATE OF SERVICE:  12/04/2020     TIME:  11:08 a.m.     SUBJECTIVE:  The patient is lying in bed.  She is able to move her leg a   little bit better.  She is able to dorsiflex and plantarflex her toes.  Her   Prevena VAC is in place.     OBJECTIVE:    VITAL SIGNS:  Blood pressure 129/59, heart rate 78, respirations 16, and   temperature 98.1.     ASSESSMENT:  Right periprosthetic proximal femur fracture -- status post   revision hemiarthroplasty.     PLAN:  Toe-touch weightbearing right lower extremity for about 3 more weeks.    Prevena VAC can stay on until the battery dies and then her incision can be   covered with a dry dressing.  She should have posterior hip precautions.  DVT   prophylaxis with aspirin 81 mg twice a day for 3 more weeks.  We will see her   back in the clinic in approximately 10 days for wound check and followup   radiographs.  She should either see me on 12/09/2020 or she could see me the   following week, but in that case, her staples need to be removed at the SNF   around 12/11/2020.        ______________________________________________  MD EKATERINA SAVAGE/CARSON    DD:  12/04/2020 12:06  DT:  12/04/2020 15:29    Job#:  [id number]

## 2021-01-11 DIAGNOSIS — Z23 NEED FOR VACCINATION: ICD-10-CM

## 2021-02-01 ENCOUNTER — OFFICE VISIT (OUTPATIENT)
Dept: MEDICAL GROUP | Facility: MEDICAL CENTER | Age: 76
End: 2021-02-01
Payer: COMMERCIAL

## 2021-02-01 VITALS
SYSTOLIC BLOOD PRESSURE: 108 MMHG | HEART RATE: 81 BPM | WEIGHT: 125 LBS | TEMPERATURE: 97.5 F | DIASTOLIC BLOOD PRESSURE: 66 MMHG | OXYGEN SATURATION: 98 % | BODY MASS INDEX: 22.15 KG/M2 | HEIGHT: 63 IN

## 2021-02-01 DIAGNOSIS — Z23 NEED FOR VACCINATION: ICD-10-CM

## 2021-02-01 DIAGNOSIS — E87.1 HYPONATREMIA: ICD-10-CM

## 2021-02-01 DIAGNOSIS — D64.9 ANEMIA, UNSPECIFIED TYPE: ICD-10-CM

## 2021-02-01 DIAGNOSIS — I10 ESSENTIAL HYPERTENSION: ICD-10-CM

## 2021-02-01 DIAGNOSIS — S72.001A CLOSED FRACTURE OF NECK OF RIGHT FEMUR, INITIAL ENCOUNTER (HCC): ICD-10-CM

## 2021-02-01 DIAGNOSIS — R79.89 ABNORMAL CBC: ICD-10-CM

## 2021-02-01 DIAGNOSIS — Z09 HOSPITAL DISCHARGE FOLLOW-UP: ICD-10-CM

## 2021-02-01 PROCEDURE — 99214 OFFICE O/P EST MOD 30 MIN: CPT | Mod: 25 | Performed by: PHYSICIAN ASSISTANT

## 2021-02-01 PROCEDURE — 90662 IIV NO PRSV INCREASED AG IM: CPT | Performed by: PHYSICIAN ASSISTANT

## 2021-02-01 PROCEDURE — 90471 IMMUNIZATION ADMIN: CPT | Performed by: PHYSICIAN ASSISTANT

## 2021-02-01 ASSESSMENT — FIBROSIS 4 INDEX: FIB4 SCORE: 1.63

## 2021-02-01 ASSESSMENT — PATIENT HEALTH QUESTIONNAIRE - PHQ9: CLINICAL INTERPRETATION OF PHQ2 SCORE: 0

## 2021-02-03 PROBLEM — E87.29 HIGH ANION GAP METABOLIC ACIDOSIS: Status: RESOLVED | Noted: 2019-04-27 | Resolved: 2021-02-03

## 2021-02-03 NOTE — ASSESSMENT & PLAN NOTE
S/p fall, fracture and ORIF. In skilled nursing for a month. Home now. Using wheelchair and walker. Able to ambulate. Has home PT and senior care assistant. Needs DMV handicap paperwork.

## 2021-02-03 NOTE — PROGRESS NOTES
Subjective:   Sangita Bolton is a 75 y.o. female here today with her  for follow up after hospitalization for right hip fracture and subsequent skilled nursing stay. Doing well. No new concerns. Questions about meds. Needs DMV paperwork for handicap parking.     Hypertension  Chronic, stable on current, no headache or dizziness    Closed fracture of neck of right femur (HCC)  S/p fall, fracture and ORIF. In skilled nursing for a month. Home now. Using wheelchair and walker. Able to ambulate. Has home PT and senior care assistant. Needs DMV handicap paperwork.     Anemia  In hospital, still taking ferrous sulfate supplement, will recheck CBC and iron levels    Hyponatremia  Noted in hospital, taken off hctz and put on sodium chloride tablets. Prescription is out,  isn't sure whether or not they need to continue. Will check BMP. Likely that was a temporary issue in hospital and not a long term medication need     Current medicines (including changes today)  Current Outpatient Medications   Medication Sig Dispense Refill   • losartan-hydrochlorothiazide (HYZAAR) 100-25 MG per tablet Take 1 Tab by mouth every day. 90 Tab 3   • rosuvastatin (CRESTOR) 10 MG Tab TAKE ONE TABLET BY MOUTH EVERY EVENING 30 Tab 0   • amLODIPine (NORVASC) 10 MG Tab Take 1 Tab by mouth every day. 90 Tab 3   • spironolactone (ALDACTONE) 25 MG Tab Take 1 Tab by mouth every day. 90 Tab 3   • hydrOXYzine HCl (ATARAX) 25 MG Tab Take 25 mg by mouth at bedtime as needed for Itching.     • acetaminophen (TYLENOL) 325 MG Tab Take 2 Tabs by mouth every 6 hours as needed (Mild Pain; (Pain scale 1-3); Temp greater than 100.5 F). 30 Tab 0     No current facility-administered medications for this visit.      She  has a past medical history of Alcohol abuse (4/27/2019), Arthritis, Carotid artery stenosis (12/31/2015), Fall (11/25/2020), Gluten intolerance, Hypertension, OSTEOPOROSIS, Other specified symptom associated with female genital organs,  "Pain (02/06/15), Stage 3 chronic kidney disease (11/25/2020), and Unspecified cataract. She also has no past medical history of Arrhythmia, Asthma, Back pain, Breast cancer (HCC), Bronchitis, Chronic airway obstruction, not elsewhere classified, Cold, Congestive heart failure (HCC), Dental disorder, Encounter for renal dialysis, Glaucoma, Heart valve disease, Indigestion, Infectious disease, Jaundice, Myocardial infarct (HCC), Other and unspecified angina pectoris, Pacemaker, Personal history of venous thrombosis and embolism, Pneumonia, Rheumatic fever, Seizure (HCC), Type II or unspecified type diabetes mellitus without mention of complication, not stated as uncontrolled, Unspecified disorder of thyroid, Unspecified hemorrhagic conditions, or Unspecified urinary incontinence.    ROS   No fever/chills. No headache/dizziness. No focal weakness. No sore throat, nasal congestion, ear pain. No chest pain, no shortness of breath, difficulty breathing. No n/v/d/c or abdominal pain. No urinary complaint. No rash or skin lesion.        Objective:     /66 (BP Location: Left arm, Patient Position: Sitting, BP Cuff Size: Adult)   Pulse 81   Temp 36.4 °C (97.5 °F) (Temporal)   Ht 1.6 m (5' 3\")   Wt 56.7 kg (125 lb)   SpO2 98%  Body mass index is 22.14 kg/m².   Physical Exam:  Constitutional: WDWN, NAD - thin, frail appearing  Skin: Warm, dry, good turgor, no rashes in visible areas.  Respiratory: Unlabored respiratory effort, lungs clear to auscultation, no wheezes, no ronchi.  Cardiovascular: Normal S1, S2, no murmur  Psych: Alert and oriented x3, normal affect and mood.        Assessment and Plan:   The following treatment plan was discussed    1. Hospital discharge follow-up      2. Hyponatremia    - Basic Metabolic Panel; Future    3. Abnormal CBC    - CBC WITH DIFFERENTIAL; Future  - IRON; Future    4. Need for vaccination    - Influenza Vaccine, High Dose (65+ Only)    5. Closed fracture of neck of right " femur, initial encounter (HCC)      6. Essential hypertension      7. Anemia, unspecified type        Followup: paperwork completed as requested. Advise continued PT for strengthening and fall prevention . F/u with PCP

## 2021-02-06 LAB
BASOPHILS # BLD AUTO: 0.1 X10E3/UL (ref 0–0.2)
BASOPHILS NFR BLD AUTO: 1 %
BUN SERPL-MCNC: 42 MG/DL (ref 8–27)
BUN/CREAT SERPL: 24 (ref 12–28)
CALCIUM SERPL-MCNC: 9.5 MG/DL (ref 8.7–10.3)
CHLORIDE SERPL-SCNC: 101 MMOL/L (ref 96–106)
CO2 SERPL-SCNC: 14 MMOL/L (ref 20–29)
CREAT SERPL-MCNC: 1.73 MG/DL (ref 0.57–1)
EOSINOPHIL # BLD AUTO: 0.2 X10E3/UL (ref 0–0.4)
EOSINOPHIL NFR BLD AUTO: 2 %
ERYTHROCYTE [DISTWIDTH] IN BLOOD BY AUTOMATED COUNT: 12.8 % (ref 11.7–15.4)
GLUCOSE SERPL-MCNC: 90 MG/DL (ref 65–99)
HCT VFR BLD AUTO: 35 % (ref 34–46.6)
HGB BLD-MCNC: 11.1 G/DL (ref 11.1–15.9)
IMM GRANULOCYTES # BLD AUTO: 0 X10E3/UL (ref 0–0.1)
IMM GRANULOCYTES NFR BLD AUTO: 0 %
IMMATURE CELLS  115398: ABNORMAL
IRON SERPL-MCNC: 72 UG/DL (ref 27–139)
LYMPHOCYTES # BLD AUTO: 1.3 X10E3/UL (ref 0.7–3.1)
LYMPHOCYTES NFR BLD AUTO: 13 %
MCH RBC QN AUTO: 30.7 PG (ref 26.6–33)
MCHC RBC AUTO-ENTMCNC: 31.7 G/DL (ref 31.5–35.7)
MCV RBC AUTO: 97 FL (ref 79–97)
MONOCYTES # BLD AUTO: 0.8 X10E3/UL (ref 0.1–0.9)
MONOCYTES NFR BLD AUTO: 8 %
MORPHOLOGY BLD-IMP: ABNORMAL
NEUTROPHILS # BLD AUTO: 7.5 X10E3/UL (ref 1.4–7)
NEUTROPHILS NFR BLD AUTO: 76 %
NRBC BLD AUTO-RTO: ABNORMAL %
PLATELET # BLD AUTO: 477 X10E3/UL (ref 150–450)
POTASSIUM SERPL-SCNC: 5.1 MMOL/L (ref 3.5–5.2)
RBC # BLD AUTO: 3.62 X10E6/UL (ref 3.77–5.28)
SODIUM SERPL-SCNC: 132 MMOL/L (ref 134–144)
WBC # BLD AUTO: 9.8 X10E3/UL (ref 3.4–10.8)

## 2021-02-20 ENCOUNTER — PATIENT MESSAGE (OUTPATIENT)
Dept: MEDICAL GROUP | Facility: MEDICAL CENTER | Age: 76
End: 2021-02-20

## 2021-02-22 RX ORDER — ROSUVASTATIN CALCIUM 10 MG/1
10 TABLET, COATED ORAL DAILY
Qty: 90 TABLET | Refills: 3 | Status: SHIPPED | OUTPATIENT
Start: 2021-02-22 | End: 2021-07-13 | Stop reason: SDUPTHER

## 2021-02-22 NOTE — TELEPHONE ENCOUNTER
"From: Sangita Bolton  To: Physician Assistant Kelin Gardiner  Sent: 2/20/2021 9:03 AM PST  Subject: Prescription Question    for Sangita Bolton. she has been prescribed ROSUVASTATIN CALCIUM 10MG TABS. We have 6 tabs remaining for daily use. The current bottle says \"no refills left\". If she still needs these pills we need you to contact [facility] for refill authorization. Six days from now she will will be out of this medication. Tried to call your office but that seems to be impossible - very frustrating.    Jeremy Bolton  [phone]  "

## 2021-03-18 DIAGNOSIS — I10 ESSENTIAL HYPERTENSION: ICD-10-CM

## 2021-03-19 ENCOUNTER — PATIENT MESSAGE (OUTPATIENT)
Dept: MEDICAL GROUP | Facility: MEDICAL CENTER | Age: 76
End: 2021-03-19

## 2021-03-19 DIAGNOSIS — I10 ESSENTIAL HYPERTENSION: ICD-10-CM

## 2021-03-19 RX ORDER — AMLODIPINE BESYLATE 10 MG/1
10 TABLET ORAL DAILY
Qty: 90 TABLET | Refills: 3 | Status: SHIPPED | OUTPATIENT
Start: 2021-03-19 | End: 2021-06-15 | Stop reason: SDUPTHER

## 2021-03-19 RX ORDER — AMLODIPINE BESYLATE 10 MG/1
10 TABLET ORAL DAILY
Qty: 90 TABLET | Refills: 3 | Status: SHIPPED
Start: 2021-03-19 | End: 2021-03-31

## 2021-03-19 NOTE — PATIENT COMMUNICATION
Received request via: Patient    Was the patient seen in the last year in this department? Yes, by Kelin on 2/20/21.    Does the patient have an active prescription (recently filled or refills available) for medication(s) requested? No

## 2021-03-31 ENCOUNTER — OFFICE VISIT (OUTPATIENT)
Dept: NEPHROLOGY | Facility: MEDICAL CENTER | Age: 76
End: 2021-03-31
Payer: COMMERCIAL

## 2021-03-31 VITALS
HEIGHT: 63 IN | DIASTOLIC BLOOD PRESSURE: 74 MMHG | WEIGHT: 100.8 LBS | BODY MASS INDEX: 17.86 KG/M2 | HEART RATE: 85 BPM | SYSTOLIC BLOOD PRESSURE: 126 MMHG | OXYGEN SATURATION: 98 % | TEMPERATURE: 97.7 F

## 2021-03-31 DIAGNOSIS — N17.9 AKI (ACUTE KIDNEY INJURY) (HCC): ICD-10-CM

## 2021-03-31 DIAGNOSIS — E87.1 HYPONATREMIA: ICD-10-CM

## 2021-03-31 DIAGNOSIS — N18.30 STAGE 3 CHRONIC KIDNEY DISEASE, UNSPECIFIED WHETHER STAGE 3A OR 3B CKD: ICD-10-CM

## 2021-03-31 DIAGNOSIS — I10 ESSENTIAL HYPERTENSION: ICD-10-CM

## 2021-03-31 PROCEDURE — 99214 OFFICE O/P EST MOD 30 MIN: CPT | Performed by: INTERNAL MEDICINE

## 2021-03-31 RX ORDER — LOSARTAN POTASSIUM 100 MG/1
100 TABLET ORAL DAILY
Qty: 90 TABLET | Refills: 3 | Status: SHIPPED | OUTPATIENT
Start: 2021-03-31 | End: 2021-06-23 | Stop reason: SDUPTHER

## 2021-03-31 ASSESSMENT — ENCOUNTER SYMPTOMS
NAUSEA: 0
FEVER: 0
CHILLS: 0
COUGH: 0
SHORTNESS OF BREATH: 0
HYPERTENSION: 1
VOMITING: 0

## 2021-03-31 ASSESSMENT — FIBROSIS 4 INDEX: FIB4 SCORE: 1.39

## 2021-03-31 NOTE — PROGRESS NOTES
"Subjective:      Sangita Bolton is a 75 y.o. female who presents with Chronic Kidney Disease and Hypertension            Chronic Kidney Disease  The current episode started more than 1 year ago. The problem occurs constantly. The problem has been waxing and waning. Pertinent negatives include no chest pain, chills, coughing, fever, nausea, urinary symptoms or vomiting.   Hypertension  This is a chronic problem. The current episode started more than 1 year ago. The problem is unchanged. The problem is controlled. Pertinent negatives include no chest pain, malaise/fatigue, peripheral edema or shortness of breath. Risk factors for coronary artery disease include post-menopausal state. Past treatments include diuretics, angiotensin blockers and calcium channel blockers. The current treatment provides significant improvement. There are no compliance problems.  Hypertensive end-organ damage includes kidney disease. Identifiable causes of hypertension include chronic renal disease.       Review of Systems   Constitutional: Negative for chills, fever and malaise/fatigue.   Respiratory: Negative for cough and shortness of breath.    Cardiovascular: Negative for chest pain and leg swelling.   Gastrointestinal: Negative for nausea and vomiting.   Genitourinary: Negative for dysuria, frequency and urgency.          Objective:     /74 (BP Location: Right arm, Patient Position: Sitting)   Pulse 85   Temp 36.5 °C (97.7 °F) (Temporal)   Ht 1.6 m (5' 3\")   Wt 45.7 kg (100 lb 12.8 oz)   SpO2 98%   BMI 17.86 kg/m²      Physical Exam  Vitals and nursing note reviewed.   Constitutional:       General: She is not in acute distress.     Appearance: Normal appearance. She is well-developed. She is not diaphoretic.   HENT:      Head: Normocephalic and atraumatic.      Right Ear: External ear normal.      Left Ear: External ear normal.      Nose: Nose normal.   Eyes:      General: No scleral icterus.        Right eye: No " discharge.         Left eye: No discharge.      Conjunctiva/sclera: Conjunctivae normal.   Cardiovascular:      Rate and Rhythm: Normal rate and regular rhythm.      Heart sounds: No murmur.   Pulmonary:      Effort: Pulmonary effort is normal. No respiratory distress.      Breath sounds: Normal breath sounds.   Musculoskeletal:         General: No tenderness.      Right lower leg: No edema.      Left lower leg: No edema.   Skin:     General: Skin is warm and dry.      Findings: No erythema.   Neurological:      General: No focal deficit present.      Mental Status: She is alert and oriented to person, place, and time.      Cranial Nerves: No cranial nerve deficit.   Psychiatric:         Mood and Affect: Mood normal.         Behavior: Behavior normal.       Past Medical History:   Diagnosis Date   • Alcohol abuse 4/27/2019   • Arthritis     generalized-Osteo   • Carotid artery stenosis 12/31/2015   • Fall 11/25/2020   • Gluten intolerance    • Hypertension    • OSTEOPOROSIS    • Other specified symptom associated with female genital organs     post menaupausal bleeding   • Pain 02/06/15    bilateral legs-chronic>4/10   • Stage 3 chronic kidney disease 11/25/2020   • Unspecified cataract      Social History     Socioeconomic History   • Marital status:      Spouse name: Not on file   • Number of children: Not on file   • Years of education: Not on file   • Highest education level: Not on file   Occupational History   • Not on file   Tobacco Use   • Smoking status: Current Some Day Smoker     Packs/day: 0.50     Years: 40.00     Pack years: 20.00     Types: Cigarettes     Start date: 2/2/1975   • Smokeless tobacco: Never Used   Substance and Sexual Activity   • Alcohol use: Yes     Comment: 4/week   • Drug use: No   • Sexual activity: Not on file     Comment: ,    Other Topics Concern   • Not on file   Social History Narrative   • Not on file     Social Determinants of Health     Financial Resource  Strain:    • Difficulty of Paying Living Expenses:    Food Insecurity:    • Worried About Running Out of Food in the Last Year:    • Ran Out of Food in the Last Year:    Transportation Needs:    • Lack of Transportation (Medical):    • Lack of Transportation (Non-Medical):    Physical Activity:    • Days of Exercise per Week:    • Minutes of Exercise per Session:    Stress:    • Feeling of Stress :    Social Connections:    • Frequency of Communication with Friends and Family:    • Frequency of Social Gatherings with Friends and Family:    • Attends [employer] Services:    • Active Member of Clubs or Organizations:    • Attends Club or Organization Meetings:    • Marital Status:    Intimate Partner Violence:    • Fear of Current or Ex-Partner:    • Emotionally Abused:    • Physically Abused:    • Sexually Abused:      Family History   Problem Relation Age of Onset   • Cancer Mother         leukemia     Recent Labs     11/25/20  1253 11/25/20  1805 11/26/20  0558 11/26/20  1208 11/27/20  0133 11/27/20  1613 11/29/20  0625 11/29/20  0915 12/03/20  0922 12/04/20  0326 02/05/21  1005   ALBUMIN 4.0   < > 3.4  --  3.4  --  2.9*  --   --   --   --    SODIUM 124*   < > 127*   < > 128*   < > 125*   < > 130* 134* 132*   POTASSIUM 3.1*   < > 4.4   < > 4.5   < > 4.0   < > 3.3* 4.0 5.1   CHLORIDE 86*   < > 93*   < > 97   < > 93*   < > 98 103 101   CO2 21   < > 22   < > 22   < > 21   < > 22 22 14*   BUN 16   < > 20   < > 18   < > 26*   < > 22 20 42*   CREATININE 1.08   < > 1.25   < > 1.11   < > 1.34   < > 1.05 0.98 1.73*   PHOSPHORUS 3.4  --  3.1  --   --   --  4.2  --   --   --   --     < > = values in this interval not displayed.                 Assessment/Plan:        1. RASHAD (acute kidney injury) (HCC)  Unclear etiology  Patient is asymptomatic  Recheck labs after hydration  Avoid nephrotoxins like NSAIDs    2. Stage 3 chronic kidney disease, unspecified whether stage 3a or 3b CKD  No uremic symptoms  Renal dose of  medication  Avoid nephrotoxins  Continue same medication regimen  Recheck labs    3. Essential hypertension  Controlled  Continue same medication regimen  Continue low-sodium diet      4. Hyponatremia  Be related to hydrochlorothiazide  Hydrochlorothiazide  Recheck labs

## 2021-04-11 DIAGNOSIS — I10 ESSENTIAL HYPERTENSION: ICD-10-CM

## 2021-04-12 RX ORDER — SPIRONOLACTONE 25 MG/1
25 TABLET ORAL DAILY
Qty: 90 TABLET | Refills: 0 | Status: SHIPPED | OUTPATIENT
Start: 2021-04-12 | End: 2021-07-12 | Stop reason: SDUPTHER

## 2021-06-15 DIAGNOSIS — I10 ESSENTIAL HYPERTENSION: ICD-10-CM

## 2021-06-15 RX ORDER — AMLODIPINE BESYLATE 10 MG/1
10 TABLET ORAL DAILY
Qty: 90 TABLET | Refills: 3 | Status: SHIPPED | OUTPATIENT
Start: 2021-06-15 | End: 2021-09-18 | Stop reason: SDUPTHER

## 2021-06-15 NOTE — TELEPHONE ENCOUNTER
Called pt to schedule a fv appointment, pt declined and will give us a call back when she is ready to be seen

## 2021-06-23 RX ORDER — LOSARTAN POTASSIUM 100 MG/1
100 TABLET ORAL DAILY
Qty: 90 TABLET | Refills: 3 | Status: SHIPPED | OUTPATIENT
Start: 2021-06-23 | End: 2022-09-23 | Stop reason: SDUPTHER

## 2021-07-11 ENCOUNTER — PATIENT MESSAGE (OUTPATIENT)
Dept: MEDICAL GROUP | Facility: MEDICAL CENTER | Age: 76
End: 2021-07-11

## 2021-07-11 DIAGNOSIS — I10 ESSENTIAL HYPERTENSION: ICD-10-CM

## 2021-07-12 ENCOUNTER — TELEPHONE (OUTPATIENT)
Dept: MEDICAL GROUP | Facility: MEDICAL CENTER | Age: 76
End: 2021-07-12

## 2021-07-12 SDOH — HEALTH STABILITY: PHYSICAL HEALTH: ON AVERAGE, HOW MANY DAYS PER WEEK DO YOU ENGAGE IN MODERATE TO STRENUOUS EXERCISE (LIKE A BRISK WALK)?: 0 DAYS

## 2021-07-12 SDOH — ECONOMIC STABILITY: INCOME INSECURITY: HOW HARD IS IT FOR YOU TO PAY FOR THE VERY BASICS LIKE FOOD, HOUSING, MEDICAL CARE, AND HEATING?: NOT HARD AT ALL

## 2021-07-12 SDOH — ECONOMIC STABILITY: TRANSPORTATION INSECURITY
IN THE PAST 12 MONTHS, HAS LACK OF RELIABLE TRANSPORTATION KEPT YOU FROM MEDICAL APPOINTMENTS, MEETINGS, WORK OR FROM GETTING THINGS NEEDED FOR DAILY LIVING?: NO

## 2021-07-12 SDOH — ECONOMIC STABILITY: HOUSING INSECURITY
IN THE LAST 12 MONTHS, WAS THERE A TIME WHEN YOU DID NOT HAVE A STEADY PLACE TO SLEEP OR SLEPT IN A SHELTER (INCLUDING NOW)?: NO

## 2021-07-12 SDOH — ECONOMIC STABILITY: TRANSPORTATION INSECURITY
IN THE PAST 12 MONTHS, HAS THE LACK OF TRANSPORTATION KEPT YOU FROM MEDICAL APPOINTMENTS OR FROM GETTING MEDICATIONS?: NO

## 2021-07-12 SDOH — ECONOMIC STABILITY: INCOME INSECURITY: IN THE LAST 12 MONTHS, WAS THERE A TIME WHEN YOU WERE NOT ABLE TO PAY THE MORTGAGE OR RENT ON TIME?: NO

## 2021-07-12 SDOH — ECONOMIC STABILITY: FOOD INSECURITY: WITHIN THE PAST 12 MONTHS, YOU WORRIED THAT YOUR FOOD WOULD RUN OUT BEFORE YOU GOT MONEY TO BUY MORE.: NEVER TRUE

## 2021-07-12 SDOH — ECONOMIC STABILITY: HOUSING INSECURITY: IN THE LAST 12 MONTHS, HOW MANY PLACES HAVE YOU LIVED?: 1

## 2021-07-12 SDOH — HEALTH STABILITY: PHYSICAL HEALTH: ON AVERAGE, HOW MANY MINUTES DO YOU ENGAGE IN EXERCISE AT THIS LEVEL?: 0 MIN

## 2021-07-12 SDOH — ECONOMIC STABILITY: FOOD INSECURITY: WITHIN THE PAST 12 MONTHS, THE FOOD YOU BOUGHT JUST DIDN'T LAST AND YOU DIDN'T HAVE MONEY TO GET MORE.: NEVER TRUE

## 2021-07-12 SDOH — ECONOMIC STABILITY: TRANSPORTATION INSECURITY
IN THE PAST 12 MONTHS, HAS LACK OF TRANSPORTATION KEPT YOU FROM MEETINGS, WORK, OR FROM GETTING THINGS NEEDED FOR DAILY LIVING?: NO

## 2021-07-12 SDOH — HEALTH STABILITY: MENTAL HEALTH
STRESS IS WHEN SOMEONE FEELS TENSE, NERVOUS, ANXIOUS, OR CAN'T SLEEP AT NIGHT BECAUSE THEIR MIND IS TROUBLED. HOW STRESSED ARE YOU?: ONLY A LITTLE

## 2021-07-12 ASSESSMENT — SOCIAL DETERMINANTS OF HEALTH (SDOH)
HOW OFTEN DO YOU ATTEND CHURCH OR RELIGIOUS SERVICES?: NEVER
HOW OFTEN DO YOU ATTENT MEETINGS OF THE CLUB OR ORGANIZATION YOU BELONG TO?: NEVER
HOW MANY DRINKS CONTAINING ALCOHOL DO YOU HAVE ON A TYPICAL DAY WHEN YOU ARE DRINKING: 1 OR 2
IN A TYPICAL WEEK, HOW MANY TIMES DO YOU TALK ON THE PHONE WITH FAMILY, FRIENDS, OR NEIGHBORS?: THREE TIMES A WEEK
HOW OFTEN DO YOU HAVE A DRINK CONTAINING ALCOHOL: MONTHLY OR LESS
DO YOU BELONG TO ANY CLUBS OR ORGANIZATIONS SUCH AS CHURCH GROUPS UNIONS, FRATERNAL OR ATHLETIC GROUPS, OR SCHOOL GROUPS?: NO
DO YOU BELONG TO ANY CLUBS OR ORGANIZATIONS SUCH AS CHURCH GROUPS UNIONS, FRATERNAL OR ATHLETIC GROUPS, OR SCHOOL GROUPS?: NO
HOW HARD IS IT FOR YOU TO PAY FOR THE VERY BASICS LIKE FOOD, HOUSING, MEDICAL CARE, AND HEATING?: NOT HARD AT ALL
HOW OFTEN DO YOU ATTENT MEETINGS OF THE CLUB OR ORGANIZATION YOU BELONG TO?: NEVER
HOW OFTEN DO YOU GET TOGETHER WITH FRIENDS OR RELATIVES?: MORE THAN THREE TIMES A WEEK
HOW OFTEN DO YOU ATTEND CHURCH OR RELIGIOUS SERVICES?: NEVER
WITHIN THE PAST 12 MONTHS, YOU WORRIED THAT YOUR FOOD WOULD RUN OUT BEFORE YOU GOT THE MONEY TO BUY MORE: NEVER TRUE
HOW OFTEN DO YOU HAVE SIX OR MORE DRINKS ON ONE OCCASION: NEVER
IN A TYPICAL WEEK, HOW MANY TIMES DO YOU TALK ON THE PHONE WITH FAMILY, FRIENDS, OR NEIGHBORS?: THREE TIMES A WEEK
HOW OFTEN DO YOU GET TOGETHER WITH FRIENDS OR RELATIVES?: MORE THAN THREE TIMES A WEEK

## 2021-07-12 ASSESSMENT — LIFESTYLE VARIABLES
HOW MANY STANDARD DRINKS CONTAINING ALCOHOL DO YOU HAVE ON A TYPICAL DAY: 1 OR 2
HOW OFTEN DO YOU HAVE A DRINK CONTAINING ALCOHOL: MONTHLY OR LESS
HOW OFTEN DO YOU HAVE SIX OR MORE DRINKS ON ONE OCCASION: NEVER

## 2021-07-12 NOTE — TELEPHONE ENCOUNTER
"Pts  came into clinic asking for general labs for pt and she is not feeling well all around. I explained to pt that Dr Hernández orders labs at time of appt especially of there are specific concerns to discuss. Pts  was upset at having what he stated as \"an appt for an appt\" to order labs and then discuss results.   Pt was originally scheduled with Kelin Gardiner for annual on 7/30/21. Have rescheduled pt for annual with PCP on 7/14/21.      FYI  "

## 2021-07-12 NOTE — TELEPHONE ENCOUNTER
From: Sangita Bolton  To: Physician Assistant Kelin Gardiner  Sent: 7/11/2021 8:09 AM PDT  Subject: Prescription Question    Snagita's Spironolactone prescription is down to 4 pills and can not be refilled via MyChart.

## 2021-07-13 RX ORDER — ROSUVASTATIN CALCIUM 10 MG/1
10 TABLET, COATED ORAL DAILY
Qty: 90 TABLET | Refills: 3 | Status: SHIPPED
Start: 2021-07-13 | End: 2021-09-30

## 2021-07-13 RX ORDER — SPIRONOLACTONE 25 MG/1
25 TABLET ORAL DAILY
Qty: 90 TABLET | Refills: 3 | Status: SHIPPED
Start: 2021-07-13 | End: 2021-08-10

## 2021-07-14 ENCOUNTER — OFFICE VISIT (OUTPATIENT)
Dept: MEDICAL GROUP | Facility: MEDICAL CENTER | Age: 76
End: 2021-07-14
Payer: COMMERCIAL

## 2021-07-14 VITALS
WEIGHT: 125 LBS | SYSTOLIC BLOOD PRESSURE: 116 MMHG | OXYGEN SATURATION: 95 % | BODY MASS INDEX: 22.15 KG/M2 | DIASTOLIC BLOOD PRESSURE: 56 MMHG | TEMPERATURE: 97.9 F | HEART RATE: 80 BPM | HEIGHT: 63 IN | RESPIRATION RATE: 16 BRPM

## 2021-07-14 DIAGNOSIS — Z12.31 ENCOUNTER FOR SCREENING MAMMOGRAM FOR BREAST CANCER: ICD-10-CM

## 2021-07-14 DIAGNOSIS — Z13.6 SCREENING FOR ISCHEMIC HEART DISEASE: ICD-10-CM

## 2021-07-14 DIAGNOSIS — D63.1 ANEMIA DUE TO CHRONIC KIDNEY DISEASE, UNSPECIFIED CKD STAGE: ICD-10-CM

## 2021-07-14 DIAGNOSIS — N18.30 STAGE 3 CHRONIC KIDNEY DISEASE, UNSPECIFIED WHETHER STAGE 3A OR 3B CKD: ICD-10-CM

## 2021-07-14 DIAGNOSIS — F03.90 DEMENTIA WITHOUT BEHAVIORAL DISTURBANCE, UNSPECIFIED DEMENTIA TYPE: ICD-10-CM

## 2021-07-14 DIAGNOSIS — I73.9 PERIPHERAL VASCULAR DISEASE (HCC): ICD-10-CM

## 2021-07-14 DIAGNOSIS — Z13.1 SCREENING FOR DIABETES MELLITUS (DM): ICD-10-CM

## 2021-07-14 DIAGNOSIS — E87.1 HYPONATREMIA: ICD-10-CM

## 2021-07-14 DIAGNOSIS — N18.9 ANEMIA DUE TO CHRONIC KIDNEY DISEASE, UNSPECIFIED CKD STAGE: ICD-10-CM

## 2021-07-14 DIAGNOSIS — S72.001A CLOSED FRACTURE OF NECK OF RIGHT FEMUR, INITIAL ENCOUNTER (HCC): ICD-10-CM

## 2021-07-14 DIAGNOSIS — N95.9 MENOPAUSAL AND POSTMENOPAUSAL DISORDER: ICD-10-CM

## 2021-07-14 PROBLEM — R63.4 WEIGHT LOSS: Status: RESOLVED | Noted: 2019-08-28 | Resolved: 2021-07-14

## 2021-07-14 PROBLEM — K59.00 CONSTIPATION: Status: RESOLVED | Noted: 2020-12-03 | Resolved: 2021-07-14

## 2021-07-14 PROCEDURE — 99214 OFFICE O/P EST MOD 30 MIN: CPT | Performed by: FAMILY MEDICINE

## 2021-07-14 RX ORDER — ROSUVASTATIN CALCIUM 10 MG/1
10 TABLET, COATED ORAL DAILY
Qty: 90 TABLET | Refills: 3 | Status: SHIPPED
Start: 2021-07-14 | End: 2021-09-30

## 2021-07-14 RX ORDER — ASPIRIN 81 MG/1
1 TABLET ORAL 2 TIMES DAILY
COMMUNITY
End: 2023-04-24

## 2021-07-14 ASSESSMENT — ACTIVITIES OF DAILY LIVING (ADL): BATHING_REQUIRES_ASSISTANCE: 1

## 2021-07-14 ASSESSMENT — ENCOUNTER SYMPTOMS: GENERAL WELL-BEING: FAIR

## 2021-07-14 ASSESSMENT — FIBROSIS 4 INDEX: FIB4 SCORE: 1.39

## 2021-07-14 ASSESSMENT — PATIENT HEALTH QUESTIONNAIRE - PHQ9: CLINICAL INTERPRETATION OF PHQ2 SCORE: 0

## 2021-07-14 NOTE — PROGRESS NOTES
Chief Complaint   Patient presents with   • Annual Wellness Visit     labs    • Paperwork     dmv form         HPI:  Sangita is a 75 y.o. here for Medicare Annual Wellness Visit    Our last visit together was about 2 years ago   She has since been hospitalizes for ground level fall and hip fracture     She is here today with her        Patient Active Problem List    Diagnosis Date Noted   • Hyponatremia 07/14/2021   • Peripheral vascular disease (HCC) 07/14/2021   • Anemia 11/27/2020   • Dementia (HCC) 11/26/2020   • Fall 11/25/2020   • Stage 3 chronic kidney disease ([facility]) 11/25/2020   • CAD (coronary artery disease) 11/25/2020   • Closed fracture of neck of right femur ([facility]) 04/27/2019   • Alcohol abuse 04/27/2019   • Dependence on nicotine from cigarettes 04/27/2019   • Dry skin dermatitis 04/11/2018   • S/P insertion of iliac artery stent 12/31/2015   • Hypertension 07/01/2013   • DDD (degenerative disc disease), lumbar 01/09/2013   • Osteoporosis, post-menopausal        Current Outpatient Medications   Medication Sig Dispense Refill   • Cholecalciferol (VITAMIN D3 PO) Take 1 tablet by mouth every day.     • Cyanocobalamin (VITAMIN B-12 PO) Take 1 tablet by mouth every day.     • aspirin (ASPIRIN ADULT LOW DOSE) 81 MG EC tablet Take 1 tablet by mouth every day.     • spironolactone (ALDACTONE) 25 MG Tab Take 1 tablet by mouth every day. 90 tablet 3   • rosuvastatin (CRESTOR) 10 MG Tab Take 1 tablet by mouth every day. 90 tablet 3   • losartan (COZAAR) 100 MG Tab Take 1 tablet by mouth every day. 90 tablet 3   • amLODIPine (NORVASC) 10 MG Tab Take 1 tablet by mouth every day. 90 tablet 3   • hydrOXYzine HCl (ATARAX) 25 MG Tab Take 25 mg by mouth at bedtime as needed for Itching.     • acetaminophen (TYLENOL) 325 MG Tab Take 2 Tabs by mouth every 6 hours as needed (Mild Pain; (Pain scale 1-3); Temp greater than 100.5 F). 30 Tab 0     No current facility-administered medications for this visit.        Patient  is taking medications as noted in medication list.  Current supplements as per medication list.     Allergies: Penicillins    Current social contact/activities: yes     Is patient current with immunizations? No, due for SHINGRIX (Shingles). Patient is interested in receiving NONE today.    She  reports that she has been smoking cigarettes. She started smoking about 46 years ago. She has a 20.00 pack-year smoking history. She has never used smokeless tobacco. She reports current alcohol use. She reports that she does not use drugs.  Ready to quit: Not Answered  Counseling given: Not Answered        DPA/Advanced directive: Patient has Durable Power of  on file.     ROS:    Gait: Uses a wheelchair   Ostomy: No   Other tubes: No   Amputations: No   Chronic oxygen use No   Last eye exam 2019   Wears hearing aids: No   : Denies any urinary leakage during the last 6 months          Depression Screening    Little interest or pleasure in doing things?  0 - not at all  Feeling down, depressed, or hopeless? 0 - not at all  Patient Health Questionnaire Score: 0    If depressive symptoms identified deferred to follow up visit unless specifically addressed in assessment and plan.    Interpretation of PHQ-9 Total Score   Score Severity   1-4 No Depression   5-9 Mild Depression   10-14 Moderate Depression   15-19 Moderately Severe Depression   20-27 Severe Depression    Screening for Cognitive Impairment    Three Minute Recall (captain, garden, picture)  0/3    Dion clock face with all 12 numbers and set the hands to show 5 past 8.  Yes    If cognitive concerns identified, deferred for follow up unless specifically addressed in assessment and plan.    Fall Risk Assessment    Has the patient had two or more falls in the last year or any fall with injury in the last year?  Yes  If fall risk identified, deferred for follow up unless specifically addressed in assessment and plan.    Safety Assessment    Throw rugs on floor.   No  Handrails on all stairs.  No  Good lighting in all hallways.  Yes  Difficulty hearing.  No  Patient counseled about all safety risks that were identified.    Functional Assessment ADLs    Are there any barriers preventing you from cooking for yourself or meeting nutritional needs?  No.    Are there any barriers preventing you from driving safely or obtaining transportation?  No.    Are there any barriers preventing you from using a telephone or calling for help?  No.    Are there any barriers preventing you from shopping?  No.    Are there any barriers preventing you from taking care of your own finances?  No.    Are there any barriers preventing you from managing your medications?  No.    Are there any barriers preventing you from showering, bathing or dressing yourself?  Yes.    Are you currently engaging in any exercise or physical activity?  No.     What is your perception of your health?  Fair.    Health Maintenance Summary                IMM HEP B VACCINE Overdue 8/9/1962     IMM ZOSTER VACCINES Overdue 3/5/2012      Done 1/9/2012 Imm Admin: Zoster Vaccine Live (ZVL) (Zostavax) - HISTORICAL DATA    BONE DENSITY Overdue 2/13/2020      Done 2/13/2015 DS-BONE DENSITY STUDY (DEXA)     Patient has more history with this topic...    MAMMOGRAM Overdue 10/7/2020      Done 10/7/2019 MA-SCREENING MAMMO BILAT W/TOMOSYNTHESIS W/CAD     Patient has more history with this topic...    IMM INFLUENZA Next Due 9/1/2021      Done 2/1/2021 Imm Admin: Influenza Vaccine Adult HD     Patient has more history with this topic...    IMM DTaP/Tdap/Td Vaccine Next Due 1/17/2022      Done 1/17/2012 Imm Admin: Tdap Vaccine    COLONOSCOPY Next Due 2/2/2025      Done 2/2/2017 REFERRAL TO GI FOR COLONOSCOPY          Patient Care Team:  Claire Hernández M.D. as PCP - General (Family Medicine)  Isis Reese M.D. (Sports Medicine)    Social History     Tobacco Use   • Smoking status: Current Some Day Smoker     Packs/day: 0.50     Years:  40.00     Pack years: 20.00     Types: Cigarettes     Start date: 2/2/1975   • Smokeless tobacco: Never Used   Vaping Use   • Vaping Use: Never used   Substance Use Topics   • Alcohol use: Yes     Comment: 4/week   • Drug use: No     Family History   Problem Relation Age of Onset   • Cancer Mother         leukemia     She  has a past medical history of Alcohol abuse (4/27/2019), Arthritis, Carotid artery stenosis (12/31/2015), Fall (11/25/2020), Gluten intolerance, Hypertension, OSTEOPOROSIS, Other specified symptom associated with female genital organs, Pain (02/06/15), Stage 3 chronic kidney disease (HCC) (11/25/2020), and Unspecified cataract. She also has no past medical history of Arrhythmia, Asthma, Back pain, Breast cancer (HCC), Bronchitis, Chronic airway obstruction, not elsewhere classified, Cold, Congestive heart failure (HCC), Dental disorder, Encounter for renal dialysis, Glaucoma, Heart valve disease, Indigestion, Infectious disease, Jaundice, Myocardial infarct ([facility]), Other and unspecified angina pectoris, Pacemaker, Personal history of venous thrombosis and embolism, Pneumonia, Rheumatic fever, Seizure ([facility]), Type II or unspecified type diabetes mellitus without mention of complication, not stated as uncontrolled, Unspecified disorder of thyroid, Unspecified hemorrhagic conditions, or Unspecified urinary incontinence.   Past Surgical History:   Procedure Laterality Date   • PB PARTIAL HIP REPLACEMENT  11/28/2020    Procedure: HEMIARTHROPLASTY, HIP REVISION;  Surgeon: Mohsen Thomas M.D.;  Location: [facility];  Service: Orthopedics   • PB PARTIAL HIP REPLACEMENT Right 4/28/2019    Procedure: HEMIARTHROPLASTY, HIP;  Surgeon: Evens Sarabia M.D.;  Location: [facility];  Service: Orthopedics   • RECOVERY  2/9/2015    Performed by [facility] SAME DAY [facility]   • FEMORAL ARTERY REPAIR  2/9/2015    Performed by Yuly Chirinos M.D. at [facility]  "[place] [facility]   • HYSTEROSCOPY WITH VIDEO DIAGNOSTIC  11/17/2010    Performed by ALIS DEGROOT at SURGERY SAME DAY [facility]   • DILATION AND CURETTAGE  11/17/2010    Performed by ALIS DEGROOT at SURGERY SAME DAY [facility]   • HUMERUS ORIF  9/5/08    Performed by LAURA CARTER at SURGERY [facility]   • COLONOSCOPY  2008    recheck 4 years   • OTHER ORTHOPEDIC SURGERY      rt leg fx   • OTHER ORTHOPEDIC SURGERY      broken right wrist           Exam:     /56 (BP Location: Left arm, Patient Position: Sitting, BP Cuff Size: Adult)   Pulse 80   Temp 36.6 °C (97.9 °F) (Temporal)   Resp 16   Ht 1.6 m (5' 3\")   Wt 56.7 kg (125 lb)   SpO2 95%  Body mass index is 22.14 kg/m².    Hearing excellent.    Dentition good  Alert, oriented in no acute distress.  Eye contact is good, speech goal directed, affect calm      Assessment and Plan. The following treatment and monitoring plan is recommended:    1. Screening for diabetes mellitus (DM)  DS-BONE DENSITY STUDY (DEXA)    MA-SCREENING MAMMO BILAT W/CAD    Basic Metabolic Panel    Lipid Profile   2. Screening for ischemic heart disease  DS-BONE DENSITY STUDY (DEXA)    MA-SCREENING MAMMO BILAT W/CAD    Basic Metabolic Panel    Lipid Profile   3. Encounter for screening mammogram for breast cancer  DS-BONE DENSITY STUDY (DEXA)    MA-SCREENING MAMMO BILAT W/CAD    Basic Metabolic Panel    Lipid Profile   4. Menopausal and postmenopausal disorder  DS-BONE DENSITY STUDY (DEXA)    MA-SCREENING MAMMO BILAT W/CAD    Basic Metabolic Panel    Lipid Profile   5. Stage 3 chronic kidney disease, unspecified whether stage 3a or 3b CKD ([facility])  MICROALBUMIN CREAT RATIO URINE   6. Closed fracture of neck of right femur, initial encounter ([facility])     7. Anemia due to chronic kidney disease, unspecified CKD stage  HEMOGLOBIN AND HEMATOCRIT    IRON/TOTAL IRON BIND   8. Hyponatremia     9. Peripheral vascular disease ([facility])     10. Dementia without behavioral " disturbance, unspecified dementia type (HCC)         Problem List Items Addressed This Visit     Closed fracture of neck of right femur (HCC)     This was her second hip fracture   She states that she walks at home but uses FWW   She is wheelchair bound outside of home   She is homebound       I offer home health evaluation for skilled nursing and PT as this is a new year and she declines for now   I  her that our  will help arrange for transportation if needed ( brought her in today)              Relevant Medications    aspirin (ASPIRIN ADULT LOW DOSE) 81 MG EC tablet    Stage 3 chronic kidney disease (HCC)     She has nephrologist added to her txt team that she sees regularly              Relevant Orders    MICROALBUMIN CREAT RATIO URINE    Dementia (HCC)     Plan for short term follow up 1-2 mo since I have not seen prasanna in 2 years, her mobility has significantly changed and therefore I would like closer continuity with her              Anemia     2/2 ckd     In hospital, still taking ferrous sulfate supplement, will recheck CBC and iron levels         Relevant Orders    HEMOGLOBIN AND HEMATOCRIT    IRON/TOTAL IRON BIND    Hyponatremia     In hospital   Rechecking today            Peripheral vascular disease (HCC)     Taking statin                Other Visit Diagnoses     Screening for diabetes mellitus (DM)        Relevant Orders    DS-BONE DENSITY STUDY (DEXA)    MA-SCREENING MAMMO BILAT W/CAD    Basic Metabolic Panel    Lipid Profile    Screening for ischemic heart disease        Relevant Orders    DS-BONE DENSITY STUDY (DEXA)    MA-SCREENING MAMMO BILAT W/CAD    Basic Metabolic Panel    Lipid Profile    Encounter for screening mammogram for breast cancer        Relevant Orders    DS-BONE DENSITY STUDY (DEXA)    MA-SCREENING MAMMO BILAT W/CAD    Basic Metabolic Panel    Lipid Profile    Menopausal and postmenopausal disorder        Relevant Orders    DS-BONE DENSITY STUDY (DEXA)     MA-SCREENING MAMMO BILAT W/CAD    Basic Metabolic Panel    Lipid Profile          Services suggested: No services needed at this time  Health Care Screening recommendations as per orders if indicated.  Referrals offered: PT/OT/Nutrition counseling/Behavioral Health/Smoking cessation as per orders if indicated.    Discussion today about general wellness and lifestyle habits:    · Prevent falls and reduce trip hazards; Cautioned about securing or removing rugs.  · Have a working fire alarm and carbon monoxide detector;   · Engage in regular physical activity and social activities       Follow-up: No follow-ups on file.   Problem List Items Addressed This Visit     Closed fracture of neck of right femur (HCC)     This was her second hip fracture   She states that she walks at home but uses FWW   She is wheelchair bound outside of home   She is homebound       I offer home health evaluation for skilled nursing and PT as this is a new year and she declines for now   I  her that our  will help arrange for transportation if needed ( brought her in today)              Relevant Medications    aspirin (ASPIRIN ADULT LOW DOSE) 81 MG EC tablet    Stage 3 chronic kidney disease (HCC)     She has nephrologist added to her txt team that she sees regularly              Relevant Orders    MICROALBUMIN CREAT RATIO URINE    Dementia (HCC)     Plan for short term follow up 1-2 mo since I have not seen prasanna in 2 years, her mobility has significantly changed and therefore I would like closer continuity with her              Anemia     2/2 ckd     In hospital, still taking ferrous sulfate supplement, will recheck CBC and iron levels         Relevant Orders    HEMOGLOBIN AND HEMATOCRIT    IRON/TOTAL IRON BIND    Hyponatremia     In hospital   Rechecking today            Peripheral vascular disease (HCC)     Taking statin                Other Visit Diagnoses     Screening for diabetes mellitus (DM)         Relevant Orders    DS-BONE DENSITY STUDY (DEXA)    MA-SCREENING MAMMO BILAT W/CAD    Basic Metabolic Panel    Lipid Profile    Screening for ischemic heart disease        Relevant Orders    DS-BONE DENSITY STUDY (DEXA)    MA-SCREENING MAMMO BILAT W/CAD    Basic Metabolic Panel    Lipid Profile    Encounter for screening mammogram for breast cancer        Relevant Orders    DS-BONE DENSITY STUDY (DEXA)    MA-SCREENING MAMMO BILAT W/CAD    Basic Metabolic Panel    Lipid Profile    Menopausal and postmenopausal disorder        Relevant Orders    DS-BONE DENSITY STUDY (DEXA)    MA-SCREENING MAMMO BILAT W/CAD    Basic Metabolic Panel    Lipid Profile

## 2021-07-14 NOTE — ASSESSMENT & PLAN NOTE
This was her second hip fracture   She states that she walks at home but uses FWW   She is wheelchair bound outside of home   She is homebound       I offer home health evaluation for skilled nursing and PT as this is a new year and she declines for now   I  her that our  will help arrange for transportation if needed ( brought her in today)

## 2021-07-14 NOTE — ASSESSMENT & PLAN NOTE
2/2 ckd     In hospital, still taking ferrous sulfate supplement, will recheck CBC and iron levels

## 2021-07-14 NOTE — ASSESSMENT & PLAN NOTE
Plan for short term follow up 1-2 mo since I have not seen prasanna in 2 years, her mobility has significantly changed and therefore I would like closer continuity with her

## 2021-07-14 NOTE — TELEPHONE ENCOUNTER
Dr. Hernández this pt was seen by Kelin on 2/1/21, does pt still need an appointment for refill ?  Thanks.

## 2021-08-04 LAB
ALBUMIN/CREAT UR: 236 MG/G CREAT (ref 0–29)
BUN SERPL-MCNC: 40 MG/DL (ref 8–27)
BUN SERPL-MCNC: 40 MG/DL (ref 8–27)
BUN/CREAT SERPL: 23 (ref 12–28)
BUN/CREAT SERPL: 24 (ref 12–28)
CALCIUM SERPL-MCNC: 10.2 MG/DL (ref 8.7–10.3)
CALCIUM SERPL-MCNC: 10.5 MG/DL (ref 8.7–10.3)
CHLORIDE SERPL-SCNC: 101 MMOL/L (ref 96–106)
CHLORIDE SERPL-SCNC: 101 MMOL/L (ref 96–106)
CHOLEST SERPL-MCNC: 111 MG/DL (ref 100–199)
CO2 SERPL-SCNC: 15 MMOL/L (ref 20–29)
CO2 SERPL-SCNC: 16 MMOL/L (ref 20–29)
CREAT SERPL-MCNC: 1.64 MG/DL (ref 0.57–1)
CREAT SERPL-MCNC: 1.73 MG/DL (ref 0.57–1)
CREAT UR-MCNC: 28.2 MG/DL
ERYTHROCYTE [DISTWIDTH] IN BLOOD BY AUTOMATED COUNT: 12.7 % (ref 11.7–15.4)
GLUCOSE SERPL-MCNC: 90 MG/DL (ref 65–99)
GLUCOSE SERPL-MCNC: 92 MG/DL (ref 65–99)
HCT VFR BLD AUTO: 38.5 % (ref 34–46.6)
HDLC SERPL-MCNC: 69 MG/DL
HGB BLD-MCNC: 12.9 G/DL (ref 11.1–15.9)
LABORATORY COMMENT REPORT: NORMAL
LDLC SERPL CALC-MCNC: 27 MG/DL (ref 0–99)
MCH RBC QN AUTO: 31.5 PG (ref 26.6–33)
MCHC RBC AUTO-ENTMCNC: 33.5 G/DL (ref 31.5–35.7)
MCV RBC AUTO: 94 FL (ref 79–97)
MICROALBUMIN UR-MCNC: 66.6 UG/ML
NRBC BLD AUTO-RTO: NORMAL %
PLATELET # BLD AUTO: 398 X10E3/UL (ref 150–450)
POTASSIUM SERPL-SCNC: 5.6 MMOL/L (ref 3.5–5.2)
POTASSIUM SERPL-SCNC: 5.9 MMOL/L (ref 3.5–5.2)
RBC # BLD AUTO: 4.09 X10E6/UL (ref 3.77–5.28)
SODIUM SERPL-SCNC: 131 MMOL/L (ref 134–144)
SODIUM SERPL-SCNC: 132 MMOL/L (ref 134–144)
TRIGL SERPL-MCNC: 69 MG/DL (ref 0–149)
VLDLC SERPL CALC-MCNC: 15 MG/DL (ref 5–40)
WBC # BLD AUTO: 10.2 X10E3/UL (ref 3.4–10.8)

## 2021-08-10 ENCOUNTER — OFFICE VISIT (OUTPATIENT)
Dept: NEPHROLOGY | Facility: MEDICAL CENTER | Age: 76
End: 2021-08-10
Payer: COMMERCIAL

## 2021-08-10 VITALS
HEIGHT: 63 IN | OXYGEN SATURATION: 96 % | SYSTOLIC BLOOD PRESSURE: 126 MMHG | DIASTOLIC BLOOD PRESSURE: 78 MMHG | HEART RATE: 81 BPM | WEIGHT: 107.25 LBS | TEMPERATURE: 98.3 F | BODY MASS INDEX: 19 KG/M2

## 2021-08-10 DIAGNOSIS — E87.6 HYPOKALEMIA: ICD-10-CM

## 2021-08-10 DIAGNOSIS — Z72.0 TOBACCO ABUSE: ICD-10-CM

## 2021-08-10 DIAGNOSIS — N18.30 STAGE 3 CHRONIC KIDNEY DISEASE, UNSPECIFIED WHETHER STAGE 3A OR 3B CKD: ICD-10-CM

## 2021-08-10 DIAGNOSIS — I10 ESSENTIAL HYPERTENSION: ICD-10-CM

## 2021-08-10 DIAGNOSIS — Z71.6 TOBACCO ABUSE COUNSELING: ICD-10-CM

## 2021-08-10 DIAGNOSIS — E87.1 HYPONATREMIA: ICD-10-CM

## 2021-08-10 PROCEDURE — 99214 OFFICE O/P EST MOD 30 MIN: CPT | Mod: 25 | Performed by: INTERNAL MEDICINE

## 2021-08-10 PROCEDURE — 99406 BEHAV CHNG SMOKING 3-10 MIN: CPT | Performed by: INTERNAL MEDICINE

## 2021-08-10 ASSESSMENT — ENCOUNTER SYMPTOMS
SHORTNESS OF BREATH: 0
COUGH: 0
HYPERTENSION: 1
NAUSEA: 0
CHILLS: 0
FEVER: 0
VOMITING: 0

## 2021-08-10 ASSESSMENT — FIBROSIS 4 INDEX: FIB4 SCORE: 1.69

## 2021-08-10 NOTE — PROGRESS NOTES
"Subjective:      Sangita Bolton is a 76 y.o. female who presents with Chronic Kidney Disease and Hypertension            Chronic Kidney Disease  This is a new problem. The current episode started more than 1 month ago. The problem has been unchanged. Pertinent negatives include no chest pain, chills, coughing, fever, nausea, urinary symptoms or vomiting.   Hypertension  This is a chronic problem. The current episode started more than 1 year ago. The problem is unchanged. The problem is controlled. Pertinent negatives include no chest pain, malaise/fatigue, peripheral edema or shortness of breath. Risk factors for coronary artery disease include post-menopausal state and smoking/tobacco exposure. Past treatments include angiotensin blockers. The current treatment provides significant improvement. There are no compliance problems.  Hypertensive end-organ damage includes kidney disease. Identifiable causes of hypertension include chronic renal disease.       Review of Systems   Constitutional: Negative for chills, fever and malaise/fatigue.   Respiratory: Negative for cough and shortness of breath.    Cardiovascular: Negative for chest pain and leg swelling.   Gastrointestinal: Negative for nausea and vomiting.   Genitourinary: Negative for dysuria, frequency and urgency.          Objective:     /78 (BP Location: Right arm, Patient Position: Sitting, BP Cuff Size: Adult)   Pulse 81   Temp 36.8 °C (98.3 °F) (Temporal)   Ht 1.6 m (5' 3\")   Wt 48.6 kg (107 lb 4 oz)   SpO2 96%   BMI 19.00 kg/m²      Physical Exam  Vitals and nursing note reviewed.   Constitutional:       General: She is not in acute distress.     Appearance: Normal appearance. She is well-developed. She is not diaphoretic.   HENT:      Head: Normocephalic and atraumatic.      Right Ear: External ear normal.      Left Ear: External ear normal.      Nose: Nose normal.   Eyes:      General: No scleral icterus.        Right eye: No discharge.        "  Left eye: No discharge.      Conjunctiva/sclera: Conjunctivae normal.   Cardiovascular:      Rate and Rhythm: Normal rate and regular rhythm.      Heart sounds: No murmur heard.     Pulmonary:      Effort: Pulmonary effort is normal. No respiratory distress.      Breath sounds: Wheezing present.   Musculoskeletal:         General: No tenderness.      Right lower leg: No edema.      Left lower leg: No edema.   Skin:     General: Skin is warm and dry.      Findings: No erythema.   Neurological:      General: No focal deficit present.      Mental Status: She is alert and oriented to person, place, and time.      Cranial Nerves: No cranial nerve deficit.   Psychiatric:         Mood and Affect: Mood normal.         Behavior: Behavior normal.       Past Medical History:   Diagnosis Date   • Alcohol abuse 4/27/2019   • Arthritis     generalized-Osteo   • Carotid artery stenosis 12/31/2015   • Fall 11/25/2020   • Gluten intolerance    • Hypertension    • OSTEOPOROSIS    • Other specified symptom associated with female genital organs     post menaupausal bleeding   • Pain 02/06/15    bilateral legs-chronic>4/10   • Stage 3 chronic kidney disease (HCC) 11/25/2020   • Unspecified cataract      Social History     Socioeconomic History   • Marital status:      Spouse name: Not on file   • Number of children: Not on file   • Years of education: Not on file   • Highest education level: 12th grade   Occupational History   • Not on file   Tobacco Use   • Smoking status: Current Some Day Smoker     Packs/day: 0.50     Years: 40.00     Pack years: 20.00     Types: Cigarettes     Start date: 2/2/1975   • Smokeless tobacco: Never Used   Vaping Use   • Vaping Use: Never used   Substance and Sexual Activity   • Alcohol use: Yes     Comment: 4/week   • Drug use: No   • Sexual activity: Not on file     Comment: ,    Other Topics Concern   • Not on file   Social History Narrative   • Not on file     Social Determinants of  Health     Financial Resource Strain: Low Risk    • Difficulty of Paying Living Expenses: Not hard at all   Food Insecurity: No Food Insecurity   • Worried About Running Out of Food in the Last Year: Never true   • Ran Out of Food in the Last Year: Never true   Transportation Needs: No Transportation Needs   • Lack of Transportation (Medical): No   • Lack of Transportation (Non-Medical): No   Physical Activity: Inactive   • Days of Exercise per Week: 0 days   • Minutes of Exercise per Session: 0 min   Stress: No Stress Concern Present   • Feeling of Stress : Only a little   Social Connections: Moderately Isolated   • Frequency of Communication with Friends and Family: Three times a week   • Frequency of Social Gatherings with Friends and Family: More than three times a week   • Attends Nondenominational Services: Never   • Active Member of Clubs or Organizations: No   • Attends Club or Organization Meetings: Never   • Marital Status:    Intimate Partner Violence:    • Fear of Current or Ex-Partner:    • Emotionally Abused:    • Physically Abused:    • Sexually Abused:      Family History   Problem Relation Age of Onset   • Cancer Mother         leukemia     Recent Labs     11/25/20  1253 11/25/20  1805 11/26/20  0558 11/26/20  1208 11/27/20  0133 11/27/20  1613 11/29/20  0625 11/29/20  0915 02/05/21  1005 08/03/21  0724 08/03/21  0726   ALBUMIN 4.0   < > 3.4  --  3.4  --  2.9*  --   --   --   --    HDL  --   --   --   --   --   --   --   --   --   --  69   TRIGLYCERIDE  --   --   --   --   --   --   --   --   --   --  69   SODIUM 124*   < > 127*   < > 128*   < > 125*   < > 132* 131* 132*   POTASSIUM 3.1*   < > 4.4   < > 4.5   < > 4.0   < > 5.1 5.6* 5.9*   CHLORIDE 86*   < > 93*   < > 97   < > 93*   < > 101 101 101   CO2 21   < > 22   < > 22   < > 21   < > 14* 15* 16*   BUN 16   < > 20   < > 18   < > 26*   < > 42* 40* 40*   CREATININE 1.08   < > 1.25   < > 1.11   < > 1.34   < > 1.73* 1.64* 1.73*   PHOSPHORUS 3.4  --   3.1  --   --   --  4.2  --   --   --   --     < > = values in this interval not displayed.                        Assessment/Plan:        1. Essential hypertension  Controlled  Continue same medication regimen  Continue low-sodium diet      2. Stage 3 chronic kidney disease, unspecified whether stage 3a or 3b CKD (HCC)  Stable  No uremic symptoms  Renal dose of medication  Avoid nephrotoxins  Continue same medication regimen  Recheck lab in few months    3. Hyponatremia  Check chest x-ray  Continue to avoid hydrochlorothiazide    4. Hypokalemia  Discontinue spironolactone  Recheck labs    5. Tobacco abuse    6. Tobacco abuse counseling  I spent 3 minutes discussing the need for smoking/tobacco cessation. We discussed measures for quitting including replacements such as nicotine gum/nicotine patch

## 2021-08-30 ENCOUNTER — HOSPITAL ENCOUNTER (OUTPATIENT)
Dept: RADIOLOGY | Facility: MEDICAL CENTER | Age: 76
End: 2021-08-30
Attending: INTERNAL MEDICINE
Payer: COMMERCIAL

## 2021-08-30 ENCOUNTER — HOSPITAL ENCOUNTER (OUTPATIENT)
Dept: RADIOLOGY | Facility: MEDICAL CENTER | Age: 76
End: 2021-08-30
Attending: FAMILY MEDICINE
Payer: COMMERCIAL

## 2021-08-30 DIAGNOSIS — Z12.31 ENCOUNTER FOR SCREENING MAMMOGRAM FOR BREAST CANCER: ICD-10-CM

## 2021-08-30 DIAGNOSIS — N95.9 MENOPAUSAL AND POSTMENOPAUSAL DISORDER: ICD-10-CM

## 2021-08-30 DIAGNOSIS — Z13.1 SCREENING FOR DIABETES MELLITUS (DM): ICD-10-CM

## 2021-08-30 DIAGNOSIS — I10 ESSENTIAL HYPERTENSION: ICD-10-CM

## 2021-08-30 DIAGNOSIS — E87.1 HYPONATREMIA: ICD-10-CM

## 2021-08-30 DIAGNOSIS — N18.30 STAGE 3 CHRONIC KIDNEY DISEASE, UNSPECIFIED WHETHER STAGE 3A OR 3B CKD: ICD-10-CM

## 2021-08-30 DIAGNOSIS — Z13.6 SCREENING FOR ISCHEMIC HEART DISEASE: ICD-10-CM

## 2021-08-30 DIAGNOSIS — E87.6 HYPOKALEMIA: ICD-10-CM

## 2021-08-30 PROCEDURE — 71046 X-RAY EXAM CHEST 2 VIEWS: CPT

## 2021-08-30 PROCEDURE — 77063 BREAST TOMOSYNTHESIS BI: CPT

## 2021-08-30 PROCEDURE — 77080 DXA BONE DENSITY AXIAL: CPT

## 2021-09-01 ENCOUNTER — TELEPHONE (OUTPATIENT)
Dept: MEDICAL GROUP | Facility: MEDICAL CENTER | Age: 76
End: 2021-09-01

## 2021-09-01 NOTE — TELEPHONE ENCOUNTER
----- Message from Claire Hernández M.D. sent at 8/31/2021  5:00 PM PDT -----  According to bone scan she would benefit from bone modifying agent such as fosamax   If interested we can discuss at her follow up appt

## 2021-09-18 DIAGNOSIS — I10 ESSENTIAL HYPERTENSION: ICD-10-CM

## 2021-09-20 DIAGNOSIS — I10 ESSENTIAL HYPERTENSION: ICD-10-CM

## 2021-09-21 DIAGNOSIS — I10 ESSENTIAL HYPERTENSION: ICD-10-CM

## 2021-09-21 RX ORDER — AMLODIPINE BESYLATE 10 MG/1
10 TABLET ORAL DAILY
Qty: 90 TABLET | Refills: 3 | Status: SHIPPED
Start: 2021-09-21 | End: 2021-09-30

## 2021-09-21 RX ORDER — AMLODIPINE BESYLATE 10 MG/1
10 TABLET ORAL DAILY
Qty: 90 TABLET | Refills: 3 | Status: SHIPPED | OUTPATIENT
Start: 2021-09-21 | End: 2021-12-22 | Stop reason: SDUPTHER

## 2021-09-30 ENCOUNTER — OFFICE VISIT (OUTPATIENT)
Dept: MEDICAL GROUP | Facility: MEDICAL CENTER | Age: 76
End: 2021-09-30
Payer: COMMERCIAL

## 2021-09-30 VITALS
RESPIRATION RATE: 16 BRPM | HEIGHT: 63 IN | OXYGEN SATURATION: 94 % | WEIGHT: 128 LBS | TEMPERATURE: 97.6 F | HEART RATE: 78 BPM | SYSTOLIC BLOOD PRESSURE: 136 MMHG | BODY MASS INDEX: 22.68 KG/M2 | DIASTOLIC BLOOD PRESSURE: 58 MMHG

## 2021-09-30 DIAGNOSIS — M81.0 OSTEOPOROSIS, POST-MENOPAUSAL: ICD-10-CM

## 2021-09-30 DIAGNOSIS — N18.30 STAGE 3 CHRONIC KIDNEY DISEASE, UNSPECIFIED WHETHER STAGE 3A OR 3B CKD: ICD-10-CM

## 2021-09-30 PROCEDURE — 99214 OFFICE O/P EST MOD 30 MIN: CPT | Performed by: FAMILY MEDICINE

## 2021-09-30 RX ORDER — ALENDRONATE SODIUM 70 MG/1
70 TABLET ORAL
Qty: 12 TABLET | Refills: 3 | Status: SHIPPED
Start: 2021-09-30 | End: 2022-09-23

## 2021-09-30 RX ORDER — MULTIVIT WITH MINERALS/LUTEIN
1 TABLET ORAL DAILY
COMMUNITY
End: 2022-09-23

## 2021-09-30 ASSESSMENT — FIBROSIS 4 INDEX: FIB4 SCORE: 1.69

## 2021-09-30 NOTE — PROGRESS NOTES
This medical record contains text that has been entered with the assistance of computer voice recognition and dictation software.  Therefore, it may contain unintended errors in text, spelling, punctuation, or grammar        Chief Complaint   Patient presents with   • Follow-Up     dexa scan       Sangita Bolton is a 76 y.o. female here evaluation and management of:    She is a 77 yo F who has   Patient Active Problem List   Diagnosis   • Osteoporosis, post-menopausal   • DDD (degenerative disc disease), lumbar   • Hypertension   • S/P insertion of iliac artery stent   • Dry skin dermatitis   • Closed fracture of neck of right femur (HCC)   • Alcohol abuse   • Dependence on nicotine from cigarettes   • Fall   • Stage 3 chronic kidney disease (HCC)   • CAD (coronary artery disease)   • Dementia (HCC)   • Anemia   • Hyponatremia   • Peripheral vascular disease (HCC)   we asked her to come in today to review dexa results       Current Outpatient Medications   Medication Sig Dispense Refill   • Ascorbic Acid (VITAMIN C) 1000 MG Tab Take 1 Tablet by mouth every day.     • amLODIPine (NORVASC) 10 MG Tab Take 1 Tablet by mouth every day. 90 Tablet 3   • Cholecalciferol (VITAMIN D3 PO) Take 1 tablet by mouth every day.     • Cyanocobalamin (VITAMIN B-12 PO) Take 1 tablet by mouth every day.     • aspirin (ASPIRIN ADULT LOW DOSE) 81 MG EC tablet Take 1 Tablet by mouth 2 times a day.     • losartan (COZAAR) 100 MG Tab Take 1 tablet by mouth every day. 90 tablet 3   • acetaminophen (TYLENOL) 325 MG Tab Take 2 Tabs by mouth every 6 hours as needed (Mild Pain; (Pain scale 1-3); Temp greater than 100.5 F). 30 Tab 0     No current facility-administered medications for this visit.     Patient Active Problem List    Diagnosis Date Noted   • Hyponatremia 07/14/2021   • Peripheral vascular disease (HCC) 07/14/2021   • Anemia 11/27/2020   • Dementia (HCC) 11/26/2020   • Fall 11/25/2020   • Stage 3 chronic kidney disease (HCC) 11/25/2020    • CAD (coronary artery disease) 11/25/2020   • Closed fracture of neck of right femur (HCC) 04/27/2019   • Alcohol abuse 04/27/2019   • Dependence on nicotine from cigarettes 04/27/2019   • Dry skin dermatitis 04/11/2018   • S/P insertion of iliac artery stent 12/31/2015   • Hypertension 07/01/2013   • DDD (degenerative disc disease), lumbar 01/09/2013   • Osteoporosis, post-menopausal      Past Surgical History:   Procedure Laterality Date   • PB PARTIAL HIP REPLACEMENT  11/28/2020    Procedure: HEMIARTHROPLASTY, HIP REVISION;  Surgeon: Mohsen Thomas M.D.;  Location: SURGERY [facility];  Service: Orthopedics   • PB PARTIAL HIP REPLACEMENT Right 4/28/2019    Procedure: HEMIARTHROPLASTY, HIP;  Surgeon: Evens Sarabia M.D.;  Location: [facility];  Service: Orthopedics   • RECOVERY  2/9/2015    Performed by [facility] at SURGERY SAME DAY [facility] ORS   • FEMORAL ARTERY REPAIR  2/9/2015    Performed by Yuly Chirinos M.D. at SURGERY [facility] ORS   • HYSTEROSCOPY WITH VIDEO DIAGNOSTIC  11/17/2010    Performed by ALIS DEGROOT at SURGERY SAME DAY [facility] ORS   • DILATION AND CURETTAGE  11/17/2010    Performed by ALIS DEGROOT at SURGERY SAME DAY [facility] ORS   • HUMERUS ORIF  9/5/08    Performed by LAURA CARTER at SURGERY [facility] ORS   • COLONOSCOPY  2008    recheck 4 years   • OTHER ORTHOPEDIC SURGERY      rt leg fx   • OTHER ORTHOPEDIC SURGERY      broken right wrist      Social History     Tobacco Use   • Smoking status: Current Some Day Smoker     Packs/day: 1.50     Years: 40.00     Pack years: 60.00     Types: Cigarettes     Start date: 2/2/1975   • Smokeless tobacco: Never Used   Vaping Use   • Vaping Use: Never used   Substance Use Topics   • Alcohol use: Yes     Comment: 4/week   • Drug use: No     Family History   Problem Relation Age of Onset   • Cancer Mother         leukemia           ROS    all review of system completed and negative except for  "those listed above     Objective:     /58 (BP Location: Left arm, Patient Position: Sitting, BP Cuff Size: Adult)   Pulse 78   Temp 36.4 °C (97.6 °F) (Temporal)   Resp 16   Ht 1.6 m (5' 3\")   Wt 58.1 kg (128 lb)   SpO2 94%  Body mass index is 22.67 kg/m².  Physical Exam:        GEN: comfortable, alert and oriented, well nourished, well developed, in no apparent distress   HEENT: NCAT, eyes: pupils equal and reactive, sclera white, EOMIT, good dentition  HEART: limbs warm and well perfused, regular rate, no JVD, no lower extremity edema  LUNGS: speaking in full sentences, not in apparent respiratory distress, no audible wheezes  MSK: normal tone and bulk, no swelling of the joints, gait steady and normal           Assessment and Plan:   The following treatment plan was discussed        Problem List Items Addressed This Visit     Osteoporosis, post-menopausal     Has taken bone modifying agent in the past however this has been a while   She has h/o hip fracture x 2   Review her last dexa and I do feel that she would benefit from restarting bone modifying agent such as fosamax   Risk benefit side effect instruction discussed              Stage 3 chronic kidney disease (HCC)     Established with nephrology   Taking losartan   Moderate blood pressure control today                          Instructed to follow up if symptoms worsen or fail to improve, ER/UC precautions discussed as well    Claire Hernández MD  [facility], Family Medicine   [address] [place] [address] [address]   [place] [address]  Phone: [phone]               "

## 2021-09-30 NOTE — ASSESSMENT & PLAN NOTE
Has taken bone modifying agent in the past however this has been a while   She has h/o hip fracture x 2   Review her last dexa and I do feel that she would benefit from restarting bone modifying agent such as fosamax   Risk benefit side effect instruction discussed

## 2021-10-11 DIAGNOSIS — I10 ESSENTIAL HYPERTENSION: ICD-10-CM

## 2021-10-12 RX ORDER — SPIRONOLACTONE 25 MG/1
TABLET ORAL
Qty: 90 TABLET | Refills: 3 | Status: SHIPPED | OUTPATIENT
Start: 2021-10-12 | End: 2022-07-11 | Stop reason: SDUPTHER

## 2021-12-22 DIAGNOSIS — I10 ESSENTIAL HYPERTENSION: ICD-10-CM

## 2021-12-22 RX ORDER — AMLODIPINE BESYLATE 10 MG/1
10 TABLET ORAL DAILY
Qty: 90 TABLET | Refills: 3 | Status: SHIPPED | OUTPATIENT
Start: 2021-12-22 | End: 2022-06-19 | Stop reason: SDUPTHER

## 2022-06-19 DIAGNOSIS — I10 ESSENTIAL HYPERTENSION: ICD-10-CM

## 2022-06-21 RX ORDER — AMLODIPINE BESYLATE 10 MG/1
10 TABLET ORAL DAILY
Qty: 90 TABLET | Refills: 3 | Status: SHIPPED | OUTPATIENT
Start: 2022-06-21 | End: 2022-10-04 | Stop reason: SDUPTHER

## 2022-07-11 DIAGNOSIS — I10 ESSENTIAL HYPERTENSION: ICD-10-CM

## 2022-07-12 RX ORDER — SPIRONOLACTONE 25 MG/1
25 TABLET ORAL
Qty: 90 TABLET | Refills: 3 | Status: SHIPPED | OUTPATIENT
Start: 2022-07-12 | End: 2023-07-09 | Stop reason: SDUPTHER

## 2022-07-12 NOTE — TELEPHONE ENCOUNTER
Spoke with pt and pt declined to set up an appointment, she does not want to make an appointment due to a broken leg, pt hung up

## 2022-08-12 ENCOUNTER — OFFICE VISIT (OUTPATIENT)
Dept: MEDICAL GROUP | Facility: MEDICAL CENTER | Age: 77
End: 2022-08-12
Payer: COMMERCIAL

## 2022-08-12 VITALS
RESPIRATION RATE: 16 BRPM | OXYGEN SATURATION: 94 % | HEART RATE: 80 BPM | WEIGHT: 130 LBS | SYSTOLIC BLOOD PRESSURE: 142 MMHG | DIASTOLIC BLOOD PRESSURE: 70 MMHG | HEIGHT: 63 IN | TEMPERATURE: 96.9 F | BODY MASS INDEX: 23.04 KG/M2

## 2022-08-12 DIAGNOSIS — I10 HYPERTENSION, UNSPECIFIED TYPE: ICD-10-CM

## 2022-08-12 DIAGNOSIS — N18.30 STAGE 3 CHRONIC KIDNEY DISEASE, UNSPECIFIED WHETHER STAGE 3A OR 3B CKD: ICD-10-CM

## 2022-08-12 PROCEDURE — 99214 OFFICE O/P EST MOD 30 MIN: CPT | Performed by: FAMILY MEDICINE

## 2022-08-12 RX ORDER — LOSARTAN POTASSIUM 50 MG/1
50 TABLET ORAL DAILY
Qty: 90 TABLET | Refills: 0 | Status: SHIPPED
Start: 2022-08-12 | End: 2022-09-23

## 2022-08-12 ASSESSMENT — FIBROSIS 4 INDEX: FIB4 SCORE: 1.71

## 2022-08-12 NOTE — PROGRESS NOTES
This medical record contains text that has been entered with the assistance of computer voice recognition and dictation software.  Therefore, it may contain unintended errors in text, spelling, punctuation, or grammar        Chief Complaint   Patient presents with    Follow-Up       Sangita Bolton is a 77 y.o. female here evaluation and management of:      Has been a year since our last visit , we received med refill request and reminded her to come in     Current Outpatient Medications   Medication Sig Dispense Refill    losartan (COZAAR) 50 MG Tab Take 1 Tablet by mouth every day. 90 Tablet 0    spironolactone (ALDACTONE) 25 MG Tab Take 1 Tablet by mouth every day. 90 Tablet 3    amLODIPine (NORVASC) 10 MG Tab Take 1 Tablet by mouth every day. 90 Tablet 3    Cholecalciferol (VITAMIN D3 PO) Take 1 tablet by mouth every day.      Cyanocobalamin (VITAMIN B-12 PO) Take 1 tablet by mouth every day.      aspirin 81 MG EC tablet Take 1 Tablet by mouth 2 times a day.      Ascorbic Acid (VITAMIN C) 1000 MG Tab Take 1 Tablet by mouth every day.      alendronate (FOSAMAX) 70 MG Tab Take 1 Tablet by mouth every 7 days. 12 Tablet 3    losartan (COZAAR) 100 MG Tab Take 1 tablet by mouth every day. 90 tablet 3    acetaminophen (TYLENOL) 325 MG Tab Take 2 Tabs by mouth every 6 hours as needed (Mild Pain; (Pain scale 1-3); Temp greater than 100.5 F). 30 Tab 0     No current facility-administered medications for this visit.     Patient Active Problem List    Diagnosis Date Noted    Hyponatremia 07/14/2021    Peripheral vascular disease (HCC) 07/14/2021    Anemia 11/27/2020    Dementia ([facility]) 11/26/2020    Fall 11/25/2020    Stage 3 chronic kidney disease ([facility]) 11/25/2020    CAD (coronary artery disease) 11/25/2020    Closed fracture of neck of right femur ([facility]) 04/27/2019    Alcohol abuse 04/27/2019    Dependence on nicotine from cigarettes 04/27/2019    Dry skin dermatitis 04/11/2018    S/P insertion of iliac artery stent 12/31/2015  "   Hypertension 07/01/2013    DDD (degenerative disc disease), lumbar 01/09/2013    Osteoporosis, post-menopausal      Past Surgical History:   Procedure Laterality Date    PB PARTIAL HIP REPLACEMENT  11/28/2020    Procedure: HEMIARTHROPLASTY, HIP REVISION;  Surgeon: Mohsen Thomas M.D.;  Location: SURGERY [facility];  Service: Orthopedics    PB PARTIAL HIP REPLACEMENT Right 4/28/2019    Procedure: HEMIARTHROPLASTY, HIP;  Surgeon: Evens Sarabia M.D.;  Location: [facility];  Service: Orthopedics    RECOVERY  2/9/2015    Performed by [facility] at SURGERY SAME DAY [facility]    FEMORAL ARTERY REPAIR  2/9/2015    Performed by Yuly Chirinos M.D. at SURGERY [facility]    HYSTEROSCOPY WITH VIDEO DIAGNOSTIC  11/17/2010    Performed by ALIS DEGROOT at SURGERY SAME DAY [facility] ORS    DILATION AND CURETTAGE  11/17/2010    Performed by ALIS DEGROOT at SURGERY SAME DAY [facility] ORS    ORIF, FRACTURE, HUMERUS  9/5/08    Performed by LAURA CARTER at SURGERY [facility]    COLONOSCOPY  2008    recheck 4 years    OTHER ORTHOPEDIC SURGERY      rt leg fx    OTHER ORTHOPEDIC SURGERY      broken right wrist      Social History     Tobacco Use    Smoking status: Some Days     Packs/day: 1.50     Years: 40.00     Pack years: 60.00     Types: Cigarettes     Start date: 2/2/1975    Smokeless tobacco: Never   Vaping Use    Vaping Use: Never used   Substance Use Topics    Alcohol use: Yes     Comment: 4/week    Drug use: No     Family History   Problem Relation Age of Onset    Cancer Mother         leukemia           ROS    all review of system completed and negative except for those listed above     Objective:     BP (!) 142/70 (BP Location: Left arm, Patient Position: Sitting, BP Cuff Size: Small adult)   Pulse 80   Temp 36.1 °C (96.9 °F) (Temporal)   Resp 16   Ht 1.6 m (5' 3\")   Wt 59 kg (130 lb)   SpO2 94%  Body mass index is 23.03 kg/m².  Physical " Exam:    Constitutional: Alert, no distress.  Skin: Warm, dry, good turgor, no rashes in visible areas.  Eye: Equal, round and reactive, conjunctiva clear, lids normal.  ENMT: Lips without lesions, good dentition, oropharynx clear.  Neck: Trachea midline, no masses, no thyromegaly. No cervical or supraclavicular lymphadenopathy.  Respiratory: Unlabored respiratory effort, lungs clear to auscultation, no wheezes, no ronchi.  Cardiovascular: Normal S1, S2, no murmur, no edema.  Abdomen: Soft, non-tender, no masses, no hepatosplenomegaly.  Psych: Alert and oriented x3, normal affect and mood.          Assessment and Plan:   The following treatment plan was discussed        Problem List Items Addressed This Visit       Hypertension     Not well controlled   Stopped her ARB   We will restart this at lower dose and schedule 1 mo follow up    Labs as well            Relevant Medications    losartan (COZAAR) 50 MG Tab    Other Relevant Orders    Referral to Nephrology    MA-SCREENING MAMMO BILAT W/CAD    Basic Metabolic Panel    Lipid Profile    MICROALBUMIN CREAT RATIO URINE    Stage 3 chronic kidney disease (HCC)     Reviewed her last nephrology note from last year   Reminder to follow up and keep up this relationship                     Instructed to follow up if symptoms worsen or fail to improve, ER/UC precautions discussed as well    Claire Hernández MD  [facility], Family Medicine   [address] [place] [address] [address]   [place] [address]  Phone: [phone]

## 2022-08-19 ENCOUNTER — HOSPITAL ENCOUNTER (OUTPATIENT)
Dept: LAB | Facility: MEDICAL CENTER | Age: 77
End: 2022-08-19
Attending: FAMILY MEDICINE
Payer: COMMERCIAL

## 2022-08-19 DIAGNOSIS — I10 HYPERTENSION, UNSPECIFIED TYPE: ICD-10-CM

## 2022-08-19 LAB
ANION GAP SERPL CALC-SCNC: 13 MMOL/L (ref 7–16)
BUN SERPL-MCNC: 42 MG/DL (ref 8–22)
CALCIUM SERPL-MCNC: 9.7 MG/DL (ref 8.5–10.5)
CHLORIDE SERPL-SCNC: 97 MMOL/L (ref 96–112)
CHOLEST SERPL-MCNC: 169 MG/DL (ref 100–199)
CO2 SERPL-SCNC: 20 MMOL/L (ref 20–33)
CREAT SERPL-MCNC: 1.99 MG/DL (ref 0.5–1.4)
CREAT UR-MCNC: 23.13 MG/DL
FASTING STATUS PATIENT QL REPORTED: NORMAL
GFR SERPLBLD CREATININE-BSD FMLA CKD-EPI: 25 ML/MIN/1.73 M 2
GLUCOSE SERPL-MCNC: 94 MG/DL (ref 65–99)
HDLC SERPL-MCNC: 60 MG/DL
LDLC SERPL CALC-MCNC: 79 MG/DL
MICROALBUMIN UR-MCNC: 29.4 MG/DL
MICROALBUMIN/CREAT UR: 1271 MG/G (ref 0–30)
POTASSIUM SERPL-SCNC: 4.6 MMOL/L (ref 3.6–5.5)
SODIUM SERPL-SCNC: 130 MMOL/L (ref 135–145)
TRIGL SERPL-MCNC: 151 MG/DL (ref 0–149)

## 2022-08-19 PROCEDURE — 36415 COLL VENOUS BLD VENIPUNCTURE: CPT

## 2022-08-19 PROCEDURE — 80061 LIPID PANEL: CPT

## 2022-08-19 PROCEDURE — 80048 BASIC METABOLIC PNL TOTAL CA: CPT

## 2022-08-19 PROCEDURE — 82570 ASSAY OF URINE CREATININE: CPT

## 2022-08-19 PROCEDURE — 82043 UR ALBUMIN QUANTITATIVE: CPT

## 2022-09-23 ENCOUNTER — OFFICE VISIT (OUTPATIENT)
Dept: MEDICAL GROUP | Facility: MEDICAL CENTER | Age: 77
End: 2022-09-23
Payer: COMMERCIAL

## 2022-09-23 ENCOUNTER — TELEPHONE (OUTPATIENT)
Dept: MEDICAL GROUP | Facility: MEDICAL CENTER | Age: 77
End: 2022-09-23

## 2022-09-23 VITALS
SYSTOLIC BLOOD PRESSURE: 138 MMHG | BODY MASS INDEX: 22.75 KG/M2 | HEART RATE: 80 BPM | OXYGEN SATURATION: 93 % | WEIGHT: 128.42 LBS | DIASTOLIC BLOOD PRESSURE: 52 MMHG | RESPIRATION RATE: 20 BRPM | TEMPERATURE: 97.9 F | HEIGHT: 63 IN

## 2022-09-23 DIAGNOSIS — N18.9 CHRONIC KIDNEY DISEASE, UNSPECIFIED CKD STAGE: ICD-10-CM

## 2022-09-23 DIAGNOSIS — Z12.31 ENCOUNTER FOR SCREENING MAMMOGRAM FOR BREAST CANCER: ICD-10-CM

## 2022-09-23 DIAGNOSIS — N18.30 STAGE 3 CHRONIC KIDNEY DISEASE, UNSPECIFIED WHETHER STAGE 3A OR 3B CKD: ICD-10-CM

## 2022-09-23 DIAGNOSIS — I10 HYPERTENSION, UNSPECIFIED TYPE: ICD-10-CM

## 2022-09-23 DIAGNOSIS — Z13.6 SCREENING FOR ISCHEMIC HEART DISEASE: ICD-10-CM

## 2022-09-23 PROCEDURE — 99214 OFFICE O/P EST MOD 30 MIN: CPT | Performed by: FAMILY MEDICINE

## 2022-09-23 RX ORDER — LOSARTAN POTASSIUM 100 MG/1
100 TABLET ORAL DAILY
Qty: 90 TABLET | Refills: 3 | Status: SHIPPED | OUTPATIENT
Start: 2022-09-23 | End: 2023-10-16 | Stop reason: SDUPTHER

## 2022-09-23 ASSESSMENT — FIBROSIS 4 INDEX: FIB4 SCORE: 1.71

## 2022-09-23 ASSESSMENT — PATIENT HEALTH QUESTIONNAIRE - PHQ9: CLINICAL INTERPRETATION OF PHQ2 SCORE: 0

## 2022-09-23 NOTE — ASSESSMENT & PLAN NOTE
htn not well controlled   Increase losartan back at her previous dose   Follow up 6 mo labs prior

## 2022-09-23 NOTE — PROGRESS NOTES
This medical record contains text that has been entered with the assistance of computer voice recognition and dictation software.  Therefore, it may contain unintended errors in text, spelling, punctuation, or grammar        Chief Complaint   Patient presents with    Hypertension Follow-up       Sangita Bolton is a 77 y.o. female here evaluation and management of:   And lab review  Current Outpatient Medications   Medication Sig Dispense Refill    losartan (COZAAR) 100 MG Tab Take 1 Tablet by mouth every day. 90 Tablet 3    spironolactone (ALDACTONE) 25 MG Tab Take 1 Tablet by mouth every day. 90 Tablet 3    amLODIPine (NORVASC) 10 MG Tab Take 1 Tablet by mouth every day. 90 Tablet 3    Cholecalciferol (VITAMIN D3 PO) Take 1 tablet by mouth every day.      Cyanocobalamin (VITAMIN B-12 PO) Take 1 tablet by mouth every day.      aspirin 81 MG EC tablet Take 1 Tablet by mouth 2 times a day.       No current facility-administered medications for this visit.     Patient Active Problem List    Diagnosis Date Noted    Hyponatremia 07/14/2021    Peripheral vascular disease (HCC) 07/14/2021    Anemia 11/27/2020    Dementia (HCC) 11/26/2020    Fall 11/25/2020    Stage 3 chronic kidney disease (HCC) 11/25/2020    CAD (coronary artery disease) 11/25/2020    Closed fracture of neck of right femur ([facility]) 04/27/2019    Alcohol abuse 04/27/2019    Dependence on nicotine from cigarettes 04/27/2019    Dry skin dermatitis 04/11/2018    S/P insertion of iliac artery stent 12/31/2015    Hypertension 07/01/2013    DDD (degenerative disc disease), lumbar 01/09/2013    Osteoporosis, post-menopausal      Past Surgical History:   Procedure Laterality Date    PB PARTIAL HIP REPLACEMENT  11/28/2020    Procedure: HEMIARTHROPLASTY, HIP REVISION;  Surgeon: Mohsen Thomas M.D.;  Location: SURGERY [facility];  Service: Orthopedics    PB PARTIAL HIP REPLACEMENT Right 4/28/2019    Procedure: HEMIARTHROPLASTY, HIP;  Surgeon: Evens Sarabia M.D.;   "Location: SURGERY [facility];  Service: Orthopedics    RECOVERY  2/9/2015    Performed by [facility] at SURGERY SAME DAY [facility]    FEMORAL ARTERY REPAIR  2/9/2015    Performed by Yuly Chirinos M.D. at SURGERY [facility]    HYSTEROSCOPY WITH VIDEO DIAGNOSTIC  11/17/2010    Performed by ALIS DEGROOT at SURGERY SAME DAY [facility]    DILATION AND CURETTAGE  11/17/2010    Performed by ALIS DEGROOT at SURGERY SAME DAY [facility] ORS    ORIF, FRACTURE, HUMERUS  9/5/08    Performed by LAURA CARTER at SURGERY [facility]    COLONOSCOPY  2008    recheck 4 years    OTHER ORTHOPEDIC SURGERY      rt leg fx    OTHER ORTHOPEDIC SURGERY      broken right wrist      Social History     Tobacco Use    Smoking status: Some Days     Packs/day: 1.50     Years: 40.00     Pack years: 60.00     Types: Cigarettes     Start date: 2/2/1975    Smokeless tobacco: Never   Vaping Use    Vaping Use: Never used   Substance Use Topics    Alcohol use: Yes     Comment: 4/week    Drug use: No     Family History   Problem Relation Age of Onset    Cancer Mother         leukemia           ROS    all review of system completed and negative except for those listed above     Objective:     /52 (BP Location: Left arm, Patient Position: Sitting)   Pulse 80   Temp 36.6 °C (97.9 °F) (Temporal)   Resp 20   Ht 1.6 m (5' 3\")   Wt 58.3 kg (128 lb 6.7 oz)   SpO2 93%  Body mass index is 22.75 kg/m².  Physical Exam:    Constitutional: Alert, no distress.  Skin: Warm, dry, good turgor, no rashes in visible areas.  Eye: Equal, round and reactive, conjunctiva clear, lids normal.  ENMT: Lips without lesions, good dentition, oropharynx clear.  Neck: Trachea midline, no masses, no thyromegaly. No cervical or supraclavicular lymphadenopathy.  Respiratory: Unlabored respiratory effort, lungs clear to auscultation, no wheezes, no ronchi.  Cardiovascular: Normal S1, S2, no murmur, no edema.  Abdomen: Soft, " non-tender, no masses, no hepatosplenomegaly.  Psych: Alert and oriented x3, normal affect and mood.        Assessment and Plan:   The following treatment plan was discussed        Problem List Items Addressed This Visit       Hypertension     htn not well controlled   Increase losartan back at her previous dose   Follow up 6 mo labs prior              Relevant Medications    losartan (COZAAR) 100 MG Tab    Other Relevant Orders    Referral to Nephrology    Stage 3 chronic kidney disease (HCC)       Reminder to follow up and keep up this relationship     Labs reviewed               Other Visit Diagnoses       Chronic kidney disease, unspecified CKD stage        Relevant Orders    Referral to Nephrology    Encounter for screening mammogram for breast cancer        Relevant Orders    MA-SCREENING MAMMO BILAT W/CAD            30+ min spent       Instructed to follow up if symptoms worsen or fail to improve, ER/UC precautions discussed as well    Claire Hernández MD  [facility], Family Medicine   [address] [place], [place] [address]y   [place] [address]  Phone: [phone]

## 2022-09-27 ENCOUNTER — OFFICE VISIT (OUTPATIENT)
Dept: NEPHROLOGY | Facility: MEDICAL CENTER | Age: 77
End: 2022-09-27
Payer: COMMERCIAL

## 2022-09-27 VITALS
WEIGHT: 114 LBS | HEIGHT: 63 IN | TEMPERATURE: 97.4 F | OXYGEN SATURATION: 92 % | BODY MASS INDEX: 20.2 KG/M2 | DIASTOLIC BLOOD PRESSURE: 64 MMHG | SYSTOLIC BLOOD PRESSURE: 120 MMHG | HEART RATE: 83 BPM

## 2022-09-27 DIAGNOSIS — R80.9 PROTEINURIA, UNSPECIFIED TYPE: ICD-10-CM

## 2022-09-27 DIAGNOSIS — N18.30 STAGE 3 CHRONIC KIDNEY DISEASE, UNSPECIFIED WHETHER STAGE 3A OR 3B CKD: ICD-10-CM

## 2022-09-27 DIAGNOSIS — Z72.0 TOBACCO ABUSE: ICD-10-CM

## 2022-09-27 DIAGNOSIS — E87.1 HYPONATREMIA: ICD-10-CM

## 2022-09-27 DIAGNOSIS — I10 ESSENTIAL HYPERTENSION: ICD-10-CM

## 2022-09-27 DIAGNOSIS — Z71.6 TOBACCO ABUSE COUNSELING: ICD-10-CM

## 2022-09-27 PROCEDURE — 99214 OFFICE O/P EST MOD 30 MIN: CPT | Performed by: INTERNAL MEDICINE

## 2022-09-27 ASSESSMENT — ENCOUNTER SYMPTOMS
HYPERTENSION: 1
CHILLS: 0
SHORTNESS OF BREATH: 0
NAUSEA: 0
FEVER: 0
COUGH: 0
VOMITING: 0

## 2022-09-27 ASSESSMENT — FIBROSIS 4 INDEX: FIB4 SCORE: 1.71

## 2022-09-27 NOTE — PROGRESS NOTES
"Subjective     Sangita Bolton is a 77 y.o. female who presents with Chronic Kidney Disease and Hypertension            Chronic Kidney Disease  This is a chronic problem. The current episode started more than 1 year ago. The problem occurs constantly. The problem has been waxing and waning. Pertinent negatives include no chest pain, chills, coughing, fever, nausea, urinary symptoms or vomiting.   Hypertension  This is a chronic problem. The current episode started more than 1 year ago. The problem is unchanged. The problem is controlled. Pertinent negatives include no chest pain, malaise/fatigue, peripheral edema or shortness of breath. Risk factors for coronary artery disease include post-menopausal state and smoking/tobacco exposure. Past treatments include angiotensin blockers. The current treatment provides significant improvement. There are no compliance problems.  Hypertensive end-organ damage includes kidney disease. Identifiable causes of hypertension include chronic renal disease.     Review of Systems   Constitutional:  Negative for chills, fever and malaise/fatigue.   Respiratory:  Negative for cough and shortness of breath.    Cardiovascular:  Negative for chest pain and leg swelling.   Gastrointestinal:  Negative for nausea and vomiting.   Genitourinary:  Negative for dysuria, frequency and urgency.            Objective     /64 (BP Location: Right arm, Patient Position: Sitting, BP Cuff Size: Adult)   Pulse 83   Temp 36.3 °C (97.4 °F) (Temporal)   Ht 1.6 m (5' 3\")   Wt 51.7 kg (114 lb)   SpO2 92%   BMI 20.19 kg/m²      Physical Exam  Vitals and nursing note reviewed.   Constitutional:       General: She is not in acute distress.     Appearance: Normal appearance. She is well-developed. She is not diaphoretic.   HENT:      Head: Normocephalic and atraumatic.      Right Ear: External ear normal.      Left Ear: External ear normal.      Nose: Nose normal.   Eyes:      General: No scleral icterus. "        Right eye: No discharge.         Left eye: No discharge.      Conjunctiva/sclera: Conjunctivae normal.   Cardiovascular:      Rate and Rhythm: Normal rate and regular rhythm.      Heart sounds: No murmur heard.  Pulmonary:      Effort: Pulmonary effort is normal. No respiratory distress.      Breath sounds: Normal breath sounds.   Musculoskeletal:         General: No tenderness.      Right lower leg: No edema.      Left lower leg: No edema.   Skin:     General: Skin is warm and dry.      Findings: No erythema.   Neurological:      General: No focal deficit present.      Mental Status: She is alert and oriented to person, place, and time.      Cranial Nerves: No cranial nerve deficit.   Psychiatric:         Mood and Affect: Mood normal.         Behavior: Behavior normal.          Past Medical History:   Diagnosis Date    Alcohol abuse 4/27/2019    Arthritis     generalized-Osteo    Carotid artery stenosis 12/31/2015    Fall 11/25/2020    Gluten intolerance     Hypertension     OSTEOPOROSIS     Other specified symptom associated with female genital organs     post menaupausal bleeding    Pain 02/06/15    bilateral legs-chronic>4/10    Stage 3 chronic kidney disease (HCC) 11/25/2020    Unspecified cataract      Social History     Socioeconomic History    Marital status:      Spouse name: Not on file    Number of children: Not on file    Years of education: Not on file    Highest education level: 12th grade   Occupational History    Not on file   Tobacco Use    Smoking status: Some Days     Packs/day: 1.50     Years: 40.00     Pack years: 60.00     Types: Cigarettes     Start date: 2/2/1975    Smokeless tobacco: Never   Vaping Use    Vaping Use: Never used   Substance and Sexual Activity    Alcohol use: Yes     Comment: 4/week    Drug use: No    Sexual activity: Not on file     Comment: ,    Other Topics Concern    Not on file   Social History Narrative    Not on file     Social Determinants of  Health     Financial Resource Strain: Not on file   Food Insecurity: Not on file   Transportation Needs: Not on file   Physical Activity: Not on file   Stress: Not on file   Social Connections: Not on file   Intimate Partner Violence: Not on file   Housing Stability: Not on file     Family History   Problem Relation Age of Onset    Cancer Mother         leukemia     Recent Labs     08/19/22  1207   HDL 60   TRIGLYCERIDE 151*   SODIUM 130*   POTASSIUM 4.6   CHLORIDE 97   CO2 20   BUN 42*   CREATININE 1.99*                     Assessment & Plan        1. Stage 3 chronic kidney disease, unspecified whether stage 3a or 3b CKD (HCC)  Stable  No uremic symptoms  Renal dose of medication  Avoid nephrotoxins  Continue same medication regimen      2. Essential hypertension  Controlled  Continue same medication regimen  Continue low-sodium diet      3. Hyponatremia  Stable  Patient is asymptomatic    4. Proteinuria, unspecified type  Continue ARB  - SPEP W/REFLEX TO MARIBEL, A, G, M; Future    5. Tobacco abuse    6. Tobacco abuse counseling  I spent 3 minutes discussing the need for smoking/tobacco cessation. We discussed measures for quitting including replacements such as nicotine gum/nicotine patch

## 2022-10-04 DIAGNOSIS — I10 ESSENTIAL HYPERTENSION: ICD-10-CM

## 2022-10-04 RX ORDER — AMLODIPINE BESYLATE 10 MG/1
10 TABLET ORAL DAILY
Qty: 90 TABLET | Refills: 3 | Status: SHIPPED | OUTPATIENT
Start: 2022-10-04 | End: 2023-10-03 | Stop reason: SDUPTHER

## 2022-10-26 ENCOUNTER — HOSPITAL ENCOUNTER (OUTPATIENT)
Dept: RADIOLOGY | Facility: MEDICAL CENTER | Age: 77
End: 2022-10-26
Attending: FAMILY MEDICINE
Payer: COMMERCIAL

## 2022-10-26 DIAGNOSIS — I10 HYPERTENSION, UNSPECIFIED TYPE: ICD-10-CM

## 2022-10-26 PROCEDURE — 77063 BREAST TOMOSYNTHESIS BI: CPT

## 2022-11-09 ENCOUNTER — PATIENT MESSAGE (OUTPATIENT)
Dept: HEALTH INFORMATION MANAGEMENT | Facility: OTHER | Age: 77
End: 2022-11-09

## 2023-03-16 ENCOUNTER — HOSPITAL ENCOUNTER (OUTPATIENT)
Dept: LAB | Facility: MEDICAL CENTER | Age: 78
End: 2023-03-16
Attending: INTERNAL MEDICINE
Payer: COMMERCIAL

## 2023-03-16 ENCOUNTER — HOSPITAL ENCOUNTER (OUTPATIENT)
Dept: LAB | Facility: MEDICAL CENTER | Age: 78
End: 2023-03-16
Attending: FAMILY MEDICINE
Payer: COMMERCIAL

## 2023-03-16 DIAGNOSIS — R80.9 PROTEINURIA, UNSPECIFIED TYPE: ICD-10-CM

## 2023-03-16 DIAGNOSIS — N18.30 STAGE 3 CHRONIC KIDNEY DISEASE, UNSPECIFIED WHETHER STAGE 3A OR 3B CKD: ICD-10-CM

## 2023-03-16 DIAGNOSIS — I10 ESSENTIAL HYPERTENSION: ICD-10-CM

## 2023-03-16 DIAGNOSIS — Z13.6 SCREENING FOR ISCHEMIC HEART DISEASE: ICD-10-CM

## 2023-03-16 DIAGNOSIS — E87.1 HYPONATREMIA: ICD-10-CM

## 2023-03-16 LAB
ANION GAP SERPL CALC-SCNC: 14 MMOL/L (ref 7–16)
BUN SERPL-MCNC: 41 MG/DL (ref 8–22)
CALCIUM SERPL-MCNC: 10.2 MG/DL (ref 8.5–10.5)
CHLORIDE SERPL-SCNC: 102 MMOL/L (ref 96–112)
CHOLEST SERPL-MCNC: 175 MG/DL (ref 100–199)
CO2 SERPL-SCNC: 19 MMOL/L (ref 20–33)
CREAT SERPL-MCNC: 2.14 MG/DL (ref 0.5–1.4)
CREAT UR-MCNC: 59.88 MG/DL
ERYTHROCYTE [DISTWIDTH] IN BLOOD BY AUTOMATED COUNT: 48.2 FL (ref 35.9–50)
FASTING STATUS PATIENT QL REPORTED: NORMAL
GFR SERPLBLD CREATININE-BSD FMLA CKD-EPI: 23 ML/MIN/1.73 M 2
GLUCOSE SERPL-MCNC: 97 MG/DL (ref 65–99)
HCT VFR BLD AUTO: 44.9 % (ref 37–47)
HDLC SERPL-MCNC: 61 MG/DL
HGB BLD-MCNC: 14.6 G/DL (ref 12–16)
LDLC SERPL CALC-MCNC: 89 MG/DL
MCH RBC QN AUTO: 30.8 PG (ref 27–33)
MCHC RBC AUTO-ENTMCNC: 32.5 G/DL (ref 33.6–35)
MCV RBC AUTO: 94.7 FL (ref 81.4–97.8)
MICROALBUMIN UR-MCNC: 30.4 MG/DL
MICROALBUMIN/CREAT UR: 508 MG/G (ref 0–30)
PLATELET # BLD AUTO: 327 K/UL (ref 164–446)
PMV BLD AUTO: 9.6 FL (ref 9–12.9)
POTASSIUM SERPL-SCNC: 5.2 MMOL/L (ref 3.6–5.5)
RBC # BLD AUTO: 4.74 M/UL (ref 4.2–5.4)
SODIUM SERPL-SCNC: 135 MMOL/L (ref 135–145)
TRIGL SERPL-MCNC: 127 MG/DL (ref 0–149)
WBC # BLD AUTO: 12.6 K/UL (ref 4.8–10.8)

## 2023-03-16 PROCEDURE — 82784 ASSAY IGA/IGD/IGG/IGM EACH: CPT

## 2023-03-16 PROCEDURE — 84155 ASSAY OF PROTEIN SERUM: CPT

## 2023-03-16 PROCEDURE — 86334 IMMUNOFIX E-PHORESIS SERUM: CPT

## 2023-03-16 PROCEDURE — 36415 COLL VENOUS BLD VENIPUNCTURE: CPT

## 2023-03-16 PROCEDURE — 84165 PROTEIN E-PHORESIS SERUM: CPT

## 2023-03-16 PROCEDURE — 82043 UR ALBUMIN QUANTITATIVE: CPT

## 2023-03-16 PROCEDURE — 85027 COMPLETE CBC AUTOMATED: CPT

## 2023-03-16 PROCEDURE — 80061 LIPID PANEL: CPT

## 2023-03-16 PROCEDURE — 80048 BASIC METABOLIC PNL TOTAL CA: CPT

## 2023-03-16 PROCEDURE — 82570 ASSAY OF URINE CREATININE: CPT

## 2023-03-17 ENCOUNTER — APPOINTMENT (OUTPATIENT)
Dept: RADIOLOGY | Facility: MEDICAL CENTER | Age: 78
End: 2023-03-17
Attending: EMERGENCY MEDICINE
Payer: COMMERCIAL

## 2023-03-17 ENCOUNTER — HOSPITAL ENCOUNTER (EMERGENCY)
Facility: MEDICAL CENTER | Age: 78
End: 2023-03-17
Attending: EMERGENCY MEDICINE
Payer: COMMERCIAL

## 2023-03-17 VITALS
OXYGEN SATURATION: 92 % | HEIGHT: 63 IN | RESPIRATION RATE: 19 BRPM | DIASTOLIC BLOOD PRESSURE: 54 MMHG | WEIGHT: 130 LBS | TEMPERATURE: 98 F | HEART RATE: 81 BPM | SYSTOLIC BLOOD PRESSURE: 111 MMHG | BODY MASS INDEX: 23.04 KG/M2

## 2023-03-17 DIAGNOSIS — S80.01XA CONTUSION OF RIGHT KNEE, INITIAL ENCOUNTER: ICD-10-CM
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
## 2023-03-17 DIAGNOSIS — M25.461 EFFUSION OF BURSA OF RIGHT KNEE: ICD-10-CM

## 2023-03-17 PROCEDURE — A9270 NON-COVERED ITEM OR SERVICE: HCPCS | Performed by: EMERGENCY MEDICINE

## 2023-03-17 PROCEDURE — 700102 HCHG RX REV CODE 250 W/ 637 OVERRIDE(OP): Performed by: EMERGENCY MEDICINE

## 2023-03-17 PROCEDURE — 99284 EMERGENCY DEPT VISIT MOD MDM: CPT

## 2023-03-17 PROCEDURE — 73562 X-RAY EXAM OF KNEE 3: CPT | Mod: RT

## 2023-03-17 RX ORDER — HYDROCODONE BITARTRATE AND ACETAMINOPHEN 5; 325 MG/1; MG/1
1 TABLET ORAL ONCE
Status: COMPLETED | OUTPATIENT
Start: 2023-03-17 | End: 2023-03-17

## 2023-03-17 RX ORDER — HYDROCODONE BITARTRATE AND ACETAMINOPHEN 5; 325 MG/1; MG/1
1 TABLET ORAL EVERY 6 HOURS PRN
Qty: 20 TABLET | Refills: 0 | Status: SHIPPED | OUTPATIENT
Start: 2023-03-17 | End: 2023-03-22

## 2023-03-17 RX ADMIN — HYDROCODONE BITARTRATE AND ACETAMINOPHEN 1 TABLET: 5; 325 TABLET ORAL at 04:11

## 2023-03-17 ASSESSMENT — PAIN DESCRIPTION - PAIN TYPE: TYPE: ACUTE PAIN

## 2023-03-17 NOTE — ED PROVIDER NOTES
ED Provider Note    CHIEF COMPLAINT  Chief Complaint   Patient presents with    GLF     Pt states she tripped and fell walking outside yesterday. -head strike, -LOC, +thinners (daily ASA). Complaining of 7/10 sharp knee pain, pt endorsing being able to ambulate falling fall.        EXTERNAL RECORDS REVIEWED  None available    HPI/ROS  LIMITATION TO HISTORY   Select: : None  OUTSIDE HISTORIAN(S):  Significant other     Sangita Bolton is a 77 y.o. female who presents tonight via [facility] from her home secondary to severe knee pain after she fell Thursday morning while going to [facility] to have her blood drawn.  She states she missed the curb and landed directly on her right knee.  She denies striking her head or sustaining any other injuries.  She denies any distal paresthesias.  She routinely follows with [facility]    PAST MEDICAL HISTORY   has a past medical history of Alcohol abuse (4/27/2019), Arthritis, Carotid artery stenosis (12/31/2015), Fall (11/25/2020), Gluten intolerance, Hypertension, OSTEOPOROSIS, Other specified symptom associated with female genital organs, Pain (02/06/15), Stage 3 chronic kidney disease (HCC) (11/25/2020), and Unspecified cataract.    SURGICAL HISTORY   has a past surgical history that includes colonoscopy (2008); other orthopedic surgery; orif, fracture, humerus (9/5/08); other orthopedic surgery; hysteroscopy with video diagnostic (11/17/2010); dilation and curettage (11/17/2010); recovery (2/9/2015); femoral artery repair (2/9/2015); partial hip replacement (Right, 4/28/2019); and partial hip replacement (11/28/2020).    FAMILY HISTORY  Family History   Problem Relation Age of Onset    Cancer Mother         leukemia       SOCIAL HISTORY  Social History     Tobacco Use    Smoking status: Some Days     Packs/day: 1.50     Years: 40.00     Pack years: 60.00     Types: Cigarettes     Start date: 2/2/1975    Smokeless tobacco: Never   Vaping Use    Vaping Use: Never used  "  Substance and Sexual Activity    Alcohol use: Yes     Comment: 4/week    Drug use: No    Sexual activity: Not on file     Comment: ,        CURRENT MEDICATIONS  Home Medications       Reviewed by Henny De Los Santos R.N. (Registered Nurse) on 03/17/23 at 0329  Med List Status: Not Addressed     Medication Last Dose Status   amLODIPine (NORVASC) 10 MG Tab  Active   aspirin 81 MG EC tablet  Active   Cholecalciferol (VITAMIN D3 PO)  Active   Cyanocobalamin (VITAMIN B-12 PO)  Active   losartan (COZAAR) 100 MG Tab  Active   spironolactone (ALDACTONE) 25 MG Tab  Active                    ALLERGIES  Allergies   Allergen Reactions    Hair Formula Extra Strength [Kdc:Cranberry Extract+Sambucus Nigra+Vegetable Enzyme+Yellow Dye+...] Hives     Hair Dye    Penicillins Hives     Hands; tolerates cephalosporins (Rocephin Nov 2020)       PHYSICAL EXAM  VITAL SIGNS: /54   Pulse 81   Temp 36.7 °C (98 °F) (Temporal)   Resp 19   Ht 1.6 m (5' 3\")   Wt 59 kg (130 lb)   SpO2 92%   BMI 23.03 kg/m²    Constitutional: Patient is an elderly female in moderate distress from her knee pain.  HENT: Normocephalic, atraumatic. Oropharynx moist without erythema   Eyes: PERRL, EOMI, Conjunctiva without erythema   Neck: Supple with no tenderness.  Lymphatic: No lymphadenopathy noted.   Cardiovascular: Normal heart rate and Regular rhythm. No murmur  Thorax & Lungs: Clear and equal breath sounds with good excursion. No respiratory distress  Abdomen: Bowel sounds normal in all four quadrants. Soft,nontender  Skin: Warm, Dry, No contusions or abrasions.  Back: No cervical, thoracic, or lumbosacral tenderness.    Extremities: Peripheral pulses 4/4 extreme tenderness to the right knee with ballotable joint effusion, moderate swelling, limited range of motion secondary to pain and swelling.  Neurovascular is intact.  She has good capillary refill and good distal sensation.  No other signs of trauma to the remaining " extremities.  Musculoskeletal: Normal range of motion in all major joints.  Neurologic: Alert & oriented x 3, Normal motor function, Normal sensory function, No lateralizing or focal deficits noted.  Psychiatric: Affect normal    DIAGNOSTIC STUDIES / PROCEDURES      RADIOLOGY  I have independently interpreted the diagnostic imaging associated with this visit and am waiting the final reading from the radiologist.   My preliminary interpretation is as follows: No fracture  Radiologist interpretation:   DX-KNEE 3 VIEWS RIGHT   Final Result         1.  No acute traumatic bony injury.   2.  Atherosclerosis           COURSE & MEDICAL DECISION MAKING    ED Observation Status? No; Patient does not meet criteria for ED Observation.     INITIAL ASSESSMENT, COURSE AND PLAN  Care Narrative: Patient was brought in via ambulance for this right knee pain.  She was evaluated and treated, x-ray was performed showing no fracture or subluxation.  She was placed in a right knee immobilizer.  She has her own walker at home that she can use.  She is instructed to ice for 24 hours and apply warm compresses, follow-up with [facility] within 1 week for recheck and return if problems or worsening.  She was given Norco here in the ER and a prescription for the same for home.  She is discharged in the care of her  in stable condition        ADDITIONAL PROBLEM LIST  Hypertension, history of alcohol abuse  DISPOSITION AND DISCUSSIONS  I have discussed management of the patient with the following physicians and GIL's: None    Discussion of management with other QHP or appropriate source(s): None     Barriers to care at this time, including but not limited to:  None .     Decision tools and prescription drugs considered including, but not limited to: Pain Medications Norco 5 mg/325 #20 .  Patient was reviewed on  there appeared to be no significant risk factors for prescribing this for the patient.    FINAL DIAGNOSIS  1. Fall  from ground level    2. Contusion of right knee, initial encounter    3. Effusion of bursa of right knee           Electronically signed by: Emily Macedo D.O., 3/17/2023 5:52 AM

## 2023-03-17 NOTE — ED NOTES
Pt educated on discharge instructions. All questions & concerns addressed. Vitals re-assessed and at pt's baseline. Pt brought to exit via wheelchair gait with all belongings and helped into vehicle by staff.    No IV to d/c

## 2023-03-17 NOTE — ED TRIAGE NOTES
"Chief Complaint   Patient presents with    GLF     Pt states she tripped and fell walking outside yesterday. -head strike, -LOC, +thinners (daily ASA). Complaining of 7/10 sharp knee pain, pt endorsing being able to ambulate falling fall.      Pt BIB EMS for above complaint. No interventions prior to arrival.     /63   Pulse 91   Temp 36.7 °C (98.1 °F) (Temporal)   Resp (!) 21   Ht 1.6 m (5' 3\")   Wt 59 kg (130 lb)   SpO2 90%   BMI 23.03 kg/m²     "

## 2023-03-17 NOTE — DISCHARGE INSTRUCTIONS
Ice for 24 hours then warm compresses.  Wear the knee immobilizer at all times except for bathing for both comfort and support.  Use your walker at all times to avoid falling and to avoid weightbearing for the next 3 to 4 days  Follow-up with [facility] this next week for recheck  Take the pain medications as directed with food and stool softeners to avoid constipation.  Tylenol for lesser pain.

## 2023-03-21 LAB
ALBUMIN SERPL ELPH-MCNC: 4.64 G/DL (ref 3.75–5.01)
ALPHA1 GLOB SERPL ELPH-MCNC: 0.31 G/DL (ref 0.19–0.46)
ALPHA2 GLOB SERPL ELPH-MCNC: 0.79 G/DL (ref 0.48–1.05)
B-GLOBULIN SERPL ELPH-MCNC: 0.9 G/DL (ref 0.48–1.1)
GAMMA GLOB SERPL ELPH-MCNC: 1.06 G/DL (ref 0.62–1.51)
IGA SERPL-MCNC: 297 MG/DL (ref 68–408)
IGG SERPL-MCNC: 1006 MG/DL (ref 768–1632)
IGM SERPL-MCNC: 346 MG/DL (ref 35–263)
INTERPRETATION SERPL IFE-IMP: NORMAL
MONOCLON BAND OBS SERPL: NORMAL
MONOCLONAL PROTEIN NL11656: 0.31 G/DL
PATHOLOGY STUDY: NORMAL
PROT SERPL-MCNC: 7.7 G/DL (ref 6.3–8.2)

## 2023-03-24 ENCOUNTER — OFFICE VISIT (OUTPATIENT)
Dept: MEDICAL GROUP | Facility: MEDICAL CENTER | Age: 78
End: 2023-03-24
Payer: COMMERCIAL

## 2023-03-24 VITALS
SYSTOLIC BLOOD PRESSURE: 118 MMHG | BODY MASS INDEX: 23.39 KG/M2 | HEART RATE: 79 BPM | RESPIRATION RATE: 16 BRPM | WEIGHT: 132 LBS | TEMPERATURE: 97 F | OXYGEN SATURATION: 94 % | DIASTOLIC BLOOD PRESSURE: 66 MMHG | HEIGHT: 63 IN
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
## 2023-03-24 DIAGNOSIS — S89.90XS KNEE INJURY, UNSPECIFIED LATERALITY, SEQUELA: ICD-10-CM

## 2023-03-24 DIAGNOSIS — Z13.6 SCREENING FOR ISCHEMIC HEART DISEASE: ICD-10-CM

## 2023-03-24 DIAGNOSIS — N18.30 STAGE 3 CHRONIC KIDNEY DISEASE, UNSPECIFIED WHETHER STAGE 3A OR 3B CKD: ICD-10-CM

## 2023-03-24 DIAGNOSIS — Z13.1 SCREENING FOR DIABETES MELLITUS (DM): ICD-10-CM

## 2023-03-24 DIAGNOSIS — I10 PRIMARY HYPERTENSION: ICD-10-CM

## 2023-03-24 DIAGNOSIS — Z95.828 S/P INSERTION OF ILIAC ARTERY STENT: ICD-10-CM

## 2023-03-24 PROBLEM — F03.90 DEMENTIA (HCC): Status: RESOLVED | Noted: 2020-11-26 | Resolved: 2023-03-24

## 2023-03-24 PROCEDURE — 99214 OFFICE O/P EST MOD 30 MIN: CPT | Performed by: FAMILY MEDICINE

## 2023-03-24 NOTE — PROGRESS NOTES
This medical record contains text that has been entered with the assistance of computer voice recognition and dictation software.  Therefore, it may contain unintended errors in text, spelling, punctuation, or grammar        Chief Complaint   Patient presents with    Follow-Up     Pt doing well, did fall on knee few days ago       Sangita Bolton is a 77 y.o. female here evaluation and management of:     Recently fell and injured knee , was evaluated at [facility] and [facility]   In wheelchair today   Current Outpatient Medications   Medication Sig Dispense Refill    amLODIPine (NORVASC) 10 MG Tab Take 1 Tablet by mouth every day. 90 Tablet 3    losartan (COZAAR) 100 MG Tab Take 1 Tablet by mouth every day. 90 Tablet 3    spironolactone (ALDACTONE) 25 MG Tab Take 1 Tablet by mouth every day. 90 Tablet 3    Cholecalciferol (VITAMIN D3 PO) Take 1 tablet by mouth every day.      Cyanocobalamin (VITAMIN B-12 PO) Take 1 tablet by mouth every day.      aspirin 81 MG EC tablet Take 1 Tablet by mouth 2 times a day.       No current facility-administered medications for this visit.     Patient Active Problem List    Diagnosis Date Noted    Hyponatremia 07/14/2021    Peripheral vascular disease (HCC) 07/14/2021    Anemia 11/27/2020    Fall 11/25/2020    Stage 3 chronic kidney disease (HCC) 11/25/2020    CAD (coronary artery disease) 11/25/2020    Closed fracture of neck of right femur (HCC) 04/27/2019    Alcohol abuse 04/27/2019    Dependence on nicotine from cigarettes 04/27/2019    Dry skin dermatitis 04/11/2018    S/P insertion of iliac artery stent 12/31/2015    Hypertension 07/01/2013    DDD (degenerative disc disease), lumbar 01/09/2013    Osteoporosis, post-menopausal      Past Surgical History:   Procedure Laterality Date    PB PARTIAL HIP REPLACEMENT  11/28/2020    Procedure: HEMIARTHROPLASTY, HIP REVISION;  Surgeon: Mohsen Thomas M.D.;  Location: SURGERY [facility];  Service: Orthopedics    PB PARTIAL HIP REPLACEMENT  "Right 4/28/2019    Procedure: HEMIARTHROPLASTY, HIP;  Surgeon: Evens Sarabia M.D.;  Location: SURGERY [facility];  Service: Orthopedics    RECOVERY  2/9/2015    Performed by [facility] at SURGERY SAME DAY [facility]    FEMORAL ARTERY REPAIR  2/9/2015    Performed by Yuly Chirinos M.D. at SURGERY [facility]    HYSTEROSCOPY WITH VIDEO DIAGNOSTIC  11/17/2010    Performed by ALIS DEGROOT at SURGERY SAME DAY [facility]    DILATION AND CURETTAGE  11/17/2010    Performed by ALIS DEGROOT at SURGERY SAME DAY [facility]    ORIF, FRACTURE, HUMERUS  9/5/08    Performed by LAURA CARTER at SURGERY [facility]    COLONOSCOPY  2008    recheck 4 years    OTHER ORTHOPEDIC SURGERY      rt leg fx    OTHER ORTHOPEDIC SURGERY      broken right wrist      Social History     Tobacco Use    Smoking status: Every Day     Packs/day: 1.00     Years: 40.00     Pack years: 40.00     Types: Cigarettes     Start date: 2/2/1975    Smokeless tobacco: Never   Vaping Use    Vaping Use: Never used   Substance Use Topics    Alcohol use: Not Currently     Comment: 4/week    Drug use: No     Family History   Problem Relation Age of Onset    Cancer Mother         leukemia           ROS    all review of system completed and negative except for those listed above     Objective:     /66 (BP Location: Right arm, Patient Position: Sitting, BP Cuff Size: Adult long)   Pulse 79   Temp 36.1 °C (97 °F) (Temporal)   Resp 16   Ht 1.6 m (5' 3\")   Wt 59.9 kg (132 lb)   SpO2 94%  Body mass index is 23.38 kg/m².  Physical Exam:        GEN: comfortable, alert and oriented, well nourished, well developed, in no apparent distress   HEENT: NCAT, eyes: pupils equal and reactive, sclera white, EOMIT, good dentition  HEART: limbs warm and well perfused, regular rate, no JVD, no lower extremity edema  LUNGS: speaking in full sentences, not in apparent respiratory distress, no audible wheezes  MSK: normal tone " and bulk, no swelling of the joints, gait steady and normal         Assessment and Plan:   The following treatment plan was discussed        Problem List Items Addressed This Visit       Hypertension     At goal today   Labs reviewed   Continue arb and diuretic and ccb               S/P insertion of iliac artery stent     I suggest that she est with our vascular medicine department     Would likely benefit from statin therapy            Relevant Orders    Referral to Vascular Medicine    Fall     With R knee injury   She states that she ambulates with FWW at home but is wheelchair reliant outside the home   She states that she tripped when using the stairs by the [facility]     I recommend home health PT for knee rehab   I also encourage her to contact a private care manager to help her attend appointments etc since she has not been driving for a while. She would likely benefit from care management as well     30+ min spent     Follow up with me 3 mo              Stage 3 chronic kidney disease (HCC)     Encouraged to keep up her relationship with nephrology   Labs reviewed stable                  Relevant Orders    Referral to Nephrology    Lipid Profile    Basic Metabolic Panel    MICROALBUMIN CREAT RATIO URINE    HEP C VIRUS ANTIBODY     Other Visit Diagnoses       Screening for ischemic heart disease        Relevant Orders    Lipid Profile    Basic Metabolic Panel    MICROALBUMIN CREAT RATIO URINE    HEP C VIRUS ANTIBODY    Screening for diabetes mellitus (DM)        Relevant Orders    Lipid Profile    Basic Metabolic Panel    MICROALBUMIN CREAT RATIO URINE    HEP C VIRUS ANTIBODY    Knee injury, unspecified laterality, sequela        Relevant Orders    Referral to Home Health                  Instructed to follow up if symptoms worsen or fail to improve, ER/UC precautions discussed as well    MD [facility], Family Medicine   [address] [place] [address] [address]   [place] [id number]  Phone:  [phone]

## 2023-03-24 NOTE — ASSESSMENT & PLAN NOTE
With R knee injury   She states that she ambulates with FWW at home but is wheelchair reliant outside the home   She states that she tripped when using the stairs by the [facility]     I recommend home health PT for knee rehab   I also encourage her to contact a private care manager to help her attend appointments etc since she has not been driving for a while. She would likely benefit from care management as well     30+ min spent     Follow up with me 3 mo

## 2023-03-24 NOTE — ASSESSMENT & PLAN NOTE
I suggest that she est with our vascular medicine department     Would likely benefit from statin therapy

## 2023-04-11 ENCOUNTER — OFFICE VISIT (OUTPATIENT)
Dept: NEPHROLOGY | Facility: MEDICAL CENTER | Age: 78
End: 2023-04-11
Payer: COMMERCIAL

## 2023-04-11 VITALS
HEIGHT: 63 IN | BODY MASS INDEX: 23.04 KG/M2 | SYSTOLIC BLOOD PRESSURE: 112 MMHG | OXYGEN SATURATION: 94 % | DIASTOLIC BLOOD PRESSURE: 60 MMHG | HEART RATE: 79 BPM | WEIGHT: 130 LBS | TEMPERATURE: 98.2 F

## 2023-04-11 DIAGNOSIS — Z71.6 TOBACCO ABUSE COUNSELING: ICD-10-CM

## 2023-04-11 DIAGNOSIS — N18.4 CKD (CHRONIC KIDNEY DISEASE) STAGE 4, GFR 15-29 ML/MIN (HCC): ICD-10-CM

## 2023-04-11 DIAGNOSIS — I10 ESSENTIAL HYPERTENSION: ICD-10-CM

## 2023-04-11 DIAGNOSIS — Z72.0 TOBACCO ABUSE: ICD-10-CM

## 2023-04-11 PROCEDURE — 99213 OFFICE O/P EST LOW 20 MIN: CPT | Performed by: INTERNAL MEDICINE

## 2023-04-11 ASSESSMENT — ENCOUNTER SYMPTOMS
SHORTNESS OF BREATH: 0
NAUSEA: 0
FEVER: 0
CHILLS: 0
VOMITING: 0
COUGH: 0
HYPERTENSION: 1
WEAKNESS: 1

## 2023-04-11 NOTE — PROGRESS NOTES
"Subjective     Sangita Bolton is a 77 y.o. female who presents with Chronic Kidney Disease and Hypertension            Chronic Kidney Disease  This is a chronic problem. The current episode started more than 1 year ago. The problem occurs constantly. The problem has been unchanged. Associated symptoms include weakness. Pertinent negatives include no chest pain, chills, coughing, fever, nausea, urinary symptoms or vomiting.   Hypertension  This is a chronic problem. The current episode started more than 1 year ago. The problem is unchanged. The problem is controlled. Pertinent negatives include no chest pain, malaise/fatigue, peripheral edema or shortness of breath. Risk factors for coronary artery disease include smoking/tobacco exposure and post-menopausal state. Past treatments include angiotensin blockers and calcium channel blockers. The current treatment provides significant improvement. There are no compliance problems.  Hypertensive end-organ damage includes kidney disease. Identifiable causes of hypertension include chronic renal disease.     Review of Systems   Constitutional:  Negative for chills, fever and malaise/fatigue.   Respiratory:  Negative for cough and shortness of breath.    Cardiovascular:  Negative for chest pain and leg swelling.   Gastrointestinal:  Negative for nausea and vomiting.   Genitourinary:  Negative for dysuria, frequency and urgency.   Neurological:  Positive for weakness.            Objective     /60 (BP Location: Left arm, Patient Position: Sitting, BP Cuff Size: Adult)   Pulse 79   Temp 36.8 °C (98.2 °F) (Temporal)   Ht 1.6 m (5' 3\")   Wt 59 kg (130 lb)   SpO2 94%   BMI 23.03 kg/m²      Physical Exam  Vitals and nursing note reviewed.   Constitutional:       General: She is not in acute distress.     Appearance: Normal appearance. She is well-developed. She is not diaphoretic.   HENT:      Head: Normocephalic and atraumatic.      Right Ear: External ear normal.      " Left Ear: External ear normal.      Nose: Nose normal.   Eyes:      General: No scleral icterus.        Right eye: No discharge.         Left eye: No discharge.      Conjunctiva/sclera: Conjunctivae normal.   Cardiovascular:      Rate and Rhythm: Normal rate and regular rhythm.      Heart sounds: No murmur heard.  Pulmonary:      Effort: Pulmonary effort is normal. No respiratory distress.      Breath sounds: Normal breath sounds.   Musculoskeletal:         General: No tenderness.      Right lower leg: No edema.      Left lower leg: No edema.   Skin:     General: Skin is warm and dry.      Findings: No erythema.   Neurological:      General: No focal deficit present.      Mental Status: She is alert and oriented to person, place, and time.      Cranial Nerves: No cranial nerve deficit.   Psychiatric:         Mood and Affect: Mood normal.         Behavior: Behavior normal.     Past Medical History:   Diagnosis Date    Alcohol abuse 4/27/2019    Arthritis     generalized-Osteo    Carotid artery stenosis 12/31/2015    Fall 11/25/2020    Gluten intolerance     Hypertension     OSTEOPOROSIS     Other specified symptom associated with female genital organs     post menaupausal bleeding    Pain 02/06/15    bilateral legs-chronic>4/10    Stage 3 chronic kidney disease (HCC) 11/25/2020    Unspecified cataract      Social History     Socioeconomic History    Marital status:      Spouse name: Not on file    Number of children: Not on file    Years of education: Not on file    Highest education level: 12th grade   Occupational History    Not on file   Tobacco Use    Smoking status: Every Day     Packs/day: 1.00     Years: 40.00     Pack years: 40.00     Types: Cigarettes     Start date: 2/2/1975    Smokeless tobacco: Never   Vaping Use    Vaping Use: Never used   Substance and Sexual Activity    Alcohol use: Not Currently     Comment: 4/week    Drug use: No    Sexual activity: Not on file     Comment: ,    Other  Topics Concern    Not on file   Social History Narrative    Not on file     Social Determinants of Health     Financial Resource Strain: Not on file   Food Insecurity: Not on file   Transportation Needs: Not on file   Physical Activity: Not on file   Stress: Not on file   Social Connections: Not on file   Intimate Partner Violence: Not on file   Housing Stability: Not on file     Family History   Problem Relation Age of Onset    Cancer Mother         leukemia     Recent Labs     08/19/22  1207 03/16/23  0855 03/16/23  0856   ALBUMIN  --  4.64  --    HDL 60  --  61   TRIGLYCERIDE 151*  --  127   SODIUM 130*  --  135   POTASSIUM 4.6  --  5.2   CHLORIDE 97  --  102   CO2 20  --  19*   BUN 42*  --  41*   CREATININE 1.99*  --  2.14*                             Assessment & Plan        1. Essential hypertension  Controlled  Continue same medication regimen  Continue low-sodium diet  Patient was advised to check her blood pressure at home regularly, blood pressure on the low side and patient is having fatigue/lightheadedness then we will discontinue amlodipine    2. CKD (chronic kidney disease) stage 4, GFR 15-29 ml/min ([facility])  Stable  No uremic symptoms  Renal dose of medication  Avoid nephrotoxins  Continue same medication regimen  Patient was advised to call us if symptoms worsen      3. Tobacco abuse      4. Tobacco abuse counseling  I spent 3 minutes discussing the need for smoking/tobacco cessation. We discussed measures for quitting including replacements such as nicotine gum/nicotine patch

## 2023-04-24 ENCOUNTER — OFFICE VISIT (OUTPATIENT)
Dept: VASCULAR LAB | Facility: MEDICAL CENTER | Age: 78
End: 2023-04-24
Attending: FAMILY MEDICINE
Payer: COMMERCIAL

## 2023-04-24 VITALS
HEIGHT: 63 IN | BODY MASS INDEX: 23.04 KG/M2 | HEART RATE: 86 BPM | WEIGHT: 130 LBS | SYSTOLIC BLOOD PRESSURE: 151 MMHG | DIASTOLIC BLOOD PRESSURE: 63 MMHG

## 2023-04-24 DIAGNOSIS — I73.9 PAD (PERIPHERAL ARTERY DISEASE) (HCC): ICD-10-CM

## 2023-04-24 DIAGNOSIS — E78.49 OTHER HYPERLIPIDEMIA: ICD-10-CM

## 2023-04-24 DIAGNOSIS — I65.21 CAROTID STENOSIS, ASYMPTOMATIC, RIGHT: ICD-10-CM

## 2023-04-24 DIAGNOSIS — I25.10 CORONARY ARTERY DISEASE INVOLVING NATIVE CORONARY ARTERY WITHOUT ANGINA PECTORIS, UNSPECIFIED WHETHER NATIVE OR TRANSPLANTED HEART: ICD-10-CM

## 2023-04-24 DIAGNOSIS — N18.4 CKD STAGE G4/A3, GFR 15-29 AND ALBUMIN CREATININE RATIO >300 MG/G (HCC): ICD-10-CM

## 2023-04-24 DIAGNOSIS — I10 PRIMARY HYPERTENSION: ICD-10-CM

## 2023-04-24 DIAGNOSIS — Z95.828 S/P INSERTION OF ILIAC ARTERY STENT: Chronic | ICD-10-CM

## 2023-04-24 DIAGNOSIS — F17.218 CIGARETTE NICOTINE DEPENDENCE WITH OTHER NICOTINE-INDUCED DISORDER: ICD-10-CM

## 2023-04-24 DIAGNOSIS — I65.23 CAROTID ATHEROSCLEROSIS, BILATERAL: ICD-10-CM

## 2023-04-24 PROBLEM — E87.20 ACIDOSIS: Status: ACTIVE | Noted: 2019-05-02

## 2023-04-24 PROBLEM — R26.2 DIFFICULTY IN WALKING, NOT ELSEWHERE CLASSIFIED: Status: ACTIVE | Noted: 2019-05-02

## 2023-04-24 PROBLEM — M62.81 MUSCLE WEAKNESS (GENERALIZED): Status: ACTIVE | Noted: 2019-05-02

## 2023-04-24 PROBLEM — E87.1 HYPO-OSMOLALITY AND HYPONATREMIA: Status: ACTIVE | Noted: 2019-05-02

## 2023-04-24 PROBLEM — R27.8 OTHER LACK OF COORDINATION: Status: ACTIVE | Noted: 2019-05-02

## 2023-04-24 PROCEDURE — 99212 OFFICE O/P EST SF 10 MIN: CPT

## 2023-04-24 PROCEDURE — 99407 BEHAV CHNG SMOKING > 10 MIN: CPT | Performed by: FAMILY MEDICINE

## 2023-04-24 PROCEDURE — 99204 OFFICE O/P NEW MOD 45 MIN: CPT | Mod: 25 | Performed by: FAMILY MEDICINE

## 2023-04-24 RX ORDER — ROSUVASTATIN CALCIUM 10 MG/1
10 TABLET, COATED ORAL EVERY EVENING
Qty: 90 TABLET | Refills: 3 | Status: SHIPPED | OUTPATIENT
Start: 2023-04-24

## 2023-04-24 RX ORDER — ASPIRIN 81 MG/1
81 TABLET ORAL DAILY
COMMUNITY
Start: 2023-04-24

## 2023-04-24 ASSESSMENT — ENCOUNTER SYMPTOMS
FEVER: 0
BRUISES/BLEEDS EASILY: 0
CHILLS: 0
SHORTNESS OF BREATH: 0
CLAUDICATION: 0
ORTHOPNEA: 0
PND: 0
SPUTUM PRODUCTION: 0
HEMOPTYSIS: 0
PALPITATIONS: 0
WHEEZING: 0
COUGH: 0
BLOOD IN STOOL: 0

## 2023-04-24 NOTE — PROGRESS NOTES
INITIAL VASCULAR MEDICINE VISIT  23     Sangita Bolton is a 77 y.o. female  who presents for   Chief Complaint   Patient presents with    Other     NP Dx: S/P insertion of iliac artery stent     initially referred by Claire Hernández M.D. for PAD, s/p iliac art stenting      Subjective         Lower extremity PAD:   Initial hx:  recent fall with knee injury, seen by PCP, ref to our office for eval.  Denies current limb pain with walking, no claudication  Chronic limb ischemic sx: denies rest pain, nonhealing leg wounds, cold extremities, coloration changes   Location: bilat   Age at symptom onset: 60s   Pain-free walking distance: limited due to orthopedic issues   Maximum walking distance, prior to stopping due to symptoms: unable to determine due to ortho limitis   How long until pain subsides with rest: n/a   Prior imaging studies:  no recent   Prior medications/interventions:    Meds: ASA, ARB, no statin    Interventions: 2015 -bilat iliac art stenting, L CFA endarterectomy by dr. Yuly Chirinos    Exercise therapy: none   Tobacco hx:  reports that she has been smoking cigarettes. She started smoking about 48 years ago. She has a 40.00 pack-year smoking history. She has never used smokeless tobacco.    HTN:  No current concerns, stable, tolerating meds.   Home BP los/70s - not checking rourtinely     Hyperlipidemia:  Stable, no current concerns  Current treatment: no current medications  Baseline lipid off tx:   Latest Reference Range & Units 12 08:18   Cholesterol,Tot 100 - 199 mg/dL 281 (H)   Triglycerides 0 - 149 mg/dL 58   HDL >=40 mg/dL 146   LDL <100 mg/dL 123     Antiplatelet/anticoagulation: Yes, Details: ASA 81mg  , no hx of bleeding     Type 2 DM:No     CKD:  G4, seeing nephrology, no prior known renal aa stenosis     Past Medical History:   Diagnosis Date    Alcohol abuse 2019    Arthritis     generalized-Osteo    Carotid artery stenosis 2015    Fall 2020    Gluten  intolerance     Hypertension     OSTEOPOROSIS     Other specified symptom associated with female genital organs     post menaupausal bleeding    Pain 02/06/15    bilateral legs-chronic>4/10    Stage 3 chronic kidney disease (HCC) 11/25/2020    Unspecified cataract      Past Surgical History:   Procedure Laterality Date    PB PARTIAL HIP REPLACEMENT  11/28/2020    Procedure: HEMIARTHROPLASTY, HIP REVISION;  Surgeon: Mohsen Thomas M.D.;  Location: SURGERY [facility];  Service: Orthopedics    PB PARTIAL HIP REPLACEMENT Right 4/28/2019    Procedure: HEMIARTHROPLASTY, HIP;  Surgeon: Evens Sarabia M.D.;  Location: SURGERY [facility];  Service: Orthopedics    RECOVERY  2/9/2015    Performed by [facility] at SURGERY SAME DAY [facility]S    FEMORAL ARTERY REPAIR  2/9/2015    Performed by Yuly Chirinos M.D. at SURGERY [facility] ORS    HYSTEROSCOPY WITH VIDEO DIAGNOSTIC  11/17/2010    Performed by ALIS DEGROOT at SURGERY SAME DAY [facility] ORS    DILATION AND CURETTAGE  11/17/2010    Performed by ALIS DEGROOT at SURGERY SAME DAY [facility] ORS    ORIF, FRACTURE, HUMERUS  9/5/08    Performed by LAURA CARTER at SURGERY [facility] ORS    COLONOSCOPY  2008    recheck 4 years    OTHER ORTHOPEDIC SURGERY      rt leg fx    OTHER ORTHOPEDIC SURGERY      broken right wrist        Current Outpatient Medications:     rosuvastatin, 10 mg, Oral, Q EVENING    aspirin, 81 mg, Oral, DAILY    amLODIPine, 10 mg, Oral, DAILY, Taking    losartan, 100 mg, Oral, DAILY, Taking    spironolactone, 25 mg, Oral, QDAY, Taking    Cholecalciferol (VITAMIN D3 PO), 1 Tablet, Oral, DAILY, Taking    Cyanocobalamin (VITAMIN B-12 PO), 1 Tablet, Oral, DAILY, Taking   Allergies   Allergen Reactions    Hair Formula Extra Strength [Kdc:Cranberry Extract+Sambucus Nigra+Vegetable Enzyme+Yellow Dye+...] Hives     Hair Dye    Penicillins Hives     Hands; tolerates cephalosporins (Rocephin Nov 2020)     Family History  "  Problem Relation Age of Onset    Cancer Mother         leukemia      Social History     Tobacco Use    Smoking status: Every Day     Packs/day: 1.00     Years: 40.00     Pack years: 40.00     Types: Cigarettes     Start date: 2/2/1975    Smokeless tobacco: Never   Vaping Use    Vaping Use: Never used   Substance Use Topics    Alcohol use: Not Currently     Comment: 4/week    Drug use: No     Review of Systems   Constitutional:  Negative for chills, fever and malaise/fatigue.   HENT:  Negative for nosebleeds.    Respiratory:  Negative for cough, hemoptysis, sputum production, shortness of breath and wheezing.    Cardiovascular:  Negative for chest pain, palpitations, orthopnea, claudication, leg swelling and PND.   Gastrointestinal:  Negative for blood in stool.   Endo/Heme/Allergies:  Does not bruise/bleed easily.         Objective   Vitals:    04/24/23 1420 04/24/23 1423   BP: (!) 152/66 (!) 151/63   BP Location: Left arm Left arm   Patient Position: Sitting Sitting   BP Cuff Size: Adult Adult   Pulse: 86    Weight: 59 kg (130 lb)    Height: 1.6 m (5' 3\")       BP Readings from Last 5 Encounters:   04/24/23 (!) 151/63   04/11/23 112/60   03/24/23 118/66   03/17/23 111/54   09/27/22 120/64      Body mass index is 23.03 kg/m².  Physical Exam  Vitals reviewed.   Constitutional:       General: She is not in acute distress.     Appearance: Normal appearance.   HENT:      Head: Normocephalic and atraumatic.   Neck:      Thyroid: No thyroid mass.      Vascular: Normal carotid pulses.      Trachea: Trachea normal.   Cardiovascular:      Rate and Rhythm: Normal rate and regular rhythm.      Chest Wall: PMI is not displaced.      Pulses:           Carotid pulses are 2+ on the right side and 2+ on the left side.       Radial pulses are 2+ on the right side and 2+ on the left side.        Dorsalis pedis pulses are 1+ on the right side and 1+ on the left side.        Posterior tibial pulses are 1+ on the right side and 1+ on " the left side.      Heart sounds: Normal heart sounds.      Comments: No ulcerations, warm ext  Pulmonary:      Effort: Pulmonary effort is normal.      Breath sounds: Normal breath sounds.   Musculoskeletal:      Cervical back: Full passive range of motion without pain.      Right lower leg: No edema.      Left lower leg: No edema.   Skin:     General: Skin is warm and dry.      Capillary Refill: Capillary refill takes less than 2 seconds.      Coloration: Skin is not cyanotic.      Nails: There is no clubbing.   Neurological:      General: No focal deficit present.      Mental Status: She is alert and oriented to person, place, and time. Mental status is at baseline.      Cranial Nerves: No cranial nerve deficit.      Coordination: Coordination is intact. Coordination normal.      Gait: Gait is intact. Gait normal.   Psychiatric:         Mood and Affect: Mood normal.         Behavior: Behavior normal.     Lab Results   Component Value Date    CHOLSTRLTOT 175 03/16/2023    LDL 89 03/16/2023    HDL 61 03/16/2023    TRIGLYCERIDE 127 03/16/2023      No results found for: LIPOPROTA   No results found for: APOB           Lab Results   Component Value Date    SODIUM 135 03/16/2023    POTASSIUM 5.2 03/16/2023    CHLORIDE 102 03/16/2023    CO2 19 (L) 03/16/2023    GLUCOSE 97 03/16/2023    BUN 41 (H) 03/16/2023    CREATININE 2.14 (H) 03/16/2023    BUNCREATRAT 23 08/03/2021    IFAFRICA 32 (L) 08/03/2021    IFNOTAFR 28 (L) 08/03/2021        Lab Results   Component Value Date    WBC 12.6 (H) 03/16/2023    RBC 4.74 03/16/2023    HEMOGLOBIN 14.6 03/16/2023    HEMATOCRIT 44.9 03/16/2023    MCV 94.7 03/16/2023    MCH 30.8 03/16/2023    MCHC 32.5 (L) 03/16/2023    MPV 9.6 03/16/2023         Lab Results   Component Value Date/Time    MALBCRT 508 (H) 03/16/2023 08:56 AM    MICROALBUR 30.4 03/16/2023 08:56 AM    MICRALB 66.6 08/03/2021 10:18 AM      VASCULAR IMAGING:   Results for orders placed or performed during the hospital  encounter of 20   EKG   Result Value Ref Range    Report       [facility] Emergency Dept.    Test Date:  2020  Pt Name:    AGUSTIN PORRAS                Department: ER  MRN:        [id number]                      Room:        29  Gender:     Female                       Technician: [id number]  :        [id number]                   Requested By:WILFRED LIRIANO  Order #:    [id number]                    Reading MD:    Measurements  Intervals                                Axis  Rate:       83                           P:          0  PA:         124                          QRS:        54  QRSD:       90                           T:          263  QT:         404  QTc:        475    Interpretive Statements  SINUS RHYTHM  ATRIAL PREMATURE COMPLEX  CONSIDER RIGHT ATRIAL ABNORMALITY  REPOL ABNRM SUGGESTS ISCHEMIA, LATERAL LEADS  Compared to ECG 2019 07:41:36  Possible ischemia now present       OP report  (Dr. Chirinos)   1.  Aortogram.  2.  Iliac artery angiogram.  3.  Bilateral common and external iliac artery angioplasty.  4.  Right common iliac artery stent with 7x29 mm Omnilink Elite.  5.  Left common and external iliac artery angioplasty and stent with 8x40 mm   Absolute Pro self-expanding stent crossing the iliac bifurcation.  6.  Right common and external iliac artery stent with 8x30 mm Absolute Pro   self-expanding stent postdilated with a 7 mm balloon.  7.  Left common femoral artery exploration and repair with endarterectomy and   Dacron patch angioplasty.    Carotid 2017   1.  There is an extensive amount of atherosclerotic plaque.  Plaque is located in carotid bulbs and proximal internal carotid arteries.  Plaque characterization:  Heterogeneous with calcifications   2.  There is no evidence of carotid occlusion.   3. Vertebral arteries demonstrate antegrade flow.   4. Diameter reduction in the internal carotid arteries: 50-69% in the right proximal internal carotid artery.  Diameter reduction in the left ICA is between 0% 50%. There is right-sided hemodynamically significant stenosis.    BLE venous 2019   1. Normal, but limited bilateral superficial and deep venous examination of    the lower extremities.     Echo 2020  No prior study is available for comparison.   Normal left ventricular size, wall thickness, and systolic function.  Left ventricular ejection fraction is visually estimated to be 70%.  Grade I diastolic dysfunction.  Normal right ventricular size and systolic function.  Mild mitral regurgitation.  Mild tricuspid regurgitation.  Right ventricular systolic pressure is estimated to be 45 mmHg.             Medical Decision Making:  Today's Assessment / Status / Plan:     1. PAD (peripheral artery disease) ([facility])  rosuvastatin (CRESTOR) 10 MG Tab    aspirin 81 MG EC tablet    US-EXTREMITY ARTERY LOWER BILAT W/ROBERTA (COMBO)    US-AORTA/ILIACS DUPLEX COMPLETE      2. S/P insertion of iliac artery stent  US-AORTA/ILIACS DUPLEX COMPLETE    bilater, 2015      3. Primary hypertension  Comp Metabolic Panel    Lipid Profile    CBC WITHOUT DIFFERENTIAL      4. CKD stage G4/A3, GFR 15-29 and albumin creatinine ratio >300 mg/g (Formerly [facility])        5. Coronary artery disease involving native coronary artery without angina pectoris, unspecified whether native or transplanted heart        6. Cigarette nicotine dependence with other nicotine-induced disorder        7. Carotid atherosclerosis, bilateral  US-CAROTID DOPPLER BILAT      8. Carotid stenosis, asymptomatic, right        9. Other hyperlipidemia  Comp Metabolic Panel    Lipid Profile    Lipoprotein (a)    CBC WITHOUT DIFFERENTIAL          Patient Type: Secondary Prevention, polyvascular, very high risk     Etiology of Established CVD if Present:     1) Lower extremity PAD:  inflow/outflow - no current sx   2015 -s/p bi-iliac aa stenting, L CFA endarterectomy   [place] classification: 0-1  Change in Pain-free walking distance: n/a   Change  in Maximum walking distance: n/   Exam shows no CLI s/s at this time  No signs of neuropathy  - continue TLC measures and continued complete cessation of tobacco products   -  consider structured exercise therapy for 12 weeks as outlined in care instructions, monitor pain-free and total walking distance to monitor progress  - continue antiplatelet therapy, ARB, statin   - consider ASA + xarelto protocol as per COMPASS/VOYAGER studies if qualifies   - consider cilostazol 50 or 100mg BID (no signs of CHF) - reviewed common ADRs   - check ROBERTA/art duplex, ao/iliac duplex - will consider annual surveillance     BLOOD PRESSURE MANAGEMENT:  ACC/AHA (2017) goal <130/80  Home BP at goal:  yes  Office BP at goal:  no, presumed white coat effect   24h ABPM: not ordered to date  Plan:   Monitoring:   - start/continue home BP monitoring, reviewed correct technique, provide BP log and instructions  - order 24h ABPM:  UNDECIDED  - monitor lytes/gfr routinely   - contact office if BP consistently >140/>90 for discussion of tx adjustments   - consider SEDRICK duplex in future   Medications:  - continue losartan 100mg daily   - continue amlodipine 10mg daily   - consider doxazosin as next agent   - would consider stopping spironolactone due to gfr <30    LIPID MANAGEMENT:   Qualifies for Statin Therapy Based on 2018 ACC/AHA Guidelines: yes, Secondary Prevention - Very high risk ASCVD - 2 major events or 1 event with other high-risk conditions  The ASCVD Risk score (Sabina DK, et al., 2019) failed to calculate.  Major ASCVD events: Symptomatic PAD (hx of claudication with ROBERTA <0.85, previous revascularization/amputation), Documented clinical evidence of ASCVD:, and Carotid plaque, >50% stenosis  High-risk condition: >64yr old , Hypertension , CKD (egfr 15-59), and Current smoking   Risk-enhancers: N/A  Currently on Statin: Yes  Tx goals: LDL-C <55 preferred   At goal? no  Plan:   - reinforced ongoing TLC measures as noted   - monitor labs    Meds:   - start rosuva 10mg daily     ANTITHROMBOTIC/ANTIPLATELET THERAPY: continue ASA 81mg   Consider DPI with ASA + xarelto 2.5mg BID due to PAD, CAD, low bleed risk     GLYCEMIC STATUS: Normal    LIFESTYLE INTERVENTIONS:    SMOKING: Stages of Change: Contemplative (intention to change in next 6 months )    reports that she has been smoking cigarettes. She started smoking about 48 years ago. She has a 40.00 pack-year smoking history. She has never used smokeless tobacco.   Provided strong recommendation for complete cessation and informed this is the primary contributor to the majority of all ASCVD and cancer-related conditions and can result in significant morbidity and early mortality.   - reviewed resources for cessation including tobacco cessation clinic visit, pharmacotx meds, quit lines  - plan quit date 5/5  - will patch 21mg patch with NRT gum   - review at every visit   Provided 11 minutes of face-to-face counseling on above topics     PHYSICAL ACTIVITY: continue healthy activity to improve CV fitness.  In general, targeting >150min/week of moderate-level activity or as much as tolerated in light of functional status and co-morbidities     WEIGHT MANAGEMENT AND NUTRITION: Mediterranean style dietary approach       OTHER:    # CKD G4/A3, stable  - avoid nephrotoxins  - continue HTN control with RAAS blockade   - monitor lytes/gfr routinely  -continue nephrology if worsening over time     Instructed to follow-up with PCP for remainder of adult medical needs: yes  We will partner with other providers in the management of established vascular disease and cardiometabolic risk factors.    Studies to Be Obtained: Ao/iliac, ROBERTA/art duplex, carotid   Labs to Be Obtained: as noted above     Follow up in: July    Ramo Freire M.D.  [facility]   [facility]   [phone]

## 2023-05-01 NOTE — ASSESSMENT & PLAN NOTE
"    [facility]  PROGRESS NOTE    Patient Name: Kodi Tolliver  : 1961  MRN: [id number]    Date of Admission: 2023  Primary Care Physician: Provider, No Known    Subjective   Subjective     CC:  Follow-up lung cancer  Subjective:  Patient says he is \"alright.\"  Otherwise he is somewhat difficult to understand.    Review of Systems  No current fevers or chills  No current nausea, vomiting, or diarrhea  No current chest pain or palpitations      Objective   Objective     Vital Signs:   Temp:  [97.3 °F (36.3 °C)-97.8 °F (36.6 °C)] 97.8 °F (36.6 °C)  Heart Rate:  [65-86] 65  Resp:  [18-20] 20  BP: (131-152)/(93-95) 131/95        Physical Exam:  Constitutional:Awake, alert, chronically ill-appearing but nontoxic HENT: NCAT, mucous membranes moist, neck supple  Respiratory: No cough, occasional coarse sounds, no wheezes, nonlabored breathing   Cardiovascular: Pulse rate is normal, palpable radial pulses  Gastrointestinal: Positive bowel sounds, soft, nontender, nondistended  Musculoskeletal: Somewhat chronically to be in appearance, BMI is 22, no lower extremity edema   psychiatric: Appropriate affect, cooperative, conversational  Neurologic: No slurred speech or facial droop, follows commands  Skin: No rashes or jaundice, warm      Results Reviewed:  Results from last 7 days   Lab Units 23  1025   WBC 10*3/mm3 9.32   HEMOGLOBIN g/dL 8.4*   HEMATOCRIT % 27.8*   PLATELETS 10*3/mm3 302     Results from last 7 days   Lab Units 23  1025 23  0536 23  0529   SODIUM mmol/L 142 138 148*   POTASSIUM mmol/L 4.3 5.6* 5.1   CHLORIDE mmol/L 100 101 107   CO2 mmol/L 37.0* 28.0 29.9*   BUN mg/dL 12 15 24*   CREATININE mg/dL 0.92 1.05 1.17   GLUCOSE mg/dL 103* 103* 71   CALCIUM mg/dL 8.9 8.9 9.3   ALT (SGPT) U/L 15 20 15   AST (SGOT) U/L 24 41* 60*     Estimated Creatinine Clearance: 83 mL/min (by C-G formula based on SCr of 0.92 mg/dL).    Microbiology Results Abnormal     Procedure " Noted in hospital, taken off hctz and put on sodium chloride tablets. Prescription is out,  isn't sure whether or not they need to continue. Will check BMP. Likely that was a temporary issue in hospital and not a long term medication need   Component Value - Date/Time    BAL Culture, Quantitative - Lavage, Lung, Lingula [[id number]] Collected: 04/27/23 1546    Lab Status: Final result Specimen: Lavage from Lung, Lingula Updated: 04/29/23 1059     BAL Culture 25,000 CFU/mL Normal respiratory miguelina. No S. aureus or Pseudomonas aeruginosa detected. Final report.     Gram Stain Rare (1+) WBCs per low power field      No epithelial cells seen      No organisms seen    Blood Culture - Blood, Arm, Left [[id number]]  (Normal) Collected: 04/23/23 1931    Lab Status: Final result Specimen: Blood from Arm, Left Updated: 04/28/23 2045     Blood Culture No growth at 5 days    Blood Culture - Blood, Arm, Left [[id number]]  (Normal) Collected: 04/23/23 1821    Lab Status: Final result Specimen: Blood from Arm, Left Updated: 04/28/23 1831     Blood Culture No growth at 5 days    AFB Culture - Lavage, Lung, Lingula [[id number]] Collected: 04/27/23 1546    Lab Status: Preliminary result Specimen: Lavage from Lung, Lingula Updated: 04/28/23 1820     AFB Stain No acid fast bacilli seen on concentrated smear    Urine Culture - Urine, Urine, Clean Catch [[id number]]  (Normal) Collected: 04/23/23 1619    Lab Status: Final result Specimen: Urine, Clean Catch Updated: 04/24/23 2008     Urine Culture No growth          Imaging Results (Last 24 Hours)     ** No results found for the last 24 hours. **              I have reviewed the medications:  Scheduled Meds:atorvastatin, 20 mg, Oral, Daily  docusate sodium, 100 mg, Oral, BID  enoxaparin, 1 mg/kg, Subcutaneous, Q12H  levETIRAcetam, 500 mg, Oral, BID  metoprolol tartrate, 25 mg, Oral, Nightly  metoprolol tartrate, 50 mg, Oral, Daily  OLANZapine, 10 mg, Oral, BID  pantoprazole, 40 mg, Oral, Q AM  senna-docusate sodium, 2 tablet, Oral, BID  Valproic Acid, 250 mg, Oral, TID      Continuous Infusions:dilTIAZem, 5-10 mg/hr, Last Rate: Stopped (04/24/23 1504)  sodium chloride, 30 mL/hr, Last Rate: 30 mL/hr (04/27/23 1445)      PRN  Meds:.•  acetaminophen **OR** [DISCONTINUED] acetaminophen **OR** [DISCONTINUED] acetaminophen  •  aluminum-magnesium hydroxide-simethicone  •  senna-docusate sodium **AND** polyethylene glycol **AND** bisacodyl **AND** bisacodyl  •  nitroglycerin  •  OLANZapine  •  ondansetron **OR** ondansetron  •  sodium chloride  •  sodium chloride    Assessment & Plan   Assessment & Plan     Active Hospital Problems    Diagnosis  POA   • **Mass of left lung [R91.8]  Yes   • Paroxysmal atrial fibrillation [I48.0]  Yes   • Acute metabolic encephalopathy [G93.41]  Yes   • MARYJANE (acute kidney injury) [N17.9]  Yes   • COPD (chronic obstructive pulmonary disease) [J44.9]  Yes   • Chronic respiratory failure with hypoxia and hypercapnia [J96.11, J96.12]  Yes   • Stage 3b chronic kidney disease [N18.32]  Yes   • Schizophrenia [F20.9]  Yes   • Essential hypertension [I10]  Yes      Resolved Hospital Problems    Diagnosis Date Resolved POA   • Acute prostatitis/cystitis without hematuria [N30.00] 04/28/2023 Yes        Brief Hospital Course to date:  Kodi Tolliver is a 62 y.o. male presents to the hospital with left lung mass    Discussion/plan:  All medical problems are new under my management today.    Pathology reviewed and shows squamous cell carcinoma of the lung.  Consult oncology and thoracic surgery to weigh in.    Titrate oxygen as needed.  Chest x-ray images from 4/27 reviewed and stable after lung biopsy.    Albuterol as needed for COPD.    Appreciate psychiatry recommendation for psychiatric illnesses.    Beta-blocker for atrial fibrillation.  Lovenox for now    Monitor renal function.        DVT Prophylaxis: Lovenox      Disposition: pending     CODE STATUS:   Code Status and Medical Interventions:   Ordered at: 04/23/23 2046     Code Status (Patient has no pulse and is not breathing):    CPR (Attempt to Resuscitate)     Medical Interventions (Patient has pulse or is breathing):    Full Support       Bolivar Bynum,  MD  05/01/23

## 2023-05-08 ENCOUNTER — HOSPITAL ENCOUNTER (OUTPATIENT)
Dept: RADIOLOGY | Facility: MEDICAL CENTER | Age: 78
End: 2023-05-08
Attending: FAMILY MEDICINE
Payer: COMMERCIAL

## 2023-05-08 DIAGNOSIS — I73.9 PAD (PERIPHERAL ARTERY DISEASE) (HCC): ICD-10-CM

## 2023-05-08 DIAGNOSIS — Z95.828 S/P INSERTION OF ILIAC ARTERY STENT: Chronic | ICD-10-CM

## 2023-05-08 PROCEDURE — 93978 VASCULAR STUDY: CPT

## 2023-05-08 PROCEDURE — 93978 VASCULAR STUDY: CPT | Mod: 26 | Performed by: INTERNAL MEDICINE

## 2023-05-09 PROBLEM — I74.5 ILIAC ARTERY OCCLUSION, LEFT (HCC): Status: ACTIVE | Noted: 2023-05-09

## 2023-05-09 PROBLEM — K55.1 SUPERIOR MESENTERIC ARTERY STENOSIS (HCC): Status: ACTIVE | Noted: 2023-05-09

## 2023-05-09 PROBLEM — I77.4 CELIAC ARTERY STENOSIS (HCC): Status: ACTIVE | Noted: 2023-05-09

## 2023-05-09 PROBLEM — I70.0 AORTIC ATHEROSCLEROSIS (HCC): Status: ACTIVE | Noted: 2023-05-09

## 2023-05-09 PROBLEM — I77.1 BILATERAL ILIAC ARTERY STENOSIS (HCC): Status: ACTIVE | Noted: 2023-05-09

## 2023-05-09 PROBLEM — I77.1 CELIAC ARTERY STENOSIS (HCC): Status: ACTIVE | Noted: 2023-05-09

## 2023-05-25 ENCOUNTER — HOSPITAL ENCOUNTER (OUTPATIENT)
Dept: RADIOLOGY | Facility: MEDICAL CENTER | Age: 78
End: 2023-05-25
Attending: FAMILY MEDICINE
Payer: COMMERCIAL

## 2023-05-25 DIAGNOSIS — I65.23 CAROTID ATHEROSCLEROSIS, BILATERAL: ICD-10-CM

## 2023-05-25 PROCEDURE — 93880 EXTRACRANIAL BILAT STUDY: CPT

## 2023-05-25 PROCEDURE — 93880 EXTRACRANIAL BILAT STUDY: CPT | Mod: 26 | Performed by: INTERNAL MEDICINE

## 2023-05-26 DIAGNOSIS — I65.21 CAROTID STENOSIS, ASYMPTOMATIC, RIGHT: ICD-10-CM

## 2023-05-26 DIAGNOSIS — I65.23 CAROTID ATHEROSCLEROSIS, BILATERAL: ICD-10-CM

## 2023-05-26 NOTE — PROGRESS NOTES
Orders placed for additional vascular imaging.  [employer] message sent to pt to update.  Scheduling numbers provided.    Deann JOLLY  [facility]

## 2023-05-29 ENCOUNTER — DOCUMENTATION (OUTPATIENT)
Dept: VASCULAR LAB | Facility: MEDICAL CENTER | Age: 78
End: 2023-05-29
Payer: COMMERCIAL

## 2023-05-30 NOTE — PROGRESS NOTES
Carotid duplex with possible severe stenosis. Will get CTA to more carefully delineate the degree of stenosis.     Michael Bloch, MD  Vascular Care

## 2023-06-09 ENCOUNTER — DOCUMENTATION (OUTPATIENT)
Dept: VASCULAR LAB | Facility: MEDICAL CENTER | Age: 78
End: 2023-06-09
Payer: COMMERCIAL

## 2023-06-09 NOTE — PROGRESS NOTES
[employer] message sent to pt to remind that they are due for their surveillance vascular imaging.  Scheduling numbers provided.    Deann JOLLY  [facility]

## 2023-06-28 ENCOUNTER — HOSPITAL ENCOUNTER (OUTPATIENT)
Dept: RADIOLOGY | Facility: MEDICAL CENTER | Age: 78
End: 2023-06-28
Attending: FAMILY MEDICINE
Payer: COMMERCIAL

## 2023-06-28 ENCOUNTER — DOCUMENTATION (OUTPATIENT)
Dept: VASCULAR LAB | Facility: MEDICAL CENTER | Age: 78
End: 2023-06-28

## 2023-06-28 DIAGNOSIS — I73.9 PAD (PERIPHERAL ARTERY DISEASE) (HCC): ICD-10-CM

## 2023-06-28 PROCEDURE — 93925 LOWER EXTREMITY STUDY: CPT

## 2023-06-28 PROCEDURE — 93922 UPR/L XTREMITY ART 2 LEVELS: CPT

## 2023-06-28 PROCEDURE — 93925 LOWER EXTREMITY STUDY: CPT | Mod: 26 | Performed by: INTERNAL MEDICINE

## 2023-06-28 PROCEDURE — 93922 UPR/L XTREMITY ART 2 LEVELS: CPT | Mod: 26 | Performed by: INTERNAL MEDICINE

## 2023-06-29 ENCOUNTER — OFFICE VISIT (OUTPATIENT)
Dept: MEDICAL GROUP | Facility: MEDICAL CENTER | Age: 78
End: 2023-06-29
Payer: COMMERCIAL

## 2023-06-29 VITALS
OXYGEN SATURATION: 92 % | BODY MASS INDEX: 23.04 KG/M2 | DIASTOLIC BLOOD PRESSURE: 60 MMHG | RESPIRATION RATE: 18 BRPM | WEIGHT: 130 LBS | HEART RATE: 74 BPM | SYSTOLIC BLOOD PRESSURE: 120 MMHG | TEMPERATURE: 97.3 F | HEIGHT: 63 IN

## 2023-06-29 DIAGNOSIS — I77.1 CELIAC ARTERY STENOSIS (HCC): ICD-10-CM

## 2023-06-29 DIAGNOSIS — M62.81 MUSCLE WEAKNESS (GENERALIZED): ICD-10-CM

## 2023-06-29 DIAGNOSIS — N18.4 CKD STAGE G4/A3, GFR 15-29 AND ALBUMIN CREATININE RATIO >300 MG/G (HCC): ICD-10-CM

## 2023-06-29 DIAGNOSIS — I10 PRIMARY HYPERTENSION: ICD-10-CM

## 2023-06-29 PROCEDURE — 99213 OFFICE O/P EST LOW 20 MIN: CPT | Performed by: FAMILY MEDICINE

## 2023-06-29 PROCEDURE — 3074F SYST BP LT 130 MM HG: CPT | Performed by: FAMILY MEDICINE

## 2023-06-29 PROCEDURE — 3078F DIAST BP <80 MM HG: CPT | Performed by: FAMILY MEDICINE

## 2023-06-29 ASSESSMENT — PATIENT HEALTH QUESTIONNAIRE - PHQ9: CLINICAL INTERPRETATION OF PHQ2 SCORE: 0

## 2023-06-29 NOTE — PROGRESS NOTES
This medical record contains text that has been entered with the assistance of computer voice recognition and dictation software.  Therefore, it may contain unintended errors in text, spelling, punctuation, or grammar        Chief Complaint   Patient presents with    Follow-Up       Sangita Bolton is a 77 y.o. female here evaluation and management of:   Ckd   Deconditioning after hip fracture   Htn   Vascular disease       Current Outpatient Medications   Medication Sig Dispense Refill    rosuvastatin (CRESTOR) 10 MG Tab Take 1 Tablet by mouth every evening. 90 Tablet 3    aspirin 81 MG EC tablet Take 1 Tablet by mouth every day.      amLODIPine (NORVASC) 10 MG Tab Take 1 Tablet by mouth every day. 90 Tablet 3    losartan (COZAAR) 100 MG Tab Take 1 Tablet by mouth every day. 90 Tablet 3    spironolactone (ALDACTONE) 25 MG Tab Take 1 Tablet by mouth every day. 90 Tablet 3    Cholecalciferol (VITAMIN D3 PO) Take 1 tablet by mouth every day.      Cyanocobalamin (VITAMIN B-12 PO) Take 1 tablet by mouth every day.       No current facility-administered medications for this visit.     Patient Active Problem List    Diagnosis Date Noted    Aortic atherosclerosis ([facility]) 05/09/2023    Right iliac artery stenosis ([facility]) 05/09/2023    Iliac artery occlusion, left ([facility]) 05/09/2023    Superior mesenteric artery stenosis ([facility]) 05/09/2023    Celiac artery stenosis ([facility]) 05/09/2023    CKD stage G4/A3, GFR 15-29 and albumin creatinine ratio >300 mg/g ([facility]) 04/24/2023    Carotid atherosclerosis, bilateral 04/24/2023    Hyponatremia 07/14/2021    PAD (peripheral artery disease) ([facility]) 07/14/2021    Anemia 11/27/2020    Fall 11/25/2020    Stage 3 chronic kidney disease (HCC) 11/25/2020    CAD (coronary artery disease) 11/25/2020    Other lack of coordination 05/02/2019    Muscle weakness (generalized) 05/02/2019    Hypo-osmolality and hyponatremia 05/02/2019    Difficulty in walking, not elsewhere classified 05/02/2019    Acidosis  05/02/2019    Closed fracture of neck of right femur (HCC) 04/27/2019    Alcohol abuse 04/27/2019    Dependence on nicotine from cigarettes 04/27/2019    Dry skin dermatitis 04/11/2018    Carotid stenosis, asymptomatic, right 12/31/2015    S/P insertion of iliac artery stent 12/31/2015    Hypertension 07/01/2013    DDD (degenerative disc disease), lumbar 01/09/2013    Osteoporosis, post-menopausal      Past Surgical History:   Procedure Laterality Date    PB PARTIAL HIP REPLACEMENT  11/28/2020    Procedure: HEMIARTHROPLASTY, HIP REVISION;  Surgeon: Mohsen Thomas M.D.;  Location: SURGERY [facility];  Service: Orthopedics    PB PARTIAL HIP REPLACEMENT Right 4/28/2019    Procedure: HEMIARTHROPLASTY, HIP;  Surgeon: Evens Sarabia M.D.;  Location: [facility];  Service: Orthopedics    RECOVERY  2/9/2015    [facility]    FEMORAL ARTERY REPAIR  2/9/2015    Performed by Yuly Chirinos M.D. at SURGERY [facility] ORS    HYSTEROSCOPY WITH VIDEO DIAGNOSTIC  11/17/2010    Performed by ALIS DEGROOT at SURGERY SAME DAY [facility] ORS    DILATION AND CURETTAGE  11/17/2010    Performed by ALIS DEGROOT at SURGERY SAME DAY [facility] ORS    ORIF, FRACTURE, HUMERUS  9/5/08    Performed by LAURA CARTER at SURGERY [facility] ORS    COLONOSCOPY  2008    recheck 4 years    OTHER ORTHOPEDIC SURGERY      rt leg fx    OTHER ORTHOPEDIC SURGERY      broken right wrist      Social History     Tobacco Use    Smoking status: Every Day     Packs/day: 1.00     Years: 40.00     Pack years: 40.00     Types: Cigarettes     Start date: 2/2/1975    Smokeless tobacco: Never   Vaping Use    Vaping Use: Never used   Substance Use Topics    Alcohol use: Not Currently     Comment: 4/week    Drug use: No     Family History   Problem Relation Age of Onset    Cancer Mother         leukemia           ROS    all review of system completed and negative except for those  "listed above     Objective:     /60 (BP Location: Left arm, Patient Position: Sitting, BP Cuff Size: Adult long)   Pulse 74   Temp 36.3 °C (97.3 °F) (Temporal)   Resp 18   Ht 1.6 m (5' 3\")   Wt 59 kg (130 lb)   SpO2 92%  Body mass index is 23.03 kg/m².  Physical Exam:      GEN: comfortable, alert and oriented, well nourished, well developed, in no apparent distress   HEENT: NCAT, eyes: pupils equal and reactive, sclera white, EOMIT, good dentition  HEART: limbs warm and well perfused, regular rate, no JVD, no lower extremity edema  LUNGS: speaking in full sentences, not in apparent respiratory distress, no audible wheezes  MSK: normal tone and bulk, no swelling of the joints, gait steady and normal           Assessment and Plan:   The following treatment plan was discussed        Problem List Items Addressed This Visit       Hypertension     At goal today            Muscle weakness (generalized)     We could consider yearly HH PT as she is home bound   I have provided her with resources for private health assistants previously            CKD stage G4/A3, GFR 15-29 and albumin creatinine ratio >300 mg/g (HCC)     Following with nephrology            Celiac artery stenosis ([facility])     Est now with vascular medicine                     Instructed to follow up if symptoms worsen or fail to improve, ER/UC precautions discussed as well    Claire Hernández MD  [facility], Family Medicine   [address] [place], [place] [address]shivani [place] [address]  Phone: [phone]               "

## 2023-06-29 NOTE — ASSESSMENT & PLAN NOTE
We could consider yearly HH PT as she is home bound   I have provided her with resources for private health assistants previously

## 2023-06-29 NOTE — PROGRESS NOTES
Aortoiliac duplex and lower extremity arterial duplex reviewed  Patient with multilevel disease -fairly diffuse  Unless patient has developed more severe claudication or rest pain/tissue loss we will continue with medical management and repeat imaging in 1 year    APN to update surveillance calendar    Michael Bloch, MD  Vascular Medicine

## 2023-07-09 DIAGNOSIS — I10 ESSENTIAL HYPERTENSION: ICD-10-CM

## 2023-07-10 ENCOUNTER — DOCUMENTATION (OUTPATIENT)
Dept: VASCULAR LAB | Facility: MEDICAL CENTER | Age: 78
End: 2023-07-10
Payer: COMMERCIAL

## 2023-07-10 ENCOUNTER — HOSPITAL ENCOUNTER (OUTPATIENT)
Dept: LAB | Facility: MEDICAL CENTER | Age: 78
End: 2023-07-10
Attending: FAMILY MEDICINE
Payer: COMMERCIAL

## 2023-07-10 DIAGNOSIS — E78.49 OTHER HYPERLIPIDEMIA: ICD-10-CM

## 2023-07-10 DIAGNOSIS — I10 PRIMARY HYPERTENSION: ICD-10-CM

## 2023-07-10 LAB
ALBUMIN SERPL BCP-MCNC: 4.5 G/DL (ref 3.2–4.9)
ALBUMIN/GLOB SERPL: 1.5 G/DL
ALP SERPL-CCNC: 98 U/L (ref 30–99)
ALT SERPL-CCNC: 9 U/L (ref 2–50)
ANION GAP SERPL CALC-SCNC: 15 MMOL/L (ref 7–16)
AST SERPL-CCNC: 11 U/L (ref 12–45)
BILIRUB SERPL-MCNC: 0.6 MG/DL (ref 0.1–1.5)
BUN SERPL-MCNC: 33 MG/DL (ref 8–22)
CALCIUM ALBUM COR SERPL-MCNC: 9.5 MG/DL (ref 8.5–10.5)
CALCIUM SERPL-MCNC: 9.9 MG/DL (ref 8.5–10.5)
CHLORIDE SERPL-SCNC: 102 MMOL/L (ref 96–112)
CHOLEST SERPL-MCNC: 109 MG/DL (ref 100–199)
CO2 SERPL-SCNC: 17 MMOL/L (ref 20–33)
CREAT SERPL-MCNC: 1.82 MG/DL (ref 0.5–1.4)
ERYTHROCYTE [DISTWIDTH] IN BLOOD BY AUTOMATED COUNT: 47.8 FL (ref 35.9–50)
FASTING STATUS PATIENT QL REPORTED: NORMAL
GFR SERPLBLD CREATININE-BSD FMLA CKD-EPI: 28 ML/MIN/1.73 M 2
GLOBULIN SER CALC-MCNC: 3.1 G/DL (ref 1.9–3.5)
GLUCOSE SERPL-MCNC: 96 MG/DL (ref 65–99)
HCT VFR BLD AUTO: 42.9 % (ref 37–47)
HDLC SERPL-MCNC: 64 MG/DL
HGB BLD-MCNC: 14.1 G/DL (ref 12–16)
LDLC SERPL CALC-MCNC: 27 MG/DL
MCH RBC QN AUTO: 30.7 PG (ref 27–33)
MCHC RBC AUTO-ENTMCNC: 32.9 G/DL (ref 32.2–35.5)
MCV RBC AUTO: 93.5 FL (ref 81.4–97.8)
PLATELET # BLD AUTO: 318 K/UL (ref 164–446)
PMV BLD AUTO: 9.7 FL (ref 9–12.9)
POTASSIUM SERPL-SCNC: 5 MMOL/L (ref 3.6–5.5)
PROT SERPL-MCNC: 7.6 G/DL (ref 6–8.2)
RBC # BLD AUTO: 4.59 M/UL (ref 4.2–5.4)
SODIUM SERPL-SCNC: 134 MMOL/L (ref 135–145)
TRIGL SERPL-MCNC: 91 MG/DL (ref 0–149)
WBC # BLD AUTO: 9.1 K/UL (ref 4.8–10.8)

## 2023-07-10 PROCEDURE — 36415 COLL VENOUS BLD VENIPUNCTURE: CPT

## 2023-07-10 PROCEDURE — 85027 COMPLETE CBC AUTOMATED: CPT

## 2023-07-10 PROCEDURE — 80053 COMPREHEN METABOLIC PANEL: CPT

## 2023-07-10 PROCEDURE — 83695 ASSAY OF LIPOPROTEIN(A): CPT

## 2023-07-10 PROCEDURE — 80061 LIPID PANEL: CPT

## 2023-07-10 NOTE — TELEPHONE ENCOUNTER
Received request via: Patient    Was the patient seen in the last year in this department? Yes    Does the patient have an active prescription (recently filled or refills available) for medication(s) requested? No    *  Future Appointments         Provider Department [place]    7/13/2023 3:00 PM VASCULAR NURSE PRACTITIONER [facility]     10/5/2023 1:20 PM (Arrive by 1:05 PM) Claire Hernández M.D. [facility]    10/17/2023 2:45 PM Fadi Najjar, M.D. [facility] [address] [place] [address]

## 2023-07-10 NOTE — PROGRESS NOTES
Spoke with patients  to remind them they need to schedule CTA. Patients  declines any further imaging for patient until they are seen in office.

## 2023-07-11 LAB — LPA SERPL-MCNC: 12 MG/DL

## 2023-07-11 RX ORDER — SPIRONOLACTONE 25 MG/1
25 TABLET ORAL
Qty: 90 TABLET | Refills: 3 | Status: SHIPPED | OUTPATIENT
Start: 2023-07-11 | End: 2023-10-16 | Stop reason: SDUPTHER

## 2023-07-13 ENCOUNTER — OFFICE VISIT (OUTPATIENT)
Dept: VASCULAR LAB | Facility: MEDICAL CENTER | Age: 78
End: 2023-07-13
Attending: FAMILY MEDICINE
Payer: COMMERCIAL

## 2023-07-13 VITALS
DIASTOLIC BLOOD PRESSURE: 70 MMHG | WEIGHT: 112 LBS | HEART RATE: 74 BPM | HEIGHT: 62 IN | SYSTOLIC BLOOD PRESSURE: 134 MMHG | BODY MASS INDEX: 20.61 KG/M2

## 2023-07-13 DIAGNOSIS — N18.4 CKD STAGE G4/A3, GFR 15-29 AND ALBUMIN CREATININE RATIO >300 MG/G (HCC): ICD-10-CM

## 2023-07-13 DIAGNOSIS — I65.23 CAROTID ATHEROSCLEROSIS, BILATERAL: ICD-10-CM

## 2023-07-13 DIAGNOSIS — Z95.828 S/P INSERTION OF ILIAC ARTERY STENT: ICD-10-CM

## 2023-07-13 DIAGNOSIS — I73.9 PAD (PERIPHERAL ARTERY DISEASE) (HCC): ICD-10-CM

## 2023-07-13 DIAGNOSIS — F17.218 CIGARETTE NICOTINE DEPENDENCE WITH OTHER NICOTINE-INDUCED DISORDER: ICD-10-CM

## 2023-07-13 PROCEDURE — 3078F DIAST BP <80 MM HG: CPT | Performed by: NURSE PRACTITIONER

## 2023-07-13 PROCEDURE — 3075F SYST BP GE 130 - 139MM HG: CPT | Performed by: NURSE PRACTITIONER

## 2023-07-13 PROCEDURE — 99214 OFFICE O/P EST MOD 30 MIN: CPT | Performed by: NURSE PRACTITIONER

## 2023-07-13 PROCEDURE — 99212 OFFICE O/P EST SF 10 MIN: CPT

## 2023-07-13 ASSESSMENT — FIBROSIS 4 INDEX: FIB4 SCORE: 0.91

## 2023-07-13 NOTE — PROGRESS NOTES
FOLLOW UP VASCULAR MEDICINE VISIT  23     Sangita Bolton is a 77 y.o. female who presents for   Chief Complaint   Patient presents with    Follow-Up     initially referred by Claire Hernández M.D. for PAD, s/p iliac art stenting   Established care at last visit; 2023     Subjective         Lower extremity PAD:   Initial hx:  Denies current limb pain with walking, no claudication  Chronic limb ischemic sx: denies rest pain, nonhealing leg wounds, cold extremities, coloration changes   Location: bilat   Age at symptom onset: 60s   Pain-free walking distance: limited due to orthopedic issues   Maximum walking distance, prior to stopping due to symptoms: unable to determine due to ortho limitis   How long until pain subsides with rest: n/a   Prior imaging studies:  no recent   Prior medications/interventions:    Meds: ASA, ARB, no statin    Interventions:  -bilat iliac art stenting, L CFA endarterectomy by dr. Yuly Chirinos    Exercise therapy: none   Tobacco hx:  reports that she has been smoking cigarettes. She started smoking about 48 years ago. She has a 40.00 pack-year smoking history. She has never used smokeless tobacco.    HTN:  No current concerns, stable, tolerating meds.   Home BP los/70s    Hyperlipidemia:  Stable, no current concerns  Current treatment: was started on rosuva at last visit; tolerating well  Baseline lipid off tx:   Latest Reference Range & Units 12 08:18   Cholesterol,Tot 100 - 199 mg/dL 281 (H)   Triglycerides 0 - 149 mg/dL 58   HDL >=40 mg/dL 146   LDL <100 mg/dL 123     Antiplatelet/anticoagulation: Yes, Details: ASA 81mg  , no hx of bleeding        CKD:  G4, seeing nephrology, no prior known renal aa stenosis       Current Outpatient Medications:     spironolactone, 25 mg, Oral, QDAY, Taking    rosuvastatin, 10 mg, Oral, Q EVENING, Taking    aspirin, 81 mg, Oral, DAILY, Taking    amLODIPine, 10 mg, Oral, DAILY, Taking    losartan, 100 mg, Oral, DAILY, Taking     Cholecalciferol (VITAMIN D3 PO), 1 Tablet, Oral, DAILY, Taking    Cyanocobalamin (VITAMIN B-12 PO), 1 Tablet, Oral, DAILY, Taking   Allergies   Allergen Reactions    Hair Formula Extra Strength [Kdc:Cranberry Extract+Sambucus Nigra+Vegetable Enzyme+Yellow Dye+...] Hives     Hair Dye    Penicillins Hives     Hands; tolerates cephalosporins (Rocephin Nov 2020)     Family History   Problem Relation Age of Onset    Cancer Mother         leukemia      Social History     Tobacco Use    Smoking status: Every Day     Packs/day: 1.00     Years: 40.00     Pack years: 40.00     Types: Cigarettes     Start date: 2/2/1975    Smokeless tobacco: Never   Vaping Use    Vaping Use: Never used   Substance Use Topics    Alcohol use: Not Currently     Comment: 4/week    Drug use: No         Objective   There were no vitals filed for this visit.     BP Readings from Last 5 Encounters:   06/29/23 120/60   04/24/23 (!) 151/63   04/11/23 112/60   03/24/23 118/66   03/17/23 111/54      There is no height or weight on file to calculate BMI.  Physical Exam  Constitutional:       General: She is not in acute distress.     Appearance: She is not diaphoretic.   Cardiovascular:      Rate and Rhythm: Normal rate and regular rhythm.      Heart sounds: Normal heart sounds. No murmur heard.  Pulmonary:      Effort: Pulmonary effort is normal. No respiratory distress.      Breath sounds: Normal breath sounds. No wheezing.   Musculoskeletal:         General: No swelling. Normal range of motion.   Skin:     General: Skin is dry.   Neurological:      Mental Status: She is alert and oriented to person, place, and time.      Motor: No weakness.      Gait: Gait normal.   Psychiatric:         Mood and Affect: Mood normal.         Behavior: Behavior normal.         Thought Content: Thought content normal.       Lab Results   Component Value Date    CHOLSTRLTOT 109 07/10/2023    LDL 27 07/10/2023    HDL 64 07/10/2023    TRIGLYCERIDE 91 07/10/2023      Lab  Results   Component Value Date/Time    LIPOPROTA 12 07/10/2023 07:29 AM      No results found for: APOB           Lab Results   Component Value Date    SODIUM 134 (L) 07/10/2023    POTASSIUM 5.0 07/10/2023    CHLORIDE 102 07/10/2023    CO2 17 (L) 07/10/2023    GLUCOSE 96 07/10/2023    BUN 33 (H) 07/10/2023    CREATININE 1.82 (H) 07/10/2023    BUNCREATRAT 23 2021    IFAFRICA 32 (L) 2021    IFNOTAFR 28 (L) 2021        Lab Results   Component Value Date    WBC 9.1 07/10/2023    RBC 4.59 07/10/2023    HEMOGLOBIN 14.1 07/10/2023    HEMATOCRIT 42.9 07/10/2023    MCV 93.5 07/10/2023    MCH 30.7 07/10/2023    MCHC 32.9 07/10/2023    MPV 9.7 07/10/2023         Lab Results   Component Value Date/Time    MALBCRT 508 (H) 2023 08:56 AM    MICROALBUR 30.4 2023 08:56 AM    MICRALB 66.6 2021 10:18 AM      VASCULAR IMAGING:   Results for orders placed or performed during the hospital encounter of 20   EKG   Result Value Ref Range    Report       [facility] Emergency Dept.    Test Date:  2020  Pt Name:    AGUSTIN PORRAS                Department: ER  MRN:        [id number]                      Room:       [facility]  Gender:     Female                       Technician: [id number]  :        [id number]                   Requested By:WILFRED LIRIANO  Order #:    [id number]                    Reading MD:    Measurements  Intervals                                Axis  Rate:       83                           P:          0  AL:         124                          QRS:        54  QRSD:       90                           T:          263  QT:         404  QTc:        475    Interpretive Statements  SINUS RHYTHM  ATRIAL PREMATURE COMPLEX  CONSIDER RIGHT ATRIAL ABNORMALITY  REPOL ABNRM SUGGESTS ISCHEMIA, LATERAL LEADS  Compared to ECG 2019 07:41:36  Possible ischemia now present       OP report  (Dr. Chirinos)   1.  Aortogram.  2.  Iliac artery angiogram.  3.  Bilateral common  and external iliac artery angioplasty.  4.  Right common iliac artery stent with 7x29 mm Omnilink Elite.  5.  Left common and external iliac artery angioplasty and stent with 8x40 mm   Absolute Pro self-expanding stent crossing the iliac bifurcation.  6.  Right common and external iliac artery stent with 8x30 mm Absolute Pro   self-expanding stent postdilated with a 7 mm balloon.  7.  Left common femoral artery exploration and repair with endarterectomy and   Dacron patch angioplasty.    Carotid 2017   1.  There is an extensive amount of atherosclerotic plaque.  Plaque is located in carotid bulbs and proximal internal carotid arteries.  Plaque characterization:  Heterogeneous with calcifications   2.  There is no evidence of carotid occlusion.   3. Vertebral arteries demonstrate antegrade flow.   4. Diameter reduction in the internal carotid arteries: 50-69% in the right proximal internal carotid artery. Diameter reduction in the left ICA is between 0% 50%. There is right-sided hemodynamically significant stenosis.    BLE venous 2019   1. Normal, but limited bilateral superficial and deep venous examination of    the lower extremities.     Echo 2020  No prior study is available for comparison.   Normal left ventricular size, wall thickness, and systolic function.  Left ventricular ejection fraction is visually estimated to be 70%.  Grade I diastolic dysfunction.  Normal right ventricular size and systolic function.  Mild mitral regurgitation.  Mild tricuspid regurgitation.  Right ventricular systolic pressure is estimated to be 45 mmHg.    5/25/23 Carotid Duplex      Vascular Laboratory   CONCLUSIONS   Severe appearing irregular plaque in the distal common carotid artery.     Velocities are consistent with greater than 50% stenosis right internal    carotid artery.      6/25/23 art/marla   Arterial Duplex Report      Vascular Laboratory   CONCLUSIONS   Right lower extremity:   There is 50-75% stenosis in the distal  common femoral artery.   There is 50-75% stenosis in the mid superficial femoral artery.   Absent flow in the right posterior tibial artery.      Left lower extremity:   There is 50-75% stenosis in the distal superficial femoral artery.   Absent flow in the left peroneal artery and proximal  anterior tibial    artery.      The Doppler waveform of the right common femoral artery is abnormally    dampened, consistent with significant arterial disease in the aorto-iliac    segment.    The right toe-brachial indices are abnormally reduced.       Left   The Doppler waveform of the left common femoral artery is abnormally    dampened, consistent with significant arterial disease in the aorto-iliac    segment.    The left ankle-brachial index is severely reduced.       There is evidence of severe arterial disease demonstrated.          Medical Decision Making:  Today's Assessment / Status / Plan:     No diagnosis found.      Patient Type: Secondary Prevention, polyvascular, very high risk     Etiology of Established CVD if Present:     1) Lower extremity PAD:  inflow/outflow - no current sx   2015 -s/p bi-iliac aa stenting, L CFA endarterectomy   Baker classification: 0-1  Change in Pain-free walking distance: n/a   Change in Maximum walking distance: n/a  Exam shows no CLI s/s at this time  No signs of neuropathy  - continue TLC measures and encouraged complete cessation of tobacco products   - encouraged structured exercise therapy for 12 weeks as outlined in care instructions, monitor pain-free and total walking distance to monitor progress  - continue antiplatelet therapy, ARB, statin   - consider ASA + xarelto protocol as per COMPASS/VOYAGER studies if qualifies   - consider cilostazol 50 or 100mg BID (no signs of CHF) - reviewed common ADRs   - recommend annual surveillance; art/marla 6/2024    2) carotid stenosis: recent duplex with possible severe stenosis. Denies any lightheaded or dizziness. No changes in  vision, no HAs.  Recommend CTA to more carefully delineate the degree of stenosis  - CTA neck, as previously ordered- gave scheduling phone number to pt today  - asa, statin as below    BLOOD PRESSURE MANAGEMENT:  ACC/AHA (2017) goal <130/80  Home BP at goal:  yes  Office BP at goal:  yes  24h ABPM: not ordered to date  Plan:   Monitoring:   - start/continue home BP monitoring, reviewed correct technique, provide BP log and instructions  - order 24h ABPM:  UNDECIDED  - monitor lytes/gfr routinely   - contact office if BP consistently >140/>90 for discussion of tx adjustments   - consider SEDRICK duplex in future   Medications:  - continue losartan 100mg daily   - continue amlodipine 10mg daily   - consider doxazosin as next agent   - consider stopping spironolactone due to gfr <30    LIPID MANAGEMENT:   Qualifies for Statin Therapy Based on 2018 ACC/AHA Guidelines: yes, Secondary Prevention - Very high risk ASCVD - 2 major events or 1 event with other high-risk conditions  The ASCVD Risk score (Sabina ROGERS, et al., 2019) failed to calculate.  Major ASCVD events: Symptomatic PAD (hx of claudication with ROBERTA <0.85, previous revascularization/amputation), Documented clinical evidence of ASCVD:, and Carotid plaque, >50% stenosis  High-risk condition: >64yr old , Hypertension , CKD (egfr 15-59), and Current smoking   Risk-enhancers: N/A  Currently on Statin: Yes, started last visit  Tx goals: LDL-C <55 preferred   At goal? yes  Plan:   - reinforced ongoing TLC measures as noted   - monitor labs   Meds:   - continue rosuva 10mg daily     ANTITHROMBOTIC/ANTIPLATELET THERAPY: continue ASA 81mg   Consider DPI with ASA + xarelto 2.5mg BID due to PAD, CAD, low bleed risk     GLYCEMIC STATUS: Normal    LIFESTYLE INTERVENTIONS:    SMOKING: Stages of Change: Contemplative (intention to change in next 6 months )    reports that she has been smoking cigarettes. She started smoking about 48 years ago. She has a 40.00 pack-year smoking  history. She has never used smokeless tobacco.   She states she has decreased from 1ppd to 1/2ppd, using Nicoret gum  Provided strong recommendation for complete cessation and informed this is the primary contributor to the majority of all ASCVD and cancer-related conditions and can result in significant morbidity and early mortality.   - reviewed resources for cessation including tobacco cessation clinic visit, pharmacotx meds, quit lines  - continue to replace cig/day with NRT with an overall goal of 0 cig in the next 3mo  - review at every visit     Provided 2 minutes of face-to-face counseling on above topics     PHYSICAL ACTIVITY: continue healthy activity to improve CV fitness.  In general, targeting >150min/week of moderate-level activity or as much as tolerated in light of functional status and co-morbidities     WEIGHT MANAGEMENT AND NUTRITION: Mediterranean style dietary approach       OTHER:    # CKD G4/A3, stable  - avoid nephrotoxins  - continue HTN control with RAAS blockade   - monitor lytes/gfr routinely  -continue close f/u with nephrology     Instructed to follow-up with PCP for remainder of adult medical needs: yes  We will partner with other providers in the management of established vascular disease and cardiometabolic risk factors.    Studies to Be Obtained: CTA neck now,  Ao/iliac, ROBERTA/art duplex 6/2024  Labs to Be Obtained: per neph (Oct)    Follow up in: 8-10wks    VADIM Maldonado.P.SIDNEY.  Vascular Medicine Clinic   [facility]   [phone]      CC:  Dr. A Lynch Dr. Najjar

## 2023-08-18 ENCOUNTER — TELEPHONE (OUTPATIENT)
Dept: VASCULAR LAB | Facility: MEDICAL CENTER | Age: 78
End: 2023-08-18
Payer: COMMERCIAL

## 2023-10-02 SDOH — ECONOMIC STABILITY: HOUSING INSECURITY: IN THE LAST 12 MONTHS, HOW MANY PLACES HAVE YOU LIVED?: 1

## 2023-10-02 SDOH — ECONOMIC STABILITY: FOOD INSECURITY: WITHIN THE PAST 12 MONTHS, YOU WORRIED THAT YOUR FOOD WOULD RUN OUT BEFORE YOU GOT MONEY TO BUY MORE.: NEVER TRUE

## 2023-10-02 SDOH — ECONOMIC STABILITY: INCOME INSECURITY: IN THE LAST 12 MONTHS, WAS THERE A TIME WHEN YOU WERE NOT ABLE TO PAY THE MORTGAGE OR RENT ON TIME?: NO

## 2023-10-02 SDOH — HEALTH STABILITY: PHYSICAL HEALTH: ON AVERAGE, HOW MANY MINUTES DO YOU ENGAGE IN EXERCISE AT THIS LEVEL?: 0 MIN

## 2023-10-02 SDOH — HEALTH STABILITY: PHYSICAL HEALTH: ON AVERAGE, HOW MANY DAYS PER WEEK DO YOU ENGAGE IN MODERATE TO STRENUOUS EXERCISE (LIKE A BRISK WALK)?: 0 DAYS

## 2023-10-02 SDOH — ECONOMIC STABILITY: FOOD INSECURITY: WITHIN THE PAST 12 MONTHS, THE FOOD YOU BOUGHT JUST DIDN'T LAST AND YOU DIDN'T HAVE MONEY TO GET MORE.: NEVER TRUE

## 2023-10-02 SDOH — HEALTH STABILITY: MENTAL HEALTH
STRESS IS WHEN SOMEONE FEELS TENSE, NERVOUS, ANXIOUS, OR CAN'T SLEEP AT NIGHT BECAUSE THEIR MIND IS TROUBLED. HOW STRESSED ARE YOU?: NOT AT ALL

## 2023-10-02 SDOH — ECONOMIC STABILITY: INCOME INSECURITY: HOW HARD IS IT FOR YOU TO PAY FOR THE VERY BASICS LIKE FOOD, HOUSING, MEDICAL CARE, AND HEATING?: NOT HARD AT ALL

## 2023-10-02 ASSESSMENT — LIFESTYLE VARIABLES
HOW OFTEN DO YOU HAVE A DRINK CONTAINING ALCOHOL: MONTHLY OR LESS
SKIP TO QUESTIONS 9-10: 1
AUDIT-C TOTAL SCORE: 1
HOW MANY STANDARD DRINKS CONTAINING ALCOHOL DO YOU HAVE ON A TYPICAL DAY: PATIENT DOES NOT DRINK
HOW OFTEN DO YOU HAVE SIX OR MORE DRINKS ON ONE OCCASION: NEVER

## 2023-10-02 ASSESSMENT — SOCIAL DETERMINANTS OF HEALTH (SDOH)
HOW OFTEN DO YOU HAVE SIX OR MORE DRINKS ON ONE OCCASION: NEVER
DO YOU BELONG TO ANY CLUBS OR ORGANIZATIONS SUCH AS CHURCH GROUPS UNIONS, FRATERNAL OR ATHLETIC GROUPS, OR SCHOOL GROUPS?: NO
HOW OFTEN DO YOU GET TOGETHER WITH FRIENDS OR RELATIVES?: MORE THAN THREE TIMES A WEEK
HOW OFTEN DO YOU ATTEND CHURCH OR RELIGIOUS SERVICES?: NEVER
IN A TYPICAL WEEK, HOW MANY TIMES DO YOU TALK ON THE PHONE WITH FAMILY, FRIENDS, OR NEIGHBORS?: MORE THAN THREE TIMES A WEEK
DO YOU BELONG TO ANY CLUBS OR ORGANIZATIONS SUCH AS CHURCH GROUPS UNIONS, FRATERNAL OR ATHLETIC GROUPS, OR SCHOOL GROUPS?: NO
WITHIN THE PAST 12 MONTHS, YOU WORRIED THAT YOUR FOOD WOULD RUN OUT BEFORE YOU GOT THE MONEY TO BUY MORE: NEVER TRUE
HOW OFTEN DO YOU HAVE A DRINK CONTAINING ALCOHOL: MONTHLY OR LESS
HOW OFTEN DO YOU ATTENT MEETINGS OF THE CLUB OR ORGANIZATION YOU BELONG TO?: NEVER
HOW OFTEN DO YOU ATTENT MEETINGS OF THE CLUB OR ORGANIZATION YOU BELONG TO?: NEVER
HOW MANY DRINKS CONTAINING ALCOHOL DO YOU HAVE ON A TYPICAL DAY WHEN YOU ARE DRINKING: PATIENT DOES NOT DRINK
IN A TYPICAL WEEK, HOW MANY TIMES DO YOU TALK ON THE PHONE WITH FAMILY, FRIENDS, OR NEIGHBORS?: MORE THAN THREE TIMES A WEEK
HOW OFTEN DO YOU GET TOGETHER WITH FRIENDS OR RELATIVES?: MORE THAN THREE TIMES A WEEK
HOW HARD IS IT FOR YOU TO PAY FOR THE VERY BASICS LIKE FOOD, HOUSING, MEDICAL CARE, AND HEATING?: NOT HARD AT ALL
HOW OFTEN DO YOU ATTEND CHURCH OR RELIGIOUS SERVICES?: NEVER

## 2023-10-03 DIAGNOSIS — I10 ESSENTIAL HYPERTENSION: ICD-10-CM

## 2023-10-03 RX ORDER — AMLODIPINE BESYLATE 10 MG/1
10 TABLET ORAL DAILY
Qty: 90 TABLET | Refills: 3 | Status: SHIPPED | OUTPATIENT
Start: 2023-10-03 | End: 2024-01-07 | Stop reason: SDUPTHER

## 2023-10-05 ENCOUNTER — OFFICE VISIT (OUTPATIENT)
Dept: MEDICAL GROUP | Facility: MEDICAL CENTER | Age: 78
End: 2023-10-05
Payer: COMMERCIAL

## 2023-10-05 VITALS
BODY MASS INDEX: 21.35 KG/M2 | WEIGHT: 116 LBS | HEART RATE: 72 BPM | HEIGHT: 62 IN | TEMPERATURE: 98.5 F | SYSTOLIC BLOOD PRESSURE: 122 MMHG | DIASTOLIC BLOOD PRESSURE: 60 MMHG | OXYGEN SATURATION: 93 % | RESPIRATION RATE: 18 BRPM

## 2023-10-05 DIAGNOSIS — I25.10 CORONARY ARTERY DISEASE INVOLVING NATIVE CORONARY ARTERY WITHOUT ANGINA PECTORIS, UNSPECIFIED WHETHER NATIVE OR TRANSPLANTED HEART: ICD-10-CM

## 2023-10-05 DIAGNOSIS — I65.23 CAROTID ATHEROSCLEROSIS, BILATERAL: ICD-10-CM

## 2023-10-05 DIAGNOSIS — F17.200 CURRENT EVERY DAY SMOKER: ICD-10-CM

## 2023-10-05 DIAGNOSIS — Z23 NEED FOR VACCINATION: ICD-10-CM

## 2023-10-05 DIAGNOSIS — R05.9 COUGH, UNSPECIFIED TYPE: ICD-10-CM

## 2023-10-05 DIAGNOSIS — J84.9 INTERSTITIAL PULMONARY DISEASE (HCC): ICD-10-CM

## 2023-10-05 DIAGNOSIS — N18.30 STAGE 3 CHRONIC KIDNEY DISEASE, UNSPECIFIED WHETHER STAGE 3A OR 3B CKD: ICD-10-CM

## 2023-10-05 DIAGNOSIS — Z00.00 MEDICARE ANNUAL WELLNESS VISIT, SUBSEQUENT: ICD-10-CM

## 2023-10-05 PROCEDURE — 90471 IMMUNIZATION ADMIN: CPT | Performed by: FAMILY MEDICINE

## 2023-10-05 PROCEDURE — 3078F DIAST BP <80 MM HG: CPT | Performed by: FAMILY MEDICINE

## 2023-10-05 PROCEDURE — 90662 IIV NO PRSV INCREASED AG IM: CPT | Performed by: FAMILY MEDICINE

## 2023-10-05 PROCEDURE — 3074F SYST BP LT 130 MM HG: CPT | Performed by: FAMILY MEDICINE

## 2023-10-05 PROCEDURE — 99213 OFFICE O/P EST LOW 20 MIN: CPT | Mod: 25 | Performed by: FAMILY MEDICINE

## 2023-10-05 ASSESSMENT — PATIENT HEALTH QUESTIONNAIRE - PHQ9: CLINICAL INTERPRETATION OF PHQ2 SCORE: 0

## 2023-10-05 ASSESSMENT — ENCOUNTER SYMPTOMS: GENERAL WELL-BEING: GOOD

## 2023-10-05 ASSESSMENT — ACTIVITIES OF DAILY LIVING (ADL): BATHING_REQUIRES_ASSISTANCE: 0

## 2023-10-05 ASSESSMENT — FIBROSIS 4 INDEX: FIB4 SCORE: 0.92

## 2023-10-05 NOTE — ASSESSMENT & PLAN NOTE
Chronic cough   Nearly 50 pack year smoking history   Aged out of lung cancer screening so I suggest CT scan and est with pulm       30+ min spent

## 2023-10-05 NOTE — PROGRESS NOTES
Chief Complaint   Patient presents with    Annual Exam       HPI:  Sangita Bolton is a 78 y.o. here for Medicare Annual Wellness Visit     Patient Active Problem List    Diagnosis Date Noted    Medicare annual wellness visit, subsequent 10/05/2023    Cough 10/05/2023    Aortic atherosclerosis (HCC) 05/09/2023    Right iliac artery stenosis (HCC) 05/09/2023    Iliac artery occlusion, left (HCC) 05/09/2023    Superior mesenteric artery stenosis (HCC) 05/09/2023    Celiac artery stenosis (HCC) 05/09/2023    CKD stage G4/A3, GFR 15-29 and albumin creatinine ratio >300 mg/g (Formerly [facility]) 04/24/2023    Carotid atherosclerosis, bilateral 04/24/2023    Hyponatremia 07/14/2021    PAD (peripheral artery disease) (Formerly [facility]) 07/14/2021    Anemia 11/27/2020    Fall 11/25/2020    Stage 3 chronic kidney disease ([facility]) 11/25/2020    CAD (coronary artery disease) 11/25/2020    Other lack of coordination 05/02/2019    Muscle weakness (generalized) 05/02/2019    Hypo-osmolality and hyponatremia 05/02/2019    Difficulty in walking, not elsewhere classified 05/02/2019    Acidosis 05/02/2019    Closed fracture of neck of right femur ([facility]) 04/27/2019    Alcohol abuse 04/27/2019    Dependence on nicotine from cigarettes 04/27/2019    Dry skin dermatitis 04/11/2018    Carotid stenosis, asymptomatic, right 12/31/2015    S/P insertion of iliac artery stent 12/31/2015    Hypertension 07/01/2013    DDD (degenerative disc disease), lumbar 01/09/2013    Osteoporosis, post-menopausal        Current Outpatient Medications   Medication Sig Dispense Refill    amLODIPine (NORVASC) 10 MG Tab Take 1 Tablet by mouth every day. 90 Tablet 3    spironolactone (ALDACTONE) 25 MG Tab Take 1 Tablet by mouth every day. 90 Tablet 3    rosuvastatin (CRESTOR) 10 MG Tab Take 1 Tablet by mouth every evening. 90 Tablet 3    aspirin 81 MG EC tablet Take 1 Tablet by mouth every day.      losartan (COZAAR) 100 MG Tab Take 1 Tablet by mouth every day. 90 Tablet 3    Cholecalciferol  (VITAMIN D3 PO) Take 1 tablet by mouth every day.      Cyanocobalamin (VITAMIN B-12 PO) Take 1 tablet by mouth every day.       No current facility-administered medications for this visit.          Current supplements as per medication list.     Allergies: Hair formula extra strength [kdc:cranberry extract+sambucus nigra+vegetable enzyme+yellow dye+...] and Penicillins    Current social contact/activities: Pt. Gets together with family and friends.      She  reports that she has been smoking cigarettes. She started smoking about 48 years ago. She has a 48.7 pack-year smoking history. She has never used smokeless tobacco. She reports that she does not currently use alcohol. She reports that she does not use drugs.  Ready to quit: Not Answered  Counseling given: Not Answered    ROS:    Gait: Uses a walker  Ostomy: No  Other tubes: No  Amputations: No  Chronic oxygen use: No  Last eye exam: 1 year ago.  Wears hearing aids: No   : Denies any urinary leakage during the last 6 months    Depression Screening  Little interest or pleasure in doing things?  0 - not at all  Feeling down, depressed , or hopeless? 0 - not at all  Patient Health Questionnaire Score: 0     If depressive symptoms identified deferred to follow up visit unless specifically addressed in assessment and plan.    Interpretation of PHQ-9 Total Score   Score Severity   1-4 No Depression   5-9 Mild Depression   10-14 Moderate Depression   15-19 Moderately Severe Depression   20-27 Severe Depression    Screening for Cognitive Impairment  Do you or any of your friends or family members have any concern about your memory? No  Three Minute Recall (Banana, Sunrise, Chair) 3/3    Dion clock face with all 12 numbers and set the hands to show 20 past 8.  Yes 5/5  Cognitive concerns identified deferred for follow up unless specifically addressed in assessment and plan.    Fall Risk Assessment  Has the patient had two or more falls in the last year or any fall with  injury in the last year?  No    Safety Assessment  Do you always wear your seatbelt?  Yes  Any changes to home needed to function safely? No  Difficulty hearing.  No  Patient counseled about all safety risks that were identified.    Functional Assessment ADLs  Are there any barriers preventing you from cooking for yourself or meeting nutritional needs?  No.    Are there any barriers preventing you from driving safely or obtaining transportation?  No.    Are there any barriers preventing you from using a telephone or calling for help?  No    Are there any barriers preventing you from shopping?  No.    Are there any barriers preventing you from taking care of your own finances?  No    Are there any barriers preventing you from managing your medications?  No    Are there any barriers preventing you from showering, bathing or dressing yourself? No    Are there any barriers preventing you from doing housework or laundry? No  Are there any barriers preventing you from using the toilet?No  Are you currently engaging in any exercise or physical activity?  Yes. Pt takes care of her home and walks when goes shopping.    Self-Assessment of Health  What is your perception of your health? Good  Do you sleep more than six hours a night? Yes  In the past 7 days, how much did pain keep you from doing your normal work? None  Do you spend quality time with family or friends (virtually or in person)? Yes  Do you usually eat a heart healthy diet that constists of a variety of fruits, vegetables, whole grains and fiber? Yes  Do you eat foods high in fat and/or Fast Food more than three times per week? No    Advance Care Planning  Do you have an Advance Directive, Living Will, Durable Power of , or POLST? Yes      Durable Power of    is on file      Health Maintenance Summary            Overdue - Hepatitis A Vaccine (Hep A) (1 of 2 - Risk 2-dose series) Overdue - never done      No completion history exists for this  topic.              Overdue - Zoster (Shingles) Vaccines (2 of 3) Overdue since 3/5/2012      01/09/2012  Imm Admin: Zoster Vaccine Live (ZVL) (Zostavax) - HISTORICAL DATA              Overdue - COVID-19 Vaccine (3 - Moderna series) Overdue since 4/30/2021 03/05/2021  Imm Admin: MODERNA SARS-COV-2 VACCINE (12+)    02/05/2021  Imm Admin: MODERNA SARS-COV-2 VACCINE (12+)              Overdue - IMM DTaP/Tdap/Td Vaccine (2 - Td or Tdap) Overdue since 1/17/2022 01/17/2012  Imm Admin: Tdap Vaccine              Annual Wellness Visit (Every 366 Days) Next due on 10/5/2024      10/05/2023  Prob Dx: Medicare annual wellness visit, subsequent    10/05/2023  Visit Dx: Medicare annual wellness visit, subsequent              Bone Density Scan (Every 5 Years) Next due on 8/30/2026 08/30/2021  DS-BONE DENSITY STUDY (DEXA)    02/13/2015  DS-BONE DENSITY STUDY (DEXA)    02/12/2013  DS-BONE DENSITY STUDY (DEXA)    10/07/2011  DS-BONE DENSITY STUDY (DEXA)    06/04/2010  DS-BONE DENSITY STUDY (DEXA)    Only the first 5 history entries have been loaded, but more history exists.              Pneumococcal Vaccine: 65+ Years (Series Information) Completed      12/31/2015  Imm Admin: Pneumococcal Conjugate Vaccine (Prevnar/PCV-13)    09/09/2014  Imm Admin: Pneumococcal polysaccharide vaccine (PPSV-23)              Lung Cancer Screening Shared Decision Making  Addressed      10/04/2023  Reason not specified              Influenza Vaccine (Series Information) Completed      10/05/2023  Imm Admin: Influenza Vaccine Adult HD    02/01/2021  Imm Admin: Influenza Vaccine Adult HD    10/25/2017  Imm Admin: Influenza Vaccine Adult HD    11/02/2016  Imm Admin: Influenza Vaccine Adult HD    12/31/2015  Imm Admin: Influenza Vaccine Adult HD    Only the first 5 history entries have been loaded, but more history exists.              Hepatitis B Vaccine (Hep B) (Series Information) Aged Out      No completion history exists for this topic.               HPV Vaccines (Series Information) Aged Out      No completion history exists for this topic.              Polio Vaccine (Inactivated Polio) (Series Information) Aged Out      No completion history exists for this topic.              Meningococcal Immunization (Series Information) Aged Out      No completion history exists for this topic.              Discontinued - Mammogram  Discontinued        Frequency changed to Never automatically (Topic No Longer Applies)    10/26/2022  MA-SCREENING MAMMO BILAT W/TOMOSYNTHESIS W/CAD    08/30/2021  MA-SCREENING MAMMO BILAT W/TOMOSYNTHESIS W/CAD    10/07/2019  MA-SCREENING MAMMO BILAT W/TOMOSYNTHESIS W/CAD    06/07/2017  MA-SCREEN MAMMO W/CAD-BILAT    Only the first 5 history entries have been loaded, but more history exists.              Discontinued - Colorectal Cancer Screening  Discontinued        Frequency changed to Never automatically (Topic No Longer Applies)    02/02/2017  REFERRAL TO GI FOR COLONOSCOPY                    Patient Care Team:  Claire Hernández M.D. as PCP - General (Family Medicine)  Isis Reese M.D. (Sports Medicine)        Social History     Tobacco Use    Smoking status: Every Day     Current packs/day: 1.00     Average packs/day: 1 pack/day for 48.7 years (48.7 ttl pk-yrs)     Types: Cigarettes     Start date: 2/2/1975    Smokeless tobacco: Never   Vaping Use    Vaping Use: Never used   Substance Use Topics    Alcohol use: Not Currently     Comment: 4/week    Drug use: No     Family History   Problem Relation Age of Onset    Cancer Mother         leukemia     She  has a past medical history of Alcohol abuse (4/27/2019), Arthritis, Carotid artery stenosis (12/31/2015), Fall (11/25/2020), Gluten intolerance, Hypertension, OSTEOPOROSIS, Other specified symptom associated with female genital organs, Pain (02/06/15), Stage 3 chronic kidney disease (HCC) (11/25/2020), and Unspecified cataract.    She has no past medical history of  "Arrhythmia, Asthma, Back pain, Breast cancer (HCC), Bronchitis, Chronic airway obstruction, not elsewhere classified, Cold, Congestive heart failure (HCC), Dental disorder, Encounter for renal dialysis, Glaucoma, Heart valve disease, Indigestion, Infectious disease, Jaundice, Myocardial infarct (HCC), Other and unspecified angina pectoris, Pacemaker, Personal history of venous thrombosis and embolism, Pneumonia, Rheumatic fever, Seizure (HCC), Type II or unspecified type diabetes mellitus without mention of complication, not stated as uncontrolled, Unspecified disorder of thyroid, Unspecified hemorrhagic conditions, or Unspecified urinary incontinence.   Past Surgical History:   Procedure Laterality Date    PB PARTIAL HIP REPLACEMENT  11/28/2020    Procedure: HEMIARTHROPLASTY, HIP REVISION;  Surgeon: Mohsen Thomas M.D.;  Location: [facility];  Service: Orthopedics    PB PARTIAL HIP REPLACEMENT Right 4/28/2019    Procedure: HEMIARTHROPLASTY, HIP;  Surgeon: Evens Sarabia M.D.;  Location: [facility];  Service: Orthopedics    RECOVERY  2/9/2015    Performed by -Recovery Surgery at SURGERY SAME DAY [facility]    FEMORAL ARTERY REPAIR  2/9/2015    Performed by Yuly Chirinos M.D. at SURGERY [facility]S    HYSTEROSCOPY WITH VIDEO DIAGNOSTIC  11/17/2010    Performed by ALSI DEGROOT at SURGERY SAME DAY [facility]    DILATION AND CURETTAGE  11/17/2010    Performed by ALIS DEGROOT at SURGERY SAME DAY [facility] ORS    ORIF, FRACTURE, HUMERUS  9/5/08    Performed by LAURA CARTER at SURGERY [facility]    COLONOSCOPY  2008    recheck 4 years    OTHER ORTHOPEDIC SURGERY      rt leg fx    OTHER ORTHOPEDIC SURGERY      broken right wrist       Exam:   /60 (BP Location: Left arm, Patient Position: Sitting, BP Cuff Size: Adult long)   Pulse 72   Temp 36.9 °C (98.5 °F) (Temporal)   Resp 18   Ht 1.575 m (5' 2\")   Wt 52.6 kg (116 lb)   SpO2 93%  Body mass index " is 21.22 kg/m².    Hearing excellent.    Dentition good  Alert, oriented in no acute distress.  Eye contact is good, speech goal directed, affect calm    Assessment and Plan. The following treatment and monitoring plan is recommended:    1. Need for vaccination  - INFLUENZA VACCINE, HIGH DOSE (65+ ONLY)    2. Current every day smoker  - CT-CHEST, HIGH RESOLUTION LUNG; Future  - Referral to Pulmonary and Sleep Medicine    3. Interstitial pulmonary disease (HCC)  - CT-CHEST, HIGH RESOLUTION LUNG; Future    4. Cough, unspecified type  - CT-CHEST, HIGH RESOLUTION LUNG; Future  - Referral to Pulmonary and Sleep Medicine    5. Stage 3 chronic kidney disease, unspecified whether stage 3a or 3b CKD (HCC)  - Referral to Nephrology    6. Coronary artery disease involving native coronary artery without angina pectoris, unspecified whether native or transplanted heart  - REFERRAL TO CARDIOLOGY    7. Carotid atherosclerosis, bilateral  - Referral to Vascular Medicine    8. Medicare annual wellness visit, subsequent    Problem List Items Addressed This Visit       Stage 3 chronic kidney disease (HCC)    Relevant Orders    Referral to Nephrology    CAD (coronary artery disease)    Relevant Orders    REFERRAL TO CARDIOLOGY    Carotid atherosclerosis, bilateral    Relevant Orders    Referral to Vascular Medicine    Medicare annual wellness visit, subsequent     Age appropriate prev health care discussed   Cancer screening discussed   Vaccines discussed   Labs offered   Blood pressure at goal     Anticipatory guidance including SPF and other skin protective measures, dental hygiene and care, regular exercise, diet, stress and family planning advice discussed.             Cough     Chronic cough   Nearly 50 pack year smoking history   Aged out of lung cancer screening so I suggest CT scan and est with pulm       30+ min spent          Relevant Orders    CT-CHEST, HIGH RESOLUTION LUNG    Referral to Pulmonary and Sleep Medicine      Other Visit Diagnoses       Need for vaccination        Relevant Orders    INFLUENZA VACCINE, HIGH DOSE (65+ ONLY) (Completed)    Current every day smoker        Relevant Orders    CT-CHEST, HIGH RESOLUTION LUNG    Referral to Pulmonary and Sleep Medicine    Interstitial pulmonary disease (HCC)        Relevant Orders    CT-CHEST, HIGH RESOLUTION LUNG              Services suggested: No services needed at this time  Health Care Screening: Age-appropriate preventive services recommended by USPTF and ACIP covered by Medicare were discussed today. Services ordered if indicated and agreed upon by the patient.  Referrals offered: Community-based lifestyle interventions to reduce health risks and promote self-management and wellness, fall prevention, nutrition, physical activity, tobacco-use cessation, weight loss, and mental health services as per orders if indicated.    Discussion today about general wellness and lifestyle habits:    Prevent falls and reduce trip hazards; Cautioned about securing or removing rugs.  Have a working fire alarm and carbon monoxide detector;   Engage in regular physical activity and social activities     Follow-up: No follow-ups on file.

## 2023-10-16 DIAGNOSIS — I10 HYPERTENSION, UNSPECIFIED TYPE: ICD-10-CM

## 2023-10-16 DIAGNOSIS — I10 ESSENTIAL HYPERTENSION: ICD-10-CM

## 2023-10-17 ENCOUNTER — OFFICE VISIT (OUTPATIENT)
Dept: NEPHROLOGY | Facility: MEDICAL CENTER | Age: 78
End: 2023-10-17
Payer: COMMERCIAL

## 2023-10-17 VITALS
RESPIRATION RATE: 16 BRPM | HEART RATE: 76 BPM | DIASTOLIC BLOOD PRESSURE: 78 MMHG | SYSTOLIC BLOOD PRESSURE: 112 MMHG | HEIGHT: 63 IN | WEIGHT: 115 LBS | OXYGEN SATURATION: 94 % | BODY MASS INDEX: 20.38 KG/M2 | TEMPERATURE: 97 F

## 2023-10-17 DIAGNOSIS — I10 ESSENTIAL HYPERTENSION: ICD-10-CM

## 2023-10-17 DIAGNOSIS — Z71.6 TOBACCO ABUSE COUNSELING: ICD-10-CM

## 2023-10-17 DIAGNOSIS — N18.4 CKD (CHRONIC KIDNEY DISEASE) STAGE 4, GFR 15-29 ML/MIN (HCC): ICD-10-CM

## 2023-10-17 DIAGNOSIS — Z72.0 TOBACCO ABUSE: ICD-10-CM

## 2023-10-17 PROCEDURE — 3074F SYST BP LT 130 MM HG: CPT | Performed by: INTERNAL MEDICINE

## 2023-10-17 PROCEDURE — 99213 OFFICE O/P EST LOW 20 MIN: CPT | Performed by: INTERNAL MEDICINE

## 2023-10-17 PROCEDURE — 3078F DIAST BP <80 MM HG: CPT | Performed by: INTERNAL MEDICINE

## 2023-10-17 RX ORDER — SPIRONOLACTONE 25 MG/1
25 TABLET ORAL
Qty: 90 TABLET | Refills: 3 | Status: SHIPPED | OUTPATIENT
Start: 2023-10-17 | End: 2024-01-07 | Stop reason: SDUPTHER

## 2023-10-17 RX ORDER — LOSARTAN POTASSIUM 100 MG/1
100 TABLET ORAL DAILY
Qty: 90 TABLET | Refills: 3 | Status: SHIPPED | OUTPATIENT
Start: 2023-10-17 | End: 2024-01-07 | Stop reason: SDUPTHER

## 2023-10-17 ASSESSMENT — ENCOUNTER SYMPTOMS
SHORTNESS OF BREATH: 0
CHILLS: 0
NAUSEA: 0
COUGH: 0
VOMITING: 0
HYPERTENSION: 1
FEVER: 0

## 2023-10-17 ASSESSMENT — FIBROSIS 4 INDEX: FIB4 SCORE: 0.92

## 2023-10-17 NOTE — PROGRESS NOTES
"Subjective     Sangita Bolton is a 78 y.o. female who presents with Hypertension and Chronic Kidney Disease            Hypertension  This is a chronic problem. The current episode started more than 1 year ago. The problem is unchanged. The problem is controlled. Pertinent negatives include no chest pain, malaise/fatigue, peripheral edema or shortness of breath. Risk factors for coronary artery disease include post-menopausal state and smoking/tobacco exposure. Past treatments include angiotensin blockers. The current treatment provides significant improvement. Hypertensive end-organ damage includes kidney disease. Identifiable causes of hypertension include chronic renal disease.   Chronic Kidney Disease  This is a chronic problem. The current episode started more than 1 year ago. The problem occurs constantly. The problem has been unchanged. Pertinent negatives include no chest pain, chills, coughing, fever, nausea, urinary symptoms or vomiting.       Review of Systems   Constitutional:  Negative for chills, fever and malaise/fatigue.   Respiratory:  Negative for cough and shortness of breath.    Cardiovascular:  Negative for chest pain and leg swelling.   Gastrointestinal:  Negative for nausea and vomiting.   Genitourinary:  Negative for dysuria, frequency and urgency.              Objective     /78 (BP Location: Right arm, Patient Position: Sitting, BP Cuff Size: Small adult)   Pulse 76   Temp 36.1 °C (97 °F) (Temporal)   Resp 16   Ht 1.6 m (5' 3\")   Wt 52.2 kg (115 lb)   SpO2 94%   BMI 20.37 kg/m²      Physical Exam  Vitals and nursing note reviewed.   Constitutional:       General: She is not in acute distress.     Appearance: Normal appearance. She is well-developed. She is not diaphoretic.   HENT:      Head: Normocephalic and atraumatic.      Right Ear: External ear normal.      Left Ear: External ear normal.      Nose: Nose normal.   Eyes:      General: No scleral icterus.        Right eye: No " discharge.         Left eye: No discharge.      Conjunctiva/sclera: Conjunctivae normal.   Cardiovascular:      Rate and Rhythm: Normal rate and regular rhythm.      Heart sounds: No murmur heard.  Pulmonary:      Effort: Pulmonary effort is normal. No respiratory distress.      Breath sounds: Normal breath sounds.   Musculoskeletal:         General: No tenderness.      Right lower leg: No edema.      Left lower leg: No edema.   Skin:     General: Skin is warm and dry.      Findings: No erythema.   Neurological:      General: No focal deficit present.      Mental Status: She is alert and oriented to person, place, and time.      Cranial Nerves: No cranial nerve deficit.   Psychiatric:         Mood and Affect: Mood normal.         Behavior: Behavior normal.       Past Medical History:   Diagnosis Date    Alcohol abuse 4/27/2019    Arthritis     generalized-Osteo    Carotid artery stenosis 12/31/2015    Fall 11/25/2020    Gluten intolerance     Hypertension     OSTEOPOROSIS     Other specified symptom associated with female genital organs     post menaupausal bleeding    Pain 02/06/15    bilateral legs-chronic>4/10    Stage 3 chronic kidney disease (HCC) 11/25/2020    Unspecified cataract      Social History     Socioeconomic History    Marital status:      Spouse name: Not on file    Number of children: Not on file    Years of education: Not on file    Highest education level: 12th grade   Occupational History    Not on file   Tobacco Use    Smoking status: Every Day     Current packs/day: 1.00     Average packs/day: 1 pack/day for 48.7 years (48.7 ttl pk-yrs)     Types: Cigarettes     Start date: 2/2/1975    Smokeless tobacco: Never   Vaping Use    Vaping Use: Never used   Substance and Sexual Activity    Alcohol use: Not Currently     Comment: 4/week    Drug use: No    Sexual activity: Not on file     Comment: ,    Other Topics Concern    Not on file   Social History Narrative    Not on file      Social Determinants of Health     Financial Resource Strain: Low Risk  (10/2/2023)    Overall Financial Resource Strain (CARDIA)     Difficulty of Paying Living Expenses: Not hard at all   Food Insecurity: No Food Insecurity (10/2/2023)    Hunger Vital Sign     Worried About Running Out of Food in the Last Year: Never true     Ran Out of Food in the Last Year: Never true   Transportation Needs: No Transportation Needs (10/2/2023)    PRAPARE - Transportation     Lack of Transportation (Medical): No     Lack of Transportation (Non-Medical): No   Physical Activity: Inactive (10/2/2023)    Exercise Vital Sign     Days of Exercise per Week: 0 days     Minutes of Exercise per Session: 0 min   Stress: No Stress Concern Present (10/2/2023)    [employer] [place] [employer] - Occupational Stress Questionnaire     Feeling of Stress : Not at all   Social Connections: Moderately Isolated (10/2/2023)    Social Connection and Isolation Panel [NHANES]     Frequency of Communication with Friends and Family: More than three times a week     Frequency of Social Gatherings with Friends and Family: More than three times a week     Attends [employer] Services: Never     Active Member of Clubs or Organizations: No     Attends Club or Organization Meetings: Never     Marital Status:    Intimate Partner Violence: Not on file   Housing Stability: Low Risk  (10/2/2023)    Housing Stability Vital Sign     Unable to Pay for Housing in the Last Year: No     Number of Places Lived in the Last Year: 1     Unstable Housing in the Last Year: No     Family History   Problem Relation Age of Onset    Cancer Mother         leukemia     Recent Labs     03/16/23  0855 03/16/23  0856 07/10/23  0729   ALBUMIN 4.64  --  4.5   HDL  --  61 64   TRIGLYCERIDE  --  127 91   SODIUM  --  135 134*   POTASSIUM  --  5.2 5.0   CHLORIDE  --  102 102   CO2  --  19* 17*   BUN  --  41* 33*   CREATININE  --  2.14* 1.82*                              Assessment & Plan        1. Essential hypertension  Controlled  Continue same medication regimen  Continue low-sodium diet      2. CKD (chronic kidney disease) stage 4, GFR 15-29 ml/min ([facility])  Stable  No uremic symptoms  Renal dose of medication  Avoid nephrotoxins  Continue same medication regimen  Patient was advised to call us if symptoms worsen      3. Tobacco abuse    4. Tobacco abuse counseling  I spent 3 minutes discussing the need for smoking/tobacco cessation. We discussed measures for quitting including replacements such as nicotine gum/nicotine patch

## 2023-10-20 ENCOUNTER — TELEPHONE (OUTPATIENT)
Dept: HEALTH INFORMATION MANAGEMENT | Facility: OTHER | Age: 78
End: 2023-10-20
Payer: COMMERCIAL

## 2023-10-25 ENCOUNTER — HOSPITAL ENCOUNTER (OUTPATIENT)
Dept: RADIOLOGY | Facility: MEDICAL CENTER | Age: 78
End: 2023-10-25
Attending: FAMILY MEDICINE
Payer: COMMERCIAL

## 2023-10-25 ENCOUNTER — HOSPITAL ENCOUNTER (OUTPATIENT)
Dept: RADIOLOGY | Facility: MEDICAL CENTER | Age: 78
End: 2023-10-25
Payer: COMMERCIAL

## 2023-10-25 ENCOUNTER — HOSPITAL ENCOUNTER (OUTPATIENT)
Dept: LAB | Facility: MEDICAL CENTER | Age: 78
End: 2023-10-25
Payer: COMMERCIAL

## 2023-10-25 DIAGNOSIS — J84.9 INTERSTITIAL PULMONARY DISEASE (HCC): ICD-10-CM

## 2023-10-25 DIAGNOSIS — I65.21 CAROTID STENOSIS, ASYMPTOMATIC, RIGHT: ICD-10-CM

## 2023-10-25 DIAGNOSIS — R05.9 COUGH, UNSPECIFIED TYPE: ICD-10-CM

## 2023-10-25 DIAGNOSIS — I65.23 CAROTID ATHEROSCLEROSIS, BILATERAL: ICD-10-CM

## 2023-10-25 DIAGNOSIS — F17.200 CURRENT EVERY DAY SMOKER: ICD-10-CM

## 2023-10-25 LAB
ANION GAP SERPL CALC-SCNC: 15 MMOL/L (ref 7–16)
BUN SERPL-MCNC: 33 MG/DL (ref 8–22)
CALCIUM SERPL-MCNC: 9.7 MG/DL (ref 8.4–10.2)
CHLORIDE SERPL-SCNC: 95 MMOL/L (ref 96–112)
CO2 SERPL-SCNC: 19 MMOL/L (ref 20–33)
CREAT SERPL-MCNC: 1.94 MG/DL (ref 0.5–1.4)
GFR SERPLBLD CREATININE-BSD FMLA CKD-EPI: 26 ML/MIN/1.73 M 2
GLUCOSE SERPL-MCNC: 95 MG/DL (ref 65–99)
POTASSIUM SERPL-SCNC: 4.9 MMOL/L (ref 3.6–5.5)
SODIUM SERPL-SCNC: 129 MMOL/L (ref 135–145)

## 2023-10-25 PROCEDURE — 36415 COLL VENOUS BLD VENIPUNCTURE: CPT

## 2023-10-25 PROCEDURE — 80048 BASIC METABOLIC PNL TOTAL CA: CPT

## 2023-10-25 PROCEDURE — 71250 CT THORAX DX C-: CPT

## 2023-10-26 ENCOUNTER — TELEPHONE (OUTPATIENT)
Dept: MEDICAL GROUP | Facility: MEDICAL CENTER | Age: 78
End: 2023-10-26
Payer: COMMERCIAL

## 2023-10-26 ENCOUNTER — TELEPHONE (OUTPATIENT)
Dept: VASCULAR LAB | Facility: MEDICAL CENTER | Age: 78
End: 2023-10-26
Payer: COMMERCIAL

## 2023-10-26 DIAGNOSIS — K86.89 PANCREATIC MASS: ICD-10-CM

## 2023-10-26 DIAGNOSIS — J43.9 PULMONARY EMPHYSEMA, UNSPECIFIED EMPHYSEMA TYPE (HCC): ICD-10-CM

## 2023-10-26 DIAGNOSIS — I65.23 CAROTID ATHEROSCLEROSIS, BILATERAL: ICD-10-CM

## 2023-10-26 DIAGNOSIS — I65.21 CAROTID STENOSIS, ASYMPTOMATIC, RIGHT: ICD-10-CM

## 2023-10-26 NOTE — TELEPHONE ENCOUNTER
Fyi only     This patient had some findings on her lung CT   1) I referred her to pulm to address the pulmonary findings   2) a possible lesion at the head of the pancreas . I talked to GI and they want us to do a MRI pancreas protocol (ordered and communicated to patient) and then she is going to follow up with primary care to review results     At her primary care follow up: I recommend reinforcing her referring her to   1) surg onc (abdirahman woodard, radha chan or tom Lma) and they will take over her care and surveillance  And consider referral to   2) yandy esteban (interventional GI )     Claire Hernández M.D.

## 2023-10-26 NOTE — TELEPHONE ENCOUNTER
Phone Number Called: [phone] (home) [phone] (work)    Call outcome:  informed pt of her CT chest results of 10/25/2023. Pt verbalized understanding and will follow up with Dr. Ugalde as recommended by Dr. Hernández

## 2023-10-31 ENCOUNTER — TELEPHONE (OUTPATIENT)
Dept: VASCULAR LAB | Facility: MEDICAL CENTER | Age: 78
End: 2023-10-31
Payer: COMMERCIAL

## 2023-10-31 NOTE — TELEPHONE ENCOUNTER
Michael J Bloch, M.D. Britney M. Chonez, R.N.  I get it, but it is from may. Repeat u/s at 6 mo seems reasonable. We will see what we will see.   Mb           Previous Messages       ----- Message -----   From: Mara Conner R.N.   Sent: 10/26/2023  11:24 AM PDT   To: Michael J Bloch, M.D.   Subject: FW: unable to do CTA Neck - GFR 26               Pt  was wondering since she just had one what more another would show. Last notes on it were:     Michael J Bloch, M.D. (Physician) · · Vascular Medicine · · Encounter Date: 5/29/2023 ·     Carotid duplex with possible severe stenosis. Will get CTA to more carefully delineate the degree of stenosis.

## 2023-11-03 ENCOUNTER — HOSPITAL ENCOUNTER (OUTPATIENT)
Dept: RADIOLOGY | Facility: MEDICAL CENTER | Age: 78
End: 2023-11-03
Attending: INTERNAL MEDICINE
Payer: COMMERCIAL

## 2023-11-03 DIAGNOSIS — I65.23 CAROTID ATHEROSCLEROSIS, BILATERAL: ICD-10-CM

## 2023-11-03 DIAGNOSIS — I65.21 CAROTID STENOSIS, ASYMPTOMATIC, RIGHT: ICD-10-CM

## 2023-11-03 PROCEDURE — 93880 EXTRACRANIAL BILAT STUDY: CPT | Mod: 26 | Performed by: INTERNAL MEDICINE

## 2023-11-03 PROCEDURE — 93880 EXTRACRANIAL BILAT STUDY: CPT

## 2023-11-05 ENCOUNTER — DOCUMENTATION (OUTPATIENT)
Dept: VASCULAR LAB | Facility: MEDICAL CENTER | Age: 78
End: 2023-11-05
Payer: COMMERCIAL

## 2023-11-05 NOTE — Clinical Note
BC - tesfaye carotid duplex as done. Put on vj for CTA neck in may (if renal function improved) - if not we will need to do another duplex KO - let patient know that there is no obvious change from previous. Pls make a follow up appt with dc 
none

## 2023-11-06 ENCOUNTER — DOCUMENTATION (OUTPATIENT)
Dept: VASCULAR LAB | Facility: MEDICAL CENTER | Age: 78
End: 2023-11-06
Payer: COMMERCIAL

## 2023-11-06 NOTE — PROGRESS NOTES
Duplex with unchanged mod-severe right carotid stenosis.   Advanced CKD has been a barrier to advanced imaging and may increase risk of intervention.   Will hope that renal function improves enough to obtain CTA in 6 mo - if not, we will repeat carotid duplex.   Patient overdue for vasc med follow up.    Michael Bloch, MD  Vascular Care

## 2023-11-06 NOTE — PROGRESS NOTES
Called patient to relay below message from Dr. Bloch:     Michael J Bloch, M.D.  Mara Conner, LUCI.; Candice Agrawal, [facility]  BC - tesfaye carotid duplex as done. Put on vj for CTA neck in may (if renal function improved) - if not we will need to do another duplex   KO - let patient know that there is no obvious change from previous. Pls make a follow up appt with dc    Spoke with patients , patients  states understanding though they do not wish to make a follow up appt with any provider in our office. They will continue follow up with PCP and Cardiology at this time. Message sent to MD. Candice Agrawal, Medical Assistant   [facility]   Ph: [phone]  Fx: [phone]

## 2023-11-07 ENCOUNTER — APPOINTMENT (OUTPATIENT)
Dept: VASCULAR LAB | Facility: MEDICAL CENTER | Age: 78
End: 2023-11-07
Payer: COMMERCIAL

## 2023-11-10 NOTE — PROGRESS NOTES
Candice Agrawal, Med Ass't  Michael J Bloch, M.D.; Mara Conner R.N.  Spoke with patients , they do not wish to see ESMER, they were very unhappy with him. Offered to schedule with another provider, they decline at this time and wish to see pcp and cardiology only at this time.

## 2023-11-16 ENCOUNTER — OFFICE VISIT (OUTPATIENT)
Dept: CARDIOLOGY | Facility: MEDICAL CENTER | Age: 78
End: 2023-11-16
Attending: FAMILY MEDICINE
Payer: COMMERCIAL

## 2023-11-16 VITALS
DIASTOLIC BLOOD PRESSURE: 58 MMHG | BODY MASS INDEX: 20.38 KG/M2 | HEART RATE: 77 BPM | OXYGEN SATURATION: 97 % | RESPIRATION RATE: 16 BRPM | SYSTOLIC BLOOD PRESSURE: 130 MMHG | WEIGHT: 115 LBS | HEIGHT: 63 IN

## 2023-11-16 DIAGNOSIS — I65.21 CAROTID STENOSIS, ASYMPTOMATIC, RIGHT: ICD-10-CM

## 2023-11-16 DIAGNOSIS — R01.1 UNDIAGNOSED CARDIAC MURMURS: ICD-10-CM

## 2023-11-16 DIAGNOSIS — E78.5 DYSLIPIDEMIA: ICD-10-CM

## 2023-11-16 DIAGNOSIS — Z72.0 TOBACCO USE: ICD-10-CM

## 2023-11-16 DIAGNOSIS — I10 PRIMARY HYPERTENSION: ICD-10-CM

## 2023-11-16 DIAGNOSIS — I73.9 PAD (PERIPHERAL ARTERY DISEASE) (HCC): ICD-10-CM

## 2023-11-16 DIAGNOSIS — I25.10 CORONARY ARTERY DISEASE INVOLVING NATIVE CORONARY ARTERY WITHOUT ANGINA PECTORIS, UNSPECIFIED WHETHER NATIVE OR TRANSPLANTED HEART: ICD-10-CM

## 2023-11-16 LAB — EKG IMPRESSION: NORMAL

## 2023-11-16 PROCEDURE — 99406 BEHAV CHNG SMOKING 3-10 MIN: CPT | Mod: 25 | Performed by: INTERNAL MEDICINE

## 2023-11-16 PROCEDURE — 3075F SYST BP GE 130 - 139MM HG: CPT | Performed by: INTERNAL MEDICINE

## 2023-11-16 PROCEDURE — 93005 ELECTROCARDIOGRAM TRACING: CPT | Performed by: INTERNAL MEDICINE

## 2023-11-16 PROCEDURE — 3078F DIAST BP <80 MM HG: CPT | Performed by: INTERNAL MEDICINE

## 2023-11-16 PROCEDURE — 99212 OFFICE O/P EST SF 10 MIN: CPT | Performed by: INTERNAL MEDICINE

## 2023-11-16 PROCEDURE — 99406 BEHAV CHNG SMOKING 3-10 MIN: CPT | Performed by: INTERNAL MEDICINE

## 2023-11-16 PROCEDURE — 93010 ELECTROCARDIOGRAM REPORT: CPT | Performed by: INTERNAL MEDICINE

## 2023-11-16 PROCEDURE — 99204 OFFICE O/P NEW MOD 45 MIN: CPT | Mod: 25 | Performed by: INTERNAL MEDICINE

## 2023-11-16 ASSESSMENT — ENCOUNTER SYMPTOMS
CARDIOVASCULAR NEGATIVE: 1
BRUISES/BLEEDS EASILY: 0
GASTROINTESTINAL NEGATIVE: 1
CONSTITUTIONAL NEGATIVE: 1
WEIGHT LOSS: 0
EYES NEGATIVE: 1
DIZZINESS: 0
CHILLS: 0
ABDOMINAL PAIN: 0
FEVER: 0
PSYCHIATRIC NEGATIVE: 1
VOMITING: 0
HEADACHES: 0
SHORTNESS OF BREATH: 0
BLURRED VISION: 0
MUSCULOSKELETAL NEGATIVE: 1
CLAUDICATION: 0
PALPITATIONS: 0
WEAKNESS: 0
COUGH: 0
RESPIRATORY NEGATIVE: 1
NAUSEA: 0
NERVOUS/ANXIOUS: 0
DOUBLE VISION: 0
DEPRESSION: 0
NEUROLOGICAL NEGATIVE: 1
MYALGIAS: 0
FOCAL WEAKNESS: 0

## 2023-11-16 ASSESSMENT — FIBROSIS 4 INDEX: FIB4 SCORE: 0.92

## 2023-11-16 NOTE — PROGRESS NOTES
Chief Complaint   Patient presents with    Hypertension    Aortic Atherosclerosis    Coronary Artery Disease       Subjective     Sangita Bolton is a 78 y.o. female who presents today as a consult from Claire Harp for coronary artery disease.    Thank you for allowing me to evaluate Mrs. Bolton, who as you know is a 78 year old female with hypertension and hyperlipidemia, peripheral vascular disease, chronic tobacco abuse, no family history of coronary artery disease. She is clinically doing well. She denies chest pain, shortness of breath, palpitations, nausea/vomiting or diaphoresis. She underwent a CT scan of her chest which demonstrated coronary atherosclerosis. She lives at home with her . She continues to smoke.     Past Medical History:   Diagnosis Date    Alcohol abuse 04/27/2019    Arthritis     generalized-Osteo    Carotid artery stenosis 12/31/2015    Fall 11/25/2020    Gluten intolerance     Hyperlipidemia     Hypertension     Medicare annual wellness visit, subsequent 10/05/2023    OSTEOPOROSIS     Other specified symptom associated with female genital organs     post menaupausal bleeding    Pain 02/06/2015    bilateral legs-chronic>4/10    Stage 3 chronic kidney disease (HCC) 11/25/2020    Unspecified cataract      Past Surgical History:   Procedure Laterality Date    PB PARTIAL HIP REPLACEMENT  11/28/2020    Procedure: HEMIARTHROPLASTY, HIP REVISION;  Surgeon: Mohsen Thomas M.D.;  Location: SURGERY [facility];  Service: Orthopedics    PB PARTIAL HIP REPLACEMENT Right 4/28/2019    Procedure: HEMIARTHROPLASTY, HIP;  Surgeon: Evens Sarabia M.D.;  Location: [facility];  Service: Orthopedics    RECOVERY  2/9/2015    Performed by -Recovery Surgery at [facility] SAME DAY [facility] ORS    FEMORAL ARTERY REPAIR  2/9/2015    Performed by Yuly Chirinos M.D. at SURGERY [facility] ORS    HYSTEROSCOPY WITH VIDEO DIAGNOSTIC  11/17/2010    Performed by ALIS DEGROOT  SURGERY SAME DAY [facility] ORS    DILATION AND CURETTAGE  11/17/2010    Performed by ALIS DEGROOT at SURGERY SAME DAY [facility]    ORIF, FRACTURE, HUMERUS  9/5/08    Performed by LAURA CARTER at SURGERY [facility] ORS    COLONOSCOPY  2008    recheck 4 years    OTHER ORTHOPEDIC SURGERY      rt leg fx    OTHER ORTHOPEDIC SURGERY      broken right wrist     Family History   Problem Relation Age of Onset    Cancer Mother         leukemia    Heart Disease Neg Hx     Heart Attack Neg Hx      Social History     Socioeconomic History    Marital status:      Spouse name: Not on file    Number of children: Not on file    Years of education: Not on file    Highest education level: 12th grade   Occupational History    Not on file   Tobacco Use    Smoking status: Every Day     Current packs/day: 1.00     Average packs/day: 1 pack/day for 48.8 years (48.8 ttl pk-yrs)     Types: Cigarettes     Start date: 2/2/1975    Smokeless tobacco: Never   Vaping Use    Vaping Use: Never used   Substance and Sexual Activity    Alcohol use: Not Currently     Comment: 4/week    Drug use: No    Sexual activity: Not on file     Comment: ,    Other Topics Concern    Not on file   Social History Narrative    Not on file     Social Determinants of Health     Financial Resource Strain: Low Risk  (10/2/2023)    Overall Financial Resource Strain (CARDIA)     Difficulty of Paying Living Expenses: Not hard at all   Food Insecurity: No Food Insecurity (10/2/2023)    Hunger Vital Sign     Worried About Running Out of Food in the Last Year: Never true     Ran Out of Food in the Last Year: Never true   Transportation Needs: No Transportation Needs (10/2/2023)    PRAPARE - Transportation     Lack of Transportation (Medical): No     Lack of Transportation (Non-Medical): No   Physical Activity: Inactive (10/2/2023)    Exercise Vital Sign     Days of Exercise per Week: 0 days     Minutes of Exercise per Session: 0 min   Stress: No  Stress Concern Present (10/2/2023)    [employer] - Occupational Stress Questionnaire     Feeling of Stress : Not at all   Social Connections: Moderately Isolated (10/2/2023)    Social Connection and Isolation Panel [NHANES]     Frequency of Communication with Friends and Family: More than three times a week     Frequency of Social Gatherings with Friends and Family: More than three times a week     Attends Anabaptism Services: Never     Active Member of Clubs or Organizations: No     Attends Club or Organization Meetings: Never     Marital Status:    Intimate Partner Violence: Not on file   Housing Stability: Low Risk  (10/2/2023)    Housing Stability Vital Sign     Unable to Pay for Housing in the Last Year: No     Number of Places Lived in the Last Year: 1     Unstable Housing in the Last Year: No     Allergies   Allergen Reactions    Hair Formula Extra Strength [Kdc:Cranberry Extract+Sambucus Nigra+Vegetable Enzyme+Yellow Dye+...] Hives     Hair Dye    Penicillins Hives     Hands; tolerates cephalosporins (Rocephin Nov 2020)     (Medications reviewed.)  Outpatient Encounter Medications as of 11/16/2023   Medication Sig Dispense Refill    spironolactone (ALDACTONE) 25 MG Tab Take 1 Tablet by mouth every day. 90 Tablet 3    losartan (COZAAR) 100 MG Tab Take 1 Tablet by mouth every day. 90 Tablet 3    amLODIPine (NORVASC) 10 MG Tab Take 1 Tablet by mouth every day. 90 Tablet 3    rosuvastatin (CRESTOR) 10 MG Tab Take 1 Tablet by mouth every evening. 90 Tablet 3    aspirin 81 MG EC tablet Take 1 Tablet by mouth every day.      Cholecalciferol (VITAMIN D3 PO) Take 1 tablet by mouth every day.      Cyanocobalamin (VITAMIN B-12 PO) Take 1 tablet by mouth every day.       No facility-administered encounter medications on file as of 11/16/2023.     Review of Systems   Constitutional: Negative.  Negative for chills, fever, malaise/fatigue and weight loss.   HENT: Negative.  Negative for  "hearing loss.    Eyes: Negative.  Negative for blurred vision and double vision.   Respiratory: Negative.  Negative for cough and shortness of breath.    Cardiovascular: Negative.  Negative for chest pain, palpitations, claudication and leg swelling.   Gastrointestinal: Negative.  Negative for abdominal pain, nausea and vomiting.   Genitourinary: Negative.  Negative for dysuria and urgency.   Musculoskeletal: Negative.  Negative for joint pain and myalgias.   Skin: Negative.  Negative for itching and rash.   Neurological: Negative.  Negative for dizziness, focal weakness, weakness and headaches.   Endo/Heme/Allergies: Negative.  Does not bruise/bleed easily.   Psychiatric/Behavioral: Negative.  Negative for depression. The patient is not nervous/anxious.               Objective     /58 (BP Location: Left arm, Patient Position: Sitting, BP Cuff Size: Adult)   Pulse 77   Resp 16   Ht 1.6 m (5' 3\")   Wt 52.2 kg (115 lb)   SpO2 97%   BMI 20.37 kg/m²     Physical Exam  Constitutional:       Appearance: Normal appearance. She is well-developed and normal weight.   HENT:      Head: Normocephalic and atraumatic.   Neck:      Vascular: No JVD.   Cardiovascular:      Rate and Rhythm: Normal rate and regular rhythm.      Heart sounds: Normal heart sounds.   Pulmonary:      Effort: Pulmonary effort is normal.      Breath sounds: Normal breath sounds.   Abdominal:      General: Bowel sounds are normal.      Palpations: Abdomen is soft.      Comments: No hepatosplenomegaly.   Musculoskeletal:         General: Normal range of motion.   Lymphadenopathy:      Cervical: No cervical adenopathy.   Skin:     General: Skin is warm and dry.   Neurological:      Mental Status: She is alert and oriented to person, place, and time.            CARDIAC STUDIES/PROCEDURES:    CAROTID ULTRASOUND (11/03/23)  Compared to the prior study on 5/25/2023, no major changes.  Right.   Moderate to severe appearing irregular plaque in the " distal common carotid artery.   Velocities consistent with greater than >50% stenosis.   (study result reviewed)     CT OF CHEST (10/25/23)  1.  Moderately advanced emphysematous changes.  2.   Tiny micronodules, couple of which were not seen on the prior study. Follow-up per Fleischner criteria as clinically indicated.  3.  Severe atherosclerosis.  4.  Partially visualized nonspecific pancreatic head low density lesion and nonspecific pancreatic ductal dilatation. Pancreatic protocol CT or MRI of the abdomen without and with contrast may BE performed to further evaluate.  5.  No evidence of interstitial pneumonitis/UIP.  (study result reviewed)     ECHOCARDIOGRAM CONCLUSIONS (11/26/20)  No prior study is available for comparison.   Normal left ventricular size, wall thickness, and systolic function.  Left ventricular ejection fraction is visually estimated to be 70%.  Grade I diastolic dysfunction.  Normal right ventricular size and systolic function.  Mild mitral regurgitation.  Mild tricuspid regurgitation.  Right ventricular systolic pressure is estimated to be 45 mmHg.  (study result reviewed)     Laboratory results of (07/10/23) were reviewed. Cholesterol profile of 109/91/64/27 mg/dL noted.     PERIPHERAL INTERVENTION by Yuly Brennan (02/09/15)   1.  Aortogram.  2.  Iliac artery angiogram.  3.  Bilateral common and external iliac artery angioplasty.  4.  Right common iliac artery stent with 7x29 mm Omnilink Elite.  5.  Left common and external iliac artery angioplasty and stent with 8x40 mm   Absolute Pro self-expanding stent crossing the iliac bifurcation.  6.  Right common and external iliac artery stent with 8x30 mm Absolute Pro   self-expanding stent postdilated with a 7 mm balloon.  7.  Left common femoral artery exploration and repair with endarterectomy and   Dacron patch angioplasty.     Assessment & Plan     1. Coronary artery disease involving native coronary artery without angina pectoris,  unspecified whether native or transplanted heart  EKG      2. Undiagnosed cardiac murmurs        3. Primary hypertension        4. Dyslipidemia        5. Carotid stenosis, asymptomatic, right        6. PAD (peripheral artery disease) (HCC)        7. Tobacco use            Medical Decision Making: Today's Assessment/Status/Plan:        Coronary artery disease: She remains clinically doing well. I will continue with current medical care including losartan, rosuvastatin. We will start beta-blockade therapy with metoprolol 25 mg QD. We will perform an echocardiogram and myocardial perfusion imaging study..  Murmur: Her examination uncovered a murmur, likely due to aortic stenosis. We will perform an echocardiogram.   Hypertension: Blood pressure is well controlled. We will continue with amlodipine, losartan.  Hyperlipidemia: She is doing well on statin therapy without myalgia symptoms. (Managed by [facility])   Carotid artery stenosis and peripheral vascular disease: (Managed by [facility])   Chronic tobacco use: Smoking cessation recommended with discussions of health effects and plans for over 3 minutes.    We will follow up in 3 months.    Thank you for this consult.

## 2023-12-04 ENCOUNTER — HOSPITAL ENCOUNTER (OUTPATIENT)
Dept: RADIOLOGY | Facility: MEDICAL CENTER | Age: 78
End: 2023-12-04
Attending: INTERNAL MEDICINE
Payer: COMMERCIAL

## 2023-12-04 DIAGNOSIS — I10 PRIMARY HYPERTENSION: ICD-10-CM

## 2023-12-04 DIAGNOSIS — R01.1 UNDIAGNOSED CARDIAC MURMURS: ICD-10-CM

## 2023-12-04 DIAGNOSIS — I25.10 CORONARY ARTERY DISEASE INVOLVING NATIVE CORONARY ARTERY WITHOUT ANGINA PECTORIS, UNSPECIFIED WHETHER NATIVE OR TRANSPLANTED HEART: ICD-10-CM

## 2023-12-04 PROCEDURE — 700111 HCHG RX REV CODE 636 W/ 250 OVERRIDE (IP): Performed by: INTERNAL MEDICINE

## 2023-12-04 PROCEDURE — 93018 CV STRESS TEST I&R ONLY: CPT | Performed by: INTERNAL MEDICINE

## 2023-12-04 PROCEDURE — 78452 HT MUSCLE IMAGE SPECT MULT: CPT

## 2023-12-04 PROCEDURE — 78452 HT MUSCLE IMAGE SPECT MULT: CPT | Mod: 26 | Performed by: INTERNAL MEDICINE

## 2023-12-04 RX ORDER — REGADENOSON 0.08 MG/ML
0.4 INJECTION, SOLUTION INTRAVENOUS ONCE
Status: COMPLETED | OUTPATIENT
Start: 2023-12-04 | End: 2023-12-04

## 2023-12-04 RX ORDER — AMINOPHYLLINE 25 MG/ML
100 INJECTION, SOLUTION INTRAVENOUS
Status: DISCONTINUED | OUTPATIENT
Start: 2023-12-04 | End: 2023-12-05 | Stop reason: HOSPADM

## 2023-12-04 RX ADMIN — REGADENOSON 0.4 MG: 0.08 INJECTION, SOLUTION INTRAVENOUS at 09:52

## 2023-12-12 ENCOUNTER — APPOINTMENT (OUTPATIENT)
Dept: RADIOLOGY | Facility: MEDICAL CENTER | Age: 78
End: 2023-12-12
Attending: FAMILY MEDICINE
Payer: COMMERCIAL

## 2023-12-18 ENCOUNTER — OFFICE VISIT (OUTPATIENT)
Dept: MEDICAL GROUP | Facility: MEDICAL CENTER | Age: 78
End: 2023-12-18
Payer: COMMERCIAL

## 2023-12-18 VITALS
DIASTOLIC BLOOD PRESSURE: 44 MMHG | SYSTOLIC BLOOD PRESSURE: 128 MMHG | OXYGEN SATURATION: 93 % | BODY MASS INDEX: 21.86 KG/M2 | WEIGHT: 123.4 LBS | TEMPERATURE: 97.3 F | HEIGHT: 63 IN | HEART RATE: 77 BPM

## 2023-12-18 DIAGNOSIS — J43.9 PULMONARY EMPHYSEMA, UNSPECIFIED EMPHYSEMA TYPE (HCC): ICD-10-CM

## 2023-12-18 DIAGNOSIS — K86.9 LESION OF PANCREAS: ICD-10-CM

## 2023-12-18 DIAGNOSIS — I70.0 AORTIC ATHEROSCLEROSIS (HCC): ICD-10-CM

## 2023-12-18 PROCEDURE — 3078F DIAST BP <80 MM HG: CPT | Performed by: STUDENT IN AN ORGANIZED HEALTH CARE EDUCATION/TRAINING PROGRAM

## 2023-12-18 PROCEDURE — 3074F SYST BP LT 130 MM HG: CPT | Performed by: STUDENT IN AN ORGANIZED HEALTH CARE EDUCATION/TRAINING PROGRAM

## 2023-12-18 PROCEDURE — 99214 OFFICE O/P EST MOD 30 MIN: CPT | Performed by: STUDENT IN AN ORGANIZED HEALTH CARE EDUCATION/TRAINING PROGRAM

## 2023-12-18 RX ORDER — ALBUTEROL SULFATE 90 UG/1
2 AEROSOL, METERED RESPIRATORY (INHALATION) EVERY 6 HOURS PRN
Qty: 1 EACH | Refills: 0 | Status: SHIPPED | OUTPATIENT
Start: 2023-12-18

## 2023-12-18 ASSESSMENT — FIBROSIS 4 INDEX: FIB4 SCORE: 0.92

## 2023-12-18 NOTE — PROGRESS NOTES
Subjective:     CC: Follow-up    HPI:   Sangita presents today to follow-up.  Patient came with her spouse.  Initially mention that she is not sure why she made this follow-up appointment.    Patient was supposed to get an MRI abdomen with and without contrast for a pancreatic lesion which was seen on CT scan.  Patient reports that it was scheduled last week but they canceled it because of their schedule.  They did not reschedule it as she was confused why she was getting the imaging done.  Extensive education and counseling provided to patient that given she had a mass on the pancreas we want to make sure what kind of lesion is this if it is benign or malignant.  Patient verbalized understanding agrees to make a new appointment for MRI.  Patient has also not scheduled any appointment with pulmonology yet.  They were confused about this referral as well.  Patient states that she was not aware that she has to go and see a lung doctor.  Again discussed CT lung imaging findings and strongly recommended to follow-up with the pulmonologist.  I have printed and given her the information for pulmonology referral as well.    Given patient has not yet finished the MRI imaging I will wait on the results to decide if we need to refer her to gastroenterology medical/surgical speciality.  I have recommended patient to follow-up after MRI is done    CT chest imaging also showed severe atherosclerosis of aorta.  Patient has history of severe atherosclerosis in multiple arteries and history of iliac artery stent.  Patient was supposed to follow-up with vascular surgery but she is not.  I did encourage them to make an appointment with the specialist .For now patient and spouse declined vascular surgery follow-up.  Patient reports that she is asymptomatic at present denies any leg pain or claudication symptoms.   She is on aspirin 81 mg daily and Crestor 10 mg daily.       Health Maintenance: Completed    ROS:  ROS    Review of systems  "unremarkable except for concerns noted by patient or items listed.    Please see HPI for additional ROS.      Objective:     Exam:  /44 (BP Location: Left arm, Patient Position: Sitting, BP Cuff Size: Adult)   Pulse 77   Temp 36.3 °C (97.3 °F) (Temporal)   Ht 1.6 m (5' 3\")   Wt 56 kg (123 lb 6.4 oz)   SpO2 93%   BMI 21.86 kg/m²  Body mass index is 21.86 kg/m².    Physical Exam  Constitutional:       Appearance: Normal appearance.   HENT:      Head: Normocephalic and atraumatic.      Nose: Nose normal.   Eyes:      Conjunctiva/sclera: Conjunctivae normal.   Cardiovascular:      Rate and Rhythm: Normal rate and regular rhythm.   Pulmonary:      Effort: Pulmonary effort is normal.   Skin:     General: Skin is warm.      Capillary Refill: Capillary refill takes less than 2 seconds.   Neurological:      Mental Status: She is alert and oriented to person, place, and time. Mental status is at baseline.   Psychiatric:         Mood and Affect: Mood normal.         Behavior: Behavior normal.           Imagine CT chest :    IMPRESSION:     1.  Moderately advanced emphysematous changes.  2.   Tiny micronodules, couple of which were not seen on the prior study. Follow-up per Fleischner criteria as clinically indicated.  3.  Severe atherosclerosis.  4.  Partially visualized nonspecific pancreatic head low density lesion and nonspecific pancreatic ductal dilatation. Pancreatic protocol CT or MRI of the abdomen without and with contrast may BE performed to further evaluate.  5.  No evidence of interstitial pneumonitis/UIP.    Labs: reviewed     Assessment & Plan:     78 y.o. female with the following -       1. Aortic atherosclerosis (HCC)  Chronic, stable  Continue aspirin 81 mg daily   Continue Crestor 10 mg daily   Follow-up with vascular surgery team    2. Lesion of pancreas  Acute finding in CT imagine, incidental finding     Partially visualized nonspecific pancreatic head low density lesion and nonspecific " pancreatic ductal dilatation. Pancreatic protocol CT or MRI of the abdomen without and with contrast may BE performed to further evaluate.     MRI abdomen was ordered - pending   Recommended patient to follow up , get MRI scheduled   Please see HPI for more details     3. Pulmonary emphysema, unspecified emphysema type (HCC)  Chronic, unclear status  Patient is a chronic smoker - possible worsening COPD   Counseling provided to stop smoking - both active and passive .  Currently denies any symptoms of SOB ,chest pain or wheezing   Recommended considering inhaler - rescue inhaler to be used as needed.  Follow up with Pulmonology as recommended by your PCP       Follow-up in a month after MRI imaging    Please note that this dictation was created using voice recognition software. I have made every reasonable attempt to correct obvious errors, but I expect that there are errors of grammar and possibly content that I did not discover before finalizing the note.

## 2024-01-07 DIAGNOSIS — I10 HYPERTENSION, UNSPECIFIED TYPE: ICD-10-CM

## 2024-01-07 DIAGNOSIS — I10 ESSENTIAL HYPERTENSION: ICD-10-CM

## 2024-01-07 RX ORDER — LOSARTAN POTASSIUM 100 MG/1
100 TABLET ORAL DAILY
Qty: 90 TABLET | Refills: 0 | Status: SHIPPED | OUTPATIENT
Start: 2024-01-07

## 2024-01-07 RX ORDER — SPIRONOLACTONE 25 MG/1
25 TABLET ORAL
Qty: 90 TABLET | Refills: 0 | Status: SHIPPED | OUTPATIENT
Start: 2024-01-07

## 2024-01-07 RX ORDER — AMLODIPINE BESYLATE 10 MG/1
10 TABLET ORAL DAILY
Qty: 90 TABLET | Refills: 0 | Status: SHIPPED | OUTPATIENT
Start: 2024-01-07

## 2024-01-09 ENCOUNTER — HOSPITAL ENCOUNTER (OUTPATIENT)
Dept: CARDIOLOGY | Facility: MEDICAL CENTER | Age: 79
End: 2024-01-09
Attending: INTERNAL MEDICINE
Payer: COMMERCIAL

## 2024-01-09 DIAGNOSIS — R01.1 UNDIAGNOSED CARDIAC MURMURS: ICD-10-CM

## 2024-01-09 DIAGNOSIS — I25.10 CORONARY ARTERY DISEASE INVOLVING NATIVE CORONARY ARTERY WITHOUT ANGINA PECTORIS, UNSPECIFIED WHETHER NATIVE OR TRANSPLANTED HEART: ICD-10-CM

## 2024-01-09 DIAGNOSIS — I10 PRIMARY HYPERTENSION: ICD-10-CM

## 2024-01-09 LAB
LV EJECT FRACT  99904: 65
LV EJECT FRACT MOD 2C 99903: 45.89
LV EJECT FRACT MOD 4C 99902: 69.5
LV EJECT FRACT MOD BP 99901: 56.7

## 2024-01-09 PROCEDURE — 93306 TTE W/DOPPLER COMPLETE: CPT | Mod: 26 | Performed by: INTERNAL MEDICINE

## 2024-01-09 PROCEDURE — 93306 TTE W/DOPPLER COMPLETE: CPT

## 2024-01-12 ENCOUNTER — OFFICE VISIT (OUTPATIENT)
Dept: SLEEP MEDICINE | Facility: MEDICAL CENTER | Age: 79
End: 2024-01-12
Attending: FAMILY MEDICINE
Payer: COMMERCIAL

## 2024-01-12 VITALS
WEIGHT: 130 LBS | BODY MASS INDEX: 23.04 KG/M2 | HEIGHT: 63 IN | OXYGEN SATURATION: 97 % | HEART RATE: 70 BPM | DIASTOLIC BLOOD PRESSURE: 54 MMHG | SYSTOLIC BLOOD PRESSURE: 118 MMHG

## 2024-01-12 DIAGNOSIS — J43.2 CENTRILOBULAR EMPHYSEMA (HCC): ICD-10-CM

## 2024-01-12 DIAGNOSIS — Z72.0 TOBACCO USE: ICD-10-CM

## 2024-01-12 DIAGNOSIS — R91.8 PULMONARY NODULES: ICD-10-CM

## 2024-01-12 PROBLEM — R05.9 COUGH: Status: RESOLVED | Noted: 2023-10-05 | Resolved: 2024-01-12

## 2024-01-12 PROCEDURE — 99212 OFFICE O/P EST SF 10 MIN: CPT | Performed by: INTERNAL MEDICINE

## 2024-01-12 PROCEDURE — 99204 OFFICE O/P NEW MOD 45 MIN: CPT | Performed by: INTERNAL MEDICINE

## 2024-01-12 PROCEDURE — 3078F DIAST BP <80 MM HG: CPT | Performed by: INTERNAL MEDICINE

## 2024-01-12 PROCEDURE — 3074F SYST BP LT 130 MM HG: CPT | Performed by: INTERNAL MEDICINE

## 2024-01-12 RX ORDER — UMECLIDINIUM BROMIDE AND VILANTEROL TRIFENATATE 62.5; 25 UG/1; UG/1
1 POWDER RESPIRATORY (INHALATION) DAILY
Qty: 1 EACH | Refills: 11 | Status: CANCELLED | OUTPATIENT
Start: 2024-01-12

## 2024-01-12 ASSESSMENT — FIBROSIS 4 INDEX: FIB4 SCORE: 0.92

## 2024-01-12 ASSESSMENT — PATIENT HEALTH QUESTIONNAIRE - PHQ9: CLINICAL INTERPRETATION OF PHQ2 SCORE: 0

## 2024-01-12 NOTE — ASSESSMENT & PLAN NOTE
Very small    Follow-up repeat CT in 1 year, October 2024, this should be a low-dose CT scan and not a high-resolution CT

## 2024-01-12 NOTE — ASSESSMENT & PLAN NOTE
Seen on CT, no PFTs on file  Patient currently asymptomatic    Follow-up PFTs  Patient has an albuterol inhaler at home in case she needs it

## 2024-01-12 NOTE — PROGRESS NOTES
[facility] Hospital Medicine  Progress Note    Patient Name: Donta Cline  MRN: [id number]  Patient Class: IP- Inpatient   Admission Date: 10/10/2017  Length of Stay: 3 days  Attending Physician: Alessia Ch MD  Primary Care Provider: ZAID Richardson Iii, MD    [facility] Medicine Team: [facility] 1 Edgar Richard MD    Subjective:     Principal Problem:Aspiration pneumonia    HPI:  Mr. 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    Patient was originally seen at [facility] on 10/5/17 due to worsening low back pain and inability to ambulate with decline in function over past 5 weeks. There, patient was noted to have elevated troponin so transferred to [facility] for Cardiology evaluation. Patient was diagnosed with NSTEMI and treated medically. Patient was found to have elevated INR and no stents could be placed.  His INR was attempted to be revered with 4U of FFP and vit K but patient went into flash pulm edema.  At this time, 2 D echo revealed severe aortic stenosis with preserved EF and Cardiology evaluated patient and did not feel LHC warranted at this time but recommended outpatient follow-up with his regular Cardiologist and likely need for outpatient [facility] to evaluate his aortic stenosis and coronary anatomy.     During this admission, patient was having delirium and developed aspiration PNA (placed on IV Zosyn). Additionally, pt was evaluated by NSx who  Pulmonary Clinic- Initial Consult    Date of Service: 1/12/2024    Referring Physician: Claire Hernández M.D.    Reason for Consult:smoking    Chief Complaint:   Chief Complaint   Patient presents with    New Patient     NP/ REF BY; TOMMY KTBCG / DX: Current every day smoker - Cough       HPI:   Sangita Bolton is a 78 y.o. female who is referred to the pulmonary clinic for history of smoking and pulmonary nodules.  Patient was furred by her primary care physician, but patient denies any respiratory complaints.  She does not have shortness of breath, cough, sputum production.  She had a CT scan done which showed multiple bilateral punctate nodules as well as a pancreatic head mass.  She has extensive emphysema on her CT scan.  Patient has a 60+ pack year history of smoking and quit in December 2023.  She has never had PFTs.  Planning for MRI of the pancreas next week.    Past Medical History:   Diagnosis Date    Alcohol abuse 04/27/2019    Arthritis     generalized-Osteo    Carotid artery stenosis 12/31/2015    Fall 11/25/2020    Gluten intolerance     Hyperlipidemia     Hypertension     Medicare annual wellness visit, subsequent 10/05/2023    OSTEOPOROSIS     Other specified symptom associated with female genital organs     post menaupausal bleeding    Pain 02/06/2015    bilateral legs-chronic>4/10    Stage 3 chronic kidney disease (HCC) 11/25/2020    Unspecified cataract        Past Surgical History:   Procedure Laterality Date    PB PARTIAL HIP REPLACEMENT  11/28/2020    Procedure: HEMIARTHROPLASTY, HIP REVISION;  Surgeon: Mohsen Thomas M.D.;  Location: [facility];  Service: Orthopedics    PB PARTIAL HIP REPLACEMENT Right 4/28/2019    Procedure: HEMIARTHROPLASTY, HIP;  Surgeon: Evens Sarabia M.D.;  Location: [facility];  Service: Orthopedics    RECOVERY  2/9/2015    Performed by -Recovery Surgery at SURGERY SAME DAY [facility]    FEMORAL ARTERY REPAIR  2/9/2015    Performed by  Yuly Chirinos M.D. at SURGERY [facility] ORS    HYSTEROSCOPY WITH VIDEO DIAGNOSTIC  11/17/2010    Performed by ALIS DEGROOT at SURGERY SAME DAY [facility] ORS    DILATION AND CURETTAGE  11/17/2010    Performed by ALIS DEGROOT at SURGERY SAME DAY [facility] ORS    ORIF, FRACTURE, HUMERUS  9/5/08    Performed by LAURA CARTER at SURGERY [facility] ORS    COLONOSCOPY  2008    recheck 4 years    OTHER ORTHOPEDIC SURGERY      rt leg fx    OTHER ORTHOPEDIC SURGERY      broken right wrist       Social History     Socioeconomic History    Marital status:      Spouse name: Not on file    Number of children: Not on file    Years of education: Not on file    Highest education level: 12th grade   Occupational History    Not on file   Tobacco Use    Smoking status: Every Day     Current packs/day: 1.00     Average packs/day: 1 pack/day for 48.9 years (48.9 ttl pk-yrs)     Types: Cigarettes     Start date: 2/2/1975    Smokeless tobacco: Never   Vaping Use    Vaping Use: Never used   Substance and Sexual Activity    Alcohol use: Not Currently     Comment: 4/week    Drug use: No    Sexual activity: Not on file     Comment: ,    Other Topics Concern    Not on file   Social History Narrative    Not on file     Social Determinants of Health     Financial Resource Strain: Low Risk  (10/2/2023)    Overall Financial Resource Strain (CARDIA)     Difficulty of Paying Living Expenses: Not hard at all   Food Insecurity: No Food Insecurity (10/2/2023)    Hunger Vital Sign     Worried About Running Out of Food in the Last Year: Never true     Ran Out of Food in the Last Year: Never true   Transportation Needs: No Transportation Needs (10/2/2023)    PRAPARE - Transportation     Lack of Transportation (Medical): No     Lack of Transportation (Non-Medical): No   Physical Activity: Inactive (10/2/2023)    Exercise Vital Sign     Days of Exercise per Week: 0 days     Minutes of Exercise per Session: 0 min  felt he was not a candidate for surgery. Patient was seen and evaluated by Neurosurgery at [facility] (Dr. James) who noted Ct scan of lumbar spine showed moderate to severe acquired spinal stenosis and bilateral foraminal encroachment at L3-L4. Patient is here for second opionion from NSx.    Hospital Course:  10/12: Patient still in pain, fentanyl patch ordered, consulted PM&R. Surgical intervention not indicated per NSx. However, family strongly leaning towards surgical intervention and have unrealistic expectation. If surgical intervention, patient needs LHC and full cardiac work up. Given his recent NSTEMI and severe AS along with other co-morbities, patient is at high risk for surgery. Patient also with nausea and heart burn this morning. MBSS today, aspiration risk with honey thick liquids, speech recommended dental soft diet now.   10/13: Patient with no complaints of pain this AM. Patient has been assessed by cardiology who request angiogram before any surgical intervention. Patient with low flow low gradient aortic stenosis and would not benefit from TAVR or balloon valvuloplasty per cardiology. Patient in HD this AM. Interventional cardiology consult place, possible right heart cath today.       Interval History: Patient with no complaints of pain this AM. Patient has been assessed by cardiology who request angiogram before any surgical intervention. Patient with low flow low gradient aortic stenosis and would not benefit from TAVR or balloon valvuloplasty per cardiology. Patient in HD this AM. Patient and family requesting surgical intervention now after patient originally declined. Will attempt to contact neurosurgery to see if they will attempt surgical intervention at this time.      Review of Systems   Constitutional: Negative for appetite change, chills, fatigue and fever.   HENT: Negative for congestion, ear pain, rhinorrhea and sore throat.    Eyes: Negative for pain and discharge.    Respiratory: Negative for cough, choking, chest tightness and shortness of breath.    Cardiovascular: Negative for chest pain, palpitations and leg swelling.   Gastrointestinal: Negative for abdominal pain, blood in stool, diarrhea, nausea, rectal pain and vomiting.   Endocrine: Negative for polyuria.   Genitourinary: Negative for decreased urine volume, difficulty urinating, dysuria, flank pain and hematuria.   Musculoskeletal: Negative for back pain, joint swelling and neck pain.   Skin: Negative for color change, pallor, rash and wound.   Neurological: Negative for dizziness, seizures, weakness and headaches.   Psychiatric/Behavioral: Negative for behavioral problems and confusion.     Objective:     Vital Signs (Most Recent):  Temp: 98 °F (36.7 °C) (10/13/17 0758)  Pulse: 78 (10/13/17 0758)  Resp: 18 (10/13/17 0758)  BP: 121/69 (10/13/17 0758)  SpO2: 99 % (10/13/17 0758) Vital Signs (24h Range):  Temp:  [96.6 °F (35.9 °C)-99.5 °F (37.5 °C)] 98 °F (36.7 °C)  Pulse:  [76-94] 78  Resp:  [16-18] 18  SpO2:  [94 %-99 %] 99 %  BP: (121-141)/(63-72) 121/69     Weight: 80 kg (176 lb 5.9 oz)  Body mass index is 27.68 kg/m².  No intake or output data in the 24 hours ending 10/13/17 0846   Physical Exam   Constitutional: He is oriented to person, place, and time. He appears well-developed and well-nourished.   Pt is alert orientated and conversing appropriately   HENT:   Head: Normocephalic.   Nose: Nose normal.   Mouth/Throat: Oropharynx is clear and moist.   Eyes: EOM are normal. Pupils are equal, round, and reactive to light. No scleral icterus.   Neck: Neck supple. No JVD present. No tracheal deviation present. No thyromegaly present.   Cardiovascular: Normal rate.  Exam reveals no gallop and no friction rub.    Murmur (4/6 systolic) heard.  Irregularly irregular rhythm   Pulmonary/Chest: No respiratory distress. He has no wheezes. He has no rales.   Decreased breath sound on R side   Abdominal: Soft. Bowel sounds are    Stress: No Stress Concern Present (10/2/2023)    [place] [employer] [place] [employer] - Occupational Stress Questionnaire     Feeling of Stress : Not at all   Social Connections: Moderately Isolated (10/2/2023)    Social Connection and Isolation Panel [NHANES]     Frequency of Communication with Friends and Family: More than three times a week     Frequency of Social Gatherings with Friends and Family: More than three times a week     Attends Sabianist Services: Never     Active Member of Clubs or Organizations: No     Attends Club or Organization Meetings: Never     Marital Status:    Intimate Partner Violence: Not on file   Housing Stability: Low Risk  (10/2/2023)    Housing Stability Vital Sign     Unable to Pay for Housing in the Last Year: No     Number of Places Lived in the Last Year: 1     Unstable Housing in the Last Year: No          Family History   Problem Relation Age of Onset    Cancer Mother         leukemia    Heart Disease Neg Hx     Heart Attack Neg Hx        Current Outpatient Medications on File Prior to Visit   Medication Sig Dispense Refill    amLODIPine (NORVASC) 10 MG Tab Take 1 Tablet by mouth every day. 90 Tablet 0    spironolactone (ALDACTONE) 25 MG Tab Take 1 Tablet by mouth every day. 90 Tablet 0    losartan (COZAAR) 100 MG Tab Take 1 Tablet by mouth every day. 90 Tablet 0    albuterol 108 (90 Base) MCG/ACT Aero Soln inhalation aerosol Inhale 2 Puffs every 6 hours as needed for Shortness of Breath. 1 Each 0    rosuvastatin (CRESTOR) 10 MG Tab Take 1 Tablet by mouth every evening. 90 Tablet 3    aspirin 81 MG EC tablet Take 1 Tablet by mouth every day.      Cholecalciferol (VITAMIN D3 PO) Take 1 tablet by mouth every day.      Cyanocobalamin (VITAMIN B-12 PO) Take 1 tablet by mouth every day.       No current facility-administered medications on file prior to visit.       Allergies: Hair formula extra strength [kdc:cranberry extract+sambucus nigra+vegetable enzyme+yellow  "dye+...] and Penicillins    Review of Systems   Constitutional:  Negative for chills, fever, malaise/fatigue and weight loss.   Respiratory:  Negative for cough, sputum production, shortness of breath and wheezing.    Cardiovascular:  Negative for chest pain and palpitations.   Gastrointestinal:  Negative for nausea and vomiting.   Neurological:  Negative for dizziness and headaches.   Psychiatric/Behavioral:  Negative for depression and suicidal ideas.      Vitals:  /54 (BP Location: Right arm, Patient Position: Sitting, BP Cuff Size: Adult)   Pulse 70   Ht 1.6 m (5' 3\")   Wt 59 kg (130 lb)   SpO2 97%     Physical Exam  Vitals and nursing note reviewed.   Constitutional:       General: She is not in acute distress.     Appearance: Normal appearance.   HENT:      Head: Normocephalic and atraumatic.   Cardiovascular:      Rate and Rhythm: Normal rate and regular rhythm.   Pulmonary:      Effort: Pulmonary effort is normal.      Breath sounds: Normal breath sounds.   Skin:     General: Skin is warm and dry.   Neurological:      Mental Status: She is alert and oriented to person, place, and time.   Psychiatric:         Mood and Affect: Mood normal.         Behavior: Behavior normal.       Laboratory Data:      Pertinent Studies:    PFTs as reviewed by me personally show:    Imaging as reviewed by me personally show:    CT Chest 10/25/2023  1.  Moderately advanced emphysematous changes.  2.   Tiny micronodules, couple of which were not seen on the prior study. Follow-up per Fleischner criteria as clinically indicated.  3.  Severe atherosclerosis.  4.  Partially visualized nonspecific pancreatic head low density lesion and nonspecific pancreatic ductal dilatation. Pancreatic protocol CT or MRI of the abdomen without and with contrast may BE performed to further evaluate.  5.  No evidence of interstitial pneumonitis/UIP.    CT chest 2019  IMPRESSION:  Diffuse emphysematous changes.  15 mm groundglass opacity in " normal. He exhibits no distension and no mass. There is no tenderness. There is no rebound and no guarding.   Bruises noted diffusely throughout abdomen   Musculoskeletal: He exhibits deformity (scar from previous back surgery). He exhibits no edema.   Lymphadenopathy:     He has no cervical adenopathy.   Neurological: He is alert and oriented to person, place, and time. He exhibits normal muscle tone.   Skin: Skin is warm and dry. No rash noted. No erythema.   Psychiatric: He has a normal mood and affect. His behavior is normal.   Vitals reviewed.      Significant Labs:   BMP:   Recent Labs  Lab 10/13/17  0517         K 4.6   *   CO2 23   BUN 29*   CREATININE 5.1*   CALCIUM 10.0     CBC:   Recent Labs  Lab 10/12/17  0538 10/13/17  0517   WBC 6.23 9.19   HGB 9.2* 10.0*   HCT 28.9* 31.3*    195     Coagulation:   Recent Labs  Lab 10/13/17  0517   INR 2.2*       Significant Imaging: I have reviewed all pertinent imaging results/findings within the past 24 hours.    Assessment/Plan:      * Aspiration pneumonia    - pt had AMS at OSH and was reported to have aspiration event  - on Unasyn   - CXR with bilateral infiltrates but improved from previous cxr  - remained afebrile, no leukocytosis   - Diet per speech therapy         BRBPR (bright red blood per rectum)    - patients INR was supra therapeutic at OSH  - according to daughter has decreased  - CBC stable        Lumbar stenosis    -patient has stated this has been chronic but has been losing bowel functions for the past several months.    -denies saddle paraesthesia  -Ct scan of lumbar spine showed moderate to severe acquired spinal stenosis and bilateral foraminal encroachment at L3-L4  -MRI consistent with lumbar stenosis in the L3-4. Refer to MRI result   -Neurosurgery consulted, don't feel surgery is indicated at this point,however, family wants surgical intervention, unlikely given patients significant cardiac history, age and other  co-morbidities. High risk for surgery.   -Fentanyl patch for pain management   -consulted PM& R  - Patient now requesting surgical intervention, per previous neurosurgical note the patient declined surgical intervention, will contact neurosurgery to clarify           Hypokalemia    -resolved at this time  - will continue to monitor           NSTEMI (non-ST elevated myocardial infarction)    -recent NSTEMI on 10/5/17  -treated medically  -continue aspirin, metoprolol, statin   -EKG with non specific ST changes and A.fib with troponin 0.399, down from OSH 1.030  -Cardiology following appreciate recs, attributed elevated troponin to AS and ESRD  -2D echo pending           Paroxysmal atrial fibrillation    -hold AC if undergoing right heart cath          Severe aortic stenosis    - cardiology consulted  - given type of stenosis patient will not benefit from TAVR or balloon angioplasty  - recommend angiography prior to any surgical intervention  - medications recommendations appreciated           ESRD (end stage renal disease) on dialysis    -HD on MWF  -has fistula on R arm  -inpatient consult to nephrology, appreciate help  -HD today          Type 2 diabetes mellitus with chronic kidney disease on chronic dialysis, with long-term current use of insulin    -20U qhs  -updated A1c pending  -diabetic, renal diet  -BG optimal         Weakness generalized    -PT/OT ordered  -consult to social work for placement on discharge rehab vs SNF   - Rehab consulted, following           Coronary artery disease involving native coronary artery of native heart without angina pectoris    - holding DAPT if undergoing heart cath           Hypercholesteremia    - continue home rosuvastatin             VTE Risk Mitigation         Ordered     Medium Risk of VTE  Once      10/11/17 0006              Edgar Richard MD  Department of Hospital Medicine   [facility]   the anterior inferior right upper lobe.  Calcified granuloma in the left lung and calcified hilar lymph nodes suggesting old granulomatous disease.  11 mm short axis diameter precarinal lymph node.  Extensive atherosclerotic vascular disease.    Echo:  1/9/2024  CONCLUSIONS  Prior study on 11/26/20, compared to the report of the prior study,   there has been progression of tricuspid regurgitation.   Normal left ventricular systolic function.  The left ventricular ejection fraction is visually estimated to be 65%.  Mild mitral regurgitation.  Moderate tricuspid regurgitation.  Estimated right ventricular systolic pressure is 40 mmHg.    NM Perfusion 12/4/2023  Myocardial Perfusion   Report   NUCLEAR IMAGING INTERPRETATION   Normal Lexiscan myocardial study.   No evidence of ischemia or infarct.   SDS 1.   LVEF 62%.   Sinus rhythm with anterior Q waves at rest.   No ischemic changes with Regadenoson.   Occasional PVCs.   No chest chest.   ECG INTERPRETATION   Negative stress ECG for ischemia.    Assessment/Plan:    Problem List Items Addressed This Visit       Tobacco use     Quit in December 2023, 60+ pack year history of smoking    No longer meets criteria for lung cancer screening due to age, however, she did have small nodules on her last CT scan and we will continue to follow it yearly         Relevant Orders    PULMONARY FUNCTION TESTS -Test requested: Complete Pulmonary Function Test    Centrilobular emphysema (HCC)     Seen on CT, no PFTs on file  Patient currently asymptomatic    Follow-up PFTs  Patient has an albuterol inhaler at home in case she needs it         Pulmonary nodules     Very small    Follow-up repeat CT in 1 year, October 2024, this should be a low-dose CT scan and not a high-resolution CT             Return in about 3 months (around 4/12/2024).     This note was generated using voice recognition software which has a chance of producing errors of grammar and possibly content.  I have made  every reasonable attempt to find and correct any obvious errors, but it should be expected that some may not be found prior to finalization of this note.    Time spent in record review prior to patient arrival, reviewing results, and in face-to-face encounter totaled 52 min, excluding any procedures if performed.    Chelsey Aden,    Pulmonary and Critical Care  [facility]

## 2024-01-12 NOTE — ASSESSMENT & PLAN NOTE
Quit in December 2023, 60+ pack year history of smoking    No longer meets criteria for lung cancer screening due to age, however, she did have small nodules on her last CT scan and we will continue to follow it yearly

## 2024-01-16 ENCOUNTER — HOSPITAL ENCOUNTER (EMERGENCY)
Facility: MEDICAL CENTER | Age: 79
End: 2024-01-16
Payer: COMMERCIAL

## 2024-01-16 ENCOUNTER — HOSPITAL ENCOUNTER (OUTPATIENT)
Dept: RADIOLOGY | Facility: MEDICAL CENTER | Age: 79
End: 2024-01-16
Attending: FAMILY MEDICINE
Payer: COMMERCIAL

## 2024-01-16 DIAGNOSIS — K86.89 PANCREATIC MASS: ICD-10-CM

## 2024-01-16 PROCEDURE — A9579 GAD-BASE MR CONTRAST NOS,1ML: HCPCS | Performed by: FAMILY MEDICINE

## 2024-01-16 PROCEDURE — 74183 MRI ABD W/O CNTR FLWD CNTR: CPT

## 2024-01-16 PROCEDURE — 700117 HCHG RX CONTRAST REV CODE 255: Performed by: FAMILY MEDICINE

## 2024-01-16 RX ADMIN — GADOTERIDOL 15 ML: 279.3 INJECTION, SOLUTION INTRAVENOUS at 17:47

## 2024-01-22 ENCOUNTER — NON-PROVIDER VISIT (OUTPATIENT)
Dept: SLEEP MEDICINE | Facility: MEDICAL CENTER | Age: 79
End: 2024-01-22
Attending: INTERNAL MEDICINE
Payer: COMMERCIAL

## 2024-01-22 VITALS — WEIGHT: 124.4 LBS | BODY MASS INDEX: 22.89 KG/M2 | HEIGHT: 62 IN

## 2024-01-22 DIAGNOSIS — Z72.0 TOBACCO USE: ICD-10-CM

## 2024-01-22 PROCEDURE — 94010 BREATHING CAPACITY TEST: CPT | Mod: 26 | Performed by: INTERNAL MEDICINE

## 2024-01-22 PROCEDURE — 94010 BREATHING CAPACITY TEST: CPT | Performed by: INTERNAL MEDICINE

## 2024-01-22 PROCEDURE — 94729 DIFFUSING CAPACITY: CPT | Mod: 26 | Performed by: INTERNAL MEDICINE

## 2024-01-22 PROCEDURE — 94729 DIFFUSING CAPACITY: CPT | Performed by: INTERNAL MEDICINE

## 2024-01-22 PROCEDURE — 94726 PLETHYSMOGRAPHY LUNG VOLUMES: CPT | Mod: 26 | Performed by: INTERNAL MEDICINE

## 2024-01-22 PROCEDURE — 94726 PLETHYSMOGRAPHY LUNG VOLUMES: CPT | Performed by: INTERNAL MEDICINE

## 2024-01-22 ASSESSMENT — PULMONARY FUNCTION TESTS
FEV1_PERCENT_PREDICTED: 62
FEV1/FVC_PREDICTED: 77
FEV1/FVC_PERCENT_PREDICTED: 77
FEV1: 1.13
FVC_PERCENT_PREDICTED: 80
FEV1/FVC: 60
FEV1_PREDICTED: 1.8
FEV1/FVC: 60
FEV1/FVC_PERCENT_LLN: 64
FEV1_LLN: 1.50
FEV1/FVC_PERCENT_PREDICTED: 77
FVC_LLN: 1.97
FVC_PREDICTED: 2.35
FVC: 1.89
FEV1/FVC_PERCENT_PREDICTED: 78

## 2024-01-22 ASSESSMENT — FIBROSIS 4 INDEX: FIB4 SCORE: 0.92

## 2024-01-22 NOTE — PROCEDURES
Tech: Enedina Serna, RT  Good patient effort & cooperation.  Test was performed on the [employer] Body Plethysmograph- Elite DX system.  The predicted sets used for Spirometry are GLI-2012, for Lung Volumes are ITS, and for DLCO is GLI 2017.  The results of this test meet the ATS standards for acceptability and repeatability.  The DLCO was uncorrected for Hb.  Patient refused bronchodilator, no post FVC.      Interpretation:  Baseline spirometry shows airflow obstruction with an FEV1/FVC ratio of 60 and an FEV1 of 1.13 L or 72% predicted.  Bronchodilator testing was not performed.  Total lung capacity is normal however there is borderline air trapping.  Diffusion capacity is reduced at 61% predicted.  Pulmonary function testing shows airflow obstruction with mild air trapping and reduced DLCO consistent with chronic obstructive pulmonary disease.

## 2024-02-16 ENCOUNTER — PATIENT MESSAGE (OUTPATIENT)
Dept: HEALTH INFORMATION MANAGEMENT | Facility: OTHER | Age: 79
End: 2024-02-16

## 2024-02-27 ENCOUNTER — OFFICE VISIT (OUTPATIENT)
Dept: CARDIOLOGY | Facility: MEDICAL CENTER | Age: 79
End: 2024-02-27
Attending: INTERNAL MEDICINE
Payer: COMMERCIAL

## 2024-02-27 VITALS
RESPIRATION RATE: 16 BRPM | BODY MASS INDEX: 22.15 KG/M2 | WEIGHT: 125 LBS | DIASTOLIC BLOOD PRESSURE: 56 MMHG | OXYGEN SATURATION: 97 % | SYSTOLIC BLOOD PRESSURE: 128 MMHG | HEIGHT: 63 IN | HEART RATE: 76 BPM

## 2024-02-27 DIAGNOSIS — I25.10 CORONARY ARTERY DISEASE INVOLVING NATIVE CORONARY ARTERY OF NATIVE HEART WITHOUT ANGINA PECTORIS: ICD-10-CM

## 2024-02-27 DIAGNOSIS — R01.1 UNDIAGNOSED CARDIAC MURMURS: ICD-10-CM

## 2024-02-27 DIAGNOSIS — E78.5 DYSLIPIDEMIA: ICD-10-CM

## 2024-02-27 DIAGNOSIS — I65.21 CAROTID STENOSIS, ASYMPTOMATIC, RIGHT: ICD-10-CM

## 2024-02-27 DIAGNOSIS — I10 PRIMARY HYPERTENSION: ICD-10-CM

## 2024-02-27 DIAGNOSIS — I36.1 NONRHEUMATIC TRICUSPID VALVE REGURGITATION: ICD-10-CM

## 2024-02-27 PROCEDURE — 99214 OFFICE O/P EST MOD 30 MIN: CPT | Performed by: INTERNAL MEDICINE

## 2024-02-27 PROCEDURE — 3078F DIAST BP <80 MM HG: CPT | Performed by: INTERNAL MEDICINE

## 2024-02-27 PROCEDURE — 3074F SYST BP LT 130 MM HG: CPT | Performed by: INTERNAL MEDICINE

## 2024-02-27 PROCEDURE — 99212 OFFICE O/P EST SF 10 MIN: CPT | Performed by: INTERNAL MEDICINE

## 2024-02-27 RX ORDER — METOPROLOL SUCCINATE 25 MG/1
25 TABLET, EXTENDED RELEASE ORAL DAILY
Qty: 90 TABLET | Refills: 3 | Status: SHIPPED | OUTPATIENT
Start: 2024-02-27

## 2024-02-27 ASSESSMENT — ENCOUNTER SYMPTOMS
MYALGIAS: 0
HEADACHES: 0
NEUROLOGICAL NEGATIVE: 1
WEIGHT LOSS: 0
RESPIRATORY NEGATIVE: 1
FOCAL WEAKNESS: 0
NAUSEA: 0
DOUBLE VISION: 0
ABDOMINAL PAIN: 0
BRUISES/BLEEDS EASILY: 0
VOMITING: 0
MUSCULOSKELETAL NEGATIVE: 1
CLAUDICATION: 0
CHILLS: 0
NERVOUS/ANXIOUS: 0
COUGH: 0
DEPRESSION: 0
DIZZINESS: 0
WEAKNESS: 0
GASTROINTESTINAL NEGATIVE: 1
SHORTNESS OF BREATH: 0
PALPITATIONS: 0
CARDIOVASCULAR NEGATIVE: 1
CONSTITUTIONAL NEGATIVE: 1
FEVER: 0
PSYCHIATRIC NEGATIVE: 1
BLURRED VISION: 0
EYES NEGATIVE: 1

## 2024-02-27 ASSESSMENT — FIBROSIS 4 INDEX: FIB4 SCORE: 0.92

## 2024-02-27 NOTE — PROGRESS NOTES
Chief Complaint   Patient presents with    Coronary Artery Disease     FV Dx: Coronary artery disease involving native coronary artery without angina pectoris, unspecified whether native or transplanted heart       Subjective     Sangita Bolton is a 78 y.o. female who presents today for follow up of coronary artery disease, hypertension and hyperlipidemia.    Since the patient's last visit on 11/16/213, she has been doing well clinically. She denies chest pain, shortness of breath, palpitations, nausea/vomiting or diaphoresis. She has not smoked since 10/2023. She keeps active walking. She underwent unremarkable cardiac studies as described.     Past Medical History:   Diagnosis Date    Alcohol abuse 04/27/2019    Arthritis     generalized-Osteo    Carotid artery stenosis 12/31/2015    Fall 11/25/2020    Gluten intolerance     Hyperlipidemia     Hypertension     Medicare annual wellness visit, subsequent 10/05/2023    OSTEOPOROSIS     Other specified symptom associated with female genital organs     post menaupausal bleeding    Pain 02/06/2015    bilateral legs-chronic>4/10    Stage 3 chronic kidney disease (HCC) 11/25/2020    Unspecified cataract      Past Surgical History:   Procedure Laterality Date    PB PARTIAL HIP REPLACEMENT  11/28/2020    Procedure: HEMIARTHROPLASTY, HIP REVISION;  Surgeon: Mohsen Thomas M.D.;  Location: [facility];  Service: Orthopedics    PB PARTIAL HIP REPLACEMENT Right 4/28/2019    Procedure: HEMIARTHROPLASTY, HIP;  Surgeon: Evens Sarabia M.D.;  Location: [facility];  Service: Orthopedics    RECOVERY  2/9/2015    Performed by -Recovery Surgery at SURGERY SAME DAY [facility]    FEMORAL ARTERY REPAIR  2/9/2015    Performed by Yuly Chirinos M.D. at [facility] ORS    HYSTEROSCOPY WITH VIDEO DIAGNOSTIC  11/17/2010    Performed by ALIS DEGROOT at SURGERY SAME DAY [facility] ORS    DILATION AND CURETTAGE  11/17/2010    Performed by MIKAYLA  ALIS FIERRO at SURGERY SAME DAY [facility] ORS    ORIF, FRACTURE, HUMERUS  9/5/08    Performed by LAURA CARTER at SURGERY [facility] ORS    COLONOSCOPY  2008    recheck 4 years    OTHER ORTHOPEDIC SURGERY      rt leg fx    OTHER ORTHOPEDIC SURGERY      broken right wrist     Family History   Problem Relation Age of Onset    Cancer Mother         leukemia    Heart Disease Neg Hx     Heart Attack Neg Hx      Social History     Socioeconomic History    Marital status:      Spouse name: Not on file    Number of children: Not on file    Years of education: Not on file    Highest education level: 12th grade   Occupational History    Not on file   Tobacco Use    Smoking status: Every Day     Current packs/day: 1.00     Average packs/day: 1 pack/day for 49.1 years (49.1 ttl pk-yrs)     Types: Cigarettes     Start date: 2/2/1975    Smokeless tobacco: Never   Vaping Use    Vaping Use: Never used   Substance and Sexual Activity    Alcohol use: Not Currently     Comment: 4/week    Drug use: No    Sexual activity: Not on file     Comment: ,    Other Topics Concern    Not on file   Social History Narrative    Not on file     Social Determinants of Health     Financial Resource Strain: Low Risk  (10/2/2023)    Overall Financial Resource Strain (CARDIA)     Difficulty of Paying Living Expenses: Not hard at all   Food Insecurity: No Food Insecurity (10/2/2023)    Hunger Vital Sign     Worried About Running Out of Food in the Last Year: Never true     Ran Out of Food in the Last Year: Never true   Transportation Needs: No Transportation Needs (10/2/2023)    PRAPARE - Transportation     Lack of Transportation (Medical): No     Lack of Transportation (Non-Medical): No   Physical Activity: Inactive (10/2/2023)    Exercise Vital Sign     Days of Exercise per Week: 0 days     Minutes of Exercise per Session: 0 min   Stress: No Stress Concern Present (10/2/2023)    [employer] [place] [employer] - Occupational  Stress Questionnaire     Feeling of Stress : Not at all   Social Connections: Moderately Isolated (10/2/2023)    Social Connection and Isolation Panel [NHANES]     Frequency of Communication with Friends and Family: More than three times a week     Frequency of Social Gatherings with Friends and Family: More than three times a week     Attends Taoist Services: Never     Active Member of Clubs or Organizations: No     Attends Club or Organization Meetings: Never     Marital Status:    Intimate Partner Violence: Not on file   Housing Stability: Low Risk  (10/2/2023)    Housing Stability Vital Sign     Unable to Pay for Housing in the Last Year: No     Number of Places Lived in the Last Year: 1     Unstable Housing in the Last Year: No     Allergies   Allergen Reactions    Hair Formula Extra Strength [Kdc:Cranberry Extract+Sambucus Nigra+Vegetable Enzyme+Yellow Dye+...] Hives     Hair Dye    Penicillins Hives     Hands; tolerates cephalosporins (Rocephin Nov 2020)     (Medications reviewed.)  Outpatient Encounter Medications as of 2/27/2024   Medication Sig Dispense Refill    amLODIPine (NORVASC) 10 MG Tab Take 1 Tablet by mouth every day. 90 Tablet 0    spironolactone (ALDACTONE) 25 MG Tab Take 1 Tablet by mouth every day. 90 Tablet 0    losartan (COZAAR) 100 MG Tab Take 1 Tablet by mouth every day. 90 Tablet 0    albuterol 108 (90 Base) MCG/ACT Aero Soln inhalation aerosol Inhale 2 Puffs every 6 hours as needed for Shortness of Breath. 1 Each 0    rosuvastatin (CRESTOR) 10 MG Tab Take 1 Tablet by mouth every evening. 90 Tablet 3    aspirin 81 MG EC tablet Take 1 Tablet by mouth every day.      Cholecalciferol (VITAMIN D3 PO) Take 1 tablet by mouth every day.      Cyanocobalamin (VITAMIN B-12 PO) Take 1 tablet by mouth every day.       No facility-administered encounter medications on file as of 2/27/2024.     Review of Systems   Constitutional: Negative.  Negative for chills, fever, malaise/fatigue and  "weight loss.   HENT: Negative.  Negative for hearing loss.    Eyes: Negative.  Negative for blurred vision and double vision.   Respiratory: Negative.  Negative for cough and shortness of breath.    Cardiovascular: Negative.  Negative for chest pain, palpitations, claudication and leg swelling.   Gastrointestinal: Negative.  Negative for abdominal pain, nausea and vomiting.   Genitourinary: Negative.  Negative for dysuria and urgency.   Musculoskeletal: Negative.  Negative for joint pain and myalgias.   Skin: Negative.  Negative for itching and rash.   Neurological: Negative.  Negative for dizziness, focal weakness, weakness and headaches.   Endo/Heme/Allergies: Negative.  Does not bruise/bleed easily.   Psychiatric/Behavioral: Negative.  Negative for depression. The patient is not nervous/anxious.               Objective     /56 (BP Location: Left arm, Patient Position: Sitting, BP Cuff Size: Adult)   Pulse 76   Resp 16   Ht 1.6 m (5' 3\")   Wt 56.7 kg (125 lb)   SpO2 97%   BMI 22.14 kg/m²     Physical Exam  Constitutional:       Appearance: Normal appearance. She is well-developed and normal weight.   HENT:      Head: Normocephalic and atraumatic.   Neck:      Vascular: No JVD.   Cardiovascular:      Rate and Rhythm: Normal rate and regular rhythm.      Heart sounds: Murmur heard.   Pulmonary:      Effort: Pulmonary effort is normal.      Breath sounds: Normal breath sounds.   Abdominal:      General: Bowel sounds are normal.      Palpations: Abdomen is soft.      Comments: No hepatosplenomegaly.   Musculoskeletal:         General: Normal range of motion.   Lymphadenopathy:      Cervical: No cervical adenopathy.   Skin:     General: Skin is warm and dry.   Neurological:      Mental Status: She is alert and oriented to person, place, and time.            CARDIAC STUDIES/PROCEDURES:     CAROTID ULTRASOUND (11/03/23)  Compared to the prior study on 5/25/2023, no major changes.  Right.   Moderate to severe " appearing irregular plaque in the distal common carotid artery.   Velocities consistent with greater than >50% stenosis.     CT OF CHEST (10/25/23)  1.  Moderately advanced emphysematous changes.  2.   Tiny micronodules, couple of which were not seen on the prior study. Follow-up per Fleischner criteria as clinically indicated.  3.  Severe atherosclerosis.  4.  Partially visualized nonspecific pancreatic head low density lesion and nonspecific pancreatic ductal dilatation. Pancreatic protocol CT or MRI of the abdomen without and with contrast may BE performed to further evaluate.  5.  No evidence of interstitial pneumonitis/UIP.    ECHOCARDIOGRAM CONCLUSIONS (01/09/24)  Prior study on 11/26/20, compared to the report of the prior study,   there has been progression of tricuspid regurgitation.   Normal left ventricular systolic function.  The left ventricular ejection fraction is visually estimated to be 65%.  Aortic valve sclerosis without significant stenosis.  Mild mitral regurgitation.  Moderate tricuspid regurgitation.  Estimated right ventricular systolic pressure is 40 mmHg.  (study result reviewed)     ECHOCARDIOGRAM CONCLUSIONS (11/26/20)  No prior study is available for comparison.   Normal left ventricular size, wall thickness, and systolic function.  Left ventricular ejection fraction is visually estimated to be 70%.  Grade I diastolic dysfunction.  Normal right ventricular size and systolic function.  Mild mitral regurgitation.  Mild tricuspid regurgitation.  Right ventricular systolic pressure is estimated to be 45 mmHg.    EKG performed on (11/16/23) was reviewed: EKG personally interpreted shows sinus rhythm.     Laboratory results of (07/10/23) Cholesterol profile of 109/91/64/27 mg/dL noted.      MYOCARDIAL PERFUSION STUDY CONCLUSIONS (12/04/23)  NUCLEAR IMAGING INTERPRETATION  Normal Lexiscan myocardial study.  No evidence of ischemia or infarct.  SDS 1.  LVEF 62%.  Sinus rhythm with anterior Q  waves at rest.  No ischemic changes with Regadenoson.  Occasional PVCs.  No chest chest.  ECG INTERPRETATION  Negative stress ECG for ischemia.  (study result reviewed)     PERIPHERAL INTERVENTION by Yuly Brennan (02/09/15)   1.  Aortogram.  2.  Iliac artery angiogram.  3.  Bilateral common and external iliac artery angioplasty.  4.  Right common iliac artery stent with 7x29 mm Omnilink Elite.  5.  Left common and external iliac artery angioplasty and stent with 8x40 mm   Absolute Pro self-expanding stent crossing the iliac bifurcation.  6.  Right common and external iliac artery stent with 8x30 mm Absolute Pro   self-expanding stent postdilated with a 7 mm balloon.  7.  Left common femoral artery exploration and repair with endarterectomy and   Dacron patch angioplasty.    Assessment & Plan     1. Coronary artery disease involving native coronary artery of native heart without angina pectoris        2. Primary hypertension        3. Dyslipidemia        4. Carotid stenosis, asymptomatic, right        5. Undiagnosed cardiac murmurs        6. Nonrheumatic tricuspid valve regurgitation            Medical Decision Making: Today's Assessment/Status/Plan:        Coronary artery disease: She remains clinically doing well. I will continue with current medical care including metoprolol, losartan, rosuvastatin.   Hypertension: Blood pressure is well controlled. We will continue with amlodipine, beta blockade therapy and angiotensin receptor blockade.  Hyperlipidemia: She is doing well on statin therapy without myalgia symptoms. (Managed by [facility])   Carotid artery stenosis and peripheral vascular disease: (Managed by [facility])   Murmur ad moderate, tricuspid regurgitation: Murmur due to aortic sclerosis without stenosis noted. We will repeat an echocardiogram in one year.     We will follow up the patient in one year.    CC Claire Harp

## 2024-03-05 ENCOUNTER — TELEPHONE (OUTPATIENT)
Dept: VASCULAR LAB | Facility: MEDICAL CENTER | Age: 79
End: 2024-03-05
Payer: COMMERCIAL

## 2024-03-05 NOTE — TELEPHONE ENCOUNTER
----- Message from Michael J Bloch, M.D. sent at 3/5/2024  3:21 PM PST -----  Regarding: RE: surveillance q  Add carotid duplex in may - not CTA at this time  Mb    ----- Message -----  From: Mara Conner R.N.  Sent: 3/4/2024   3:11 PM PST  To: Michael J Bloch, M.D.  Subject: surveillance q                                   Pt due for an AO duplex and BLE art duplex with ROBERTA in june, notes on need for CTA depending on kidney function. Add?

## 2024-03-06 ENCOUNTER — TELEPHONE (OUTPATIENT)
Dept: VASCULAR LAB | Facility: MEDICAL CENTER | Age: 79
End: 2024-03-06
Payer: COMMERCIAL

## 2024-03-06 DIAGNOSIS — I77.1 CELIAC ARTERY STENOSIS (HCC): ICD-10-CM

## 2024-03-06 DIAGNOSIS — I65.23 CAROTID ATHEROSCLEROSIS, BILATERAL: ICD-10-CM

## 2024-03-06 DIAGNOSIS — I74.5 ILIAC ARTERY OCCLUSION, LEFT (HCC): ICD-10-CM

## 2024-03-06 DIAGNOSIS — I65.21 CAROTID STENOSIS, ASYMPTOMATIC, RIGHT: ICD-10-CM

## 2024-03-06 DIAGNOSIS — I70.0 AORTIC ATHEROSCLEROSIS (HCC): ICD-10-CM

## 2024-03-06 DIAGNOSIS — Z95.828 S/P INSERTION OF ILIAC ARTERY STENT: ICD-10-CM

## 2024-03-06 DIAGNOSIS — I77.1 RIGHT ILIAC ARTERY STENOSIS (HCC): ICD-10-CM

## 2024-03-06 DIAGNOSIS — I73.9 PAD (PERIPHERAL ARTERY DISEASE) (HCC): ICD-10-CM

## 2024-03-06 NOTE — TELEPHONE ENCOUNTER
----- Message from Michael J Bloch, M.D. sent at 3/5/2024  3:59 PM PST -----  Regarding: RE: surveillance q  Yep    ----- Message -----  From: Mara Conner R.N.  Sent: 3/5/2024   3:50 PM PST  To: Michael J Bloch, M.D.  Subject: RE: surveillance q                               Ok so AO duplex, BLE art duplex with roberta, and Carotid orders needed correct?  ----- Message -----  From: Michael J Bloch, M.D.  Sent: 3/5/2024   3:22 PM PST  To: Mara Conner R.N.  Subject: RE: surveillance q                               Add carotid duplex in may - not CTA at this time  Mb    ----- Message -----  From: Mara Conner R.N.  Sent: 3/4/2024   3:11 PM PST  To: Michael J Bloch, M.D.  Subject: surveillance q                                   Pt due for an AO duplex and BLE art duplex with ROBERTA in june, notes on need for CTA depending on kidney function. Add?

## 2024-03-06 NOTE — TELEPHONE ENCOUNTER
Orders placed for vascular surveillance imaging.    [employer] message sent to pt to remind that they are due for their surveillance vascular imaging.       Mara Conner R.N.   [facility]

## 2024-03-28 ENCOUNTER — HOSPITAL ENCOUNTER (OUTPATIENT)
Dept: LAB | Facility: MEDICAL CENTER | Age: 79
End: 2024-03-28
Attending: INTERNAL MEDICINE
Payer: COMMERCIAL

## 2024-03-28 DIAGNOSIS — N18.4 CKD (CHRONIC KIDNEY DISEASE) STAGE 4, GFR 15-29 ML/MIN (HCC): ICD-10-CM

## 2024-03-28 DIAGNOSIS — I10 ESSENTIAL HYPERTENSION: ICD-10-CM

## 2024-03-28 LAB
ANION GAP SERPL CALC-SCNC: 16 MMOL/L (ref 7–16)
BUN SERPL-MCNC: 46 MG/DL (ref 8–22)
CALCIUM SERPL-MCNC: 9.4 MG/DL (ref 8.5–10.5)
CHLORIDE SERPL-SCNC: 107 MMOL/L (ref 96–112)
CO2 SERPL-SCNC: 14 MMOL/L (ref 20–33)
CREAT SERPL-MCNC: 2.33 MG/DL (ref 0.5–1.4)
CREAT UR-MCNC: 45.82 MG/DL
ERYTHROCYTE [DISTWIDTH] IN BLOOD BY AUTOMATED COUNT: 48.8 FL (ref 35.9–50)
GFR SERPLBLD CREATININE-BSD FMLA CKD-EPI: 21 ML/MIN/1.73 M 2
GLUCOSE SERPL-MCNC: 93 MG/DL (ref 65–99)
HCT VFR BLD AUTO: 36.4 % (ref 37–47)
HGB BLD-MCNC: 11.8 G/DL (ref 12–16)
MCH RBC QN AUTO: 31 PG (ref 27–33)
MCHC RBC AUTO-ENTMCNC: 32.4 G/DL (ref 32.2–35.5)
MCV RBC AUTO: 95.5 FL (ref 81.4–97.8)
MICROALBUMIN UR-MCNC: 13.9 MG/DL
MICROALBUMIN/CREAT UR: 303 MG/G (ref 0–30)
PLATELET # BLD AUTO: 305 K/UL (ref 164–446)
PMV BLD AUTO: 10.1 FL (ref 9–12.9)
POTASSIUM SERPL-SCNC: 5.9 MMOL/L (ref 3.6–5.5)
RBC # BLD AUTO: 3.81 M/UL (ref 4.2–5.4)
SODIUM SERPL-SCNC: 137 MMOL/L (ref 135–145)
WBC # BLD AUTO: 8.3 K/UL (ref 4.8–10.8)

## 2024-03-28 PROCEDURE — 82570 ASSAY OF URINE CREATININE: CPT

## 2024-03-28 PROCEDURE — 36415 COLL VENOUS BLD VENIPUNCTURE: CPT

## 2024-03-28 PROCEDURE — 80048 BASIC METABOLIC PNL TOTAL CA: CPT

## 2024-03-28 PROCEDURE — 82043 UR ALBUMIN QUANTITATIVE: CPT

## 2024-03-28 PROCEDURE — 85027 COMPLETE CBC AUTOMATED: CPT

## 2024-04-09 ENCOUNTER — OFFICE VISIT (OUTPATIENT)
Dept: MEDICAL GROUP | Facility: MEDICAL CENTER | Age: 79
End: 2024-04-09
Payer: COMMERCIAL

## 2024-04-09 VITALS
HEART RATE: 67 BPM | WEIGHT: 128.75 LBS | BODY MASS INDEX: 22.81 KG/M2 | DIASTOLIC BLOOD PRESSURE: 62 MMHG | TEMPERATURE: 98.3 F | RESPIRATION RATE: 16 BRPM | OXYGEN SATURATION: 96 % | SYSTOLIC BLOOD PRESSURE: 112 MMHG

## 2024-04-09 DIAGNOSIS — N18.4 CKD STAGE G4/A3, GFR 15-29 AND ALBUMIN CREATININE RATIO >300 MG/G (HCC): ICD-10-CM

## 2024-04-09 DIAGNOSIS — Z63.79 STRESS DUE TO ILLNESS OF FAMILY MEMBER: ICD-10-CM

## 2024-04-09 DIAGNOSIS — E78.5 DYSLIPIDEMIA: ICD-10-CM

## 2024-04-09 PROCEDURE — 3078F DIAST BP <80 MM HG: CPT | Performed by: FAMILY MEDICINE

## 2024-04-09 PROCEDURE — 3074F SYST BP LT 130 MM HG: CPT | Performed by: FAMILY MEDICINE

## 2024-04-09 PROCEDURE — 99214 OFFICE O/P EST MOD 30 MIN: CPT | Performed by: FAMILY MEDICINE

## 2024-04-09 ASSESSMENT — FIBROSIS 4 INDEX: FIB4 SCORE: 0.96

## 2024-04-09 NOTE — PROGRESS NOTES
This medical record contains text that has been entered with the assistance of computer voice recognition and dictation software.  Therefore, it may contain unintended errors in text, spelling, punctuation, or grammar        Chief Complaint   Patient presents with    Follow-Up       Sangita Bolton is a 78 y.o. female here evaluation and management of:   Htn   Dyslipidemia   CAD   Emphysemia   Ckd     Current Outpatient Medications   Medication Sig Dispense Refill    metoprolol SR (TOPROL XL) 25 MG TABLET SR 24 HR Take 1 Tablet by mouth every day. 90 Tablet 3    amLODIPine (NORVASC) 10 MG Tab Take 1 Tablet by mouth every day. 90 Tablet 0    spironolactone (ALDACTONE) 25 MG Tab Take 1 Tablet by mouth every day. 90 Tablet 0    losartan (COZAAR) 100 MG Tab Take 1 Tablet by mouth every day. 90 Tablet 0    albuterol 108 (90 Base) MCG/ACT Aero Soln inhalation aerosol Inhale 2 Puffs every 6 hours as needed for Shortness of Breath. 1 Each 0    rosuvastatin (CRESTOR) 10 MG Tab Take 1 Tablet by mouth every evening. 90 Tablet 3    aspirin 81 MG EC tablet Take 1 Tablet by mouth every day.      Cholecalciferol (VITAMIN D3 PO) Take 1 tablet by mouth every day.      Cyanocobalamin (VITAMIN B-12 PO) Take 1 tablet by mouth every day.       No current facility-administered medications for this visit.     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    Superior mesenteric artery stenosis (HCC) 05/09/2023    Celiac artery stenosis (HCC) 05/09/2023    CKD stage  G4/A3, GFR 15-29 and albumin creatinine ratio >300 mg/g ([facility]) 04/24/2023    Carotid atherosclerosis, bilateral 04/24/2023    Hyponatremia 07/14/2021    PAD (peripheral artery disease) ([facility]) 07/14/2021    Anemia 11/27/2020    Fall 11/25/2020    Stage 3 chronic kidney disease 11/25/2020    CAD (coronary artery disease) 11/25/2020    Other lack of coordination 05/02/2019    Muscle weakness (generalized) 05/02/2019    Hypo-osmolality and hyponatremia 05/02/2019    Difficulty in walking, not elsewhere classified 05/02/2019    Acidosis 05/02/2019    Closed fracture of neck of right femur ([facility]) 04/27/2019    Alcohol abuse 04/27/2019    Dependence on nicotine from cigarettes 04/27/2019    Dry skin dermatitis 04/11/2018    Carotid stenosis, asymptomatic, right 12/31/2015    S/P insertion of iliac artery stent 12/31/2015    Hypertension 07/01/2013    DDD (degenerative disc disease), lumbar 01/09/2013    Osteoporosis, post-menopausal      Past Surgical History:   Procedure Laterality Date    PB PARTIAL HIP REPLACEMENT  11/28/2020    Procedure: HEMIARTHROPLASTY, HIP REVISION;  Surgeon: Mohsen Thomas M.D.;  Location: [facility];  Service: Orthopedics    PB PARTIAL HIP REPLACEMENT Right 4/28/2019    Procedure: HEMIARTHROPLASTY, HIP;  Surgeon: Evens Sarabia M.D.;  Location: [facility];  Service: Orthopedics    RECOVERY  2/9/2015    Performed by Ir-Recovery Surgery at SURGERY SAME DAY [facility] ORS    FEMORAL ARTERY REPAIR  2/9/2015    Performed by Yuly Chirinos M.D. at [facility] ORS    HYSTEROSCOPY WITH VIDEO DIAGNOSTIC  11/17/2010    Performed by ALIS DEGROOT at SURGERY SAME DAY [facility] ORS    DILATION AND CURETTAGE  11/17/2010    Performed by ALIS DEGROOT at SURGERY SAME DAY [facility] ORS    ORIF, FRACTURE, HUMERUS  9/5/08    Performed by LAURA CARTER at [facility] ORS    COLONOSCOPY  2008    recheck 4 years    OTHER ORTHOPEDIC SURGERY      rt leg fx    OTHER  ORTHOPEDIC SURGERY      broken right wrist      Social History     Tobacco Use    Smoking status: Every Day     Current packs/day: 1.00     Average packs/day: 1 pack/day for 49.2 years (49.2 ttl pk-yrs)     Types: Cigarettes     Start date: 2/2/1975    Smokeless tobacco: Never   Vaping Use    Vaping Use: Never used   Substance Use Topics    Alcohol use: Not Currently     Comment: 4/week    Drug use: No     Family History   Problem Relation Age of Onset    Cancer Mother         leukemia    Heart Disease Neg Hx     Heart Attack Neg Hx            ROS    all review of system completed and negative except for those listed above     Objective:     /62 (BP Location: Right arm, Patient Position: Sitting, BP Cuff Size: Adult)   Pulse 67   Temp 36.8 °C (98.3 °F) (Temporal)   Resp 16   Wt 58.4 kg (128 lb 12 oz)   SpO2 96%  Body mass index is 22.81 kg/m².  Physical Exam:        GEN: comfortable, alert and oriented, well nourished, well developed, in no apparent distress   HEENT: NCAT, eyes: pupils equal and reactive, sclera white, EOMIT, good dentition  HEART: limbs warm and well perfused, regular rate, no JVD, no lower extremity edema  LUNGS: speaking in full sentences, not in apparent respiratory distress, no audible wheezes  MSK: normal tone and bulk, no swelling of the joints, gait steady and normal           Assessment and Plan:   The following treatment plan was discussed        Problem List Items Addressed This Visit       CKD stage G4/A3, GFR 15-29 and albumin creatinine ratio >300 mg/g (HCC)     Keeping up relationship with nephrology          Dyslipidemia     Tolerating statin            Stress due to illness of family member      had CVA  I encourage her to contact home health agencies private vs non private or consider hiring a personal healthcare assistant if financially possible                       Instructed to follow up if symptoms worsen or fail to improve, ER/UC precautions discussed as  beth Hernández MD  [facility] [address]   [place] [address]  Phone: [phone]

## 2024-04-09 NOTE — ASSESSMENT & PLAN NOTE
had CVA  I encourage her to contact home health agencies private vs non private or consider hiring a personal healthcare assistant if financially possible

## 2024-04-23 ENCOUNTER — OFFICE VISIT (OUTPATIENT)
Dept: NEPHROLOGY | Facility: MEDICAL CENTER | Age: 79
End: 2024-04-23
Payer: COMMERCIAL

## 2024-04-23 VITALS
BODY MASS INDEX: 23.04 KG/M2 | WEIGHT: 130 LBS | OXYGEN SATURATION: 95 % | TEMPERATURE: 98.2 F | SYSTOLIC BLOOD PRESSURE: 124 MMHG | HEART RATE: 63 BPM | DIASTOLIC BLOOD PRESSURE: 60 MMHG | HEIGHT: 63 IN

## 2024-04-23 DIAGNOSIS — I10 ESSENTIAL HYPERTENSION: ICD-10-CM

## 2024-04-23 DIAGNOSIS — E87.5 HYPERKALEMIA: ICD-10-CM

## 2024-04-23 DIAGNOSIS — N18.4 CKD (CHRONIC KIDNEY DISEASE) STAGE 4, GFR 15-29 ML/MIN (HCC): ICD-10-CM

## 2024-04-23 PROCEDURE — 3078F DIAST BP <80 MM HG: CPT | Performed by: INTERNAL MEDICINE

## 2024-04-23 PROCEDURE — 3074F SYST BP LT 130 MM HG: CPT | Performed by: INTERNAL MEDICINE

## 2024-04-23 PROCEDURE — 99214 OFFICE O/P EST MOD 30 MIN: CPT | Performed by: INTERNAL MEDICINE

## 2024-04-23 ASSESSMENT — ENCOUNTER SYMPTOMS
SHORTNESS OF BREATH: 0
HYPERTENSION: 1
COUGH: 0
VOMITING: 0
CHILLS: 0
FEVER: 0
NAUSEA: 0

## 2024-04-23 ASSESSMENT — FIBROSIS 4 INDEX: FIB4 SCORE: 0.96

## 2024-04-23 NOTE — PROGRESS NOTES
"Subjective     Sangita Bolton is a 78 y.o. female who presents with Hypertension and Chronic Kidney Disease            Hypertension  This is a chronic problem. The current episode started more than 1 year ago. The problem is controlled. Pertinent negatives include no chest pain, malaise/fatigue, peripheral edema or shortness of breath. Risk factors for coronary artery disease include post-menopausal state. Past treatments include beta blockers and angiotensin blockers. The current treatment provides significant improvement. There are no compliance problems.  Hypertensive end-organ damage includes kidney disease. Identifiable causes of hypertension include chronic renal disease.   Chronic Kidney Disease  This is a chronic problem. The current episode started more than 1 year ago. The problem occurs constantly. The problem has been unchanged. Pertinent negatives include no chest pain, chills, coughing, fever, nausea, urinary symptoms or vomiting.       Review of Systems   Constitutional:  Negative for chills, fever and malaise/fatigue.   Respiratory:  Negative for cough and shortness of breath.    Cardiovascular:  Negative for chest pain and leg swelling.   Gastrointestinal:  Negative for nausea and vomiting.   Genitourinary:  Negative for dysuria, frequency and urgency.              Objective     /60 (BP Location: Right arm, Patient Position: Sitting, BP Cuff Size: Adult)   Pulse 63   Temp 36.8 °C (98.2 °F) (Temporal)   Ht 1.6 m (5' 3\")   Wt 59 kg (130 lb)   SpO2 95%   BMI 23.03 kg/m²      Physical Exam  Vitals and nursing note reviewed.   Constitutional:       General: She is not in acute distress.     Appearance: Normal appearance. She is well-developed. She is not diaphoretic.   HENT:      Head: Normocephalic and atraumatic.      Right Ear: External ear normal.      Left Ear: External ear normal.      Nose: Nose normal.   Eyes:      General: No scleral icterus.        Right eye: No discharge.         " Left eye: No discharge.      Conjunctiva/sclera: Conjunctivae normal.   Cardiovascular:      Rate and Rhythm: Normal rate and regular rhythm.      Heart sounds: No murmur heard.  Pulmonary:      Effort: Pulmonary effort is normal. No respiratory distress.      Breath sounds: Normal breath sounds.   Musculoskeletal:         General: No tenderness.      Right lower leg: No edema.      Left lower leg: No edema.   Skin:     General: Skin is warm and dry.      Findings: No erythema.   Neurological:      General: No focal deficit present.      Mental Status: She is alert and oriented to person, place, and time.      Cranial Nerves: No cranial nerve deficit.   Psychiatric:         Mood and Affect: Mood normal.         Behavior: Behavior normal.       Past Medical History:   Diagnosis Date    Alcohol abuse 04/27/2019    Arthritis     generalized-Osteo    Carotid artery stenosis 12/31/2015    Fall 11/25/2020    Gluten intolerance     Hyperlipidemia     Hypertension     Medicare annual wellness visit, subsequent 10/05/2023    OSTEOPOROSIS     Other specified symptom associated with female genital organs     post menaupausal bleeding    Pain 02/06/2015    bilateral legs-chronic>4/10    Stage 3 chronic kidney disease 11/25/2020    Unspecified cataract      Social History     Socioeconomic History    Marital status:      Spouse name: Not on file    Number of children: Not on file    Years of education: Not on file    Highest education level: 12th grade   Occupational History    Not on file   Tobacco Use    Smoking status: Every Day     Current packs/day: 1.00     Average packs/day: 1 pack/day for 49.2 years (49.2 ttl pk-yrs)     Types: Cigarettes     Start date: 2/2/1975    Smokeless tobacco: Never   Vaping Use    Vaping Use: Never used   Substance and Sexual Activity    Alcohol use: Not Currently     Comment: 4/week    Drug use: No    Sexual activity: Not on file     Comment: ,    Other Topics Concern    Not on  file   Social History Narrative    Not on file     Social Determinants of Health     Financial Resource Strain: Low Risk  (10/2/2023)    Overall Financial Resource Strain (CARDIA)     Difficulty of Paying Living Expenses: Not hard at all   Food Insecurity: No Food Insecurity (10/2/2023)    Hunger Vital Sign     Worried About Running Out of Food in the Last Year: Never true     Ran Out of Food in the Last Year: Never true   Transportation Needs: No Transportation Needs (10/2/2023)    PRAPARE - Transportation     Lack of Transportation (Medical): No     Lack of Transportation (Non-Medical): No   Physical Activity: Inactive (10/2/2023)    Exercise Vital Sign     Days of Exercise per Week: 0 days     Minutes of Exercise per Session: 0 min   Stress: No Stress Concern Present (10/2/2023)    [employer] - Occupational Stress Questionnaire     Feeling of Stress : Not at all   Social Connections: Moderately Isolated (10/2/2023)    Social Connection and Isolation Panel [NHANES]     Frequency of Communication with Friends and Family: More than three times a week     Frequency of Social Gatherings with Friends and Family: More than three times a week     Attends [employer] Services: Never     Active Member of Clubs or Organizations: No     Attends Club or Organization Meetings: Never     Marital Status:    Intimate Partner Violence: Not on file   Housing Stability: Low Risk  (10/2/2023)    Housing Stability Vital Sign     Unable to Pay for Housing in the Last Year: No     Number of Places Lived in the Last Year: 1     Unstable Housing in the Last Year: No     Family History   Problem Relation Age of Onset    Cancer Mother         leukemia    Heart Disease Neg Hx     Heart Attack Neg Hx      Recent Labs     07/10/23  0729 10/25/23  0955 03/28/24  0753   ALBUMIN 4.5  --   --    HDL 64  --   --    TRIGLYCERIDE 91  --   --    SODIUM 134* 129* 137   POTASSIUM 5.0 4.9 5.9*   CHLORIDE 102 95* 107    CO2 17* 19* 14*   BUN 33* 33* 46*   CREATININE 1.82* 1.94* 2.33*                             Assessment & Plan        1. CKD (chronic kidney disease) stage 4, GFR 15-29 ml/min ([facility])  Stable  No uremic symptoms  Renal dose of medication  Avoid nephrotoxins  Continue same medication regimen  Patient was advised to call us if symptoms worsen      2. Essential hypertension  Controlled  Continue same medication regimen  Continue low-sodium diet      3. Hyperkalemia  Most likely secondary to spironolactone, I advised the patient to discontinue spironolactone  Recheck labs

## 2024-05-10 NOTE — ASSESSMENT & PLAN NOTE
With the help of nephrology well controlled now  I would like her to continue to see nephrology q6 mo          no

## 2024-07-08 DIAGNOSIS — I73.9 PAD (PERIPHERAL ARTERY DISEASE) (HCC): ICD-10-CM

## 2024-07-09 RX ORDER — ROSUVASTATIN CALCIUM 10 MG/1
10 TABLET, COATED ORAL EVERY EVENING
Qty: 90 TABLET | Refills: 0 | Status: SHIPPED | OUTPATIENT
Start: 2024-07-09

## 2024-07-27 DIAGNOSIS — I10 HYPERTENSION, UNSPECIFIED TYPE: ICD-10-CM

## 2024-08-06 RX ORDER — LOSARTAN POTASSIUM 100 MG/1
100 TABLET ORAL
Qty: 90 TABLET | Refills: 3 | Status: SHIPPED | OUTPATIENT
Start: 2024-08-06

## 2024-09-04 SDOH — ECONOMIC STABILITY: FOOD INSECURITY: WITHIN THE PAST 12 MONTHS, THE FOOD YOU BOUGHT JUST DIDN'T LAST AND YOU DIDN'T HAVE MONEY TO GET MORE.: NEVER TRUE

## 2024-09-04 SDOH — ECONOMIC STABILITY: FOOD INSECURITY: WITHIN THE PAST 12 MONTHS, YOU WORRIED THAT YOUR FOOD WOULD RUN OUT BEFORE YOU GOT MONEY TO BUY MORE.: NEVER TRUE

## 2024-09-04 SDOH — HEALTH STABILITY: PHYSICAL HEALTH: ON AVERAGE, HOW MANY MINUTES DO YOU ENGAGE IN EXERCISE AT THIS LEVEL?: 0 MIN

## 2024-09-04 SDOH — ECONOMIC STABILITY: INCOME INSECURITY: HOW HARD IS IT FOR YOU TO PAY FOR THE VERY BASICS LIKE FOOD, HOUSING, MEDICAL CARE, AND HEATING?: NOT HARD AT ALL

## 2024-09-04 SDOH — ECONOMIC STABILITY: INCOME INSECURITY: IN THE LAST 12 MONTHS, WAS THERE A TIME WHEN YOU WERE NOT ABLE TO PAY THE MORTGAGE OR RENT ON TIME?: NO

## 2024-09-04 SDOH — HEALTH STABILITY: PHYSICAL HEALTH: ON AVERAGE, HOW MANY DAYS PER WEEK DO YOU ENGAGE IN MODERATE TO STRENUOUS EXERCISE (LIKE A BRISK WALK)?: 0 DAYS

## 2024-09-04 ASSESSMENT — SOCIAL DETERMINANTS OF HEALTH (SDOH)
IN THE PAST 12 MONTHS, HAS THE ELECTRIC, GAS, OIL, OR WATER COMPANY THREATENED TO SHUT OFF SERVICE IN YOUR HOME?: NO
HOW MANY DRINKS CONTAINING ALCOHOL DO YOU HAVE ON A TYPICAL DAY WHEN YOU ARE DRINKING: PATIENT DOES NOT DRINK
WITHIN THE PAST 12 MONTHS, YOU WORRIED THAT YOUR FOOD WOULD RUN OUT BEFORE YOU GOT THE MONEY TO BUY MORE: NEVER TRUE
HOW HARD IS IT FOR YOU TO PAY FOR THE VERY BASICS LIKE FOOD, HOUSING, MEDICAL CARE, AND HEATING?: NOT HARD AT ALL
HOW OFTEN DO YOU ATTEND CHURCH OR RELIGIOUS SERVICES?: NEVER
IN A TYPICAL WEEK, HOW MANY TIMES DO YOU TALK ON THE PHONE WITH FAMILY, FRIENDS, OR NEIGHBORS?: PATIENT DECLINED
HOW OFTEN DO YOU ATTENT MEETINGS OF THE CLUB OR ORGANIZATION YOU BELONG TO?: PATIENT DECLINED
HOW OFTEN DO YOU HAVE A DRINK CONTAINING ALCOHOL: MONTHLY OR LESS
HOW OFTEN DO YOU GET TOGETHER WITH FRIENDS OR RELATIVES?: PATIENT DECLINED
HOW OFTEN DO YOU GET TOGETHER WITH FRIENDS OR RELATIVES?: PATIENT DECLINED
HOW OFTEN DO YOU ATTENT MEETINGS OF THE CLUB OR ORGANIZATION YOU BELONG TO?: PATIENT DECLINED
HOW OFTEN DO YOU HAVE SIX OR MORE DRINKS ON ONE OCCASION: NEVER
IN A TYPICAL WEEK, HOW MANY TIMES DO YOU TALK ON THE PHONE WITH FAMILY, FRIENDS, OR NEIGHBORS?: PATIENT DECLINED
DO YOU BELONG TO ANY CLUBS OR ORGANIZATIONS SUCH AS CHURCH GROUPS UNIONS, FRATERNAL OR ATHLETIC GROUPS, OR SCHOOL GROUPS?: NO
DO YOU BELONG TO ANY CLUBS OR ORGANIZATIONS SUCH AS CHURCH GROUPS UNIONS, FRATERNAL OR ATHLETIC GROUPS, OR SCHOOL GROUPS?: NO
HOW OFTEN DO YOU ATTEND CHURCH OR RELIGIOUS SERVICES?: NEVER

## 2024-09-04 ASSESSMENT — LIFESTYLE VARIABLES
AUDIT-C TOTAL SCORE: 1
SKIP TO QUESTIONS 9-10: 1
HOW OFTEN DO YOU HAVE SIX OR MORE DRINKS ON ONE OCCASION: NEVER
HOW OFTEN DO YOU HAVE A DRINK CONTAINING ALCOHOL: MONTHLY OR LESS
HOW MANY STANDARD DRINKS CONTAINING ALCOHOL DO YOU HAVE ON A TYPICAL DAY: PATIENT DOES NOT DRINK

## 2024-09-10 ENCOUNTER — OFFICE VISIT (OUTPATIENT)
Dept: MEDICAL GROUP | Facility: MEDICAL CENTER | Age: 79
End: 2024-09-10
Payer: COMMERCIAL

## 2024-09-10 ENCOUNTER — HOSPITAL ENCOUNTER (INPATIENT)
Facility: MEDICAL CENTER | Age: 79
LOS: 5 days | DRG: 871 | End: 2024-09-15
Attending: EMERGENCY MEDICINE | Admitting: HOSPITALIST
Payer: MEDICARE

## 2024-09-10 ENCOUNTER — APPOINTMENT (OUTPATIENT)
Dept: RADIOLOGY | Facility: MEDICAL CENTER | Age: 79
DRG: 871 | End: 2024-09-10
Attending: EMERGENCY MEDICINE
Payer: MEDICARE

## 2024-09-10 VITALS
HEART RATE: 93 BPM | WEIGHT: 123.8 LBS | DIASTOLIC BLOOD PRESSURE: 60 MMHG | OXYGEN SATURATION: 83 % | SYSTOLIC BLOOD PRESSURE: 98 MMHG | HEIGHT: 65 IN | RESPIRATION RATE: 20 BRPM | BODY MASS INDEX: 20.62 KG/M2 | TEMPERATURE: 97.6 F

## 2024-09-10 DIAGNOSIS — L89.300 PRESSURE INJURY OF BUTTOCK, UNSTAGEABLE, UNSPECIFIED LATERALITY (HCC): ICD-10-CM

## 2024-09-10 DIAGNOSIS — R09.02 HYPOXIA: ICD-10-CM

## 2024-09-10 DIAGNOSIS — R91.8 PULMONARY NODULES: ICD-10-CM

## 2024-09-10 DIAGNOSIS — U07.1 COVID-19: ICD-10-CM

## 2024-09-10 DIAGNOSIS — N18.31 STAGE 3A CHRONIC KIDNEY DISEASE: ICD-10-CM

## 2024-09-10 DIAGNOSIS — N18.4 CKD STAGE G4/A3, GFR 15-29 AND ALBUMIN CREATININE RATIO >300 MG/G (HCC): ICD-10-CM

## 2024-09-10 DIAGNOSIS — N17.9 ACUTE RENAL FAILURE, UNSPECIFIED ACUTE RENAL FAILURE TYPE (HCC): ICD-10-CM

## 2024-09-10 DIAGNOSIS — I27.20 PULMONARY HYPERTENSION (HCC): Chronic | ICD-10-CM

## 2024-09-10 DIAGNOSIS — E87.6 DIURETIC-INDUCED HYPOKALEMIA: ICD-10-CM

## 2024-09-10 DIAGNOSIS — J96.01 ACUTE HYPOXEMIC RESPIRATORY FAILURE (HCC): ICD-10-CM

## 2024-09-10 DIAGNOSIS — A41.9 SEPSIS, DUE TO UNSPECIFIED ORGANISM, UNSPECIFIED WHETHER ACUTE ORGAN DYSFUNCTION PRESENT (HCC): ICD-10-CM

## 2024-09-10 DIAGNOSIS — T50.2X5A DIURETIC-INDUCED HYPOKALEMIA: ICD-10-CM

## 2024-09-10 DIAGNOSIS — N30.00 ACUTE CYSTITIS WITHOUT HEMATURIA: ICD-10-CM

## 2024-09-10 DIAGNOSIS — E87.20 METABOLIC ACIDOSIS: ICD-10-CM

## 2024-09-10 DIAGNOSIS — J18.9 PNEUMONIA OF RIGHT LOWER LOBE DUE TO INFECTIOUS ORGANISM: ICD-10-CM

## 2024-09-10 DIAGNOSIS — Z72.0 TOBACCO USE: ICD-10-CM

## 2024-09-10 DIAGNOSIS — I36.1 NONRHEUMATIC TRICUSPID VALVE REGURGITATION: ICD-10-CM

## 2024-09-10 DIAGNOSIS — J43.2 CENTRILOBULAR EMPHYSEMA (HCC): ICD-10-CM

## 2024-09-10 PROBLEM — R19.7 DIARRHEA: Status: ACTIVE | Noted: 2024-09-10

## 2024-09-10 PROBLEM — Z71.89 ACP (ADVANCE CARE PLANNING): Status: ACTIVE | Noted: 2024-09-10

## 2024-09-10 PROBLEM — R79.89 ELEVATED TROPONIN: Status: ACTIVE | Noted: 2024-09-10

## 2024-09-10 LAB
ALBUMIN SERPL BCP-MCNC: 3.6 G/DL (ref 3.2–4.9)
ALBUMIN/GLOB SERPL: 0.8 G/DL
ALP SERPL-CCNC: 111 U/L (ref 30–99)
ALT SERPL-CCNC: 19 U/L (ref 2–50)
ANION GAP SERPL CALC-SCNC: 22 MMOL/L (ref 7–16)
ANION GAP SERPL CALC-SCNC: 24 MMOL/L (ref 7–16)
AST SERPL-CCNC: 26 U/L (ref 12–45)
BASOPHILS # BLD AUTO: 0 % (ref 0–1.8)
BASOPHILS # BLD: 0 K/UL (ref 0–0.12)
BILIRUB SERPL-MCNC: 0.6 MG/DL (ref 0.1–1.5)
BUN SERPL-MCNC: 102 MG/DL (ref 8–22)
BUN SERPL-MCNC: 97 MG/DL (ref 8–22)
CALCIUM ALBUM COR SERPL-MCNC: 9.5 MG/DL (ref 8.5–10.5)
CALCIUM SERPL-MCNC: 8.8 MG/DL (ref 8.4–10.2)
CALCIUM SERPL-MCNC: 9.2 MG/DL (ref 8.4–10.2)
CHLORIDE SERPL-SCNC: 89 MMOL/L (ref 96–112)
CHLORIDE SERPL-SCNC: 94 MMOL/L (ref 96–112)
CO2 SERPL-SCNC: 11 MMOL/L (ref 20–33)
CO2 SERPL-SCNC: 13 MMOL/L (ref 20–33)
CREAT SERPL-MCNC: 4.08 MG/DL (ref 0.5–1.4)
CREAT SERPL-MCNC: 4.13 MG/DL (ref 0.5–1.4)
EKG IMPRESSION: NORMAL
EOSINOPHIL # BLD AUTO: 0 K/UL (ref 0–0.51)
EOSINOPHIL NFR BLD: 0 % (ref 0–6.9)
ERYTHROCYTE [DISTWIDTH] IN BLOOD BY AUTOMATED COUNT: 45.9 FL (ref 35.9–50)
FLUAV RNA SPEC QL NAA+PROBE: NEGATIVE
FLUBV RNA SPEC QL NAA+PROBE: NEGATIVE
GFR SERPLBLD CREATININE-BSD FMLA CKD-EPI: 10 ML/MIN/1.73 M 2
GFR SERPLBLD CREATININE-BSD FMLA CKD-EPI: 10 ML/MIN/1.73 M 2
GLOBULIN SER CALC-MCNC: 4.3 G/DL (ref 1.9–3.5)
GLUCOSE SERPL-MCNC: 110 MG/DL (ref 65–99)
GLUCOSE SERPL-MCNC: 113 MG/DL (ref 65–99)
HCT VFR BLD AUTO: 39.6 % (ref 37–47)
HGB BLD-MCNC: 13 G/DL (ref 12–16)
LACTATE SERPL-SCNC: 0.8 MMOL/L (ref 0.5–2)
LACTATE SERPL-SCNC: 2.4 MMOL/L (ref 0.5–2)
LYMPHOCYTES # BLD AUTO: 0.71 K/UL (ref 1–4.8)
LYMPHOCYTES NFR BLD: 5 % (ref 22–41)
MANUAL DIFF BLD: NORMAL
MCH RBC QN AUTO: 29 PG (ref 27–33)
MCHC RBC AUTO-ENTMCNC: 32.8 G/DL (ref 32.2–35.5)
MCV RBC AUTO: 88.4 FL (ref 81.4–97.8)
MONOCYTES # BLD AUTO: 0.28 K/UL (ref 0–0.85)
MONOCYTES NFR BLD AUTO: 2 % (ref 0–13.4)
NEUTROPHILS # BLD AUTO: 13.11 K/UL (ref 1.82–7.42)
NEUTROPHILS NFR BLD: 93 % (ref 44–72)
NRBC # BLD AUTO: 0 K/UL
NRBC BLD-RTO: 0 /100 WBC (ref 0–0.2)
NT-PROBNP SERPL IA-MCNC: ABNORMAL PG/ML (ref 0–125)
PLATELET # BLD AUTO: 436 K/UL (ref 164–446)
PLATELET BLD QL SMEAR: NORMAL
PMV BLD AUTO: 9.8 FL (ref 9–12.9)
POTASSIUM SERPL-SCNC: 4 MMOL/L (ref 3.6–5.5)
POTASSIUM SERPL-SCNC: 4.3 MMOL/L (ref 3.6–5.5)
PROCALCITONIN SERPL-MCNC: 17.26 NG/ML
PROT SERPL-MCNC: 7.9 G/DL (ref 6–8.2)
RBC # BLD AUTO: 4.48 M/UL (ref 4.2–5.4)
RBC BLD AUTO: NORMAL
RSV RNA SPEC QL NAA+PROBE: NEGATIVE
SARS-COV-2 RNA RESP QL NAA+PROBE: DETECTED
SODIUM SERPL-SCNC: 126 MMOL/L (ref 135–145)
SODIUM SERPL-SCNC: 127 MMOL/L (ref 135–145)
SPECIMEN SOURCE: ABNORMAL
TROPONIN T SERPL-MCNC: 69 NG/L (ref 6–19)
TROPONIN T SERPL-MCNC: 75 NG/L (ref 6–19)
VARIANT LYMPHS BLD QL SMEAR: NORMAL
WBC # BLD AUTO: 14.1 K/UL (ref 4.8–10.8)

## 2024-09-10 PROCEDURE — 51798 US URINE CAPACITY MEASURE: CPT

## 2024-09-10 PROCEDURE — 80048 BASIC METABOLIC PNL TOTAL CA: CPT

## 2024-09-10 PROCEDURE — 3074F SYST BP LT 130 MM HG: CPT | Performed by: FAMILY MEDICINE

## 2024-09-10 PROCEDURE — 700111 HCHG RX REV CODE 636 W/ 250 OVERRIDE (IP): Mod: JZ | Performed by: HOSPITALIST

## 2024-09-10 PROCEDURE — 84484 ASSAY OF TROPONIN QUANT: CPT

## 2024-09-10 PROCEDURE — 96375 TX/PRO/DX INJ NEW DRUG ADDON: CPT

## 2024-09-10 PROCEDURE — 700111 HCHG RX REV CODE 636 W/ 250 OVERRIDE (IP): Mod: JZ | Performed by: EMERGENCY MEDICINE

## 2024-09-10 PROCEDURE — 85007 BL SMEAR W/DIFF WBC COUNT: CPT

## 2024-09-10 PROCEDURE — 700105 HCHG RX REV CODE 258: Performed by: HOSPITALIST

## 2024-09-10 PROCEDURE — 71045 X-RAY EXAM CHEST 1 VIEW: CPT

## 2024-09-10 PROCEDURE — 87040 BLOOD CULTURE FOR BACTERIA: CPT

## 2024-09-10 PROCEDURE — 83880 ASSAY OF NATRIURETIC PEPTIDE: CPT

## 2024-09-10 PROCEDURE — A9270 NON-COVERED ITEM OR SERVICE: HCPCS | Performed by: EMERGENCY MEDICINE

## 2024-09-10 PROCEDURE — 36415 COLL VENOUS BLD VENIPUNCTURE: CPT

## 2024-09-10 PROCEDURE — 84145 PROCALCITONIN (PCT): CPT

## 2024-09-10 PROCEDURE — 96374 THER/PROPH/DIAG INJ IV PUSH: CPT

## 2024-09-10 PROCEDURE — 99285 EMERGENCY DEPT VISIT HI MDM: CPT

## 2024-09-10 PROCEDURE — 3078F DIAST BP <80 MM HG: CPT | Performed by: FAMILY MEDICINE

## 2024-09-10 PROCEDURE — 700102 HCHG RX REV CODE 250 W/ 637 OVERRIDE(OP): Performed by: HOSPITALIST

## 2024-09-10 PROCEDURE — 700102 HCHG RX REV CODE 250 W/ 637 OVERRIDE(OP): Performed by: EMERGENCY MEDICINE

## 2024-09-10 PROCEDURE — A9270 NON-COVERED ITEM OR SERVICE: HCPCS | Performed by: HOSPITALIST

## 2024-09-10 PROCEDURE — 93005 ELECTROCARDIOGRAM TRACING: CPT | Performed by: EMERGENCY MEDICINE

## 2024-09-10 PROCEDURE — 0241U HCHG SARS-COV-2 COVID-19 NFCT DS RESP RNA 4 TRGT MIC: CPT

## 2024-09-10 PROCEDURE — 770020 HCHG ROOM/CARE - TELE (206)

## 2024-09-10 PROCEDURE — 80053 COMPREHEN METABOLIC PANEL: CPT

## 2024-09-10 PROCEDURE — 99214 OFFICE O/P EST MOD 30 MIN: CPT | Performed by: FAMILY MEDICINE

## 2024-09-10 PROCEDURE — 83605 ASSAY OF LACTIC ACID: CPT

## 2024-09-10 PROCEDURE — 85027 COMPLETE CBC AUTOMATED: CPT

## 2024-09-10 PROCEDURE — 99497 ADVNCD CARE PLAN 30 MIN: CPT | Mod: 25 | Performed by: HOSPITALIST

## 2024-09-10 PROCEDURE — 99223 1ST HOSP IP/OBS HIGH 75: CPT | Mod: 25 | Performed by: HOSPITALIST

## 2024-09-10 RX ORDER — AMOXICILLIN 250 MG
2 CAPSULE ORAL 2 TIMES DAILY
Status: DISCONTINUED | OUTPATIENT
Start: 2024-09-10 | End: 2024-09-10

## 2024-09-10 RX ORDER — AZITHROMYCIN 250 MG/1
500 TABLET, FILM COATED ORAL ONCE
Status: COMPLETED | OUTPATIENT
Start: 2024-09-10 | End: 2024-09-10

## 2024-09-10 RX ORDER — ACETAMINOPHEN 325 MG/1
650 TABLET ORAL EVERY 6 HOURS PRN
Status: DISCONTINUED | OUTPATIENT
Start: 2024-09-10 | End: 2024-09-15 | Stop reason: HOSPADM

## 2024-09-10 RX ORDER — OXYCODONE HYDROCHLORIDE 5 MG/1
5 TABLET ORAL
Status: DISCONTINUED | OUTPATIENT
Start: 2024-09-10 | End: 2024-09-15 | Stop reason: HOSPADM

## 2024-09-10 RX ORDER — POLYETHYLENE GLYCOL 3350 17 G/17G
1 POWDER, FOR SOLUTION ORAL
Status: DISCONTINUED | OUTPATIENT
Start: 2024-09-10 | End: 2024-09-10

## 2024-09-10 RX ORDER — ROSUVASTATIN CALCIUM 10 MG/1
10 TABLET, COATED ORAL EVERY EVENING
Status: DISCONTINUED | OUTPATIENT
Start: 2024-09-10 | End: 2024-09-15 | Stop reason: HOSPADM

## 2024-09-10 RX ORDER — HYDROMORPHONE HYDROCHLORIDE 1 MG/ML
0.25 INJECTION, SOLUTION INTRAMUSCULAR; INTRAVENOUS; SUBCUTANEOUS
Status: DISCONTINUED | OUTPATIENT
Start: 2024-09-10 | End: 2024-09-15 | Stop reason: HOSPADM

## 2024-09-10 RX ORDER — SODIUM CHLORIDE 9 MG/ML
INJECTION, SOLUTION INTRAVENOUS CONTINUOUS
Status: DISCONTINUED | OUTPATIENT
Start: 2024-09-10 | End: 2024-09-10

## 2024-09-10 RX ORDER — ASPIRIN 81 MG/1
81 TABLET ORAL DAILY
Status: DISCONTINUED | OUTPATIENT
Start: 2024-09-10 | End: 2024-09-15 | Stop reason: HOSPADM

## 2024-09-10 RX ORDER — ASPIRIN 81 MG/1
81 TABLET ORAL DAILY
Status: DISCONTINUED | OUTPATIENT
Start: 2024-09-11 | End: 2024-09-10

## 2024-09-10 RX ORDER — CEFTRIAXONE 2 G/1
2000 INJECTION, POWDER, FOR SOLUTION INTRAMUSCULAR; INTRAVENOUS ONCE
Status: COMPLETED | OUTPATIENT
Start: 2024-09-10 | End: 2024-09-10

## 2024-09-10 RX ORDER — DEXAMETHASONE 4 MG/1
6 TABLET ORAL DAILY
Status: DISCONTINUED | OUTPATIENT
Start: 2024-09-10 | End: 2024-09-15 | Stop reason: HOSPADM

## 2024-09-10 RX ORDER — METOPROLOL SUCCINATE 25 MG/1
25 TABLET, EXTENDED RELEASE ORAL DAILY
Status: DISCONTINUED | OUTPATIENT
Start: 2024-09-11 | End: 2024-09-15 | Stop reason: HOSPADM

## 2024-09-10 RX ORDER — OXYCODONE HYDROCHLORIDE 5 MG/1
2.5 TABLET ORAL
Status: DISCONTINUED | OUTPATIENT
Start: 2024-09-10 | End: 2024-09-15 | Stop reason: HOSPADM

## 2024-09-10 RX ORDER — AMLODIPINE BESYLATE 5 MG/1
10 TABLET ORAL DAILY
Status: DISCONTINUED | OUTPATIENT
Start: 2024-09-11 | End: 2024-09-15 | Stop reason: HOSPADM

## 2024-09-10 RX ORDER — HEPARIN SODIUM 5000 [USP'U]/ML
5000 INJECTION, SOLUTION INTRAVENOUS; SUBCUTANEOUS EVERY 8 HOURS
Status: DISCONTINUED | OUTPATIENT
Start: 2024-09-11 | End: 2024-09-15 | Stop reason: HOSPADM

## 2024-09-10 RX ORDER — SODIUM CHLORIDE 9 MG/ML
30 INJECTION, SOLUTION INTRAVENOUS
Status: DISCONTINUED | OUTPATIENT
Start: 2024-09-10 | End: 2024-09-10

## 2024-09-10 RX ORDER — SODIUM CHLORIDE 9 MG/ML
INJECTION, SOLUTION INTRAVENOUS CONTINUOUS
Status: ACTIVE | OUTPATIENT
Start: 2024-09-10 | End: 2024-09-11

## 2024-09-10 RX ADMIN — CEFTRIAXONE SODIUM 2000 MG: 2 INJECTION, POWDER, FOR SOLUTION INTRAMUSCULAR; INTRAVENOUS at 13:45

## 2024-09-10 RX ADMIN — AZITHROMYCIN DIHYDRATE 500 MG: 250 TABLET ORAL at 13:44

## 2024-09-10 RX ADMIN — ASPIRIN 81 MG: 81 TABLET, COATED ORAL at 18:12

## 2024-09-10 RX ADMIN — DEXAMETHASONE 6 MG: 4 TABLET ORAL at 14:32

## 2024-09-10 RX ADMIN — SODIUM BICARBONATE 50 MEQ: 84 INJECTION INTRAVENOUS at 18:08

## 2024-09-10 RX ADMIN — SODIUM CHLORIDE 1000 ML: 9 INJECTION, SOLUTION INTRAVENOUS at 15:37

## 2024-09-10 RX ADMIN — ROSUVASTATIN CALCIUM 10 MG: 10 TABLET, FILM COATED ORAL at 18:13

## 2024-09-10 RX ADMIN — SODIUM BICARBONATE 50 MEQ: 84 INJECTION INTRAVENOUS at 21:52

## 2024-09-10 ASSESSMENT — ENCOUNTER SYMPTOMS
STRIDOR: 0
VOMITING: 0
SHORTNESS OF BREATH: 1
EYE REDNESS: 0
ABDOMINAL PAIN: 0
FEVER: 0
CHILLS: 0
NERVOUS/ANXIOUS: 0
FOCAL WEAKNESS: 0
BRUISES/BLEEDS EASILY: 0
FLANK PAIN: 0
MYALGIAS: 0
COUGH: 1
DIARRHEA: 1
EYE DISCHARGE: 0

## 2024-09-10 ASSESSMENT — COPD QUESTIONNAIRES
HAVE YOU SMOKED AT LEAST 100 CIGARETTES IN YOUR ENTIRE LIFE: YES
DURING THE PAST 4 WEEKS HOW MUCH DID YOU FEEL SHORT OF BREATH: NONE/LITTLE OF THE TIME
COPD SCREENING SCORE: 4
DO YOU EVER COUGH UP ANY MUCUS OR PHLEGM?: NO/ONLY WITH OCCASIONAL COLDS OR INFECTIONS

## 2024-09-10 ASSESSMENT — LIFESTYLE VARIABLES
CONSUMPTION TOTAL: NEGATIVE
EVER HAD A DRINK FIRST THING IN THE MORNING TO STEADY YOUR NERVES TO GET RID OF A HANGOVER: NO
HAVE PEOPLE ANNOYED YOU BY CRITICIZING YOUR DRINKING: NO
ON A TYPICAL DAY WHEN YOU DRINK ALCOHOL HOW MANY DRINKS DO YOU HAVE: 0
HAVE YOU EVER FELT YOU SHOULD CUT DOWN ON YOUR DRINKING: NO
TOTAL SCORE: 0
ALCOHOL_USE: NO
TOTAL SCORE: 0
TOTAL SCORE: 0
HOW MANY TIMES IN THE PAST YEAR HAVE YOU HAD 5 OR MORE DRINKS IN A DAY: 0
EVER FELT BAD OR GUILTY ABOUT YOUR DRINKING: NO
AVERAGE NUMBER OF DAYS PER WEEK YOU HAVE A DRINK CONTAINING ALCOHOL: 0

## 2024-09-10 ASSESSMENT — SOCIAL DETERMINANTS OF HEALTH (SDOH)
WITHIN THE LAST YEAR, HAVE TO BEEN RAPED OR FORCED TO HAVE ANY KIND OF SEXUAL ACTIVITY BY YOUR PARTNER OR EX-PARTNER?: NO
WITHIN THE PAST 12 MONTHS, YOU WORRIED THAT YOUR FOOD WOULD RUN OUT BEFORE YOU GOT THE MONEY TO BUY MORE: NEVER TRUE
WITHIN THE LAST YEAR, HAVE YOU BEEN AFRAID OF YOUR PARTNER OR EX-PARTNER?: NO
IN THE PAST 12 MONTHS, HAS THE ELECTRIC, GAS, OIL, OR WATER COMPANY THREATENED TO SHUT OFF SERVICE IN YOUR HOME?: NO
WITHIN THE PAST 12 MONTHS, THE FOOD YOU BOUGHT JUST DIDN'T LAST AND YOU DIDN'T HAVE MONEY TO GET MORE: NEVER TRUE
WITHIN THE LAST YEAR, HAVE YOU BEEN HUMILIATED OR EMOTIONALLY ABUSED IN OTHER WAYS BY YOUR PARTNER OR EX-PARTNER?: NO
WITHIN THE LAST YEAR, HAVE YOU BEEN KICKED, HIT, SLAPPED, OR OTHERWISE PHYSICALLY HURT BY YOUR PARTNER OR EX-PARTNER?: NO

## 2024-09-10 ASSESSMENT — COGNITIVE AND FUNCTIONAL STATUS - GENERAL
MOVING TO AND FROM BED TO CHAIR: A LOT
CLIMB 3 TO 5 STEPS WITH RAILING: A LITTLE
TURNING FROM BACK TO SIDE WHILE IN FLAT BAD: A LITTLE
DRESSING REGULAR UPPER BODY CLOTHING: A LITTLE
WALKING IN HOSPITAL ROOM: A LITTLE
PERSONAL GROOMING: A LITTLE
EATING MEALS: A LITTLE
MOBILITY SCORE: 17
DAILY ACTIVITIY SCORE: 18
STANDING UP FROM CHAIR USING ARMS: A LITTLE
DRESSING REGULAR LOWER BODY CLOTHING: A LITTLE
HELP NEEDED FOR BATHING: A LITTLE
SUGGESTED CMS G CODE MODIFIER DAILY ACTIVITY: CK
TOILETING: A LITTLE
MOVING FROM LYING ON BACK TO SITTING ON SIDE OF FLAT BED: A LITTLE
SUGGESTED CMS G CODE MODIFIER MOBILITY: CK

## 2024-09-10 ASSESSMENT — PAIN DESCRIPTION - PAIN TYPE
TYPE: ACUTE PAIN
TYPE: ACUTE PAIN

## 2024-09-10 ASSESSMENT — FIBROSIS 4 INDEX
FIB4 SCORE: 0.97
FIB4 SCORE: 0.97
FIB4 SCORE: 1.11

## 2024-09-10 ASSESSMENT — PATIENT HEALTH QUESTIONNAIRE - PHQ9
SUM OF ALL RESPONSES TO PHQ9 QUESTIONS 1 AND 2: 0
1. LITTLE INTEREST OR PLEASURE IN DOING THINGS: NOT AT ALL
2. FEELING DOWN, DEPRESSED, IRRITABLE, OR HOPELESS: NOT AT ALL

## 2024-09-10 NOTE — ASSESSMENT & PLAN NOTE
Likely secondary to COVID-19 infection  Canceled C. difficile PCR, patient has not had a bowel movement since admission

## 2024-09-10 NOTE — ED PROVIDER NOTES
ED PHYSICIAN NOTE    CHIEF COMPLAINT  Chief Complaint   Patient presents with    Sent by MD Dr Hernández sent her over after a annual check up. Pt been feeling nauseous and generalized weakness. Per , she has had decreased mobility x 1 week. Usually uses a walker but unable to do that now. No CP or SOB.  Pt O2 80% RA in triage. Placed on 4 L NC now sating 88%.   No flu like symptoms.       EXTERNAL RECORDS REVIEWED  Patient seen in primary doctor's office today.  Noted to be hypoxic and referred to the ER    HPI/ROS    OUTSIDE HISTORIAN(S):  Reports patient has had diarrhea and has had cough.    Sangita Bolton is a 79 y.o. female who presents to the ER at the recommendation of her primary doctor.  She went in for her annual checkup stating she has been feeling okay other than being a bit tired and somewhat weak.  She was noted to be hypoxic referred here.  She denies coughing but is noted to cough in the ED.  She denies abdominal pain.  Denies vomiting but she has been nauseous.  She had 1 diarrheal bowel movement yesterday.  Denies chest pain pleuritic symptoms.  No leg swelling orthopnea PND.  No breathlessness.    PAST MEDICAL HISTORY  Past Medical History:   Diagnosis Date    Alcohol abuse 04/27/2019    Arthritis     generalized-Osteo    Carotid artery stenosis 12/31/2015    Fall 11/25/2020    Gluten intolerance     Hyperlipidemia     Hypertension     Medicare annual wellness visit, subsequent 10/05/2023    OSTEOPOROSIS     Other specified symptom associated with female genital organs     post menaupausal bleeding    Pain 02/06/2015    bilateral legs-chronic>4/10    Stage 3 chronic kidney disease 11/25/2020    Unspecified cataract        SOCIAL HISTORY  Social History     Tobacco Use    Smoking status: Every Day     Current packs/day: 1.00     Average packs/day: 1 pack/day for 49.6 years (49.6 ttl pk-yrs)     Types: Cigarettes     Start date: 2/2/1975    Smokeless tobacco: Never   Vaping Use    Vaping  "status: Never Used   Substance Use Topics    Alcohol use: Not Currently     Comment: 4/week    Drug use: No       CURRENT MEDICATIONS  Home Medications       Reviewed by Osman Addison (Pharmacy Tech) on 09/10/24 at 1344  Med List Status: Complete     Medication Last Dose Status   amLODIPine (NORVASC) 10 MG Tab 9/10/2024 Active   aspirin 81 MG EC tablet 9/9/2024 Active   Cholecalciferol (VITAMIN D3) 125 MCG (5000 UT) Cap 9/10/2024 Active   Cyanocobalamin (VITAMIN B-12 PO) 9/10/2024 Active   losartan (COZAAR) 100 MG Tab 9/10/2024 Active   metoprolol SR (TOPROL XL) 25 MG TABLET SR 24 HR 9/10/2024 Active   rosuvastatin (CRESTOR) 10 MG Tab 9/9/2024 Active                  Audit from Redirected Encounters    **Home medications have not yet been reviewed for this encounter**         ALLERGIES  Allergies   Allergen Reactions    Hair Formula Extra Strength [Kdc:Cranberry Extract+Sambucus Nigra+Vegetable Enzyme+Yellow Dye+...] Hives     Hair Dye    Penicillins Hives     Hands; tolerates cephalosporins (Rocephin Nov 2020)       PHYSICAL EXAM  VITAL SIGNS: /61   Pulse 80   Temp 36.7 °C (98 °F) (Temporal)   Resp 18   Ht 1.6 m (5' 3\")   Wt 59 kg (130 lb)   SpO2 96%   BMI 23.03 kg/m²    Constitutional: Awake and alert  HENT: Normal inspection  Eyes: Normal inspection  Neck: Grossly normal range of motion.  Cardiovascular: Normal heart rate, Normal rhythm.  Symmetric peripheral pulses.   Thorax & Lungs: No respiratory distress, No wheezing, crackles right lower lung field  Abdomen: Bowel sounds normal, soft, non-distended, nontender, no mass  Skin: No obvious rash.  Back: No tenderness, No CVA tenderness.   Extremities: No asymmetric swelling.  No pitting edema.  Neurologic: Grossly normal   Psychiatric: Normal for situation     DIAGNOSTIC STUDIES / PROCEDURES  LABS/EKG  Results for orders placed or performed during the hospital encounter of 09/10/24   CBC with Differential   Result Value Ref Range    WBC 14.1 " (H) 4.8 - 10.8 K/uL    RBC 4.48 4.20 - 5.40 M/uL    Hemoglobin 13.0 12.0 - 16.0 g/dL    Hematocrit 39.6 37.0 - 47.0 %    MCV 88.4 81.4 - 97.8 fL    MCH 29.0 27.0 - 33.0 pg    MCHC 32.8 32.2 - 35.5 g/dL    RDW 45.9 35.9 - 50.0 fL    Platelet Count 436 164 - 446 K/uL    MPV 9.8 9.0 - 12.9 fL    Neutrophils-Polys 93.00 (H) 44.00 - 72.00 %    Lymphocytes 5.00 (L) 22.00 - 41.00 %    Monocytes 2.00 0.00 - 13.40 %    Eosinophils 0.00 0.00 - 6.90 %    Basophils 0.00 0.00 - 1.80 %    Nucleated RBC 0.00 0.00 - 0.20 /100 WBC    Neutrophils (Absolute) 13.11 (H) 1.82 - 7.42 K/uL    Lymphs (Absolute) 0.71 (L) 1.00 - 4.80 K/uL    Monos (Absolute) 0.28 0.00 - 0.85 K/uL    Eos (Absolute) 0.00 0.00 - 0.51 K/uL    Baso (Absolute) 0.00 0.00 - 0.12 K/uL    NRBC (Absolute) 0.00 K/uL   Comp Metabolic Panel   Result Value Ref Range    Sodium 126 (L) 135 - 145 mmol/L    Potassium 4.3 3.6 - 5.5 mmol/L    Chloride 89 (L) 96 - 112 mmol/L    Co2 13 (L) 20 - 33 mmol/L    Anion Gap 24.0 (H) 7.0 - 16.0    Glucose 113 (H) 65 - 99 mg/dL    Bun 102 (H) 8 - 22 mg/dL    Creatinine 4.13 (H) 0.50 - 1.40 mg/dL    Calcium 9.2 8.4 - 10.2 mg/dL    Correct Calcium 9.5 8.5 - 10.5 mg/dL    AST(SGOT) 26 12 - 45 U/L    ALT(SGPT) 19 2 - 50 U/L    Alkaline Phosphatase 111 (H) 30 - 99 U/L    Total Bilirubin 0.6 0.1 - 1.5 mg/dL    Albumin 3.6 3.2 - 4.9 g/dL    Total Protein 7.9 6.0 - 8.2 g/dL    Globulin 4.3 (H) 1.9 - 3.5 g/dL    A-G Ratio 0.8 g/dL   Lactic Acid   Result Value Ref Range    Lactic Acid 2.4 (H) 0.5 - 2.0 mmol/L   Troponin   Result Value Ref Range    Troponin T 75 (H) 6 - 19 ng/L   proBrain Natriuretic Peptide, NT   Result Value Ref Range    NT-proBNP 66871 (H) 0 - 125 pg/mL   CoV-2, Flu A/B, And RSV by PCR ([facility])    Specimen: Respirate   Result Value Ref Range    SARS-CoV-2 Source NP Swab    ESTIMATED GFR   Result Value Ref Range    GFR (CKD-EPI) 10 (A) >60 mL/min/1.73 m 2   DIFFERENTIAL MANUAL   Result Value Ref Range    Manual Diff Status  PERFORMED    PLATELET ESTIMATE   Result Value Ref Range    Plt Estimation Normal    MORPHOLOGY   Result Value Ref Range    RBC Morphology Normal     Reactive Lymphocytes Few    EKG   Result Value Ref Range    Report       [facility]    Test Date:  2024-09-10  Pt Name:    AGUSTIN PORRAS                Department: [facility]  MRN:        [id number]                      Room:  Gender:     Female                       Technician: EM  :        [id number]                   Requested By:ER TRIAGE PROTOCOL  Order #:    [id number]                    Reading MD:    Measurements  Intervals                                Axis  Rate:       81                           P:          0  NJ:         47                           QRS:        -22  QRSD:       122                          T:          86  QT:         381  QTc:        443    Interpretive Statements  Sinus tachycardia  Ventricular premature complex  Short NJ interval  Left bundle branch block  Compared to ECG 2023 14:42:08  Ventricular premature complex(es) now present  Short NJ interval now present  Left bundle-branch block now present  Sinus rhythm no longer present  Intraventricular conduction delay no longer present  M yocardial infarct finding no longer present  T-wave abnormality no longer present        I have independently interpreted this EKG as documented above    Rhythm strip interpretation-sinus rhythm    RADIOLOGY  I have independently interpreted the diagnostic imaging associated with this visit and am waiting the final reading from the radiologist.   My preliminary interpretation is as follows: Chest x-ray without pneumothorax.  Questionable right lower infiltrate on my review    COURSE & MEDICAL DECISION MAKING    INITIAL ASSESSMENT, COURSE AND PLAN  Care Narrative: Patient presents with hypoxia.  She has cough.  She has crackles in the right lower lung fields.  Consider pneumonia.  She appears unwell but is euvolemic  on exam.  Laboratory data was ordered.  Obtain chest x-ray.    WBC count 14,000.  She has acute renal failure creatinine of 4.  Her troponin is elevated possibly from hypoxia EKG does not show acute ischemia will need to be trended.  BNP is elevated without edema.  Echocardiogram would be in order.  IV fluids for now for fear of volume overload.  Chest x-ray interpreted as negative by radiologist, but given clinical findings I suspect pneumonia.  Patient given ceftriaxone and azithromycin in the ED.  She will need to be admitted to hospital.  Condition guarded.  Discussed with Dr. RALPH        Interventions  Medications   cefTRIAXone (Rocephin) injection 2,000 mg (2,000 mg Intravenous Given 9/10/24 1345)   acetaminophen (Tylenol) tablet 650 mg (has no administration in time range)   senna-docusate (Pericolace Or Senokot S) 8.6-50 MG per tablet 2 Tablet (has no administration in time range)     And   polyethylene glycol/lytes (Miralax) Packet 1 Packet (has no administration in time range)   aspirin EC tablet 81 mg (has no administration in time range)   Respiratory Therapy Consult (has no administration in time range)   NS (Bolus) 0.9 % infusion 1,770 mL (has no administration in time range)   heparin injection 5,000 Units (has no administration in time range)   Pharmacy Consult Request ...Pain Management Review 1 Each (has no administration in time range)   oxyCODONE immediate-release (Roxicodone) tablet 2.5 mg (has no administration in time range)     Or   oxyCODONE immediate-release (Roxicodone) tablet 5 mg (has no administration in time range)     Or   HYDROmorphone (Dilaudid) injection 0.25 mg (has no administration in time range)   azithromycin (Zithromax) tablet 500 mg (500 mg Oral Given 9/10/24 1344)       DISPOSITION AND DISCUSSIONS  I have discussed management of the patient with the following physicians and GIL's:  as noted above      FINAL IMPRESSION  1.  Pneumonia  2.  Severe sepsis  3.  Hypoxic respiratory  failure    CRITICAL CARE  The very real possibilty of a deterioration of this patient's condition required the highest level of my preparedness for sudden, emergent intervention.  I provided critical care services, which included medication orders, frequent reevaluations of the patient's condition and response to treatment, ordering and reviewing test results, and discussing the case with various consultants.  The critical care time associated with the care of the patient was 30 minutes. Review chart for interventions. This time is exclusive of any other billable procedures.       This dictation was created using voice recognition software. The accuracy of the dictation is limited to the abilities of the software. I expect there may be some errors of grammar and possibly content. The nursing notes were reviewed and certain aspects of this information were incorporated into this note.    Electronically signed by: Cresencio Kent M.D., 9/10/2024

## 2024-09-10 NOTE — ED NOTES
2nd Bld culture drawn, sent to lab  PO and IV med's given per order  pt in NAD   VSS she is aware of POC to be admitted   went home

## 2024-09-10 NOTE — ASSESSMENT & PLAN NOTE
Likely due to reduced intravascular volume and increase bicarb losses through diarrhea and acute kidney injury  Bicarb 16  Continue sodium bicarb p.o.

## 2024-09-10 NOTE — ASSESSMENT & PLAN NOTE
Due to bacterial community-acquired pneumonia and acute COVID-19 viral pneumonia  Continue dexamethasone  Finished IV ceftriaxone, 5/5 days  Fat[employer] PAPPAS delivered portable concentrator

## 2024-09-10 NOTE — ED NOTES
Medication history reviewed with pt. Med rec is complete.  Allergies reviewed, per pt  Interviewed pt with  at bedside with permission from pt and pts     Pt and pts  does not think pt is taking SPIRONOLACTONE 25MG and ALBUTEROL inhaler, pt had remove from her list of medications.    Patient has not had any outpatient antibiotics in the last 30 days.    Pt is not on any anticoagulants

## 2024-09-10 NOTE — ED TRIAGE NOTES
"Chief Complaint   Patient presents with    Sent by MD Dr Hernández sent her over after a annual check up. Pt been feeling nauseous and generalized weakness. Per , she has had decreased mobility x 1 week. Usually uses a walker but unable to do that now. No CP or SOB.  Pt O2 80% RA in triage. Placed on 4 L NC now sating 88%.   No flu like symptoms.     Pt pale. Oxygen increased to 6 L NC now sating at 90%.     BP 94/56   Pulse 82   Temp 36.7 °C (98.1 °F) (Temporal)   Resp (!) 24   Ht 1.6 m (5' 3\")   Wt 59 kg (130 lb)   SpO2 (!) 80%   BMI 23.03 kg/m²     "

## 2024-09-10 NOTE — PROGRESS NOTES
This medical record contains text that has been entered with the assistance of computer voice recognition and dictation software.  Therefore, it may contain unintended errors in text, spelling, punctuation, or grammar        Chief Complaint   Patient presents with    Nausea     Feels weak x few days.       Sangita Bolton is a 79 y.o. female here evaluation and management of:         Current Outpatient Medications   Medication Sig Dispense Refill    losartan (COZAAR) 100 MG Tab TAKE ONE TABLET BY MOUTH EVERY DAY 90 Tablet 3    rosuvastatin (CRESTOR) 10 MG Tab Take 1 Tablet by mouth every evening. Overdue for follow up, needs appt for further refills 90 Tablet 0    metoprolol SR (TOPROL XL) 25 MG TABLET SR 24 HR Take 1 Tablet by mouth every day. 90 Tablet 3    amLODIPine (NORVASC) 10 MG Tab Take 1 Tablet by mouth every day. 90 Tablet 0    aspirin 81 MG EC tablet Take 1 Tablet by mouth every day.      Cholecalciferol (VITAMIN D3 PO) Take 1 tablet by mouth every day.      Cyanocobalamin (VITAMIN B-12 PO) Take 1 tablet by mouth every day.      spironolactone (ALDACTONE) 25 MG Tab Take 1 Tablet by mouth every day. 90 Tablet 0    albuterol 108 (90 Base) MCG/ACT Aero Soln inhalation aerosol Inhale 2 Puffs every 6 hours as needed for Shortness of Breath. (Patient not taking: Reported on 4/23/2024) 1 Each 0     No current facility-administered medications for this visit.     Patient Active Problem List    Diagnosis Date Noted    Hypoxia 09/10/2024    Stress due to illness of family member 04/09/2024    Nonrheumatic tricuspid valve regurgitation 02/27/2024    Centrilobular emphysema (HCC) 01/12/2024    Pulmonary nodules 01/12/2024    Undiagnosed cardiac murmurs 11/16/2023    Dyslipidemia 11/16/2023    Tobacco use 11/16/2023    Medicare annual wellness visit, subsequent 10/05/2023    Aortic atherosclerosis (HCC) 05/09/2023    Right iliac artery stenosis (HCC) 05/09/2023    Iliac artery occlusion, left (HCC) 05/09/2023     Superior mesenteric artery stenosis (HCC) 05/09/2023    Celiac artery stenosis (HCC) 05/09/2023    CKD stage G4/A3, GFR 15-29 and albumin creatinine ratio >300 mg/g ([facility]) 04/24/2023    Carotid atherosclerosis, bilateral 04/24/2023    Hyponatremia 07/14/2021    PAD (peripheral artery disease) ([facility]) 07/14/2021    Anemia 11/27/2020    Fall 11/25/2020    Stage 3 chronic kidney disease 11/25/2020    CAD (coronary artery disease) 11/25/2020    Other lack of coordination 05/02/2019    Muscle weakness (generalized) 05/02/2019    Hypo-osmolality and hyponatremia 05/02/2019    Difficulty in walking, not elsewhere classified 05/02/2019    Acidosis 05/02/2019    Closed fracture of neck of right femur ([facility]) 04/27/2019    Alcohol abuse 04/27/2019    Dependence on nicotine from cigarettes 04/27/2019    Dry skin dermatitis 04/11/2018    Carotid stenosis, asymptomatic, right 12/31/2015    S/P insertion of iliac artery stent 12/31/2015    Hypertension 07/01/2013    DDD (degenerative disc disease), lumbar 01/09/2013    Osteoporosis, post-menopausal      Past Surgical History:   Procedure Laterality Date    PB PARTIAL HIP REPLACEMENT  11/28/2020    Procedure: HEMIARTHROPLASTY, HIP REVISION;  Surgeon: Mohsen Thomas M.D.;  Location: [facility];  Service: Orthopedics    PB PARTIAL HIP REPLACEMENT Right 4/28/2019    Procedure: HEMIARTHROPLASTY, HIP;  Surgeon: Evens Sarabia M.D.;  Location: [facility];  Service: Orthopedics    RECOVERY  2/9/2015    Performed by [facility] at SURGERY SAME DAY [facility]    FEMORAL ARTERY REPAIR  2/9/2015    Performed by Yuly Chirinos M.D. at [facility]    HYSTEROSCOPY WITH VIDEO DIAGNOSTIC  11/17/2010    Performed by ALIS DEGROOT at SURGERY SAME DAY [facility]    DILATION AND CURETTAGE  11/17/2010    Performed by ALIS DEGROOT at SURGERY SAME DAY [facility]    ORIF, FRACTURE, HUMERUS  9/5/08    Performed by LAURA CARTER at  "[facility]    COLONOSCOPY  2008    recheck 4 years    OTHER ORTHOPEDIC SURGERY      rt leg fx    OTHER ORTHOPEDIC SURGERY      broken right wrist      Social History     Tobacco Use    Smoking status: Every Day     Current packs/day: 1.00     Average packs/day: 1 pack/day for 49.6 years (49.6 ttl pk-yrs)     Types: Cigarettes     Start date: 2/2/1975    Smokeless tobacco: Never   Vaping Use    Vaping status: Never Used   Substance Use Topics    Alcohol use: Not Currently     Comment: 4/week    Drug use: No     Family History   Problem Relation Age of Onset    Cancer Mother         leukemia    Heart Disease Neg Hx     Heart Attack Neg Hx            ROS    all review of system completed and negative except for those listed above     Objective:     BP 98/60 (BP Location: Left arm, Patient Position: Sitting, BP Cuff Size: Adult)   Pulse 93   Temp 36.4 °C (97.6 °F) (Temporal)   Resp 20   Ht 1.651 m (5' 5\")   Wt 56.2 kg (123 lb 12.8 oz)   SpO2 (!) 83%  Body mass index is 20.6 kg/m².  Physical Exam:        GEN: comfortable, alert and oriented, well nourished, well developed, in no apparent distress   HEENT: NCAT, eyes: pupils equal and reactive, sclera white, EOMIT, good dentition  HEART: limbs warm and well perfused, regular rate, no JVD, no lower extremity edema  LUNGS: speaking in full sentences, not in apparent respiratory distress, no audible wheezes  MSK: normal tone and bulk, no swelling of the joints, gait steady and normal           Assessment and Plan:   The following treatment plan was discussed        Problem List Items Addressed This Visit       Hypoxia     Patient's  called that he has been worried about her for a couple days   Patient states she has been feeling fatigued but denies fever chill cough   Upon presentation tachycardia and hypoxia   With 2L O2 her HR returns to normal (80's ) and 02 sat is around 88-89%   I suggest that she go to ER by remsa to have supplemental o2 with " transport but she states her preference is to have her  drive her     We call ER to give them a heads up     30+ min spent                   Instructed to follow up if symptoms worsen or fail to improve, ER/UC precautions discussed as well    Claire Hernández MD  [facility], Family Medicine   [address] [place] [address]   [place] [id number]  Phone: [phone]

## 2024-09-10 NOTE — ASSESSMENT & PLAN NOTE
In the indeterminate range  EKG shows sinus rhythm with a rate of 81.  There is no ST elevation.  There is left bundle branch block that is seen on prior EKGs.  There is flattening of T waves in lead I with T wave inversions in lead aVL.  QTc is 443   Demand ischemia due to 3 concurrent infections  Troponins 69 and 75, plateaued

## 2024-09-10 NOTE — ED NOTES
Hyacinth from Lab called with critical result of procalcitonin at 1726. Critical lab result read back to hyacinth.   Dr. fuchs notified of critical lab result at 1454.  Critical lab result read back by Dr. fuchs.

## 2024-09-10 NOTE — ASSESSMENT & PLAN NOTE
RASHAD on CKD4.  Baseline creatinine less than 2.3 (3/2024)  Creatinine 2.96 from 4.13  Continue Hernandez catheter  Renal ultrasound shows medical renal disease.  Prior MRI A/P 01/2024 showed smaller right kidney.  Due to urinary retention, sepsis and losartan use at home

## 2024-09-10 NOTE — ASSESSMENT & PLAN NOTE
Patient's  called that he has been worried about her for a couple days   Patient states she has been feeling fatigued but denies fever chill cough   Upon presentation tachycardia and hypoxia   With 2L O2 her HR returns to normal (80's ) and 02 sat is around 88-89%   I suggest that she go to ER by remsa to have supplemental o2 with transport but she states her preference is to have her  drive her     We call ER to give them a heads up     30+ min spent

## 2024-09-10 NOTE — H&P
Hospital Medicine History & Physical Note    Date of Service  9/10/2024    Primary Care Physician  Claire Hernández M.D.    Consultants  None      Code Status  Full Code    Chief Complaint  Chief Complaint   Patient presents with    Sent by MD Dr Hernández sent her over after a annual check up. Pt been feeling nauseous and generalized weakness. Per , she has had decreased mobility x 1 week. Usually uses a walker but unable to do that now. No CP or SOB.  Pt O2 80% RA in triage. Placed on 4 L NC now sating 88%.   No flu like symptoms.     History of Presenting Illness  Sangita Bolton is a 79 y.o. female with a past medical history of primary hypertension, hyperlipidemia who presented 9/10/2024 with generalized weakness, easy fatigability, shortness of breath and diarrhea.  The patient has not been feeling well over the past 7 days.  She has been having progressively worsening easy fatigability and shortness of breath.  She was found to be hypoxic saturating 80% at her primary care physician who advised her to go to the hospital.     I discussed the plan of care with emergency physician, and the patient    Review of Systems  Review of Systems   Constitutional:  Positive for malaise/fatigue. Negative for chills and fever.   Eyes:  Negative for discharge and redness.   Respiratory:  Positive for cough and shortness of breath. Negative for stridor.    Cardiovascular:  Negative for chest pain and leg swelling.   Gastrointestinal:  Positive for diarrhea. Negative for abdominal pain and vomiting.   Genitourinary:  Negative for flank pain.   Musculoskeletal:  Negative for myalgias.   Skin: Negative.    Neurological:  Negative for focal weakness.   Endo/Heme/Allergies:  Does not bruise/bleed easily.   Psychiatric/Behavioral:  The patient is not nervous/anxious.      Past Medical History   has a past medical history of Alcohol abuse (04/27/2019), Arthritis, Carotid artery stenosis (12/31/2015), Fall (11/25/2020), Gluten  intolerance, Hyperlipidemia, Hypertension, Medicare annual wellness visit, subsequent (10/05/2023), OSTEOPOROSIS, Other specified symptom associated with female genital organs, Pain (02/06/2015), Stage 3 chronic kidney disease (11/25/2020), and Unspecified cataract.    Surgical History   has a past surgical history that includes colonoscopy (2008); other orthopedic surgery; orif, fracture, humerus (9/5/08); other orthopedic surgery; hysteroscopy with video diagnostic (11/17/2010); dilation and curettage (11/17/2010); recovery (2/9/2015); femoral artery repair (2/9/2015); pr partial hip replacement (Right, 4/28/2019); and pr partial hip replacement (11/28/2020).     Family History  family history includes Cancer in her mother.      Social History   reports that she has been smoking cigarettes. She started smoking about 49 years ago. She has a 49.6 pack-year smoking history. She has never used smokeless tobacco. She reports that she does not currently use alcohol. She reports that she does not use drugs.    Allergies  Allergies   Allergen Reactions    Hair Formula Extra Strength [Kdc:Cranberry Extract+Sambucus Nigra+Vegetable Enzyme+Yellow Dye+...] Hives     Hair Dye    Penicillins Hives     Hands; tolerates cephalosporins (Rocephin Nov 2020)     Medications  Prior to Admission Medications   Prescriptions Last Dose Informant Patient Reported? Taking?   Cholecalciferol (VITAMIN D3 PO)   Yes No   Sig: Take 1 tablet by mouth every day.   Cyanocobalamin (VITAMIN B-12 PO)   Yes No   Sig: Take 1 tablet by mouth every day.   albuterol 108 (90 Base) MCG/ACT Aero Soln inhalation aerosol   No No   Sig: Inhale 2 Puffs every 6 hours as needed for Shortness of Breath.   Patient not taking: Reported on 4/23/2024   amLODIPine (NORVASC) 10 MG Tab   No No   Sig: Take 1 Tablet by mouth every day.   aspirin 81 MG EC tablet   No No   Sig: Take 1 Tablet by mouth every day.   losartan (COZAAR) 100 MG Tab   No No   Sig: TAKE ONE TABLET BY  MOUTH EVERY DAY   metoprolol SR (TOPROL XL) 25 MG TABLET SR 24 HR   No No   Sig: Take 1 Tablet by mouth every day.   rosuvastatin (CRESTOR) 10 MG Tab   No No   Sig: Take 1 Tablet by mouth every evening. Overdue for follow up, needs appt for further refills   spironolactone (ALDACTONE) 25 MG Tab   No No   Sig: Take 1 Tablet by mouth every day.      Facility-Administered Medications: None     Physical Exam  Temp:  [36.3 °C (97.3 °F)-36.7 °C (98.1 °F)] 36.3 °C (97.3 °F)  Pulse:  [76-93] 85  Resp:  [16-24] 16  BP: ()/(50-64) 128/50  SpO2:  [80 %-99 %] 96 %  Blood Pressure : 127/61   Temperature: 36.7 °C (98 °F)   Pulse: 80   Respiration: 18   Pulse Oximetry: 96 %     Physical Exam  Constitutional:       General: She is not in acute distress.  HENT:      Head: Normocephalic and atraumatic.      Right Ear: External ear normal.      Left Ear: External ear normal.      Nose: No congestion or rhinorrhea.      Mouth/Throat:      Mouth: Mucous membranes are moist.      Pharynx: No oropharyngeal exudate or posterior oropharyngeal erythema.   Eyes:      General: No scleral icterus.        Right eye: No discharge.         Left eye: No discharge.      Conjunctiva/sclera: Conjunctivae normal.      Pupils: Pupils are equal, round, and reactive to light.   Cardiovascular:      Rate and Rhythm: Regular rhythm. Tachycardia present.      Heart sounds:      No friction rub. No gallop.   Pulmonary:      Comments: Reduced air entry bilaterally basally.  Requiring 6 L of oxygen to achieve adequate saturation.  Is able to speak in full sentences.  Abdominal:      General: Abdomen is flat. There is no distension.      Tenderness: There is no guarding.   Musculoskeletal:         General: No swelling.      Cervical back: Neck supple. No rigidity. No muscular tenderness.      Right lower leg: No edema.      Left lower leg: No edema.   Skin:     Capillary Refill: Capillary refill takes 2 to 3 seconds.      Coloration: Skin is not jaundiced  or pale.      Findings: No bruising or erythema.   Neurological:      Mental Status: She is alert and oriented to person, place, and time.   Psychiatric:         Mood and Affect: Mood normal.         Judgment: Judgment normal.       Laboratory:  Recent Labs     09/10/24  1234   WBC 14.1*   RBC 4.48   HEMOGLOBIN 13.0   HEMATOCRIT 39.6   MCV 88.4   MCH 29.0   MCHC 32.8   RDW 45.9   PLATELETCT 436   MPV 9.8     Recent Labs     09/10/24  1234 09/10/24  1805   SODIUM 126* 127*   POTASSIUM 4.3 4.0   CHLORIDE 89* 94*   CO2 13* 11*   GLUCOSE 113* 110*   * 97*   CREATININE 4.13* 4.08*   CALCIUM 9.2 8.8     Recent Labs     09/10/24  1234 09/10/24  1805   ALTSGPT 19  --    ASTSGOT 26  --    ALKPHOSPHAT 111*  --    TBILIRUBIN 0.6  --    GLUCOSE 113* 110*         Recent Labs     09/10/24  1234   NTPROBNP 38270*         Recent Labs     09/10/24  1234 09/10/24  1805   TROPONINT 75* 69*     Imaging:  DX-CHEST-PORTABLE (1 VIEW)   Final Result         1. No acute cardiopulmonary abnormalities are identified.        I personally reviewed patient's EKG shows sinus rhythm with a rate of 81.  There is no ST elevation.  There is left bundle branch block that is seen on prior EKGs.  There is flattening of T waves in lead I with T wave inversions in lead aVL.  QTc is 443.    Assessment/Plan:  Justification for Admission Status  I anticipate this patient will require at least two midnights for appropriate medical management, necessitating inpatient admission because the patient has multiple acute medical problems including pneumonia and acute kidney injury.  She will require intravenous fluids and intravenous antibiotics in addition to oxygen, breathing treatment and oxygen titration    Patient will need a Telemetry bed on MEDICAL service     * Acute hypoxemic respiratory failure (HCC)- (present on admission)  Assessment & Plan  Secondary to pneumonia [likely both bacterial and COVID]  Dexamethasone for COVID-19 infection.  Will start  empiric antibiotics with ceftriaxone, follow on cultures and sensitivities  Oxygen as needed, Respiratory protocol, Bronchodilators, Incentive spirometry    Sepsis (HCC)- (present on admission)  Assessment & Plan  This is Sepsis Present on admission  SIRS criteria identified on my evaluation include: Tachypnea, with respirations greater than 20 per minute and Leukocytosis, with WBC greater than 12,000  Clinical indicators of end organ dysfunction include Lactic Acid greater than 2  Source is pneumonia  Sepsis protocol initiated  Crystalloid Fluid Administration: Fluid resuscitation ordered per standard protocol less than 30 mL/kg per current or ideal body weight. Has covid  IV antibiotics as appropriate for source of sepsis  Reassessment: I have reassessed the patient's hemodynamic status    RASHAD (acute kidney injury) (HCC)- (present on admission)  Assessment & Plan  Mostly due to dehydration   I will start intravenous fluids  Avoid / minimize nephrotoxins as much as possible. [I will hold home losartan for now]  Monitor inputs and outputs  I will order bladder scan    Metabolic acidosis- (present on admission)  Assessment & Plan  Likely due to reduced intravascular volume and increase bicarb losses through diarrhea and acute kidney injury  I will start intravenous fluids and bicarb  Anticipate improvement with intravenous fluids  Continue to monitor, I will order follow-up bicarb level         Diarrhea- (present on admission)  Assessment & Plan  Likely secondary to COVID-19 infection  I will check stool for C. difficile toxins.  Supportive care with intravenous fluids monitor electrolytes and replace accordingly     Elevated troponin- (present on admission)  Assessment & Plan  In the indeterminate range  EKG shows sinus rhythm with a rate of 81.  There is no ST elevation.  There is left bundle branch block that is seen on prior EKGs.  There is flattening of T waves in lead I with T wave inversions in lead aVL.  QTc  is 443   I will place on continuous cardiac monitoring    Troponin elevation is likely secondary to demand ischemia secondary to pneumonia and hypoxia.   I anticipate improvement with correcting hypoxia and treating pneumonia  I will trend troponin levels  Stat EKG, troponin for chest pain or worsening shortness of breath     Hyponatremia- (present on admission)  Assessment & Plan  Hypovolemic hyponatremia  I expect Na to improve with IVF     ACP (advance care planning)- (present on admission)  Assessment & Plan  I had a discussion with the patient regarding goals of care, diagnoses, prognosis, and CODE STATUS. We discussed her prognosis and comorbidities.  The patient has advanced age of 79 years with a relatively few number of chronic medical problems including chronic kidney disease and hyperlipidemia.  She is presenting with acute hypoxemic respiratory failure secondary to pneumonia.  At this point the patient wants to obtain a full code.  She is open to all forms of invasive and noninvasive diagnostic and therapeutic interventions.       Dyslipidemia- (present on admission)  Assessment & Plan  Cardiac diet.  I will start rosuvastatin      VTE prophylaxis: SCDs/TEDs and heparin ppx    I had a discussion with the patient regarding goals of care, diagnoses, prognosis, and CODE STATUS. We discussed her prognosis and comorbidities.  The patient has advanced age of 79 years with a relatively few number of chronic medical problems including chronic kidney disease and hyperlipidemia.  She is presenting with acute hypoxemic respiratory failure secondary to pneumonia.  At this point the patient wants to obtain a full code.  She is open to all forms of invasive and noninvasive diagnostic and therapeutic interventions.  I spent 16 minutes on advanced care planning

## 2024-09-11 LAB
ALBUMIN SERPL BCP-MCNC: 2.9 G/DL (ref 3.2–4.9)
ALBUMIN/GLOB SERPL: 0.8 G/DL
ALP SERPL-CCNC: 90 U/L (ref 30–99)
ALT SERPL-CCNC: 15 U/L (ref 2–50)
ANION GAP SERPL CALC-SCNC: 21 MMOL/L (ref 7–16)
APPEARANCE UR: ABNORMAL
AST SERPL-CCNC: 17 U/L (ref 12–45)
BACTERIA #/AREA URNS HPF: ABNORMAL /HPF
BILIRUB SERPL-MCNC: 0.3 MG/DL (ref 0.1–1.5)
BILIRUB UR QL STRIP.AUTO: NEGATIVE
BUN SERPL-MCNC: 105 MG/DL (ref 8–22)
CALCIUM ALBUM COR SERPL-MCNC: 9.3 MG/DL (ref 8.5–10.5)
CALCIUM SERPL-MCNC: 8.4 MG/DL (ref 8.4–10.2)
CHLORIDE SERPL-SCNC: 96 MMOL/L (ref 96–112)
CHOLEST SERPL-MCNC: 75 MG/DL (ref 100–199)
CO2 SERPL-SCNC: 14 MMOL/L (ref 20–33)
COLOR UR: YELLOW
CREAT SERPL-MCNC: 3.86 MG/DL (ref 0.5–1.4)
EPI CELLS #/AREA URNS HPF: ABNORMAL /HPF
ERYTHROCYTE [DISTWIDTH] IN BLOOD BY AUTOMATED COUNT: 44.9 FL (ref 35.9–50)
GFR SERPLBLD CREATININE-BSD FMLA CKD-EPI: 11 ML/MIN/1.73 M 2
GLOBULIN SER CALC-MCNC: 3.5 G/DL (ref 1.9–3.5)
GLUCOSE SERPL-MCNC: 146 MG/DL (ref 65–99)
GLUCOSE UR STRIP.AUTO-MCNC: NEGATIVE MG/DL
HCT VFR BLD AUTO: 33.6 % (ref 37–47)
HDLC SERPL-MCNC: 24 MG/DL
HGB BLD-MCNC: 11.6 G/DL (ref 12–16)
KETONES UR STRIP.AUTO-MCNC: NEGATIVE MG/DL
LDLC SERPL CALC-MCNC: 29 MG/DL
LEUKOCYTE ESTERASE UR QL STRIP.AUTO: ABNORMAL
MAGNESIUM SERPL-MCNC: 2.7 MG/DL (ref 1.5–2.5)
MCH RBC QN AUTO: 29.8 PG (ref 27–33)
MCHC RBC AUTO-ENTMCNC: 34.5 G/DL (ref 32.2–35.5)
MCV RBC AUTO: 86.4 FL (ref 81.4–97.8)
MICRO URNS: ABNORMAL
MUCOUS THREADS #/AREA URNS HPF: ABNORMAL /HPF
NITRITE UR QL STRIP.AUTO: POSITIVE
PH UR STRIP.AUTO: 6 [PH] (ref 5–8)
PLATELET # BLD AUTO: 398 K/UL (ref 164–446)
PMV BLD AUTO: 9.9 FL (ref 9–12.9)
POTASSIUM SERPL-SCNC: 3.7 MMOL/L (ref 3.6–5.5)
PROT SERPL-MCNC: 6.4 G/DL (ref 6–8.2)
PROT UR QL STRIP: 100 MG/DL
RBC # BLD AUTO: 3.89 M/UL (ref 4.2–5.4)
RBC # URNS HPF: ABNORMAL /HPF
RBC UR QL AUTO: ABNORMAL
SODIUM SERPL-SCNC: 131 MMOL/L (ref 135–145)
SP GR UR STRIP.AUTO: 1.02
TRIGL SERPL-MCNC: 112 MG/DL (ref 0–149)
UNIDENT CRYS URNS QL MICRO: ABNORMAL /HPF
WBC # BLD AUTO: 9.1 K/UL (ref 4.8–10.8)
WBC #/AREA URNS HPF: ABNORMAL /HPF

## 2024-09-11 PROCEDURE — 85027 COMPLETE CBC AUTOMATED: CPT

## 2024-09-11 PROCEDURE — 770020 HCHG ROOM/CARE - TELE (206)

## 2024-09-11 PROCEDURE — 700102 HCHG RX REV CODE 250 W/ 637 OVERRIDE(OP): Performed by: INTERNAL MEDICINE

## 2024-09-11 PROCEDURE — A9270 NON-COVERED ITEM OR SERVICE: HCPCS | Performed by: HOSPITALIST

## 2024-09-11 PROCEDURE — 51798 US URINE CAPACITY MEASURE: CPT

## 2024-09-11 PROCEDURE — 700105 HCHG RX REV CODE 258: Performed by: HOSPITALIST

## 2024-09-11 PROCEDURE — 80053 COMPREHEN METABOLIC PANEL: CPT

## 2024-09-11 PROCEDURE — A9270 NON-COVERED ITEM OR SERVICE: HCPCS | Performed by: INTERNAL MEDICINE

## 2024-09-11 PROCEDURE — 0T9B70Z DRAINAGE OF BLADDER WITH DRAINAGE DEVICE, VIA NATURAL OR ARTIFICIAL OPENING: ICD-10-PCS | Performed by: HOSPITALIST

## 2024-09-11 PROCEDURE — 700102 HCHG RX REV CODE 250 W/ 637 OVERRIDE(OP): Performed by: HOSPITALIST

## 2024-09-11 PROCEDURE — 83735 ASSAY OF MAGNESIUM: CPT

## 2024-09-11 PROCEDURE — 87186 SC STD MICRODIL/AGAR DIL: CPT | Mod: 91

## 2024-09-11 PROCEDURE — 87086 URINE CULTURE/COLONY COUNT: CPT

## 2024-09-11 PROCEDURE — 36415 COLL VENOUS BLD VENIPUNCTURE: CPT

## 2024-09-11 PROCEDURE — 87077 CULTURE AEROBIC IDENTIFY: CPT | Mod: 91

## 2024-09-11 PROCEDURE — 81001 URINALYSIS AUTO W/SCOPE: CPT

## 2024-09-11 PROCEDURE — 99233 SBSQ HOSP IP/OBS HIGH 50: CPT | Performed by: HOSPITALIST

## 2024-09-11 PROCEDURE — 94760 N-INVAS EAR/PLS OXIMETRY 1: CPT

## 2024-09-11 PROCEDURE — 80061 LIPID PANEL: CPT

## 2024-09-11 PROCEDURE — 700111 HCHG RX REV CODE 636 W/ 250 OVERRIDE (IP): Mod: JZ | Performed by: HOSPITALIST

## 2024-09-11 RX ORDER — SODIUM CHLORIDE 9 MG/ML
INJECTION, SOLUTION INTRAVENOUS CONTINUOUS
Status: ACTIVE | OUTPATIENT
Start: 2024-09-11 | End: 2024-09-12

## 2024-09-11 RX ORDER — SODIUM BICARBONATE 650 MG/1
650 TABLET ORAL 3 TIMES DAILY
Status: DISCONTINUED | OUTPATIENT
Start: 2024-09-11 | End: 2024-09-12

## 2024-09-11 RX ADMIN — CEFTRIAXONE SODIUM 2000 MG: 2 INJECTION, POWDER, FOR SOLUTION INTRAMUSCULAR; INTRAVENOUS at 14:43

## 2024-09-11 RX ADMIN — METOPROLOL SUCCINATE 25 MG: 25 TABLET, FILM COATED, EXTENDED RELEASE ORAL at 05:31

## 2024-09-11 RX ADMIN — ASPIRIN 81 MG: 81 TABLET, COATED ORAL at 05:31

## 2024-09-11 RX ADMIN — SODIUM CHLORIDE: 9 INJECTION, SOLUTION INTRAVENOUS at 14:41

## 2024-09-11 RX ADMIN — SODIUM BICARBONATE 650 MG: 650 TABLET ORAL at 21:05

## 2024-09-11 RX ADMIN — ROSUVASTATIN CALCIUM 10 MG: 10 TABLET, FILM COATED ORAL at 16:34

## 2024-09-11 RX ADMIN — HEPARIN SODIUM 5000 UNITS: 5000 INJECTION, SOLUTION INTRAVENOUS; SUBCUTANEOUS at 21:05

## 2024-09-11 RX ADMIN — Medication 5 MG: at 22:46

## 2024-09-11 RX ADMIN — DEXAMETHASONE 6 MG: 4 TABLET ORAL at 05:31

## 2024-09-11 RX ADMIN — SODIUM BICARBONATE 650 MG: 650 TABLET ORAL at 14:39

## 2024-09-11 ASSESSMENT — ENCOUNTER SYMPTOMS
EYE REDNESS: 0
STRIDOR: 0
CHILLS: 0
MYALGIAS: 0
NERVOUS/ANXIOUS: 0
FOCAL WEAKNESS: 0
ABDOMINAL PAIN: 0
FLANK PAIN: 0
FEVER: 0
DIARRHEA: 0
VOMITING: 0
SHORTNESS OF BREATH: 1
BRUISES/BLEEDS EASILY: 0
COUGH: 1

## 2024-09-11 ASSESSMENT — FIBROSIS 4 INDEX: FIB4 SCORE: 0.89

## 2024-09-11 ASSESSMENT — PAIN DESCRIPTION - PAIN TYPE
TYPE: ACUTE PAIN
TYPE: ACUTE PAIN

## 2024-09-11 NOTE — DISCHARGE PLANNING
Case Management Discharge Planning    Admission Date: 9/10/2024  GMLOS: 5.1  ALOS: 1    6-Clicks ADL Score: 18  6-Clicks Mobility Score: 17    Anticipated Discharge Dispo: Discharge Disposition: D/T to home under HHA care in anticipation of covered skilled care (06)    Action(s) Taken: DC Assessment Complete (See below) and Choice obtained    Spoke to patient at bedside. Introduced self and role. Patient reports she lives in [place] with spouse. PCP: Dr. Hernández. Patient reports she would be agreeable to home health if recommended. Choice obtained for [facility] (1), [facility] (2), and [facility] (3). Patient reports she is not normally on oxygen at home. Patient expressed desire to go home without it.     Care Transition Team Assessment    Information Source  Orientation Level: Oriented to place, Oriented to time, Oriented to person, Disoriented to situation  Information Given By: Patient  Who is responsible for making decisions for patient? : Patient    Readmission Evaluation  Is this a readmission?: No    Elopement Risk  Legal Hold: No  Ambulatory or Self Mobile in Wheelchair: Yes  Elopement Risk: Not at Risk for Elopement    Interdisciplinary Discharge Planning  Lives with - Patient's Self Care Capacity: Spouse  Patient or legal guardian wants to designate a caregiver: No  Support Systems: Spouse / Significant Other  Housing / Facility: [address]    Discharge Preparedness  What is your plan after discharge?: Home with help  What are your discharge supports?: Spouse  Prior Functional Level: Independent with Medication Management, Independent with Activities of Daily Living    Functional Assesment  Prior Functional Level: Independent with Medication Management, Independent with Activities of Daily Living    Finances  Financial Barriers to Discharge: No  Prescription Coverage: Yes    Vision / Hearing Impairment  Vision Impairment : Yes  Right Eye Vision: Wears Glasses  Left Eye Vision: Wears  Glasses  Hearing Impairment : No    Advance Directive  Advance Directive?: None    Domestic Abuse  Have you ever been the victim of abuse or violence?: No  Possible Abuse/Neglect Reported to:: Not Applicable    Anticipated Discharge Information  Discharge Disposition: D/T to home under HHA care in anticipation of covered skilled care (06)

## 2024-09-11 NOTE — PROGRESS NOTES
Telemetry Shift Summary     Rhythm: SR/Afib with BBB  HR: 84-96  Ectopy: r-o-f pvc, f big    Measurements: -/0.14/0.38    Normal Values  Rhythm: SR  HR:   Measurements: 0.12-0.20/0.08-0.10/0.30-0.52

## 2024-09-11 NOTE — PROGRESS NOTES
4 Eyes Skin Assessment Completed by STEPHANI Emery and STEPHANI Lazo.    Head WDL  Ears WDL  Nose WDL  Mouth WDL  Neck WDL  Breast/Chest Incision  Shoulder Blades WDL  Spine WDL  (R) Arm/Elbow/Hand Bruising FA  (L) Arm/Elbow/Hand Bruising FA  Abdomen WDL  Groin WDL  Scrotum/Coccyx/Buttocks Excoriation/Skin tear  (R) Leg WDL  (L) Leg WDL  (R) Heel/Foot/Toe WDL  (L) Heel/Foot/Toe WDL              Devices In Places Tele Box, Blood Pressure Cuff, and Nasal Cannula      Interventions In Place NC W/Ear Foams, pillows    Possible Skin Injury Yes    Pictures Uploaded Into Epic Yes  Wound Consult Placed Yes  RN Wound Prevention Protocol Ordered Yes

## 2024-09-11 NOTE — CARE PLAN
The patient is Stable - Low risk of patient condition declining or worsening    Shift Goals  Clinical Goals: Monitor labs, monitor vitals, and IV fluids  Patient Goals: Rest  Family Goals: SHOLA    Progress made toward(s) clinical / shift goals:    Problem: Knowledge Deficit - Standard  Goal: Patient and family/care givers will demonstrate understanding of plan of care, disease process/condition, diagnostic tests and medications  Outcome: Progressing   Patient updated on POC, patient verbalized an understanding. Patient educated on medications being administered. All questions answered at this time.   Problem: Respiratory  Goal: Patient will achieve/maintain optimum respiratory ventilation and gas exchange  Outcome: Progressing   Patient has remained on 3 L and has maintained an oxygen saturation above 90% during this shift.

## 2024-09-11 NOTE — PROGRESS NOTES
4 Eyes Skin Assessment Completed by Ida RN and Sarika RN.    Head WDL  Ears WDL  Nose WDL  Mouth WDL  Neck WDL  Breast/Chest WDL  Shoulder Blades WDL  Spine WDL  (R) Arm/Elbow/Hand Bruising to forearm  (L) Arm/Elbow/Hand Bruising to forearm  Abdomen WDL  Groin WDL  Scrotum/Coccyx/Buttocks WDL  (R) Leg WDL  (L) Leg WDL  (R) Heel/Foot/Toe Redness and Blanching heel  (L) Heel/Foot/Toe Redness and Blanching heel           Devices In Places Tele Box, Blood Pressure Cuff, Pulse Ox, and Nasal Cannula      Interventions In Place NC W/Ear Foams, Pillows, and Pressure Redistribution Mattress    Possible Skin Injury No    Pictures Uploaded Into Epic N/A  Wound Consult Placed N/A  RN Wound Prevention Protocol Ordered No

## 2024-09-11 NOTE — CARE PLAN
The patient is Stable - Low risk of patient condition declining or worsening    Shift Goals  Clinical Goals: IV fluids, monitor UOP, monitor labs  Patient Goals: Rest  Family Goals: SHOLA    Progress made toward(s) clinical / shift goals:  Pt reports not urinating during night shift, upon bladder bine=576, Cameron DOSS aware. This RN educated pt on voiding, pt verbalizes understanding. Will re-check bladder scan.      Problem: Knowledge Deficit - Standard  Goal: Patient and family/care givers will demonstrate understanding of plan of care, disease process/condition, diagnostic tests and medications  Outcome: Progressing     Problem: Hemodynamics  Goal: Patient's hemodynamics, fluid balance and neurologic status will be stable or improve  Outcome: Progressing     Problem: Urinary - Renal Perfusion  Goal: Ability to achieve and maintain adequate renal perfusion and functioning will improve  Outcome: Progressing       Patient is not progressing towards the following goals:

## 2024-09-11 NOTE — PROGRESS NOTES
Pt had occurrence of wet brief, however bladder scan results shows 453ml. This RN notified MD. Order for charles received. Charles placed.

## 2024-09-11 NOTE — PROGRESS NOTES
Assumed care of patient at bedside report from day shift RN. Updated on POC. Patient currently A & O x 4, on 4 L O2 95% via nasal cannula, up in bed, without complaints of acute pain. Assessment completed. Call light within reach. Whiteboard updated. Fall precautions in place. Bed locked and in lowest position. All questions answered. No other needs indicated at this time.

## 2024-09-11 NOTE — PROGRESS NOTES
Hospital Medicine Daily Progress Note    Date of Service  9/11/2024    Chief Complaint  Sangita Bolton is a 79 y.o. female admitted 9/10/2024 with Covid 19 PNA and acute hypoxemic respiratory failure.     Hospital Course  Sangita Bolton is a 79 y.o. female with a past medical history of primary hypertension, and hyperlipidemia admitted 9/10/2024 with Covid 19 PNA and acute hypoxemic respiratory failure.     Interval Problem Update  Hemodynamically stable overnight   Saturating well on 2 L of Oxygen NC , improvement from yesterday, was on 6L    Creatinine slightly better today 3.86, down from 4.08   I will start on IVF   Troponin have has been stable 75 > 69   Na improving 131 now, up from 127   CO2 improving, 14, up from 11. I am starting bicarb PO   The patient will likely need Home Health or at least SNF   I have discussed this patient's plan of care and discharge plan at IDT rounds today with Case Management, Nursing, Nursing leadership, and other members of the IDT team.    Consultants/Specialty  None     Code Status  Full Code    Disposition  The patient is not medically cleared.  Still requiring oxygen.  Still requiring intravenous antibiotics.  I have placed the appropriate orders for post-discharge needs.    Review of Systems  Review of Systems   Constitutional:  Positive for malaise/fatigue. Negative for chills and fever.   Eyes:  Negative for redness.   Respiratory:  Positive for cough and shortness of breath. Negative for stridor.    Cardiovascular:  Negative for chest pain and leg swelling.   Gastrointestinal:  Negative for abdominal pain, diarrhea and vomiting.   Genitourinary:  Negative for flank pain.   Musculoskeletal:  Negative for myalgias.   Skin: Negative.    Neurological:  Negative for focal weakness.   Endo/Heme/Allergies:  Does not bruise/bleed easily.   Psychiatric/Behavioral:  The patient is not nervous/anxious.       Physical Exam  Temp:  [36.2 °C (97.2 °F)-36.7 °C (98 °F)] 36.2 °C (97.2  °F)  Pulse:  [] 93  Resp:  [16-18] 16  BP: (104-137)/(42-77) 111/42  SpO2:  [91 %-99 %] 91 %    Physical Exam  Constitutional:       General: She is not in acute distress.  HENT:      Head: Normocephalic and atraumatic.      Right Ear: External ear normal.      Left Ear: External ear normal.      Nose: No congestion or rhinorrhea.      Mouth/Throat:      Mouth: Mucous membranes are moist.      Pharynx: No oropharyngeal exudate or posterior oropharyngeal erythema.   Eyes:      General: No scleral icterus.        Right eye: No discharge.         Left eye: No discharge.      Conjunctiva/sclera: Conjunctivae normal.      Pupils: Pupils are equal, round, and reactive to light.   Cardiovascular:      Rate and Rhythm: Regular rhythm. Tachycardia present.      Heart sounds:      No friction rub. No gallop.   Pulmonary:      Comments: Reduced air entry bilaterally basally.  Requiring 6 L of oxygen to achieve adequate saturation.  Is able to speak in full sentences.  Abdominal:      General: Abdomen is flat. There is no distension.      Tenderness: There is no guarding.   Musculoskeletal:         General: No swelling.      Cervical back: Neck supple. No rigidity. No muscular tenderness.      Right lower leg: No edema.      Left lower leg: No edema.   Skin:     Capillary Refill: Capillary refill takes 2 to 3 seconds.      Coloration: Skin is not jaundiced or pale.      Findings: No bruising or erythema.   Neurological:      Mental Status: She is alert and oriented to person, place, and time.   Psychiatric:         Mood and Affect: Mood normal.         Judgment: Judgment normal.       Fluids  No intake or output data in the 24 hours ending 09/11/24 1257     Laboratory  Recent Labs     09/10/24  1234 09/11/24  0245   WBC 14.1* 9.1   RBC 4.48 3.89*   HEMOGLOBIN 13.0 11.6*   HEMATOCRIT 39.6 33.6*   MCV 88.4 86.4   MCH 29.0 29.8   MCHC 32.8 34.5   RDW 45.9 44.9   PLATELETCT 436 398   MPV 9.8 9.9     Recent Labs      09/10/24  1234 09/10/24  1805 09/11/24  0245   SODIUM 126* 127* 131*   POTASSIUM 4.3 4.0 3.7   CHLORIDE 89* 94* 96   CO2 13* 11* 14*   GLUCOSE 113* 110* 146*   * 97* 105*   CREATININE 4.13* 4.08* 3.86*   CALCIUM 9.2 8.8 8.4         Recent Labs     09/11/24  0245   TRIGLYCERIDE 112   HDL 24*   LDL 29     Imaging  DX-CHEST-PORTABLE (1 VIEW)   Final Result         1. No acute cardiopulmonary abnormalities are identified.         Assessment/Plan  * Acute hypoxemic respiratory failure (HCC)- (present on admission)  Assessment & Plan  Secondary to pneumonia [likely both bacterial and COVID]   Dexamethasone for COVID-19 infection.   Continue ceftriaxone, follow on cultures and sensitivities  Oxygen as needed, Respiratory protocol, Bronchodilators, Incentive spirometry     9/11/2024   Saturating well on 2 L of Oxygen NC, improvement from yesterday, was on 6L     Sepsis (HCC)- (present on admission)  Assessment & Plan  Was present on admission  Now resolved     RASHAD (acute kidney injury) (HCC)- (present on admission)  Assessment & Plan  Mostly due to dehydration   I will start intravenous fluids  Avoid / minimize nephrotoxins as much as possible. [I will hold home losartan for now]  Monitor inputs and outputs  I will order bladder scan    9/11/2024   Creatinine slightly better today 3.86, down from 4.08   I will start on IVF      Metabolic acidosis- (present on admission)  Assessment & Plan  Likely due to reduced intravascular volume and increase bicarb losses through diarrhea and acute kidney injury  I will start intravenous fluids and bicarb  Anticipate improvement with intravenous fluids  Continue to monitor, I will order follow-up bicarb level       9/11/2024   CO2 improving, 14, up from 11. I am starting bicarb PO       Diarrhea- (present on admission)  Assessment & Plan  Likely secondary to COVID-19 infection  I will check stool for C. difficile toxins.  Supportive care with intravenous fluids monitor  electrolytes and replace accordingly      Elevated troponin- (present on admission)  Assessment & Plan  In the indeterminate range  EKG shows sinus rhythm with a rate of 81.  There is no ST elevation.  There is left bundle branch block that is seen on prior EKGs.  There is flattening of T waves in lead I with T wave inversions in lead aVL.  QTc is 443   I will place on continuous cardiac monitoring    Troponin elevation is likely secondary to demand ischemia secondary to pneumonia and hypoxia.   I anticipate improvement with correcting hypoxia and treating pneumonia  I will trend troponin levels  Stat EKG, troponin for chest pain or worsening shortness of breath     9/11/2024   Troponin have has been stable 75 > 69      Hyponatremia- (present on admission)  Assessment & Plan  Hypovolemic hyponatremia  I expect Na to improve with IVF     9/11/2024   Na improving 131 now, up from 127      ACP (advance care planning)- (present on admission)  Assessment & Plan  Full code     Dyslipidemia- (present on admission)  Assessment & Plan  Cardiac diet.  I will start rosuvastatin       VTE prophylaxis: SCDs, heparin subcu    I have performed a physical exam and reviewed and updated ROS and Plan today (9/11/2024). In review of yesterday's note (9/10/2024), there are no changes except as documented above.    Total time spent is 51 minutes.    This included review of patient overnight, face-to-face interview, physical examination, lab review and analysis.  In addition, I spoke with patient and nurse, charge nurse, pharmacy, case management during discharge planning IDT rounds.

## 2024-09-11 NOTE — PROGRESS NOTES
Bladder scan = 315ml this am. Cameron DOSS at bedside upon completion of bladder scan, MD aware of results.

## 2024-09-12 ENCOUNTER — APPOINTMENT (OUTPATIENT)
Dept: RADIOLOGY | Facility: MEDICAL CENTER | Age: 79
DRG: 871 | End: 2024-09-12
Attending: INTERNAL MEDICINE
Payer: MEDICARE

## 2024-09-12 PROBLEM — N30.00 ACUTE CYSTITIS WITHOUT HEMATURIA: Status: ACTIVE | Noted: 2024-09-12

## 2024-09-12 PROBLEM — Z97.8 FOLEY CATHETER IN PLACE: Status: ACTIVE | Noted: 2024-09-12

## 2024-09-12 LAB
ANION GAP SERPL CALC-SCNC: 21 MMOL/L (ref 7–16)
BUN SERPL-MCNC: 108 MG/DL (ref 8–22)
CALCIUM SERPL-MCNC: 7.5 MG/DL (ref 8.4–10.2)
CHLORIDE SERPL-SCNC: 99 MMOL/L (ref 96–112)
CO2 SERPL-SCNC: 14 MMOL/L (ref 20–33)
CREAT SERPL-MCNC: 3.55 MG/DL (ref 0.5–1.4)
ERYTHROCYTE [DISTWIDTH] IN BLOOD BY AUTOMATED COUNT: 45.2 FL (ref 35.9–50)
EXPOSED MRN EXMRN: NORMAL
GFR SERPLBLD CREATININE-BSD FMLA CKD-EPI: 12 ML/MIN/1.73 M 2
GLUCOSE SERPL-MCNC: 170 MG/DL (ref 65–99)
HBV SURFACE AG SER QL: NORMAL
HCT VFR BLD AUTO: 29.5 % (ref 37–47)
HCV AB SER QL: NORMAL
HGB BLD-MCNC: 10 G/DL (ref 12–16)
MCH RBC QN AUTO: 29.4 PG (ref 27–33)
MCHC RBC AUTO-ENTMCNC: 33.9 G/DL (ref 32.2–35.5)
MCV RBC AUTO: 86.8 FL (ref 81.4–97.8)
PLATELET # BLD AUTO: 364 K/UL (ref 164–446)
PMV BLD AUTO: 9.9 FL (ref 9–12.9)
POTASSIUM SERPL-SCNC: 3.3 MMOL/L (ref 3.6–5.5)
RBC # BLD AUTO: 3.4 M/UL (ref 4.2–5.4)
SODIUM SERPL-SCNC: 134 MMOL/L (ref 135–145)
WBC # BLD AUTO: 9.7 K/UL (ref 4.8–10.8)

## 2024-09-12 PROCEDURE — 700111 HCHG RX REV CODE 636 W/ 250 OVERRIDE (IP): Performed by: HOSPITALIST

## 2024-09-12 PROCEDURE — 97602 WOUND(S) CARE NON-SELECTIVE: CPT

## 2024-09-12 PROCEDURE — 700111 HCHG RX REV CODE 636 W/ 250 OVERRIDE (IP): Performed by: INTERNAL MEDICINE

## 2024-09-12 PROCEDURE — 700102 HCHG RX REV CODE 250 W/ 637 OVERRIDE(OP): Performed by: INTERNAL MEDICINE

## 2024-09-12 PROCEDURE — 99233 SBSQ HOSP IP/OBS HIGH 50: CPT | Performed by: INTERNAL MEDICINE

## 2024-09-12 PROCEDURE — 700105 HCHG RX REV CODE 258: Performed by: HOSPITALIST

## 2024-09-12 PROCEDURE — 76775 US EXAM ABDO BACK WALL LIM: CPT

## 2024-09-12 PROCEDURE — A9270 NON-COVERED ITEM OR SERVICE: HCPCS | Performed by: INTERNAL MEDICINE

## 2024-09-12 PROCEDURE — 770020 HCHG ROOM/CARE - TELE (206)

## 2024-09-12 PROCEDURE — 36415 COLL VENOUS BLD VENIPUNCTURE: CPT

## 2024-09-12 PROCEDURE — 85027 COMPLETE CBC AUTOMATED: CPT

## 2024-09-12 PROCEDURE — 94760 N-INVAS EAR/PLS OXIMETRY 1: CPT

## 2024-09-12 PROCEDURE — 700102 HCHG RX REV CODE 250 W/ 637 OVERRIDE(OP): Performed by: HOSPITALIST

## 2024-09-12 PROCEDURE — A9270 NON-COVERED ITEM OR SERVICE: HCPCS | Performed by: HOSPITALIST

## 2024-09-12 PROCEDURE — 80048 BASIC METABOLIC PNL TOTAL CA: CPT

## 2024-09-12 RX ORDER — SODIUM BICARBONATE 650 MG/1
1300 TABLET ORAL 3 TIMES DAILY
Status: COMPLETED | OUTPATIENT
Start: 2024-09-12 | End: 2024-09-12

## 2024-09-12 RX ORDER — POTASSIUM CHLORIDE 7.45 MG/ML
10 INJECTION INTRAVENOUS
Status: COMPLETED | OUTPATIENT
Start: 2024-09-13 | End: 2024-09-13

## 2024-09-12 RX ADMIN — POTASSIUM CHLORIDE 10 MEQ: 7.46 INJECTION, SOLUTION INTRAVENOUS at 23:53

## 2024-09-12 RX ADMIN — DEXAMETHASONE 6 MG: 4 TABLET ORAL at 05:56

## 2024-09-12 RX ADMIN — METOPROLOL SUCCINATE 25 MG: 25 TABLET, FILM COATED, EXTENDED RELEASE ORAL at 05:58

## 2024-09-12 RX ADMIN — ASPIRIN 81 MG: 81 TABLET, COATED ORAL at 05:56

## 2024-09-12 RX ADMIN — HEPARIN SODIUM 5000 UNITS: 5000 INJECTION, SOLUTION INTRAVENOUS; SUBCUTANEOUS at 14:20

## 2024-09-12 RX ADMIN — ROSUVASTATIN CALCIUM 10 MG: 10 TABLET, FILM COATED ORAL at 16:58

## 2024-09-12 RX ADMIN — CEFTRIAXONE SODIUM 2000 MG: 2 INJECTION, POWDER, FOR SOLUTION INTRAMUSCULAR; INTRAVENOUS at 14:21

## 2024-09-12 RX ADMIN — HEPARIN SODIUM 5000 UNITS: 5000 INJECTION, SOLUTION INTRAVENOUS; SUBCUTANEOUS at 05:56

## 2024-09-12 RX ADMIN — SODIUM BICARBONATE 1300 MG: 650 TABLET ORAL at 21:04

## 2024-09-12 RX ADMIN — SODIUM BICARBONATE 650 MG: 650 TABLET ORAL at 14:20

## 2024-09-12 RX ADMIN — HEPARIN SODIUM 5000 UNITS: 5000 INJECTION, SOLUTION INTRAVENOUS; SUBCUTANEOUS at 21:04

## 2024-09-12 RX ADMIN — Medication 5 MG: at 21:05

## 2024-09-12 RX ADMIN — SODIUM BICARBONATE 650 MG: 650 TABLET ORAL at 09:11

## 2024-09-12 ASSESSMENT — ENCOUNTER SYMPTOMS
FEVER: 0
WEAKNESS: 1
SHORTNESS OF BREATH: 1
CHILLS: 0

## 2024-09-12 ASSESSMENT — PAIN DESCRIPTION - PAIN TYPE
TYPE: ACUTE PAIN
TYPE: ACUTE PAIN

## 2024-09-12 NOTE — PROGRESS NOTES
Telemetry Shift Summary    Rhythm Afib  HR Range 78-89  Ectopy foPVC, rTrig  Measurements -/0.14/-        Normal Values  Rhythm SR  HR Range    Measurements 0.12-0.20 / 0.06-0.10  / 0.30-0.52

## 2024-09-12 NOTE — CARE PLAN
The patient is Watcher - Medium risk of patient condition declining or worsening    Shift Goals  Clinical Goals: Pain scale 0, UOP >30 cc/hr, SpO2 >90%  Patient Goals: Feel better  Family Goals: SHOLA    Progress made toward(s) clinical / shift goals:  Pt had 900cc of urine out during my shift. Pt OOB and up to the chair all day. Encouraged to use her IS 10 x/hour, pt able to pull 1000. She does have strong cough but it is not productive. Able to titrate down oxygen from 6L NC to 3L NC. On abx, no signs and symptoms of worsening infection.

## 2024-09-12 NOTE — DISCHARGE PLANNING
Received Choice Form at: 09/11/24  1141  Agency/Facility Name: [facility], [facility]   Outcome: Accepted by [facility]

## 2024-09-12 NOTE — ASSESSMENT & PLAN NOTE
Continue IV ceftriaxone  Urine culture showing Enterococcus faecalis and Klebsiella variicola  -Klebsiella sensitive to cefuroxime, cefazolin, ceftriaxone, intermediate to Macrobid.  Enterococcus faecalis likely contaminant.

## 2024-09-12 NOTE — PROGRESS NOTES
12-hour chart check complete.    Monitor Summary  Rhythm: A-fib  Rate: 80-90's  Ectopy: rPAC,rPVC  Measurements: -/0.14/-

## 2024-09-12 NOTE — ASSESSMENT & PLAN NOTE
For urinary retention  Complicated due to UTI POA.  Continue Hernandez catheter  Home health ordered

## 2024-09-12 NOTE — WOUND TEAM
[facility]  Inpatient Services  Initial Wound and Skin Care Evaluation    Admission Date: 9/10/2024     Last order of IP CONSULT TO WOUND CARE was found on 9/11/2024 from Hospital Encounter on 9/10/2024     HPI, PMH, SH: Reviewed    Past Surgical History:   Procedure Laterality Date    PB PARTIAL HIP REPLACEMENT  11/28/2020    Procedure: HEMIARTHROPLASTY, HIP REVISION;  Surgeon: Mohsen Thomas M.D.;  Location: SURGERY [facility];  Service: Orthopedics    PB PARTIAL HIP REPLACEMENT Right 4/28/2019    Procedure: HEMIARTHROPLASTY, HIP;  Surgeon: Evens Sarabia M.D.;  Location: SURGERY [facility];  Service: Orthopedics    RECOVERY  2/9/2015    Performed by [facility] at SURGERY SAME DAY [facility]    FEMORAL ARTERY REPAIR  2/9/2015    Performed by Yuly Chirinos M.D. at SURGERY [facility] ORS    HYSTEROSCOPY WITH VIDEO DIAGNOSTIC  11/17/2010    Performed by ALIS DEGROOT at SURGERY SAME DAY [facility] ORS    DILATION AND CURETTAGE  11/17/2010    Performed by ALIS DEGROOT at SURGERY SAME DAY [facility] ORS    ORIF, FRACTURE, HUMERUS  9/5/08    Performed by LAURA CARTER at SURGERY [facility] ORS    COLONOSCOPY  2008    recheck 4 years    OTHER ORTHOPEDIC SURGERY      rt leg fx    OTHER ORTHOPEDIC SURGERY      broken right wrist     Social History     Tobacco Use    Smoking status: Every Day     Current packs/day: 1.00     Average packs/day: 1 pack/day for 49.6 years (49.6 ttl pk-yrs)     Types: Cigarettes     Start date: 2/2/1975    Smokeless tobacco: Never   Substance Use Topics    Alcohol use: Not Currently     Comment: 4/week     Chief Complaint   Patient presents with    Sent by MD Dr Hernández sent her over after a annual check up. Pt been feeling nauseous and generalized weakness. Per , she has had decreased mobility x 1 week. Usually uses a walker but unable to do that now. No CP or SOB.  Pt O2 80% RA in triage. Placed on 4 L NC now sating 88%.   No  "flu like symptoms.     Diagnosis: Acute hypoxemic respiratory failure (HCC) [J96.01]    Unit where seen by Wound Team: 3314/01     WOUND CONSULT RELATED TO:  Buttocks    WOUND TEAM PLAN OF CARE - Frequency of Follow-up:   Nursing to follow dressing orders written for wound care. Contact wound team if area fails to progress, deteriorates or with any questions/concerns if something comes up before next scheduled follow up (See below as to whether wound is following and frequency of wound follow up)   Not following, consult as needed  - Buttocks    WOUND HISTORY:   Per H&P: \"Sangita Bolton is a 79 y.o. female with a past medical history of primary hypertension, and hyperlipidemia admitted 9/10/2024 with Covid 19 PNA and acute hypoxemic respiratory failure. \"       WOUND ASSESSMENT/LDA  Wound 09/11/24 Pressure Injury Buttocks Bilateral POA Unstageable with surrounding shearing (Active)   Date First Assessed/Time First Assessed: 09/11/24 1030   Primary Wound Type: Pressure Injury  Location: Buttocks  Laterality: Bilateral  Wound Description (Comments): POA Unstageable with surrounding shearing      Assessments 9/12/2024 10:45 AM   Wound Image     Site Assessment Slough;Red;Painful   Periwound Assessment Fragile   Margins Defined edges;Attached edges   Closure Open to air   Drainage Amount Scant   Drainage Description Serosanguineous   Treatments Cleansed   Periwound Protectant Barrier Paste   Dressing Status Open to Air   NEXT Weekly Photo (Inpatient Only) 09/19/24   Wound Team Following Not following   Pressure Injury Stage Unstageable        Vascular:    ROBERTA:   No results found.    Lab Values:    Lab Results   Component Value Date/Time    WBC 9.7 09/12/2024 12:40 AM    WBC 6.6 02/22/2013 09:15 AM    RBC 3.40 (L) 09/12/2024 12:40 AM    RBC 4.08 02/22/2013 09:15 AM    HEMOGLOBIN 10.0 (L) 09/12/2024 12:40 AM    HEMATOCRIT 29.5 (L) 09/12/2024 12:40 AM    CREACTPROT 15.06 (H) 12/01/2020 03:38 AM         Culture Results " show:  No results found for this or any previous visit (from the past 720 hour(s)).    Pain Level/Medicated:  None, Tolerated without pain medication       INTERVENTIONS BY WOUND TEAM:  Chart and images reviewed. Discussed with bedside RN. All areas of concern (based on picture review, LDA review and discussion with bedside RN) have been thoroughly assessed. Documentation of areas based on significant findings. This RN in to assess patient. Performed standard wound care which includes appropriate positioning, dressing removal and non-selective debridement. Pictures and measurements obtained weekly if/when required.    Wound:  BL buttocks  Cleansed/Non-selectively Debrided with:  Perineal Wipes (Barrier wipes)  Primary Dressing:  Barrier paste, WILL    [facility] Discharge Planning  Number of Clinicians necessary to complete wound care: 1  Is patient requiring IV pain medications for dressing changes:  No   Length of time for dressing change 5 min. (This does not include chart review, pre-medication time, set up, clean up or time spent charting.)    Interdisciplinary consultation: Patient, Bedside RN,  Lelo KAMARA (Wound RN) .    EVALUATION / RATIONALE FOR TREATMENT:     Date:  09/12/24  Wound Status:  Initial evaluation    BL buttocks with POA unstageable pressure injury and surrounding friction-related partial thickness wounds. Barrier paste applied as patient is incontinent.         Goals: Steady decrease in wound area and depth weekly.    NURSING PLAN OF CARE ORDERS:  RN Prevention Protocol    NUTRITION RECOMMENDATIONS   Wound Team Recommendations:  N/A    DIET ORDERS (From admission to next 24h)       Start     Ordered    09/10/24 1338  Diet Order Diet: Cardiac  ALL MEALS        Question:  Diet:  Answer:  Cardiac    09/10/24 1341                    PREVENTATIVE INTERVENTIONS:    Q shift Yfn - performed per nursing policy  Q shift pressure point assessments - performed per nursing  policy    Surface/Positioning  Standard/trauma mattress - Currently in Place    Containment/Moisture Prevention    Hernandez Catheter - Currently in Place    Anticipated discharge plans:  TBD        Vac Discharge Needs:  Vac Discharge plan is purely a recommendation from wound team and not a requirement for discharge unless otherwise stated by physician.  Not Applicable Pt not on a wound vac

## 2024-09-12 NOTE — CARE PLAN
The patient is Watcher - Medium risk of patient condition declining or worsening    Shift Goals  Clinical Goals: Monitor UOP, daily labs  Patient Goals: sleep  Family Goals: farida    Progress made toward(s) clinical / shift goals:    Problem: Knowledge Deficit - Standard  Goal: Patient and family/care givers will demonstrate understanding of plan of care, disease process/condition, diagnostic tests and medications  Outcome: Progressing     Problem: Hemodynamics  Goal: Patient's hemodynamics, fluid balance and neurologic status will be stable or improve  Outcome: Progressing     Problem: Fluid Volume  Goal: Fluid volume balance will be maintained  Outcome: Progressing     Problem: Urinary - Renal Perfusion  Goal: Ability to achieve and maintain adequate renal perfusion and functioning will improve  Outcome: Progressing     Problem: Fall Risk  Goal: Patient will remain free from falls  Outcome: Progressing       Patient is not progressing towards the following goals:      Problem: Respiratory  Goal: Patient will achieve/maintain optimum respiratory ventilation and gas exchange  Outcome: Not Progressing

## 2024-09-13 PROBLEM — R94.31 ECG ABNORMALITY: Status: ACTIVE | Noted: 2024-09-13

## 2024-09-13 PROBLEM — L89.300 PRESSURE INJURY OF BUTTOCK, UNSTAGEABLE (HCC): Status: ACTIVE | Noted: 2024-09-13

## 2024-09-13 PROBLEM — I27.20 PULMONARY HYPERTENSION (HCC): Chronic | Status: ACTIVE | Noted: 2024-09-13

## 2024-09-13 LAB
ALBUMIN SERPL BCP-MCNC: 2.9 G/DL (ref 3.2–4.9)
BACTERIA UR CULT: ABNORMAL
BUN SERPL-MCNC: 103 MG/DL (ref 8–22)
CALCIUM ALBUM COR SERPL-MCNC: 9 MG/DL (ref 8.5–10.5)
CALCIUM SERPL-MCNC: 8.1 MG/DL (ref 8.4–10.2)
CHLORIDE SERPL-SCNC: 98 MMOL/L (ref 96–112)
CO2 SERPL-SCNC: 15 MMOL/L (ref 20–33)
CREAT SERPL-MCNC: 3.41 MG/DL (ref 0.5–1.4)
D DIMER PPP IA.FEU-MCNC: 3.03 UG/ML (FEU) (ref 0–0.5)
ERYTHROCYTE [DISTWIDTH] IN BLOOD BY AUTOMATED COUNT: 45.6 FL (ref 35.9–50)
EXPOSED MRN EXMRN: NORMAL
GFR SERPLBLD CREATININE-BSD FMLA CKD-EPI: 13 ML/MIN/1.73 M 2
GLUCOSE SERPL-MCNC: 165 MG/DL (ref 65–99)
HCT VFR BLD AUTO: 29 % (ref 37–47)
HGB BLD-MCNC: 9.8 G/DL (ref 12–16)
HIV 1+2 AB+HIV1 P24 AG SERPL QL IA: NORMAL
MAGNESIUM SERPL-MCNC: 2.3 MG/DL (ref 1.5–2.5)
MCH RBC QN AUTO: 29.3 PG (ref 27–33)
MCHC RBC AUTO-ENTMCNC: 33.8 G/DL (ref 32.2–35.5)
MCV RBC AUTO: 86.8 FL (ref 81.4–97.8)
PHOSPHATE SERPL-MCNC: 3.8 MG/DL (ref 2.5–4.5)
PLATELET # BLD AUTO: 394 K/UL (ref 164–446)
PMV BLD AUTO: 10.1 FL (ref 9–12.9)
POTASSIUM SERPL-SCNC: 3.9 MMOL/L (ref 3.6–5.5)
PROCALCITONIN SERPL-MCNC: 2.8 NG/ML
RBC # BLD AUTO: 3.34 M/UL (ref 4.2–5.4)
SIGNIFICANT IND 70042: ABNORMAL
SITE SITE: ABNORMAL
SODIUM SERPL-SCNC: 131 MMOL/L (ref 135–145)
SOURCE SOURCE: ABNORMAL
WBC # BLD AUTO: 10.6 K/UL (ref 4.8–10.8)

## 2024-09-13 PROCEDURE — 85379 FIBRIN DEGRADATION QUANT: CPT

## 2024-09-13 PROCEDURE — 700102 HCHG RX REV CODE 250 W/ 637 OVERRIDE(OP): Performed by: INTERNAL MEDICINE

## 2024-09-13 PROCEDURE — 700111 HCHG RX REV CODE 636 W/ 250 OVERRIDE (IP): Mod: JZ | Performed by: HOSPITALIST

## 2024-09-13 PROCEDURE — A9270 NON-COVERED ITEM OR SERVICE: HCPCS | Performed by: HOSPITALIST

## 2024-09-13 PROCEDURE — 83735 ASSAY OF MAGNESIUM: CPT

## 2024-09-13 PROCEDURE — 94640 AIRWAY INHALATION TREATMENT: CPT

## 2024-09-13 PROCEDURE — 97165 OT EVAL LOW COMPLEX 30 MIN: CPT

## 2024-09-13 PROCEDURE — 770020 HCHG ROOM/CARE - TELE (206)

## 2024-09-13 PROCEDURE — A9270 NON-COVERED ITEM OR SERVICE: HCPCS | Performed by: INTERNAL MEDICINE

## 2024-09-13 PROCEDURE — 700102 HCHG RX REV CODE 250 W/ 637 OVERRIDE(OP): Performed by: HOSPITALIST

## 2024-09-13 PROCEDURE — 84145 PROCALCITONIN (PCT): CPT

## 2024-09-13 PROCEDURE — 99233 SBSQ HOSP IP/OBS HIGH 50: CPT | Performed by: INTERNAL MEDICINE

## 2024-09-13 PROCEDURE — 94760 N-INVAS EAR/PLS OXIMETRY 1: CPT

## 2024-09-13 PROCEDURE — 36415 COLL VENOUS BLD VENIPUNCTURE: CPT

## 2024-09-13 PROCEDURE — 97535 SELF CARE MNGMENT TRAINING: CPT

## 2024-09-13 PROCEDURE — 85027 COMPLETE CBC AUTOMATED: CPT

## 2024-09-13 PROCEDURE — 700111 HCHG RX REV CODE 636 W/ 250 OVERRIDE (IP): Performed by: INTERNAL MEDICINE

## 2024-09-13 PROCEDURE — 80069 RENAL FUNCTION PANEL: CPT

## 2024-09-13 PROCEDURE — 700105 HCHG RX REV CODE 258: Performed by: HOSPITALIST

## 2024-09-13 RX ORDER — SODIUM BICARBONATE 650 MG/1
650 TABLET ORAL 3 TIMES DAILY
Status: DISCONTINUED | OUTPATIENT
Start: 2024-09-13 | End: 2024-09-15 | Stop reason: HOSPADM

## 2024-09-13 RX ADMIN — ROSUVASTATIN CALCIUM 10 MG: 10 TABLET, FILM COATED ORAL at 17:39

## 2024-09-13 RX ADMIN — TIOTROPIUM BROMIDE INHALATION SPRAY 5 MCG: 3.12 SPRAY, METERED RESPIRATORY (INHALATION) at 17:29

## 2024-09-13 RX ADMIN — Medication 5 MG: at 21:28

## 2024-09-13 RX ADMIN — HEPARIN SODIUM 5000 UNITS: 5000 INJECTION, SOLUTION INTRAVENOUS; SUBCUTANEOUS at 21:27

## 2024-09-13 RX ADMIN — POTASSIUM CHLORIDE 10 MEQ: 7.46 INJECTION, SOLUTION INTRAVENOUS at 03:53

## 2024-09-13 RX ADMIN — SODIUM BICARBONATE 650 MG: 650 TABLET ORAL at 17:39

## 2024-09-13 RX ADMIN — AMLODIPINE BESYLATE 10 MG: 5 TABLET ORAL at 06:47

## 2024-09-13 RX ADMIN — DEXAMETHASONE 6 MG: 4 TABLET ORAL at 06:47

## 2024-09-13 RX ADMIN — POTASSIUM CHLORIDE 10 MEQ: 7.46 INJECTION, SOLUTION INTRAVENOUS at 02:35

## 2024-09-13 RX ADMIN — POTASSIUM CHLORIDE 10 MEQ: 7.46 INJECTION, SOLUTION INTRAVENOUS at 01:19

## 2024-09-13 RX ADMIN — ASPIRIN 81 MG: 81 TABLET, COATED ORAL at 06:47

## 2024-09-13 RX ADMIN — CEFTRIAXONE SODIUM 2000 MG: 2 INJECTION, POWDER, FOR SOLUTION INTRAMUSCULAR; INTRAVENOUS at 14:48

## 2024-09-13 RX ADMIN — MOMETASONE FUROATE AND FORMOTEROL FUMARATE DIHYDRATE 2 PUFF: 100; 5 AEROSOL RESPIRATORY (INHALATION) at 17:29

## 2024-09-13 RX ADMIN — SODIUM BICARBONATE 650 MG: 650 TABLET ORAL at 21:28

## 2024-09-13 RX ADMIN — HEPARIN SODIUM 5000 UNITS: 5000 INJECTION, SOLUTION INTRAVENOUS; SUBCUTANEOUS at 14:48

## 2024-09-13 RX ADMIN — METOPROLOL SUCCINATE 25 MG: 25 TABLET, FILM COATED, EXTENDED RELEASE ORAL at 06:46

## 2024-09-13 RX ADMIN — HEPARIN SODIUM 5000 UNITS: 5000 INJECTION, SOLUTION INTRAVENOUS; SUBCUTANEOUS at 06:47

## 2024-09-13 ASSESSMENT — ENCOUNTER SYMPTOMS
CHILLS: 0
WEAKNESS: 1
SHORTNESS OF BREATH: 1
FEVER: 0

## 2024-09-13 ASSESSMENT — COGNITIVE AND FUNCTIONAL STATUS - GENERAL
HELP NEEDED FOR BATHING: A LITTLE
DRESSING REGULAR UPPER BODY CLOTHING: A LITTLE
DAILY ACTIVITIY SCORE: 19
PERSONAL GROOMING: A LITTLE
SUGGESTED CMS G CODE MODIFIER DAILY ACTIVITY: CK
TOILETING: A LITTLE
DRESSING REGULAR LOWER BODY CLOTHING: A LITTLE

## 2024-09-13 ASSESSMENT — PAIN DESCRIPTION - PAIN TYPE: TYPE: ACUTE PAIN

## 2024-09-13 ASSESSMENT — GAIT ASSESSMENTS: DISTANCE (FEET): 15

## 2024-09-13 ASSESSMENT — ACTIVITIES OF DAILY LIVING (ADL): TOILETING: INDEPENDENT

## 2024-09-13 NOTE — PROGRESS NOTES
Hospital Medicine Daily Progress Note    Date of Service  9/13/2024    Chief Complaint  Sangita Bolton is a 79 y.o. female admitted 9/10/2024 with   Chief Complaint   Patient presents with    Sent by MD Dr Hernández sent her over after a annual check up. Pt been feeling nauseous and generalized weakness. Per , she has had decreased mobility x 1 week. Usually uses a walker but unable to do that now. No CP or SOB.  Pt O2 80% RA in triage. Placed on 4 L NC now sating 88%.   No flu like symptoms.       Hospital Course  No notes on file    Interval Problem Update  Patient had no acute complaints  Remaining on 2 L nasal cannula  Creatinine 3.4 still elevated  Continue Hernandez catheter, patient did not want to remove today  Patient had 33 beats of V. tach overnight, asymptomatic.  Continuing telemetry  Continue metoprolol  Continue ceftriaxone for bacterial pneumonia, UTI  Ordered home oxygen DME  I called patient's  Jeremy Bolton and provided updates.  Vascular US on 10/4/24. Extremity artery lower, bilateral, ROBERTA.    I have discussed this patient's plan of care and discharge plan at IDT rounds today with Case Management, Nursing, Nursing leadership, and other members of the IDT team.    Consultants/Specialty  none    Code Status  Full Code    Disposition  The patient is not medically cleared for discharge to home or a post-acute facility.  Anticipate discharge to: home with close outpatient follow-up    I have placed the appropriate orders for post-discharge needs.    Review of Systems  Review of Systems   Constitutional:  Positive for malaise/fatigue. Negative for chills and fever.   Respiratory:  Positive for shortness of breath.    Neurological:  Positive for weakness.   All other systems reviewed and are negative.       Physical Exam  Temp:  [36.2 °C (97.1 °F)-36.4 °C (97.5 °F)] 36.4 °C (97.5 °F)  Pulse:  [67-82] 67  Resp:  [16-18] 16  BP: (101-134)/(53-79) 131/53  SpO2:  [91 %-95 %] 94 %    Physical  Exam  Vitals and nursing note reviewed.   Constitutional:       General: She is not in acute distress.     Appearance: She is not ill-appearing.      Comments: Frail appearing elderly female   HENT:      Head: Normocephalic and atraumatic.      Comments: Temporal muscle wasting positive     Mouth/Throat:      Mouth: Mucous membranes are dry.      Pharynx: No oropharyngeal exudate.   Eyes:      General: No scleral icterus.     Extraocular Movements: Extraocular movements intact.   Cardiovascular:      Rate and Rhythm: Normal rate and regular rhythm.      Pulses: Normal pulses.      Heart sounds: Normal heart sounds. No murmur heard.  Pulmonary:      Effort: Pulmonary effort is normal. No respiratory distress.      Breath sounds: No wheezing or rhonchi.      Comments: On 3 L nasal cannula  Abdominal:      General: Abdomen is flat. Bowel sounds are normal. There is no distension.      Palpations: Abdomen is soft.      Tenderness: There is no abdominal tenderness.   Musculoskeletal:         General: No swelling or tenderness.      Cervical back: Normal range of motion. No rigidity.      Right lower leg: No edema.      Left lower leg: No edema.      Comments: Sarcopenic. Bilateral hand muscle atrophy noted.   Skin:     General: Skin is warm.      Capillary Refill: Capillary refill takes less than 2 seconds.      Coloration: Skin is not jaundiced.      Findings: No erythema.   Neurological:      Mental Status: She is alert and oriented to person, place, and time. Mental status is at baseline.      Motor: Weakness present.   Psychiatric:         Mood and Affect: Mood normal.         Behavior: Behavior normal.         Thought Content: Thought content normal.         Judgment: Judgment normal.         Fluids  No intake or output data in the 24 hours ending 09/13/24 1639       Laboratory  Recent Labs     09/11/24  0245 09/12/24  0040 09/13/24  0150   WBC 9.1 9.7 10.6   RBC 3.89* 3.40* 3.34*   HEMOGLOBIN 11.6* 10.0* 9.8*    HEMATOCRIT 33.6* 29.5* 29.0*   MCV 86.4 86.8 86.8   MCH 29.8 29.4 29.3   MCHC 34.5 33.9 33.8   RDW 44.9 45.2 45.6   PLATELETCT 398 364 394   MPV 9.9 9.9 10.1     Recent Labs     09/11/24  0245 09/12/24  0040 09/13/24  0150   SODIUM 131* 134* 131*   POTASSIUM 3.7 3.3* 3.9   CHLORIDE 96 99 98   CO2 14* 14* 15*   GLUCOSE 146* 170* 165*   * 108* 103*   CREATININE 3.86* 3.55* 3.41*   CALCIUM 8.4 7.5* 8.1*             Recent Labs     09/11/24  0245   TRIGLYCERIDE 112   HDL 24*   LDL 29       Imaging  US-RENAL   Final Result      1.  Increased bilateral corticomedullary differentiation suggestive of medical renal disease.   2.  Right simple renal cyst, no follow-up recommended for this finding.      DX-CHEST-PORTABLE (1 VIEW)   Final Result         1. No acute cardiopulmonary abnormalities are identified.           Assessment/Plan  * Acute hypoxemic respiratory failure (HCC)- (present on admission)  Assessment & Plan  Due to bacterial community-acquired pneumonia and acute COVID-19 viral pneumonia  Continue dexamethasone, ceftriaxone  I ordered home oxygen DME    Pulmonary hypertension (HCC)- (present on admission)  Assessment & Plan  Last echo 1/2024 showed RVSP 40 mmHg    ECG abnormality  Assessment & Plan  Telemetry showing possibly 33 V. tach's overnight  She converted to sinus rhythm      Pressure injury of buttock, unstageable (HCC)- (present on admission)  Assessment & Plan  Offloading measures    Acute cystitis without hematuria- (present on admission)  Assessment & Plan  Continue IV ceftriaxone  Urine culture showing Enterococcus faecalis and Klebsiella variicola  -Pending sensitivities.  Possibly cefuroxime p.o.    Hernandez catheter in place  Assessment & Plan  For urinary retention  Complicated due to UTI POA.  Continue Hernandez catheter, RN protocols for care    Sepsis (HCC)- (present on admission)  Assessment & Plan  Was present on admission  Now resolved     Metabolic acidosis- (present on  admission)  Assessment & Plan  Likely due to reduced intravascular volume and increase bicarb losses through diarrhea and acute kidney injury  Bicarb 15  Continue sodium bicarb p.o.    Diarrhea- (present on admission)  Assessment & Plan  Likely secondary to COVID-19 infection  Canceled C. difficile PCR, patient has not had a bowel movement since admission    ACP (advance care planning)- (present on admission)  Assessment & Plan  Full code     Elevated troponin- (present on admission)  Assessment & Plan  In the indeterminate range  EKG shows sinus rhythm with a rate of 81.  There is no ST elevation.  There is left bundle branch block that is seen on prior EKGs.  There is flattening of T waves in lead I with T wave inversions in lead aVL.  QTc is 443   Demand ischemia due to 3 concurrent infections  Troponins 69 and 75, plateaued    Nonrheumatic tricuspid valve regurgitation- (present on admission)  Assessment & Plan  I reviewed last echo 01/2024, showed LVEF 65%, grade 1 diastolic dysfunction, mildly dilated LA, mild mitral regurgitation, moderate tricuspid regurgitation, RVSP 40 mmHg,    Centrilobular emphysema (HCC)- (present on admission)  Assessment & Plan  From prolonged tobacco use. Started smoking at 38 years old. Smoking 1-2ppd. She stopped smoking only a few months ago.  No home inhalers  Starting Dulera and Spirivia    Dyslipidemia- (present on admission)  Assessment & Plan  Cardiac diet.  Continue rosuvastatin    Hyponatremia- (present on admission)  Assessment & Plan  Sodium 131    RASHAD (acute kidney injury) (HCC)- (present on admission)  Assessment & Plan  Baseline appears to be less than 2.0  Creatinine 3.41  Continue Hernandez catheter  Renal ultrasound shows medical renal disease.  Prior MRI A/P 01/2024 showed smaller right kidney.  Due to urinary retention, sepsis and losartan use at home         VTE prophylaxis:    heparin ppx      I have performed a physical exam and reviewed and updated ROS and Plan  today (9/13/2024). In review of yesterday's note (9/12/2024), there are no changes except as documented above.      Total time spent 52 minutes.    This included my review of patient's overnight RN notes, face to face interview, physical examination, lab analysis.  Also includes repeat visits with the patient, and my documented assessments and interventions above.  In addition, I spoke with entire care team on patient's treatment plan, and DC planning on morning rounds and IDT rounds.    This note was generated using voice recognition software which has a chance of producing errors of grammar and content.  I have made every reasonable attempt to find and correct any errors, but it should be expected that some may not be found prior to finalization of this note.

## 2024-09-13 NOTE — PROGRESS NOTES
Hospital Medicine Daily Progress Note    Date of Service  9/12/2024    Chief Complaint  Sangita Bolton is a 79 y.o. female admitted 9/10/2024 with   Chief Complaint   Patient presents with    Sent by MD Dr Hernández sent her over after a annual check up. Pt been feeling nauseous and generalized weakness. Per , she has had decreased mobility x 1 week. Usually uses a walker but unable to do that now. No CP or SOB.  Pt O2 80% RA in triage. Placed on 4 L NC now sating 88%.   No flu like symptoms.       Hospital Course  No notes on file    Interval Problem Update  Patient denied any acute complaints  I discussed with her about her current conditions.  She presented with acute sepsis due to acute community-acquired bacterial pneumonia, acute COVID-19 pneumonia, acute UTI,  Patient required Hernandez catheter yesterday for urinary retention, will continue.  Continue IV ceftriaxone  Continue dexamethasone for COVID, oxygen support.  Patient respiratory state labile needing 3 to 6 L nasal cannula today.  PT OT ordered  C. difficile PCR rule out pending  I reviewed labs, procalcitonin 17.26, WBC 9.7.  Sodium 134, potassium 3.3 replacing via p.o.    Creatinine 3.55, Baseline appears to be less than 2.0.  Bicarb 14  Troponin 69 from 75.  proBNP 12,633.  Positive UA.    I have discussed this patient's plan of care and discharge plan at IDT rounds today with Case Management, Nursing, Nursing leadership, and other members of the IDT team.    Consultants/Specialty  none    Code Status  Full Code    Disposition  The patient is not medically cleared for discharge to home or a post-acute facility.      I have placed the appropriate orders for post-discharge needs.    Review of Systems  Review of Systems   Constitutional:  Positive for malaise/fatigue. Negative for chills and fever.   Respiratory:  Positive for shortness of breath.    Neurological:  Positive for weakness.   All other systems reviewed and are negative.       Physical  Exam  Temp:  [36.2 °C (97.1 °F)-36.4 °C (97.5 °F)] 36.2 °C (97.1 °F)  Pulse:  [62-75] 67  Resp:  [16-18] 18  BP: ()/(49-63) 121/61  SpO2:  [90 %-95 %] 93 %    Physical Exam  Vitals and nursing note reviewed.   Constitutional:       General: She is not in acute distress.     Appearance: She is not ill-appearing.      Comments: Frail appearing elderly female   HENT:      Head: Normocephalic and atraumatic.      Comments: Temporal muscle wasting positive     Mouth/Throat:      Mouth: Mucous membranes are dry.      Pharynx: No oropharyngeal exudate.   Eyes:      General: No scleral icterus.     Extraocular Movements: Extraocular movements intact.   Cardiovascular:      Rate and Rhythm: Normal rate and regular rhythm.      Pulses: Normal pulses.      Heart sounds: Normal heart sounds. No murmur heard.  Pulmonary:      Effort: Pulmonary effort is normal. No respiratory distress.      Breath sounds: Rhonchi present. No wheezing.      Comments: On 3 L nasal cannula  Abdominal:      General: Abdomen is flat. Bowel sounds are normal. There is no distension.      Palpations: Abdomen is soft.      Tenderness: There is no abdominal tenderness.   Musculoskeletal:         General: No swelling or tenderness.      Cervical back: Normal range of motion. No rigidity.      Right lower leg: No edema.      Left lower leg: No edema.      Comments: Sarcopenic. Bilateral hand muscle atrophy noted.   Skin:     General: Skin is warm.      Capillary Refill: Capillary refill takes less than 2 seconds.      Coloration: Skin is not jaundiced.      Findings: No erythema.   Neurological:      Mental Status: She is alert and oriented to person, place, and time. Mental status is at baseline.      Motor: Weakness present.   Psychiatric:         Mood and Affect: Mood normal.         Behavior: Behavior normal.         Thought Content: Thought content normal.         Judgment: Judgment normal.         Fluids    Intake/Output Summary (Last 24 hours)  at 9/12/2024 1849  Last data filed at 9/12/2024 1300  Gross per 24 hour   Intake 1295.58 ml   Output 800 ml   Net 495.58 ml        Laboratory  Recent Labs     09/10/24  1234 09/11/24  0245 09/12/24  0040   WBC 14.1* 9.1 9.7   RBC 4.48 3.89* 3.40*   HEMOGLOBIN 13.0 11.6* 10.0*   HEMATOCRIT 39.6 33.6* 29.5*   MCV 88.4 86.4 86.8   MCH 29.0 29.8 29.4   MCHC 32.8 34.5 33.9   RDW 45.9 44.9 45.2   PLATELETCT 436 398 364   MPV 9.8 9.9 9.9     Recent Labs     09/10/24  1805 09/11/24  0245 09/12/24  0040   SODIUM 127* 131* 134*   POTASSIUM 4.0 3.7 3.3*   CHLORIDE 94* 96 99   CO2 11* 14* 14*   GLUCOSE 110* 146* 170*   BUN 97* 105* 108*   CREATININE 4.08* 3.86* 3.55*   CALCIUM 8.8 8.4 7.5*             Recent Labs     09/11/24  0245   TRIGLYCERIDE 112   HDL 24*   LDL 29       Imaging  DX-CHEST-PORTABLE (1 VIEW)   Final Result         1. No acute cardiopulmonary abnormalities are identified.           Assessment/Plan  * Acute hypoxemic respiratory failure (HCC)- (present on admission)  Assessment & Plan  Due to bacterial community-acquired pneumonia and acute COVID-19 viral pneumonia  Continue dexamethasone  Continue ceftriaxone  Procalcitonin 17.26, continue checking daily    Acute cystitis without hematuria- (present on admission)  Assessment & Plan  Continue IV ceftriaxone  Ucx pending    Hernandez catheter in place  Assessment & Plan  For urinary retention, continue  Complicated due to UTI POA.    Sepsis (HCC)- (present on admission)  Assessment & Plan  Was present on admission  Now resolved     Metabolic acidosis- (present on admission)  Assessment & Plan  Likely due to reduced intravascular volume and increase bicarb losses through diarrhea and acute kidney injury  Continue sodium bicarb p.o.  Bicarb 14    Diarrhea- (present on admission)  Assessment & Plan  Likely secondary to COVID-19 infection  Pending C. difficile PCR    ACP (advance care planning)- (present on admission)  Assessment & Plan  Full code     Elevated troponin-  (present on admission)  Assessment & Plan  In the indeterminate range  EKG shows sinus rhythm with a rate of 81.  There is no ST elevation.  There is left bundle branch block that is seen on prior EKGs.  There is flattening of T waves in lead I with T wave inversions in lead aVL.  QTc is 443   Demand ischemia due to 3 concurrent infections  Troponins 69 and 75, plateaued    Dyslipidemia- (present on admission)  Assessment & Plan  Cardiac diet.  Continue rosuvastatin    Hyponatremia- (present on admission)  Assessment & Plan  Na 134, monitoring    RASHAD (acute kidney injury) (HCC)- (present on admission)  Assessment & Plan  Creatinine 3.55, Baseline appears to be less than 2.0  Continue Hernandez, repeat labs daily  Checking renal US. Prior MRI A/P 01/2024 showed smaller right kidney.         VTE prophylaxis:    heparin ppx      I have performed a physical exam and reviewed and updated ROS and Plan today (9/12/2024). In review of yesterday's note (9/11/2024), there are no changes except as documented above.      Total time spent 51 minutes. I spent greater than 50% of the time for patient care, including unit/floor time, multiple face-to-face encounters with the patient, counseling on treatment plan and discussion with bedside RN.  Further, I spent time on my own review of patient's overnight events, RN notes, imaging and lab analysis, and developing my assessment and plan above.  In addition, working with , social workers, and Charge RN on case coordination on this date.    This note was generated using voice recognition software which has a chance of producing errors of grammar and content.  I have made every reasonable attempt to find and correct any errors, but it should be expected that some may not be found prior to finalization of this note.

## 2024-09-13 NOTE — CARE PLAN
The patient is Watcher - Medium risk of patient condition declining or worsening    Shift Goals  Clinical Goals: Wean O2, rest  Patient Goals: Rest  Family Goals: SHOLA    Progress made toward(s) clinical / shift goals:    Problem: Knowledge Deficit - Standard  Goal: Patient and family/care givers will demonstrate understanding of plan of care, disease process/condition, diagnostic tests and medications  Outcome: Progressing     Problem: Hemodynamics  Goal: Patient's hemodynamics, fluid balance and neurologic status will be stable or improve  Outcome: Progressing     Problem: Fluid Volume  Goal: Fluid volume balance will be maintained  Outcome: Progressing     Problem: Urinary - Renal Perfusion  Goal: Ability to achieve and maintain adequate renal perfusion and functioning will improve  Outcome: Progressing     Problem: Respiratory  Goal: Patient will achieve/maintain optimum respiratory ventilation and gas exchange  Outcome: Progressing       Patient is not progressing towards the following goals:

## 2024-09-13 NOTE — ASSESSMENT & PLAN NOTE
I reviewed last echo 01/2024, showed LVEF 65%, grade 1 diastolic dysfunction, mildly dilated LA, mild mitral regurgitation, moderate tricuspid regurgitation, RVSP 40 mmHg.    I reviewed new echocardiogram, LVEF 60%, grade 1 diastolic dysfunction, RV size is normal no dilation or strain, there is an enlarged RA, moderately dilated LA.  Positive mild mitral regurgitation, trace aortic insufficiency, moderate tricuspid regurgitation, RVSP 50 mmHg (worse), trace pulmonic insufficiency, trivial pericardial effusion (new), ascending aorta 2.8 cm and normal aortic root.

## 2024-09-13 NOTE — ASSESSMENT & PLAN NOTE
Last echo 1/2024 showed RVSP 40 mmHg  No echocardiogram shows RVSP 50 mmHg, now severe  -Continue IV Lasix

## 2024-09-13 NOTE — RESPIRATORY CARE
"   COPD EDUCATION by COPD CLINICAL EDUCATOR  9/13/2024 at 12:50 PM by Floridalma Davey, RRT     Patient reviewed by COPD education team. Patient does not have a history or diagnosis of COPD and is a smoker.  Therefore, patient does not qualify for the COPD program.    COPD Screen  COPD Risk Screening  Do you have a history of COPD?: No  COPD Population Screener  During the past 4 weeks, how much did you feel short of breath?: None/Little of the time  Do you ever cough up any mucus or phlegm?: No/only with occasional colds or infections  In the past 12 months, you do less than you used to because of your breathing problems: Disagree/unsure  Have you smoked at least 100 cigarettes in your entire life?: Yes  How old are you?: 60+  COPD Screening Score: 4    COPD Assessment  COPD Clinical Specialists ONLY  COPD Education Initiated: No--Quick Screen (Pt has no hx dx COPD, is a smoker and at age 79 probably not going to quit, no medications/inhalers for respiratory, admit acute sepsis due to acute community-acquired bacterial pneumonia, acute COVID-19 pneumonia, acute UTI)  Interdisciplinary Rounds: Attendance at Rounds (30 Min)    PFT Results    No results found for: \"PFT\"    Meds to Beds  [facility] provides bedside medication delivery for all eligible patients at discharge and you have been automatically enrolled in the Meds to Beds Program. Would you like to opt out of this program for any reason?: No - Stay Opted In     MY COPD ACTION PLAN     It is recommended that patients and physicians /healthcare providers complete this action plan together. This plan should be discussed at each physician visit and updated as needed.    The green, yellow and red zones show groups of symptoms of COPD. This list of symptoms is not comprehensive, and you may experience other symptoms. In the \"Actions\" column, your healthcare provider has recommended actions for you to take based on your symptoms.    Patient Name: Sangita Bolton   Date " "of Birth: 1945   Last Updated on:     Green Zone:  I am doing well today Actions     Usual activitiy and exercise level   Take daily medications     Usual amounts of cough and phlegm/mucus   Use oxygen as prescribed     Sleep well at night   Continue regular exercise/diet plan     Appetite is good   At all times avoid cigarette smoke, inhaled irritants     Daily Medications (these medications are taken every day):                Yellow Zone:  I am having a bad day or a COPD flare Actions     More breathless than usual   Continue daily medications     I have less energy for my daily activities   Use quick relief inhaler as ordered     Increased or thicker phlegm/mucus   Use oxygen as prescribed     Using quick relief inhaler/nebulizer more often   Get plenty of rest     Swelling of ankles more than usual   Use pursed lip breathing     More coughing than usual   At all times avoid cigarette smoke, inhaled irritants     I feel like I have a \"chest cold\"     Poor sleep and my symptoms woke me up     My appetite is not good     My medicine is not helping      Call provider immediately if symptoms don’t improve     Continue daily medications, add rescue medications:               Medications to be used during a flare up, (as Discussed with Provider):              Red Zone:  I need urgent medical care Actions     Severe shortness of breath even at rest   Call 911 or seek medical care immediately     Not able to do any activity because of breathing      Fever or shaking chills      Feeling confused or very drowsy       Chest pains      Coughing up blood                  "

## 2024-09-13 NOTE — PROGRESS NOTES
Telemetry Strip     Strip printed: 1210  Measurements from am strip were as follows:  Rhythm: Afib/SR  HR: 68-82  Measurements: 0.14/ 0.10/ 0.38  Ectopy: f trig, r PVC, r PAC             Normal Values  Rhythm SR  HR Range    Measurements 0.12-0.20 / 0.06-0.10  / 0.30-0.52

## 2024-09-13 NOTE — ASSESSMENT & PLAN NOTE
From prolonged tobacco use. Started smoking at 38 years old. Smoking 1-2ppd. She stopped smoking only a few months ago.  No home inhalers  Continue Dulera and Spiriva.

## 2024-09-13 NOTE — FACE TO FACE
"Face to Face Note  -  Durable Medical Equipment    Adolph Alaniz M.D. - NPI: [id number]  I certify that this patient is under my care and that they had a durable medical equipment(DME)face to face encounter by myself that meets the physician DME face-to-face encounter requirements with this patient on:    Date of encounter:   Patient:                    MRN:                       YOB: 2026  Sangita Bolton  [id number]  1945     The encounter with the patient was in whole, or in part, for the following medical condition, which is the primary reason for durable medical equipment:  Covid-19 Infection and Other - acute hypoxemic respiratory failure, acute demand ischemia, acute renal failure, CKD 3A, acute sepsis    I certify that, based on my findings, the following durable medical equipment is medically necessary:    Oxygen   HOME O2 Saturation Measurements:(Values must be present for Home Oxygen orders)  Room air sat at rest: 94  Room air sat with amb: 83  With liters of O2: 2, O2 sat at rest with O2: 96  With Liters of O2: 2, O2 sat with amb with O2 : 91  Is the patient mobile?: Yes  If patient feels more short of breath, they can go up to 6 liters per minute and contact healthcare provider.    Supporting Symptoms: The patient requires supplemental oxygen, as the following interventions have been tried with limited or no improvement: \"Bronchodilators and/or steroid inhalers, \"Oral and/or IV steroids, \"Ambulation with oximetry, and \"Incentive spirometry.    My Clinical findings support the need for the above equipment due to:  Hypoxia  "

## 2024-09-13 NOTE — PROGRESS NOTES
"Attempted to remove charles catheter per MD order. Pt refused stating \"you can take it out tomorrow\". Education provided pt continued to refuse removal of charles catheter  "

## 2024-09-13 NOTE — PROGRESS NOTES
12-hour chart check complete.    Monitor Summary  Rhythm: Afib  Rate: 70's-80's  Ectopy: Occ PVc's, rare bigem/trig, coup, frequent PVC's  Measurements: -/.14/-   Orders placed.

## 2024-09-13 NOTE — PROGRESS NOTES
Telemetry Shift Summary     Rhythm A-fib with BBB  HR Range 72-92  Ectopy rPVC/couplet/triggem/bigem  Measurements --/0.12/0.40           Normal Values  Rhythm SR  HR Range    Measurements 0.12-0.20 / 0.06-0.10  / 0.30-0.52

## 2024-09-13 NOTE — CARE PLAN
Problem: Knowledge Deficit - Standard  Goal: Patient and family/care givers will demonstrate understanding of plan of care, disease process/condition, diagnostic tests and medications  Outcome: Progressing     Problem: Urinary - Renal Perfusion  Goal: Ability to achieve and maintain adequate renal perfusion and functioning will improve  Outcome: Progressing     Problem: Respiratory  Goal: Patient will achieve/maintain optimum respiratory ventilation and gas exchange  Outcome: Progressing   The patient is Stable - Low risk of patient condition declining or worsening    Shift Goals  Clinical Goals: wean O2, continue abx, monitor urine output  Patient Goals: rest  Family Goals: SHOLA    Progress made toward(s) clinical / shift goals:  pt oriented to self and place, reoriented to date and situation. Updated pt and spouse on plan of care to continue IV abx, wean oxygen as tolerated, monitor urine output pt with charles for urinary retention. Pt on 2 liters NC baseline room air. Encourage mobility and frequent repositioning.     Patient is not progressing towards the following goals:

## 2024-09-13 NOTE — THERAPY
Occupational Therapy   Initial Evaluation     Patient Name: Sangita Bolton  Age:  79 y.o., Sex:  female  Medical Record #: [id number]  Today's Date: 9/13/2024     Precautions  Precautions: (P) Fall Risk  Comments: (P) COVID-19 precautions    Assessment  Patient is 79 y.o. female who presented 9/10/24 w/ generalized weakness, nausea, diarrhea and SOB.  Admitted with a diagnosis of acute hypoxemic respiratory failure, positive COVID-19.  PMH - HTN, HLD, osteoporosis, CKD III, cataracts.  Additional factors influencing patient status / progress: Impaired balance, limited activity tolerance, desat w/ activity (<2 mins rest break to stabilize), generalized weakness.  [facility] [place] [place]   is available to assist as needed.  PLOF Mod I to Indep for ADL's, transfers and ambulation w/out a device.  Pt demonstrated CGA sit/stand, attempted HHA standing/ambulation (impaired balance-transitioned to FWW w/ greater stability/function), CGA transfers, Min assist U/LB clothing management.      Plan    Occupational Therapy Initial Treatment Plan   Treatment Interventions: (P) Self Care / Activities of Daily Living, Adaptive Equipment, Therapeutic Exercises, Therapeutic Activity, Neuro Re-Education / Balance  Treatment Frequency: (P) 3 Times per Week  Duration: (P) Until Therapy Goals Met    DC Equipment Recommendations: (P) None  Discharge Recommendations: (P) Recommend home health for continued occupational therapy services     Subjective    Pt was alert and cooperative w/ tx.     Objective       09/13/24 1325    Services   Is patient using  services for this encounter? No   Initial Contact Note    Initial Contact Note Order Received and Verified, Occupational Therapy Evaluation in Progress with Full Report to Follow.   Prior Living Situation   Prior Services Home-Independent   Housing / Facility [address]   Steps Into Home 3  (garage to home, no rails)   Steps In Home 0    Bathroom Set up Walk In Shower;Shower Chair   Equipment Owned Front-Wheel Walker;4-Wheel Walker;Single Point Cane;Wheelchair;Tub / Shower Seat;Grab Bar(s) By Toilet   Lives with - Patient's Self Care Capacity Spouse   Comments [facility] [place] [place]   is available to assist as needed.  PLOF Mod I to Indep for ADL's, transfers and ambulation w/out a device.   Prior Level of ADL Function   Self Feeding Independent   Grooming / Hygiene Independent   Bathing Independent   Dressing Independent   Toileting Independent   Prior Level of IADL Function   Medication Management Independent   Laundry Independent   Kitchen Mobility Independent   Finances Independent   Home Management Independent   Shopping Independent   Prior Level Of Mobility Independent Without Device in Community;Independent With Steps in Community;Independent Without Device in Home;Independent With Steps in Home   Driving / Transportation Driving Independent With Handicap Accessories   Occupation (Pre-Hospital Vocational) Retired Due To Age   History of Falls   History of Falls Yes   Date of Last Fall   (Pt reported frqnt falls, but none in past week.)   Precautions   Precautions Fall Risk   Comments COVID-19 precautions   Vitals   Pulse Oximetry 93 %   O2 (LPM) 2   O2 Delivery Device Silicone Nasal Cannula   Pain   Intervention Declines   Cognition    Cognition / Consciousness WDL   Level of Consciousness Alert   Passive ROM Upper Body   Passive ROM Upper Body WDL   Active ROM Upper Body   Active ROM Upper Body  WDL   Dominant Hand Right   Strength Upper Body   Upper Body Strength  X   Comments Generalized weakness bilaterally   Coordination Upper Body   Coordination WDL   Balance Assessment   Sitting Balance (Static) Fair +   Sitting Balance (Dynamic) Fair +   Standing Balance (Static) Fair -   Standing Balance (Dynamic) Fair -   Weight Shift Sitting Fair   Weight Shift Standing Fair   Comments OOB FWW   Bed Mobility     Comments Pt found upright in bedside chair at start of session and returned to bedside chair at end of session.   ADL Assessment   Upper Body Dressing Minimal Assist   Lower Body Dressing Minimal Assist   Toileting Minimal Assist   How much help from another person does the patient currently need...   Putting on and taking off regular lower body clothing? 3   Bathing (including washing, rinsing, and drying)? 3   Toileting, which includes using a toilet, bedpan, or urinal? 3   Putting on and taking off regular upper body clothing? 3   Taking care of personal grooming such as brushing teeth? 3   Eating meals? 4   6 Clicks Daily Activity Score 19   Functional Mobility   Sit to Stand Contact Guard Assist   Bed, Chair, Wheelchair Transfer Contact Guard Assist   Toilet Transfers Contact Guard Assist   Transfer Method Stand Step   Mobility CGA FWW   Distance (Feet) 15   # of Times Distance was Traveled 2   Edema / Skin Assessment   Edema / Skin  Not Assessed   Short Term Goals   Short Term Goal # 1 Mod I bed mobility (to/from supine/EOB sitting)   Short Term Goal # 2 Mod I U/LB clothing management via AE/DME PRN   Short Term Goal # 3 Sup toilet transfer/bathroom via DME   Short Term Goal # 4 Sup toileting/bathroom via AE/DME PRN   Education Group   Education Provided Transfers;Role of Occupational Therapist;Activities of Daily Living;Adaptive Equipment;Use of Call Light   Role of Occupational Therapist Patient Response Patient;Acceptance;Explanation;Verbal Demonstration   Transfers Patient Response Patient;Acceptance;Explanation;Demonstration;Teach Back;Verbal Demonstration;Action Demonstration;Reinforcement Needed   ADL Patient Response Patient;Acceptance;Explanation;Demonstration;Teach Back;Verbal Demonstration;Action Demonstration;Reinforcement Needed   Adaptive Equipment Patient Response Patient;Acceptance;Explanation;Demonstration;Teach Back;Action Demonstration;Verbal Demonstration;Reinforcement Needed   Use of  Call Light Patient Response Patient;Acceptance;Explanation;Demonstration;Verbal Demonstration   Occupational Therapy Initial Treatment Plan    Treatment Interventions Self Care / Activities of Daily Living;Adaptive Equipment;Therapeutic Exercises;Therapeutic Activity;Neuro Re-Education / Balance   Treatment Frequency 3 Times per Week   Duration Until Therapy Goals Met   Problem List   Problem List Decreased Active Daily Living Skills;Decreased Upper Extremity Strength Right;Decreased Upper Extremity Strength Left;Decreased Functional Mobility;Decreased Activity Tolerance;Safety Awareness Deficits / Cognition;Impaired Postural Control / Balance   Anticipated Discharge Equipment and Recommendations   DC Equipment Recommendations None   Discharge Recommendations Recommend home health for continued occupational therapy services

## 2024-09-14 ENCOUNTER — APPOINTMENT (OUTPATIENT)
Dept: CARDIOLOGY | Facility: MEDICAL CENTER | Age: 79
DRG: 871 | End: 2024-09-14
Attending: INTERNAL MEDICINE
Payer: MEDICARE

## 2024-09-14 ENCOUNTER — APPOINTMENT (OUTPATIENT)
Dept: RADIOLOGY | Facility: MEDICAL CENTER | Age: 79
DRG: 871 | End: 2024-09-14
Attending: INTERNAL MEDICINE
Payer: MEDICARE

## 2024-09-14 LAB
ALBUMIN SERPL BCP-MCNC: 3.2 G/DL (ref 3.2–4.9)
BUN SERPL-MCNC: 100 MG/DL (ref 8–22)
CALCIUM ALBUM COR SERPL-MCNC: 8.8 MG/DL (ref 8.5–10.5)
CALCIUM SERPL-MCNC: 8.2 MG/DL (ref 8.4–10.2)
CHLORIDE SERPL-SCNC: 96 MMOL/L (ref 96–112)
CO2 SERPL-SCNC: 16 MMOL/L (ref 20–33)
CREAT SERPL-MCNC: 2.96 MG/DL (ref 0.5–1.4)
ERYTHROCYTE [DISTWIDTH] IN BLOOD BY AUTOMATED COUNT: 45.6 FL (ref 35.9–50)
GFR SERPLBLD CREATININE-BSD FMLA CKD-EPI: 15 ML/MIN/1.73 M 2
GLUCOSE SERPL-MCNC: 168 MG/DL (ref 65–99)
HCT VFR BLD AUTO: 31 % (ref 37–47)
HGB BLD-MCNC: 10.5 G/DL (ref 12–16)
LV EJECT FRACT MOD 2C 99903: 65.87
LV EJECT FRACT MOD 4C 99902: 66.58
LV EJECT FRACT MOD BP 99901: 64.12
MAGNESIUM SERPL-MCNC: 2 MG/DL (ref 1.5–2.5)
MCH RBC QN AUTO: 29.7 PG (ref 27–33)
MCHC RBC AUTO-ENTMCNC: 33.9 G/DL (ref 32.2–35.5)
MCV RBC AUTO: 87.8 FL (ref 81.4–97.8)
NT-PROBNP SERPL IA-MCNC: ABNORMAL PG/ML (ref 0–125)
PHOSPHATE SERPL-MCNC: 4.1 MG/DL (ref 2.5–4.5)
PLATELET # BLD AUTO: 411 K/UL (ref 164–446)
PMV BLD AUTO: 10 FL (ref 9–12.9)
POTASSIUM SERPL-SCNC: 4 MMOL/L (ref 3.6–5.5)
PROCALCITONIN SERPL-MCNC: 1.49 NG/ML
RBC # BLD AUTO: 3.53 M/UL (ref 4.2–5.4)
SODIUM SERPL-SCNC: 130 MMOL/L (ref 135–145)
WBC # BLD AUTO: 10.5 K/UL (ref 4.8–10.8)

## 2024-09-14 PROCEDURE — 84145 PROCALCITONIN (PCT): CPT

## 2024-09-14 PROCEDURE — 770020 HCHG ROOM/CARE - TELE (206)

## 2024-09-14 PROCEDURE — A9270 NON-COVERED ITEM OR SERVICE: HCPCS | Performed by: INTERNAL MEDICINE

## 2024-09-14 PROCEDURE — 99233 SBSQ HOSP IP/OBS HIGH 50: CPT | Performed by: INTERNAL MEDICINE

## 2024-09-14 PROCEDURE — 94760 N-INVAS EAR/PLS OXIMETRY 1: CPT

## 2024-09-14 PROCEDURE — 93306 TTE W/DOPPLER COMPLETE: CPT | Mod: 26 | Performed by: INTERNAL MEDICINE

## 2024-09-14 PROCEDURE — 700111 HCHG RX REV CODE 636 W/ 250 OVERRIDE (IP): Performed by: HOSPITALIST

## 2024-09-14 PROCEDURE — 700102 HCHG RX REV CODE 250 W/ 637 OVERRIDE(OP): Performed by: INTERNAL MEDICINE

## 2024-09-14 PROCEDURE — 85027 COMPLETE CBC AUTOMATED: CPT

## 2024-09-14 PROCEDURE — 93306 TTE W/DOPPLER COMPLETE: CPT

## 2024-09-14 PROCEDURE — 700102 HCHG RX REV CODE 250 W/ 637 OVERRIDE(OP): Performed by: HOSPITALIST

## 2024-09-14 PROCEDURE — 700105 HCHG RX REV CODE 258: Performed by: HOSPITALIST

## 2024-09-14 PROCEDURE — 700111 HCHG RX REV CODE 636 W/ 250 OVERRIDE (IP): Mod: JZ | Performed by: INTERNAL MEDICINE

## 2024-09-14 PROCEDURE — 36415 COLL VENOUS BLD VENIPUNCTURE: CPT

## 2024-09-14 PROCEDURE — A9270 NON-COVERED ITEM OR SERVICE: HCPCS | Performed by: HOSPITALIST

## 2024-09-14 PROCEDURE — 80069 RENAL FUNCTION PANEL: CPT

## 2024-09-14 PROCEDURE — A9540 TC99M MAA: HCPCS

## 2024-09-14 PROCEDURE — 83880 ASSAY OF NATRIURETIC PEPTIDE: CPT

## 2024-09-14 PROCEDURE — 83735 ASSAY OF MAGNESIUM: CPT

## 2024-09-14 RX ORDER — FUROSEMIDE 10 MG/ML
20 INJECTION INTRAMUSCULAR; INTRAVENOUS ONCE
Status: COMPLETED | OUTPATIENT
Start: 2024-09-14 | End: 2024-09-14

## 2024-09-14 RX ADMIN — Medication 5 MG: at 21:29

## 2024-09-14 RX ADMIN — DEXAMETHASONE 6 MG: 4 TABLET ORAL at 05:40

## 2024-09-14 RX ADMIN — HEPARIN SODIUM 5000 UNITS: 5000 INJECTION, SOLUTION INTRAVENOUS; SUBCUTANEOUS at 05:40

## 2024-09-14 RX ADMIN — HEPARIN SODIUM 5000 UNITS: 5000 INJECTION, SOLUTION INTRAVENOUS; SUBCUTANEOUS at 21:28

## 2024-09-14 RX ADMIN — SODIUM BICARBONATE 650 MG: 650 TABLET ORAL at 21:29

## 2024-09-14 RX ADMIN — METOPROLOL SUCCINATE 25 MG: 25 TABLET, FILM COATED, EXTENDED RELEASE ORAL at 05:40

## 2024-09-14 RX ADMIN — FUROSEMIDE 20 MG: 10 INJECTION, SOLUTION INTRAVENOUS at 09:06

## 2024-09-14 RX ADMIN — AMLODIPINE BESYLATE 10 MG: 5 TABLET ORAL at 05:40

## 2024-09-14 RX ADMIN — SODIUM BICARBONATE 650 MG: 650 TABLET ORAL at 15:22

## 2024-09-14 RX ADMIN — ASPIRIN 81 MG: 81 TABLET, COATED ORAL at 05:40

## 2024-09-14 RX ADMIN — SODIUM BICARBONATE 650 MG: 650 TABLET ORAL at 09:05

## 2024-09-14 RX ADMIN — HEPARIN SODIUM 5000 UNITS: 5000 INJECTION, SOLUTION INTRAVENOUS; SUBCUTANEOUS at 14:04

## 2024-09-14 RX ADMIN — ROSUVASTATIN CALCIUM 10 MG: 10 TABLET, FILM COATED ORAL at 17:28

## 2024-09-14 RX ADMIN — CEFTRIAXONE SODIUM 2000 MG: 2 INJECTION, POWDER, FOR SOLUTION INTRAMUSCULAR; INTRAVENOUS at 14:06

## 2024-09-14 RX ADMIN — FUROSEMIDE 20 MG: 10 INJECTION, SOLUTION INTRAVENOUS at 17:28

## 2024-09-14 ASSESSMENT — PAIN DESCRIPTION - PAIN TYPE
TYPE: ACUTE PAIN

## 2024-09-14 ASSESSMENT — ENCOUNTER SYMPTOMS
SHORTNESS OF BREATH: 1
FEVER: 0
WEAKNESS: 1
CHILLS: 0

## 2024-09-14 ASSESSMENT — FIBROSIS 4 INDEX: FIB4 SCORE: 0.87

## 2024-09-14 NOTE — PROGRESS NOTES
"Discussed removal of charles catheter per MD order. Pt refused stating \"they can take it out tomorrow, I don't want to deal with it tonight.\" Patient education provided and still refused.   "

## 2024-09-14 NOTE — PROGRESS NOTES
Bedside report received by day STEPHANI Hernandez and assumed care of patient. Resting in bed, denied pain. A&Ox4 x on 2L NC. Tele monitoring in place. Educated on fall risk, all fall precautions in place. Call light within reach, bed locked and in lowest position, denied other needs at this time. Hourly rounding in progress.

## 2024-09-14 NOTE — PROGRESS NOTES
Telemetry Shift Summary     Rhythm: SR  HR: 64-81  Ectopy: r PVC, r PAC    Measurements: 0.16/0.08/0.40    Normal Values  Rhythm: SR  HR:   Measurements: 0.12-0.20/0.08-0.10/0.30-0.52

## 2024-09-14 NOTE — CARE PLAN
The patient is Stable - Low risk of patient condition declining or worsening    Shift Goals  Clinical Goals: Wean O2 / Vitals / ABX  Patient Goals: Sleep / Go Home  Family Goals: SHOLA    Progress made toward(s) clinical / shift goals:  Pt resting comfortably. Vitals within normal limits. On abx    Patient is not progressing towards the following goals:

## 2024-09-14 NOTE — CARE PLAN
The patient is Stable - Low risk of patient condition declining or worsening    Shift Goals  Clinical Goals: Wean O2, IV abx, monitor urine output  Patient Goals: sleep, go home  Family Goals: SHOLA    Progress made toward(s) clinical / shift goals:    Plan of care discussed with patient and all questions answered. Patient has had adequate urine output this shift. Patient has not had increased demands for oxygen this shift. Walking O2 to be performed in the am. IV abx being administered per MAR.    Patient is not progressing towards the following goals:

## 2024-09-14 NOTE — PROGRESS NOTES
Encouraging pt to move. Pt refused turn as she is pretty comfortable in her current position. Educated pt on importance of turns to prevent further sacrum pressure sores. Pt says maybe later. Pt is alert and oriented x4.

## 2024-09-15 ENCOUNTER — PHARMACY VISIT (OUTPATIENT)
Dept: PHARMACY | Facility: MEDICAL CENTER | Age: 79
End: 2024-09-15
Payer: COMMERCIAL

## 2024-09-15 VITALS
OXYGEN SATURATION: 92 % | TEMPERATURE: 97.4 F | SYSTOLIC BLOOD PRESSURE: 132 MMHG | BODY MASS INDEX: 23.91 KG/M2 | HEIGHT: 63 IN | HEART RATE: 67 BPM | DIASTOLIC BLOOD PRESSURE: 68 MMHG | RESPIRATION RATE: 18 BRPM | WEIGHT: 134.92 LBS

## 2024-09-15 PROBLEM — R19.7 DIARRHEA: Status: RESOLVED | Noted: 2024-09-10 | Resolved: 2024-09-15

## 2024-09-15 PROBLEM — E87.20 METABOLIC ACIDOSIS: Status: RESOLVED | Noted: 2024-09-10 | Resolved: 2024-09-15

## 2024-09-15 PROBLEM — E87.20 LACTIC ACID ACIDOSIS: Status: ACTIVE | Noted: 2024-09-15

## 2024-09-15 PROBLEM — A41.9 SEPSIS (HCC): Status: RESOLVED | Noted: 2024-09-10 | Resolved: 2024-09-15

## 2024-09-15 PROBLEM — N30.00 ACUTE CYSTITIS WITHOUT HEMATURIA: Status: RESOLVED | Noted: 2024-09-12 | Resolved: 2024-09-15

## 2024-09-15 PROBLEM — E87.20 LACTIC ACID ACIDOSIS: Status: RESOLVED | Noted: 2024-09-15 | Resolved: 2024-09-15

## 2024-09-15 PROBLEM — R94.31 ECG ABNORMALITY: Status: RESOLVED | Noted: 2024-09-13 | Resolved: 2024-09-15

## 2024-09-15 LAB
ALBUMIN SERPL BCP-MCNC: 3.1 G/DL (ref 3.2–4.9)
BACTERIA BLD CULT: NORMAL
BACTERIA BLD CULT: NORMAL
BUN SERPL-MCNC: 97 MG/DL (ref 8–22)
CALCIUM ALBUM COR SERPL-MCNC: 9.1 MG/DL (ref 8.5–10.5)
CALCIUM SERPL-MCNC: 8.4 MG/DL (ref 8.4–10.2)
CHLORIDE SERPL-SCNC: 96 MMOL/L (ref 96–112)
CO2 SERPL-SCNC: 18 MMOL/L (ref 20–33)
CREAT SERPL-MCNC: 2.82 MG/DL (ref 0.5–1.4)
ERYTHROCYTE [DISTWIDTH] IN BLOOD BY AUTOMATED COUNT: 43.4 FL (ref 35.9–50)
GFR SERPLBLD CREATININE-BSD FMLA CKD-EPI: 16 ML/MIN/1.73 M 2
GLUCOSE SERPL-MCNC: 150 MG/DL (ref 65–99)
HCT VFR BLD AUTO: 32.8 % (ref 37–47)
HGB BLD-MCNC: 11.4 G/DL (ref 12–16)
MAGNESIUM SERPL-MCNC: 1.8 MG/DL (ref 1.5–2.5)
MCH RBC QN AUTO: 29.7 PG (ref 27–33)
MCHC RBC AUTO-ENTMCNC: 34.8 G/DL (ref 32.2–35.5)
MCV RBC AUTO: 85.4 FL (ref 81.4–97.8)
NT-PROBNP SERPL IA-MCNC: ABNORMAL PG/ML (ref 0–125)
PHOSPHATE SERPL-MCNC: 4.1 MG/DL (ref 2.5–4.5)
PLATELET # BLD AUTO: 392 K/UL (ref 164–446)
PMV BLD AUTO: 10.1 FL (ref 9–12.9)
POTASSIUM SERPL-SCNC: 4.1 MMOL/L (ref 3.6–5.5)
PROCALCITONIN SERPL-MCNC: 0.73 NG/ML
RBC # BLD AUTO: 3.84 M/UL (ref 4.2–5.4)
SIGNIFICANT IND 70042: NORMAL
SIGNIFICANT IND 70042: NORMAL
SITE SITE: NORMAL
SITE SITE: NORMAL
SODIUM SERPL-SCNC: 130 MMOL/L (ref 135–145)
SOURCE SOURCE: NORMAL
SOURCE SOURCE: NORMAL
WBC # BLD AUTO: 13.8 K/UL (ref 4.8–10.8)

## 2024-09-15 PROCEDURE — RXMED WILLOW AMBULATORY MEDICATION CHARGE: Performed by: INTERNAL MEDICINE

## 2024-09-15 PROCEDURE — A9270 NON-COVERED ITEM OR SERVICE: HCPCS | Performed by: HOSPITALIST

## 2024-09-15 PROCEDURE — 700102 HCHG RX REV CODE 250 W/ 637 OVERRIDE(OP): Performed by: INTERNAL MEDICINE

## 2024-09-15 PROCEDURE — 80069 RENAL FUNCTION PANEL: CPT

## 2024-09-15 PROCEDURE — 700111 HCHG RX REV CODE 636 W/ 250 OVERRIDE (IP): Performed by: HOSPITALIST

## 2024-09-15 PROCEDURE — 36415 COLL VENOUS BLD VENIPUNCTURE: CPT

## 2024-09-15 PROCEDURE — 700102 HCHG RX REV CODE 250 W/ 637 OVERRIDE(OP): Performed by: HOSPITALIST

## 2024-09-15 PROCEDURE — 85027 COMPLETE CBC AUTOMATED: CPT

## 2024-09-15 PROCEDURE — 84145 PROCALCITONIN (PCT): CPT

## 2024-09-15 PROCEDURE — 99239 HOSP IP/OBS DSCHRG MGMT >30: CPT | Performed by: INTERNAL MEDICINE

## 2024-09-15 PROCEDURE — 97535 SELF CARE MNGMENT TRAINING: CPT

## 2024-09-15 PROCEDURE — 83735 ASSAY OF MAGNESIUM: CPT

## 2024-09-15 PROCEDURE — 97162 PT EVAL MOD COMPLEX 30 MIN: CPT

## 2024-09-15 PROCEDURE — 700111 HCHG RX REV CODE 636 W/ 250 OVERRIDE (IP): Mod: JZ | Performed by: INTERNAL MEDICINE

## 2024-09-15 PROCEDURE — A9270 NON-COVERED ITEM OR SERVICE: HCPCS | Performed by: INTERNAL MEDICINE

## 2024-09-15 PROCEDURE — 94760 N-INVAS EAR/PLS OXIMETRY 1: CPT

## 2024-09-15 PROCEDURE — 83880 ASSAY OF NATRIURETIC PEPTIDE: CPT

## 2024-09-15 RX ORDER — FUROSEMIDE 40 MG
40 TABLET ORAL DAILY
Qty: 30 TABLET | Refills: 2 | Status: ON HOLD | OUTPATIENT
Start: 2024-09-15 | End: 2024-09-30

## 2024-09-15 RX ORDER — FUROSEMIDE 10 MG/ML
60 INJECTION INTRAMUSCULAR; INTRAVENOUS ONCE
Status: COMPLETED | OUTPATIENT
Start: 2024-09-15 | End: 2024-09-15

## 2024-09-15 RX ORDER — POTASSIUM CHLORIDE 750 MG/1
10 TABLET, EXTENDED RELEASE ORAL 2 TIMES DAILY
Qty: 90 TABLET | Refills: 3 | Status: ON HOLD | OUTPATIENT
Start: 2024-09-15 | End: 2024-09-30

## 2024-09-15 RX ORDER — SODIUM BICARBONATE 650 MG/1
1300 TABLET ORAL 3 TIMES DAILY
Qty: 120 TABLET | Refills: 3 | Status: SHIPPED | OUTPATIENT
Start: 2024-09-15

## 2024-09-15 RX ORDER — DEXAMETHASONE 6 MG/1
6 TABLET ORAL DAILY
Qty: 3 TABLET | Refills: 0 | Status: SHIPPED | OUTPATIENT
Start: 2024-09-16 | End: 2024-09-19

## 2024-09-15 RX ADMIN — METOPROLOL SUCCINATE 25 MG: 25 TABLET, FILM COATED, EXTENDED RELEASE ORAL at 06:10

## 2024-09-15 RX ADMIN — AMLODIPINE BESYLATE 10 MG: 5 TABLET ORAL at 06:10

## 2024-09-15 RX ADMIN — HEPARIN SODIUM 5000 UNITS: 5000 INJECTION, SOLUTION INTRAVENOUS; SUBCUTANEOUS at 06:10

## 2024-09-15 RX ADMIN — SODIUM BICARBONATE 650 MG: 650 TABLET ORAL at 09:17

## 2024-09-15 RX ADMIN — DEXAMETHASONE 6 MG: 4 TABLET ORAL at 06:09

## 2024-09-15 RX ADMIN — ASPIRIN 81 MG: 81 TABLET, COATED ORAL at 06:10

## 2024-09-15 RX ADMIN — FUROSEMIDE 60 MG: 10 INJECTION, SOLUTION INTRAVENOUS at 06:45

## 2024-09-15 ASSESSMENT — COGNITIVE AND FUNCTIONAL STATUS - GENERAL
MOVING TO AND FROM BED TO CHAIR: A LITTLE
CLIMB 3 TO 5 STEPS WITH RAILING: A LOT
STANDING UP FROM CHAIR USING ARMS: A LITTLE
MOVING FROM LYING ON BACK TO SITTING ON SIDE OF FLAT BED: A LITTLE
SUGGESTED CMS G CODE MODIFIER MOBILITY: CK
WALKING IN HOSPITAL ROOM: A LITTLE
TURNING FROM BACK TO SIDE WHILE IN FLAT BAD: A LITTLE
MOBILITY SCORE: 17

## 2024-09-15 ASSESSMENT — GAIT ASSESSMENTS
GAIT LEVEL OF ASSIST: CONTACT GUARD ASSIST
DEVIATION: DECREASED HEEL STRIKE;DECREASED TOE OFF;STEP TO
ASSISTIVE DEVICE: FRONT WHEEL WALKER
DISTANCE (FEET): 15

## 2024-09-15 ASSESSMENT — FIBROSIS 4 INDEX: FIB4 SCORE: 0.91

## 2024-09-15 ASSESSMENT — PAIN DESCRIPTION - PAIN TYPE: TYPE: ACUTE PAIN

## 2024-09-15 NOTE — PROGRESS NOTES
12-hour chart check complete.    Monitor Summary  Rhythm: SR  Rate: 60s  Ectopy: rPAC  Measurements: .14/.08/.40

## 2024-09-15 NOTE — DISCHARGE SUMMARY
"Discharge Summary    CHIEF COMPLAINT ON ADMISSION  Chief Complaint   Patient presents with    Sent by MD Dr Hernández sent her over after a annual check up. Pt been feeling nauseous and generalized weakness. Per , she has had decreased mobility x 1 week. Usually uses a walker but unable to do that now. No CP or SOB.  Pt O2 80% RA in triage. Placed on 4 L NC now sating 88%.   No flu like symptoms.       Reason for Admission  Sent by MD     Admission Date  9/10/2024    CODE STATUS  Full Code    HPI & HOSPITAL COURSE  This is \"Sangita Bolton is a 79 y.o. female with a past medical history of primary hypertension, hyperlipidemia who presented 9/10/2024 with generalized weakness, easy fatigability, shortness of breath and diarrhea.  The patient has not been feeling well over the past 7 days.  She has been having progressively worsening easy fatigability and shortness of breath.  She was found to be hypoxic saturating 80% at her primary care physician who advised her to go to the hospital.\" (As per admitting physician Dr. Barnes in H&P).    Patient did present with acute sepsis, SIRS criteria met with tachypnea and leukocytosis.  Acute sepsis caused acute lactic acidosis, acute renal failure and acute hypoxemic respiratory failure.    Patient was treated for superimposed bacterial community-acquired pneumonia due to acute COVID-19 viral pneumonia.  Patient's procalcitonin was started at 17.26, after treatment was down to 0.73.      She was also found to have urinary tract infection with Klebsiella variicola.  She was treated with IV ceftriaxone for 5 days.  Urine culture showing Enterococcus faecalis and Klebsiella variicola.  Patient did require Hernandez catheter placement and she was discharged with catheter.  We obtained advanced home health.  -Klebsiella sensitive to cefuroxime, cefazolin, ceftriaxone, intermediate to Macrobid.  Enterococcus faecalis likely contaminant.    Patient was also found to have " significant elevation of BNP 23,127, was initially 12,663.  She was also found to have demand ischemia with troponins 75 then 69.  He also presented with acute renal failure on CKD4, creatinine started at 4.13, was down to 2.82 (creatinine baseline less than 2.2).  She was treated with IV Lasix with improvement.    We obtained a  new echocardiogram which showed an LVEF 60%, grade 1 diastolic dysfunction, RV size is normal no dilation or strain, there is an enlarged RA, moderately dilated LA.  Positive mild mitral regurgitation, trace aortic insufficiency, moderate tricuspid regurgitation, RVSP 50 mmHg (worse), trace pulmonic insufficiency, trivial pericardial effusion (new), ascending aorta 2.8 cm and normal aortic root.     D-dimer 3.03, I ordered a VQ scan but given patient's COVID-19 she was only able to perform a perfusion scan. There is no lack of perfusion on the scan but it is indeterminate for any PE. At this time we will hold any anticoagulation.     Therefore, she is discharged in good and stable condition to home with organized home healthcare and close outpatient follow-up.    The patient met 2-midnight criteria for an inpatient stay at the time of discharge.    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/g (HCC) (POA: Yes)    Dyslipidemia (POA: Yes)    Centrilobular emphysema (HCC) (POA: Yes)    Nonrheumatic tricuspid valve regurgitation (POA: Yes)    Elevated troponin (POA: Yes)    ACP (advance care planning) (POA: Yes)    Hernandez catheter in place (POA: No)    Pressure injury of buttock, unstageable (HCC) (POA: Yes)    Pulmonary hypertension (HCC) (Chronic) (POA: Yes)  Resolved Problems:    Diarrhea (POA: Yes)    Metabolic  acidosis (POA: Yes)    Sepsis (HCC) (POA: Yes)    Acute cystitis without hematuria (POA: Yes)    ECG abnormality (POA: Clinically Undetermined)    Lactic acid acidosis (POA: Yes)      FOLLOW UP  Future Appointments   Date Time Provider Department Center   9/17/2024  9:20 AM [facility]   10/4/2024  2:00 PM VASCULAR LAB [facility] SMNI SMarlene Little   2/3/2025 10:15 AM [facility] ECHO 1 ECSM DIONNA JoyLittle     [facility]  [address]  [facility] [address]  [phone]        Claire Hernández M.D.  [address]  [address]  [facility] [id number] [phone]    Schedule an appointment as soon as possible for a visit in 1 week(s)  Please follow-up for posthospital visit in 1 to 2 weeks after discharging from the hospital.  - You will need follow-up for your oxygen needs, currently with [facility] PAPPAS  - You will need follow-up for your bacterial pneumonia, COVID-19 viral pneumonia, bacterial urinary tract infection.  -Please revisit losartan medication, repeat labs including renal function panel or BMP, repeat Pro-BNP.  Please restart losartan if creatinine is back to baseline (<2.2).  Please adjust with new Lasix prescription.    [facility] - KIDNEY [facility]ARE  [address]  [facility] [address]  [phone]  Schedule an appointment as soon as possible for a visit in 1 week(s)  Please follow-up with the [facility] for your chronic kidney disease stage IV.  They will need to evaluate given losartan use, new prescription of Lasix.      MEDICATIONS ON DISCHARGE     Medication List        START taking these medications        Instructions   dexamethasone 6 MG Tabs  Start taking on: September 16, 2024  Commonly known as: Decadron   Take 1 Tablet by mouth every day for 3 days.  Dose: 6 mg     furosemide 40 MG Tabs  Commonly known as: Lasix   Take 1 Tablet by mouth every day for 30 days.  Dose: 40 mg     mometasone-formoterol 100-5 MCG/ACT Aero  Commonly known as: Dulera   Inhale 2  Puffs 2 times a day for 30 days.  Dose: 2 Puff     potassium chloride SA 10 MEQ Tbcr  Commonly known as: K-Dur   Take 1 Tablet by mouth 2 times a day. Take potassium with furosemide/Lasix.  Do not take otherwise.  Dose: 10 mEq     sodium bicarbonate 650 MG Tabs  Commonly known as: Sodium Bicarbonate   Take 2 Tablets by mouth in the morning, at noon, and at bedtime.  Dose: 1,300 mg     tiotropium 2.5 mcg/Act Aers  Start taking on: September 16, 2024  Commonly known as: Spiriva Respimat   Inhale 2 Inhalations every day for 30 days.  Dose: 5 mcg            CONTINUE taking these medications        Instructions   amLODIPine 10 MG Tabs  Commonly known as: Norvasc   Take 1 Tablet by mouth every day.  Dose: 10 mg     aspirin 81 MG EC tablet   Take 1 Tablet by mouth every day.  Dose: 81 mg     metoprolol SR 25 MG Tb24  Commonly known as: Toprol XL   Take 1 Tablet by mouth every day.  Dose: 25 mg     rosuvastatin 10 MG Tabs  Commonly known as: Crestor   Take 1 Tablet by mouth every evening. Overdue for follow up, needs appt for further refills  Dose: 10 mg     VITAMIN B-12 PO   Take 1 tablet by mouth every day.  Dose: 1 Tablet     vitamin D3 125 MCG (5000 UT) Caps   Take 5,000 Units by mouth every day.  Dose: 5,000 Units            STOP taking these medications      losartan 100 MG Tabs  Commonly known as: Cozaar              Allergies  Allergies   Allergen Reactions    Hair Formula Extra Strength [Kdc:Cranberry Extract+Sambucus Nigra+Vegetable Enzyme+Yellow Dye+...] Hives     Hair Dye    Penicillins Hives     Hands; tolerates cephalosporins (Rocephin Nov 2020)       DIET  Orders Placed This Encounter   Procedures    Diet Order Diet: Cardiac     Standing Status:   Standing     Number of Occurrences:   1     Order Specific Question:   Diet:     Answer:   Cardiac [6]       ACTIVITY  As tolerated.with oxygen  Weight bearing as tolerated    CONSULTATIONS  none    PROCEDURES  none    LABORATORY  Lab Results   Component Value Date     SODIUM 130 (L) 09/15/2024    POTASSIUM 4.1 09/15/2024    CHLORIDE 96 09/15/2024    CO2 18 (L) 09/15/2024    GLUCOSE 150 (H) 09/15/2024    BUN 97 (H) 09/15/2024    CREATININE 2.82 (H) 09/15/2024    CREATININE 0.58 02/22/2013    GLOMRATE >59 06/22/2010        Lab Results   Component Value Date    WBC 13.8 (H) 09/15/2024    WBC 6.6 02/22/2013    HEMOGLOBIN 11.4 (L) 09/15/2024    HEMATOCRIT 32.8 (L) 09/15/2024    PLATELETCT 392 09/15/2024      NM-LUNG PERFUSION IMAGING   Final Result      Heterogeneous perfusion bilaterally, without segmental perfusion defects. Without ventilation scan this study is indeterminate/intermediate probability for pulmonary embolus. Ventilation scan was not obtained in this patient with Covid-19 infection    Covid-19 imaging protocol.      EC-ECHOCARDIOGRAM COMPLETE W/O CONT   Final Result      US-RENAL   Final Result      1.  Increased bilateral corticomedullary differentiation suggestive of medical renal disease.   2.  Right simple renal cyst, no follow-up recommended for this finding.      DX-CHEST-PORTABLE (1 VIEW)   Final Result         1. No acute cardiopulmonary abnormalities are identified.          Total time of the discharge process exceeds 36 minutes.

## 2024-09-15 NOTE — PROGRESS NOTES
Hospital Medicine Daily Progress Note    Date of Service  9/14/2024    Chief Complaint  Sangita Bolton is a 79 y.o. female admitted 9/10/2024 with   Chief Complaint   Patient presents with    Sent by MD Dr Hernández sent her over after a annual check up. Pt been feeling nauseous and generalized weakness. Per , she has had decreased mobility x 1 week. Usually uses a walker but unable to do that now. No CP or SOB.  Pt O2 80% RA in triage. Placed on 4 L NC now sating 88%.   No flu like symptoms.       Hospital Course  No notes on file    Interval Problem Update  Patient had no acute complaints today  [employer] delivered portable concentrator  I reviewed labs, WBC 10.5, sodium 130, potassium 4.0, mag 2.0, phosphorus 4.1.  Creatinine 2.96, BNP 23,127.  I administered a dose of IV Lasix 20 mg this morning and giving another dose in the evening.  D-dimer 3.03, I ordered a VQ scan but given patient's COVID-19 she was only able to perform a perfusion scan.  There is no lack of perfusion on the scan but it is indeterminate for any PE.  At this time we will hold any anticoagulation.  I reviewed echocardiogram, LVEF 60%, grade 1 diastolic dysfunction, RV size is normal no dilation or strain, there is an enlarged RA, moderately dilated LA.  Positive mild mitral regurgitation, trace aortic insufficiency, moderate tricuspid regurgitation, RVSP 50 mmHg, trace pulmonic insufficiency, trivial pericardial effusion, ascending aorta 2.8 cm and normal aortic root.    I have discussed this patient's plan of care and discharge plan at IDT rounds today with Case Management, Nursing, Nursing leadership, and other members of the IDT team.    Consultants/Specialty  none    Code Status  Full Code    Disposition  The patient is not medically cleared for discharge to home or a post-acute facility.  Anticipate discharge to: home with organized home healthcare and close outpatient follow-up    I have placed the appropriate orders for  post-discharge needs.    Review of Systems  Review of Systems   Constitutional:  Positive for malaise/fatigue. Negative for chills and fever.   Respiratory:  Positive for shortness of breath.    Neurological:  Positive for weakness.   All other systems reviewed and are negative.       Physical Exam  Temp:  [35.9 °C (96.7 °F)-36.8 °C (98.2 °F)] 36.4 °C (97.5 °F)  Pulse:  [67-74] 68  Resp:  [16-18] 18  BP: (112-158)/(54-98) 136/54  SpO2:  [91 %-95 %] 93 %    Physical Exam  Vitals and nursing note reviewed.   Constitutional:       General: She is not in acute distress.     Appearance: She is not ill-appearing.      Comments: Frail appearing elderly female   HENT:      Head: Normocephalic and atraumatic.      Comments: Temporal muscle wasting positive     Mouth/Throat:      Mouth: Mucous membranes are dry.      Pharynx: No oropharyngeal exudate.   Eyes:      General: No scleral icterus.     Extraocular Movements: Extraocular movements intact.   Cardiovascular:      Rate and Rhythm: Normal rate and regular rhythm.      Pulses: Normal pulses.      Heart sounds: Normal heart sounds. No murmur heard.  Pulmonary:      Effort: Pulmonary effort is normal. No respiratory distress.      Breath sounds: No wheezing or rhonchi.      Comments: On 3 L nasal cannula  Abdominal:      General: Abdomen is flat. Bowel sounds are normal. There is no distension.      Palpations: Abdomen is soft.      Tenderness: There is no abdominal tenderness.   Musculoskeletal:         General: No swelling or tenderness.      Cervical back: Normal range of motion. No rigidity.      Right lower leg: No edema.      Left lower leg: No edema.      Comments: Sarcopenic. Bilateral hand muscle atrophy noted.   Skin:     General: Skin is warm.      Capillary Refill: Capillary refill takes less than 2 seconds.      Coloration: Skin is not jaundiced.      Findings: No erythema.   Neurological:      Mental Status: She is alert and oriented to person, place, and  time. Mental status is at baseline.      Motor: Weakness present.   Psychiatric:         Mood and Affect: Mood normal.         Behavior: Behavior normal.         Thought Content: Thought content normal.         Judgment: Judgment normal.         Fluids    Intake/Output Summary (Last 24 hours) at 9/14/2024 1714  Last data filed at 9/14/2024 1500  Gross per 24 hour   Intake 685.22 ml   Output 2800 ml   Net -2114.78 ml          Laboratory  Recent Labs     09/12/24  0040 09/13/24  0150 09/14/24  0025   WBC 9.7 10.6 10.5   RBC 3.40* 3.34* 3.53*   HEMOGLOBIN 10.0* 9.8* 10.5*   HEMATOCRIT 29.5* 29.0* 31.0*   MCV 86.8 86.8 87.8   MCH 29.4 29.3 29.7   MCHC 33.9 33.8 33.9   RDW 45.2 45.6 45.6   PLATELETCT 364 394 411   MPV 9.9 10.1 10.0     Recent Labs     09/12/24  0040 09/13/24  0150 09/14/24  0025   SODIUM 134* 131* 130*   POTASSIUM 3.3* 3.9 4.0   CHLORIDE 99 98 96   CO2 14* 15* 16*   GLUCOSE 170* 165* 168*   * 103* 100*   CREATININE 3.55* 3.41* 2.96*   CALCIUM 7.5* 8.1* 8.2*                     Imaging  NM-LUNG PERFUSION IMAGING   Final Result      Heterogeneous perfusion bilaterally, without segmental perfusion defects. Without ventilation scan this study is indeterminate/intermediate probability for pulmonary embolus. Ventilation scan was not obtained in this patient with Covid-19 infection    Covid-19 imaging protocol.      EC-ECHOCARDIOGRAM COMPLETE W/O CONT   Final Result      US-RENAL   Final Result      1.  Increased bilateral corticomedullary differentiation suggestive of medical renal disease.   2.  Right simple renal cyst, no follow-up recommended for this finding.      DX-CHEST-PORTABLE (1 VIEW)   Final Result         1. No acute cardiopulmonary abnormalities are identified.           Assessment/Plan  * Acute hypoxemic respiratory failure (HCC)- (present on admission)  Assessment & Plan  Due to bacterial community-acquired pneumonia and acute COVID-19 viral pneumonia  Continue dexamethasone  Finished IV  ceftriaxone, 5/5 days  [employer] delivered portable concentrator    Pulmonary hypertension (HCC)- (present on admission)  Assessment & Plan  Last echo 1/2024 showed RVSP 40 mmHg  No echocardiogram shows RVSP 50 mmHg, now severe  -Continue IV Lasix    ECG abnormality  Assessment & Plan  Telemetry showing possibly 33 V. tach's overnight  She converted to sinus rhythm      Pressure injury of buttock, unstageable (HCC)- (present on admission)  Assessment & Plan  Offloading measures    Acute cystitis without hematuria- (present on admission)  Assessment & Plan  Continue IV ceftriaxone  Urine culture showing Enterococcus faecalis and Klebsiella variicola  -Klebsiella sensitive to cefuroxime, cefazolin, ceftriaxone, intermediate to Macrobid.  Enterococcus faecalis likely contaminant.    Hernandez catheter in place  Assessment & Plan  For urinary retention  Complicated due to UTI POA.  Continue Hernandez catheter  Home health ordered    Sepsis (HCC)- (present on admission)  Assessment & Plan  Was present on admission  Now resolved     Metabolic acidosis- (present on admission)  Assessment & Plan  Likely due to reduced intravascular volume and increase bicarb losses through diarrhea and acute kidney injury  Bicarb 16  Continue sodium bicarb p.o.    Diarrhea- (present on admission)  Assessment & Plan  Likely secondary to COVID-19 infection  Canceled C. difficile PCR, patient has not had a bowel movement since admission    ACP (advance care planning)- (present on admission)  Assessment & Plan  Full code     Elevated troponin- (present on admission)  Assessment & Plan  In the indeterminate range  EKG shows sinus rhythm with a rate of 81.  There is no ST elevation.  There is left bundle branch block that is seen on prior EKGs.  There is flattening of T waves in lead I with T wave inversions in lead aVL.  QTc is 443   Demand ischemia due to 3 concurrent infections  Troponins 69 and 75, plateaued    Nonrheumatic tricuspid valve  regurgitation- (present on admission)  Assessment & Plan  I reviewed last echo 01/2024, showed LVEF 65%, grade 1 diastolic dysfunction, mildly dilated LA, mild mitral regurgitation, moderate tricuspid regurgitation, RVSP 40 mmHg.    I reviewed new echocardiogram, LVEF 60%, grade 1 diastolic dysfunction, RV size is normal no dilation or strain, there is an enlarged RA, moderately dilated LA.  Positive mild mitral regurgitation, trace aortic insufficiency, moderate tricuspid regurgitation, RVSP 50 mmHg (worse), trace pulmonic insufficiency, trivial pericardial effusion (new), ascending aorta 2.8 cm and normal aortic root.    Centrilobular emphysema (HCC)- (present on admission)  Assessment & Plan  From prolonged tobacco use. Started smoking at 38 years old. Smoking 1-2ppd. She stopped smoking only a few months ago.  No home inhalers  Continue Dulera and Spiriva.    Dyslipidemia- (present on admission)  Assessment & Plan  Cardiac diet.  Continue rosuvastatin    CKD stage G4/A3, GFR 15-29 and albumin creatinine ratio >300 mg/g ([facility])- (present on admission)  Assessment & Plan  GFR 15, creatinine 2.96.  With Hernandez catheter at this time  Needing IV Lasix.  May need to give higher doses of IV Lasix to improve fluid overload.  BNP is 23,000 but patient does not have peripheral edema.  I suspect patient has pulmonary edema and echo showing trivial pericardial effusion which is increasing her BNP levels.    Hyponatremia- (present on admission)  Assessment & Plan  Sodium 130, monitoring on Lasix    RASHAD (acute kidney injury) (HCC)- (present on admission)  Assessment & Plan  RASHAD on CKD4.  Baseline creatinine less than 2.3 (3/2024)  Creatinine 2.96 from 4.13  Continue Hernandez catheter  Renal ultrasound shows medical renal disease.  Prior MRI A/P 01/2024 showed smaller right kidney.  Due to urinary retention, sepsis and losartan use at home         VTE prophylaxis:    heparin ppx      I have performed a physical exam and reviewed and  updated ROS and Plan today (9/14/2024). In review of yesterday's note (9/13/2024), there are no changes except as documented above.      Total time spent 51 minutes. I spent greater than 50% of the time for patient care, including unit/floor time, multiple face-to-face encounters with the patient, counseling on treatment plan and discussion with bedside RN.  Further, I spent time on my own review of patient's overnight events, RN notes, imaging and lab analysis, and developing my assessment and plan above.  In addition, working with , social workers, and Charge RN on case coordination on this date.    This note was generated using voice recognition software which has a chance of producing errors of grammar and content.  I have made every reasonable attempt to find and correct any errors, but it should be expected that some may not be found prior to finalization of this note.

## 2024-09-15 NOTE — CARE PLAN
The patient is Stable - Low risk of patient condition declining or worsening    Shift Goals  Clinical Goals: wean O2, monitor vitals, abx  Patient Goals: go home  Family Goals: SHOLA    Progress made toward(s) clinical / shift goals:      Problem: Knowledge Deficit - Standard  Goal: Patient and family/care givers will demonstrate understanding of plan of care, disease process/condition, diagnostic tests and medications  Outcome: Progressing     Problem: Urinary - Renal Perfusion  Goal: Ability to achieve and maintain adequate renal perfusion and functioning will improve  Outcome: Progressing     Problem: Respiratory  Goal: Patient will achieve/maintain optimum respiratory ventilation and gas exchange  Outcome: Progressing     Problem: Fall Risk  Goal: Patient will remain free from falls  Outcome: Progressing     Problem: Skin Integrity  Goal: Skin integrity is maintained or improved  Outcome: Progressing       Patient is not progressing towards the following goals:

## 2024-09-15 NOTE — ASSESSMENT & PLAN NOTE
GFR 15, creatinine 2.96.  With Hernandez catheter at this time  Needing IV Lasix.  May need to give higher doses of IV Lasix to improve fluid overload.  BNP is 23,000 but patient does not have peripheral edema.  I suspect patient has pulmonary edema and echo showing trivial pericardial effusion which is increasing her BNP levels.

## 2024-09-15 NOTE — THERAPY
Physical Therapy   Initial Evaluation     Patient Name: Sangita Bolton  Age:  79 y.o., Sex:  female  Medical Record #: [id number]  Today's Date: 9/15/2024     Precautions  Precautions: Fall Risk    Assessment  Patient is 79 y.o. female with a diagnosis of Acute hypoxic respiratory failure.  Additional factors influencing patient status / progress pt is + covid 19 and presented on 9/10 with weakness  nausea, and SOB.  Pt has a PMH of HTN, HLD, osteoporosis, CKDIII. Pt is not at baseline bt has a supportive  who is able to help 24/7. Pt is able to get OOB and transfer with SBA/CGA and ambulate with CGA but she is using her FWW. Pt will benefit from HH to assist with these at home and to help pt progress activity tolerance back to her baseline as pt is reluctant to walk or get up and transfer 2/2 overall c/o of fatigue. Pt will go home with 2L of O2.    Plan    Physical Therapy Initial Treatment Plan   Treatment Plan : Self Care / Home Evaluation, Therapeutic Activities, Therapeutic Exercise, Bed Mobility  Treatment Frequency: Other (See Comments)  Duration: Evaluation only    DC Equipment Recommendations: None  Discharge Recommendations: Recommend home health for continued physical therapy services       Subjective  Pt is pleasant but needs encouragement to get OOB and participate with PT.       Objective     09/15/24 0945   Prior Living Situation   Prior Services Home-Independent   Housing / Facility 1 Story House   Steps Into Home 3   Steps In Home 0   Bathroom Set up Walk In Shower   Equipment Owned Front-Wheel Walker   Lives with - Patient's Self Care Capacity Spouse   Comments pt does not have hobbies and prefers staying around her house.   Prior Level of Functional Mobility   Bed Mobility Independent   Transfer Status Independent   Ambulation Independent   Ambulation Distance house hold   Assistive Devices Used None   Stairs Independent   Gait Analysis   Gait Level Of Assist Contact Guard Assist   Assistive  Device Front Wheel Walker   Distance (Feet) 15   # of Times Distance was Traveled 1   Deviation Decreased Heel Strike;Decreased Toe Off;Step To   # of Stairs Climbed 0   Weight Bearing Status FWB   Comments pt no motivated to walk but prefers to stand and sit . Pt persuaded to take some steps in her room and then pt assisted to bedside chair with CGA .   Functional Mobility   Sit to Stand Contact Guard Assist   Bed, Chair, Wheelchair Transfer Contact Guard Assist   Toilet Transfers Contact Guard Assist   Transfer Method Stand Step   Mobility supine , sit <> std, ambulate in room with FWW, transfer to bedside chair.   Comments pt needs encouragement and motivation to get moving and seems to prefer sitting.   Patient / Family Goals    Patient / Family Goal #1 go home   Anticipated Discharge Equipment and Recommendations   DC Equipment Recommendations None   Discharge Recommendations Recommend home health for continued physical therapy services

## 2024-09-16 ENCOUNTER — PATIENT OUTREACH (OUTPATIENT)
Dept: MEDICAL GROUP | Facility: MEDICAL CENTER | Age: 79
End: 2024-09-16
Payer: COMMERCIAL

## 2024-09-16 ENCOUNTER — TELEPHONE (OUTPATIENT)
Dept: NEPHROLOGY | Facility: MEDICAL CENTER | Age: 79
End: 2024-09-16
Payer: COMMERCIAL

## 2024-09-16 NOTE — PROGRESS NOTES
9/16: Pt has an appointment scheduled with PCP within two business days of discharge on 9/17/24 at 0920 for TCM. Therefore, a call by the RN is not required and PCP may bill for TCM appt.

## 2024-09-16 NOTE — PROGRESS NOTES
Transitional Care Management    This patient qualifies for a TCM visit, as they are being seen within the required time for CMS of 2 business days from discharge, a phone call by an RN is not required.  You can therefore see them as a TCM visit and charge appropriately.       This medical record contains text that has been entered with the assistance of computer voice recognition and dictation software.  Therefore, it may contain unintended errors in text, spelling, punctuation, or grammar        Chief Complaint   Patient presents with    Transitional Care Management Hospital Follow-up     Hosp stay x 3 days DX: (+) covid  Pt states she's feeling well.         Sangita Bolton is a 79 y.o. female here evaluation and management of: patient presented to office last week hypoxic   We referred her to ER   She was hospitalized and dx for covid +  She was discharged with o2 her  is caretaking for her but he too has covid     Current Outpatient Medications   Medication Sig Dispense Refill    furosemide (LASIX) 40 MG Tab Take 1 Tablet by mouth every day for 30 days. 30 Tablet 2    potassium chloride SA (K-DUR) 10 MEQ Tab CR Take 1 Tablet by mouth 2 times a day. Take potassium with furosemide/Lasix.  Do not take otherwise. 90 Tablet 3    mometasone-formoterol (DULERA) 100-5 MCG/ACT Aerosol Inhale 2 Puffs 2 times a day for 30 days. 8.8 g 5    tiotropium (SPIRIVA RESPIMAT) 2.5 mcg/Act Aero Soln Inhale 2 Inhalations every day for 30 days. 4 g 5    sodium bicarbonate (SODIUM BICARBONATE) 650 MG Tab Take 2 Tablets by mouth in the morning, at noon, and at bedtime. 120 Tablet 3    dexamethasone (DECADRON) 6 MG Tab Take 1 Tablet by mouth every day for 3 days. 3 Tablet 0    rosuvastatin (CRESTOR) 10 MG Tab Take 1 Tablet by mouth every evening. Overdue for follow up, needs appt for further refills 90 Tablet 0    metoprolol SR (TOPROL XL) 25 MG TABLET SR 24 HR Take 1 Tablet by mouth every day. 90 Tablet 3    amLODIPine (NORVASC) 10  MG Tab Take 1 Tablet by mouth every day. 90 Tablet 0    aspirin 81 MG EC tablet Take 1 Tablet by mouth every day.      Cholecalciferol (VITAMIN D3) 125 MCG (5000 UT) Cap Take 5,000 Units by mouth every day.      Cyanocobalamin (VITAMIN B-12 PO) Take 1 tablet by mouth every day.       No current facility-administered medications for this visit.     Patient Active Problem List    Diagnosis Date Noted    Pressure injury of buttock, unstageable ([facility]) 09/13/2024    Pulmonary hypertension ([facility]) 09/13/2024    Hernandez catheter in place 09/12/2024    Hypoxia 09/10/2024    Acute hypoxemic respiratory failure ([facility]) 09/10/2024    Elevated troponin 09/10/2024    ACP (advance care planning) 09/10/2024    Stress due to illness of family member 04/09/2024    Nonrheumatic tricuspid valve regurgitation 02/27/2024    COPD with centrilobular emphysema ([facility]) 01/12/2024    Pulmonary nodules 01/12/2024    Undiagnosed cardiac murmurs 11/16/2023    Dyslipidemia 11/16/2023    Tobacco use 11/16/2023    Medicare annual wellness visit, subsequent 10/05/2023    Aortic atherosclerosis ([facility]) 05/09/2023    Right iliac artery stenosis ([facility]) 05/09/2023    Iliac artery occlusion, 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    Superior mesenteric artery stenosis ([facility]) 05/09/2023    Celiac artery stenosis ([facility]) 05/09/2023    CKD stage G4/A3, GFR 15-29 and albumin creatinine ratio >300 mg/g ([facility]) 04/24/2023    Carotid atherosclerosis, bilateral 04/24/2023    Hyponatremia 07/14/2021    PAD (peripheral artery disease) ([facility]) 07/14/2021    RASHAD (acute kidney injury) ([facility]) 12/01/2020    Anemia 11/27/2020    Fall 11/25/2020    Stage 3 chronic kidney disease 11/25/2020    CAD (coronary artery disease) 11/25/2020    Other lack of coordination 05/02/2019    Muscle weakness (generalized) 05/02/2019    Hypo-osmolality and hyponatremia 05/02/2019    Difficulty in walking, not elsewhere classified 05/02/2019    Acidosis 05/02/2019    Closed fracture of neck of right femur ([facility]) 04/27/2019     Alcohol abuse 04/27/2019    Dependence on nicotine from cigarettes 04/27/2019    Dry skin dermatitis 04/11/2018    Carotid stenosis, asymptomatic, right 12/31/2015    S/P insertion of iliac artery stent 12/31/2015    Hypertension 07/01/2013    DDD (degenerative disc disease), lumbar 01/09/2013    Osteoporosis, post-menopausal      Past Surgical History:   Procedure Laterality Date    PB PARTIAL HIP REPLACEMENT  11/28/2020    Procedure: HEMIARTHROPLASTY, HIP REVISION;  Surgeon: Mohsen Thomas M.D.;  Location: SURGERY [facility];  Service: Orthopedics    PB PARTIAL HIP REPLACEMENT Right 4/28/2019    Procedure: HEMIARTHROPLASTY, HIP;  Surgeon: Evens Sarabia M.D.;  Location: SURGERY [facility];  Service: Orthopedics    RECOVERY  2/9/2015    Performed by -Recovery Surgery at SURGERY SAME DAY [facility]    FEMORAL ARTERY REPAIR  2/9/2015    Performed by Yuly Chirinos M.D. at SURGERY [facility] ORS    HYSTEROSCOPY WITH VIDEO DIAGNOSTIC  11/17/2010    Performed by ALIS DEGROOT at SURGERY SAME DAY [facility]    DILATION AND CURETTAGE  11/17/2010    Performed by ALIS DEGROOT at SURGERY SAME DAY [facility]    ORIF, FRACTURE, HUMERUS  9/5/08    Performed by LAURA CARTER at SURGERY [facility] ORS    COLONOSCOPY  2008    recheck 4 years    OTHER ORTHOPEDIC SURGERY      rt leg fx    OTHER ORTHOPEDIC SURGERY      broken right wrist      Social History     Tobacco Use    Smoking status: Every Day     Current packs/day: 1.00     Average packs/day: 1 pack/day for 49.6 years (49.6 ttl pk-yrs)     Types: Cigarettes     Start date: 2/2/1975    Smokeless tobacco: Never   Vaping Use    Vaping status: Never Used   Substance Use Topics    Alcohol use: Not Currently     Comment: 4/week    Drug use: No     Family History   Problem Relation Age of Onset    Cancer Mother         leukemia    Heart Disease Neg Hx     Heart Attack Neg Hx            ROS    all review of system completed and negative  "except for those listed above     Objective:     /60 (BP Location: Left arm, Patient Position: Sitting, BP Cuff Size: Adult)   Pulse 69   Temp 36.2 °C (97.2 °F) (Temporal)   Resp 18   Ht 1.6 m (5' 3\")   SpO2 92%  Body mass index is 23.9 kg/m².  Physical Exam:        GEN: comfortable, alert and oriented, well nourished, well developed, in no apparent distress   HEENT: NCAT, eyes: pupils equal and reactive, sclera white, EOMIT, good dentition  HEART: limbs warm and well perfused, regular rate, no JVD, no lower extremity edema  LUNGS: speaking in full sentences, not in apparent respiratory distress, no audible wheezes  MSK: normal tone and bulk, no swelling of the joints, gait steady and normal         Assessment and Plan:   The following treatment plan was discussed        Problem List Items Addressed This Visit       Hypoxia    Relevant Orders    REFERRAL TO COMPLEX CHRONIC CARE MANAGEMENT     Plan will be to involve home health   Return precautions discussed   Benefits of home health discussed   May involve pulmonology to help manage 02    30+ min spent   <7 days since discharge         Instructed to follow up if symptoms worsen or fail to improve, ER/UC precautions discussed as well    Claire Hernández MD  [facility], Family Medicine   [address] [place] [address] [address]   [place] [address]  Phone: [phone]               "

## 2024-09-16 NOTE — TELEPHONE ENCOUNTER
"Pt's  called and stated that his wife was released from the hospital yesterday with COVID and that they need a HFV. I let him know that she will need to be seen in about 2 weeks but there is nothing available until Nov 15th. He stated that he needs something right now. He wanted me to reach out to Dr. Najjar to tell him how she needs to be seen immediately and to find a way to get her seen this week because she will be \"dead\" if she waits.  "

## 2024-09-17 ENCOUNTER — OFFICE VISIT (OUTPATIENT)
Dept: MEDICAL GROUP | Facility: MEDICAL CENTER | Age: 79
End: 2024-09-17
Payer: COMMERCIAL

## 2024-09-17 VITALS
RESPIRATION RATE: 18 BRPM | BODY MASS INDEX: 23.9 KG/M2 | OXYGEN SATURATION: 92 % | TEMPERATURE: 97.2 F | HEIGHT: 63 IN | DIASTOLIC BLOOD PRESSURE: 60 MMHG | HEART RATE: 69 BPM | SYSTOLIC BLOOD PRESSURE: 100 MMHG

## 2024-09-17 DIAGNOSIS — R09.02 HYPOXIA: ICD-10-CM

## 2024-09-20 ENCOUNTER — TELEPHONE (OUTPATIENT)
Dept: MEDICAL GROUP | Facility: MEDICAL CENTER | Age: 79
End: 2024-09-20
Payer: COMMERCIAL

## 2024-09-20 DIAGNOSIS — R09.02 HYPOXIA: ICD-10-CM

## 2024-09-20 NOTE — TELEPHONE ENCOUNTER
Pt's  stop at the office today asking for an oxygen order to be sent to [facility] [phone], fax: [phone].    Pt currently has oxygen from [facility] who does not take her insurance and they will  the oxygen today however pt left the hospital with oxygen and is supposed to be on it 24/7.    Dr. Ugalde,   Would you please put a oxygen order for pt ASAP   Oral Minoxidil Counseling- I discussed with the patient the risks of oral minoxidil including but not limited to shortness of breath, swelling of the feet or ankles, dizziness, lightheadedness, unwanted hair growth and allergic reaction.  The patient verbalized understanding of the proper use and possible adverse effects of oral minoxidil.  All of the patient's questions and concerns were addressed.

## 2024-09-25 ENCOUNTER — HOSPITAL ENCOUNTER (INPATIENT)
Facility: MEDICAL CENTER | Age: 79
LOS: 4 days | End: 2024-09-30
Attending: EMERGENCY MEDICINE | Admitting: HOSPITALIST
Payer: MEDICARE

## 2024-09-25 ENCOUNTER — APPOINTMENT (OUTPATIENT)
Dept: RADIOLOGY | Facility: MEDICAL CENTER | Age: 79
DRG: 683 | End: 2024-09-25
Attending: EMERGENCY MEDICINE
Payer: MEDICARE

## 2024-09-25 DIAGNOSIS — U07.1 COVID: ICD-10-CM

## 2024-09-25 DIAGNOSIS — L89.90 PRESSURE INJURY OF SKIN, UNSPECIFIED INJURY STAGE, UNSPECIFIED LOCATION: ICD-10-CM

## 2024-09-25 DIAGNOSIS — N18.31 STAGE 3A CHRONIC KIDNEY DISEASE: ICD-10-CM

## 2024-09-25 DIAGNOSIS — L89.159 PRESSURE INJURY OF SKIN OF SACRAL REGION, UNSPECIFIED INJURY STAGE: ICD-10-CM

## 2024-09-25 DIAGNOSIS — R19.7 DIARRHEA, UNSPECIFIED TYPE: ICD-10-CM

## 2024-09-25 DIAGNOSIS — R79.89 ELEVATED TROPONIN: ICD-10-CM

## 2024-09-25 PROBLEM — E86.0 DEHYDRATION: Status: ACTIVE | Noted: 2024-09-25

## 2024-09-25 LAB
ALBUMIN SERPL BCP-MCNC: 3.3 G/DL (ref 3.2–4.9)
ALBUMIN/GLOB SERPL: 0.9 G/DL
ALP SERPL-CCNC: 80 U/L (ref 30–99)
ALT SERPL-CCNC: 18 U/L (ref 2–50)
AMPHET UR QL SCN: NEGATIVE
ANION GAP SERPL CALC-SCNC: 22 MMOL/L (ref 7–16)
APPEARANCE UR: CLEAR
AST SERPL-CCNC: 18 U/L (ref 12–45)
BACTERIA #/AREA URNS HPF: NEGATIVE /HPF
BARBITURATES UR QL SCN: NEGATIVE
BASOPHILS # BLD AUTO: 0.2 % (ref 0–1.8)
BASOPHILS # BLD: 0.02 K/UL (ref 0–0.12)
BENZODIAZ UR QL SCN: NEGATIVE
BILIRUB SERPL-MCNC: 1.1 MG/DL (ref 0.1–1.5)
BILIRUB UR QL STRIP.AUTO: NEGATIVE
BUN SERPL-MCNC: 81 MG/DL (ref 8–22)
BZE UR QL SCN: NEGATIVE
C DIFF DNA SPEC QL NAA+PROBE: NEGATIVE
C DIFF TOX GENS STL QL NAA+PROBE: NEGATIVE
CALCIUM ALBUM COR SERPL-MCNC: 9.8 MG/DL (ref 8.5–10.5)
CALCIUM SERPL-MCNC: 9.2 MG/DL (ref 8.4–10.2)
CANNABINOIDS UR QL SCN: NEGATIVE
CHLORIDE SERPL-SCNC: 101 MMOL/L (ref 96–112)
CO2 SERPL-SCNC: 20 MMOL/L (ref 20–33)
COHGB MFR BLD: 2 % (ref 0–4.9)
COLOR UR: YELLOW
CREAT SERPL-MCNC: 3.26 MG/DL (ref 0.5–1.4)
EKG IMPRESSION: NORMAL
EOSINOPHIL # BLD AUTO: 0.06 K/UL (ref 0–0.51)
EOSINOPHIL NFR BLD: 0.5 % (ref 0–6.9)
EPI CELLS #/AREA URNS HPF: ABNORMAL /HPF
ERYTHROCYTE [DISTWIDTH] IN BLOOD BY AUTOMATED COUNT: 50.2 FL (ref 35.9–50)
ETHANOL BLD-MCNC: <10.1 MG/DL
FENTANYL UR QL: NEGATIVE
GFR SERPLBLD CREATININE-BSD FMLA CKD-EPI: 14 ML/MIN/1.73 M 2
GLOBULIN SER CALC-MCNC: 3.8 G/DL (ref 1.9–3.5)
GLUCOSE SERPL-MCNC: 105 MG/DL (ref 65–99)
GLUCOSE UR STRIP.AUTO-MCNC: NEGATIVE MG/DL
HCT VFR BLD AUTO: 36.9 % (ref 37–47)
HGB BLD-MCNC: 11.8 G/DL (ref 12–16)
IMM GRANULOCYTES # BLD AUTO: 0.09 K/UL (ref 0–0.11)
IMM GRANULOCYTES NFR BLD AUTO: 0.7 % (ref 0–0.9)
KETONES UR STRIP.AUTO-MCNC: NEGATIVE MG/DL
LEUKOCYTE ESTERASE UR QL STRIP.AUTO: NEGATIVE
LIPASE SERPL-CCNC: 17 U/L (ref 11–82)
LYMPHOCYTES # BLD AUTO: 0.52 K/UL (ref 1–4.8)
LYMPHOCYTES NFR BLD: 4.3 % (ref 22–41)
MCH RBC QN AUTO: 29.5 PG (ref 27–33)
MCHC RBC AUTO-ENTMCNC: 32 G/DL (ref 32.2–35.5)
MCV RBC AUTO: 92.3 FL (ref 81.4–97.8)
METHADONE UR QL SCN: NEGATIVE
MICRO URNS: ABNORMAL
MONOCYTES # BLD AUTO: 0.78 K/UL (ref 0–0.85)
MONOCYTES NFR BLD AUTO: 6.5 % (ref 0–13.4)
NEUTROPHILS # BLD AUTO: 10.57 K/UL (ref 1.82–7.42)
NEUTROPHILS NFR BLD: 87.8 % (ref 44–72)
NITRITE UR QL STRIP.AUTO: NEGATIVE
NRBC # BLD AUTO: 0 K/UL
NRBC BLD-RTO: 0 /100 WBC (ref 0–0.2)
OPIATES UR QL SCN: NEGATIVE
OXYCODONE UR QL SCN: NEGATIVE
PCP UR QL SCN: NEGATIVE
PH UR STRIP.AUTO: 7.5 [PH] (ref 5–8)
PLATELET # BLD AUTO: 156 K/UL (ref 164–446)
PMV BLD AUTO: 11.8 FL (ref 9–12.9)
POTASSIUM SERPL-SCNC: 4.4 MMOL/L (ref 3.6–5.5)
PROPOXYPH UR QL SCN: NEGATIVE
PROT SERPL-MCNC: 7.1 G/DL (ref 6–8.2)
PROT UR QL STRIP: 30 MG/DL
RBC # BLD AUTO: 4 M/UL (ref 4.2–5.4)
RBC # URNS HPF: ABNORMAL /HPF
RBC UR QL AUTO: ABNORMAL
SODIUM SERPL-SCNC: 143 MMOL/L (ref 135–145)
SP GR UR STRIP.AUTO: 1.01
TROPONIN T SERPL-MCNC: 137 NG/L (ref 6–19)
TROPONIN T SERPL-MCNC: 144 NG/L (ref 6–19)
TSH SERPL DL<=0.005 MIU/L-ACNC: 2.85 UIU/ML (ref 0.38–5.33)
WBC # BLD AUTO: 12 K/UL (ref 4.8–10.8)
WBC #/AREA URNS HPF: ABNORMAL /HPF

## 2024-09-25 PROCEDURE — 87493 C DIFF AMPLIFIED PROBE: CPT

## 2024-09-25 PROCEDURE — 99223 1ST HOSP IP/OBS HIGH 75: CPT | Mod: 25,AI | Performed by: HOSPITALIST

## 2024-09-25 PROCEDURE — 36415 COLL VENOUS BLD VENIPUNCTURE: CPT

## 2024-09-25 PROCEDURE — 700105 HCHG RX REV CODE 258: Performed by: EMERGENCY MEDICINE

## 2024-09-25 PROCEDURE — 94760 N-INVAS EAR/PLS OXIMETRY 1: CPT

## 2024-09-25 PROCEDURE — 83690 ASSAY OF LIPASE: CPT

## 2024-09-25 PROCEDURE — 81001 URINALYSIS AUTO W/SCOPE: CPT

## 2024-09-25 PROCEDURE — 71045 X-RAY EXAM CHEST 1 VIEW: CPT

## 2024-09-25 PROCEDURE — 93005 ELECTROCARDIOGRAM TRACING: CPT | Performed by: EMERGENCY MEDICINE

## 2024-09-25 PROCEDURE — 70450 CT HEAD/BRAIN W/O DYE: CPT

## 2024-09-25 PROCEDURE — 80053 COMPREHEN METABOLIC PANEL: CPT

## 2024-09-25 PROCEDURE — 80307 DRUG TEST PRSMV CHEM ANLYZR: CPT

## 2024-09-25 PROCEDURE — 99497 ADVNCD CARE PLAN 30 MIN: CPT | Performed by: HOSPITALIST

## 2024-09-25 PROCEDURE — 99285 EMERGENCY DEPT VISIT HI MDM: CPT

## 2024-09-25 PROCEDURE — G0378 HOSPITAL OBSERVATION PER HR: HCPCS

## 2024-09-25 PROCEDURE — 84484 ASSAY OF TROPONIN QUANT: CPT

## 2024-09-25 PROCEDURE — 85025 COMPLETE CBC W/AUTO DIFF WBC: CPT

## 2024-09-25 PROCEDURE — 84443 ASSAY THYROID STIM HORMONE: CPT

## 2024-09-25 PROCEDURE — 700105 HCHG RX REV CODE 258: Performed by: HOSPITALIST

## 2024-09-25 PROCEDURE — 82077 ASSAY SPEC XCP UR&BREATH IA: CPT

## 2024-09-25 PROCEDURE — 700102 HCHG RX REV CODE 250 W/ 637 OVERRIDE(OP): Performed by: HOSPITALIST

## 2024-09-25 PROCEDURE — 87507 IADNA-DNA/RNA PROBE TQ 12-25: CPT

## 2024-09-25 PROCEDURE — A9270 NON-COVERED ITEM OR SERVICE: HCPCS | Performed by: HOSPITALIST

## 2024-09-25 PROCEDURE — 82375 ASSAY CARBOXYHB QUANT: CPT

## 2024-09-25 RX ORDER — ROSUVASTATIN CALCIUM 10 MG/1
10 TABLET, COATED ORAL EVERY EVENING
Status: DISCONTINUED | OUTPATIENT
Start: 2024-09-25 | End: 2024-09-30 | Stop reason: HOSPADM

## 2024-09-25 RX ORDER — LOSARTAN POTASSIUM 100 MG/1
100 TABLET ORAL DAILY
Status: ON HOLD | COMMUNITY
End: 2024-10-25

## 2024-09-25 RX ORDER — OXYCODONE HYDROCHLORIDE 5 MG/1
2.5 TABLET ORAL
Status: DISCONTINUED | OUTPATIENT
Start: 2024-09-25 | End: 2024-09-30 | Stop reason: HOSPADM

## 2024-09-25 RX ORDER — ONDANSETRON 2 MG/ML
4 INJECTION INTRAMUSCULAR; INTRAVENOUS EVERY 4 HOURS PRN
Status: DISCONTINUED | OUTPATIENT
Start: 2024-09-25 | End: 2024-09-30 | Stop reason: HOSPADM

## 2024-09-25 RX ORDER — OXYCODONE HYDROCHLORIDE 5 MG/1
5 TABLET ORAL
Status: DISCONTINUED | OUTPATIENT
Start: 2024-09-25 | End: 2024-09-30 | Stop reason: HOSPADM

## 2024-09-25 RX ORDER — SODIUM CHLORIDE 9 MG/ML
50 INJECTION, SOLUTION INTRAVENOUS ONCE
Status: DISCONTINUED | OUTPATIENT
Start: 2024-09-25 | End: 2024-09-25

## 2024-09-25 RX ORDER — METOPROLOL SUCCINATE 25 MG/1
25 TABLET, EXTENDED RELEASE ORAL DAILY
Status: DISCONTINUED | OUTPATIENT
Start: 2024-09-26 | End: 2024-09-30 | Stop reason: HOSPADM

## 2024-09-25 RX ORDER — ACETAMINOPHEN 325 MG/1
650 TABLET ORAL EVERY 6 HOURS PRN
Status: DISCONTINUED | OUTPATIENT
Start: 2024-09-25 | End: 2024-09-30 | Stop reason: HOSPADM

## 2024-09-25 RX ORDER — LOSARTAN POTASSIUM 100 MG/1
100 TABLET ORAL DAILY
Status: DISCONTINUED | OUTPATIENT
Start: 2024-09-26 | End: 2024-09-25

## 2024-09-25 RX ORDER — DEXAMETHASONE 6 MG/1
6 TABLET ORAL DAILY
Status: ON HOLD | COMMUNITY
End: 2024-09-30

## 2024-09-25 RX ORDER — HYDROMORPHONE HYDROCHLORIDE 1 MG/ML
0.25 INJECTION, SOLUTION INTRAMUSCULAR; INTRAVENOUS; SUBCUTANEOUS
Status: DISCONTINUED | OUTPATIENT
Start: 2024-09-25 | End: 2024-09-30 | Stop reason: HOSPADM

## 2024-09-25 RX ORDER — ONDANSETRON 4 MG/1
4 TABLET, ORALLY DISINTEGRATING ORAL EVERY 4 HOURS PRN
Status: DISCONTINUED | OUTPATIENT
Start: 2024-09-25 | End: 2024-09-30 | Stop reason: HOSPADM

## 2024-09-25 RX ORDER — SODIUM BICARBONATE 650 MG/1
1300 TABLET ORAL 3 TIMES DAILY
Status: DISCONTINUED | OUTPATIENT
Start: 2024-09-25 | End: 2024-09-30 | Stop reason: HOSPADM

## 2024-09-25 RX ORDER — AMLODIPINE BESYLATE 5 MG/1
10 TABLET ORAL DAILY
Status: DISCONTINUED | OUTPATIENT
Start: 2024-09-26 | End: 2024-09-30 | Stop reason: HOSPADM

## 2024-09-25 RX ORDER — AMOXICILLIN 250 MG
2 CAPSULE ORAL 2 TIMES DAILY
Status: DISCONTINUED | OUTPATIENT
Start: 2024-09-25 | End: 2024-09-30 | Stop reason: HOSPADM

## 2024-09-25 RX ORDER — SODIUM CHLORIDE 9 MG/ML
500 INJECTION, SOLUTION INTRAVENOUS ONCE
Status: COMPLETED | OUTPATIENT
Start: 2024-09-25 | End: 2024-09-26

## 2024-09-25 RX ORDER — HEPARIN SODIUM 5000 [USP'U]/ML
5000 INJECTION, SOLUTION INTRAVENOUS; SUBCUTANEOUS EVERY 8 HOURS
Status: DISCONTINUED | OUTPATIENT
Start: 2024-09-26 | End: 2024-09-30 | Stop reason: HOSPADM

## 2024-09-25 RX ORDER — ASPIRIN 81 MG/1
81 TABLET ORAL DAILY
Status: DISCONTINUED | OUTPATIENT
Start: 2024-09-26 | End: 2024-09-30 | Stop reason: HOSPADM

## 2024-09-25 RX ORDER — FUROSEMIDE 40 MG/1
40 TABLET ORAL DAILY
Status: DISCONTINUED | OUTPATIENT
Start: 2024-09-26 | End: 2024-09-28

## 2024-09-25 RX ORDER — SODIUM CHLORIDE 9 MG/ML
INJECTION, SOLUTION INTRAVENOUS CONTINUOUS
Status: DISCONTINUED | OUTPATIENT
Start: 2024-09-25 | End: 2024-09-29

## 2024-09-25 RX ORDER — POLYETHYLENE GLYCOL 3350 17 G/17G
1 POWDER, FOR SOLUTION ORAL
Status: DISCONTINUED | OUTPATIENT
Start: 2024-09-25 | End: 2024-09-30 | Stop reason: HOSPADM

## 2024-09-25 RX ADMIN — VANCOMYCIN HYDROCHLORIDE 125 MG: 5 INJECTION, POWDER, LYOPHILIZED, FOR SOLUTION INTRAVENOUS at 23:28

## 2024-09-25 RX ADMIN — SODIUM CHLORIDE 500 ML: 9 INJECTION, SOLUTION INTRAVENOUS at 19:03

## 2024-09-25 RX ADMIN — VANCOMYCIN HYDROCHLORIDE 125 MG: 5 INJECTION, POWDER, LYOPHILIZED, FOR SOLUTION INTRAVENOUS at 20:08

## 2024-09-25 RX ADMIN — SODIUM CHLORIDE: 9 INJECTION, SOLUTION INTRAVENOUS at 23:14

## 2024-09-25 RX ADMIN — SODIUM BICARBONATE 1300 MG: 650 TABLET ORAL at 23:25

## 2024-09-25 RX ADMIN — MOMETASONE FUROATE AND FORMOTEROL FUMARATE DIHYDRATE 2 PUFF: 100; 5 AEROSOL RESPIRATORY (INHALATION) at 23:12

## 2024-09-25 RX ADMIN — ROSUVASTATIN 10 MG: 10 TABLET, FILM COATED ORAL at 23:25

## 2024-09-25 ASSESSMENT — ENCOUNTER SYMPTOMS
STRIDOR: 0
VOMITING: 0
SHORTNESS OF BREATH: 1
FEVER: 0
CHILLS: 0
NERVOUS/ANXIOUS: 0
MYALGIAS: 0
ABDOMINAL PAIN: 0
COUGH: 0
EYE REDNESS: 0
FLANK PAIN: 0
FOCAL WEAKNESS: 0
DIARRHEA: 1
BRUISES/BLEEDS EASILY: 0
EYE DISCHARGE: 0

## 2024-09-25 ASSESSMENT — LIFESTYLE VARIABLES
CONSUMPTION TOTAL: NEGATIVE
HOW MANY TIMES IN THE PAST YEAR HAVE YOU HAD 5 OR MORE DRINKS IN A DAY: 0
EVER HAD A DRINK FIRST THING IN THE MORNING TO STEADY YOUR NERVES TO GET RID OF A HANGOVER: NO
TOTAL SCORE: 0
HAVE YOU EVER FELT YOU SHOULD CUT DOWN ON YOUR DRINKING: NO
HAVE PEOPLE ANNOYED YOU BY CRITICIZING YOUR DRINKING: NO
EVER FELT BAD OR GUILTY ABOUT YOUR DRINKING: NO
ON A TYPICAL DAY WHEN YOU DRINK ALCOHOL HOW MANY DRINKS DO YOU HAVE: 0
AVERAGE NUMBER OF DAYS PER WEEK YOU HAVE A DRINK CONTAINING ALCOHOL: 0
ALCOHOL_USE: NO

## 2024-09-25 ASSESSMENT — SOCIAL DETERMINANTS OF HEALTH (SDOH)
WITHIN THE LAST YEAR, HAVE YOU BEEN KICKED, HIT, SLAPPED, OR OTHERWISE PHYSICALLY HURT BY YOUR PARTNER OR EX-PARTNER?: NO
WITHIN THE LAST YEAR, HAVE TO BEEN RAPED OR FORCED TO HAVE ANY KIND OF SEXUAL ACTIVITY BY YOUR PARTNER OR EX-PARTNER?: NO
WITHIN THE LAST YEAR, HAVE YOU BEEN HUMILIATED OR EMOTIONALLY ABUSED IN OTHER WAYS BY YOUR PARTNER OR EX-PARTNER?: NO
WITHIN THE LAST YEAR, HAVE YOU BEEN AFRAID OF YOUR PARTNER OR EX-PARTNER?: NO
IN THE PAST 12 MONTHS, HAS THE ELECTRIC, GAS, OIL, OR WATER COMPANY THREATENED TO SHUT OFF SERVICE IN YOUR HOME?: NO
WITHIN THE PAST 12 MONTHS, THE FOOD YOU BOUGHT JUST DIDN'T LAST AND YOU DIDN'T HAVE MONEY TO GET MORE: NEVER TRUE
WITHIN THE PAST 12 MONTHS, YOU WORRIED THAT YOUR FOOD WOULD RUN OUT BEFORE YOU GOT THE MONEY TO BUY MORE: NEVER TRUE

## 2024-09-25 ASSESSMENT — PAIN DESCRIPTION - PAIN TYPE: TYPE: ACUTE PAIN

## 2024-09-25 ASSESSMENT — PATIENT HEALTH QUESTIONNAIRE - PHQ9
2. FEELING DOWN, DEPRESSED, IRRITABLE, OR HOPELESS: NOT AT ALL
1. LITTLE INTEREST OR PLEASURE IN DOING THINGS: NOT AT ALL
SUM OF ALL RESPONSES TO PHQ9 QUESTIONS 1 AND 2: 0

## 2024-09-25 ASSESSMENT — FIBROSIS 4 INDEX
FIB4 SCORE: 0.91
FIB4 SCORE: 2.2

## 2024-09-25 NOTE — ED NOTES
Medication history reviewed with pts . Med rec is complete.  Allergies reviewed, per pts .  Interviewed pt with  at bedside with permission from pt.    Pts  reports that he was told to stop LOSARTAN 100MG on 9/10/2024.    Patient has not had any outpatient antibiotics in the last 30 days.    Pt is not on any anticoagulants

## 2024-09-25 NOTE — ED TRIAGE NOTES
"Chief Complaint   Patient presents with    Other     Malaise  Unable to care for self      /58   Pulse 81   Temp 36.4 °C (97.5 °F) (Temporal)   Resp 20   Ht 1.6 m (5' 3\")   Wt 59 kg (130 lb)   SpO2 97%   BMI 23.03 kg/m²     BIB EMS from home.  is unable to care for Pt. Pt complaining of general malaise.   "

## 2024-09-25 NOTE — ED PROVIDER NOTES
Emergency Physician Note    Chief Concern:  Chief Complaint   Patient presents with    Other     Malaise  Unable to care for self          External Records Reviewed:  Outpatient records reviewed: Patient was seen for transitional care management visit 9/17/2024.  Family physician note reviewed from that visit.  Noted after recent hospital stay for 3 days after diagnosis of COVID.  She was discharged with oxygen, noted that  is caretaker but he also has COVID.  Referral placed to complex chronic care management.  Benefits of home health discussed.     HPI/ROS     Limitation to History:  History of dementia     Outside Historians:    provides full history     HPI:  Sangita Bolton is a 79 y.o. female who presents to the emergency department for evaluation of fatigue and weakness, after recent hospitalization for COVID-19.  She was discharged from this facility on 9/15/2024 after admission for hypoxia due to acute COVID-19 viral pneumonia.  She was also treated for urinary tract infection.  She was discharged home with home health care, after diagnosis has been states that he went to urgent care and was also diagnosed with COVID.  Upper at least the last 2 days she has been unable to get out of bed.   states that he is too weak to help her out of bed.  If she is seated in a chair, she is able to get up and ambulate using a walker, but she is not able to safely transfer from the bed to the walker, nor bed to a chair.  As such, she has been urinating in the bed over the last 48 hours, was found to be soaked in her own urine in bed by her home health nurse today.  Home health visits the house a few times a week.  For this reason, she was brought to the emergency department for further evaluation.  She does not report any new pain, simply states that she is not able to get up out of bed by herself.  She reports no recent falls,  reports no falls or injuries.  She is afebrile on arrival to the  emergency department,  has not noticed any fevers after discharge from the hospital.    PAST MEDICAL HISTORY  Past Medical History:   Diagnosis Date    Alcohol abuse 04/27/2019    Arthritis     generalized-Osteo    Carotid artery stenosis 12/31/2015    Fall 11/25/2020    Gluten intolerance     Hyperlipidemia     Hypertension     Medicare annual wellness visit, subsequent 10/05/2023    OSTEOPOROSIS     Other specified symptom associated with female genital organs     post menaupausal bleeding    Pain 02/06/2015    bilateral legs-chronic>4/10    Stage 3 chronic kidney disease 11/25/2020    Unspecified cataract        SURGICAL HISTORY  Past Surgical History:   Procedure Laterality Date    PB PARTIAL HIP REPLACEMENT  11/28/2020    Procedure: HEMIARTHROPLASTY, HIP REVISION;  Surgeon: Mohsen Thomas M.D.;  Location: SURGERY [facility];  Service: Orthopedics    PB PARTIAL HIP REPLACEMENT Right 4/28/2019    Procedure: HEMIARTHROPLASTY, HIP;  Surgeon: Evens Sarabia M.D.;  Location: SURGERY [facility];  Service: Orthopedics    RECOVERY  2/9/2015    Performed by -Recovery Surgery at SURGERY SAME DAY [facility] ORS    FEMORAL ARTERY REPAIR  2/9/2015    Performed by Yuly Chirinos M.D. at SURGERY [facility] ORS    HYSTEROSCOPY WITH VIDEO DIAGNOSTIC  11/17/2010    Performed by ALIS DEGROOT at SURGERY SAME DAY [facility] ORS    DILATION AND CURETTAGE  11/17/2010    Performed by ALIS DEGROOT at SURGERY SAME DAY [facility] ORS    ORIF, FRACTURE, HUMERUS  9/5/08    Performed by LAURA CARTER at SURGERY [facility] ORS    COLONOSCOPY  2008    recheck 4 years    OTHER ORTHOPEDIC SURGERY      rt leg fx    OTHER ORTHOPEDIC SURGERY      broken right wrist       FAMILY HISTORY  Family History   Problem Relation Age of Onset    Cancer Mother         leukemia    Heart Disease Neg Hx     Heart Attack Neg Hx        SOCIAL HISTORY   reports that she has been smoking cigarettes. She started smoking about 49  "years ago. She has a 49.6 pack-year smoking history. She has never used smokeless tobacco. She reports that she does not currently use alcohol. She reports that she does not use drugs.    CURRENT MEDICATIONS  Previous Medications    AMLODIPINE (NORVASC) 10 MG TAB    Take 1 Tablet by mouth every day.    ASPIRIN 81 MG EC TABLET    Take 1 Tablet by mouth every day.    CHOLECALCIFEROL (VITAMIN D3) 125 MCG (5000 UT) CAP    Take 5,000 Units by mouth every day.    CYANOCOBALAMIN (VITAMIN B-12 PO)    Take 1 tablet by mouth every day.    DEXAMETHASONE (DECADRON) 6 MG TAB    Take 6 mg by mouth every day. Pt started on 9/15/2024 for 3 day course    FUROSEMIDE (LASIX) 40 MG TAB    Take 1 Tablet by mouth every day for 30 days.    LOSARTAN (COZAAR) 100 MG TAB    Take 100 mg by mouth every day.    METOPROLOL SR (TOPROL XL) 25 MG TABLET SR 24 HR    Take 1 Tablet by mouth every day.    MOMETASONE-FORMOTEROL (DULERA) 100-5 MCG/ACT AEROSOL    Inhale 2 Puffs 2 times a day for 30 days.    POTASSIUM CHLORIDE SA (K-DUR) 10 MEQ TAB CR    Take 1 Tablet by mouth 2 times a day. Take potassium with furosemide/Lasix.  Do not take otherwise.    ROSUVASTATIN (CRESTOR) 10 MG TAB    Take 1 Tablet by mouth every evening. Overdue for follow up, needs appt for further refills    SODIUM BICARBONATE (SODIUM BICARBONATE) 650 MG TAB    Take 2 Tablets by mouth in the morning, at noon, and at bedtime.    TIOTROPIUM (SPIRIVA RESPIMAT) 2.5 MCG/ACT AERO SOLN    Inhale 2 Inhalations every day for 30 days.       ALLERGIES  Hair formula extra strength [kdc:cranberry extract+sambucus nigra+vegetable enzyme+yellow dye+...] and Penicillins    PHYSICAL EXAM  Vital Signs: /58   Pulse 81   Temp 36.4 °C (97.5 °F) (Temporal)   Resp 20   Ht 1.6 m (5' 3\")   Wt 59 kg (130 lb)   SpO2 97%   BMI 23.03 kg/m²   Constitutional: Awake and alert  HENT: Normocephalic, atraumatic.  Cardiovascular: Murmur present, regular rate and rhythm, no tachycardia  Pulmonary: No " respiratory distress, normal work of breathing, oxygen saturation 97% on 3 L via nasal cannula, breath sounds quiet and equal bilaterally, no wheezing, no coarse breath sounds appreciated  Abdomen: Soft, non tender, no peritoneal signs.  Skin: Pressure ulcerations present on the sacrum  Musculoskeletal: Normal range of motion in all extremities, no swelling or deformity noted  Neurologic: Awake, alert, moves all 4 extremities, no facial droop, no slurred speech, has difficulty recalling the events that brought her to the emergency department today, and explaining an accurate timeline.    Diagnostic Studies & Procedures    Labs:  All labs reviewed by me as noted below.    EK Lead EKG interpreted by me as noted below  Results for orders placed or performed during the hospital encounter of 24   EKG   Result Value Ref Range    Report       [facility] Emergency Dept.    Test Date:  2024  Pt Name:    AGUSTIN PORRAS                Department: [facility]  MRN:        [id number]                      Room:       [place] [address]  Gender:     Female                       Technician: [id number]  :        [id number]                   Requested By:CT VILLALPANDO  Order #:    [id number]                    Reading MD: CT VILLALPANDO MD    Measurements  Intervals                                Axis  Rate:       77                           P:          82  CT:         136                          QRS:        21  QRSD:       99                           T:          68  QT:         392  QTc:        444    Interpretive Statements  Rate 77, sinus rhythm, ST depression present in leads II, III, and aVF, as  well as V5 and V6, no ST elevation, no convex T wave changes.  Electronically Signed On 2024 17:46:36 PDT by CT VILLALPANDO MD       Radiology:  The attending Emergency Physician has independently interpreted the following imaging:  I independently interpreted the chest x-ray, no infiltrates  identified.    CT-HEAD W/O   Final Result      1. No acute intracranial abnormality.   2. Age-related central and cortical atrophy and diffuse chronic microangiopathic white matter changes.   3. Atherosclerosis.               DX-CHEST-PORTABLE (1 VIEW)   Final Result      1. No acute cardiac or pulmonary abnormalities are identified.   2. Atherosclerosis.   3. Other stable chronic degenerative, posttraumatic and postsurgical changes.        Course and Medical Decision Making    Initial Assessment and Plan:  Ms. Bolton presents to the emergency department today due to inability to safely get out of bed, and inability of family members to help care for her.  She was admitted to this facility 10 days ago and treated for hypoxia due to COVID-pneumonia, uncertain of her symptoms today may be due to deconditioning.  Alternatively, new or worsening process is a consideration.  She has been on home oxygen, and has no hypoxia on arrival to the emergency department.  She is afebrile.  Differential diagnosis includes electrolyte abnormality, urinary tract infection, intracranial bleed.    On laboratory evaluation white blood count is 12.0, which is decreased from prior value.  Hemoglobin is 11.8, unchanged from prior, no evidence of acute blood loss.  Sodium and potassium are within normal limits on CMP, creatinine is 3.26 which appears to be consistent with prior values.  GFR 14, also consistent with prior values.  Carboxyhemoglobin is negative, no evidence of carbon monoxide exposure causing fatigue.  Lipase is within normal limits, diagnostic alcohol is negative.  Troponin is 144, most recent prior values are around 70.  Repeat 2-hour troponin is pending at this time.    Chest x-ray is negative for acute process.    Head CT is negative for acute process.    At this time is for admission to hospitalist for wound care, due to sacral pressure ulcerations.  Additionally, she will require serial troponin values.  She did have  an episode of loose stool in the emergency department, C. difficile order was placed, that result is pending at this time.    Additional Problems and Disposition    I have discussed management of the patient with the following physicians:   Dr. Barnes, Hospitalist    Disposition:  Admit in stable condition    FINAL IMPRESSION   1. Pressure injury of skin of sacral region, unspecified injury stage    2. COVID    3. Diarrhea, unspecified type    4. Elevated troponin

## 2024-09-26 LAB
ALBUMIN SERPL BCP-MCNC: 3 G/DL (ref 3.2–4.9)
ALBUMIN/GLOB SERPL: 0.8 G/DL
ALP SERPL-CCNC: 78 U/L (ref 30–99)
ALT SERPL-CCNC: 15 U/L (ref 2–50)
ANION GAP SERPL CALC-SCNC: 18 MMOL/L (ref 7–16)
AST SERPL-CCNC: 17 U/L (ref 12–45)
BILIRUB SERPL-MCNC: 1 MG/DL (ref 0.1–1.5)
BUN SERPL-MCNC: 71 MG/DL (ref 8–22)
CALCIUM ALBUM COR SERPL-MCNC: 9.6 MG/DL (ref 8.5–10.5)
CALCIUM SERPL-MCNC: 8.8 MG/DL (ref 8.4–10.2)
CHLORIDE SERPL-SCNC: 99 MMOL/L (ref 96–112)
CO2 SERPL-SCNC: 20 MMOL/L (ref 20–33)
CREAT SERPL-MCNC: 3.11 MG/DL (ref 0.5–1.4)
ERYTHROCYTE [DISTWIDTH] IN BLOOD BY AUTOMATED COUNT: 49.9 FL (ref 35.9–50)
GFR SERPLBLD CREATININE-BSD FMLA CKD-EPI: 14 ML/MIN/1.73 M 2
GLOBULIN SER CALC-MCNC: 3.6 G/DL (ref 1.9–3.5)
GLUCOSE SERPL-MCNC: 80 MG/DL (ref 65–99)
HCT VFR BLD AUTO: 35.9 % (ref 37–47)
HGB BLD-MCNC: 11.6 G/DL (ref 12–16)
MAGNESIUM SERPL-MCNC: 1.8 MG/DL (ref 1.5–2.5)
MCH RBC QN AUTO: 29.8 PG (ref 27–33)
MCHC RBC AUTO-ENTMCNC: 32.3 G/DL (ref 32.2–35.5)
MCV RBC AUTO: 92.3 FL (ref 81.4–97.8)
PLATELET # BLD AUTO: 154 K/UL (ref 164–446)
PMV BLD AUTO: 11.2 FL (ref 9–12.9)
POTASSIUM SERPL-SCNC: 3.7 MMOL/L (ref 3.6–5.5)
PROT SERPL-MCNC: 6.6 G/DL (ref 6–8.2)
RBC # BLD AUTO: 3.89 M/UL (ref 4.2–5.4)
SODIUM SERPL-SCNC: 137 MMOL/L (ref 135–145)
TROPONIN T SERPL-MCNC: 140 NG/L (ref 6–19)
WBC # BLD AUTO: 9.9 K/UL (ref 4.8–10.8)

## 2024-09-26 PROCEDURE — 85027 COMPLETE CBC AUTOMATED: CPT

## 2024-09-26 PROCEDURE — A9270 NON-COVERED ITEM OR SERVICE: HCPCS | Performed by: HOSPITALIST

## 2024-09-26 PROCEDURE — 97162 PT EVAL MOD COMPLEX 30 MIN: CPT

## 2024-09-26 PROCEDURE — 770001 HCHG ROOM/CARE - MED/SURG/GYN PRIV*

## 2024-09-26 PROCEDURE — 83735 ASSAY OF MAGNESIUM: CPT

## 2024-09-26 PROCEDURE — 99232 SBSQ HOSP IP/OBS MODERATE 35: CPT | Performed by: HOSPITALIST

## 2024-09-26 PROCEDURE — 700102 HCHG RX REV CODE 250 W/ 637 OVERRIDE(OP): Performed by: HOSPITALIST

## 2024-09-26 PROCEDURE — 84484 ASSAY OF TROPONIN QUANT: CPT

## 2024-09-26 PROCEDURE — 80053 COMPREHEN METABOLIC PANEL: CPT

## 2024-09-26 PROCEDURE — 94760 N-INVAS EAR/PLS OXIMETRY 1: CPT

## 2024-09-26 PROCEDURE — 36415 COLL VENOUS BLD VENIPUNCTURE: CPT

## 2024-09-26 PROCEDURE — 700105 HCHG RX REV CODE 258: Performed by: HOSPITALIST

## 2024-09-26 PROCEDURE — 97163 PT EVAL HIGH COMPLEX 45 MIN: CPT

## 2024-09-26 PROCEDURE — 302106 OSTOMY POWDER: Performed by: HOSPITALIST

## 2024-09-26 PROCEDURE — 96372 THER/PROPH/DIAG INJ SC/IM: CPT

## 2024-09-26 PROCEDURE — 700111 HCHG RX REV CODE 636 W/ 250 OVERRIDE (IP): Performed by: HOSPITALIST

## 2024-09-26 RX ADMIN — MOMETASONE FUROATE AND FORMOTEROL FUMARATE DIHYDRATE 2 PUFF: 100; 5 AEROSOL RESPIRATORY (INHALATION) at 17:57

## 2024-09-26 RX ADMIN — TIOTROPIUM BROMIDE INHALATION SPRAY 5 MCG: 3.12 SPRAY, METERED RESPIRATORY (INHALATION) at 05:19

## 2024-09-26 RX ADMIN — SODIUM BICARBONATE 1300 MG: 650 TABLET ORAL at 20:24

## 2024-09-26 RX ADMIN — VANCOMYCIN HYDROCHLORIDE 125 MG: 5 INJECTION, POWDER, LYOPHILIZED, FOR SOLUTION INTRAVENOUS at 05:20

## 2024-09-26 RX ADMIN — SODIUM CHLORIDE: 9 INJECTION, SOLUTION INTRAVENOUS at 11:47

## 2024-09-26 RX ADMIN — SODIUM BICARBONATE 1300 MG: 650 TABLET ORAL at 14:49

## 2024-09-26 RX ADMIN — HEPARIN SODIUM 5000 UNITS: 5000 INJECTION, SOLUTION INTRAVENOUS; SUBCUTANEOUS at 14:50

## 2024-09-26 RX ADMIN — HEPARIN SODIUM 5000 UNITS: 5000 INJECTION, SOLUTION INTRAVENOUS; SUBCUTANEOUS at 20:24

## 2024-09-26 RX ADMIN — ROSUVASTATIN 10 MG: 10 TABLET, FILM COATED ORAL at 17:57

## 2024-09-26 RX ADMIN — SODIUM BICARBONATE 1300 MG: 650 TABLET ORAL at 11:16

## 2024-09-26 RX ADMIN — ASPIRIN 81 MG: 81 TABLET, COATED ORAL at 05:17

## 2024-09-26 RX ADMIN — MOMETASONE FUROATE AND FORMOTEROL FUMARATE DIHYDRATE 2 PUFF: 100; 5 AEROSOL RESPIRATORY (INHALATION) at 05:19

## 2024-09-26 ASSESSMENT — ENCOUNTER SYMPTOMS
VOMITING: 0
FEVER: 0
COUGH: 0
BRUISES/BLEEDS EASILY: 0
CHILLS: 0
NERVOUS/ANXIOUS: 0
EYE DISCHARGE: 0
FOCAL WEAKNESS: 0
SHORTNESS OF BREATH: 1
STRIDOR: 0
EYE REDNESS: 0
FLANK PAIN: 0
MYALGIAS: 0

## 2024-09-26 ASSESSMENT — GAIT ASSESSMENTS
ASSISTIVE DEVICE: FRONT WHEEL WALKER
DISTANCE (FEET): 45
GAIT LEVEL OF ASSIST: CONTACT GUARD ASSIST
DEVIATION: STEP TO;DECREASED BASE OF SUPPORT;SHUFFLED GAIT

## 2024-09-26 ASSESSMENT — COGNITIVE AND FUNCTIONAL STATUS - GENERAL
WALKING IN HOSPITAL ROOM: A LITTLE
MOVING FROM LYING ON BACK TO SITTING ON SIDE OF FLAT BED: A LITTLE
MOVING TO AND FROM BED TO CHAIR: A LITTLE
SUGGESTED CMS G CODE MODIFIER MOBILITY: CK
CLIMB 3 TO 5 STEPS WITH RAILING: A LITTLE
STANDING UP FROM CHAIR USING ARMS: A LITTLE
MOBILITY SCORE: 19

## 2024-09-26 ASSESSMENT — PAIN DESCRIPTION - PAIN TYPE
TYPE: ACUTE PAIN;CHRONIC PAIN
TYPE: ACUTE PAIN;CHRONIC PAIN

## 2024-09-26 ASSESSMENT — PATIENT HEALTH QUESTIONNAIRE - PHQ9
SUM OF ALL RESPONSES TO PHQ9 QUESTIONS 1 AND 2: 0
2. FEELING DOWN, DEPRESSED, IRRITABLE, OR HOPELESS: NOT AT ALL
1. LITTLE INTEREST OR PLEASURE IN DOING THINGS: NOT AT ALL

## 2024-09-26 NOTE — WOUND TEAM
[facility]  Inpatient Services  Initial Wound and Skin Care Evaluation    Admission Date: 9/25/2024     Last order of IP CONSULT TO WOUND CARE was found on 9/25/2024 from Hospital Encounter on 9/25/2024     HPI, PMH, SH: Reviewed    Past Surgical History:   Procedure Laterality Date    PB PARTIAL HIP REPLACEMENT  11/28/2020    Procedure: HEMIARTHROPLASTY, HIP REVISION;  Surgeon: Mohsen Thomas M.D.;  Location: SURGERY [facility];  Service: Orthopedics    PB PARTIAL HIP REPLACEMENT Right 4/28/2019    Procedure: HEMIARTHROPLASTY, HIP;  Surgeon: Evens Sarabia M.D.;  Location: SURGERY [facility];  Service: Orthopedics    RECOVERY  2/9/2015    Performed by [facility]-Recovery Surgery at SURGERY SAME DAY [facility] ORS    FEMORAL ARTERY REPAIR  2/9/2015    Performed by Yuly Chirinos M.D. at SURGERY [facility] ORS    HYSTEROSCOPY WITH VIDEO DIAGNOSTIC  11/17/2010    Performed by ALIS DEGROOT at SURGERY SAME DAY [facility] ORS    DILATION AND CURETTAGE  11/17/2010    Performed by ALIS DEGROOT at SURGERY SAME DAY [facility] ORS    ORIF, FRACTURE, HUMERUS  9/5/08    Performed by LAURA CARTER at SURGERY [facility] ORS    COLONOSCOPY  2008    recheck 4 years    OTHER ORTHOPEDIC SURGERY      rt leg fx    OTHER ORTHOPEDIC SURGERY      broken right wrist     Social History     Tobacco Use    Smoking status: Every Day     Current packs/day: 1.00     Average packs/day: 1 pack/day for 49.6 years (49.6 ttl pk-yrs)     Types: Cigarettes     Start date: 2/2/1975    Smokeless tobacco: Never   Substance Use Topics    Alcohol use: Not Currently     Comment: 4/week     Chief Complaint   Patient presents with    Other     Malaise  Unable to care for self      Diagnosis: RASHAD (acute kidney injury) (HCC) [N17.9]  Decubitus ulcers [L89.90]    Unit where seen by Wound Team: 2206/01     WOUND CONSULT RELATED TO:  ***    WOUND TEAM PLAN OF CARE - Frequency of Follow-up:   Nursing to follow dressing orders  written for wound care. Contact wound team if area fails to progress, deteriorates or with any questions/concerns if something comes up before next scheduled follow up (See below as to whether wound is following and frequency of wound follow up)   {Follow-up Frequency:4728370}    WOUND HISTORY:   ***       WOUND ASSESSMENT/LDA  Wound 09/11/24 Pressure Injury Buttocks Bilateral POA Unstageable with surrounding shearing (Active)   Date First Assessed/Time First Assessed: 09/11/24 1030   Primary Wound Type: Pressure Injury  Location: Buttocks  Laterality: Bilateral  Wound Description (Comments): POA Unstageable with surrounding shearing      Assessments 9/26/2024  4:00 PM   Wound Image      Periwound Assessment Red   Drainage Amount Small   Drainage Description Serosanguineous   Dressing Status Other (Comment)   Dressing Changed Changed   Dressing Options Viscopaste   Dressing Change/Treatment Frequency Every Shift, and As Needed   NEXT Dressing Change/Treatment Date 09/27/24   NEXT Weekly Photo (Inpatient Only) 10/04/24   Wound Team Following Other (comment)   Wound Length (cm) 12 cm   Wound Width (cm) 6 cm   Wound Surface Area (cm^2) 72 cm^2   Wound Bed Granulation (%) 10 %   Wound Bed Slough (%) 90 %   Wound Odor None       Wound 09/25/24 Pressure Injury Right B hip (Active)   Date First Assessed/Time First Assessed: 09/25/24 1651   Present on Original Admission: Yes  Primary Wound Type: Pressure Injury  Laterality: Right  Wound Description (Comments): B hip      Assessments 9/26/2024  4:00 PM   Site Assessment Purple   Periwound Assessment Pink   Drainage Amount None   Dressing Status Other (Comment)   Dressing Changed Changed   Dressing Options Offloading Dressing - Sacral   Dressing Change/Treatment Frequency Every 72 hrs, and As Needed   NEXT Dressing Change/Treatment Date 09/29/24   NEXT Weekly Photo (Inpatient Only) 10/03/24   Wound Team Following Other (comment)   Wound Odor None        Vascular:    ROBERTA:   No  results found.    Lab Values:    Lab Results   Component Value Date/Time    WBC 9.9 09/26/2024 12:36 AM    WBC 6.6 02/22/2013 09:15 AM    RBC 3.89 (L) 09/26/2024 12:36 AM    RBC 4.08 02/22/2013 09:15 AM    HEMOGLOBIN 11.6 (L) 09/26/2024 12:36 AM    HEMATOCRIT 35.9 (L) 09/26/2024 12:36 AM    CREACTPROT 15.06 (H) 12/01/2020 03:38 AM         Culture Results show:  No results found for this or any previous visit (from the past 720 hour(s)).    Pain Level/Medicated:  {Pain Medications:70775}       INTERVENTIONS BY WOUND TEAM:  Chart and images reviewed. Discussed with bedside RN. All areas of concern (based on picture review, LDA review and discussion with bedside RN) have been thoroughly assessed. Documentation of areas based on significant findings. This RN in to assess patient. Performed standard wound care which includes appropriate positioning, dressing removal and non-selective debridement. Pictures and measurements obtained weekly if/when required.  {Wound Location & Interventions:2597691}    [facility] Discharge Planning  Number of Clinicians necessary to complete wound care: ***  Is patient requiring IV pain medications for dressing changes:  {YES/NO:20266}  Length of time for dressing change *** min. (This does not include chart review, pre-medication time, set up, clean up or time spent charting.)    Interdisciplinary consultation: Patient, Bedside RN (***), {Wound Team:30899}.  Pressure injury and staging reviewed with {Wound Team:80930}.    EVALUATION / RATIONALE FOR TREATMENT:     Date:  09/26/24  Wound Status:  {Wound Status:9247180}    ***         Goals: Steady decrease in wound area and depth weekly.    NURSING PLAN OF CARE ORDERS:  {Nursing Plan of Care:1774144}    NUTRITION RECOMMENDATIONS   Wound Team Recommendations:  {IP Wound Care Team Nutrition:3035048}    DIET ORDERS (From admission to next 24h)       Start     Ordered    09/25/24 1903  Diet Order Diet: Low Fiber(GI Soft)  ALL MEALS       "  Question:  Diet:  Answer:  Low Fiber(GI Soft)    09/25/24 8074                    PREVENTATIVE INTERVENTIONS:    Q shift Yfn - performed per nursing policy  Q shift pressure point assessments - performed per nursing policy    {RSkin Interventions:2944573}    Anticipated discharge plans:  {Anticipated DC Plans:2259060}        Vac Discharge Needs:  Vac Discharge plan is purely a recommendation from wound team and not a requirement for discharge unless otherwise stated by physician.  {Wound Vac DC Needs:8152975::\"Not Applicable Pt not on a wound vac\"}   "

## 2024-09-26 NOTE — DISCHARGE PLANNING
Case Management Discharge Planning    Admission Date: 9/25/2024  GMLOS:    ALOS: 0    6-Clicks ADL Score:    6-Clicks Mobility Score:    PT and/or OT Eval ordered: Yes      Anticipated Discharge Dispo: Discharge Disposition: D/T to home under HHA care in anticipation of covered skilled care (06)  Discharge Address: [address] [place] [address] [address] [place] [address]    DME Needed: No    Action(s) Taken: Updated Provider/Nurse on Discharge Plan    Pt discussed in IDT rounds. MD is anticipating pt to need SNF placement. Pt has previous qualifying stay.   Requested for DPA to send blanket SNF referrals to [facility].     3604-  W was notified by RN that pt's , Jeremy, was requesting a call from LSW. LSW called pt's  to discuss discharge planning. Per RN pt partly confused. Pt's  reported he is pt's POA and makes decisions for her. Notified Jeremy of MD anticipating SNF placement, PT/OT evals pending. Provided choice, pt has the following accepting SNFs: [facility], [facility], [facility].   Jeremy gave choice for (1) [facility], and (2) [facility].   [facility] referral still pending at this time.    Escalations Completed: None    Medically Clear: No    Next Steps: LSW to follow    Barriers to Discharge: Medical clearance, Pending Placement, and Pending PT Evaluation    Is the patient up for discharge tomorrow: No          Care Transition Team Assessment    Pt currently lives in a one story house with her . Pt was previously admitted to [facility] and was discharged 9/15. Pt was previously discharged on O2 from [facility].    Information Source  Orientation Level: Oriented to place, Oriented to person, Oriented to situation, Disoriented to time  Information Given By: Other (Comments)  Who is responsible for making decisions for patient? : Patient    Readmission Evaluation  Is this a readmission?: Yes - unplanned readmission    Elopement Risk  Legal Hold: No  Ambulatory or Self Mobile in Wheelchair: No-Not an  Elopement Risk  Elopement Risk: Not at Risk for Elopement    Interdisciplinary Discharge Planning  Lives with - Patient's Self Care Capacity: Spouse  Patient or legal guardian wants to designate a caregiver: No  Support Systems: Spouse / Significant Other  Housing / Facility: [address]    Discharge Preparedness  What is your plan after discharge?: Home health care  What are your discharge supports?: Spouse  Prior Functional Level: Ambulatory, Independent with Activities of Daily Living  Difficulity with ADLs: None  Difficulity with IADLs: None    Functional Assesment  Prior Functional Level: Ambulatory, Independent with Activities of Daily Living    Finances  Financial Barriers to Discharge: No  Prescription Coverage: Yes    Vision / Hearing Impairment  Vision Impairment : Yes  Right Eye Vision: Wears Glasses  Left Eye Vision: Wears Glasses  Hearing Impairment : No    Advance Directive  Advance Directive?: DPOA for Health Care    Domestic Abuse  Have you ever been the victim of abuse or violence?: No  Possible Abuse/Neglect Reported to:: Not Applicable    Psychological Assessment  History of Substance Abuse: None  History of Psychiatric Problems: No  Non-compliant with Treatment: No  Newly Diagnosed Illness: No    Discharge Risks or Barriers  Discharge risks or barriers?: No    Anticipated Discharge Information  Discharge Disposition: D/T to home under A care in anticipation of covered skilled care (06)  Discharge Address: [address] [place], [place] [address] [address]

## 2024-09-26 NOTE — ASSESSMENT & PLAN NOTE
Mostly due to  dehydration secondary to diarrhea  I will start intravenous fluids  Avoid / minimize nephrotoxins as much as possible.  Monitor inputs and outputs   I will hold home furosemide for today with a plan to restart tomorrow    9/26/2024   Cr slightly better Cr 3.11, down from 3.26   Still slightly dehydrated will continue intravenous fluids     9/28: Improving, creatinine today is 2.25.  Nephrology has been consulted.  They recommend continued IV fluids for now.    9/29: Cr today is 2.28. We will stop IVF and monitor intake.

## 2024-09-26 NOTE — ED NOTES
Pt incontinent of stool and urine. Pt cleaned and given new brief. Pericare performed and mepilex placed over sacrum due to sores on the area. Wounds documented in pt chart with pictures. STEPHANI robbins pt for urine. Urine sent to lab. Stool sample also collected and sent to lab. Pt given blankets, and socks, and placed on purewick. After cleaning pt, re-checked BP 92/54. ERP aware and pending new orders. Pt not complaining of pain or lightheadedness. Pt taken to CT at this time.

## 2024-09-26 NOTE — H&P
Hospital Medicine History & Physical Note    Date of Service  9/25/2024    Primary Care Physician  Claire Hernández M.D.    Consultants  None      Code Status  Full Code    Chief Complaint  Chief Complaint   Patient presents with    Other     Malaise  Unable to care for self      History of Presenting Illness  Sangita Bolton is a 79 y.o. female with a past medical history of primary hypertension, chronic kidney disease, hyperlipidemia, recent hospitalization for COVID-19 infection, urinary tract infection since Synthyroid 48 who presented 9/25/2024 with generalized weakness, diarrhea and decubitus ulcers.  Patient has been having progressive worsening generalized weakness and easy fatigability over the past 2 days.  She also has been having multiple episodes of diarrhea and she and her family were not able to take care of her.  She started develop pressure ulcers.  In the emergency room she was found to be dehydrated and was having soft blood pressures with systolic in the high 80s and diastolic in the mid 50s.    I discussed the plan of care with emergency physician, and the patient.    Review of Systems  Review of Systems   Constitutional:  Positive for malaise/fatigue. Negative for chills and fever.   Eyes:  Negative for discharge and redness.   Respiratory:  Positive for shortness of breath. Negative for cough and stridor.    Cardiovascular:  Negative for chest pain and leg swelling.   Gastrointestinal:  Positive for diarrhea. Negative for abdominal pain and vomiting.   Genitourinary:  Negative for flank pain.   Musculoskeletal:  Negative for myalgias.   Skin: Negative.    Neurological:  Negative for focal weakness.   Endo/Heme/Allergies:  Does not bruise/bleed easily.   Psychiatric/Behavioral:  The patient is not nervous/anxious.      Past Medical History   has a past medical history of Alcohol abuse (04/27/2019), Arthritis, Carotid artery stenosis (12/31/2015), Fall (11/25/2020), Gluten intolerance,  Hyperlipidemia, Hypertension, Medicare annual wellness visit, subsequent (10/05/2023), OSTEOPOROSIS, Other specified symptom associated with female genital organs, Pain (02/06/2015), Stage 3 chronic kidney disease (11/25/2020), and Unspecified cataract.    Surgical History   has a past surgical history that includes colonoscopy (2008); other orthopedic surgery; orif, fracture, humerus (9/5/08); other orthopedic surgery; hysteroscopy with video diagnostic (11/17/2010); dilation and curettage (11/17/2010); recovery (2/9/2015); femoral artery repair (2/9/2015); pr partial hip replacement (Right, 4/28/2019); and pr partial hip replacement (11/28/2020).     Family History  family history includes Cancer in her mother.      Social History   reports that she has been smoking cigarettes. She started smoking about 49 years ago. She has a 49.6 pack-year smoking history. She has never used smokeless tobacco. She reports that she does not currently use alcohol. She reports that she does not use drugs.    Allergies  Allergies   Allergen Reactions    Hair Formula Extra Strength [Kdc:Cranberry Extract+Sambucus Nigra+Vegetable Enzyme+Yellow Dye+...] Hives     Hair Dye    Penicillins Hives     Hands; tolerates cephalosporins (Rocephin Nov 2020)     Medications  Prior to Admission Medications   Prescriptions Last Dose Informant Patient Reported? Taking?   Cholecalciferol (VITAMIN D3) 125 MCG (5000 UT) Cap 9/25/2024 at 0700 Significant Other Yes Yes   Sig: Take 5,000 Units by mouth every day.   Cyanocobalamin (VITAMIN B-12 PO) 9/25/2024 at 0700 Significant Other Yes Yes   Sig: Take 1 tablet by mouth every day.   amLODIPine (NORVASC) 10 MG Tab 9/25/2024 at 0700 Significant Other No Yes   Sig: Take 1 Tablet by mouth every day.   aspirin 81 MG EC tablet 9/25/2024 at 0700 Significant Other No Yes   Sig: Take 1 Tablet by mouth every day.   dexamethasone (DECADRON) 6 MG Tab 9/17/2024 at FINISHED Significant Other Yes Yes   Sig: Take 6 mg by  mouth every day. Pt started on 9/15/2024 for 3 day course   furosemide (LASIX) 40 MG Tab 9/25/2024 at 0700 Significant Other No Yes   Sig: Take 1 Tablet by mouth every day for 30 days.   losartan (COZAAR) 100 MG Tab 9/10/2024 at STOPPED Significant Other Yes Yes   Sig: Take 100 mg by mouth every day.   metoprolol SR (TOPROL XL) 25 MG TABLET SR 24 HR 9/25/2024 at 0700 Significant Other No Yes   Sig: Take 1 Tablet by mouth every day.   mometasone-formoterol (DULERA) 100-5 MCG/ACT Aerosol 9/25/2024 at 0400 Significant Other No Yes   Sig: Inhale 2 Puffs 2 times a day for 30 days.   potassium chloride SA (K-DUR) 10 MEQ Tab CR 9/25/2024 at 0700 Significant Other No Yes   Sig: Take 1 Tablet by mouth 2 times a day. Take potassium with furosemide/Lasix.  Do not take otherwise.   rosuvastatin (CRESTOR) 10 MG Tab 9/24/2024 at 1800 Significant Other No Yes   Sig: Take 1 Tablet by mouth every evening. Overdue for follow up, needs appt for further refills   sodium bicarbonate (SODIUM BICARBONATE) 650 MG Tab 9/25/2024 at 1200 Significant Other No Yes   Sig: Take 2 Tablets by mouth in the morning, at noon, and at bedtime.   tiotropium (SPIRIVA RESPIMAT) 2.5 mcg/Act Aero Soln 9/25/2024 at 0700 Significant Other No Yes   Sig: Inhale 2 Inhalations every day for 30 days.      Facility-Administered Medications: None     Physical Exam  Temp:  [36.4 °C (97.5 °F)] 36.4 °C (97.5 °F)  Pulse:  [76-81] 81  Resp:  [7-26] 18  BP: ()/(52-58) 92/54  SpO2:  [96 %-97 %] 96 %  Blood Pressure : 92/54   Temperature: 36.4 °C (97.5 °F)   Pulse: 81   Respiration: 18   Pulse Oximetry: 96 %     Physical Exam  Constitutional:       General: She is not in acute distress.  HENT:      Head: Normocephalic and atraumatic.      Right Ear: External ear normal.      Left Ear: External ear normal.      Nose: No congestion or rhinorrhea.      Mouth/Throat:      Mouth: Mucous membranes are dry.      Pharynx: No oropharyngeal exudate or posterior oropharyngeal  "erythema.   Eyes:      General: No scleral icterus.        Right eye: No discharge.         Left eye: No discharge.      Conjunctiva/sclera: Conjunctivae normal.      Pupils: Pupils are equal, round, and reactive to light.   Cardiovascular:      Rate and Rhythm: Normal rate.      Heart sounds:      No friction rub. No gallop.   Pulmonary:      Effort: Pulmonary effort is normal.   Abdominal:      General: Abdomen is flat. There is no distension.      Tenderness: There is no guarding.   Musculoskeletal:         General: No swelling.      Cervical back: Neck supple. No rigidity. No muscular tenderness.      Right lower leg: No edema.      Left lower leg: No edema.   Skin:     General: Skin is dry.      Capillary Refill: Capillary refill takes 2 to 3 seconds.      Coloration: Skin is not jaundiced or pale.      Findings: No bruising or erythema.   Neurological:      Mental Status: She is alert and oriented to person, place, and time.   Psychiatric:         Mood and Affect: Mood normal.         Judgment: Judgment normal.                     Laboratory:  Recent Labs     09/25/24  1627   WBC 12.0*   RBC 4.00*   HEMOGLOBIN 11.8*   HEMATOCRIT 36.9*   MCV 92.3   MCH 29.5   MCHC 32.0*   RDW 50.2*   PLATELETCT 156*   MPV 11.8     Recent Labs     09/25/24  1627   SODIUM 143   POTASSIUM 4.4   CHLORIDE 101   CO2 20   GLUCOSE 105*   BUN 81*   CREATININE 3.26*   CALCIUM 9.2     Recent Labs     09/25/24  1627   ALTSGPT 18   ASTSGOT 18   ALKPHOSPHAT 80   TBILIRUBIN 1.1   LIPASE 17   GLUCOSE 105*         No results for input(s): \"NTPROBNP\" in the last 72 hours.      Recent Labs     09/25/24  1627 09/25/24  1900   TROPONINT 144* 137*     Imaging:  CT-HEAD W/O   Final Result      1. No acute intracranial abnormality.   2. Age-related central and cortical atrophy and diffuse chronic microangiopathic white matter changes.   3. Atherosclerosis.               DX-CHEST-PORTABLE (1 VIEW)   Final Result      1. No acute cardiac or pulmonary " abnormalities are identified.   2. Atherosclerosis.   3. Other stable chronic degenerative, posttraumatic and postsurgical changes.        I personally reviewed patient's EKG shows sinus rhythm with a rate of 77.  There is no ST elevation.  There is ST depression in inferior leads and in the lateral leads.  QTc is 444.     Assessment/Plan:  Justification for Admission Status  I anticipate this patient is appropriate for observation status at this time because patient has diarrhea and dehydration with acute on chronic kidney injury.  She will require intravenous fluids, further workup for diarrhea, and close monitoring of electrolytes    Patient will need a Med/Surg bed on MEDICAL service     * RASHAD (acute kidney injury) (HCC)- (present on admission)  Assessment & Plan  Mostly due to  dehydration secondary to diarrhea  I will start intravenous fluids  Avoid / minimize nephrotoxins as much as possible.  Monitor inputs and outputs   I will hold home furosemide for today with a plan to restart tomorrow    Decubitus ulcers- (present on admission)  Assessment & Plan  Do not appear to be infected.  Wound care    Diarrhea- (present on admission)  Assessment & Plan  Differentials include viral infection, C. difficile colitis  [has, recent hospitalization been on antibiotics for urinary tract infection]  I will check stool for C. difficile toxins.  I will check a gastrointestinal panel  Supportive care with intravenous fluids monitor electrolytes and replace accordingly     Dehydration- (present on admission)  Assessment & Plan  Likely secondary to reduced oral intake   Encourage oral intake as tolerated, antiemetics as needed.  Intravenous hydration until adequate oral intake is achieved.     Elevated troponin- (present on admission)  Assessment & Plan  In the indeterminate range  Denies chest pain.  Troponin elevation is likely secondary to demand ischemia and reduced renal clearance due to current renal function  EKG shows  sinus rhythm with a rate of 77.  There is no ST elevation.  There is ST depression in inferior leads and in the lateral leads.  QTc is 444.    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 I will order a follow-up CBC.  Transfuse for a hemoglobin of less than or equal to 7.     CAD (coronary artery disease)- (present on admission)  Assessment & Plan  I will start metoprolol with all parameters.  I will resume home atorvastatin    ACP (advance care planning)- (present on admission)  Assessment & Plan  I had a discussion with the patient regarding goals of care, diagnoses, prognosis, and CODE STATUS. We discussed her prognosis and comorbidities.  The patient has advanced age of 79 years.  She has a number of chronic medical problems including chronic kidney disease, coronary artery disease with a recent hospitalization for COVID-19 infection and a urine tract infection.  At this point the patient wants to maintain a full code.  She is open to all forms of invasive or noninvasive diagnostic and therapeutic interventions      Dyslipidemia- (present on admission)  Assessment & Plan  Cardiac diet.  I will start rosuvastatin       VTE prophylaxis: SCDs/TEDs and heparin ppx    I had a discussion with the patient regarding goals of care, diagnoses, prognosis, and CODE STATUS. We discussed her prognosis and comorbidities.  The patient has advanced age of 79 years.  She has a number of chronic medical  problems including chronic kidney disease, coronary artery disease with a recent hospitalization for COVID-19 infection and a urine tract infection.  At this point the patient wants to maintain a full code.  She is open to all forms of invasive or noninvasive diagnostic and therapeutic interventions I spent 17 minutes on advanced care planning

## 2024-09-26 NOTE — PROGRESS NOTES
Hospital Medicine Daily Progress Note    Date of Service  9/26/2024    Chief Complaint  Sangita Bolton is a 79 y.o. female admitted 9/25/2024 with multiple decubitus wounds and inability to care for self    Hospital Course  Sangita Bolton is a 79 y.o. female with a past medical history of primary hypertension, chronic kidney disease, hyperlipidemia, recent hospitalization for COVID-19 infection and a urinary tract infection September 10-15 admitted 9/25/2024 with multiple decubitus wounds and inability to care for self    Interval Problem Update  Hemodynamically stable overnight.  Saturating well on room air.   Blood pressures are better today.  Cr slightly better Cr 3.11, down from 3.26   Still slightly dehydrated will continue intravenous fluids  C Diff is negative. I will stop PO vanco    Physical and Occupational Therapy evaluation ordered  I have discussed this patient's plan of care and discharge plan at IDT rounds today with Case Management, Nursing, Nursing leadership, and other members of the IDT team.    Consultants/Specialty  None     Code Status  Full Code    Disposition  Patient needs wound care.  The patient is medically cleared for discharge to skilled nursing facility for continued wound care.  I have placed the appropriate orders for post-discharge needs.    Review of Systems  Review of Systems   Constitutional:  Positive for malaise/fatigue. Negative for chills and fever.   Eyes:  Negative for discharge and redness.   Respiratory:  Positive for shortness of breath. Negative for cough and stridor.    Cardiovascular:  Negative for chest pain and leg swelling.   Gastrointestinal:  Negative for vomiting. Diarrhea: improving.  Genitourinary:  Negative for flank pain.   Musculoskeletal:  Negative for myalgias.   Skin: Negative.    Neurological:  Negative for focal weakness.   Endo/Heme/Allergies:  Does not bruise/bleed easily.   Psychiatric/Behavioral:  The patient is not nervous/anxious.       Physical  Exam  Temp:  [36.4 °C (97.5 °F)-37.2 °C (98.9 °F)] 37.1 °C (98.8 °F)  Pulse:  [75-84] 84  Resp:  [7-26] 17  BP: ()/(44-63) 130/63  SpO2:  [90 %-97 %] 93 %    Physical Exam  Constitutional:       General: She is not in acute distress.  HENT:      Head: Normocephalic and atraumatic.      Right Ear: External ear normal.      Left Ear: External ear normal.      Nose: No congestion or rhinorrhea.      Mouth/Throat:      Mouth: Mucous membranes are dry.      Pharynx: No oropharyngeal exudate or posterior oropharyngeal erythema.   Eyes:      General: No scleral icterus.        Right eye: No discharge.         Left eye: No discharge.      Conjunctiva/sclera: Conjunctivae normal.      Pupils: Pupils are equal, round, and reactive to light.   Cardiovascular:      Rate and Rhythm: Normal rate.      Heart sounds:      No friction rub. No gallop.   Pulmonary:      Effort: Pulmonary effort is normal.   Abdominal:      General: Abdomen is flat. There is no distension.      Tenderness: There is no guarding.   Musculoskeletal:         General: No swelling.      Cervical back: Neck supple. No rigidity. No muscular tenderness.      Right lower leg: No edema.      Left lower leg: No edema.   Skin:     General: Skin is dry.      Capillary Refill: Capillary refill takes 2 to 3 seconds.      Coloration: Skin is not jaundiced or pale.      Findings: No bruising or erythema.   Neurological:      Mental Status: She is alert and oriented to person, place, and time.   Psychiatric:         Mood and Affect: Mood normal.         Judgment: Judgment normal.                 Fluids    Intake/Output Summary (Last 24 hours) at 9/26/2024 1324  Last data filed at 9/26/2024 0517  Gross per 24 hour   Intake --   Output 500 ml   Net -500 ml      Laboratory  Recent Labs     09/25/24  1627 09/26/24  0036   WBC 12.0* 9.9   RBC 4.00* 3.89*   HEMOGLOBIN 11.8* 11.6*   HEMATOCRIT 36.9* 35.9*   MCV 92.3 92.3   MCH 29.5 29.8   MCHC 32.0* 32.3   RDW 50.2* 49.9    PLATELETCT 156* 154*   MPV 11.8 11.2     Recent Labs     09/25/24  1627 09/26/24  0036   SODIUM 143 137   POTASSIUM 4.4 3.7   CHLORIDE 101 99   CO2 20 20   GLUCOSE 105* 80   BUN 81* 71*   CREATININE 3.26* 3.11*   CALCIUM 9.2 8.8     Imaging  CT-HEAD W/O   Final Result      1. No acute intracranial abnormality.   2. Age-related central and cortical atrophy and diffuse chronic microangiopathic white matter changes.   3. Atherosclerosis.               DX-CHEST-PORTABLE (1 VIEW)   Final Result      1. No acute cardiac or pulmonary abnormalities are identified.   2. Atherosclerosis.   3. Other stable chronic degenerative, posttraumatic and postsurgical changes.         Assessment/Plan  * RASHAD (acute kidney injury) (HCC)- (present on admission)  Assessment & Plan  Mostly due to  dehydration secondary to diarrhea  I will start intravenous fluids  Avoid / minimize nephrotoxins as much as possible.  Monitor inputs and outputs   I will hold home furosemide for today with a plan to restart tomorrow    9/26/2024   Cr slightly better Cr 3.11, down from 3.26   Still slightly dehydrated will continue intravenous fluids     Decubitus ulcers- (present on admission)  Assessment & Plan  Do not appear to be infected.  Wound care    9/26/2024   Will need ongoing wound care   Referral to SNFs have been placed     Diarrhea- (present on admission)  Assessment & Plan  Differentials include viral infection, C. difficile colitis  [has, recent hospitalization been on antibiotics for urinary tract infection]  I will check stool for C. difficile toxins.  I will check a gastrointestinal panel  Supportive care with intravenous fluids monitor electrolytes and replace accordingly     9/26/2024   Resolved   C Diff is negative. I will stop PO vanco.        Dehydration- (present on admission)  Assessment & Plan  Likely secondary to reduced oral intake   Encourage oral intake as tolerated, antiemetics as needed.  Intravenous hydration until adequate  oral intake is achieved.     9/26/2024   Still slightly dehydrated will continue intravenous fluids    Elevated troponin- (present on admission)  Assessment & Plan  In the indeterminate range  Denies chest pain.  Troponin elevation is likely secondary to demand ischemia and reduced renal clearance due to current renal function  EKG shows sinus rhythm with a rate of 77.  There is no ST elevation.  There is ST depression in inferior leads and in the lateral leads.  QTc is 444.    I will place on continuous cardiac monitoring    I will trend troponin levels  Stat EKG, troponin for chest pain or shortness of breath     9/26/2024   Trop has been flat     CKD stage G4/A3, GFR 15-29 and albumin creatinine ratio >300 mg/g (HCC)- (present on admission)  Assessment & Plan  Avoid/minimize nephrotoxins as much as possible, renally dose medications, monitor inputs and outputs     Anemia- (present on admission)  Assessment & Plan  Appears to be chronic  Monitor hemoglobin. I will order a follow-up CBC.  Transfuse for a hemoglobin of less than or equal to 7.     CAD (coronary artery disease)- (present on admission)  Assessment & Plan  I will start metoprolol with all parameters.  I will resume home atorvastatin    ACP (advance care planning)- (present on admission)  Assessment & Plan  FULL code     Dyslipidemia- (present on admission)  Assessment & Plan  Cardiac diet.  I will start rosuvastatin        VTE prophylaxis: SCDs/TEDs and heparin ppx      I have performed a physical exam and reviewed and updated ROS and Plan today (9/26/2024). In review of yesterday's note (9/25/2024), there are no changes except as documented above.

## 2024-09-26 NOTE — PROGRESS NOTES
Patient declined getting up to chair for breakfast and wanted to sit up in bed. She was adjusted to provide optimum positioning for eating.

## 2024-09-26 NOTE — ASSESSMENT & PLAN NOTE
Appears to be chronic  Monitor hemoglobin. I will order a follow-up CBC.  Transfuse for a hemoglobin of less than or equal to 7.    9/27: Hb seems to be stable

## 2024-09-26 NOTE — CARE PLAN
The patient is Stable - Low risk of patient condition declining or worsening    Shift Goals  Clinical Goals: monitor for BMs, no falls, pain management, turn q2hr  Patient Goals: rest    Progress made toward(s) clinical / shift goals:  pt had no BM during the night. No falls sustained. Pain controlled per MAR. Patient turned q2hrs.    Patient is not progressing towards the following goals:

## 2024-09-26 NOTE — ED NOTES
Amb to room-pt found incon of stool, as well as on rm air with sat=97%. Per report, pt on home oxygen at 3L. vs as charted. Complete linen chg with mepilex to rahul anal area. Wound phots for chart and update to erp. Pure wick also applied  aware of pending admit-left for home.

## 2024-09-26 NOTE — ASSESSMENT & PLAN NOTE
Likely secondary to reduced oral intake   Encourage oral intake as tolerated, antiemetics as needed.  Intravenous hydration until adequate oral intake is achieved.     9/26/2024   Still slightly dehydrated will continue intravenous fluids

## 2024-09-26 NOTE — PROGRESS NOTES
Heme/Onc Patient's  Jeremy was present at bedside. He inquired about plan to transfer Sangita for further care and therapy. Message sent to  for update and  request to call him.

## 2024-09-26 NOTE — ASSESSMENT & PLAN NOTE
Do not appear to be infected.  Wound care    9/26/2024   Will need ongoing wound care   Referral to SNFs have been placed     9/30: Pending placement.

## 2024-09-26 NOTE — ASSESSMENT & PLAN NOTE
Differentials include viral infection, C. difficile colitis  [has, recent hospitalization been on antibiotics for urinary tract infection]  I will check stool for C. difficile toxins.  I will check a gastrointestinal panel  Supportive care with intravenous fluids monitor electrolytes and replace accordingly     9/26/2024   Resolved   C Diff is negative. I will stop PO vanco.

## 2024-09-26 NOTE — PROGRESS NOTES
4 Eyes Skin Assessment Completed by STEPHANI Orozco and STEPHANI Jones.    Head WDL  Ears WDL  Nose WDL  Mouth WDL  Neck WDL  Breast/Chest Scar  Shoulder Blades Redness and Blanching  Spine Redness and Blanching  (R) Arm/Elbow/Hand Bruising  (L) Arm/Elbow/Hand Bruising  Abdomen Scar  Groin Redness  Scrotum/Coccyx/Buttocks Redness, Non-Blanching, and Excoriation  (R) Leg Bruising  (L) Leg Bruising  (R) Heel/Foot/Toe WDL  (L) Heel/Foot/Toe WDL          Devices In Places Pulse Ox and SCD's      Interventions In Place Sacral Mepilex, Waffle Overlay, Pillows, and Q2 Turns    Possible Skin Injury Yes    Pictures Uploaded Into Epic Yes  Wound Consult Placed Yes  RN Wound Prevention Protocol Ordered Yes

## 2024-09-26 NOTE — PROGRESS NOTES
Assumed care, patient awake and alert. No complaints of pain or discomfort. Lab for C. Diff negative so isolation most likely to be discontinued. Reviewed plan of care for the day, call light within reach, hourly rounding in place.

## 2024-09-26 NOTE — THERAPY
"Physical Therapy   Initial Evaluation     Patient Name: Sangita Bolton  Age:  79 y.o., Sex:  female  Medical Record #: [id number]  Today's Date: 9/26/2024     Precautions  Precautions: Fall Risk  Comments: confused    Assessment  Patient is 79 y.o. female with past medical history of primary hypertension, chronic kidney disease, hyperlipidemia, recent hospitalization for COVID-19 infection, urinary tract infection since Synthyroid 48 who presented 9/25/2024 with generalized weakness, diarrhea and decubitus ulcers.  Patient has been having progressive worsening generalized weakness and easy fatigability over the past 2 days. Pt presented to PT with impaired balance, impaired gait, weakness, confusion, and poor upright activity tolerance. Per RN, pt may have baseline confusion, however, unclear if she is at baseline with current cognition. Pt was able to demonstrate SBA for bed mobility and CGA for STS, transfers, and ambulation with FWW use. Pt was only able to ambulate about 45 ft and declined further mobility as pt states, \" I just want to go back\". Pt unable to state if she was fatigued, however, appeared to demonstrate inc SOB and WOB with short distance ambulation. Pt is currently a high fall risk and will benefit from continued skilled PT while in house. Given current objective findings, age, PLOF, and limited social support pt will benefit from post acute therapy prior to d/c home.     Plan    Physical Therapy Initial Treatment Plan   Treatment Plan : Bed Mobility, Equipment, Gait Training, Manual Therapy, Neuro Re-Education / Balance, Self Care / Home Evaluation, Stair Training, Therapeutic Activities, Therapeutic Exercise  Treatment Frequency: (P) 3 Times per Week  Duration: (P) Until Therapy Goals Met    DC Equipment Recommendations: (P) Unable to determine at this time  Discharge Recommendations: (P) Recommend post-acute placement for additional physical therapy services prior to discharge home   "   Objective       09/26/24 1601   Initial Contact Note    Initial Contact Note Order Received and Verified, Physical Therapy Evaluation in Progress with Full Report to Follow.   Precautions   Precautions Fall Risk   Comments confused   Pain 0 - 10 Group   Therapist Pain Assessment Nurse Notified;0   Prior Living Situation   Prior Services None   Housing / Facility 1 Story House   Steps Into Home 2   Steps In Home 0   Equipment Owned Front-Wheel Walker   Lives with - Patient's Self Care Capacity Spouse   Comments per RN spouse is not capable of assisting pt at home, above information may inaccurate as pt is currently confused   Prior Level of Functional Mobility   Bed Mobility Independent   Transfer Status Independent   Ambulation Independent   Ambulation Distance   (limited community)   Assistive Devices Used Front-Wheel Walker   Stairs Independent   Comments pt reports of an IPLOF, however, this may be inaccurate due to current confusion   History of Falls   History of Falls No   Cognition    Cognition / Consciousness X   Speech/ Communication Delayed Responses   Level of Consciousness Alert   Attention Impaired   Comments confused throughout session, unable to state reason or correct date/year   Passive ROM Lower Body   Passive ROM Lower Body WDL   Active ROM Lower Body    Active ROM Lower Body  WDL   Strength Lower Body   Lower Body Strength  X   Gross Strength Generalized Weakness, Equal Bilaterally   Sensation Lower Body   Lower Extremity Sensation   WDL   Lower Body Muscle Tone   Lower Body Muscle Tone  WDL   Neurological Concerns   Neurological Concerns No   Coordination Upper Body   Coordination WDL   Coordination Lower Body    Coordination Lower Body  WDL   Balance Assessment   Sitting Balance (Static) Good   Sitting Balance (Dynamic) Fair   Standing Balance (Static) Fair   Standing Balance (Dynamic) Fair -   Weight Shift Sitting Good   Weight Shift Standing Fair   Comments w/fww use, no jameson LOB noted    Bed Mobility    Supine to Sit Standby Assist   Sit to Supine Standby Assist   Scooting Standby Assist   Gait Analysis   Gait Level Of Assist Contact Guard Assist   Assistive Device Front Wheel Walker   Distance (Feet) 45   # of Times Distance was Traveled 1   Deviation Step To;Decreased Base Of Support;Shuffled Gait   Weight Bearing Status fwb   Comments pt limited distance in ambulation, unable to state if she is fatigued or feelin weak, just stated she wants to go back to her bed   Functional Mobility   Sit to Stand Contact Guard Assist   Bed, Chair, Wheelchair Transfer Contact Guard Assist   Transfer Method Stand Step   Mobility EOB, sit<>stand, ambulation, transfer to chair   6 Clicks Assessment - How much HELP from from another person do you currently need... (If the patient hasn't done an activity recently, how much help from another person do you think he/she would need if he/she tried?)   Turning from your back to your side while in a flat bed without using bedrails? 4   Moving from lying on your back to sitting on the side of a flat bed without using bedrails? 3   Moving to and from a bed to a chair (including a wheelchair)? 3   Standing up from a chair using your arms (e.g., wheelchair, or bedside chair)? 3   Walking in hospital room? 3   Climbing 3-5 steps with a railing? 3   6 clicks Mobility Score 19   Activity Tolerance   Sitting in Chair left up in chair, RN aware   Sitting Edge of Bed 5 mins   Standing 5 mins   Comments appears to be limited by fatigue/weakness   Edema / Skin Assessment   Edema / Skin  X   Comments pt has buttocks wounds   Patient / Family Goals    Patient / Family Goal #1 to go home   Short Term Goals    Short Term Goal # 1 pt will go supine<>sit w/hob flat and rails down w/spv in 6tx for upright seated functional activities   Short Term Goal # 2 pt will go sit<>stand w/fww w/spv in 6tx for safe upright mobility/pre-gait activities   Short Term Goal # 3 pt will transfer bed<>chair  w/fww w/spv in 6tx for meals   Short Term Goal # 4 pt will ambulate for 150ft w/fww w/spv in 6tx for household ambulation   Short Term Goal # 5 pt will go up/down 2 steps w/spv in 6tx for safe d/c home   Education Group   Education Provided Role of Physical Therapist   Role of Physical Therapist Patient Response Patient;Acceptance;Explanation;Demonstration;Verbal Demonstration;Action Demonstration   Physical Therapy Initial Treatment Plan    Treatment Plan  Bed Mobility;Equipment;Gait Training;Manual Therapy;Neuro Re-Education / Balance;Self Care / Home Evaluation;Stair Training;Therapeutic Activities;Therapeutic Exercise   Treatment Frequency 3 Times per Week   Duration Until Therapy Goals Met   Problem List    Problems Impaired Ambulation;Impaired Bed Mobility;Impaired Transfers;Functional Strength Deficit;Impaired Balance;Decreased Activity Tolerance;Safety Awareness Deficits / Cognition;Motor Planning / Sequencing   Anticipated Discharge Equipment and Recommendations   DC Equipment Recommendations Unable to determine at this time   Discharge Recommendations Recommend post-acute placement for additional physical therapy services prior to discharge home   Interdisciplinary Plan of Care Collaboration   IDT Collaboration with  Nursing   Patient Position at End of Therapy Seated;Chair Alarm On;Call Light within Reach;Tray Table within Reach;Phone within Reach;Other (Comments)  (waffle cushion on seat)   Collaboration Comments aware of visit and recs   Session Information   Date / Session Number  9/26-1 (1/3, 10/2)

## 2024-09-26 NOTE — ASSESSMENT & PLAN NOTE
In the indeterminate range  Denies chest pain.  Troponin elevation is likely secondary to demand ischemia and reduced renal clearance due to current renal function  EKG shows sinus rhythm with a rate of 77.  There is no ST elevation.  There is ST depression in inferior leads and in the lateral leads.  QTc is 444.    I will place on continuous cardiac monitoring    I will trend troponin levels  Stat EKG, troponin for chest pain or shortness of breath     9/26/2024   Trop has been flat     9/27/2024  Denies chest pain    9/28: Cardiology did not recommend further intervention at this time.  They do not believe this is ACS, the patient denying chest pain.  The patient will require follow-up as an outpatient with cardiology.    9/30: Follow up to cardiology has been placed

## 2024-09-26 NOTE — PROGRESS NOTES
Report received from STEPHANI Cheng @22:45. Patient arrived via gurney @ 23:00. Patient transferred to bed via slideboard. Welcome guide given to patient. SCDs placed. Bed alarm active. Call light given to patient.    Patient is AAOx3, unsure about year/date. Patient has no complaints of pain at this time.    Discussed plan of care for the night with patient, call light in reach, personal belongings in reach. No complaints at this time.

## 2024-09-26 NOTE — PROGRESS NOTES
4 Eyes Skin Assessment Completed by STEPHANI Matos and STEPHANI Crowley.    Head WDL  Ears WDL  Nose WDL  Mouth WDL  Neck WDL  Breast/Chest WDL  Shoulder Blades WDL  Spine WDL  (R) Arm/Elbow/Hand Bruising and Scab  (L) Arm/Elbow/Hand Bruising  Abdomen WDL  Groin WDL  Scrotum/Coccyx/Buttocks Excoriation Open wounds  (R) Leg WDL  (L) Leg WDL  (R) Heel/Foot/Toe Scab dry skin  (L) Heel/Foot/Toe WDL          Devices In Places Blood Pressure Cuff, Pulse Ox, and SCD's      Interventions In Place Sacral Mepilex, Waffle Overlay, and Pillows    Possible Skin Injury Yes    Pictures Uploaded Into Epic Yes  Wound Consult Placed Yes  RN Wound Prevention Protocol Ordered Yes

## 2024-09-27 LAB
ADV 40+41 DNA STL QL NAA+NON-PROBE: NOT DETECTED
ALBUMIN SERPL BCP-MCNC: 2.8 G/DL (ref 3.2–4.9)
APPEARANCE UR: CLEAR
ASTRO TYP 1-8 RNA STL QL NAA+NON-PROBE: NOT DETECTED
BACTERIA #/AREA URNS HPF: ABNORMAL /HPF
BILIRUB UR QL STRIP.AUTO: NEGATIVE
BUN SERPL-MCNC: 52 MG/DL (ref 8–22)
C CAYETANENSIS DNA STL QL NAA+NON-PROBE: NOT DETECTED
C COLI+JEJ+UPSA DNA STL QL NAA+NON-PROBE: NOT DETECTED
CALCIUM ALBUM COR SERPL-MCNC: 9.5 MG/DL (ref 8.5–10.5)
CALCIUM SERPL-MCNC: 8.5 MG/DL (ref 8.4–10.2)
CHLORIDE SERPL-SCNC: 105 MMOL/L (ref 96–112)
CO2 SERPL-SCNC: 22 MMOL/L (ref 20–33)
COLOR UR: YELLOW
CREAT SERPL-MCNC: 2.49 MG/DL (ref 0.5–1.4)
CRYPTOSP DNA STL QL NAA+NON-PROBE: NOT DETECTED
E HISTOLYT DNA STL QL NAA+NON-PROBE: NOT DETECTED
EAEC PAA PLAS AGGR+AATA ST NAA+NON-PRB: NOT DETECTED
EC STX1+STX2 GENES STL QL NAA+NON-PROBE: NOT DETECTED
EPEC EAE GENE STL QL NAA+NON-PROBE: NOT DETECTED
EPI CELLS #/AREA URNS HPF: ABNORMAL /HPF
ERYTHROCYTE [DISTWIDTH] IN BLOOD BY AUTOMATED COUNT: 51.2 FL (ref 35.9–50)
ETEC LTA+ST1A+ST1B TOX ST NAA+NON-PROBE: NOT DETECTED
G LAMBLIA DNA STL QL NAA+NON-PROBE: NOT DETECTED
GFR SERPLBLD CREATININE-BSD FMLA CKD-EPI: 19 ML/MIN/1.73 M 2
GLUCOSE SERPL-MCNC: 100 MG/DL (ref 65–99)
GLUCOSE UR STRIP.AUTO-MCNC: NEGATIVE MG/DL
HCT VFR BLD AUTO: 34.5 % (ref 37–47)
HGB BLD-MCNC: 10.8 G/DL (ref 12–16)
KETONES UR STRIP.AUTO-MCNC: NEGATIVE MG/DL
LEUKOCYTE ESTERASE UR QL STRIP.AUTO: NEGATIVE
MCH RBC QN AUTO: 29.7 PG (ref 27–33)
MCHC RBC AUTO-ENTMCNC: 31.3 G/DL (ref 32.2–35.5)
MCV RBC AUTO: 94.8 FL (ref 81.4–97.8)
MICRO URNS: ABNORMAL
NITRITE UR QL STRIP.AUTO: NEGATIVE
NOROVIRUS GI+II RNA STL QL NAA+NON-PROBE: NOT DETECTED
P SHIGELLOIDES DNA STL QL NAA+NON-PROBE: NOT DETECTED
PH UR STRIP.AUTO: 7 [PH] (ref 5–8)
PHOSPHATE SERPL-MCNC: 3.5 MG/DL (ref 2.5–4.5)
PLATELET # BLD AUTO: 144 K/UL (ref 164–446)
PMV BLD AUTO: 10.9 FL (ref 9–12.9)
POTASSIUM SERPL-SCNC: 4.2 MMOL/L (ref 3.6–5.5)
PROT UR QL STRIP: NEGATIVE MG/DL
RBC # BLD AUTO: 3.64 M/UL (ref 4.2–5.4)
RBC # URNS HPF: ABNORMAL /HPF
RBC UR QL AUTO: ABNORMAL
RVA RNA STL QL NAA+NON-PROBE: NOT DETECTED
S ENT+BONG DNA STL QL NAA+NON-PROBE: NOT DETECTED
SAPO I+II+IV+V RNA STL QL NAA+NON-PROBE: NOT DETECTED
SHIGELLA SP+EIEC IPAH ST NAA+NON-PROBE: NOT DETECTED
SODIUM SERPL-SCNC: 141 MMOL/L (ref 135–145)
SP GR UR STRIP.AUTO: 1.01
V CHOL+PARA+VUL DNA STL QL NAA+NON-PROBE: NOT DETECTED
V CHOLERAE DNA STL QL NAA+NON-PROBE: NOT DETECTED
WBC # BLD AUTO: 7.5 K/UL (ref 4.8–10.8)
WBC #/AREA URNS HPF: ABNORMAL /HPF
Y ENTEROCOL DNA STL QL NAA+NON-PROBE: NOT DETECTED

## 2024-09-27 PROCEDURE — 85027 COMPLETE CBC AUTOMATED: CPT

## 2024-09-27 PROCEDURE — A9270 NON-COVERED ITEM OR SERVICE: HCPCS | Performed by: HOSPITALIST

## 2024-09-27 PROCEDURE — 36415 COLL VENOUS BLD VENIPUNCTURE: CPT

## 2024-09-27 PROCEDURE — 770001 HCHG ROOM/CARE - MED/SURG/GYN PRIV*

## 2024-09-27 PROCEDURE — 97602 WOUND(S) CARE NON-SELECTIVE: CPT

## 2024-09-27 PROCEDURE — 84156 ASSAY OF PROTEIN URINE: CPT

## 2024-09-27 PROCEDURE — 700111 HCHG RX REV CODE 636 W/ 250 OVERRIDE (IP): Performed by: HOSPITALIST

## 2024-09-27 PROCEDURE — 80069 RENAL FUNCTION PANEL: CPT

## 2024-09-27 PROCEDURE — 94664 DEMO&/EVAL PT USE INHALER: CPT

## 2024-09-27 PROCEDURE — 700102 HCHG RX REV CODE 250 W/ 637 OVERRIDE(OP): Performed by: HOSPITALIST

## 2024-09-27 PROCEDURE — 94760 N-INVAS EAR/PLS OXIMETRY 1: CPT

## 2024-09-27 PROCEDURE — 99233 SBSQ HOSP IP/OBS HIGH 50: CPT | Performed by: INTERNAL MEDICINE

## 2024-09-27 PROCEDURE — 700105 HCHG RX REV CODE 258: Performed by: HOSPITALIST

## 2024-09-27 PROCEDURE — 81001 URINALYSIS AUTO W/SCOPE: CPT

## 2024-09-27 PROCEDURE — 82570 ASSAY OF URINE CREATININE: CPT | Mod: 91

## 2024-09-27 PROCEDURE — 86160 COMPLEMENT ANTIGEN: CPT | Mod: 91

## 2024-09-27 PROCEDURE — 82043 UR ALBUMIN QUANTITATIVE: CPT

## 2024-09-27 RX ADMIN — TIOTROPIUM BROMIDE INHALATION SPRAY 5 MCG: 3.12 SPRAY, METERED RESPIRATORY (INHALATION) at 05:32

## 2024-09-27 RX ADMIN — MOMETASONE FUROATE AND FORMOTEROL FUMARATE DIHYDRATE 2 PUFF: 100; 5 AEROSOL RESPIRATORY (INHALATION) at 18:01

## 2024-09-27 RX ADMIN — METOPROLOL SUCCINATE 25 MG: 25 TABLET, EXTENDED RELEASE ORAL at 05:33

## 2024-09-27 RX ADMIN — FUROSEMIDE 40 MG: 40 TABLET ORAL at 05:33

## 2024-09-27 RX ADMIN — SODIUM BICARBONATE 1300 MG: 650 TABLET ORAL at 09:38

## 2024-09-27 RX ADMIN — SENNOSIDES AND DOCUSATE SODIUM 2 TABLET: 50; 8.6 TABLET ORAL at 05:32

## 2024-09-27 RX ADMIN — SODIUM BICARBONATE 1300 MG: 650 TABLET ORAL at 21:02

## 2024-09-27 RX ADMIN — SODIUM BICARBONATE 1300 MG: 650 TABLET ORAL at 16:42

## 2024-09-27 RX ADMIN — ASPIRIN 81 MG: 81 TABLET, COATED ORAL at 05:32

## 2024-09-27 RX ADMIN — AMLODIPINE BESYLATE 10 MG: 5 TABLET ORAL at 05:33

## 2024-09-27 RX ADMIN — HEPARIN SODIUM 5000 UNITS: 5000 INJECTION, SOLUTION INTRAVENOUS; SUBCUTANEOUS at 21:02

## 2024-09-27 RX ADMIN — ROSUVASTATIN 10 MG: 10 TABLET, FILM COATED ORAL at 18:01

## 2024-09-27 RX ADMIN — HEPARIN SODIUM 5000 UNITS: 5000 INJECTION, SOLUTION INTRAVENOUS; SUBCUTANEOUS at 16:42

## 2024-09-27 RX ADMIN — MOMETASONE FUROATE AND FORMOTEROL FUMARATE DIHYDRATE 2 PUFF: 100; 5 AEROSOL RESPIRATORY (INHALATION) at 05:32

## 2024-09-27 RX ADMIN — HEPARIN SODIUM 5000 UNITS: 5000 INJECTION, SOLUTION INTRAVENOUS; SUBCUTANEOUS at 05:31

## 2024-09-27 RX ADMIN — SODIUM CHLORIDE: 9 INJECTION, SOLUTION INTRAVENOUS at 14:15

## 2024-09-27 ASSESSMENT — PATIENT HEALTH QUESTIONNAIRE - PHQ9
2. FEELING DOWN, DEPRESSED, IRRITABLE, OR HOPELESS: NOT AT ALL
2. FEELING DOWN, DEPRESSED, IRRITABLE, OR HOPELESS: NOT AT ALL
1. LITTLE INTEREST OR PLEASURE IN DOING THINGS: NOT AT ALL
SUM OF ALL RESPONSES TO PHQ9 QUESTIONS 1 AND 2: 0
1. LITTLE INTEREST OR PLEASURE IN DOING THINGS: NOT AT ALL
SUM OF ALL RESPONSES TO PHQ9 QUESTIONS 1 AND 2: 0

## 2024-09-27 ASSESSMENT — ENCOUNTER SYMPTOMS
NERVOUS/ANXIOUS: 0
SHORTNESS OF BREATH: 1
STRIDOR: 0
MYALGIAS: 0
FEVER: 0
VOMITING: 0
EYE REDNESS: 0
CHILLS: 0
BRUISES/BLEEDS EASILY: 0
COUGH: 0
EYE DISCHARGE: 0
FOCAL WEAKNESS: 0
FLANK PAIN: 0

## 2024-09-27 ASSESSMENT — PAIN DESCRIPTION - PAIN TYPE
TYPE: ACUTE PAIN;CHRONIC PAIN
TYPE: ACUTE PAIN

## 2024-09-27 NOTE — PROGRESS NOTES
"Hospital Medicine Daily Progress Note    Date of Service  9/27/2024    Chief Complaint  Sangita Bolton is a 79 y.o. female admitted 9/25/2024 with multiple decubitus wounds and inability to care for self    Hospital Course  As per chart review:  \"Sangita Bolton is a 79 y.o. female with a past medical history of primary hypertension, chronic kidney disease, hyperlipidemia, recent hospitalization for COVID-19 infection and a urinary tract infection September 10-15 admitted 9/25/2024 with multiple decubitus wounds and inability to care for self\"    Interval Problem Update  9/26: Hemodynamically stable overnight.  Saturating well on room air.   Blood pressures are better today.  Cr slightly better Cr 3.11, down from 3.26   Still slightly dehydrated will continue intravenous fluids  C Diff is negative. I will stop PO vanco    Physical and Occupational Therapy evaluation ordered    PATIENT SEEN BY PREVIOUS HOSPITALIST UNTIL 9/26 9/27: Patient seen at bedside this morning.  The patient is a very poor historian, she is not very cooperative.  I did talk to the patient's  over the phone regarding the plan of care.  We have consulted nephrology it seems the patient's creatinine is above baseline, it seems that it has been about baseline since early September.  Patient will most likely require placement.  Continue recommendations of wound care.    I have discussed this patient's plan of care and discharge plan at IDT rounds today with Case Management, Nursing, Nursing leadership, and other members of the IDT team.    Consultants/Specialty  Nephrology    Code Status  Full Code    Disposition  Patient needs wound care.  The patient is medically cleared for discharge to skilled nursing facility for continued wound care.  I have placed the appropriate orders for post-discharge needs.    Review of Systems  Review of Systems   Constitutional:  Positive for malaise/fatigue. Negative for chills and fever.   Eyes:  Negative for " discharge and redness.   Respiratory:  Positive for shortness of breath. Negative for cough and stridor.    Cardiovascular:  Negative for chest pain and leg swelling.   Gastrointestinal:  Negative for vomiting. Diarrhea: improving.  Genitourinary:  Negative for flank pain.   Musculoskeletal:  Negative for myalgias.   Skin: Negative.    Neurological:  Negative for focal weakness.   Endo/Heme/Allergies:  Does not bruise/bleed easily.   Psychiatric/Behavioral:  The patient is not nervous/anxious.       Physical Exam  Temp:  [36.2 °C (97.2 °F)-37 °C (98.6 °F)] 36.4 °C (97.6 °F)  Pulse:  [] 69  Resp:  [16] 16  BP: (102-125)/(44-69) 108/69  SpO2:  [84 %-93 %] 92 %    Physical Exam  Constitutional:       General: She is not in acute distress.     Appearance: She is ill-appearing.   HENT:      Head: Normocephalic and atraumatic.      Right Ear: External ear normal.      Left Ear: External ear normal.      Nose: No congestion or rhinorrhea.      Mouth/Throat:      Mouth: Mucous membranes are dry.      Pharynx: No oropharyngeal exudate or posterior oropharyngeal erythema.   Eyes:      General: No scleral icterus.        Right eye: No discharge.         Left eye: No discharge.      Conjunctiva/sclera: Conjunctivae normal.      Pupils: Pupils are equal, round, and reactive to light.   Cardiovascular:      Rate and Rhythm: Normal rate.      Heart sounds:      No friction rub. No gallop.   Pulmonary:      Effort: Pulmonary effort is normal.   Abdominal:      General: Abdomen is flat. There is no distension.      Tenderness: There is no guarding.   Musculoskeletal:         General: No swelling.      Cervical back: Neck supple. No rigidity. No muscular tenderness.   Skin:     General: Skin is dry.      Capillary Refill: Capillary refill takes 2 to 3 seconds.      Findings: Lesion present.   Neurological:      Mental Status: She is alert and oriented to person, place, and time.   Psychiatric:      Comments: Odd behavior, not  [facility]                 Fluids    Intake/Output Summary (Last 24 hours) at 9/27/2024 1540  Last data filed at 9/27/2024 0900  Gross per 24 hour   Intake 240 ml   Output 2500 ml   Net -2260 ml      Laboratory  Recent Labs     09/25/24  1627 09/26/24  0036   WBC 12.0* 9.9   RBC 4.00* 3.89*   HEMOGLOBIN 11.8* 11.6*   HEMATOCRIT 36.9* 35.9*   MCV 92.3 92.3   MCH 29.5 29.8   MCHC 32.0* 32.3   RDW 50.2* 49.9   PLATELETCT 156* 154*   MPV 11.8 11.2     Recent Labs     09/25/24  1627 09/26/24  0036   SODIUM 143 137   POTASSIUM 4.4 3.7   CHLORIDE 101 99   CO2 20 20   GLUCOSE 105* 80   BUN 81* 71*   CREATININE 3.26* 3.11*   CALCIUM 9.2 8.8     Imaging  CT-HEAD W/O   Final Result      1. No acute intracranial abnormality.   2. Age-related central and cortical atrophy and diffuse chronic microangiopathic white matter changes.   3. Atherosclerosis.               DX-CHEST-PORTABLE (1 VIEW)   Final Result      1. No acute cardiac or pulmonary abnormalities are identified.   2. Atherosclerosis.   3. Other stable chronic degenerative, posttraumatic and postsurgical changes.         Assessment/Plan  * RASHAD (acute kidney injury) (HCC)- (present on admission)  Assessment & Plan  Mostly due to  dehydration secondary to diarrhea  I will start intravenous fluids  Avoid / minimize nephrotoxins as much as possible.  Monitor inputs and outputs   I will hold home furosemide for today with a plan to restart tomorrow    9/26/2024   Cr slightly better Cr 3.11, down from 3.26   Still slightly dehydrated will continue intravenous fluids     Hypoxia- (present on admission)  Assessment & Plan  As per  she uses oxygen at home due to COPD  Does not seem to be on exacerbation  Seems to be at baseline as per   She uses 2 L at home.      Dehydration- (present on admission)  Assessment & Plan  Likely secondary to reduced oral intake   Encourage oral intake as tolerated, antiemetics as needed.  Intravenous hydration until adequate oral  intake is achieved.     9/26/2024   Still slightly dehydrated will continue intravenous fluids    Decubitus ulcers- (present on admission)  Assessment & Plan  Do not appear to be infected.  Wound care    9/26/2024   Will need ongoing wound care   Referral to SNFs have been placed     Diarrhea- (present on admission)  Assessment & Plan  Differentials include viral infection, C. difficile colitis  [has, recent hospitalization been on antibiotics for urinary tract infection]  I will check stool for C. difficile toxins.  I will check a gastrointestinal panel  Supportive care with intravenous fluids monitor electrolytes and replace accordingly     9/26/2024   Resolved   C Diff is negative. I will stop PO vanco.        Elevated troponin- (present on admission)  Assessment & Plan  In the indeterminate range  Denies chest pain.  Troponin elevation is likely secondary to demand ischemia and reduced renal clearance due to current renal function  EKG shows sinus rhythm with a rate of 77.  There is no ST elevation.  There is ST depression in inferior leads and in the lateral leads.  QTc is 444.    I will place on continuous cardiac monitoring    I will trend troponin levels  Stat EKG, troponin for chest pain or shortness of breath     9/26/2024   Trop has been flat     9/27/2024  Denies chest pain    Dyslipidemia- (present on admission)  Assessment & Plan  Cardiac diet.  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 I will order a follow-up CBC.  Transfuse for a hemoglobin of less than or equal to 7.    9/27: Hb seems to be stable     CAD (coronary artery disease)- (present on admission)  Assessment & Plan  I will start metoprolol  with all parameters.  I will resume home atorvastatin       VTE prophylaxis: heparin    I have performed a physical exam and reviewed and updated ROS and Plan today (9/27/2024). In review of yesterday's note (9/26/2024), there are no changes except as documented above.    I spend at least 51 minutes providing care for this patient.  This included face-to-face interview, physical examination.  Discussing with  over the phone plan of care.  Review of lab work including CBC, CMP, magnesium.  Consulting and discussing with nephrology.  Discussing with multidisciplinary team including case management, nursing staff and pharmacy.  Creating plan of care, reviewing orders.

## 2024-09-27 NOTE — WOUND TEAM
Assisted Floridalma RALPH (Wound PT), with wound care, non-selective debridement performed using wound cleanser/NS and gauze. Please see Floridalma RALPH (Wound PT) wound note for further wound care details.

## 2024-09-27 NOTE — PROGRESS NOTES
4 Eyes Skin Assessment Completed by STEPHANI Orozco and Cristobal RN.    Head WDL  Ears WDL  Nose WDL  Mouth WDL  Neck WDL  Breast/Chest Scar to R ant. shoulder  Shoulder Blades WDL  Spine WDL  (R) Arm/Elbow/Hand Bruising, Scab, and Scar  (L) Arm/Elbow/Hand Bruising and Scab  Abdomen Scar and Bruising  Groin Redness, Non-Blanching, and Excoriation  Scrotum/Coccyx/Buttocks Redness, Non-Blanching, Excoriation, and Moisture Fissure  (R) Leg Bruising  (L) Leg Bruising  (R) Heel/Foot/Toe WDL  (L) Heel/Foot/Toe WDL          Devices In Places Pulse Ox and SCD's      Interventions In Place Heel Mepilex, Waffle Overlay, Pillows, Q2 Turns, Barrier Cream, and Heels Loaded W/Pillows    Possible Skin Injury Yes    Pictures Uploaded Into Epic Yes  Wound Consult Placed Yes  RN Wound Prevention Protocol Ordered Yes

## 2024-09-27 NOTE — PROGRESS NOTES
Assumed care of pt at 1910, bedside report with STEPHANI Matos. Pt is AAOx 4, resting comfortably in bed with NADN, pain currently 0/10.    Pt using call light appropriately and to get up with assistance. Discussed plan of care for the night with patient, bed in lowest position, call light in reach, personal belongings in reach. No complaints at this time.

## 2024-09-27 NOTE — WOUND TEAM
Wound team in to see pt, pressure injury found on buttocks and B hips.  Orders placed for nursing to do dressing changes.  Full note to follow.

## 2024-09-27 NOTE — RESPIRATORY CARE
"   EDUCATION by COPD CLINICAL EDUCATOR  9/27/2024 at 10:11 AM by Floridalma Davey RRT     Patient interviewed by education team. Patient does have a history or diagnosis of COPD. Patient is a former smoker.  Patient states quit a year ago.   A Short intervention was completed with this patient and  covering: What is COPD (how the lungs work), daily medications rescue and maintenance, breathing techniques, infection prevention and oxygen safety were covered in detail.  A comprehensive packet including information about COPD, treatments, and oxygen safety was given.  Provided spacer with instruction for use, care, and cleaning.A personalized \"COPD Action Plan\" was reviewed with and provided to the patient. Made follow-up appointment with Pulmonary.    COPD Screen  COPD Risk Screening  Do you have a history of COPD?: Yes  Do you have a Pulmonologist?: Yes    COPD Assessment  COPD Clinical Specialists ONLY  COPD Education Initiated: Yes--Short Intervention (Pt PFT 1/22/24 FEV1/FVC 60% pt has not seen pulmonary since, made appt, went over COPD educaiton and action plan, as well as spacer use.  does her medications, pt will be gong to SNF then returning home when stronger)  Is this a COPD exacerbation patient?: No  DME Company: yes  DME Equipment Type: could not remember company  Physician Name: Claire Hernández M.D.  Pulmonary Follow Up Appointment: 01/06/25  Appt Time: 0900  Pulmonologist Name: Dr. Zhang  Referrals Initiated: Yes  Pulmonary Rehab: Declined  Smoking Cessation: N/A  Hospice: N/A  Home Health Care: N/A  Mobile Urgent Care Services: N/A (Gave info)  Geriatric Specialty Group: N/A  Private In-Home Care Agency: N/A  $ Demo/Eval of SVN's, MDI's and Aerosols: Yes  (OP) Pulmonary Function Testing: Yes  Interdisciplinary Rounds: Attendance at Rounds (30 Min)    PFT Results    No results found for: \"PFT\"    Meds to Beds  [facility] provides bedside medication delivery for all eligible patients at " "discharge and you have been automatically enrolled in the Meds to Beds Program. Would you like to opt out of this program for any reason?: No - Stay Opted In     MY COPD ACTION PLAN     It is recommended that patients and physicians /healthcare providers complete this action plan together. This plan should be discussed at each physician visit and updated as needed.    The green, yellow and red zones show groups of symptoms of COPD. This list of symptoms is not comprehensive, and you may experience other symptoms. In the \"Actions\" column, your healthcare provider has recommended actions for you to take based on your symptoms.    Patient Name: Sangita Bolton   YOB: 1945   Last Updated on: 9/27/2024 10:11 AM   Green Zone:  I am doing well today Actions     Usual activitiy and exercise level   Take daily medications     Usual amounts of cough and phlegm/mucus   Use oxygen as prescribed     Sleep well at night   Continue regular exercise/diet plan     Appetite is good   At all times avoid cigarette smoke, inhaled irritants     Daily Medications (these medications are taken every day):   Budesonide-Formoterol Fumarate (Symbicort)  Tiotropium Bromide Monohydrate (Spiriva) 2 Puffs  2 Puffs Twice daily  Once daily     Additional Information:  Use Spacer with Symbicort and always rinse mouth after use of medications!    Yellow Zone:  I am having a bad day or a COPD flare Actions     More breathless than usual   Continue daily medications     I have less energy for my daily activities   Use quick relief inhaler as ordered     Increased or thicker phlegm/mucus   Use oxygen as prescribed     Using quick relief inhaler/nebulizer more often   Get plenty of rest     Swelling of ankles more than usual   Use pursed lip breathing     More coughing than usual   At all times avoid cigarette smoke, inhaled irritants     I feel like I have a \"chest cold\"     Poor sleep and my symptoms woke me up     My appetite is not good    "  My medicine is not helping      Call provider immediately if symptoms don’t improve     Continue daily medications, add rescue medications:               Medications to be used during a flare up, (as Discussed with Provider):           Additional Information:  Call your provider if you are having any of these symptoms     Red Zone:  I need urgent medical care Actions     Severe shortness of breath even at rest   Call 911 or seek medical care immediately     Not able to do any activity because of breathing      Fever or shaking chills      Feeling confused or very drowsy       Chest pains      Coughing up blood

## 2024-09-27 NOTE — DOCUMENTATION QUERY
"                                                                         [facility]                                                                       Query Response Note      PATIENT:               AGUSTIN PORRAS  ACCT #:                  [id number]  MRN:                     [id number]  :                      1945  ADMIT DATE:       2024 4:15 PM  DISCH DATE:          RESPONDING  PROVIDER #:        [id number]           QUERY TEXT:    Anemia is documented in the Medical Record.     Can the type of anemia be further specified?    The patient's Clinical Indicators include:  80yo with dx of CKD 4, hypoxemia, HTN, CAD, RASHAD, hyponatremia     PN \"Anemia- appears to be chronic.\"    Risk factors: advanced age, CKD4, hyponatremia, hypoxemia  Treatments: labwork, monitoring    If you agree with the above dx, please document in the medical record.      Contact me with questions.     Thank you,   DINH Richter, CDI  [email or web address]  Options provided:   -- Due to/in/with chronic kidney disease   -- Other explanation, (please specify other explanation)   -- Unable to determine      Query created by: Kelly Ness on 2024 9:59 AM    RESPONSE TEXT:    Due to/in/with chronic kidney disease          Electronically signed by:  ALEJANDRO MCKINNEY MD 2024 10:28 AM              "

## 2024-09-27 NOTE — CARE PLAN
The patient is Stable - Low risk of patient condition declining or worsening    Shift Goals  Clinical Goals: Offload weight from buttocks wound, Wound consult, IV fluids, Free from falls  Patient Goals: Comfort, Rest    Progress made toward(s) clinical / shift goals:  Loose stool appears to have resolved without occurrence during day shift. She was able to ambulate a short distance with PT before tiring then sat in chair for an extended period after that. She was seen by wound care for evaluation of buttocks wounds. CM working on SNF placement.  is POA and has been in contact about progress.    Patient is not progressing towards the following goals:

## 2024-09-27 NOTE — ASSESSMENT & PLAN NOTE
As per  she uses oxygen at home due to COPD  Does not seem to be on exacerbation  Seems to be at baseline as per   She uses 2 L at home.

## 2024-09-27 NOTE — PROGRESS NOTES
BSSR received from night RN. Pt sitting up in bed eating breakfast not in any distress on 1L at 92%. AAOx3 disoriented to time/date. Denies any pain or nausea. Sacral dressing in place, CDI. Discussed POC. Fall risk precautions in place, locked bed in lowest position, bed and strip alarm on and call light within reach. All needs met at this time. Hourly rounding in place.

## 2024-09-27 NOTE — WOUND TEAM
[facility]  Inpatient Services  Initial Wound and Skin Care Evaluation    Admission Date: 9/25/2024     Last order of IP CONSULT TO WOUND CARE was found on 9/25/2024 from Hospital Encounter on 9/25/2024     HPI, PMH, SH: Reviewed    Past Surgical History:   Procedure Laterality Date    PB PARTIAL HIP REPLACEMENT  11/28/2020    Procedure: HEMIARTHROPLASTY, HIP REVISION;  Surgeon: Mohsen Thomas M.D.;  Location: SURGERY [facility];  Service: Orthopedics    PB PARTIAL HIP REPLACEMENT Right 4/28/2019    Procedure: HEMIARTHROPLASTY, HIP;  Surgeon: Evens Sarabia M.D.;  Location: SURGERY [facility];  Service: Orthopedics    RECOVERY  2/9/2015    Performed by [facility] at SURGERY SAME DAY [facility]    FEMORAL ARTERY REPAIR  2/9/2015    Performed by Yuly Chirinos M.D. at SURGERY [facility] ORS    HYSTEROSCOPY WITH VIDEO DIAGNOSTIC  11/17/2010    Performed by ALIS DEGROOT at SURGERY SAME DAY [facility] ORS    DILATION AND CURETTAGE  11/17/2010    Performed by ALIS DEGROOT at SURGERY SAME DAY [facility] ORS    ORIF, FRACTURE, HUMERUS  9/5/08    Performed by LAURA CARTER at SURGERY [facility] ORS    COLONOSCOPY  2008    recheck 4 years    OTHER ORTHOPEDIC SURGERY      rt leg fx    OTHER ORTHOPEDIC SURGERY      broken right wrist     Social History     Tobacco Use    Smoking status: Every Day     Current packs/day: 1.00     Average packs/day: 1 pack/day for 49.7 years (49.7 ttl pk-yrs)     Types: Cigarettes     Start date: 2/2/1975    Smokeless tobacco: Never   Substance Use Topics    Alcohol use: Not Currently     Comment: 4/week     Chief Complaint   Patient presents with    Other     Malaise  Unable to care for self      Diagnosis: RASHAD (acute kidney injury) (HCC) [N17.9]  Decubitus ulcers [L89.90]    Unit where seen by Wound Team: 2206/01     WOUND CONSULT RELATED TO:  buttocks    WOUND TEAM PLAN OF CARE - Frequency of Follow-up:   Nursing to follow dressing  orders written for wound care. Contact wound team if area fails to progress, deteriorates or with any questions/concerns if something comes up before next scheduled follow up (See below as to whether wound is following and frequency of wound follow up)   Weekly -      WOUND HISTORY:   Pt is an 80 yo female who presented to the ED for malaise and inability to care for herself.  Recent hx of COVID and UTI.  Hx also includs dementia.   is care giver.  Notes indicate pt has been spending a lot of time in a chair and has been incontinent.         WOUND ASSESSMENT/LDA  Wound 09/11/24 Pressure Injury Buttocks Bilateral POA Unstageable with surrounding shearing (Active)   Date First Assessed/Time First Assessed: 09/11/24 1030   Primary Wound Type: Pressure Injury  Location: Buttocks  Laterality: Bilateral  Wound Description (Comments): POA Unstageable with surrounding shearing      Assessments 9/26/2024  4:00 PM   Wound Image      Periwound Assessment Red   Drainage Amount Small   Drainage Description Serosanguineous   Dressing Status Other (Comment)   Dressing Changed Changed   Dressing Options Viscopaste   Dressing Change/Treatment Frequency Every Shift, and As Needed   NEXT Dressing Change/Treatment Date 09/27/24   NEXT Weekly Photo (Inpatient Only) 10/04/24   Wound Team Following Other (comment)   Wound Length (cm) 12 cm   Wound Width (cm) 6 cm   Wound Surface Area (cm^2) 72 cm^2   Wound Bed Granulation (%) 10 %   Wound Bed Slough (%) 90 %   Wound Odor None       Wound 09/25/24 Pressure Injury Right B hip (Active)   Date First Assessed/Time First Assessed: 09/25/24 1651   Present on Original Admission: Yes  Primary Wound Type: Pressure Injury  Laterality: Right  Wound Description (Comments): B hip      Assessments 9/26/2024  4:00 PM   Site Assessment Purple   Periwound Assessment Pink   Drainage Amount None   Measurements L hip 2.5x30  R hip 1.5x15  Pressure injury DTI POA   Dressing Status Other (Comment)    Dressing Changed Changed   Dressing Options Offloading Dressing - Sacral   Dressing Change/Treatment Frequency Every 72 hrs, and As Needed   NEXT Dressing Change/Treatment Date 09/29/24   NEXT Weekly Photo (Inpatient Only) 10/03/24   Wound Team Following Other (comment)   Wound Odor None        Vascular:    ROBERTA:   No results found.    Lab Values:    Lab Results   Component Value Date/Time    WBC 9.9 09/26/2024 12:36 AM    WBC 6.6 02/22/2013 09:15 AM    RBC 3.89 (L) 09/26/2024 12:36 AM    RBC 4.08 02/22/2013 09:15 AM    HEMOGLOBIN 11.6 (L) 09/26/2024 12:36 AM    HEMATOCRIT 35.9 (L) 09/26/2024 12:36 AM    CREACTPROT 15.06 (H) 12/01/2020 03:38 AM         Culture Results show:  No results found for this or any previous visit (from the past 720 hour(s)).    Pain Level/Medicated:  None, Tolerated without pain medication       INTERVENTIONS BY WOUND TEAM:  CPerformed standard wound care which includes appropriate positioning, dressing removal and non-selective debridement. Pictures and measurements obtained weekly if/when required.    Wound:  Buttocks  Preparation for Dressing removal: Removed without difficulty  Cleansed/Non-selectively Debrided with:  Wound cleanser and Gauze  Rozina wound: Cleansed with Wound cleanser and Gauze, Prepped with Stoma Powder  Primary Dressing:  viscopaste, barrier paste     Wound:  B HIPS  Primary Dressing:  off loading foams    Advanced Wound Care Discharge Planning  Number of Clinicians necessary to complete wound care: 1  Is patient requiring IV pain medications for dressing changes:  No   Length of time for dressing change 60 min. (This does not include chart review, pre-medication time, set up, clean up or time spent charting.)    Interdisciplinary consultation: Patient, Bedside RN (), Lorraine WU (Wound RN).    EVALUATION / RATIONALE FOR TREATMENT:     Date:  09/27/24  Wound Status:      Pt has an ustageable pressuer injury B buttocks from pressure from being in chair with weakened  tissue from urine exposure due to incontinence.  Pressure injuries on hips likely from brief.  Wound should resolve with wound care and off loading and medical management per hospitalist.  Orders written for bedside nursing.           Goals: Buttocks wound with 100% granulation tissue.      NURSING PLAN OF CARE ORDERS:  Dressing changes: See Dressing Care orders    NUTRITION            PREVENTATIVE INTERVENTIONS:    Q shift Yfn - performed per nursing policy  Q shift pressure point assessments - performed per nursing policy    Surface/Positioning  TAPs Turning system - Ordered  Waffle overlay  - Ordered    Offloading/Redistribution  Heel offloading dressing (Silicone dressing) - Ordered  Heel float boots (Prevalon boot) - Ordered      Containment/Moisture Prevention    Purwick/Condom Cath - Currently in Place    Anticipated discharge plans:  TBD    - pt will need ongoing wound care for buttocks wound following discharge from [facility]    Vac Discharge Needs:  Vac Discharge plan is purely a recommendation from wound team and not a requirement for discharge unless otherwise stated by physician.  Not Applicable Pt not on a wound vac

## 2024-09-28 PROBLEM — E83.42 HYPOMAGNESEMIA: Status: ACTIVE | Noted: 2024-09-28

## 2024-09-28 LAB
ALBUMIN SERPL BCP-MCNC: 2.8 G/DL (ref 3.2–4.9)
ALBUMIN/GLOB SERPL: 0.7 G/DL
ALP SERPL-CCNC: 101 U/L (ref 30–99)
ALT SERPL-CCNC: 13 U/L (ref 2–50)
ANION GAP SERPL CALC-SCNC: 15 MMOL/L (ref 7–16)
AST SERPL-CCNC: 16 U/L (ref 12–45)
BILIRUB SERPL-MCNC: 1.2 MG/DL (ref 0.1–1.5)
BUN SERPL-MCNC: 51 MG/DL (ref 8–22)
C3 SERPL-MCNC: 117.3 MG/DL (ref 87–200)
C4 SERPL-MCNC: 34.6 MG/DL (ref 19–52)
CALCIUM ALBUM COR SERPL-MCNC: 9.6 MG/DL (ref 8.5–10.5)
CALCIUM SERPL-MCNC: 8.6 MG/DL (ref 8.4–10.2)
CHLORIDE SERPL-SCNC: 103 MMOL/L (ref 96–112)
CO2 SERPL-SCNC: 21 MMOL/L (ref 20–33)
CREAT SERPL-MCNC: 2.25 MG/DL (ref 0.5–1.4)
CREAT UR-MCNC: 18.71 MG/DL
CREAT UR-MCNC: 18.76 MG/DL
ERYTHROCYTE [DISTWIDTH] IN BLOOD BY AUTOMATED COUNT: 50.1 FL (ref 35.9–50)
GFR SERPLBLD CREATININE-BSD FMLA CKD-EPI: 21 ML/MIN/1.73 M 2
GLOBULIN SER CALC-MCNC: 3.9 G/DL (ref 1.9–3.5)
GLUCOSE SERPL-MCNC: 93 MG/DL (ref 65–99)
HCT VFR BLD AUTO: 34.9 % (ref 37–47)
HGB BLD-MCNC: 10.9 G/DL (ref 12–16)
MAGNESIUM SERPL-MCNC: 1.3 MG/DL (ref 1.5–2.5)
MCH RBC QN AUTO: 29.4 PG (ref 27–33)
MCHC RBC AUTO-ENTMCNC: 31.2 G/DL (ref 32.2–35.5)
MCV RBC AUTO: 94.1 FL (ref 81.4–97.8)
MICROALBUMIN UR-MCNC: 11 MG/DL
MICROALBUMIN/CREAT UR: 588 MG/G (ref 0–30)
PLATELET # BLD AUTO: 147 K/UL (ref 164–446)
PMV BLD AUTO: 10.8 FL (ref 9–12.9)
POTASSIUM SERPL-SCNC: 3.6 MMOL/L (ref 3.6–5.5)
PROT SERPL-MCNC: 6.7 G/DL (ref 6–8.2)
PROT UR-MCNC: 31 MG/DL (ref 0–15)
PROT/CREAT UR: 1652 MG/G (ref 10–107)
RBC # BLD AUTO: 3.71 M/UL (ref 4.2–5.4)
SODIUM SERPL-SCNC: 139 MMOL/L (ref 135–145)
WBC # BLD AUTO: 8.8 K/UL (ref 4.8–10.8)

## 2024-09-28 PROCEDURE — 85027 COMPLETE CBC AUTOMATED: CPT

## 2024-09-28 PROCEDURE — 99233 SBSQ HOSP IP/OBS HIGH 50: CPT | Performed by: INTERNAL MEDICINE

## 2024-09-28 PROCEDURE — 97166 OT EVAL MOD COMPLEX 45 MIN: CPT

## 2024-09-28 PROCEDURE — 80053 COMPREHEN METABOLIC PANEL: CPT

## 2024-09-28 PROCEDURE — A9270 NON-COVERED ITEM OR SERVICE: HCPCS | Performed by: HOSPITALIST

## 2024-09-28 PROCEDURE — 83735 ASSAY OF MAGNESIUM: CPT

## 2024-09-28 PROCEDURE — 94760 N-INVAS EAR/PLS OXIMETRY 1: CPT

## 2024-09-28 PROCEDURE — 700102 HCHG RX REV CODE 250 W/ 637 OVERRIDE(OP): Performed by: HOSPITALIST

## 2024-09-28 PROCEDURE — 770001 HCHG ROOM/CARE - MED/SURG/GYN PRIV*

## 2024-09-28 PROCEDURE — 700105 HCHG RX REV CODE 258: Performed by: INTERNAL MEDICINE

## 2024-09-28 PROCEDURE — 700111 HCHG RX REV CODE 636 W/ 250 OVERRIDE (IP): Performed by: HOSPITALIST

## 2024-09-28 PROCEDURE — 99222 1ST HOSP IP/OBS MODERATE 55: CPT | Performed by: INTERNAL MEDICINE

## 2024-09-28 PROCEDURE — 36415 COLL VENOUS BLD VENIPUNCTURE: CPT

## 2024-09-28 PROCEDURE — 700111 HCHG RX REV CODE 636 W/ 250 OVERRIDE (IP): Performed by: INTERNAL MEDICINE

## 2024-09-28 RX ORDER — MAGNESIUM SULFATE HEPTAHYDRATE 40 MG/ML
2 INJECTION, SOLUTION INTRAVENOUS ONCE
Status: COMPLETED | OUTPATIENT
Start: 2024-09-28 | End: 2024-09-28

## 2024-09-28 RX ADMIN — TIOTROPIUM BROMIDE INHALATION SPRAY 5 MCG: 3.12 SPRAY, METERED RESPIRATORY (INHALATION) at 05:22

## 2024-09-28 RX ADMIN — HEPARIN SODIUM 5000 UNITS: 5000 INJECTION, SOLUTION INTRAVENOUS; SUBCUTANEOUS at 21:02

## 2024-09-28 RX ADMIN — METOPROLOL SUCCINATE 25 MG: 25 TABLET, EXTENDED RELEASE ORAL at 05:19

## 2024-09-28 RX ADMIN — MAGNESIUM SULFATE HEPTAHYDRATE 2 G: 2 INJECTION, SOLUTION INTRAVENOUS at 05:16

## 2024-09-28 RX ADMIN — MOMETASONE FUROATE AND FORMOTEROL FUMARATE DIHYDRATE 2 PUFF: 100; 5 AEROSOL RESPIRATORY (INHALATION) at 05:22

## 2024-09-28 RX ADMIN — FUROSEMIDE 40 MG: 40 TABLET ORAL at 05:20

## 2024-09-28 RX ADMIN — HEPARIN SODIUM 5000 UNITS: 5000 INJECTION, SOLUTION INTRAVENOUS; SUBCUTANEOUS at 14:28

## 2024-09-28 RX ADMIN — SODIUM BICARBONATE 1300 MG: 650 TABLET ORAL at 20:48

## 2024-09-28 RX ADMIN — SODIUM CHLORIDE: 9 INJECTION, SOLUTION INTRAVENOUS at 11:32

## 2024-09-28 RX ADMIN — SODIUM BICARBONATE 1300 MG: 650 TABLET ORAL at 15:00

## 2024-09-28 RX ADMIN — ROSUVASTATIN 10 MG: 10 TABLET, FILM COATED ORAL at 17:46

## 2024-09-28 RX ADMIN — MOMETASONE FUROATE AND FORMOTEROL FUMARATE DIHYDRATE 2 PUFF: 100; 5 AEROSOL RESPIRATORY (INHALATION) at 17:46

## 2024-09-28 RX ADMIN — ASPIRIN 81 MG: 81 TABLET, COATED ORAL at 05:20

## 2024-09-28 RX ADMIN — SODIUM BICARBONATE 1300 MG: 650 TABLET ORAL at 08:05

## 2024-09-28 RX ADMIN — HEPARIN SODIUM 5000 UNITS: 5000 INJECTION, SOLUTION INTRAVENOUS; SUBCUTANEOUS at 05:18

## 2024-09-28 RX ADMIN — SENNOSIDES AND DOCUSATE SODIUM 2 TABLET: 50; 8.6 TABLET ORAL at 17:46

## 2024-09-28 RX ADMIN — SENNOSIDES AND DOCUSATE SODIUM 2 TABLET: 50; 8.6 TABLET ORAL at 05:19

## 2024-09-28 ASSESSMENT — COGNITIVE AND FUNCTIONAL STATUS - GENERAL
HELP NEEDED FOR BATHING: A LITTLE
DRESSING REGULAR UPPER BODY CLOTHING: A LITTLE
TOILETING: TOTAL
WALKING IN HOSPITAL ROOM: A LITTLE
MOVING FROM LYING ON BACK TO SITTING ON SIDE OF FLAT BED: A LITTLE
PERSONAL GROOMING: A LITTLE
SUGGESTED CMS G CODE MODIFIER DAILY ACTIVITY: CK
STANDING UP FROM CHAIR USING ARMS: A LITTLE
TOILETING: A LOT
CLIMB 3 TO 5 STEPS WITH RAILING: A LITTLE
SUGGESTED CMS G CODE MODIFIER DAILY ACTIVITY: CK
MOBILITY SCORE: 19
SUGGESTED CMS G CODE MODIFIER MOBILITY: CK
DRESSING REGULAR UPPER BODY CLOTHING: A LITTLE
DAILY ACTIVITIY SCORE: 15
MOVING TO AND FROM BED TO CHAIR: A LITTLE
DRESSING REGULAR LOWER BODY CLOTHING: A LOT
DAILY ACTIVITIY SCORE: 18
DRESSING REGULAR LOWER BODY CLOTHING: A LOT
HELP NEEDED FOR BATHING: A LOT

## 2024-09-28 ASSESSMENT — ENCOUNTER SYMPTOMS
FLANK PAIN: 0
FEVER: 0
EYE DISCHARGE: 0
MYALGIAS: 0
CHILLS: 0
VOMITING: 0
BRUISES/BLEEDS EASILY: 0
STRIDOR: 0
FOCAL WEAKNESS: 0
EYE REDNESS: 0
COUGH: 0
SHORTNESS OF BREATH: 1
NERVOUS/ANXIOUS: 0

## 2024-09-28 ASSESSMENT — PAIN DESCRIPTION - PAIN TYPE
TYPE: ACUTE PAIN
TYPE: ACUTE PAIN

## 2024-09-28 ASSESSMENT — ACTIVITIES OF DAILY LIVING (ADL): TOILETING: INDEPENDENT

## 2024-09-28 NOTE — PROGRESS NOTES
Brief Cardiology Note:    I was contacted to discuss this patients care with Dr. Irving Phillips. We discussed the patient's case and presentation.    This is a 79-year-old woman with past medical history significant for hypertension, CKD, hyperlipidemia, recent COVID-19 infection who was admitted with multiple decubitus wounds.  On admission, she was found to have RASHAD on CKD.  She was started on IV fluids.  Creatinine greater than 3.0 on admission.  Labs were checked including troponin which were mildly elevated at 144.  Troponins were repeated x 2 and remained flat.  EKG shows sinus rhythm, LVH and very mild ST depressions in inferior leads.    I was contacted by the hospitalist team due to elevation in troponin in the setting of recent COVID-19 infection as well as RASHAD on CKD.  According to the hospitalist, patient does not have any symptoms of chest pain or anginal equivalent.    Recommendations:  Unlikely that this is an acute coronary syndrome.  Patient has no symptoms of chest pain or anginal equivalent.  Troponin pattern remains flat on subsequent repeat.  This could be elevated in the setting of poor renal clearance due to RASHAD on CKD.  Can check echocardiogram for assessment of wall motion to rule out regional wall motion abnormalities.  Agree with aspirin and statin therapy.  She can follow-up in our office as an outpatient for ongoing cardiac workup.  Once she recovers from her current illness, can consider outpatient stress test for ischemic evaluation.    At this time it was deemed no formal in person cardiology consultation was necessary, however if this changes due to changes in patient condition or abnormal test results, please re-consult cardiology.     Electronically Signed by:  Sergio Valdez MD, FACC  9/28/2024  4:23 PM

## 2024-09-28 NOTE — CARE PLAN
The patient is Stable - Low risk of patient condition declining or worsening    Shift Goals  Clinical Goals: Pt's skin integrity will be monitored, Q2 turns and free from falls during this shift  Patient Goals: Able to rest comfortably  Family Goals: n/a    Progress made toward(s) clinical / shift goals:  Wound care and dressing changed. Refused turns. Tolerated diet, denies nausea. Denies pain. Pt did not sustain any fall during this shift.     Patient is not progressing towards the following goals:

## 2024-09-28 NOTE — PROGRESS NOTES
4 Eyes Skin Assessment Completed by STEPHANI Orozco and STEPHANI Guardado.    Head WDL  Ears WDL  Nose WDL  Mouth WDL  Neck WDL  Breast/Chest WDL  Shoulder Blades WDL  Spine WDL  (R) Arm/Elbow/Hand Bruising, Scab, and Scar  (L) Arm/Elbow/Hand Bruising and Scab  Abdomen Scar and Bruising  Groin Redness, Non-Blanching, and Excoriation  Scrotum/Coccyx/Buttocks Redness, Non-Blanching, Excoriation, and Discoloration    (R) Leg Bruising  (L) Leg Bruising  (R) Heel/Foot/Toe Redness and Blanching  (L) Heel/Foot/Toe Redness and Blanching          Devices In Places Pulse Ox and SCD's      Interventions In Place Heel Mepilex, TAP System, Pillows, Q2 Turns, and Barrier Cream    Possible Skin Injury Yes    Pictures Uploaded Into Epic Yes  Wound Consult Placed Yes  RN Wound Prevention Protocol Ordered Yes

## 2024-09-28 NOTE — PROGRESS NOTES
Assumed care of pt at 1915, bedside report with STEPHANI Lau. Pt is AAOx 3, resting comfortably in bed with NADN, pain currently 0/10.    Discussed plan of care for the night with patient, bed in lowest position, call light in reach, personal belongings in reach. No complaints at this time.

## 2024-09-28 NOTE — THERAPY
Occupational Therapy   Initial Evaluation     Patient Name: Sangita Bolton  Age:  79 y.o., Sex:  female  Medical Record #: [id number]  Today's Date: 9/28/2024     Precautions  Precautions: (P) Fall Risk  Comments: confused    Assessment  Patient is 79 y.o. female admitted with multiple decubitus wounds, inability to care for self. Diagnosis of RASHAD. Lives with spouse. Pt is confused, Ox2; hx of dementia. Limited by impaired cognition, balance, strength, activity tolerance. Unable to obtain accurate PLOF due to pt being a poor historian. See below for CLOF. OT will follow while in house.       Plan  Occupational Therapy Initial Treatment Plan   Treatment Interventions: (P) Self Care / Activities of Daily Living, Neuro Re-Education / Balance, Therapeutic Activity  Treatment Frequency: (P) 3 Times per Week  Duration: (P) Until Therapy Goals Met    DC Equipment Recommendations: (P) Unable to determine at this time  Discharge Recommendations: (P) Recommend post-acute placement for additional occupational therapy services prior to discharge home     Subjective  Ageeable     Objective     09/28/24 1500   Prior Living Situation   Prior Services Unable To Determine At This Time   Housing / Facility [facility]   Bathroom Set up Walk In Shower;Shower Chair   Equipment Owned Front-Wheel Walker;Tub / Shower Seat   Lives with - Patient's Self Care Capacity Spouse   Comments pt with confusion   Prior Level of ADL Function   Self Feeding Independent   Grooming / Hygiene Independent   Bathing Independent   Dressing Independent   Toileting Independent   Comments per pt   Prior Level of IADL Function   Medication Management Independent   Laundry Independent   Kitchen Mobility Independent   Finances Unable To Determine At This Time   Home Management Unable To Determine At This Time   Shopping Unable To Determine At This Time   Prior Level Of Mobility Unable to Determine At This Time   Driving / Transportation Relatives / Others Provide  Transportation   Occupation (Pre-Hospital Vocational) Not Employed   Leisure Interests Unable To Determine At This Time   Precautions   Precautions Fall Risk   Vitals   O2 (LPM) 1   O2 Delivery Device Silicone Nasal Cannula   Pain 0 - 10 Group   Location Buttock   Therapist Pain Assessment Post Activity Pain Same as Prior to Activity;Nurse Notified   Cognition    Cognition / Consciousness X   Speech/ Communication Delayed Responses   Level of Consciousness Alert   Attention Impaired   Comments Ox2   Passive ROM Upper Body   Passive ROM Upper Body WDL   Active ROM Upper Body   Active ROM Upper Body  WDL   Strength Upper Body   Upper Body Strength  X   Coordination Upper Body   Coordination WDL   Balance Assessment   Sitting Balance (Static) Fair +   Sitting Balance (Dynamic) Fair   Standing Balance (Static) Fair   Standing Balance (Dynamic) Fair -   Weight Shift Sitting Fair   Weight Shift Standing Fair   Bed Mobility    Supine to Sit Standby Assist   Sit to Supine Contact Guard Assist   ADL Assessment   Grooming Contact Guard Assist;Seated   Upper Body Dressing Minimal Assist   Toileting Total Assist   How much help from another person does the patient currently need...   Putting on and taking off regular lower body clothing? 2   Bathing (including washing, rinsing, and drying)? 2   Toileting, which includes using a toilet, bedpan, or urinal? 1   Putting on and taking off regular upper body clothing? 3   Taking care of personal grooming such as brushing teeth? 3   Eating meals? 4   6 Clicks Daily Activity Score 15   Functional Mobility   Sit to Stand Contact Guard Assist   Transfer Method Stand Step   Comments 4 side steps with fww; CGA   Edema / Skin Assessment   Edema / Skin  X   Comments buttock wounds   Activity Tolerance   Sitting Edge of Bed 13   Standing 2   Short Term Goals   Short Term Goal # 1 ADL transfer with [facility] within 5 days   Short Term Goal # 2 UB dressing with SBA within 5 days   Short Term Goal #  3 [place] [facility] [place] [facility] within 5 days   Education Group   Role of Occupational Therapist Patient Response Patient;Acceptance;Explanation;Verbal Demonstration   Occupational Therapy Initial Treatment Plan    Treatment Interventions Self Care / Activities of Daily Living;Neuro Re-Education / Balance;Therapeutic Activity   Treatment Frequency 3 Times per Week   Duration Until Therapy Goals Met   Problem List   Problem List Decreased Active Daily Living Skills;Decreased Functional Mobility;Decreased Activity Tolerance;Impaired Postural Control / Balance;Decreased Upper Extremity Strength Right;Decreased Upper Extremity Strength Left;Safety Awareness Deficits / Cognition;Impaired Cognitive Function   Anticipated Discharge Equipment and Recommendations   DC Equipment Recommendations Unable to determine at this time   Discharge Recommendations Recommend post-acute placement for additional occupational therapy services prior to discharge home   Interdisciplinary Plan of Care Collaboration   IDT Collaboration with  Nursing   Patient Position at End of Therapy In Bed;Bed Alarm On;Call Light within Reach;Tray Table within Reach;Phone within Reach   Collaboration Comments OT Heydi

## 2024-09-28 NOTE — CARE PLAN
The patient is Stable - Low risk of patient condition declining or worsening    Shift Goals  Clinical Goals: q2hr turns, no falls, wound care to sacrum  Patient Goals: rest  Family Goals: n/a    Progress made toward(s) clinical / shift goals:  pt repositions and turned q2hrs. Pt had wound care completed this AM.     Patient is not progressing towards the following goals:

## 2024-09-28 NOTE — PROGRESS NOTES
4 Eyes Skin Assessment Completed by STEPHANI Bender and STEPHANI Lopez.    Head WDL  Ears WDL  Nose WDL  Mouth WDL  Neck WDL  Breast/Chest WDL  Shoulder Blades WDL  Spine WDL  (R) Arm/Elbow/Hand Scab  (L) Arm/Elbow/Hand Redness and Blanching  Abdomen Bruising  Groin Redness, Blanching, and Excoriation  Scrotum/Coccyx/Buttocks Redness, Blanching, Excoriation, and Discoloration, non-blanching  (R) Leg Bruising, dry and flaky  (L) Leg Bruising, dry and flaky   (R) Heel/Foot/Toe Redness and Blanching, Mepilex applied   (L) Heel/Foot/Toe Redness and Blanching Mepilex applied           Devices In Places Pulse Ox      Interventions In Place Heel Mepilex, Waffle Overlay, TAP System, Pillows, Q2 Turns, and Barrier Cream    Possible Skin Injury Yes    Pictures Uploaded Into Epic Yes  Wound Consult Placed Yes  RN Wound Prevention Protocol Ordered Yes

## 2024-09-28 NOTE — PROGRESS NOTES
"Hospital Medicine Daily Progress Note    Date of Service  9/28/2024    Chief Complaint  Sangita Bolton is a 79 y.o. female admitted 9/25/2024 with multiple decubitus wounds and inability to care for self    Hospital Course  As per chart review:  \"Sangita Bolton is a 79 y.o. female with a past medical history of primary hypertension, chronic kidney disease, hyperlipidemia, recent hospitalization for COVID-19 infection and a urinary tract infection September 10-15 admitted 9/25/2024 with multiple decubitus wounds and inability to care for self\"    Interval Problem Update  9/26: Hemodynamically stable overnight.  Saturating well on room air.   Blood pressures are better today.  Cr slightly better Cr 3.11, down from 3.26   Still slightly dehydrated will continue intravenous fluids  C Diff is negative. I will stop PO vanco    Physical and Occupational Therapy evaluation ordered    PATIENT SEEN BY PREVIOUS HOSPITALIST UNTIL 9/26 9/27: Patient seen at bedside this morning.  The patient is a very poor historian, she is not very cooperative.  I did talk to the patient's  over the phone regarding the plan of care.  We have consulted nephrology it seems the patient's creatinine is above baseline, it seems that it has been about baseline since early September.  Patient will most likely require placement.  Continue recommendations of wound care.    9/28: Patient seen at bedside this morning.  The patient laying in bed comfortably.  The patient denying chest pain.  Nephrology recommending continued IV fluids, creatinine seems to be improving.  Continue to monitor closely.  The patient will require placement upon discharge.    I have discussed this patient's plan of care and discharge plan at IDT rounds today with Case Management, Nursing, Nursing leadership, and other members of the IDT team.    Consultants/Specialty  Nephrology    Code Status  Full Code    Disposition  Patient needs wound care.  The patient is medically " cleared for discharge to skilled nursing facility for continued wound care.  I have placed the appropriate orders for post-discharge needs.    Review of Systems  Review of Systems   Constitutional:  Positive for malaise/fatigue. Negative for chills and fever.   Eyes:  Negative for discharge and redness.   Respiratory:  Positive for shortness of breath. Negative for cough and stridor.    Cardiovascular:  Negative for chest pain and leg swelling.   Gastrointestinal:  Negative for vomiting. Diarrhea: improving.  Genitourinary:  Negative for flank pain.   Musculoskeletal:  Negative for myalgias.   Skin: Negative.    Neurological:  Negative for focal weakness.   Endo/Heme/Allergies:  Does not bruise/bleed easily.   Psychiatric/Behavioral:  The patient is not nervous/anxious.       Physical Exam  Temp:  [36.4 °C (97.5 °F)-36.6 °C (97.9 °F)] 36.6 °C (97.9 °F)  Pulse:  [68-74] 73  Resp:  [16-17] 17  BP: (122-153)/(53-78) 128/53  SpO2:  [93 %-94 %] 93 %    Physical Exam  Constitutional:       General: She is not in acute distress.     Appearance: She is ill-appearing.   HENT:      Head: Normocephalic and atraumatic.      Right Ear: External ear normal.      Left Ear: External ear normal.      Nose: No congestion or rhinorrhea.      Mouth/Throat:      Mouth: Mucous membranes are dry.      Pharynx: No oropharyngeal exudate or posterior oropharyngeal erythema.   Eyes:      General: No scleral icterus.        Right eye: No discharge.         Left eye: No discharge.      Conjunctiva/sclera: Conjunctivae normal.      Pupils: Pupils are equal, round, and reactive to light.   Cardiovascular:      Rate and Rhythm: Normal rate.      Heart sounds:      No friction rub. No gallop.   Pulmonary:      Effort: Pulmonary effort is normal.   Abdominal:      General: Abdomen is flat. There is no distension.      Tenderness: There is no guarding.   Musculoskeletal:         General: No swelling.      Cervical back: Neck supple. No rigidity. No  muscular tenderness.   Skin:     General: Skin is dry.      Capillary Refill: Capillary refill takes 2 to 3 seconds.      Findings: Lesion present.   Neurological:      Mental Status: She is alert and oriented to person, place, and time.   Psychiatric:      Comments: Odd behavior, not cooperative                 Fluids    Intake/Output Summary (Last 24 hours) at 9/28/2024 1427  Last data filed at 9/28/2024 0800  Gross per 24 hour   Intake 900 ml   Output 1100 ml   Net -200 ml      Laboratory  Recent Labs     09/26/24  0036 09/27/24  1559 09/28/24  0138   WBC 9.9 7.5 8.8   RBC 3.89* 3.64* 3.71*   HEMOGLOBIN 11.6* 10.8* 10.9*   HEMATOCRIT 35.9* 34.5* 34.9*   MCV 92.3 94.8 94.1   MCH 29.8 29.7 29.4   MCHC 32.3 31.3* 31.2*   RDW 49.9 51.2* 50.1*   PLATELETCT 154* 144* 147*   MPV 11.2 10.9 10.8     Recent Labs     09/26/24  0036 09/27/24  1559 09/28/24  0138   SODIUM 137 141 139   POTASSIUM 3.7 4.2 3.6   CHLORIDE 99 105 103   CO2 20 22 21   GLUCOSE 80 100* 93   BUN 71* 52* 51*   CREATININE 3.11* 2.49* 2.25*   CALCIUM 8.8 8.5 8.6     Imaging  CT-HEAD W/O   Final Result      1. No acute intracranial abnormality.   2. Age-related central and cortical atrophy and diffuse chronic microangiopathic white matter changes.   3. Atherosclerosis.               DX-CHEST-PORTABLE (1 VIEW)   Final Result      1. No acute cardiac or pulmonary abnormalities are identified.   2. Atherosclerosis.   3. Other stable chronic degenerative, posttraumatic and postsurgical changes.         Assessment/Plan  * RASHAD (acute kidney injury) (HCC)- (present on admission)  Assessment & Plan  Mostly due to  dehydration secondary to diarrhea  I will start intravenous fluids  Avoid / minimize nephrotoxins as much as possible.  Monitor inputs and outputs   I will hold home furosemide for today with a plan to restart tomorrow    9/26/2024   Cr slightly better Cr 3.11, down from 3.26   Still slightly dehydrated will continue intravenous fluids     9/28:  Improving, creatinine today is 2.25.  Nephrology has been consulted.  They recommend continued IV fluids for now.    Hypoxia- (present on admission)  Assessment & Plan  As per  she uses oxygen at home due to COPD  Does not seem to be on exacerbation  Seems to be at baseline as per   She uses 2 L at home.      Hypomagnesemia  Assessment & Plan  Replace as needed  monitor    Dehydration- (present on admission)  Assessment & Plan  Likely secondary to reduced oral intake   Encourage oral intake as tolerated, antiemetics as needed.  Intravenous hydration until adequate oral intake is achieved.     9/26/2024   Still slightly dehydrated will continue intravenous fluids    Decubitus ulcers- (present on admission)  Assessment & Plan  Do not appear to be infected.  Wound care    9/26/2024   Will need ongoing wound care   Referral to SNFs have been placed     Diarrhea- (present on admission)  Assessment & Plan  Differentials include viral infection, C. difficile colitis  [has, recent hospitalization been on antibiotics for urinary tract infection]  I will check stool for C. difficile toxins.  I will check a gastrointestinal panel  Supportive care with intravenous fluids monitor electrolytes and replace accordingly     9/26/2024   Resolved   C Diff is negative. I will stop PO vanco.        Elevated troponin- (present on admission)  Assessment & Plan  In the indeterminate range  Denies chest pain.  Troponin elevation is likely secondary to demand ischemia and reduced renal clearance due to current renal function  EKG shows sinus rhythm with a rate of 77.  There is no ST elevation.  There is ST depression in inferior leads and in the lateral leads.  QTc is 444.    I will place on continuous cardiac monitoring    I will trend troponin levels  Stat EKG, troponin for chest pain or shortness of breath     9/26/2024   Trop has been flat     9/27/2024  Denies chest pain    Dyslipidemia- (present on admission)  Assessment  & Plan  Cardiac diet.  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 I will order a follow-up CBC.  Transfuse for a hemoglobin of less than or equal to 7.    9/27: Hb seems to be stable     CAD (coronary artery disease)- (present on admission)  Assessment & Plan  I will start metoprolol with all parameters.  I will resume home atorvastatin       VTE prophylaxis: heparin    I have performed a physical exam and reviewed and updated ROS and Plan today (9/28/2024). In review of yesterday's note (9/27/2024), there are no changes except as documented above.    I spend at least 52 minutes providing care for this patient.  This included face-to-face interview, physical examination.  Review of lab work including CBC, CMP, magnesium.  Discussing with nephrology.  Discussing with multidisciplinary team including case management, nursing staff and pharmacy.  Creating plan of care, reviewing orders.

## 2024-09-28 NOTE — PROGRESS NOTES
Report received from night shift RN. Assumed care of patient. Daily plan of care discussed. Pt resting comfortably in bed with no signs of distress noted. Breathing even and unlabored. Patient reports no need for pain intervention at this time. Patient reports no further needs at this time. Call light within reach. Bed locked in lowest position. Plan of care on going.

## 2024-09-28 NOTE — CONSULTS
DATE OF SERVICE:  09/28/2024     REQUESTING PHYSICIAN:  Dr. Malloy.     REASON FOR CONSULTATION:  Acute kidney injury on chronic kidney disease, stage   IV.     The patient seen and examined, medical record reviewed.     HISTORY OF PRESENT ILLNESS:  The patient is an 79-year-old lady who is very   well known to my service.  She has chronic kidney disease, stage IV with   baseline creatinine in March 2024 was 2.3.  The patient recently was diagnosed   with COVID-19 infection, presented to the hospital on 09/25/2024 with   generalized weakness, diarrhea and decubitus ulcer, the patient was diagnosed   with acute kidney injury with a creatinine peaked at 3.26 on admission.     The patient has been treated with IV fluids, she is doing better.     The patient has no recent use of NSAIDs or IV contrast exposure.     PAST MEDICAL HISTORY:  Significant for:  1.  Chronic kidney disease, stage IV.  2.  Hypertension.     ALLERGIES:  The list was reviewed.     SOCIAL HISTORY:  The patient has 49 pack per year smoking history.     FAMILY HISTORY:  No known renal disease.     MEDICATIONS:  Reviewed.     REVIEW OF SYSTEMS:  The patient is weak, but all other review of systems   negative except outlined in the history of present illness.     PHYSICAL EXAMINATION:  GENERAL:  The patient appears ill, but no apparent distress.  VITAL SIGNS:  Showed blood pressure of 128/53, heart rate was 73, respiratory   rate was 17.  HEENT:  Normocephalic, atraumatic.  Sclerae are anicteric.  Pupils are   reactive.  Nose normal.  Mucous membranes moist.  NECK:  No lymphadenopathy, no JVD, no thyroid mass.  CHEST:  Normal.  LUNGS:  Clear to auscultation.  HEART:  S1, S2.  ABDOMEN:  Soft, nontender.  No hepatosplenomegaly.  There is no inguinal   lymphadenopathy.  EXTREMITIES:  There is no lower extremity edema.  SKIN:  There is no skin rash.     LABORATORY DATA:  Her recent labs from today were reviewed.     DIAGNOSTIC DATA:  The patient  had chest x-ray done on 09/25.  I reviewed the   image myself, which showed no pulmonary edema.     ASSESSMENT:  1.  Acute kidney injury on chronic kidney disease, stage IV.  The etiology is   most likely a prerenal component secondary to volume depletion versus acute   tubular necrosis.  2.  Severe hypoalbuminemia.  3.  Anemia.  4.  Hypomagnesemia.     PLAN:  1.  There is no acute need for renal placement therapy.  2.  Continue fluid resuscitation until better oral intake.  3.  Daily labs.  4.  Renal diet.  5.  Renal dose all medications.  6.  Prognosis is guarded.     Plan discussed in detail with Dr. Malloy.        ______________________________  FADI NAJJAR, MD FN/JOE    DD:  09/28/2024 11:33  DT:  09/28/2024 11:55    Job#:  [id number]

## 2024-09-28 NOTE — CARE PLAN
The patient is Stable - Low risk of patient condition declining or worsening    Shift Goals  Clinical Goals: wound care, remain free from falls and injury  Patient Goals: rest  Family Goals: n/a    Progress made toward(s) clinical / shift goals:  Pt remained free from falls and injury throughout out shift, wound care was completed.     Patient is not progressing towards the following goals:

## 2024-09-29 LAB
ALBUMIN SERPL BCP-MCNC: 2.8 G/DL (ref 3.2–4.9)
ALBUMIN/GLOB SERPL: 0.8 G/DL
ALP SERPL-CCNC: 94 U/L (ref 30–99)
ALT SERPL-CCNC: 12 U/L (ref 2–50)
ANION GAP SERPL CALC-SCNC: 16 MMOL/L (ref 7–16)
AST SERPL-CCNC: 11 U/L (ref 12–45)
BILIRUB SERPL-MCNC: 0.4 MG/DL (ref 0.1–1.5)
BUN SERPL-MCNC: 44 MG/DL (ref 8–22)
CALCIUM ALBUM COR SERPL-MCNC: 9.7 MG/DL (ref 8.5–10.5)
CALCIUM SERPL-MCNC: 8.7 MG/DL (ref 8.4–10.2)
CHLORIDE SERPL-SCNC: 103 MMOL/L (ref 96–112)
CO2 SERPL-SCNC: 20 MMOL/L (ref 20–33)
CREAT SERPL-MCNC: 2.28 MG/DL (ref 0.5–1.4)
ERYTHROCYTE [DISTWIDTH] IN BLOOD BY AUTOMATED COUNT: 48.7 FL (ref 35.9–50)
GFR SERPLBLD CREATININE-BSD FMLA CKD-EPI: 21 ML/MIN/1.73 M 2
GLOBULIN SER CALC-MCNC: 3.5 G/DL (ref 1.9–3.5)
GLUCOSE SERPL-MCNC: 93 MG/DL (ref 65–99)
HCT VFR BLD AUTO: 34.2 % (ref 37–47)
HGB BLD-MCNC: 11.1 G/DL (ref 12–16)
MAGNESIUM SERPL-MCNC: 1.9 MG/DL (ref 1.5–2.5)
MCH RBC QN AUTO: 29.7 PG (ref 27–33)
MCHC RBC AUTO-ENTMCNC: 32.5 G/DL (ref 32.2–35.5)
MCV RBC AUTO: 91.4 FL (ref 81.4–97.8)
PLATELET # BLD AUTO: 141 K/UL (ref 164–446)
PMV BLD AUTO: 10.7 FL (ref 9–12.9)
POTASSIUM SERPL-SCNC: 3.6 MMOL/L (ref 3.6–5.5)
PROT SERPL-MCNC: 6.3 G/DL (ref 6–8.2)
RBC # BLD AUTO: 3.74 M/UL (ref 4.2–5.4)
SODIUM SERPL-SCNC: 139 MMOL/L (ref 135–145)
WBC # BLD AUTO: 7.4 K/UL (ref 4.8–10.8)

## 2024-09-29 PROCEDURE — 85027 COMPLETE CBC AUTOMATED: CPT

## 2024-09-29 PROCEDURE — 97110 THERAPEUTIC EXERCISES: CPT

## 2024-09-29 PROCEDURE — 94760 N-INVAS EAR/PLS OXIMETRY 1: CPT

## 2024-09-29 PROCEDURE — 83735 ASSAY OF MAGNESIUM: CPT

## 2024-09-29 PROCEDURE — 770001 HCHG ROOM/CARE - MED/SURG/GYN PRIV*

## 2024-09-29 PROCEDURE — 700102 HCHG RX REV CODE 250 W/ 637 OVERRIDE(OP): Performed by: HOSPITALIST

## 2024-09-29 PROCEDURE — 80053 COMPREHEN METABOLIC PANEL: CPT

## 2024-09-29 PROCEDURE — 97116 GAIT TRAINING THERAPY: CPT

## 2024-09-29 PROCEDURE — 36415 COLL VENOUS BLD VENIPUNCTURE: CPT

## 2024-09-29 PROCEDURE — A9270 NON-COVERED ITEM OR SERVICE: HCPCS | Performed by: HOSPITALIST

## 2024-09-29 PROCEDURE — 700111 HCHG RX REV CODE 636 W/ 250 OVERRIDE (IP): Performed by: HOSPITALIST

## 2024-09-29 PROCEDURE — 700105 HCHG RX REV CODE 258: Performed by: INTERNAL MEDICINE

## 2024-09-29 PROCEDURE — 97530 THERAPEUTIC ACTIVITIES: CPT

## 2024-09-29 PROCEDURE — 99233 SBSQ HOSP IP/OBS HIGH 50: CPT | Performed by: INTERNAL MEDICINE

## 2024-09-29 RX ADMIN — SENNOSIDES AND DOCUSATE SODIUM 2 TABLET: 50; 8.6 TABLET ORAL at 18:38

## 2024-09-29 RX ADMIN — SODIUM CHLORIDE: 9 INJECTION, SOLUTION INTRAVENOUS at 06:22

## 2024-09-29 RX ADMIN — ROSUVASTATIN 10 MG: 10 TABLET, FILM COATED ORAL at 18:38

## 2024-09-29 RX ADMIN — ASPIRIN 81 MG: 81 TABLET, COATED ORAL at 05:57

## 2024-09-29 RX ADMIN — HEPARIN SODIUM 5000 UNITS: 5000 INJECTION, SOLUTION INTRAVENOUS; SUBCUTANEOUS at 21:02

## 2024-09-29 RX ADMIN — SODIUM BICARBONATE 1300 MG: 650 TABLET ORAL at 21:02

## 2024-09-29 RX ADMIN — MOMETASONE FUROATE AND FORMOTEROL FUMARATE DIHYDRATE 2 PUFF: 100; 5 AEROSOL RESPIRATORY (INHALATION) at 18:37

## 2024-09-29 RX ADMIN — SODIUM BICARBONATE 1300 MG: 650 TABLET ORAL at 08:22

## 2024-09-29 RX ADMIN — TIOTROPIUM BROMIDE INHALATION SPRAY 5 MCG: 3.12 SPRAY, METERED RESPIRATORY (INHALATION) at 06:04

## 2024-09-29 RX ADMIN — SODIUM BICARBONATE 1300 MG: 650 TABLET ORAL at 14:46

## 2024-09-29 RX ADMIN — MOMETASONE FUROATE AND FORMOTEROL FUMARATE DIHYDRATE 2 PUFF: 100; 5 AEROSOL RESPIRATORY (INHALATION) at 08:22

## 2024-09-29 RX ADMIN — HEPARIN SODIUM 5000 UNITS: 5000 INJECTION, SOLUTION INTRAVENOUS; SUBCUTANEOUS at 14:46

## 2024-09-29 RX ADMIN — SENNOSIDES AND DOCUSATE SODIUM 2 TABLET: 50; 8.6 TABLET ORAL at 06:00

## 2024-09-29 RX ADMIN — HEPARIN SODIUM 5000 UNITS: 5000 INJECTION, SOLUTION INTRAVENOUS; SUBCUTANEOUS at 05:59

## 2024-09-29 ASSESSMENT — ENCOUNTER SYMPTOMS
FLANK PAIN: 0
BRUISES/BLEEDS EASILY: 0
STRIDOR: 0
CHILLS: 0
EYE REDNESS: 0
FOCAL WEAKNESS: 0
COUGH: 0
FEVER: 0
NERVOUS/ANXIOUS: 0
EYE DISCHARGE: 0
SHORTNESS OF BREATH: 1
VOMITING: 0
MYALGIAS: 0

## 2024-09-29 ASSESSMENT — PAIN DESCRIPTION - PAIN TYPE
TYPE: ACUTE PAIN
TYPE: ACUTE PAIN

## 2024-09-29 ASSESSMENT — GAIT ASSESSMENTS
GAIT LEVEL OF ASSIST: CONTACT GUARD ASSIST
DISTANCE (FEET): 20
ASSISTIVE DEVICE: FRONT WHEEL WALKER
DEVIATION: STEP TO;DECREASED BASE OF SUPPORT;SHUFFLED GAIT

## 2024-09-29 ASSESSMENT — FIBROSIS 4 INDEX: FIB4 SCORE: 1.82

## 2024-09-29 NOTE — CARE PLAN
The patient is Stable - Low risk of patient condition declining or worsening    Shift Goals  Clinical Goals: Remain free from falls, wound care, Q2 turns  Patient Goals: rest      Progress made toward(s) clinical / shift goals:  ***    Patient is not progressing towards the following goals:

## 2024-09-29 NOTE — CARE PLAN
The patient is Stable - Low risk of patient condition declining or worsening    Shift Goals  Clinical Goals: Remain free from falls and wound care  Patient Goals: Sleep comfortably  Family Goals: n/a    Progress made toward(s) clinical / shift goals:   Patient did not experience falls within shift, closely monitored and wound care rendered.    Patient is not progressing towards the following goals:

## 2024-09-29 NOTE — PROGRESS NOTES
Bedside report received from day RN at 1927. Patient is lying down on resumption of shift. Patient assessed and labbs reviewed.Patient is on 2L Oxygen at Spo2 93. Line infusing NS 50mls/hr .Patient is calm and no complaints  at this time. Fall precaution in place , bed low and locked. Bed alarm on and call light within reach.Hourly rounding and monitoring of patient continues.

## 2024-09-29 NOTE — PROGRESS NOTES
"Hospital Medicine Daily Progress Note    Date of Service  9/29/2024    Chief Complaint  Sangita Bolton is a 79 y.o. female admitted 9/25/2024 with multiple decubitus wounds and inability to care for self    Hospital Course  As per chart review:  \"Sangita Bolton is a 79 y.o. female with a past medical history of primary hypertension, chronic kidney disease, hyperlipidemia, recent hospitalization for COVID-19 infection and a urinary tract infection September 10-15 admitted 9/25/2024 with multiple decubitus wounds and inability to care for self\"    Interval Problem Update  9/26: Hemodynamically stable overnight.  Saturating well on room air.   Blood pressures are better today.  Cr slightly better Cr 3.11, down from 3.26   Still slightly dehydrated will continue intravenous fluids  C Diff is negative. I will stop PO vanco    Physical and Occupational Therapy evaluation ordered    PATIENT SEEN BY PREVIOUS HOSPITALIST UNTIL 9/26 9/27: Patient seen at bedside this morning.  The patient is a very poor historian, she is not very cooperative.  I did talk to the patient's  over the phone regarding the plan of care.  We have consulted nephrology it seems the patient's creatinine is above baseline, it seems that it has been about baseline since early September.  Patient will most likely require placement.  Continue recommendations of wound care.    9/28: Patient seen at bedside this morning.  The patient laying in bed comfortably.  The patient denying chest pain.  Nephrology recommending continued IV fluids, creatinine seems to be improving.  Continue to monitor closely.  The patient will require placement upon discharge.    9/29: Patient seen at bedside this morning.  Patient laying in bed comfortably.  No family member present at the time of my evaluation.  We will discontinue IV fluids and monitor intake.  Cardiology did not recommend further intervention for elevated troponin.  Pending placement.    I have discussed " this patient's plan of care and discharge plan at IDT rounds today with Case Management, Nursing, Nursing leadership, and other members of the IDT team.    Consultants/Specialty  Nephrology    Code Status  Full Code    Disposition  Patient needs wound care.  The patient is medically cleared for discharge to skilled nursing facility for continued wound care.  I have placed the appropriate orders for post-discharge needs.    Review of Systems  Review of Systems   Constitutional:  Positive for malaise/fatigue. Negative for chills and fever.   Eyes:  Negative for discharge and redness.   Respiratory:  Positive for shortness of breath. Negative for cough and stridor.    Cardiovascular:  Negative for chest pain and leg swelling.   Gastrointestinal:  Negative for vomiting. Diarrhea: improving.  Genitourinary:  Negative for flank pain.   Musculoskeletal:  Negative for myalgias.   Skin: Negative.    Neurological:  Negative for focal weakness.   Endo/Heme/Allergies:  Does not bruise/bleed easily.   Psychiatric/Behavioral:  The patient is not nervous/anxious.       Physical Exam  Temp:  [36.2 °C (97.2 °F)-36.6 °C (97.9 °F)] 36.3 °C (97.4 °F)  Pulse:  [75-83] 83  Resp:  [17-18] 18  BP: (108-158)/(56-72) 121/59  SpO2:  [92 %-94 %] 94 %    Physical Exam  Constitutional:       General: She is not in acute distress.     Appearance: She is ill-appearing.   HENT:      Head: Normocephalic and atraumatic.      Right Ear: External ear normal.      Left Ear: External ear normal.      Nose: No congestion or rhinorrhea.      Mouth/Throat:      Mouth: Mucous membranes are dry.      Pharynx: No oropharyngeal exudate or posterior oropharyngeal erythema.   Eyes:      General: No scleral icterus.        Right eye: No discharge.         Left eye: No discharge.      Conjunctiva/sclera: Conjunctivae normal.      Pupils: Pupils are equal, round, and reactive to light.   Cardiovascular:      Rate and Rhythm: Normal rate.      Heart sounds:      No  friction rub. No gallop.   Pulmonary:      Effort: Pulmonary effort is normal.   Abdominal:      General: Abdomen is flat. There is no distension.      Tenderness: There is no guarding.   Musculoskeletal:         General: No swelling.      Cervical back: Neck supple. No rigidity. No muscular tenderness.   Skin:     General: Skin is dry.      Capillary Refill: Capillary refill takes 2 to 3 seconds.      Findings: Lesion present.   Neurological:      Mental Status: She is alert and oriented to person, place, and time.   Psychiatric:      Comments: Odd behavior, not cooperative                 Fluids    Intake/Output Summary (Last 24 hours) at 9/29/2024 1322  Last data filed at 9/29/2024 0448  Gross per 24 hour   Intake 60 ml   Output 1200 ml   Net -1140 ml      Laboratory  Recent Labs     09/27/24  1559 09/28/24  0138 09/29/24  0258   WBC 7.5 8.8 7.4   RBC 3.64* 3.71* 3.74*   HEMOGLOBIN 10.8* 10.9* 11.1*   HEMATOCRIT 34.5* 34.9* 34.2*   MCV 94.8 94.1 91.4   MCH 29.7 29.4 29.7   MCHC 31.3* 31.2* 32.5   RDW 51.2* 50.1* 48.7   PLATELETCT 144* 147* 141*   MPV 10.9 10.8 10.7     Recent Labs     09/27/24  1559 09/28/24  0138 09/29/24  0258   SODIUM 141 139 139   POTASSIUM 4.2 3.6 3.6   CHLORIDE 105 103 103   CO2 22 21 20   GLUCOSE 100* 93 93   BUN 52* 51* 44*   CREATININE 2.49* 2.25* 2.28*   CALCIUM 8.5 8.6 8.7     Imaging  CT-HEAD W/O   Final Result      1. No acute intracranial abnormality.   2. Age-related central and cortical atrophy and diffuse chronic microangiopathic white matter changes.   3. Atherosclerosis.               DX-CHEST-PORTABLE (1 VIEW)   Final Result      1. No acute cardiac or pulmonary abnormalities are identified.   2. Atherosclerosis.   3. Other stable chronic degenerative, posttraumatic and postsurgical changes.         Assessment/Plan  * RASHAD (acute kidney injury) (HCC)- (present on admission)  Assessment & Plan  Mostly due to  dehydration secondary to diarrhea  I will start intravenous  fluids  Avoid / minimize nephrotoxins as much as possible.  Monitor inputs and outputs   I will hold home furosemide for today with a plan to restart tomorrow    9/26/2024   Cr slightly better Cr 3.11, down from 3.26   Still slightly dehydrated will continue intravenous fluids     9/28: Improving, creatinine today is 2.25.  Nephrology has been consulted.  They recommend continued IV fluids for now.    9/29: Cr today is 2.28. We will stop IVF and monitor intake.    Hypoxia- (present on admission)  Assessment & Plan  As per  she uses oxygen at home due to COPD  Does not seem to be on exacerbation  Seems to be at baseline as per   She uses 2 L at home.      Hypomagnesemia  Assessment & Plan  Replace as needed  monitor    Dehydration- (present on admission)  Assessment & Plan  Likely secondary to reduced oral intake   Encourage oral intake as tolerated, antiemetics as needed.  Intravenous hydration until adequate oral intake is achieved.     9/26/2024   Still slightly dehydrated will continue intravenous fluids    Decubitus ulcers- (present on admission)  Assessment & Plan  Do not appear to be infected.  Wound care    9/26/2024   Will need ongoing wound care   Referral to SNFs have been placed     9/28: Pending placement.    Diarrhea- (present on admission)  Assessment & Plan  Differentials include viral infection, C. difficile colitis  [has, recent hospitalization been on antibiotics for urinary tract infection]  I will check stool for C. difficile toxins.  I will check a gastrointestinal panel  Supportive care with intravenous fluids monitor electrolytes and replace accordingly     9/26/2024   Resolved   C Diff is negative. I will stop PO vanco.        Elevated troponin- (present on admission)  Assessment & Plan  In the indeterminate range  Denies chest pain.  Troponin elevation is likely secondary to demand ischemia and reduced renal clearance due to current renal function  EKG shows sinus rhythm with a  rate of 77.  There is no ST elevation.  There is ST depression in inferior leads and in the lateral leads.  QTc is 444.    I will place on continuous cardiac monitoring    I will trend troponin levels  Stat EKG, troponin for chest pain or shortness of breath     9/26/2024   Trop has been flat     9/27/2024  Denies chest pain    9/28: Cardiology did not recommend further intervention at this time.  They do not believe this is ACS, the patient denying chest pain.  The patient will require follow-up as an outpatient with cardiology.    Dyslipidemia- (present on admission)  Assessment & Plan  Cardiac diet.  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 I will order a follow-up CBC.  Transfuse for a hemoglobin of less than or equal to 7.    9/27: Hb seems to be stable     CAD (coronary artery disease)- (present on admission)  Assessment & Plan  I will start metoprolol with all parameters.  I will resume home atorvastatin       VTE prophylaxis: heparin    I have performed a physical exam and reviewed and updated ROS and Plan today (9/29/2024). In review of yesterday's note (9/28/2024), there are no changes except as documented above.    I spend at least 51 minutes providing care for this patient.  This included face-to-face interview, physical examination.  Review of lab work including CBC, CMP, and magnesium.  Discussing with multidisciplinary team including case management, nursing staff and pharmacy.  Creating plan of care, reviewing orders.

## 2024-09-29 NOTE — PROGRESS NOTES
4 Eyes Skin Assessment Completed by STEPHANI Bender and STEPHANI Herman.    Head WDL  Ears WDL  Nose WDL  Mouth WDL  Neck WDL  Breast/Chest WDL  Shoulder Blades WDL  Spine WDL  (R) Arm/Elbow/Hand Scab  (L) Arm/Elbow/Hand Redness and Blanching  Abdomen Bruising  Groin Redness, Blanching, and Excoriation  Scrotum/Coccyx/Buttocks Redness, Blanching, Non-Blanching, Excoriation, and Discoloration  (R) Leg DTI R hip, offloading dressing applied.  (L) Leg bruising  (R) Heel/Foot/Toe Redness and Blanching Mepilex applied, offloaded with pillows  (L) Heel/Foot/Toe Redness and Blanching mepilex applied, offloaded with pillows          Devices In Places Pulse Ox      Interventions In Place Gray Ear Foams, NC W/Ear Foams, Heel Mepilex, Waffle Overlay, TAP System, Pillows, Q2 Turns, Barrier Cream, and Heels Loaded W/Pillows    Possible Skin Injury Yes    Pictures Uploaded Into Epic Yes  Wound Consult Placed Yes  RN Wound Prevention Protocol Ordered Yes

## 2024-09-29 NOTE — PROGRESS NOTES
Bedside report received from STEPHANI Rodriguez. Assumed care of patient. Daily plan of care discussed. Pt resting comfortably in bed with no signs of distress noted. Breathing even and unlabored. Patient reports no pain or needs at this time. further needs at this time. Call light within reach. Bed locked in lowest position. Plan of care on going.

## 2024-09-29 NOTE — THERAPY
Physical Therapy   Daily Treatment     Patient Name: Sangita Bolton  Age:  79 y.o., Sex:  female  Medical Record #: [id number]  Today's Date: 9/29/2024         Assessment  Pt is able to move and is willing to ambulate today . Pt requires MIN A to transfers with FWW . Pt able to transfer a second time with CGA  with VC's during transfer. Pt attempting to ambulate to bathroom but states she is too fatigued and is able to ambulate toward bathroom and then turn to return to bedside chair with CGA using a FWW. Pt has O2 sats drop but with pursed lip breathing they return to 92%. Pt left up in chair with 1 L 02.      Plan    Treatment Plan Status: Continue Current Treatment Plan  Type of Treatment: Bed Mobility, Gait Training, Neuro Re-Education / Balance, Stair Training, Therapeutic Activities, Therapeutic Exercise  Treatment Frequency: 3 Times per Week  Treatment Duration: Until Therapy Goals Met    DC Equipment Recommendations: Unable to determine at this time  Discharge Recommendations: Recommend post-acute placement for additional physical therapy services prior to discharge home      Subjective  Pt willing to ambulate today .        Objective     09/29/24 1432   Gait Analysis   Gait Level Of Assist Contact Guard Assist   Assistive Device Front Wheel Walker   Distance (Feet) 20   # of Times Distance was Traveled 1   Deviation Step To;Decreased Base Of Support;Shuffled Gait   # of Stairs Climbed 0   Weight Bearing Status FWB   Vision Deficits Impacting Mobility pt wears galsses   Skilled Intervention Postural Facilitation;Tactile Cuing;Verbal Cuing   Comments pt has poor safety awareness and cognitive impairment and generalized weakness with fatigue after 5' early gait.   Functional Mobility   Sit to Stand Contact Guard Assist   Bed, Chair, Wheelchair Transfer Contact Guard Assist   Transfer Method Stand Pivot   Mobility bed mob, sit<> std, ambulation, bedside chair.   Skilled Intervention Tactile Cuing;Verbal  Cuing;Sequencing   Comments pt needs cues for sequencing 2/2 cognitive impairment   Patient / Family Goals    Patient / Family Goal #1 to go home   Short Term Goals    Goal Outcome # 1 Progressing as expected   Goal Outcome # 2 Progressing as expected   Goal Outcome # 3 Progressing as expected   Goal Outcome # 4 Progressing slower than expected   Goal Outcome # 5 Progressing slower than expected   Anticipated Discharge Equipment and Recommendations   DC Equipment Recommendations Unable to determine at this time   Discharge Recommendations Recommend post-acute placement for additional physical therapy services prior to discharge home   Session Information   Date / Session Number  9/29-2(2/3 10/2)

## 2024-09-30 VITALS
HEART RATE: 61 BPM | DIASTOLIC BLOOD PRESSURE: 50 MMHG | SYSTOLIC BLOOD PRESSURE: 123 MMHG | TEMPERATURE: 97 F | OXYGEN SATURATION: 96 % | RESPIRATION RATE: 17 BRPM | BODY MASS INDEX: 22.54 KG/M2 | HEIGHT: 63 IN | WEIGHT: 127.21 LBS

## 2024-09-30 PROCEDURE — 99239 HOSP IP/OBS DSCHRG MGMT >30: CPT | Performed by: INTERNAL MEDICINE

## 2024-09-30 PROCEDURE — A9270 NON-COVERED ITEM OR SERVICE: HCPCS | Performed by: HOSPITALIST

## 2024-09-30 PROCEDURE — 700102 HCHG RX REV CODE 250 W/ 637 OVERRIDE(OP): Performed by: HOSPITALIST

## 2024-09-30 PROCEDURE — 700111 HCHG RX REV CODE 636 W/ 250 OVERRIDE (IP): Performed by: HOSPITALIST

## 2024-09-30 RX ORDER — HEPARIN SODIUM 5000 [USP'U]/ML
5000 INJECTION, SOLUTION INTRAVENOUS; SUBCUTANEOUS EVERY 8 HOURS
Status: ON HOLD | DISCHARGE
Start: 2024-09-30 | End: 2024-10-25

## 2024-09-30 RX ADMIN — ASPIRIN 81 MG: 81 TABLET, COATED ORAL at 04:39

## 2024-09-30 RX ADMIN — SODIUM BICARBONATE 1300 MG: 650 TABLET ORAL at 09:34

## 2024-09-30 RX ADMIN — MOMETASONE FUROATE AND FORMOTEROL FUMARATE DIHYDRATE 2 PUFF: 100; 5 AEROSOL RESPIRATORY (INHALATION) at 04:39

## 2024-09-30 RX ADMIN — TIOTROPIUM BROMIDE INHALATION SPRAY 5 MCG: 3.12 SPRAY, METERED RESPIRATORY (INHALATION) at 04:40

## 2024-09-30 RX ADMIN — METOPROLOL SUCCINATE 25 MG: 25 TABLET, EXTENDED RELEASE ORAL at 04:39

## 2024-09-30 RX ADMIN — AMLODIPINE BESYLATE 10 MG: 5 TABLET ORAL at 04:39

## 2024-09-30 RX ADMIN — HEPARIN SODIUM 5000 UNITS: 5000 INJECTION, SOLUTION INTRAVENOUS; SUBCUTANEOUS at 04:39

## 2024-09-30 RX ADMIN — SENNOSIDES AND DOCUSATE SODIUM 2 TABLET: 50; 8.6 TABLET ORAL at 04:39

## 2024-09-30 ASSESSMENT — FIBROSIS 4 INDEX: FIB4 SCORE: 1.82

## 2024-09-30 ASSESSMENT — ENCOUNTER SYMPTOMS
BRUISES/BLEEDS EASILY: 0
VOMITING: 0
NERVOUS/ANXIOUS: 0
EYE DISCHARGE: 0
SHORTNESS OF BREATH: 1
EYE REDNESS: 0
CHILLS: 0
COUGH: 0
FLANK PAIN: 0
MYALGIAS: 0
STRIDOR: 0
FEVER: 0
FOCAL WEAKNESS: 0

## 2024-09-30 ASSESSMENT — PAIN DESCRIPTION - PAIN TYPE: TYPE: ACUTE PAIN

## 2024-09-30 NOTE — PROGRESS NOTES
4 Eyes Skin Assessment Completed by Ma, RN and STEPHANI Hernandez.    Head WDL  Ears WDL  Nose WDL  Mouth WDL  Neck WDL  Breast/Chest WDL  Shoulder Blades WDL  Spine WDL  (R) Arm/Elbow/Hand Scab  (L) Arm/Elbow/Hand WDL  Abdomen Bruising  Groin Excoriation  Scrotum/Coccyx/Buttocks Redness, Blanching, Non-Blanching, and Excoriation  (R) Leg R hip DTI, offloading dressing applied  (L) Leg Bruising  (R) Heel/Foot/Toe Redness and Blanching  (L) Heel/Foot/Toe Redness and Blanching          Devices In Places Pulse Ox      Interventions In Place Gray Ear Foams, NC W/Ear Foams, Heel Mepilex, Waffle Overlay, Pillows, Q2 Turns, Barrier Cream, and Heels Loaded W/Pillows    Possible Skin Injury Yes    Pictures Uploaded Into Epic Yes  Wound Consult Placed Yes  RN Wound Prevention Protocol Ordered Yes

## 2024-09-30 NOTE — DISCHARGE PLANNING
DC Transport Scheduled    Transport Company Scheduled:  [facility]  Spoke with Kimberly at [facility] to schedule transport.    Scheduled Date: 9/30/2024  Scheduled Time: 1530    Transport Type: [place]  Destination:   [facility]   Destination address: [address]    Notified care team of scheduled transport via [employer].     If there are any changes needed to the DC transportation scheduled, please contact [facility] at ext. [phone] between the hours of [phone] Mon-Fri. If outside those hours, contact the ED Case Manager at ext. [phone].

## 2024-09-30 NOTE — PROGRESS NOTES
0700: Received report from NOC RN.  Assumed patient care. Patient resting comfortably. Denies pain or nausea. Safety precautions in place. Updated with plan of care.     1400: Report to Eusebia     1530: GMT at bedside. Discharging patient per MD orders. Patient and family state understanding of dc instructions, medications, and follow up appointments.

## 2024-09-30 NOTE — PROGRESS NOTES
"     Hospital Medicine Daily Progress Note    Date of Service  9/30/2024    Chief Complaint  Sangita oBlton is a 79 y.o. female admitted 9/25/2024 with multiple decubitus wounds and inability to care for self    Hospital Course  As per chart review:  \"Sangita Bolton is a 79 y.o. female with a past medical history of primary hypertension, chronic kidney disease, hyperlipidemia, recent hospitalization for COVID-19 infection and a urinary tract infection September 10-15 admitted 9/25/2024 with multiple decubitus wounds and inability to care for self\"    Interval Problem Update  9/26: Hemodynamically stable overnight.  Saturating well on room air.   Blood pressures are better today.  Cr slightly better Cr 3.11, down from 3.26   Still slightly dehydrated will continue intravenous fluids  C Diff is negative. I will stop PO vanco    Physical and Occupational Therapy evaluation ordered    PATIENT SEEN BY PREVIOUS HOSPITALIST UNTIL 9/26 9/27: Patient seen at bedside this morning.  The patient is a very poor historian, she is not very cooperative.  I did talk to the patient's  over the phone regarding the plan of care.  We have consulted nephrology it seems the patient's creatinine is above baseline, it seems that it has been about baseline since early September.  Patient will most likely require placement.  Continue recommendations of wound care.    9/28: Patient seen at bedside this morning.  The patient laying in bed comfortably.  The patient denying chest pain.  Nephrology recommending continued IV fluids, creatinine seems to be improving.  Continue to monitor closely.  The patient will require placement upon discharge.    9/29: Patient seen at bedside this morning.  Patient laying in bed comfortably.  No family member present at the time of my evaluation.  We will discontinue IV fluids and monitor intake.  Cardiology did not recommend further intervention for elevated troponin.  Pending placement.    9/30: " Patient seen at bedside this morning.  No family member present at the time of my evaluation.  I tried calling the patient's  over the phone, however I was unsuccessful.  Patient is medically clear for discharge, pending placement to skilled nursing facility.  We have placed referral to cardiology as an outpatient.  Also of note the patient had been dehydrated, we have stopped IV fluids and will monitor p.o. intake, for now patient's Lasix and p.o. potassium have been placed on hold due to the patient's initial volume depletion.  Will consider restarting Lasix once appropriate.    I have discussed this patient's plan of care and discharge plan at IDT rounds today with Case Management, Nursing, Nursing leadership, and other members of the IDT team.    Consultants/Specialty  Nephrology    Code Status  Full Code    Disposition  Patient needs wound care.  The patient is medically cleared for discharge to skilled nursing facility for continued wound care.     Review of Systems  Review of Systems   Constitutional:  Positive for malaise/fatigue. Negative for chills and fever.   Eyes:  Negative for discharge and redness.   Respiratory:  Positive for shortness of breath. Negative for cough and stridor.    Cardiovascular:  Negative for chest pain and leg swelling.   Gastrointestinal:  Negative for vomiting. Diarrhea: improving.  Genitourinary:  Negative for flank pain.   Musculoskeletal:  Negative for myalgias.   Skin: Negative.    Neurological:  Negative for focal weakness.   Endo/Heme/Allergies:  Does not bruise/bleed easily.   Psychiatric/Behavioral:  The patient is not nervous/anxious.       Physical Exam  Temp:  [36.1 °C (97 °F)-36.9 °C (98.5 °F)] 36.9 °C (98.5 °F)  Pulse:  [81-88] 81  Resp:  [18] 18  BP: (119-157)/(59-74) 148/74  SpO2:  [90 %-95 %] 94 %    Physical Exam  Constitutional:       General: She is not in acute distress.     Appearance: She is ill-appearing.   HENT:      Head: Normocephalic and  atraumatic.      Right Ear: External ear normal.      Left Ear: External ear normal.      Nose: No congestion or rhinorrhea.      Mouth/Throat:      Mouth: Mucous membranes are dry.      Pharynx: No oropharyngeal exudate or posterior oropharyngeal erythema.   Eyes:      General: No scleral icterus.        Right eye: No discharge.         Left eye: No discharge.      Conjunctiva/sclera: Conjunctivae normal.      Pupils: Pupils are equal, round, and reactive to light.   Cardiovascular:      Rate and Rhythm: Normal rate.      Heart sounds:      No friction rub. No gallop.   Pulmonary:      Effort: Pulmonary effort is normal.   Abdominal:      General: Abdomen is flat. There is no distension.      Tenderness: There is no guarding.   Musculoskeletal:         General: No swelling.      Cervical back: Neck supple. No rigidity. No muscular tenderness.   Skin:     General: Skin is dry.      Capillary Refill: Capillary refill takes 2 to 3 seconds.      Findings: Lesion present.   Neurological:      Mental Status: She is alert and oriented to person, place, and time.   Psychiatric:      Comments: Odd behavior, not cooperative                 Fluids    Intake/Output Summary (Last 24 hours) at 9/30/2024 1005  Last data filed at 9/30/2024 0900  Gross per 24 hour   Intake 480 ml   Output 1200 ml   Net -720 ml      Laboratory  Recent Labs     09/27/24  1559 09/28/24  0138 09/29/24  0258   WBC 7.5 8.8 7.4   RBC 3.64* 3.71* 3.74*   HEMOGLOBIN 10.8* 10.9* 11.1*   HEMATOCRIT 34.5* 34.9* 34.2*   MCV 94.8 94.1 91.4   MCH 29.7 29.4 29.7   MCHC 31.3* 31.2* 32.5   RDW 51.2* 50.1* 48.7   PLATELETCT 144* 147* 141*   MPV 10.9 10.8 10.7     Recent Labs     09/27/24  1559 09/28/24  0138 09/29/24  0258   SODIUM 141 139 139   POTASSIUM 4.2 3.6 3.6   CHLORIDE 105 103 103   CO2 22 21 20   GLUCOSE 100* 93 93   BUN 52* 51* 44*   CREATININE 2.49* 2.25* 2.28*   CALCIUM 8.5 8.6 8.7     Imaging  CT-HEAD W/O   Final Result      1. No acute intracranial  abnormality.   2. Age-related central and cortical atrophy and diffuse chronic microangiopathic white matter changes.   3. Atherosclerosis.               DX-CHEST-PORTABLE (1 VIEW)   Final Result      1. No acute cardiac or pulmonary abnormalities are identified.   2. Atherosclerosis.   3. Other stable chronic degenerative, posttraumatic and postsurgical changes.         Assessment/Plan  * RASHAD (acute kidney injury) (HCC)- (present on admission)  Assessment & Plan  Mostly due to  dehydration secondary to diarrhea  I will start intravenous fluids  Avoid / minimize nephrotoxins as much as possible.  Monitor inputs and outputs   I will hold home furosemide for today with a plan to restart tomorrow    9/26/2024   Cr slightly better Cr 3.11, down from 3.26   Still slightly dehydrated will continue intravenous fluids     9/28: Improving, creatinine today is 2.25.  Nephrology has been consulted.  They recommend continued IV fluids for now.    9/29: Cr today is 2.28. We will stop IVF and monitor intake.    Hypoxia- (present on admission)  Assessment & Plan  As per  she uses oxygen at home due to COPD  Does not seem to be on exacerbation  Seems to be at baseline as per   She uses 2 L at home.      Hypomagnesemia  Assessment & Plan  Replace as needed  monitor    Dehydration- (present on admission)  Assessment & Plan  Likely secondary to reduced oral intake   Encourage oral intake as tolerated, antiemetics as needed.  Intravenous hydration until adequate oral intake is achieved.     9/26/2024   Still slightly dehydrated will continue intravenous fluids    Decubitus ulcers- (present on admission)  Assessment & Plan  Do not appear to be infected.  Wound care    9/26/2024   Will need ongoing wound care   Referral to SNFs have been placed     9/30: Pending placement.    Diarrhea- (present on admission)  Assessment & Plan  Differentials include viral infection, C. difficile colitis  [has, recent hospitalization been on  antibiotics for urinary tract infection]  I will check stool for C. difficile toxins.  I will check a gastrointestinal panel  Supportive care with intravenous fluids monitor electrolytes and replace accordingly     9/26/2024   Resolved   C Diff is negative. I will stop PO vanco.        Elevated troponin- (present on admission)  Assessment & Plan  In the indeterminate range  Denies chest pain.  Troponin elevation is likely secondary to demand ischemia and reduced renal clearance due to current renal function  EKG shows sinus rhythm with a rate of 77.  There is no ST elevation.  There is ST depression in inferior leads and in the lateral leads.  QTc is 444.    I will place on continuous cardiac monitoring    I will trend troponin levels  Stat EKG, troponin for chest pain or shortness of breath     9/26/2024   Trop has been flat     9/27/2024  Denies chest pain    9/28: Cardiology did not recommend further intervention at this time.  They do not believe this is ACS, the patient denying chest pain.  The patient will require follow-up as an outpatient with cardiology.    9/30: Follow up to cardiology has been placed    Dyslipidemia- (present on admission)  Assessment & Plan  Cardiac diet.  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 I will order a follow-up CBC.  Transfuse for a hemoglobin of less than or equal to 7.    9/27: Hb seems to be stable     CAD (coronary artery disease)- (present on admission)  Assessment & Plan  I will start metoprolol with all parameters.  I will resume home atorvastatin       VTE prophylaxis: heparin    I have performed a physical exam and reviewed and updated ROS and Plan today (9/30/2024). In  review of yesterday's note (9/29/2024), there are no changes except as documented above.

## 2024-09-30 NOTE — DISCHARGE PLANNING
Per CM SW, DPA placed call to inquire about bed for Pt today. Stephanie said everyone was in meeting and transferred to [facility].

## 2024-09-30 NOTE — PROGRESS NOTES
Received report from day shift RN. Assumed care of pt. Pt A&O X 3, disoriented to time, on 1 L NC. Pt denies pain at this time. Pt updated with POC, fall precautions in place, call light within reach. All needs met at this time.

## 2024-09-30 NOTE — DISCHARGE PLANNING
Case Management Discharge Planning    Admission Date: 9/25/2024  GMLOS: 3.1  ALOS: 4    6-Clicks ADL Score: 15  6-Clicks Mobility Score: 19  PT and/or OT Eval ordered: Yes  Post-acute Referrals Ordered: Yes  Post-acute Choice Obtained: Yes  Has referral(s) been sent to post-acute provider:  Yes      Anticipated Discharge Dispo: Discharge Disposition: D/T to SNF with Medicare cert in anticipation of skilled care (03)  Discharge Address: [address] [place] [address] [address] [place] [address]    DME Needed: No    Action(s) Taken: Updated Provider/Nurse on Discharge Plan and Transport Arranged     Pt discussed in 0815 rounds. Pt is medically cleared for SNF.     [facility] is still pending, pt's second choice is [facility].     1020-  Requested for DPA to contact [facility] for a bed.     1030-  Pt discussed in IDT rounds. DPA reported she left a voicemail for [facility].     1200-  DPA reported [facility] is able to take pt today after 1400. LSW acknowledged, notified MD and RN.     [facility] request completed requesting for pt to be scheduled transport to [facility]. Pt has Medicare part A only, does not cover transport.   [facility] to schedule with T.     Met with pt at bedside, pt is agreeable to go to [facility] today.     1323-  Transport scheduled for pt via T at 1530. ASF completed, obtained verbal from leadership. Faxed ASF to [facility].     Notified pt's spouse of transport date and time.     1430-  [facility] completed, transfer packet completed. Provided transfer packet to Charge RN.       Escalations Completed: None    Medically Clear: Yes    Next Steps: LSW to follow    Barriers to Discharge: None    Is the patient up for discharge tomorrow: No

## 2024-09-30 NOTE — DISCHARGE SUMMARY
"Discharge Summary    CHIEF COMPLAINT ON ADMISSION  Chief Complaint   Patient presents with    Other     Malaise  Unable to care for self        Reason for Admission  EMS     Admission Date  9/25/2024    CODE STATUS  Full Code    HPI & HOSPITAL COURSE  As per chart review:  \"Sangita Bolton is a 79 y.o. female with a past medical history of primary hypertension, chronic kidney disease, hyperlipidemia, recent hospitalization for COVID-19 infection and a urinary tract infection September 10-15 admitted 9/25/2024 with multiple decubitus wounds and inability to care for self\"    Patient was admitted for further management and care.  Wound care was consulted.  The patient was also found to be dehydrated.  The patient was found to have RASHAD on CKD.  Nephrology was consulted however it seems that the creatinine improved closely back to baseline after IV fluids.  For now the patient's home dose of Lasix and potassium have been discontinued, once the patient is tolerating p.o. better and having adequate input, it should be considered to start her back on her Lasix and potassium.  The patient will be discharged to a skilled nurse facility where they should continue monitoring this    It seems on admission the patient was found to have diarrhea, however this has resolved as per nursing.  As per nursing today the patient had a bowel movement and was not diarrhea.  C. difficile was negative.    The patient was found to have elevated troponin on admission.  Cardiology was consulted, did not believe this was ACS and they did not recommend further intervention at this time and follow-up as an outpatient.  We have placed referral to cardiology as an outpatient.    The patient does answer questions appropriately, however when asked about questions about herself and her care she is a very poor historian.  She seems to be alert and oriented, however she does not have very good insight into her health status.    The patient seen at bedside " this morning.  The patient currently not complaining of pain.  The patient will be transferred to skilled nursing facility.  The patient will require close follow-up with PCP and nephrology as an outpatient.  We have also placed referral to cardiology as an outpatient.    Therefore, she is discharged in guarded and stable condition to skilled nursing facility.    The patient met 2-midnight criteria for an inpatient stay at the time of discharge.    Discharge Date  09/30/2024    FOLLOW UP ITEMS POST DISCHARGE  The patient will be discharged to a skilled nursing facility.  The patient will require follow-up with PCP and nephrology as an outpatient.    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 *      FOLLOW UP  Future Appointments   Date Time Provider Department Center   1/6/2025  9:00 AM Shanice Zhang M.D. [facility] None   2/3/2025 10:15 AM [facility] 1 [facility] SMarlene Hernández M.D.  [address]  [address]  [facility] [address]  [phone]    Schedule an appointment as soon as possible for a visit      Fadi Najjar, M.D.  [address]  [address]  [facility] [address]  [phone]    Schedule an appointment as soon as possible for a visit      Cardiology    Schedule an appointment as soon as possible for a visit        MEDICATIONS ON DISCHARGE     Medication List        START taking these medications        Instructions   heparin 5000 UNIT/ML Soln   Inject 1 mL under the skin every 8 hours.  Dose: 5,000 Units            CONTINUE taking these  medications        Instructions   amLODIPine 10 MG Tabs  Commonly known as: Norvasc   Take 1 Tablet by mouth every day.  Dose: 10 mg     aspirin 81 MG EC tablet   Take 1 Tablet by mouth every day.  Dose: 81 mg     Dulera 100-5 MCG/ACT Aero  Generic drug: mometasone-formoterol   Inhale 2 Puffs 2 times a day for 30 days.  Dose: 2 Puff     losartan 100 MG Tabs  Commonly known as: Cozaar   Take 100 mg by mouth every day.  Dose: 100 mg     metoprolol SR 25 MG Tb24  Commonly known as: Toprol XL   Take 1 Tablet by mouth every day.  Dose: 25 mg     rosuvastatin 10 MG Tabs  Commonly known as: Crestor   Take 1 Tablet by mouth every evening. Overdue for follow up, needs appt for further refills  Dose: 10 mg     sodium bicarbonate 650 MG Tabs  Commonly known as: Sodium Bicarbonate   Take 2 Tablets by mouth in the morning, at noon, and at bedtime.  Dose: 1,300 mg     Spiriva Respimat 2.5 MCG/ACT Aers  Generic drug: tiotropium   Inhale 2 Inhalations every day for 30 days.  (Inhale 2 Inhalations every day for 30 days.)  Dose: 5 mcg     VITAMIN B-12 PO   Take 1 tablet by mouth every day.  Dose: 1 Tablet     vitamin D3 125 MCG (5000 UT) Caps   Take 5,000 Units by mouth every day.  Dose: 5,000 Units            STOP taking these medications      dexamethasone 6 MG Tabs  Commonly known as: Decadron     furosemide 40 MG Tabs  Commonly known as: Lasix     potassium chloride SA 10 MEQ Tbcr  Commonly known as: K-Dur              Allergies  Allergies   Allergen Reactions    Hair Formula Extra Strength [Kdc:Cranberry Extract+Sambucus Nigra+Vegetable Enzyme+Yellow Dye+...] Hives     Hair Dye    Penicillins Hives     Hands; tolerates cephalosporins (Rocephin Nov 2020)       DIET  Orders Placed This Encounter   Procedures    Diet Order Diet: Low Fiber(GI Soft)     Standing Status:   Standing     Number of Occurrences:   1     Order Specific Question:   Diet:     Answer:   Low Fiber(GI Soft) [2]       ACTIVITY  As tolerated and directed by  skilled nursing.      CONSULTATIONS  Nephrology    PROCEDURES    CT-HEAD W/O   Final Result      1. No acute intracranial abnormality.   2. Age-related central and cortical atrophy and diffuse chronic microangiopathic white matter changes.   3. Atherosclerosis.               DX-CHEST-PORTABLE (1 VIEW)   Final Result      1. No acute cardiac or pulmonary abnormalities are identified.   2. Atherosclerosis.   3. Other stable chronic degenerative, posttraumatic and postsurgical changes.           LABORATORY  Lab Results   Component Value Date    SODIUM 139 09/29/2024    POTASSIUM 3.6 09/29/2024    CHLORIDE 103 09/29/2024    CO2 20 09/29/2024    GLUCOSE 93 09/29/2024    BUN 44 (H) 09/29/2024    CREATININE 2.28 (H) 09/29/2024    CREATININE 0.58 02/22/2013    GLOMRATE >59 06/22/2010        Lab Results   Component Value Date    WBC 7.4 09/29/2024    WBC 6.6 02/22/2013    HEMOGLOBIN 11.1 (L) 09/29/2024    HEMATOCRIT 34.2 (L) 09/29/2024    PLATELETCT 141 (L) 09/29/2024        Total time of the discharge process exceeds 31 minutes.

## 2024-09-30 NOTE — CARE PLAN
The patient is Stable - Low risk of patient condition declining or worsening    Shift Goals  Clinical Goals: Pt will remain free from falls and injury; perform qshift sacral dressing change  Patient Goals: Sleep and rest  Family Goals: n/a    Progress made toward(s) clinical / shift goals:  Pt remained free from falls during this shift; pt turned and repositioned every 2 hrs to maintain skin integrity; dressing change performed, progressing in other goals.      Problem: Knowledge Deficit - Standard  Goal: Patient and family/care givers will demonstrate understanding of plan of care, disease process/condition, diagnostic tests and medications  Outcome: Progressing     Problem: Fall Risk  Goal: Patient will remain free from falls  Outcome: Progressing     Problem: Skin Integrity  Goal: Skin integrity is maintained or improved  Outcome: Progressing     Problem: Psychosocial  Goal: Patient's level of anxiety will decrease  Outcome: Progressing     Problem: Respiratory  Goal: Patient will achieve/maintain optimum respiratory ventilation and gas exchange  Outcome: Progressing     Patient is not progressing towards the following goals: NA

## 2024-10-01 ENCOUNTER — TELEPHONE (OUTPATIENT)
Dept: MEDICAL GROUP | Facility: MEDICAL CENTER | Age: 79
End: 2024-10-01
Payer: COMMERCIAL

## 2024-10-01 ENCOUNTER — DOCUMENTATION (OUTPATIENT)
Dept: HEALTH INFORMATION MANAGEMENT | Facility: OTHER | Age: 79
End: 2024-10-01
Payer: COMMERCIAL

## 2024-10-08 ENCOUNTER — OFFICE VISIT (OUTPATIENT)
Dept: CARDIOLOGY | Facility: MEDICAL CENTER | Age: 79
End: 2024-10-08
Attending: INTERNAL MEDICINE
Payer: COMMERCIAL

## 2024-10-08 VITALS
DIASTOLIC BLOOD PRESSURE: 52 MMHG | HEIGHT: 62 IN | WEIGHT: 130 LBS | OXYGEN SATURATION: 97 % | HEART RATE: 82 BPM | SYSTOLIC BLOOD PRESSURE: 108 MMHG | RESPIRATION RATE: 18 BRPM | BODY MASS INDEX: 23.92 KG/M2

## 2024-10-08 DIAGNOSIS — I36.1 NONRHEUMATIC TRICUSPID VALVE REGURGITATION: ICD-10-CM

## 2024-10-08 DIAGNOSIS — I10 PRIMARY HYPERTENSION: ICD-10-CM

## 2024-10-08 DIAGNOSIS — I25.10 CORONARY ARTERY DISEASE INVOLVING NATIVE CORONARY ARTERY OF NATIVE HEART WITHOUT ANGINA PECTORIS: ICD-10-CM

## 2024-10-08 DIAGNOSIS — I65.21 CAROTID STENOSIS, ASYMPTOMATIC, RIGHT: ICD-10-CM

## 2024-10-08 DIAGNOSIS — E78.5 DYSLIPIDEMIA: ICD-10-CM

## 2024-10-08 PROCEDURE — 3078F DIAST BP <80 MM HG: CPT | Performed by: INTERNAL MEDICINE

## 2024-10-08 PROCEDURE — 3074F SYST BP LT 130 MM HG: CPT | Performed by: INTERNAL MEDICINE

## 2024-10-08 PROCEDURE — 99214 OFFICE O/P EST MOD 30 MIN: CPT | Performed by: INTERNAL MEDICINE

## 2024-10-08 PROCEDURE — 99213 OFFICE O/P EST LOW 20 MIN: CPT | Performed by: INTERNAL MEDICINE

## 2024-10-08 ASSESSMENT — ENCOUNTER SYMPTOMS
CLAUDICATION: 0
WEAKNESS: 0
COUGH: 0
HEADACHES: 0
MUSCULOSKELETAL NEGATIVE: 1
FOCAL WEAKNESS: 0
NERVOUS/ANXIOUS: 0
BRUISES/BLEEDS EASILY: 0
GASTROINTESTINAL NEGATIVE: 1
EYES NEGATIVE: 1
FEVER: 0
VOMITING: 0
NAUSEA: 0
DOUBLE VISION: 0
NEUROLOGICAL NEGATIVE: 1
CHILLS: 0
DEPRESSION: 0
SHORTNESS OF BREATH: 0
BLURRED VISION: 0
WEIGHT LOSS: 0
ABDOMINAL PAIN: 0
MYALGIAS: 0
CONSTITUTIONAL NEGATIVE: 1
CARDIOVASCULAR NEGATIVE: 1
RESPIRATORY NEGATIVE: 1
DIZZINESS: 0
PALPITATIONS: 0
PSYCHIATRIC NEGATIVE: 1

## 2024-10-08 ASSESSMENT — FIBROSIS 4 INDEX: FIB4 SCORE: 1.82

## 2024-10-11 ENCOUNTER — APPOINTMENT (OUTPATIENT)
Dept: MEDICAL GROUP | Facility: MEDICAL CENTER | Age: 79
End: 2024-10-11
Payer: COMMERCIAL

## 2024-10-18 ENCOUNTER — PATIENT MESSAGE (OUTPATIENT)
Dept: MEDICAL GROUP | Facility: MEDICAL CENTER | Age: 79
End: 2024-10-18
Payer: COMMERCIAL

## 2024-10-18 DIAGNOSIS — T50.2X5A DIURETIC-INDUCED HYPOKALEMIA: ICD-10-CM

## 2024-10-18 DIAGNOSIS — I10 HYPERTENSION, UNSPECIFIED TYPE: ICD-10-CM

## 2024-10-18 DIAGNOSIS — E87.6 DIURETIC-INDUCED HYPOKALEMIA: ICD-10-CM

## 2024-10-21 ENCOUNTER — HOSPITAL ENCOUNTER (EMERGENCY)
Facility: MEDICAL CENTER | Age: 79
End: 2024-10-21
Attending: STUDENT IN AN ORGANIZED HEALTH CARE EDUCATION/TRAINING PROGRAM | Admitting: HOSPITALIST
Payer: COMMERCIAL

## 2024-10-21 ENCOUNTER — TELEPHONE (OUTPATIENT)
Dept: MEDICAL GROUP | Facility: MEDICAL CENTER | Age: 79
End: 2024-10-21

## 2024-10-21 ENCOUNTER — APPOINTMENT (OUTPATIENT)
Dept: RADIOLOGY | Facility: MEDICAL CENTER | Age: 79
End: 2024-10-21
Attending: STUDENT IN AN ORGANIZED HEALTH CARE EDUCATION/TRAINING PROGRAM
Payer: COMMERCIAL

## 2024-10-21 ENCOUNTER — HOSPITAL ENCOUNTER (INPATIENT)
Facility: MEDICAL CENTER | Age: 79
LOS: 4 days | DRG: 291 | End: 2024-10-25
Attending: STUDENT IN AN ORGANIZED HEALTH CARE EDUCATION/TRAINING PROGRAM | Admitting: STUDENT IN AN ORGANIZED HEALTH CARE EDUCATION/TRAINING PROGRAM
Payer: MEDICARE

## 2024-10-21 VITALS
HEIGHT: 62 IN | HEART RATE: 82 BPM | WEIGHT: 130.07 LBS | RESPIRATION RATE: 19 BRPM | DIASTOLIC BLOOD PRESSURE: 55 MMHG | TEMPERATURE: 99.2 F | BODY MASS INDEX: 23.94 KG/M2 | SYSTOLIC BLOOD PRESSURE: 99 MMHG | OXYGEN SATURATION: 94 %

## 2024-10-21 DIAGNOSIS — D72.829 LEUKOCYTOSIS, UNSPECIFIED TYPE: ICD-10-CM

## 2024-10-21 DIAGNOSIS — J81.0 ACUTE PULMONARY EDEMA (HCC): ICD-10-CM

## 2024-10-21 DIAGNOSIS — J96.01 ACUTE HYPOXIC RESPIRATORY FAILURE (HCC): ICD-10-CM

## 2024-10-21 DIAGNOSIS — R79.89 ELEVATED TROPONIN: ICD-10-CM

## 2024-10-21 DIAGNOSIS — J96.01 ACUTE HYPOXEMIC RESPIRATORY FAILURE (HCC): ICD-10-CM

## 2024-10-21 DIAGNOSIS — I27.20 PULMONARY HYPERTENSION (HCC): Primary | Chronic | ICD-10-CM

## 2024-10-21 DIAGNOSIS — I36.1 NONRHEUMATIC TRICUSPID VALVE REGURGITATION: ICD-10-CM

## 2024-10-21 DIAGNOSIS — I65.23 CAROTID ATHEROSCLEROSIS, BILATERAL: ICD-10-CM

## 2024-10-21 DIAGNOSIS — I50.9 ACUTE DECOMPENSATED HEART FAILURE (HCC): ICD-10-CM

## 2024-10-21 DIAGNOSIS — E85.89 OTHER AMYLOIDOSIS (HCC): ICD-10-CM

## 2024-10-21 PROBLEM — J44.9 COPD (CHRONIC OBSTRUCTIVE PULMONARY DISEASE) (HCC): Status: ACTIVE | Noted: 2024-10-21

## 2024-10-21 LAB
ALBUMIN SERPL BCP-MCNC: 3.7 G/DL (ref 3.2–4.9)
ALBUMIN/GLOB SERPL: 0.9 G/DL
ALP SERPL-CCNC: 106 U/L (ref 30–99)
ALT SERPL-CCNC: 9 U/L (ref 2–50)
ANION GAP SERPL CALC-SCNC: 21 MMOL/L (ref 7–16)
AST SERPL-CCNC: 13 U/L (ref 12–45)
BASE EXCESS BLDV CALC-SCNC: -10 MMOL/L
BASOPHILS # BLD AUTO: 0.7 % (ref 0–1.8)
BASOPHILS # BLD: 0.1 K/UL (ref 0–0.12)
BILIRUB SERPL-MCNC: 0.9 MG/DL (ref 0.1–1.5)
BODY TEMPERATURE: 36.7 CENTIGRADE
BUN SERPL-MCNC: 40 MG/DL (ref 8–22)
CALCIUM ALBUM COR SERPL-MCNC: 9.1 MG/DL (ref 8.5–10.5)
CALCIUM SERPL-MCNC: 8.9 MG/DL (ref 8.4–10.2)
CHLORIDE SERPL-SCNC: 103 MMOL/L (ref 96–112)
CO2 SERPL-SCNC: 15 MMOL/L (ref 20–33)
CREAT SERPL-MCNC: 2.27 MG/DL (ref 0.5–1.4)
EKG IMPRESSION: NORMAL
EOSINOPHIL # BLD AUTO: 0 K/UL (ref 0–0.51)
EOSINOPHIL NFR BLD: 0 % (ref 0–6.9)
ERYTHROCYTE [DISTWIDTH] IN BLOOD BY AUTOMATED COUNT: 55.3 FL (ref 35.9–50)
FLUAV RNA SPEC QL NAA+PROBE: NEGATIVE
FLUBV RNA SPEC QL NAA+PROBE: NEGATIVE
GFR SERPLBLD CREATININE-BSD FMLA CKD-EPI: 21 ML/MIN/1.73 M 2
GLOBULIN SER CALC-MCNC: 3.9 G/DL (ref 1.9–3.5)
GLUCOSE SERPL-MCNC: 122 MG/DL (ref 65–99)
HCO3 BLDV-SCNC: 16 MMOL/L (ref 24–28)
HCT VFR BLD AUTO: 30.8 % (ref 37–47)
HGB BLD-MCNC: 9.8 G/DL (ref 12–16)
IMM GRANULOCYTES # BLD AUTO: 0.17 K/UL (ref 0–0.11)
IMM GRANULOCYTES NFR BLD AUTO: 1.2 % (ref 0–0.9)
INHALED O2 FLOW RATE: ABNORMAL L/MIN
LYMPHOCYTES # BLD AUTO: 1.08 K/UL (ref 1–4.8)
LYMPHOCYTES NFR BLD: 7.4 % (ref 22–41)
MCH RBC QN AUTO: 30.1 PG (ref 27–33)
MCHC RBC AUTO-ENTMCNC: 31.8 G/DL (ref 32.2–35.5)
MCV RBC AUTO: 94.5 FL (ref 81.4–97.8)
MONOCYTES # BLD AUTO: 1.18 K/UL (ref 0–0.85)
MONOCYTES NFR BLD AUTO: 8.1 % (ref 0–13.4)
NEUTROPHILS # BLD AUTO: 12.02 K/UL (ref 1.82–7.42)
NEUTROPHILS NFR BLD: 82.6 % (ref 44–72)
NRBC # BLD AUTO: 0.02 K/UL
NRBC BLD-RTO: 0.1 /100 WBC (ref 0–0.2)
NT-PROBNP SERPL IA-MCNC: ABNORMAL PG/ML (ref 0–125)
PCO2 BLDV: 31.4 MMHG (ref 41–51)
PH BLDV: 7.31 [PH] (ref 7.31–7.45)
PLATELET # BLD AUTO: 453 K/UL (ref 164–446)
PMV BLD AUTO: 9.6 FL (ref 9–12.9)
PO2 BLDV: 33.7 MMHG (ref 25–40)
POTASSIUM SERPL-SCNC: 4.5 MMOL/L (ref 3.6–5.5)
PROT SERPL-MCNC: 7.6 G/DL (ref 6–8.2)
RBC # BLD AUTO: 3.26 M/UL (ref 4.2–5.4)
RSV RNA SPEC QL NAA+PROBE: NEGATIVE
SAO2 % BLDV: 54 %
SARS-COV-2 RNA RESP QL NAA+PROBE: NOTDETECTED
SODIUM SERPL-SCNC: 139 MMOL/L (ref 135–145)
SPECIMEN SOURCE: NORMAL
TROPONIN T SERPL-MCNC: 99 NG/L (ref 6–19)
WBC # BLD AUTO: 14.6 K/UL (ref 4.8–10.8)

## 2024-10-21 PROCEDURE — 83880 ASSAY OF NATRIURETIC PEPTIDE: CPT

## 2024-10-21 PROCEDURE — 71045 X-RAY EXAM CHEST 1 VIEW: CPT

## 2024-10-21 PROCEDURE — 71275 CT ANGIOGRAPHY CHEST: CPT

## 2024-10-21 PROCEDURE — 770020 HCHG ROOM/CARE - TELE (206)

## 2024-10-21 PROCEDURE — 700117 HCHG RX CONTRAST REV CODE 255: Performed by: STUDENT IN AN ORGANIZED HEALTH CARE EDUCATION/TRAINING PROGRAM

## 2024-10-21 PROCEDURE — 84484 ASSAY OF TROPONIN QUANT: CPT

## 2024-10-21 PROCEDURE — 99223 1ST HOSP IP/OBS HIGH 75: CPT | Performed by: STUDENT IN AN ORGANIZED HEALTH CARE EDUCATION/TRAINING PROGRAM

## 2024-10-21 PROCEDURE — 700111 HCHG RX REV CODE 636 W/ 250 OVERRIDE (IP): Performed by: STUDENT IN AN ORGANIZED HEALTH CARE EDUCATION/TRAINING PROGRAM

## 2024-10-21 PROCEDURE — 85025 COMPLETE CBC W/AUTO DIFF WBC: CPT

## 2024-10-21 PROCEDURE — 0241U HCHG SARS-COV-2 COVID-19 NFCT DS RESP RNA 4 TRGT MIC: CPT

## 2024-10-21 PROCEDURE — 82803 BLOOD GASES ANY COMBINATION: CPT

## 2024-10-21 PROCEDURE — 93005 ELECTROCARDIOGRAM TRACING: CPT

## 2024-10-21 PROCEDURE — 99285 EMERGENCY DEPT VISIT HI MDM: CPT

## 2024-10-21 PROCEDURE — 96374 THER/PROPH/DIAG INJ IV PUSH: CPT

## 2024-10-21 PROCEDURE — 80053 COMPREHEN METABOLIC PANEL: CPT

## 2024-10-21 PROCEDURE — 36415 COLL VENOUS BLD VENIPUNCTURE: CPT

## 2024-10-21 RX ORDER — METOPROLOL SUCCINATE 25 MG/1
25 TABLET, EXTENDED RELEASE ORAL DAILY
Status: DISCONTINUED | OUTPATIENT
Start: 2024-10-22 | End: 2024-10-25 | Stop reason: HOSPADM

## 2024-10-21 RX ORDER — POTASSIUM CHLORIDE 750 MG/1
20 TABLET, EXTENDED RELEASE ORAL EVERY EVENING
Status: ON HOLD | COMMUNITY
End: 2024-10-25

## 2024-10-21 RX ORDER — ROSUVASTATIN CALCIUM 10 MG/1
10 TABLET, COATED ORAL DAILY
Status: DISCONTINUED | OUTPATIENT
Start: 2024-10-22 | End: 2024-10-25 | Stop reason: HOSPADM

## 2024-10-21 RX ORDER — HEPARIN SODIUM 5000 [USP'U]/ML
5000 INJECTION, SOLUTION INTRAVENOUS; SUBCUTANEOUS EVERY 8 HOURS
Status: DISCONTINUED | OUTPATIENT
Start: 2024-10-21 | End: 2024-10-25 | Stop reason: HOSPADM

## 2024-10-21 RX ORDER — TIOTROPIUM BROMIDE INHALATION SPRAY 3.12 UG/1
5 SPRAY, METERED RESPIRATORY (INHALATION) EVERY EVENING
COMMUNITY

## 2024-10-21 RX ORDER — ASPIRIN 81 MG/1
81 TABLET ORAL DAILY
Status: DISCONTINUED | OUTPATIENT
Start: 2024-10-22 | End: 2024-10-25 | Stop reason: HOSPADM

## 2024-10-21 RX ORDER — FUROSEMIDE 10 MG/ML
40 INJECTION INTRAMUSCULAR; INTRAVENOUS
Status: DISCONTINUED | OUTPATIENT
Start: 2024-10-21 | End: 2024-10-24

## 2024-10-21 RX ORDER — LOSARTAN POTASSIUM 50 MG/1
100 TABLET ORAL DAILY
Status: DISCONTINUED | OUTPATIENT
Start: 2024-10-22 | End: 2024-10-25 | Stop reason: HOSPADM

## 2024-10-21 RX ORDER — ONDANSETRON 2 MG/ML
4 INJECTION INTRAMUSCULAR; INTRAVENOUS EVERY 4 HOURS PRN
Status: DISCONTINUED | OUTPATIENT
Start: 2024-10-21 | End: 2024-10-25 | Stop reason: HOSPADM

## 2024-10-21 RX ORDER — HYDRALAZINE HYDROCHLORIDE 20 MG/ML
10 INJECTION INTRAMUSCULAR; INTRAVENOUS EVERY 4 HOURS PRN
Status: DISCONTINUED | OUTPATIENT
Start: 2024-10-21 | End: 2024-10-25 | Stop reason: HOSPADM

## 2024-10-21 RX ORDER — FUROSEMIDE 10 MG/ML
40 INJECTION INTRAMUSCULAR; INTRAVENOUS ONCE
Status: COMPLETED | OUTPATIENT
Start: 2024-10-21 | End: 2024-10-21

## 2024-10-21 RX ORDER — ONDANSETRON 4 MG/1
4 TABLET, ORALLY DISINTEGRATING ORAL EVERY 4 HOURS PRN
Status: DISCONTINUED | OUTPATIENT
Start: 2024-10-21 | End: 2024-10-25 | Stop reason: HOSPADM

## 2024-10-21 RX ORDER — SODIUM BICARBONATE 650 MG/1
1300 TABLET ORAL 3 TIMES DAILY
Status: DISCONTINUED | OUTPATIENT
Start: 2024-10-21 | End: 2024-10-25 | Stop reason: HOSPADM

## 2024-10-21 RX ORDER — AMLODIPINE BESYLATE 10 MG/1
10 TABLET ORAL DAILY
Status: DISCONTINUED | OUTPATIENT
Start: 2024-10-22 | End: 2024-10-25

## 2024-10-21 RX ORDER — ACETAMINOPHEN 325 MG/1
650 TABLET ORAL EVERY 6 HOURS PRN
Status: DISCONTINUED | OUTPATIENT
Start: 2024-10-21 | End: 2024-10-25 | Stop reason: HOSPADM

## 2024-10-21 RX ADMIN — HEPARIN SODIUM 5000 UNITS: 5000 INJECTION, SOLUTION INTRAVENOUS; SUBCUTANEOUS at 23:55

## 2024-10-21 RX ADMIN — FUROSEMIDE 40 MG: 10 INJECTION, SOLUTION INTRAMUSCULAR; INTRAVENOUS at 15:35

## 2024-10-21 RX ADMIN — IOHEXOL 72 ML: 350 INJECTION, SOLUTION INTRAVENOUS at 18:06

## 2024-10-21 RX ADMIN — FUROSEMIDE 40 MG: 10 INJECTION, SOLUTION INTRAMUSCULAR; INTRAVENOUS at 23:55

## 2024-10-21 ASSESSMENT — PATIENT HEALTH QUESTIONNAIRE - PHQ9
2. FEELING DOWN, DEPRESSED, IRRITABLE, OR HOPELESS: NOT AT ALL
SUM OF ALL RESPONSES TO PHQ9 QUESTIONS 1 AND 2: 0
1. LITTLE INTEREST OR PLEASURE IN DOING THINGS: NOT AT ALL

## 2024-10-21 ASSESSMENT — SOCIAL DETERMINANTS OF HEALTH (SDOH)
WITHIN THE LAST YEAR, HAVE YOU BEEN KICKED, HIT, SLAPPED, OR OTHERWISE PHYSICALLY HURT BY YOUR PARTNER OR EX-PARTNER?: NO
WITHIN THE LAST YEAR, HAVE YOU BEEN AFRAID OF YOUR PARTNER OR EX-PARTNER?: NO
WITHIN THE LAST YEAR, HAVE TO BEEN RAPED OR FORCED TO HAVE ANY KIND OF SEXUAL ACTIVITY BY YOUR PARTNER OR EX-PARTNER?: NO
WITHIN THE LAST YEAR, HAVE YOU BEEN HUMILIATED OR EMOTIONALLY ABUSED IN OTHER WAYS BY YOUR PARTNER OR EX-PARTNER?: NO

## 2024-10-21 ASSESSMENT — LIFESTYLE VARIABLES
AVERAGE NUMBER OF DAYS PER WEEK YOU HAVE A DRINK CONTAINING ALCOHOL: 0
HOW MANY TIMES IN THE PAST YEAR HAVE YOU HAD 5 OR MORE DRINKS IN A DAY: 0
TOTAL SCORE: 0
HAVE YOU EVER FELT YOU SHOULD CUT DOWN ON YOUR DRINKING: NO
ALCOHOL_USE: NO
HAVE PEOPLE ANNOYED YOU BY CRITICIZING YOUR DRINKING: NO
TOTAL SCORE: 0
EVER HAD A DRINK FIRST THING IN THE MORNING TO STEADY YOUR NERVES TO GET RID OF A HANGOVER: NO
ON A TYPICAL DAY WHEN YOU DRINK ALCOHOL HOW MANY DRINKS DO YOU HAVE: 0
EVER FELT BAD OR GUILTY ABOUT YOUR DRINKING: NO
SUBSTANCE_ABUSE: 0
CONSUMPTION TOTAL: NEGATIVE
DOES PATIENT WANT TO STOP DRINKING: NO
TOTAL SCORE: 0

## 2024-10-21 ASSESSMENT — ENCOUNTER SYMPTOMS
ABDOMINAL PAIN: 0
DIZZINESS: 0
DOUBLE VISION: 0
EYE DISCHARGE: 0
ORTHOPNEA: 0
SHORTNESS OF BREATH: 1
EYE PAIN: 0
FEVER: 0
CHILLS: 0
PALPITATIONS: 0
HEMOPTYSIS: 0
BACK PAIN: 0
PHOTOPHOBIA: 0
HEADACHES: 0
NERVOUS/ANXIOUS: 0
DIARRHEA: 0
SPUTUM PRODUCTION: 0
HALLUCINATIONS: 0
VOMITING: 0
NECK PAIN: 0
NAUSEA: 0
INSOMNIA: 0

## 2024-10-21 ASSESSMENT — PAIN DESCRIPTION - PAIN TYPE: TYPE: ACUTE PAIN

## 2024-10-21 ASSESSMENT — FIBROSIS 4 INDEX: FIB4 SCORE: 1.82

## 2024-10-22 PROBLEM — I50.9 ACUTE DECOMPENSATED HEART FAILURE (HCC): Status: ACTIVE | Noted: 2024-10-22

## 2024-10-22 LAB
ALBUMIN SERPL BCP-MCNC: 3.4 G/DL (ref 3.2–4.9)
ALBUMIN/GLOB SERPL: 1 G/DL
ALP SERPL-CCNC: 93 U/L (ref 30–99)
ALT SERPL-CCNC: 8 U/L (ref 2–50)
ANION GAP SERPL CALC-SCNC: 14 MMOL/L (ref 7–16)
APPEARANCE UR: CLEAR
AST SERPL-CCNC: 12 U/L (ref 12–45)
BACTERIA #/AREA URNS HPF: NEGATIVE /HPF
BILIRUB SERPL-MCNC: 0.7 MG/DL (ref 0.1–1.5)
BILIRUB UR QL STRIP.AUTO: NEGATIVE
BUN SERPL-MCNC: 40 MG/DL (ref 8–22)
CALCIUM ALBUM COR SERPL-MCNC: 9.2 MG/DL (ref 8.5–10.5)
CALCIUM SERPL-MCNC: 8.7 MG/DL (ref 8.5–10.5)
CHLORIDE SERPL-SCNC: 100 MMOL/L (ref 96–112)
CO2 SERPL-SCNC: 20 MMOL/L (ref 20–33)
COLOR UR: YELLOW
CREAT SERPL-MCNC: 2.32 MG/DL (ref 0.5–1.4)
EPI CELLS #/AREA URNS HPF: NEGATIVE /HPF
GFR SERPLBLD CREATININE-BSD FMLA CKD-EPI: 21 ML/MIN/1.73 M 2
GLOBULIN SER CALC-MCNC: 3.5 G/DL (ref 1.9–3.5)
GLUCOSE SERPL-MCNC: 101 MG/DL (ref 65–99)
GLUCOSE UR STRIP.AUTO-MCNC: NEGATIVE MG/DL
HYALINE CASTS #/AREA URNS LPF: ABNORMAL /LPF
KETONES UR STRIP.AUTO-MCNC: NEGATIVE MG/DL
LEUKOCYTE ESTERASE UR QL STRIP.AUTO: ABNORMAL
MICRO URNS: ABNORMAL
NITRITE UR QL STRIP.AUTO: NEGATIVE
PH UR STRIP.AUTO: 7 [PH] (ref 5–8)
POTASSIUM SERPL-SCNC: 3.8 MMOL/L (ref 3.6–5.5)
PROCALCITONIN SERPL-MCNC: 0.77 NG/ML
PROT SERPL-MCNC: 6.9 G/DL (ref 6–8.2)
PROT UR QL STRIP: 30 MG/DL
RBC # URNS HPF: ABNORMAL /HPF
RBC UR QL AUTO: NEGATIVE
SODIUM SERPL-SCNC: 134 MMOL/L (ref 135–145)
SP GR UR STRIP.AUTO: 1.01
UROBILINOGEN UR STRIP.AUTO-MCNC: 0.2 MG/DL
WBC #/AREA URNS HPF: ABNORMAL /HPF

## 2024-10-22 PROCEDURE — 770020 HCHG ROOM/CARE - TELE (206)

## 2024-10-22 PROCEDURE — 99223 1ST HOSP IP/OBS HIGH 75: CPT | Performed by: INTERNAL MEDICINE

## 2024-10-22 PROCEDURE — A9270 NON-COVERED ITEM OR SERVICE: HCPCS | Performed by: INTERNAL MEDICINE

## 2024-10-22 PROCEDURE — 700102 HCHG RX REV CODE 250 W/ 637 OVERRIDE(OP): Performed by: INTERNAL MEDICINE

## 2024-10-22 PROCEDURE — 700102 HCHG RX REV CODE 250 W/ 637 OVERRIDE(OP): Performed by: STUDENT IN AN ORGANIZED HEALTH CARE EDUCATION/TRAINING PROGRAM

## 2024-10-22 PROCEDURE — 80053 COMPREHEN METABOLIC PANEL: CPT

## 2024-10-22 PROCEDURE — A9270 NON-COVERED ITEM OR SERVICE: HCPCS | Performed by: STUDENT IN AN ORGANIZED HEALTH CARE EDUCATION/TRAINING PROGRAM

## 2024-10-22 PROCEDURE — 97602 WOUND(S) CARE NON-SELECTIVE: CPT

## 2024-10-22 PROCEDURE — 99233 SBSQ HOSP IP/OBS HIGH 50: CPT | Performed by: STUDENT IN AN ORGANIZED HEALTH CARE EDUCATION/TRAINING PROGRAM

## 2024-10-22 PROCEDURE — 36415 COLL VENOUS BLD VENIPUNCTURE: CPT

## 2024-10-22 PROCEDURE — 81001 URINALYSIS AUTO W/SCOPE: CPT

## 2024-10-22 PROCEDURE — 700111 HCHG RX REV CODE 636 W/ 250 OVERRIDE (IP): Performed by: STUDENT IN AN ORGANIZED HEALTH CARE EDUCATION/TRAINING PROGRAM

## 2024-10-22 PROCEDURE — 84145 PROCALCITONIN (PCT): CPT

## 2024-10-22 RX ORDER — DAPAGLIFLOZIN 10 MG/1
10 TABLET, FILM COATED ORAL DAILY
Status: DISCONTINUED | OUTPATIENT
Start: 2024-10-22 | End: 2024-10-22

## 2024-10-22 RX ORDER — LOSARTAN POTASSIUM 100 MG/1
100 TABLET ORAL
Qty: 90 TABLET | Refills: 2 | Status: SHIPPED | OUTPATIENT
Start: 2024-10-22

## 2024-10-22 RX ORDER — POTASSIUM CHLORIDE 750 MG/1
10 TABLET, EXTENDED RELEASE ORAL 2 TIMES DAILY
Qty: 90 TABLET | Refills: 3 | Status: SHIPPED | OUTPATIENT
Start: 2024-10-22

## 2024-10-22 RX ADMIN — ROSUVASTATIN CALCIUM 10 MG: 10 TABLET, FILM COATED ORAL at 05:13

## 2024-10-22 RX ADMIN — SODIUM BICARBONATE 1300 MG: 650 TABLET ORAL at 01:38

## 2024-10-22 RX ADMIN — ASPIRIN 81 MG: 81 TABLET, COATED ORAL at 05:13

## 2024-10-22 RX ADMIN — SODIUM BICARBONATE 1300 MG: 650 TABLET ORAL at 09:36

## 2024-10-22 RX ADMIN — SODIUM BICARBONATE 1300 MG: 650 TABLET ORAL at 14:14

## 2024-10-22 RX ADMIN — HEPARIN SODIUM 5000 UNITS: 5000 INJECTION, SOLUTION INTRAVENOUS; SUBCUTANEOUS at 05:14

## 2024-10-22 RX ADMIN — DAPAGLIFLOZIN 10 MG: 10 TABLET, FILM COATED ORAL at 11:39

## 2024-10-22 RX ADMIN — HEPARIN SODIUM 5000 UNITS: 5000 INJECTION, SOLUTION INTRAVENOUS; SUBCUTANEOUS at 14:14

## 2024-10-22 RX ADMIN — FUROSEMIDE 40 MG: 10 INJECTION, SOLUTION INTRAMUSCULAR; INTRAVENOUS at 05:13

## 2024-10-22 RX ADMIN — HEPARIN SODIUM 5000 UNITS: 5000 INJECTION, SOLUTION INTRAVENOUS; SUBCUTANEOUS at 21:37

## 2024-10-22 RX ADMIN — MOMETASONE FUROATE AND FORMOTEROL FUMARATE DIHYDRATE 2 PUFF: 100; 5 AEROSOL RESPIRATORY (INHALATION) at 16:58

## 2024-10-22 RX ADMIN — MOMETASONE FUROATE AND FORMOTEROL FUMARATE DIHYDRATE 2 PUFF: 100; 5 AEROSOL RESPIRATORY (INHALATION) at 05:15

## 2024-10-22 RX ADMIN — TIOTROPIUM BROMIDE INHALATION SPRAY 5 MCG: 3.12 SPRAY, METERED RESPIRATORY (INHALATION) at 05:20

## 2024-10-22 RX ADMIN — LOSARTAN POTASSIUM 100 MG: 50 TABLET, FILM COATED ORAL at 05:13

## 2024-10-22 RX ADMIN — METOPROLOL SUCCINATE 25 MG: 25 TABLET, EXTENDED RELEASE ORAL at 05:13

## 2024-10-22 RX ADMIN — AMLODIPINE BESYLATE 10 MG: 10 TABLET ORAL at 05:14

## 2024-10-22 RX ADMIN — FUROSEMIDE 40 MG: 10 INJECTION, SOLUTION INTRAMUSCULAR; INTRAVENOUS at 16:56

## 2024-10-22 RX ADMIN — SODIUM BICARBONATE 1300 MG: 650 TABLET ORAL at 21:37

## 2024-10-22 RX ADMIN — MOMETASONE FUROATE AND FORMOTEROL FUMARATE DIHYDRATE 2 PUFF: 100; 5 AEROSOL RESPIRATORY (INHALATION) at 01:38

## 2024-10-22 ASSESSMENT — COGNITIVE AND FUNCTIONAL STATUS - GENERAL
MOVING TO AND FROM BED TO CHAIR: A LITTLE
TURNING FROM BACK TO SIDE WHILE IN FLAT BAD: A LITTLE
MOVING FROM LYING ON BACK TO SITTING ON SIDE OF FLAT BED: A LITTLE
SUGGESTED CMS G CODE MODIFIER DAILY ACTIVITY: CH
DAILY ACTIVITIY SCORE: 24
CLIMB 3 TO 5 STEPS WITH RAILING: A LITTLE
SUGGESTED CMS G CODE MODIFIER MOBILITY: CK
STANDING UP FROM CHAIR USING ARMS: A LITTLE
WALKING IN HOSPITAL ROOM: A LITTLE
MOBILITY SCORE: 18

## 2024-10-22 ASSESSMENT — FIBROSIS 4 INDEX: FIB4 SCORE: 0.77

## 2024-10-22 ASSESSMENT — ENCOUNTER SYMPTOMS: SHORTNESS OF BREATH: 1

## 2024-10-22 ASSESSMENT — PAIN DESCRIPTION - PAIN TYPE
TYPE: ACUTE PAIN
TYPE: ACUTE PAIN

## 2024-10-23 ENCOUNTER — APPOINTMENT (OUTPATIENT)
Dept: CARDIOLOGY | Facility: MEDICAL CENTER | Age: 79
DRG: 291 | End: 2024-10-23
Attending: STUDENT IN AN ORGANIZED HEALTH CARE EDUCATION/TRAINING PROGRAM
Payer: MEDICARE

## 2024-10-23 LAB
ERYTHROCYTE [DISTWIDTH] IN BLOOD BY AUTOMATED COUNT: 54.4 FL (ref 35.9–50)
HCT VFR BLD AUTO: 30.5 % (ref 37–47)
HGB BLD-MCNC: 9.9 G/DL (ref 12–16)
LV EJECT FRACT MOD 2C 99903: 60.69
LV EJECT FRACT MOD 4C 99902: 54.66
LV EJECT FRACT MOD BP 99901: 57.82
MCH RBC QN AUTO: 30.4 PG (ref 27–33)
MCHC RBC AUTO-ENTMCNC: 32.5 G/DL (ref 32.2–35.5)
MCV RBC AUTO: 93.6 FL (ref 81.4–97.8)
PLATELET # BLD AUTO: 428 K/UL (ref 164–446)
PMV BLD AUTO: 9.5 FL (ref 9–12.9)
RBC # BLD AUTO: 3.26 M/UL (ref 4.2–5.4)
WBC # BLD AUTO: 12.5 K/UL (ref 4.8–10.8)

## 2024-10-23 PROCEDURE — 99232 SBSQ HOSP IP/OBS MODERATE 35: CPT | Performed by: STUDENT IN AN ORGANIZED HEALTH CARE EDUCATION/TRAINING PROGRAM

## 2024-10-23 PROCEDURE — 93325 DOPPLER ECHO COLOR FLOW MAPG: CPT | Mod: 26 | Performed by: STUDENT IN AN ORGANIZED HEALTH CARE EDUCATION/TRAINING PROGRAM

## 2024-10-23 PROCEDURE — 93321 DOPPLER ECHO F-UP/LMTD STD: CPT | Mod: 26 | Performed by: STUDENT IN AN ORGANIZED HEALTH CARE EDUCATION/TRAINING PROGRAM

## 2024-10-23 PROCEDURE — 93308 TTE F-UP OR LMTD: CPT | Mod: 26 | Performed by: STUDENT IN AN ORGANIZED HEALTH CARE EDUCATION/TRAINING PROGRAM

## 2024-10-23 PROCEDURE — 36415 COLL VENOUS BLD VENIPUNCTURE: CPT

## 2024-10-23 PROCEDURE — 99232 SBSQ HOSP IP/OBS MODERATE 35: CPT | Mod: FS | Performed by: INTERNAL MEDICINE

## 2024-10-23 PROCEDURE — 770020 HCHG ROOM/CARE - TELE (206)

## 2024-10-23 PROCEDURE — 97166 OT EVAL MOD COMPLEX 45 MIN: CPT

## 2024-10-23 PROCEDURE — 700102 HCHG RX REV CODE 250 W/ 637 OVERRIDE(OP): Performed by: STUDENT IN AN ORGANIZED HEALTH CARE EDUCATION/TRAINING PROGRAM

## 2024-10-23 PROCEDURE — A9270 NON-COVERED ITEM OR SERVICE: HCPCS | Performed by: STUDENT IN AN ORGANIZED HEALTH CARE EDUCATION/TRAINING PROGRAM

## 2024-10-23 PROCEDURE — 93321 DOPPLER ECHO F-UP/LMTD STD: CPT

## 2024-10-23 PROCEDURE — 700111 HCHG RX REV CODE 636 W/ 250 OVERRIDE (IP): Performed by: STUDENT IN AN ORGANIZED HEALTH CARE EDUCATION/TRAINING PROGRAM

## 2024-10-23 PROCEDURE — 97535 SELF CARE MNGMENT TRAINING: CPT

## 2024-10-23 PROCEDURE — 85027 COMPLETE CBC AUTOMATED: CPT

## 2024-10-23 PROCEDURE — 97162 PT EVAL MOD COMPLEX 30 MIN: CPT

## 2024-10-23 RX ADMIN — ROSUVASTATIN CALCIUM 10 MG: 10 TABLET, FILM COATED ORAL at 04:23

## 2024-10-23 RX ADMIN — MOMETASONE FUROATE AND FORMOTEROL FUMARATE DIHYDRATE 2 PUFF: 100; 5 AEROSOL RESPIRATORY (INHALATION) at 17:06

## 2024-10-23 RX ADMIN — HEPARIN SODIUM 5000 UNITS: 5000 INJECTION, SOLUTION INTRAVENOUS; SUBCUTANEOUS at 20:52

## 2024-10-23 RX ADMIN — HEPARIN SODIUM 5000 UNITS: 5000 INJECTION, SOLUTION INTRAVENOUS; SUBCUTANEOUS at 14:38

## 2024-10-23 RX ADMIN — MOMETASONE FUROATE AND FORMOTEROL FUMARATE DIHYDRATE 2 PUFF: 100; 5 AEROSOL RESPIRATORY (INHALATION) at 04:23

## 2024-10-23 RX ADMIN — ASPIRIN 81 MG: 81 TABLET, COATED ORAL at 04:22

## 2024-10-23 RX ADMIN — SODIUM BICARBONATE 1300 MG: 650 TABLET ORAL at 10:01

## 2024-10-23 RX ADMIN — TIOTROPIUM BROMIDE INHALATION SPRAY 5 MCG: 3.12 SPRAY, METERED RESPIRATORY (INHALATION) at 04:23

## 2024-10-23 RX ADMIN — SODIUM BICARBONATE 1300 MG: 650 TABLET ORAL at 20:52

## 2024-10-23 RX ADMIN — LOSARTAN POTASSIUM 100 MG: 50 TABLET, FILM COATED ORAL at 04:23

## 2024-10-23 RX ADMIN — METOPROLOL SUCCINATE 25 MG: 25 TABLET, EXTENDED RELEASE ORAL at 04:24

## 2024-10-23 RX ADMIN — AMLODIPINE BESYLATE 10 MG: 10 TABLET ORAL at 04:23

## 2024-10-23 RX ADMIN — FUROSEMIDE 40 MG: 10 INJECTION, SOLUTION INTRAMUSCULAR; INTRAVENOUS at 04:23

## 2024-10-23 RX ADMIN — FUROSEMIDE 40 MG: 10 INJECTION, SOLUTION INTRAMUSCULAR; INTRAVENOUS at 16:28

## 2024-10-23 RX ADMIN — SODIUM BICARBONATE 1300 MG: 650 TABLET ORAL at 14:38

## 2024-10-23 ASSESSMENT — GAIT ASSESSMENTS
DISTANCE (FEET): 150
ASSISTIVE DEVICE: FRONT WHEEL WALKER
DEVIATION: BRADYKINETIC;DECREASED TOE OFF
GAIT LEVEL OF ASSIST: STANDBY ASSIST

## 2024-10-23 ASSESSMENT — COGNITIVE AND FUNCTIONAL STATUS - GENERAL
DAILY ACTIVITIY SCORE: 21
SUGGESTED CMS G CODE MODIFIER MOBILITY: CH
MOBILITY SCORE: 24
PERSONAL GROOMING: A LITTLE
SUGGESTED CMS G CODE MODIFIER DAILY ACTIVITY: CJ
TOILETING: A LITTLE
HELP NEEDED FOR BATHING: A LITTLE

## 2024-10-23 ASSESSMENT — ENCOUNTER SYMPTOMS
PALPITATIONS: 0
AGITATION: 0
TROUBLE SWALLOWING: 0
FEVER: 0
COLOR CHANGE: 0
ABDOMINAL DISTENTION: 0
CHILLS: 0
DIZZINESS: 0
NUMBNESS: 0
CHEST TIGHTNESS: 0
SHORTNESS OF BREATH: 1
DIAPHORESIS: 0
BLOOD IN STOOL: 0
ABDOMINAL PAIN: 0
COUGH: 0
NERVOUS/ANXIOUS: 0
CONFUSION: 0

## 2024-10-23 ASSESSMENT — FIBROSIS 4 INDEX: FIB4 SCORE: 0.8

## 2024-10-23 ASSESSMENT — PAIN DESCRIPTION - PAIN TYPE: TYPE: ACUTE PAIN

## 2024-10-23 ASSESSMENT — SOCIAL DETERMINANTS OF HEALTH (SDOH)
WITHIN THE PAST 12 MONTHS, THE FOOD YOU BOUGHT JUST DIDN'T LAST AND YOU DIDN'T HAVE MONEY TO GET MORE: NEVER TRUE
IN THE PAST 12 MONTHS, HAS THE ELECTRIC, GAS, OIL, OR WATER COMPANY THREATENED TO SHUT OFF SERVICE IN YOUR HOME?: NO
WITHIN THE PAST 12 MONTHS, YOU WORRIED THAT YOUR FOOD WOULD RUN OUT BEFORE YOU GOT THE MONEY TO BUY MORE: NEVER TRUE

## 2024-10-23 ASSESSMENT — ACTIVITIES OF DAILY LIVING (ADL): TOILETING: INDEPENDENT

## 2024-10-24 LAB
ALBUMIN SERPL BCP-MCNC: 3.4 G/DL (ref 3.2–4.9)
ALBUMIN/GLOB SERPL: 1.1 G/DL
ALP SERPL-CCNC: 95 U/L (ref 30–99)
ALT SERPL-CCNC: 10 U/L (ref 2–50)
ANION GAP SERPL CALC-SCNC: 18 MMOL/L (ref 7–16)
AST SERPL-CCNC: 11 U/L (ref 12–45)
BILIRUB SERPL-MCNC: 0.6 MG/DL (ref 0.1–1.5)
BUN SERPL-MCNC: 52 MG/DL (ref 8–22)
CALCIUM ALBUM COR SERPL-MCNC: 9.2 MG/DL (ref 8.5–10.5)
CALCIUM SERPL-MCNC: 8.7 MG/DL (ref 8.5–10.5)
CHLORIDE SERPL-SCNC: 92 MMOL/L (ref 96–112)
CO2 SERPL-SCNC: 23 MMOL/L (ref 20–33)
CREAT SERPL-MCNC: 2.8 MG/DL (ref 0.5–1.4)
ERYTHROCYTE [DISTWIDTH] IN BLOOD BY AUTOMATED COUNT: 52.4 FL (ref 35.9–50)
GFR SERPLBLD CREATININE-BSD FMLA CKD-EPI: 16 ML/MIN/1.73 M 2
GLOBULIN SER CALC-MCNC: 3.2 G/DL (ref 1.9–3.5)
GLUCOSE SERPL-MCNC: 100 MG/DL (ref 65–99)
HCT VFR BLD AUTO: 29.3 % (ref 37–47)
HGB BLD-MCNC: 9.5 G/DL (ref 12–16)
MAGNESIUM SERPL-MCNC: 1.9 MG/DL (ref 1.5–2.5)
MCH RBC QN AUTO: 29.8 PG (ref 27–33)
MCHC RBC AUTO-ENTMCNC: 32.4 G/DL (ref 32.2–35.5)
MCV RBC AUTO: 91.8 FL (ref 81.4–97.8)
PLATELET # BLD AUTO: 405 K/UL (ref 164–446)
PMV BLD AUTO: 9.7 FL (ref 9–12.9)
POTASSIUM SERPL-SCNC: 3.6 MMOL/L (ref 3.6–5.5)
PROT SERPL-MCNC: 6.6 G/DL (ref 6–8.2)
RBC # BLD AUTO: 3.19 M/UL (ref 4.2–5.4)
SODIUM SERPL-SCNC: 133 MMOL/L (ref 135–145)
WBC # BLD AUTO: 9.8 K/UL (ref 4.8–10.8)

## 2024-10-24 PROCEDURE — A9270 NON-COVERED ITEM OR SERVICE: HCPCS | Performed by: STUDENT IN AN ORGANIZED HEALTH CARE EDUCATION/TRAINING PROGRAM

## 2024-10-24 PROCEDURE — 700102 HCHG RX REV CODE 250 W/ 637 OVERRIDE(OP): Performed by: STUDENT IN AN ORGANIZED HEALTH CARE EDUCATION/TRAINING PROGRAM

## 2024-10-24 PROCEDURE — 85027 COMPLETE CBC AUTOMATED: CPT

## 2024-10-24 PROCEDURE — 99232 SBSQ HOSP IP/OBS MODERATE 35: CPT | Performed by: STUDENT IN AN ORGANIZED HEALTH CARE EDUCATION/TRAINING PROGRAM

## 2024-10-24 PROCEDURE — 83735 ASSAY OF MAGNESIUM: CPT

## 2024-10-24 PROCEDURE — 80053 COMPREHEN METABOLIC PANEL: CPT

## 2024-10-24 PROCEDURE — 99232 SBSQ HOSP IP/OBS MODERATE 35: CPT | Mod: FS | Performed by: INTERNAL MEDICINE

## 2024-10-24 PROCEDURE — 770020 HCHG ROOM/CARE - TELE (206)

## 2024-10-24 PROCEDURE — 700111 HCHG RX REV CODE 636 W/ 250 OVERRIDE (IP): Performed by: STUDENT IN AN ORGANIZED HEALTH CARE EDUCATION/TRAINING PROGRAM

## 2024-10-24 RX ORDER — FUROSEMIDE 40 MG/1
40 TABLET ORAL
Status: DISCONTINUED | OUTPATIENT
Start: 2024-10-24 | End: 2024-10-24

## 2024-10-24 RX ORDER — FUROSEMIDE 40 MG/1
40 TABLET ORAL
Status: DISCONTINUED | OUTPATIENT
Start: 2024-10-24 | End: 2024-10-25 | Stop reason: HOSPADM

## 2024-10-24 RX ORDER — FUROSEMIDE 40 MG/1
40 TABLET ORAL
Status: DISCONTINUED | OUTPATIENT
Start: 2024-10-25 | End: 2024-10-24

## 2024-10-24 RX ADMIN — HEPARIN SODIUM 5000 UNITS: 5000 INJECTION, SOLUTION INTRAVENOUS; SUBCUTANEOUS at 20:33

## 2024-10-24 RX ADMIN — SODIUM BICARBONATE 1300 MG: 650 TABLET ORAL at 09:20

## 2024-10-24 RX ADMIN — SODIUM BICARBONATE 1300 MG: 650 TABLET ORAL at 14:17

## 2024-10-24 RX ADMIN — METOPROLOL SUCCINATE 25 MG: 25 TABLET, EXTENDED RELEASE ORAL at 04:28

## 2024-10-24 RX ADMIN — FUROSEMIDE 40 MG: 40 TABLET ORAL at 14:17

## 2024-10-24 RX ADMIN — MOMETASONE FUROATE AND FORMOTEROL FUMARATE DIHYDRATE 2 PUFF: 100; 5 AEROSOL RESPIRATORY (INHALATION) at 18:00

## 2024-10-24 RX ADMIN — TIOTROPIUM BROMIDE INHALATION SPRAY 5 MCG: 3.12 SPRAY, METERED RESPIRATORY (INHALATION) at 04:26

## 2024-10-24 RX ADMIN — FUROSEMIDE 40 MG: 10 INJECTION, SOLUTION INTRAMUSCULAR; INTRAVENOUS at 04:26

## 2024-10-24 RX ADMIN — ROSUVASTATIN CALCIUM 10 MG: 10 TABLET, FILM COATED ORAL at 04:27

## 2024-10-24 RX ADMIN — LOSARTAN POTASSIUM 100 MG: 50 TABLET, FILM COATED ORAL at 04:28

## 2024-10-24 RX ADMIN — ASPIRIN 81 MG: 81 TABLET, COATED ORAL at 04:26

## 2024-10-24 RX ADMIN — HEPARIN SODIUM 5000 UNITS: 5000 INJECTION, SOLUTION INTRAVENOUS; SUBCUTANEOUS at 04:26

## 2024-10-24 RX ADMIN — SODIUM BICARBONATE 1300 MG: 650 TABLET ORAL at 20:32

## 2024-10-24 RX ADMIN — MOMETASONE FUROATE AND FORMOTEROL FUMARATE DIHYDRATE 2 PUFF: 100; 5 AEROSOL RESPIRATORY (INHALATION) at 04:26

## 2024-10-24 RX ADMIN — AMLODIPINE BESYLATE 10 MG: 10 TABLET ORAL at 04:28

## 2024-10-24 ASSESSMENT — ENCOUNTER SYMPTOMS
CHOKING: 0
COUGH: 0
SHORTNESS OF BREATH: 0
CHEST TIGHTNESS: 0
STRIDOR: 0
WHEEZING: 0
ORTHOPNEA: 0

## 2024-10-24 ASSESSMENT — PAIN DESCRIPTION - PAIN TYPE: TYPE: ACUTE PAIN

## 2024-10-24 ASSESSMENT — FIBROSIS 4 INDEX: FIB4 SCORE: 0.8

## 2024-10-25 ENCOUNTER — PHARMACY VISIT (OUTPATIENT)
Dept: PHARMACY | Facility: MEDICAL CENTER | Age: 79
End: 2024-10-25
Payer: COMMERCIAL

## 2024-10-25 VITALS
SYSTOLIC BLOOD PRESSURE: 93 MMHG | RESPIRATION RATE: 16 BRPM | DIASTOLIC BLOOD PRESSURE: 48 MMHG | WEIGHT: 122.14 LBS | OXYGEN SATURATION: 95 % | HEART RATE: 77 BPM | BODY MASS INDEX: 22.48 KG/M2 | TEMPERATURE: 97.3 F | HEIGHT: 62 IN

## 2024-10-25 LAB
ALBUMIN SERPL BCP-MCNC: 3.5 G/DL (ref 3.2–4.9)
ALBUMIN/GLOB SERPL: 0.9 G/DL
ALP SERPL-CCNC: 105 U/L (ref 30–99)
ALT SERPL-CCNC: <5 U/L (ref 2–50)
ANION GAP SERPL CALC-SCNC: 18 MMOL/L (ref 7–16)
AST SERPL-CCNC: 15 U/L (ref 12–45)
BILIRUB SERPL-MCNC: 0.6 MG/DL (ref 0.1–1.5)
BUN SERPL-MCNC: 60 MG/DL (ref 8–22)
CALCIUM ALBUM COR SERPL-MCNC: 9.2 MG/DL (ref 8.5–10.5)
CALCIUM SERPL-MCNC: 8.8 MG/DL (ref 8.5–10.5)
CHLORIDE SERPL-SCNC: 92 MMOL/L (ref 96–112)
CO2 SERPL-SCNC: 22 MMOL/L (ref 20–33)
CREAT SERPL-MCNC: 3.57 MG/DL (ref 0.5–1.4)
ERYTHROCYTE [DISTWIDTH] IN BLOOD BY AUTOMATED COUNT: 52.8 FL (ref 35.9–50)
GFR SERPLBLD CREATININE-BSD FMLA CKD-EPI: 12 ML/MIN/1.73 M 2
GLOBULIN SER CALC-MCNC: 3.7 G/DL (ref 1.9–3.5)
GLUCOSE SERPL-MCNC: 108 MG/DL (ref 65–99)
HCT VFR BLD AUTO: 30.1 % (ref 37–47)
HGB BLD-MCNC: 9.9 G/DL (ref 12–16)
MCH RBC QN AUTO: 30.2 PG (ref 27–33)
MCHC RBC AUTO-ENTMCNC: 32.9 G/DL (ref 32.2–35.5)
MCV RBC AUTO: 91.8 FL (ref 81.4–97.8)
PLATELET # BLD AUTO: 457 K/UL (ref 164–446)
PMV BLD AUTO: 10 FL (ref 9–12.9)
POTASSIUM SERPL-SCNC: 4 MMOL/L (ref 3.6–5.5)
PROT SERPL-MCNC: 7.2 G/DL (ref 6–8.2)
RBC # BLD AUTO: 3.28 M/UL (ref 4.2–5.4)
SODIUM SERPL-SCNC: 132 MMOL/L (ref 135–145)
WBC # BLD AUTO: 10.7 K/UL (ref 4.8–10.8)

## 2024-10-25 PROCEDURE — 99239 HOSP IP/OBS DSCHRG MGMT >30: CPT | Performed by: STUDENT IN AN ORGANIZED HEALTH CARE EDUCATION/TRAINING PROGRAM

## 2024-10-25 PROCEDURE — 80053 COMPREHEN METABOLIC PANEL: CPT

## 2024-10-25 PROCEDURE — 700102 HCHG RX REV CODE 250 W/ 637 OVERRIDE(OP): Performed by: STUDENT IN AN ORGANIZED HEALTH CARE EDUCATION/TRAINING PROGRAM

## 2024-10-25 PROCEDURE — A9270 NON-COVERED ITEM OR SERVICE: HCPCS | Performed by: STUDENT IN AN ORGANIZED HEALTH CARE EDUCATION/TRAINING PROGRAM

## 2024-10-25 PROCEDURE — 700111 HCHG RX REV CODE 636 W/ 250 OVERRIDE (IP): Performed by: STUDENT IN AN ORGANIZED HEALTH CARE EDUCATION/TRAINING PROGRAM

## 2024-10-25 PROCEDURE — 85027 COMPLETE CBC AUTOMATED: CPT

## 2024-10-25 PROCEDURE — RXMED WILLOW AMBULATORY MEDICATION CHARGE: Performed by: STUDENT IN AN ORGANIZED HEALTH CARE EDUCATION/TRAINING PROGRAM

## 2024-10-25 RX ORDER — FUROSEMIDE 40 MG/1
40 TABLET ORAL 2 TIMES DAILY
Qty: 60 TABLET | Refills: 0 | Status: SHIPPED | OUTPATIENT
Start: 2024-10-25 | End: 2024-11-24

## 2024-10-25 RX ADMIN — METOPROLOL SUCCINATE 25 MG: 25 TABLET, EXTENDED RELEASE ORAL at 04:14

## 2024-10-25 RX ADMIN — HEPARIN SODIUM 5000 UNITS: 5000 INJECTION, SOLUTION INTRAVENOUS; SUBCUTANEOUS at 04:14

## 2024-10-25 RX ADMIN — LOSARTAN POTASSIUM 100 MG: 50 TABLET, FILM COATED ORAL at 04:14

## 2024-10-25 RX ADMIN — ASPIRIN 81 MG: 81 TABLET, COATED ORAL at 04:14

## 2024-10-25 RX ADMIN — MOMETASONE FUROATE AND FORMOTEROL FUMARATE DIHYDRATE 2 PUFF: 100; 5 AEROSOL RESPIRATORY (INHALATION) at 04:15

## 2024-10-25 RX ADMIN — ROSUVASTATIN CALCIUM 10 MG: 10 TABLET, FILM COATED ORAL at 04:14

## 2024-10-25 RX ADMIN — AMLODIPINE BESYLATE 10 MG: 10 TABLET ORAL at 04:14

## 2024-10-25 RX ADMIN — FUROSEMIDE 40 MG: 40 TABLET ORAL at 04:13

## 2024-10-25 RX ADMIN — SODIUM BICARBONATE 1300 MG: 650 TABLET ORAL at 09:01

## 2024-10-25 RX ADMIN — TIOTROPIUM BROMIDE INHALATION SPRAY 5 MCG: 3.12 SPRAY, METERED RESPIRATORY (INHALATION) at 04:15

## 2024-10-25 ASSESSMENT — FIBROSIS 4 INDEX: FIB4 SCORE: 1.25

## 2024-10-28 ENCOUNTER — TELEPHONE (OUTPATIENT)
Dept: VASCULAR LAB | Facility: MEDICAL CENTER | Age: 79
End: 2024-10-28
Payer: COMMERCIAL

## 2024-10-29 ENCOUNTER — OFFICE VISIT (OUTPATIENT)
Dept: MEDICAL GROUP | Facility: MEDICAL CENTER | Age: 79
End: 2024-10-29
Payer: COMMERCIAL

## 2024-10-29 VITALS
BODY MASS INDEX: 22.56 KG/M2 | SYSTOLIC BLOOD PRESSURE: 100 MMHG | TEMPERATURE: 97.9 F | OXYGEN SATURATION: 89 % | WEIGHT: 122.6 LBS | HEIGHT: 62 IN | RESPIRATION RATE: 18 BRPM | DIASTOLIC BLOOD PRESSURE: 60 MMHG | HEART RATE: 83 BPM

## 2024-10-29 DIAGNOSIS — N18.31 STAGE 3A CHRONIC KIDNEY DISEASE: ICD-10-CM

## 2024-10-29 DIAGNOSIS — I25.10 CORONARY ARTERY DISEASE INVOLVING NATIVE CORONARY ARTERY WITHOUT ANGINA PECTORIS, UNSPECIFIED WHETHER NATIVE OR TRANSPLANTED HEART: ICD-10-CM

## 2024-10-29 DIAGNOSIS — J96.01 ACUTE HYPOXIC RESPIRATORY FAILURE (HCC): ICD-10-CM

## 2024-10-29 DIAGNOSIS — I73.9 PAD (PERIPHERAL ARTERY DISEASE) (HCC): ICD-10-CM

## 2024-10-29 ASSESSMENT — FIBROSIS 4 INDEX: FIB4 SCORE: 1.25

## 2024-11-06 ENCOUNTER — PATIENT MESSAGE (OUTPATIENT)
Dept: MEDICAL GROUP | Facility: MEDICAL CENTER | Age: 79
End: 2024-11-06
Payer: COMMERCIAL

## 2024-11-06 DIAGNOSIS — E87.20 METABOLIC ACIDOSIS: ICD-10-CM

## 2024-11-06 DIAGNOSIS — N18.4 CKD STAGE G4/A3, GFR 15-29 AND ALBUMIN CREATININE RATIO >300 MG/G (HCC): ICD-10-CM

## 2024-11-06 NOTE — PATIENT COMMUNICATION
Received request via: Pharmacy    Was the patient seen in the last year in this department? Yes    Does the patient have an active prescription (recently filled or refills available) for medication(s) requested? No    Does the patient have MCFP Plus and need 100-day supply? (This applies to ALL medications) Patient does not have SCP

## 2024-11-07 RX ORDER — SODIUM BICARBONATE 650 MG/1
1300 TABLET ORAL 3 TIMES DAILY
Qty: 120 TABLET | Refills: 3 | Status: SHIPPED | OUTPATIENT
Start: 2024-11-07

## 2024-11-11 ENCOUNTER — TELEPHONE (OUTPATIENT)
Dept: MEDICAL GROUP | Facility: MEDICAL CENTER | Age: 79
End: 2024-11-11
Payer: COMMERCIAL

## 2024-11-11 NOTE — TELEPHONE ENCOUNTER
Caller Name: Sangita Bolton    Call Back Number: There are no phone numbers on file.      How would the patient prefer to be contacted with a response: Phone call OK to leave a detailed message    Patients  left message requesting medication refill for this patient but did not specify which medications are needing to be refilled

## 2024-11-14 ENCOUNTER — HOSPITAL ENCOUNTER (OUTPATIENT)
Dept: RADIOLOGY | Facility: MEDICAL CENTER | Age: 79
End: 2024-11-14
Attending: INTERNAL MEDICINE
Payer: COMMERCIAL

## 2024-11-14 ENCOUNTER — DOCUMENTATION (OUTPATIENT)
Dept: VASCULAR LAB | Facility: MEDICAL CENTER | Age: 79
End: 2024-11-14

## 2024-11-14 DIAGNOSIS — I65.21 CAROTID STENOSIS, ASYMPTOMATIC, RIGHT: ICD-10-CM

## 2024-11-14 DIAGNOSIS — I65.23 CAROTID ATHEROSCLEROSIS, BILATERAL: ICD-10-CM

## 2024-11-14 PROCEDURE — 93880 EXTRACRANIAL BILAT STUDY: CPT | Mod: 26 | Performed by: INTERNAL MEDICINE

## 2024-11-14 PROCEDURE — 93880 EXTRACRANIAL BILAT STUDY: CPT

## 2024-11-15 NOTE — PROGRESS NOTES
Previously carotid duplex consistent with moderate to severe carotid stenosis    Ultrasound performed this year demonstrates less than 50% carotid stenosis based upon cardiologist.  Bilateral subclavian stenosis with bidirectional flow in the vertebrals    Patient has not been seen in vascular medicine in some time.  She has been in and out of the hospital and she and her  expressed concern to her PCP about being overwhelmed    At this point, we will defer further care including determining whether or not to consider repeat surveillance imaging to cardiology and PCP    Michael Bloch, MD  Vascular Care    Cc: VADIM Hernández

## 2024-11-25 ENCOUNTER — HOSPITAL ENCOUNTER (OUTPATIENT)
Dept: LAB | Facility: MEDICAL CENTER | Age: 79
End: 2024-11-25
Attending: INTERNAL MEDICINE
Payer: COMMERCIAL

## 2024-11-25 DIAGNOSIS — I10 ESSENTIAL HYPERTENSION: ICD-10-CM

## 2024-11-25 DIAGNOSIS — N18.4 CKD (CHRONIC KIDNEY DISEASE) STAGE 4, GFR 15-29 ML/MIN (HCC): ICD-10-CM

## 2024-11-25 LAB
ANION GAP SERPL CALC-SCNC: 19 MMOL/L (ref 7–16)
BUN SERPL-MCNC: 58 MG/DL (ref 8–22)
CALCIUM SERPL-MCNC: 9.6 MG/DL (ref 8.5–10.5)
CHLORIDE SERPL-SCNC: 96 MMOL/L (ref 96–112)
CO2 SERPL-SCNC: 21 MMOL/L (ref 20–33)
CREAT SERPL-MCNC: 2.56 MG/DL (ref 0.5–1.4)
CREAT UR-MCNC: 26.83 MG/DL
ERYTHROCYTE [DISTWIDTH] IN BLOOD BY AUTOMATED COUNT: 51.5 FL (ref 35.9–50)
GFR SERPLBLD CREATININE-BSD FMLA CKD-EPI: 18 ML/MIN/1.73 M 2
GLUCOSE SERPL-MCNC: 91 MG/DL (ref 65–99)
HCT VFR BLD AUTO: 35.4 % (ref 37–47)
HGB BLD-MCNC: 11.2 G/DL (ref 12–16)
MCH RBC QN AUTO: 29.9 PG (ref 27–33)
MCHC RBC AUTO-ENTMCNC: 31.6 G/DL (ref 32.2–35.5)
MCV RBC AUTO: 94.4 FL (ref 81.4–97.8)
MICROALBUMIN UR-MCNC: 12.3 MG/DL
MICROALBUMIN/CREAT UR: 458 MG/G (ref 0–30)
PLATELET # BLD AUTO: 324 K/UL (ref 164–446)
PMV BLD AUTO: 10 FL (ref 9–12.9)
POTASSIUM SERPL-SCNC: 5.1 MMOL/L (ref 3.6–5.5)
RBC # BLD AUTO: 3.75 M/UL (ref 4.2–5.4)
SODIUM SERPL-SCNC: 136 MMOL/L (ref 135–145)
WBC # BLD AUTO: 9.1 K/UL (ref 4.8–10.8)

## 2024-11-25 PROCEDURE — 36415 COLL VENOUS BLD VENIPUNCTURE: CPT

## 2024-11-25 PROCEDURE — 82043 UR ALBUMIN QUANTITATIVE: CPT

## 2024-11-25 PROCEDURE — 82570 ASSAY OF URINE CREATININE: CPT

## 2024-11-25 PROCEDURE — 80048 BASIC METABOLIC PNL TOTAL CA: CPT

## 2024-11-25 PROCEDURE — 85027 COMPLETE CBC AUTOMATED: CPT

## 2024-12-03 ENCOUNTER — APPOINTMENT (OUTPATIENT)
Dept: NEPHROLOGY | Facility: MEDICAL CENTER | Age: 79
End: 2024-12-03
Payer: COMMERCIAL

## 2024-12-03 VITALS
DIASTOLIC BLOOD PRESSURE: 78 MMHG | WEIGHT: 128 LBS | OXYGEN SATURATION: 92 % | HEIGHT: 63 IN | TEMPERATURE: 97.7 F | HEART RATE: 70 BPM | BODY MASS INDEX: 22.68 KG/M2 | SYSTOLIC BLOOD PRESSURE: 124 MMHG

## 2024-12-03 DIAGNOSIS — N18.4 CKD (CHRONIC KIDNEY DISEASE) STAGE 4, GFR 15-29 ML/MIN (HCC): ICD-10-CM

## 2024-12-03 DIAGNOSIS — I10 HYPERTENSION, UNSPECIFIED TYPE: ICD-10-CM

## 2024-12-03 PROCEDURE — 99214 OFFICE O/P EST MOD 30 MIN: CPT | Performed by: INTERNAL MEDICINE

## 2024-12-03 PROCEDURE — 3074F SYST BP LT 130 MM HG: CPT | Performed by: INTERNAL MEDICINE

## 2024-12-03 PROCEDURE — 3078F DIAST BP <80 MM HG: CPT | Performed by: INTERNAL MEDICINE

## 2024-12-03 ASSESSMENT — ENCOUNTER SYMPTOMS
NAUSEA: 0
COUGH: 0
HYPERTENSION: 1
FEVER: 0
CHILLS: 0
SHORTNESS OF BREATH: 0
VOMITING: 0

## 2024-12-03 ASSESSMENT — FIBROSIS 4 INDEX: FIB4 SCORE: 1.767766952966368811

## 2024-12-03 NOTE — PROGRESS NOTES
"Subjective     Sangita Bolton is a 79 y.o. female who presents with Chronic Kidney Disease and Hypertension            Patient was recently hospitalized with shortness of breath, diagnosed with congestive heart failure and pulmonary hypertension  She is doing better overall    Chronic Kidney Disease  This is a chronic problem. The current episode started more than 1 year ago. The problem occurs constantly. The problem has been unchanged. Pertinent negatives include no chest pain, chills, coughing, fever, nausea, urinary symptoms or vomiting.   Hypertension  This is a chronic problem. The current episode started more than 1 year ago. The problem is unchanged. The problem is controlled. Pertinent negatives include no chest pain, malaise/fatigue, peripheral edema or shortness of breath. Risk factors for coronary artery disease include post-menopausal state. Past treatments include angiotensin blockers. The current treatment provides significant improvement. There are no compliance problems.  Hypertensive end-organ damage includes kidney disease. Identifiable causes of hypertension include chronic renal disease.       Review of Systems   Constitutional:  Negative for chills, fever and malaise/fatigue.   Respiratory:  Negative for cough and shortness of breath.    Cardiovascular:  Negative for chest pain and leg swelling.   Gastrointestinal:  Negative for nausea and vomiting.   Genitourinary:  Negative for dysuria, frequency and urgency.              Objective     /78 (BP Location: Right arm, Patient Position: Sitting, BP Cuff Size: Adult)   Pulse 70   Temp 36.5 °C (97.7 °F) (Temporal)   Ht 1.6 m (5' 3\")   Wt 58.1 kg (128 lb)   SpO2 92%   BMI 22.67 kg/m²      Physical Exam  Vitals and nursing note reviewed.   Constitutional:       General: She is not in acute distress.     Appearance: Normal appearance. She is well-developed. She is not diaphoretic.   HENT:      Head: Normocephalic and atraumatic.      Right " Ear: External ear normal.      Left Ear: External ear normal.      Nose: Nose normal.   Eyes:      General: No scleral icterus.        Right eye: No discharge.         Left eye: No discharge.      Conjunctiva/sclera: Conjunctivae normal.   Cardiovascular:      Rate and Rhythm: Normal rate and regular rhythm.      Heart sounds: No murmur heard.  Pulmonary:      Effort: Pulmonary effort is normal. No respiratory distress.      Breath sounds: Normal breath sounds.   Musculoskeletal:         General: No tenderness.      Right lower leg: No edema.      Left lower leg: No edema.   Skin:     General: Skin is warm and dry.      Findings: No erythema.   Neurological:      General: No focal deficit present.      Mental Status: She is alert and oriented to person, place, and time.      Cranial Nerves: No cranial nerve deficit.   Psychiatric:         Mood and Affect: Mood normal.         Behavior: Behavior normal.       Past Medical History:   Diagnosis Date    Acute decompensated heart failure ([facility]) 10/22/2024    Alcohol abuse 04/27/2019    Arthritis     generalized-Osteo    Carotid artery stenosis 12/31/2015    COPD (chronic obstructive pulmonary disease) ([facility]) 10/21/2024    Fall 11/25/2020    Gluten intolerance     Hyperlipidemia     Hypertension     Medicare annual wellness visit, subsequent 10/05/2023    OSTEOPOROSIS     Other specified symptom associated with female genital organs     post menaupausal bleeding    Pain 02/06/2015    bilateral legs-chronic>4/10    Stage 3 chronic kidney disease 11/25/2020    Unspecified cataract      Social History     Socioeconomic History    Marital status:      Spouse name: Not on file    Number of children: Not on file    Years of education: Not on file    Highest education level: 12th grade   Occupational History    Not on file   Tobacco Use    Smoking status: Former     Current packs/day: 1.00     Average packs/day: 1 pack/day for 49.8 years (49.8 ttl pk-yrs)     Types:  Cigarettes     Start date: 2/2/1975    Smokeless tobacco: Never   Vaping Use    Vaping status: Never Used   Substance and Sexual Activity    Alcohol use: Not Currently    Drug use: No    Sexual activity: Not on file     Comment: ,    Other Topics Concern    Not on file   Social History Narrative    Not on file     Social Drivers of Health     Financial Resource Strain: Low Risk  (9/4/2024)    Overall Financial Resource Strain (CARDIA)     Difficulty of Paying Living Expenses: Not hard at all   Food Insecurity: No Food Insecurity (10/23/2024)    Hunger Vital Sign     Worried About Running Out of Food in the Last Year: Never true     Ran Out of Food in the Last Year: Never true   Transportation Needs: No Transportation Needs (10/23/2024)    PRAPARE - Transportation     Lack of Transportation (Medical): No     Lack of Transportation (Non-Medical): No   Physical Activity: Inactive (9/4/2024)    Exercise Vital Sign     Days of Exercise per Week: 0 days     Minutes of Exercise per Session: 0 min   Stress: No Stress Concern Present (9/4/2024)    [employer] - Occupational Stress Questionnaire     Feeling of Stress : Only a little   Social Connections: Unknown (9/4/2024)    Social Connection and Isolation Panel [NHANES]     Frequency of Communication with Friends and Family: Patient declined     Frequency of Social Gatherings with Friends and Family: Patient declined     Attends Yarsanism Services: Never     Active Member of Clubs or Organizations: No     Attends Club or Organization Meetings: Patient declined     Marital Status:    Intimate Partner Violence: Not At Risk (10/21/2024)    Humiliation, Afraid, Rape, and Kick questionnaire     Fear of Current or Ex-Partner: No     Emotionally Abused: No     Physically Abused: No     Sexually Abused: No   Housing Stability: Low Risk  (10/23/2024)    Housing Stability Vital Sign     Unable to Pay for Housing in the Last Year: No      Number of Times Moved in the Last Year: 1     Homeless in the Last Year: No     Family History   Problem Relation Age of Onset    Cancer Mother         leukemia    Heart Disease Neg Hx     Heart Attack Neg Hx      Recent Labs     09/11/24  0245 09/12/24  0040 09/14/24  0025 09/15/24  0341 09/25/24  1627 09/27/24  1559 09/28/24  0138 10/22/24  0209 10/24/24  0433 10/25/24  0211 11/25/24  0934   ALBUMIN 2.9*   < > 3.2 3.1*   < > 2.8*   < > 3.4 3.4 3.5  --    HDL 24*  --   --   --   --   --   --   --   --   --   --    TRIGLYCERIDE 112  --   --   --   --   --   --   --   --   --   --    SODIUM 131*   < > 130* 130*   < > 141   < > 134* 133* 132* 136   POTASSIUM 3.7   < > 4.0 4.1   < > 4.2   < > 3.8 3.6 4.0 5.1   CHLORIDE 96   < > 96 96   < > 105   < > 100 92* 92* 96   CO2 14*   < > 16* 18*   < > 22   < > 20 23 22 21   *   < > 100* 97*   < > 52*   < > 40* 52* 60* 58*   CREATININE 3.86*   < > 2.96* 2.82*   < > 2.49*   < > 2.32* 2.80* 3.57* 2.56*   PHOSPHORUS  --    < > 4.1 4.1  --  3.5  --   --   --   --   --     < > = values in this interval not displayed.                             Assessment & Plan        Assessment & Plan  CKD (chronic kidney disease) stage 4, GFR 15-29 ml/min ([facility])  Stable  No uremic symptoms  Renal dose of medication  Avoid nephrotoxins  Continue same medication regimen  Patient was advised to call us if symptoms worsen      Hypertension, unspecified type  Controlled  Continue same medication regimen  Continue low-sodium diet

## 2025-01-06 ENCOUNTER — OFFICE VISIT (OUTPATIENT)
Dept: SLEEP MEDICINE | Facility: MEDICAL CENTER | Age: 80
End: 2025-01-06
Attending: STUDENT IN AN ORGANIZED HEALTH CARE EDUCATION/TRAINING PROGRAM
Payer: COMMERCIAL

## 2025-01-06 VITALS
HEART RATE: 68 BPM | SYSTOLIC BLOOD PRESSURE: 126 MMHG | HEIGHT: 63 IN | BODY MASS INDEX: 23.04 KG/M2 | OXYGEN SATURATION: 95 % | WEIGHT: 130 LBS | DIASTOLIC BLOOD PRESSURE: 58 MMHG

## 2025-01-06 DIAGNOSIS — J43.2 CENTRILOBULAR EMPHYSEMA (HCC): ICD-10-CM

## 2025-01-06 DIAGNOSIS — R91.8 PULMONARY NODULES: ICD-10-CM

## 2025-01-06 PROCEDURE — 3074F SYST BP LT 130 MM HG: CPT | Performed by: STUDENT IN AN ORGANIZED HEALTH CARE EDUCATION/TRAINING PROGRAM

## 2025-01-06 PROCEDURE — 3078F DIAST BP <80 MM HG: CPT | Performed by: STUDENT IN AN ORGANIZED HEALTH CARE EDUCATION/TRAINING PROGRAM

## 2025-01-06 PROCEDURE — 99215 OFFICE O/P EST HI 40 MIN: CPT | Performed by: STUDENT IN AN ORGANIZED HEALTH CARE EDUCATION/TRAINING PROGRAM

## 2025-01-06 PROCEDURE — 99212 OFFICE O/P EST SF 10 MIN: CPT | Performed by: STUDENT IN AN ORGANIZED HEALTH CARE EDUCATION/TRAINING PROGRAM

## 2025-01-06 RX ORDER — ALBUTEROL SULFATE 90 UG/1
2 INHALANT RESPIRATORY (INHALATION) EVERY 6 HOURS PRN
Qty: 8.5 G | Refills: 1 | Status: SHIPPED | OUTPATIENT
Start: 2025-01-06

## 2025-01-06 ASSESSMENT — FIBROSIS 4 INDEX: FIB4 SCORE: 1.767766952966368811

## 2025-01-06 ASSESSMENT — ENCOUNTER SYMPTOMS
CHILLS: 0
FALLS: 0
FEVER: 0
NAUSEA: 0
FOCAL WEAKNESS: 0
COUGH: 0
SPUTUM PRODUCTION: 0
VOMITING: 0
WHEEZING: 0
HEMOPTYSIS: 0
EYE DISCHARGE: 0
SHORTNESS OF BREATH: 0

## 2025-01-06 ASSESSMENT — PATIENT HEALTH QUESTIONNAIRE - PHQ9: CLINICAL INTERPRETATION OF PHQ2 SCORE: 0

## 2025-01-06 NOTE — PROGRESS NOTES
Pulmonary Clinic Note    Chief Complaint:  Chief Complaint   Patient presents with    Follow-Up     Last seen 01/12/24 w/ Dr. Aden   Centrilobular emphysema, Pulmonary nodules    Results     CT-CTA 10/21/24, DX-Chest 10/21/24, NM-LUNG 09/14/24, PFT 01/22/24       HPI:   Sangita Bolton is a very pleasant 79 y.o. female with history of tobacco smoking (60+ pack years, quit 12/2023), pulmonary nodules, heart failure, pulmonary hypertension who presents to pulmonary clinic for cough.   Patient was last seen by Dr. Aden on 1/12/2024 for her emphysema and pulmonary nodules plan for repeat CT in 1 year.  Since then, patient has had multiple hospitalizations for shortness 2/2 acute decompensated heart failure and pulmonary hypertension.  She has been started on heart failure regimen and states that she feels much better and is now off oxygen.  Patient denies any dyspnea or cough or any other respiratory symptoms.      Past Medical History:   Diagnosis Date    Acute decompensated heart failure (HCC) 10/22/2024    Alcohol abuse 04/27/2019    Arthritis     generalized-Osteo    Carotid artery stenosis 12/31/2015    COPD (chronic obstructive pulmonary disease) (HCC) 10/21/2024    Fall 11/25/2020    Gluten intolerance     Hyperlipidemia     Hypertension     Medicare annual wellness visit, subsequent 10/05/2023    OSTEOPOROSIS     Other specified symptom associated with female genital organs     post menaupausal bleeding    Pain 02/06/2015    bilateral legs-chronic>4/10    Stage 3 chronic kidney disease 11/25/2020    Unspecified cataract        Past Surgical History:   Procedure Laterality Date    PB PARTIAL HIP REPLACEMENT  11/28/2020    Procedure: HEMIARTHROPLASTY, HIP REVISION;  Surgeon: Mohsen Thomas M.D.;  Location: [facility];  Service: Orthopedics    PB PARTIAL HIP REPLACEMENT Right 4/28/2019    Procedure: HEMIARTHROPLASTY, HIP;  Surgeon: Evens Sarabia M.D.;  Location: [facility];   Service: Orthopedics    RECOVERY  2/9/2015    Performed by -Recovery Surgery at SURGERY SAME DAY [facility]    FEMORAL ARTERY REPAIR  2/9/2015    Performed by Yuly Chirinos M.D. at SURGERY [facility]    HYSTEROSCOPY WITH VIDEO DIAGNOSTIC  11/17/2010    Performed by ALIS DEGROOT at SURGERY SAME DAY [facility]    DILATION AND CURETTAGE  11/17/2010    Performed by ALIS DEGROOT at SURGERY SAME DAY [facility]    ORIF, FRACTURE, HUMERUS  9/5/08    Performed by LAURA CARTER at SURGERY [facility]    COLONOSCOPY  2008    recheck 4 years    OTHER ORTHOPEDIC SURGERY      rt leg fx    OTHER ORTHOPEDIC SURGERY      broken right wrist       Social History     Socioeconomic History    Marital status:      Spouse name: Not on file    Number of children: Not on file    Years of education: Not on file    Highest education level: 12th grade   Occupational History    Not on file   Tobacco Use    Smoking status: Former     Current packs/day: 1.00     Average packs/day: 1 pack/day for 49.9 years (49.9 ttl pk-yrs)     Types: Cigarettes     Start date: 2/2/1975    Smokeless tobacco: Never   Vaping Use    Vaping status: Never Used   Substance and Sexual Activity    Alcohol use: Not Currently    Drug use: No    Sexual activity: Not on file     Comment: ,    Other Topics Concern    Not on file   Social History Narrative    Not on file     Social Drivers of Health     Financial Resource Strain: Low Risk  (9/4/2024)    Overall Financial Resource Strain (CARDIA)     Difficulty of Paying Living Expenses: Not hard at all   Food Insecurity: No Food Insecurity (10/23/2024)    Hunger Vital Sign     Worried About Running Out of Food in the Last Year: Never true     Ran Out of Food in the Last Year: Never true   Transportation Needs: No Transportation Needs (10/23/2024)    PRAPARE - Transportation     Lack of Transportation (Medical): No     Lack of Transportation (Non-Medical): No   Physical  Activity: Inactive (9/4/2024)    Exercise Vital Sign     Days of Exercise per Week: 0 days     Minutes of Exercise per Session: 0 min   Stress: No Stress Concern Present (9/4/2024)    [employer] - Occupational Stress Questionnaire     Feeling of Stress : Only a little   Social Connections: Unknown (9/4/2024)    Social Connection and Isolation Panel [NHANES]     Frequency of Communication with Friends and Family: Patient declined     Frequency of Social Gatherings with Friends and Family: Patient declined     Attends Anabaptism Services: Never     Active Member of Clubs or Organizations: No     Attends Club or Organization Meetings: Patient declined     Marital Status:    Intimate Partner Violence: Not At Risk (10/21/2024)    Humiliation, Afraid, Rape, and Kick questionnaire     Fear of Current or Ex-Partner: No     Emotionally Abused: No     Physically Abused: No     Sexually Abused: No   Housing Stability: Low Risk  (10/23/2024)    Housing Stability Vital Sign     Unable to Pay for Housing in the Last Year: No     Number of Times Moved in the Last Year: 1     Homeless in the Last Year: No          Family History   Problem Relation Age of Onset    Cancer Mother         leukemia    Heart Disease Neg Hx     Heart Attack Neg Hx        Current Outpatient Medications on File Prior to Visit   Medication Sig Dispense Refill    rosuvastatin (CRESTOR) 10 MG Tab Take 1 Tablet by mouth every evening. Overdue for vascular follow up, needs appt for further refills.  Call [phone] to schedule. 90 Tablet 0    sodium bicarbonate (SODIUM BICARBONATE) 650 MG Tab Take 2 Tablets by mouth in the morning, at noon, and at bedtime. 120 Tablet 3    losartan (COZAAR) 100 MG Tab Take 1 Tablet by mouth every day. 90 Tablet 2    tiotropium (SPIRIVA RESPIMAT) 2.5 mcg/Act Aero Soln Inhale 5 mcg every evening.      metoprolol SR (TOPROL XL) 25 MG TABLET SR 24 HR Take 1 Tablet by mouth every day. 90 Tablet 3     "aspirin 81 MG EC tablet Take 1 Tablet by mouth every day.      Cholecalciferol (VITAMIN D3 PO) Take 1 Capsule by mouth every day.      Cyanocobalamin (VITAMIN B-12 PO) Take 1 tablet by mouth every day.      potassium chloride SA (K-DUR) 10 MEQ Tab CR Take 1 Tablet by mouth 2 times a day. Take potassium with furosemide/Lasix.  Do not take otherwise. 90 Tablet 3     No current facility-administered medications on file prior to visit.       Allergies: Hair formula extra strength [kdc:cranberry extract+sambucus nigra+vegetable enzyme+yellow dye+...] and Penicillins      ROS:   Review of Systems   Constitutional:  Negative for chills and fever.   HENT:  Negative for hearing loss.    Eyes:  Negative for discharge.   Respiratory:  Negative for cough, hemoptysis, sputum production, shortness of breath and wheezing.    Cardiovascular:  Negative for chest pain.   Gastrointestinal:  Negative for nausea and vomiting.   Musculoskeletal:  Negative for falls.   Skin:  Negative for rash.   Neurological:  Negative for focal weakness.       Vitals:  /58 (BP Location: Right arm, Patient Position: Sitting, BP Cuff Size: Adult)   Pulse 68   Ht 1.6 m (5' 3\")   Wt 59 kg (130 lb)   SpO2 95%     Physical Exam:  Physical Exam  Vitals and nursing note reviewed.   Constitutional:       General: She is not in acute distress.     Appearance: Normal appearance. She is not ill-appearing or toxic-appearing.   HENT:      Head: Normocephalic and atraumatic.      Nose: Nose normal.      Mouth/Throat:      Mouth: Mucous membranes are moist.   Eyes:      General: No scleral icterus.     Conjunctiva/sclera: Conjunctivae normal.   Cardiovascular:      Rate and Rhythm: Normal rate and regular rhythm.   Pulmonary:      Effort: Pulmonary effort is normal. No respiratory distress.      Breath sounds: No wheezing.   Abdominal:      Palpations: Abdomen is soft.   Musculoskeletal:         General: No deformity. Normal range of motion.      Cervical " back: Normal range of motion.   Skin:     General: Skin is warm and dry.   Neurological:      Mental Status: She is alert. Mental status is at baseline.   Psychiatric:         Mood and Affect: Mood normal.         Behavior: Behavior normal.         Data:    TTE 10/23/2024  Limited echocardiogram.  Normal left ventricular systolic function. The ejection fraction is   measured to be 57% by Payne's biplane.  Moderate concentric left ventricular hypertrophy.  Normal right ventricular size and systolic function.  Moderate tricuspid regurgitation. Right ventricular systolic pressure   is estimated to be 68 mmHg (moderate pulmonary hypertension).  Compared to the prior study on 09/14/2024, pulmonary hypertension is   now moderate.  No other significant changes.    CTPE 10/21/2024  1.  No pulmonary embolus appreciated.  2.  Hazy ground glass bilateral pulmonary opacities suggesting component of atypical infiltrate and/or edema  3.  Extensive emphysema  4.  Small layering bilateral pleural effusions, right greater than left, linear densities the bilateral lung bases favor atelectasis.  5.  Mediastinal and bilateral hilar adenopathy, increased since prior study. Workup and evaluation for causes of adenopathy recommended as clinically appropriate.  6.  Atherosclerosis and atherosclerotic coronary artery disease.    Assessment/Plan:    Problem List Items Addressed This Visit    None    Pulmonary emphysema  Pulmonary nodules  Patient is clinically asymptomatic and is not use any inhalers.  -As needed albuterol  -Encouraged to remain active  -Patient out of age for lung cancer screening but had previous pulmonary nodules with plan for repeat CT chest in 1 year-CTPA in 10/2024 with hazy bilateral opacities concerning for edema along with mediastinal and bilateral adenopathy.  Discussed with patient that I recommend a repeat CT chest at this time to further evaluate these opacities and lymphadenopathy now that she is more  optimized from a heart failure perspective.  Patient states that she really does not want to get any other CTs at this time and that even if we were to find something concerning for potential malignancy that would need further workup, patient is not sure that she would even want a pursue any further workup.  Patient will discuss this further with her partner and states that she needs some time and so we discussed a follow-up plan and they said that they would like to follow-up in 6 months and see where to go from there      Return in about 6 months (around 7/6/2025).     This note was generated using voice recognition software which has a chance of producing errors of grammar and possibly content.  I have made every reasonable attempt to find and correct any obvious errors, but it should be expected that some may not be found prior to finalization of this note.    Time spent in record review prior to patient arrival, reviewing results, and in face-to-face encounter totaled 40 min, excluding any procedures if performed.    Shanice Zhang MD  Pulmonary and [facility]  [facility]

## 2025-01-28 ENCOUNTER — OFFICE VISIT (OUTPATIENT)
Dept: MEDICAL GROUP | Facility: MEDICAL CENTER | Age: 80
End: 2025-01-28
Payer: COMMERCIAL

## 2025-01-28 VITALS
HEART RATE: 81 BPM | TEMPERATURE: 98.7 F | DIASTOLIC BLOOD PRESSURE: 80 MMHG | HEIGHT: 63 IN | OXYGEN SATURATION: 94 % | BODY MASS INDEX: 23.04 KG/M2 | RESPIRATION RATE: 18 BRPM | WEIGHT: 130 LBS | SYSTOLIC BLOOD PRESSURE: 116 MMHG

## 2025-01-28 DIAGNOSIS — Z23 NEED FOR VACCINATION: ICD-10-CM

## 2025-01-28 PROCEDURE — 3079F DIAST BP 80-89 MM HG: CPT | Performed by: FAMILY MEDICINE

## 2025-01-28 PROCEDURE — 90471 IMMUNIZATION ADMIN: CPT | Performed by: FAMILY MEDICINE

## 2025-01-28 PROCEDURE — 99213 OFFICE O/P EST LOW 20 MIN: CPT | Mod: 25 | Performed by: FAMILY MEDICINE

## 2025-01-28 PROCEDURE — 90662 IIV NO PRSV INCREASED AG IM: CPT | Performed by: FAMILY MEDICINE

## 2025-01-28 PROCEDURE — 3074F SYST BP LT 130 MM HG: CPT | Performed by: FAMILY MEDICINE

## 2025-01-28 ASSESSMENT — FIBROSIS 4 INDEX: FIB4 SCORE: 1.767766952966368811

## 2025-01-29 NOTE — PROGRESS NOTES
This medical record contains text that has been entered with the assistance of computer voice recognition and dictation software.  Therefore, it may contain unintended errors in text, spelling, punctuation, or grammar        Chief Complaint   Patient presents with    Hypoxemia     Feeling better       Sangita Bolton is a 79 y.o. female here evaluation and management of:       History of Present Illness  The patient presents for a follow-up visit.    She reports a general sense of well-being, with no current use of supplemental oxygen and infrequent reliance on her inhaler. She has been able to resume driving, although she expresses a lack of desire to venture out at present. Her daily activities include reading the newspaper in the morning and occasional social interactions. She is uncertain about the necessity of an influenza vaccine, given her limited exposure to public environments. She had a dental appointment this morning for a crown. She has a good dentist and will be going back to him. She has a good support system at home.        Current Outpatient Medications   Medication Sig Dispense Refill    albuterol 108 (90 Base) MCG/ACT Aero Soln inhalation aerosol Inhale 2 Puffs every 6 hours as needed for Shortness of Breath. 8.5 g 1    rosuvastatin (CRESTOR) 10 MG Tab Take 1 Tablet by mouth every evening. Overdue for vascular follow up, needs appt for further refills.  Call [phone] to schedule. 90 Tablet 0    losartan (COZAAR) 100 MG Tab Take 1 Tablet by mouth every day. 90 Tablet 2    tiotropium (SPIRIVA RESPIMAT) 2.5 mcg/Act Aero Soln Inhale 5 mcg every evening.      metoprolol SR (TOPROL XL) 25 MG TABLET SR 24 HR Take 1 Tablet by mouth every day. 90 Tablet 3    aspirin 81 MG EC tablet Take 1 Tablet by mouth every day.      Cholecalciferol (VITAMIN D3 PO) Take 1 Capsule by mouth every day.      Cyanocobalamin (VITAMIN B-12 PO) Take 1 tablet by mouth every day.      sodium bicarbonate (SODIUM BICARBONATE) 650 MG  Tab Take 2 Tablets by mouth in the morning, at noon, and at bedtime. (Patient not taking: Reported on 1/28/2025) 120 Tablet 3    potassium chloride SA (K-DUR) 10 MEQ Tab CR Take 1 Tablet by mouth 2 times a day. Take potassium with furosemide/Lasix.  Do not take otherwise. 90 Tablet 3     No current facility-administered medications for this visit.     Patient Active Problem List    Diagnosis Date Noted    Acute decompensated heart failure ([facility]) 10/22/2024    COPD (chronic obstructive pulmonary disease) ([facility]) 10/21/2024    Acute hypoxic respiratory failure ([facility]) 10/21/2024    Hypomagnesemia 09/28/2024    Decubitus ulcers 09/25/2024    Dehydration 09/25/2024    Pressure injury of buttock, unstageable ([facility]) 09/13/2024    Pulmonary hypertension ([facility]) 09/13/2024    Hernandez catheter in place 09/12/2024    Hypoxia 09/10/2024    Acute hypoxemic respiratory failure ([facility]) 09/10/2024    Elevated troponin 09/10/2024    ACP (advance care planning) 09/10/2024    Diarrhea 09/10/2024    Stress due to illness of family member 04/09/2024    Nonrheumatic tricuspid valve regurgitation 02/27/2024    COPD with centrilobular emphysema ([facility]) 01/12/2024    Pulmonary nodules 01/12/2024    Undiagnosed cardiac murmurs 11/16/2023    Dyslipidemia 11/16/2023    Tobacco use 11/16/2023    Medicare annual wellness visit, subsequent 10/05/2023    Aortic atherosclerosis ([facility]) 05/09/2023    Right iliac artery stenosis ([facility]) 05/09/2023    Iliac artery occlusion, left ([facility]) 05/09/2023    Superior mesenteric artery stenosis ([facility]) 05/09/2023    Celiac artery stenosis ([facility]) 05/09/2023    CKD stage G4/A3, GFR 15-29 and albumin creatinine ratio >300 mg/g ([facility]) 04/24/2023    Carotid atherosclerosis, bilateral 04/24/2023    Hyponatremia 07/14/2021    PAD (peripheral artery disease) ([facility]) 07/14/2021    RASHAD (acute kidney injury) ([facility]) 12/01/2020    Anemia 11/27/2020    Fall 11/25/2020    Stage 3 chronic kidney disease 11/25/2020    CAD (coronary artery disease)  11/25/2020    Other lack of coordination 05/02/2019    Muscle weakness (generalized) 05/02/2019    Hypo-osmolality and hyponatremia 05/02/2019    Difficulty in walking, not elsewhere classified 05/02/2019    Acidosis 05/02/2019    Closed fracture of neck of right femur ([facility]) 04/27/2019    Alcohol abuse 04/27/2019    Dependence on nicotine from cigarettes 04/27/2019    Leukocytosis 04/27/2019    Dry skin dermatitis 04/11/2018    Carotid stenosis, asymptomatic, right 12/31/2015    S/P insertion of iliac artery stent 12/31/2015    Hypertension 07/01/2013    DDD (degenerative disc disease), lumbar 01/09/2013    Osteoporosis, post-menopausal      Past Surgical History:   Procedure Laterality Date    PB PARTIAL HIP REPLACEMENT  11/28/2020    Procedure: HEMIARTHROPLASTY, HIP REVISION;  Surgeon: Mohsen Thomas M.D.;  Location: [facility];  Service: Orthopedics    PB PARTIAL HIP REPLACEMENT Right 4/28/2019    Procedure: HEMIARTHROPLASTY, HIP;  Surgeon: Evens Sarabia M.D.;  Location: [facility];  Service: Orthopedics    RECOVERY  2/9/2015    Performed by [facility] at SURGERY SAME DAY [facility] ORS    FEMORAL ARTERY REPAIR  2/9/2015    Performed by Yuly Chirinos M.D. at [facility] ORS    HYSTEROSCOPY WITH VIDEO DIAGNOSTIC  11/17/2010    Performed by ALIS DEGROOT at SURGERY SAME DAY [facility] ORS    DILATION AND CURETTAGE  11/17/2010    Performed by ALIS DEGROOT at SURGERY SAME DAY [facility] ORS    ORIF, FRACTURE, HUMERUS  9/5/08    Performed by LAURA CARTER at [facility] ORS    COLONOSCOPY  2008    recheck 4 years    OTHER ORTHOPEDIC SURGERY      rt leg fx    OTHER ORTHOPEDIC SURGERY      broken right wrist      Social History     Tobacco Use    Smoking status: Former     Current packs/day: 1.00     Average packs/day: 1 pack/day for 50.0 years (50.0 ttl pk-yrs)     Types: Cigarettes     Start date: 2/2/1975    Smokeless tobacco: Never   Vaping Use     "Vaping status: Never Used   Substance Use Topics    Alcohol use: Not Currently    Drug use: No     Family History   Problem Relation Age of Onset    Cancer Mother         leukemia    Heart Disease Neg Hx     Heart Attack Neg Hx            ROS    all review of system completed and negative except for those listed above     Objective:     /80 (BP Location: Left arm, Patient Position: Sitting, BP Cuff Size: Adult)   Pulse 81   Temp 37.1 °C (98.7 °F) (Temporal)   Resp 18   Ht 1.6 m (5' 3\")   Wt 59 kg (130 lb)   SpO2 94%  Body mass index is 23.03 kg/m².  Physical Exam:        GEN: comfortable, alert and oriented, well nourished, well developed, in no apparent distress   HEENT: NCAT, eyes: pupils equal and reactive, sclera white, EOMIT, good dentition  HEART: limbs warm and well perfused, regular rate, no JVD, no lower extremity edema  LUNGS: speaking in full sentences, not in apparent respiratory distress, no audible wheezes  MSK: normal tone and bulk, no swelling of the joints, gait steady and normal       Assessment and Plan:   The following treatment plan was discussed        Assessment & Plan  Need for vaccination    Orders:    Influenza Vaccine, High Dose (65+ Only)        Assessment & Plan  1. Follow-up visit.  Her condition has shown significant improvement since the last encounter, with an observed increase in energy levels. Oxygen saturation was recorded at 94 percent during this visit. She was encouraged to engage in social activities and consider volunteering at the [facility] once a month. The importance of socializing for physical and mental health was discussed. An influenza vaccine was recommended due to the current prevalence of the flu.          Instructed to follow up if symptoms worsen or fail to improve, ER/UC precautions discussed as well    Claire Hernández MD  [facility]   [address] [place] [address] [address]   [place] [address]  Phone: [phone]               "

## 2025-01-30 DIAGNOSIS — E87.20 METABOLIC ACIDOSIS: ICD-10-CM

## 2025-01-30 DIAGNOSIS — N18.4 CKD STAGE G4/A3, GFR 15-29 AND ALBUMIN CREATININE RATIO >300 MG/G (HCC): ICD-10-CM

## 2025-01-30 RX ORDER — SODIUM BICARBONATE 650 MG/1
TABLET ORAL
Qty: 180 TABLET | Refills: 3 | Status: SHIPPED | OUTPATIENT
Start: 2025-01-30

## 2025-02-03 ENCOUNTER — APPOINTMENT (OUTPATIENT)
Dept: CARDIOLOGY | Facility: MEDICAL CENTER | Age: 80
End: 2025-02-03
Attending: INTERNAL MEDICINE
Payer: COMMERCIAL

## 2025-02-03 ENCOUNTER — TELEPHONE (OUTPATIENT)
Dept: VASCULAR LAB | Facility: MEDICAL CENTER | Age: 80
End: 2025-02-03
Payer: COMMERCIAL

## 2025-02-03 NOTE — TELEPHONE ENCOUNTER
Caller: Jeremy - spouse    Topic/issue: Patient called to schedule a couple of ultrasound orders from [facility]. Patient was able to schedule one, but the ultrasound of her legs he was not able to schedule. The first available appointment they have is not until AFTER the order expires. Patient requesting a new order be placed and would like a callback once this has been done so he can make an appointment. Please advise.     Callback Number: [phone]    Thank you,  Anne Marie TOMLINSON

## 2025-02-14 ENCOUNTER — HOSPITAL ENCOUNTER (OUTPATIENT)
Dept: RADIOLOGY | Facility: MEDICAL CENTER | Age: 80
End: 2025-02-14
Attending: INTERNAL MEDICINE
Payer: COMMERCIAL

## 2025-02-14 DIAGNOSIS — I77.1 RIGHT ILIAC ARTERY STENOSIS (HCC): ICD-10-CM

## 2025-02-14 DIAGNOSIS — I73.9 PAD (PERIPHERAL ARTERY DISEASE) (HCC): ICD-10-CM

## 2025-02-14 DIAGNOSIS — Z95.828 S/P INSERTION OF ILIAC ARTERY STENT: ICD-10-CM

## 2025-02-14 DIAGNOSIS — I74.5 ILIAC ARTERY OCCLUSION, LEFT (HCC): ICD-10-CM

## 2025-02-14 DIAGNOSIS — I77.4 CELIAC ARTERY STENOSIS (HCC): ICD-10-CM

## 2025-02-14 PROCEDURE — 93922 UPR/L XTREMITY ART 2 LEVELS: CPT

## 2025-02-14 PROCEDURE — 93922 UPR/L XTREMITY ART 2 LEVELS: CPT | Mod: 26 | Performed by: INTERNAL MEDICINE

## 2025-02-14 PROCEDURE — 93925 LOWER EXTREMITY STUDY: CPT

## 2025-02-14 NOTE — TELEPHONE ENCOUNTER
Received request via: Patient    Was the patient seen in the last year in this department? Yes    Does the patient have an active prescription (recently filled or refills available) for medication(s) requested? No    Does the patient have FPC Plus and need 100-day supply? (This applies to ALL medications) Patient does not have SCP

## 2025-02-15 ENCOUNTER — HOSPITAL ENCOUNTER (OUTPATIENT)
Dept: RADIOLOGY | Facility: MEDICAL CENTER | Age: 80
End: 2025-02-15
Attending: INTERNAL MEDICINE
Payer: COMMERCIAL

## 2025-02-15 DIAGNOSIS — Z95.828 S/P INSERTION OF ILIAC ARTERY STENT: ICD-10-CM

## 2025-02-15 DIAGNOSIS — I77.1 RIGHT ILIAC ARTERY STENOSIS (HCC): ICD-10-CM

## 2025-02-15 DIAGNOSIS — I77.4 CELIAC ARTERY STENOSIS (HCC): ICD-10-CM

## 2025-02-15 DIAGNOSIS — I70.0 AORTIC ATHEROSCLEROSIS (HCC): ICD-10-CM

## 2025-02-15 DIAGNOSIS — I74.5 ILIAC ARTERY OCCLUSION, LEFT (HCC): ICD-10-CM

## 2025-02-15 PROCEDURE — 93978 VASCULAR STUDY: CPT | Mod: 26 | Performed by: INTERNAL MEDICINE

## 2025-02-15 PROCEDURE — 93978 VASCULAR STUDY: CPT

## 2025-02-15 PROCEDURE — 93925 LOWER EXTREMITY STUDY: CPT | Mod: 26 | Performed by: INTERNAL MEDICINE

## 2025-02-17 ENCOUNTER — DOCUMENTATION (OUTPATIENT)
Dept: VASCULAR LAB | Facility: MEDICAL CENTER | Age: 80
End: 2025-02-17
Payer: COMMERCIAL

## 2025-02-18 NOTE — PROGRESS NOTES
Aortoiliac duplex and bilat LE arterial duplex reviewed.     Patient still with considerably decreased ROBERTA bilat - left worse than right.     On the right there is a 50-75% stenosis in the common iliac. There is 50-75% tandem stenoses in femoral artery and a 50-75% stenosis in the popliteal artery with 2V runoff to foot    On the left there is occlusion of the left common iliac. Previous common femoral endarterectomy site appears patent; there is a 50-75% stenosis in the mid femoral artery with 3 V runoff    This is largely unchanged from previous, but unclear whether or not he symptomatology has progressed. She has not been seen in vascular medicine or surgery in some time. Will ask RN to contact patient and see if we can get her in for follow up.    Michael Bloch, MD  [facility]

## 2025-02-20 RX ORDER — ROSUVASTATIN CALCIUM 10 MG/1
10 TABLET, COATED ORAL EVERY EVENING
Qty: 90 TABLET | Refills: 0 | Status: SHIPPED | OUTPATIENT
Start: 2025-02-20

## 2025-02-21 ENCOUNTER — TELEPHONE (OUTPATIENT)
Dept: VASCULAR LAB | Facility: MEDICAL CENTER | Age: 80
End: 2025-02-21
Payer: COMMERCIAL

## 2025-02-21 NOTE — TELEPHONE ENCOUNTER
----- Message from Physician Michael Bloch, M.D. sent at 2/17/2025  8:49 PM PST -----  Pls give patient a call and let her know that she still has fairly advanced PAD - doesn't appear much worse than 2 years ago, but we would like to be able to evaluate her in office - she previously has refused f/u, but see if she will come in to see Dr. MONTERO.   If not, we may need to defer her f/u surveillance to cards and pcp. Jose gunter

## 2025-02-24 ENCOUNTER — OFFICE VISIT (OUTPATIENT)
Dept: CARDIOLOGY | Facility: MEDICAL CENTER | Age: 80
End: 2025-02-24
Attending: INTERNAL MEDICINE
Payer: COMMERCIAL

## 2025-02-24 VITALS
OXYGEN SATURATION: 93 % | DIASTOLIC BLOOD PRESSURE: 68 MMHG | WEIGHT: 134.4 LBS | HEIGHT: 63 IN | SYSTOLIC BLOOD PRESSURE: 124 MMHG | HEART RATE: 73 BPM | BODY MASS INDEX: 23.81 KG/M2

## 2025-02-24 DIAGNOSIS — I10 PRIMARY HYPERTENSION: ICD-10-CM

## 2025-02-24 DIAGNOSIS — I36.1 NONRHEUMATIC TRICUSPID VALVE REGURGITATION: ICD-10-CM

## 2025-02-24 DIAGNOSIS — I25.10 CORONARY ARTERY DISEASE INVOLVING NATIVE CORONARY ARTERY OF NATIVE HEART WITHOUT ANGINA PECTORIS: ICD-10-CM

## 2025-02-24 DIAGNOSIS — E78.5 DYSLIPIDEMIA: ICD-10-CM

## 2025-02-24 DIAGNOSIS — I27.20 PULMONARY HYPERTENSION (HCC): Chronic | ICD-10-CM

## 2025-02-24 PROCEDURE — 99212 OFFICE O/P EST SF 10 MIN: CPT | Performed by: INTERNAL MEDICINE

## 2025-02-24 PROCEDURE — 3074F SYST BP LT 130 MM HG: CPT | Performed by: INTERNAL MEDICINE

## 2025-02-24 PROCEDURE — 99214 OFFICE O/P EST MOD 30 MIN: CPT | Performed by: INTERNAL MEDICINE

## 2025-02-24 PROCEDURE — 3078F DIAST BP <80 MM HG: CPT | Performed by: INTERNAL MEDICINE

## 2025-02-24 ASSESSMENT — ENCOUNTER SYMPTOMS
FOCAL WEAKNESS: 0
MUSCULOSKELETAL NEGATIVE: 1
BLURRED VISION: 0
NERVOUS/ANXIOUS: 0
CARDIOVASCULAR NEGATIVE: 1
WEIGHT LOSS: 0
FEVER: 0
NEUROLOGICAL NEGATIVE: 1
RESPIRATORY NEGATIVE: 1
EYES NEGATIVE: 1
COUGH: 0
GASTROINTESTINAL NEGATIVE: 1
CHILLS: 0
WEAKNESS: 0
CLAUDICATION: 0
DOUBLE VISION: 0
BRUISES/BLEEDS EASILY: 0
DIZZINESS: 0
CONSTITUTIONAL NEGATIVE: 1
NAUSEA: 0
HEADACHES: 0
PALPITATIONS: 0
MYALGIAS: 0
DEPRESSION: 0
SHORTNESS OF BREATH: 0
PSYCHIATRIC NEGATIVE: 1
ABDOMINAL PAIN: 0
VOMITING: 0

## 2025-02-24 ASSESSMENT — FIBROSIS 4 INDEX: FIB4 SCORE: 1.767766952966368811

## 2025-02-24 NOTE — PROGRESS NOTES
Chief Complaint   Patient presents with    Follow-Up     FU DX: Coronary artery disease involving native coronary artery of native heart without angina pectoris       Subjective     Sangita Bolton is a 79 y.o. female who presents today for follow up of coronary artery disease, tricuspid regurgitation, pulmonary hypertension, hypertension and hyperlipidemia.    Since the patient's last visit on 10/08/24, she has been doing well clinically from cardiac standpoint. She denies fatigue, chest pain, shortness of breath, palpitations, lower extremity edema, dizziness or syncope. She underwent an echocardiogram which revealed moderate tricuspid regurgitation and pulmonary hypertension. She has been off oxygen therapy. She has been ambulatory with a wheelchair.     Past Medical History:   Diagnosis Date    Acute decompensated heart failure ([facility]) 10/22/2024    Alcohol abuse 04/27/2019    Arthritis     generalized-Osteo    Carotid artery stenosis 12/31/2015    COPD (chronic obstructive pulmonary disease) ([facility]) 10/21/2024    Fall 11/25/2020    Gluten intolerance     Hyperlipidemia     Hypertension     Medicare annual wellness visit, subsequent 10/05/2023    OSTEOPOROSIS     Other specified symptom associated with female genital organs     post menaupausal bleeding    Pain 02/06/2015    bilateral legs-chronic>4/10    Stage 3 chronic kidney disease 11/25/2020    Unspecified cataract      Past Surgical History:   Procedure Laterality Date    PB PARTIAL HIP REPLACEMENT  11/28/2020    Procedure: HEMIARTHROPLASTY, HIP REVISION;  Surgeon: Mohsen Thomas M.D.;  Location: [facility];  Service: Orthopedics    PB PARTIAL HIP REPLACEMENT Right 4/28/2019    Procedure: HEMIARTHROPLASTY, HIP;  Surgeon: Evens Sarabia M.D.;  Location: [facility];  Service: Orthopedics    RECOVERY  2/9/2015    Performed by -[facility] at [facility] SAME DAY [facility]    FEMORAL ARTERY REPAIR  2/9/2015    Performed by Yuly CROSS  KATLYN Chirinos at SURGERY [facility]S    HYSTEROSCOPY WITH VIDEO DIAGNOSTIC  11/17/2010    Performed by ALIS DEGROOT at SURGERY SAME DAY [facility] ORS    DILATION AND CURETTAGE  11/17/2010    Performed by ALIS DEGROOT at SURGERY SAME DAY [facility] ORS    ORIF, FRACTURE, HUMERUS  9/5/08    Performed by LAURA CARTER at SURGERY [facility] ORS    COLONOSCOPY  2008    recheck 4 years    OTHER ORTHOPEDIC SURGERY      rt leg fx    OTHER ORTHOPEDIC SURGERY      broken right wrist     Family History   Problem Relation Age of Onset    Cancer Mother         leukemia    Heart Disease Neg Hx     Heart Attack Neg Hx      Social History     Socioeconomic History    Marital status:      Spouse name: Not on file    Number of children: Not on file    Years of education: Not on file    Highest education level: 12th grade   Occupational History    Not on file   Tobacco Use    Smoking status: Former     Current packs/day: 1.00     Average packs/day: 1 pack/day for 50.1 years (50.1 ttl pk-yrs)     Types: Cigarettes     Start date: 2/2/1975    Smokeless tobacco: Never   Vaping Use    Vaping status: Never Used   Substance and Sexual Activity    Alcohol use: Not Currently    Drug use: No    Sexual activity: Not on file     Comment: ,    Other Topics Concern    Not on file   Social History Narrative    Not on file     Social Drivers of Health     Financial Resource Strain: Low Risk  (9/4/2024)    Overall Financial Resource Strain (CARDIA)     Difficulty of Paying Living Expenses: Not hard at all   Food Insecurity: No Food Insecurity (10/23/2024)    Hunger Vital Sign     Worried About Running Out of Food in the Last Year: Never true     Ran Out of Food in the Last Year: Never true   Transportation Needs: No Transportation Needs (10/23/2024)    PRAPARE - Transportation     Lack of Transportation (Medical): No     Lack of Transportation (Non-Medical): No   Physical Activity: Inactive (9/4/2024)    Exercise  Vital Sign     Days of Exercise per Week: 0 days     Minutes of Exercise per Session: 0 min   Stress: No Stress Concern Present (9/4/2024)    [employer] [place] [employer] - Occupational Stress Questionnaire     Feeling of Stress : Only a little   Social Connections: Unknown (9/4/2024)    Social Connection and Isolation Panel [NHANES]     Frequency of Communication with Friends and Family: Patient declined     Frequency of Social Gatherings with Friends and Family: Patient declined     Attends Muslim Services: Never     Active Member of Clubs or Organizations: No     Attends Club or Organization Meetings: Patient declined     Marital Status:    Intimate Partner Violence: Not At Risk (10/21/2024)    Humiliation, Afraid, Rape, and Kick questionnaire     Fear of Current or Ex-Partner: No     Emotionally Abused: No     Physically Abused: No     Sexually Abused: No   Housing Stability: Low Risk  (10/23/2024)    Housing Stability Vital Sign     Unable to Pay for Housing in the Last Year: No     Number of Times Moved in the Last Year: 1     Homeless in the Last Year: No     Allergies   Allergen Reactions    Hair Formula Extra Strength [Kdc:Cranberry Extract+Sambucus Nigra+Vegetable Enzyme+Yellow Dye+...] Hives     Hair Dye    Penicillins Hives     Hands; tolerates cephalosporins (Rocephin Nov 2020)     (Medications reviewed.)  Outpatient Encounter Medications as of 2/24/2025   Medication Sig Dispense Refill    rosuvastatin (CRESTOR) 10 MG Tab TAKE ONE TABLET BY MOUTH EVERY EVENING 90 Tablet 0    sodium bicarbonate (SODIUM BICARBONATE) 650 MG Tab TAKE TWO TABLETS BY MOUTH THREE TIMES A DAY (MORNING, NOON, AND BEDTIME) 180 Tablet 3    metoprolol SR (TOPROL XL) 25 MG TABLET SR 24 HR Take 1 Tablet by mouth every day. 90 Tablet 3    aspirin 81 MG EC tablet Take 1 Tablet by mouth every day.      Cholecalciferol (VITAMIN D3 PO) Take 1 Capsule by mouth every day.      Cyanocobalamin (VITAMIN B-12 PO) Take 1  "tablet by mouth every day.      [DISCONTINUED] albuterol 108 (90 Base) MCG/ACT Aero Soln inhalation aerosol Inhale 2 Puffs every 6 hours as needed for Shortness of Breath. 8.5 g 1    losartan (COZAAR) 100 MG Tab Take 1 Tablet by mouth every day. 90 Tablet 2    [DISCONTINUED] potassium chloride SA (K-DUR) 10 MEQ Tab CR Take 1 Tablet by mouth 2 times a day. Take potassium with furosemide/Lasix.  Do not take otherwise. 90 Tablet 3    [DISCONTINUED] tiotropium (SPIRIVA RESPIMAT) 2.5 mcg/Act Aero Soln Inhale 5 mcg every evening.       No facility-administered encounter medications on file as of 2/24/2025.     Review of Systems   Constitutional: Negative.  Negative for chills, fever, malaise/fatigue and weight loss.   HENT: Negative.  Negative for hearing loss.    Eyes: Negative.  Negative for blurred vision and double vision.   Respiratory: Negative.  Negative for cough and shortness of breath.    Cardiovascular: Negative.  Negative for chest pain, palpitations, claudication and leg swelling.   Gastrointestinal: Negative.  Negative for abdominal pain, nausea and vomiting.   Genitourinary: Negative.  Negative for dysuria and urgency.   Musculoskeletal: Negative.  Negative for joint pain and myalgias.   Skin: Negative.  Negative for itching and rash.   Neurological: Negative.  Negative for dizziness, focal weakness, weakness and headaches.   Endo/Heme/Allergies: Negative.  Does not bruise/bleed easily.   Psychiatric/Behavioral: Negative.  Negative for depression. The patient is not nervous/anxious.               Objective     /68 (BP Location: Left arm, Patient Position: Sitting, BP Cuff Size: Adult)   Pulse 73   Ht 1.6 m (5' 3\")   Wt 61 kg (134 lb 6.4 oz)   SpO2 93%   BMI 23.81 kg/m²     Physical Exam  Constitutional:       Appearance: Normal appearance. She is well-developed and normal weight.   HENT:      Head: Normocephalic and atraumatic.   Neck:      Vascular: No JVD.   Cardiovascular:      Rate and " Rhythm: Normal rate and regular rhythm.      Heart sounds: Murmur heard.   Pulmonary:      Effort: Pulmonary effort is normal.      Breath sounds: Normal breath sounds.   Abdominal:      General: Bowel sounds are normal.      Palpations: Abdomen is soft.      Comments: No hepatosplenomegaly.   Musculoskeletal:         General: Normal range of motion.   Lymphadenopathy:      Cervical: No cervical adenopathy.   Skin:     General: Skin is warm and dry.   Neurological:      Mental Status: She is alert and oriented to person, place, and time.            CARDIAC STUDIES/PROCEDURES:    ABDOMINAL ULTRASOUND (02/15/25)  Greater than 75% stenosis in the proximal celiac artery .   50-75% stenosis of the superior mesenteric artery.  50-75% stenosis in the RIGHT distal common iliac artery.  The LEFT common iliac and proximal external iliac arteries are occluded.    Flow reconstitutes in the distal LEFT external iliac artery.  Compared to the images of the duplex dated 5/8/2023 - there is no significantchange.  (study result reviewed)      CAROTID ULTRASOUND (11/03/23)  Compared to the prior study on 5/25/2023, no major changes.  Right.   Moderate to severe appearing irregular plaque in the distal common carotid artery.   Velocities consistent with greater than >50% stenosis.      CT OF CHEST (10/25/23)  1.  Moderately advanced emphysematous changes.  2.   Tiny micronodules, couple of which were not seen on the prior study. Follow-up per Fleischner criteria as clinically indicated.  3.  Severe atherosclerosis.  4.  Partially visualized nonspecific pancreatic head low density lesion and nonspecific pancreatic ductal dilatation. Pancreatic protocol CT or MRI of the abdomen without and with contrast may BE performed to further evaluate.  5.  No evidence of interstitial pneumonitis/UIP.    ECHOCARDIOGRAM CONCLUSIONS (10/23/24)  Limited echocardiogram.  Normal left ventricular systolic function.   The ejection fraction is measured to  be 57% by Payne's biplane.  Moderate concentric left ventricular hypertrophy.  Normal right ventricular size and systolic function.  Moderate tricuspid regurgitation.   Right ventricular systolic pressure is estimated to be 68 mmHg (moderate pulmonary hypertension).  Compared to the prior study on 09/14/2024, pulmonary hypertension is now moderate.    No other significant changes.  (study result reviewed)      ECHOCARDIOGRAM CONCLUSIONS (01/09/24)  Prior study on 11/26/20, compared to the report of the prior study,   there has been progression of tricuspid regurgitation.   Normal left ventricular systolic function.  The left ventricular ejection fraction is visually estimated to be 65%.  Aortic valve sclerosis without significant stenosis.  Mild mitral regurgitation.  Moderate tricuspid regurgitation.  Estimated right ventricular systolic pressure is 40 mmHg.     ECHOCARDIOGRAM CONCLUSIONS (09/14/24)  Normal left ventricular chamber size.  The left ventricular ejection fraction is visually estimated to be 60%.  Moderate concentric left ventricular hypertrophy.  Mild mitral regurgitation.  Normal right ventricular systolic function.  Moderate tricuspid regurgitation.  Estimated right ventricular systolic pressure is 50 mmHg.    ECHOCARDIOGRAM CONCLUSIONS (11/26/20)  No prior study is available for comparison.   Normal left ventricular size, wall thickness, and systolic function.  Left ventricular ejection fraction is visually estimated to be 70%.  Grade I diastolic dysfunction.  Normal right ventricular size and systolic function.  Mild mitral regurgitation.  Mild tricuspid regurgitation.  Right ventricular systolic pressure is estimated to be 45 mmHg.     EKG performed on (11/16/23) EKG shows sinus rhythm.     Laboratory results of (07/10/23) Cholesterol profile of 109/91/64/27 mg/dL noted.      MYOCARDIAL PERFUSION STUDY CONCLUSIONS (12/04/23)  NUCLEAR IMAGING INTERPRETATION  Normal Lexiscan myocardial study.  No  evidence of ischemia or infarct.  SDS 1.  LVEF 62%.  Sinus rhythm with anterior Q waves at rest.  No ischemic changes with Regadenoson.  Occasional PVCs.  No chest chest.  ECG INTERPRETATION  Negative stress ECG for ischemia.     PERIPHERAL INTERVENTION by Yuly Brennan (02/09/15)   1.  Aortogram.  2.  Iliac artery angiogram.  3.  Bilateral common and external iliac artery angioplasty.  4.  Right common iliac artery stent with 7x29 mm Omnilink Elite.  5.  Left common and external iliac artery angioplasty and stent with 8x40 mm   Absolute Pro self-expanding stent crossing the iliac bifurcation.  6.  Right common and external iliac artery stent with 8x30 mm Absolute Pro   self-expanding stent postdilated with a 7 mm balloon.  7.  Left common femoral artery exploration and repair with endarterectomy and   Dacron patch angioplasty.    Assessment & Plan     1. Coronary artery disease involving native coronary artery of native heart without angina pectoris        2. Nonrheumatic tricuspid valve regurgitation        3. Pulmonary hypertension (HCC)        4. Primary hypertension        5. Dyslipidemia            Medical Decision Making: Today's Assessment/Status/Plan:        Coronary artery disease: She is a 79 year old female with coronary artery disease, tricuspid regurgitation, pulmonary hypertension, hypertension and hyperlipidemia. She is clinically doing well from coronary standpoint. I will continue with current medical care including metoprolol, losartan, rosuvastatin.  Tricuspid regurgitation, pulmonary hypertension: She has moderate tricuspid regurgitation pulmonary hypertension. She has been off of her oxygen. We will have her restart her nighty oxygen and repeat an echocardiogram in 3 months.   Hypertension: Currently, the average blood pressure is well controlled. We will continue with beta blockade therapy and angiotensin receptor blockade.   Hyperlipidemia: She is doing well on statin therapy without  myalgia symptoms. (Managed by [facility])   Carotid artery stenosis and peripheral vascular disease: (Managed by [facility])     We will follow up in 3 months.    CC Claire Harp

## 2025-02-25 ENCOUNTER — APPOINTMENT (OUTPATIENT)
Dept: CARDIOLOGY | Facility: MEDICAL CENTER | Age: 80
End: 2025-02-25
Attending: INTERNAL MEDICINE
Payer: COMMERCIAL

## 2025-03-06 ENCOUNTER — OFFICE VISIT (OUTPATIENT)
Dept: VASCULAR LAB | Facility: MEDICAL CENTER | Age: 80
End: 2025-03-06
Payer: COMMERCIAL

## 2025-03-06 VITALS
HEIGHT: 63 IN | SYSTOLIC BLOOD PRESSURE: 147 MMHG | BODY MASS INDEX: 23.57 KG/M2 | WEIGHT: 133 LBS | HEART RATE: 63 BPM | DIASTOLIC BLOOD PRESSURE: 81 MMHG

## 2025-03-06 DIAGNOSIS — I73.9 PAD (PERIPHERAL ARTERY DISEASE) (HCC): ICD-10-CM

## 2025-03-06 DIAGNOSIS — I77.4 CELIAC ARTERY STENOSIS (HCC): ICD-10-CM

## 2025-03-06 DIAGNOSIS — E78.49 OTHER HYPERLIPIDEMIA: ICD-10-CM

## 2025-03-06 DIAGNOSIS — N18.4 CKD STAGE G4/A3, GFR 15-29 AND ALBUMIN CREATININE RATIO >300 MG/G (HCC): ICD-10-CM

## 2025-03-06 DIAGNOSIS — Z95.828 S/P INSERTION OF ILIAC ARTERY STENT: ICD-10-CM

## 2025-03-06 DIAGNOSIS — Z87.891 FORMER SMOKER: ICD-10-CM

## 2025-03-06 DIAGNOSIS — I77.1 RIGHT ILIAC ARTERY STENOSIS (HCC): ICD-10-CM

## 2025-03-06 DIAGNOSIS — I10 PRIMARY HYPERTENSION: ICD-10-CM

## 2025-03-06 DIAGNOSIS — I74.5 ILIAC ARTERY OCCLUSION, LEFT (HCC): ICD-10-CM

## 2025-03-06 DIAGNOSIS — I70.0 AORTIC ATHEROSCLEROSIS (HCC): ICD-10-CM

## 2025-03-06 DIAGNOSIS — I65.23 BILATERAL CAROTID ARTERY STENOSIS: ICD-10-CM

## 2025-03-06 PROCEDURE — 99212 OFFICE O/P EST SF 10 MIN: CPT

## 2025-03-06 PROCEDURE — 3077F SYST BP >= 140 MM HG: CPT

## 2025-03-06 PROCEDURE — 99214 OFFICE O/P EST MOD 30 MIN: CPT

## 2025-03-06 PROCEDURE — 3078F DIAST BP <80 MM HG: CPT

## 2025-03-06 ASSESSMENT — FIBROSIS 4 INDEX: FIB4 SCORE: 1.767766952966368811

## 2025-03-06 NOTE — Clinical Note
When should we repeat ao/iliac, BLE art/marla?  In one year 2/2026?   She is asymptomatic Also should we repeat carotids at some point as well?  We did have CTA neck ordered but she never completed, and she does have CKD 4- GFR ~18.  Also asymptomatic

## 2025-03-06 NOTE — PROGRESS NOTES
FOLLOW UP VASCULAR MEDICINE VISIT  03/06/2025     Sangita Bolton is a 79 y.o. female who presents for   Chief Complaint   Patient presents with    Follow-Up     initially referred by Claire Hernández M.D. for PAD, s/p iliac art stenting   Established care at last visit; 4/2023     Subjective       Last seen 7/2023  Here to reestablish care  Doing well, no concerns  Follows closely with PCP, cardiology and neph, amongst other specialties  Did imaging - reviewed  Has since stopped smoking!!!!! Stopped sometime around 9/2023!  Congratulated  Denies TIA/CVA symptoms  Denies claudication, no temp/color changes, no wounds and no walking or rest pain       Lower extremity PAD:   Initial hx:  Denies current limb pain with walking, no claudication  Chronic limb ischemic sx: denies rest pain, nonhealing leg wounds, cold extremities, coloration changes   Location: bilat   Age at symptom onset: 60s   Pain-free walking distance: limited due to orthopedic issues   Maximum walking distance, prior to stopping due to symptoms: unable to determine due to ortho limitis   How long until pain subsides with rest: n/a   Prior imaging studies:  no recent   Prior medications/interventions:    Meds: ASA, ARB, no statin    Interventions: 2015 -bilat iliac art stenting, L CFA endarterectomy by dr. Yuly Chirinos    Exercise therapy: none   Tobacco hx:  reports that she has quit smoking. Her smoking use included cigarettes. She started smoking about 50 years ago. She has a 50.1 pack-year smoking history. She has never used smokeless tobacco.    HTN:  No current concerns, stable, tolerating meds.   Home BP log: not checking - good in other offices    Hyperlipidemia:  Stable, no current concerns  Current treatment: rosuva 10; tolerating well  Baseline lipid off tx:   Latest Reference Range & Units 01/04/12 08:18   Cholesterol,Tot 100 - 199 mg/dL 281 (H)   Triglycerides 0 - 149 mg/dL 58   HDL >=40 mg/dL 146   LDL <100 mg/dL 123  "    Antiplatelet/anticoagulation: Yes, Details: ASA 81mg  , no hx of bleeding        CKD:  G4, seeing nephrology, no prior known renal aa stenosis       Current Outpatient Medications:     rosuvastatin, 10 mg, Oral, Q EVENING, Taking    sodium bicarbonate, TAKE TWO TABLETS BY MOUTH THREE TIMES A DAY (MORNING, NOON, AND BEDTIME), Taking    losartan, 100 mg, Oral, QDAY, Taking    metoprolol SR, 25 mg, Oral, DAILY, Taking    aspirin, 81 mg, Oral, DAILY, Taking    Cholecalciferol (VITAMIN D3 PO), 1 Capsule, Oral, DAILY, Taking    Cyanocobalamin (VITAMIN B-12 PO), 1 Tablet, Oral, DAILY, Taking   Allergies   Allergen Reactions    Hair Formula Extra Strength [Kdc:Cranberry Extract+Sambucus Nigra+Vegetable Enzyme+Yellow Dye+...] Hives     Hair Dye    Penicillins Hives     Hands; tolerates cephalosporins (Rocephin Nov 2020)     Family History   Problem Relation Age of Onset    Cancer Mother         leukemia    Heart Disease Neg Hx     Heart Attack Neg Hx       Social History     Tobacco Use    Smoking status: Former     Current packs/day: 1.00     Average packs/day: 1 pack/day for 50.1 years (50.1 ttl pk-yrs)     Types: Cigarettes     Start date: 2/2/1975    Smokeless tobacco: Never   Vaping Use    Vaping status: Never Used   Substance Use Topics    Alcohol use: Not Currently    Drug use: No         Objective   Vitals:    03/06/25 1336 03/06/25 1342 03/06/25 1400   BP: (!) 168/72 (!) 176/68 (!) 147/81   BP Location: Right arm Right arm Left arm   Patient Position: Sitting Sitting    BP Cuff Size: Adult Adult    Pulse: 65 64 63   Weight: 60.3 kg (133 lb)     Height: 1.6 m (5' 3\")        BP Readings from Last 5 Encounters:   03/06/25 (!) 147/81   02/24/25 124/68   01/28/25 116/80   01/06/25 126/58   12/03/24 124/78      Body mass index is 23.56 kg/m².  Physical Exam  Constitutional:       General: She is not in acute distress.     Appearance: She is not diaphoretic.   Cardiovascular:      Rate and Rhythm: Normal rate and " "regular rhythm.      Heart sounds: Normal heart sounds. No murmur heard.  Pulmonary:      Effort: Pulmonary effort is normal. No respiratory distress.      Breath sounds: Normal breath sounds. No wheezing.   Musculoskeletal:         General: No swelling. Normal range of motion.   Skin:     General: Skin is dry.   Neurological:      Mental Status: She is alert and oriented to person, place, and time.      Motor: No weakness.      Gait: Gait normal.   Psychiatric:         Mood and Affect: Mood normal.         Behavior: Behavior normal.         Thought Content: Thought content normal.       Lab Results   Component Value Date    CHOLSTRLTOT 75 (L) 2024    LDL 29 2024    HDL 24 (A) 2024    TRIGLYCERIDE 112 2024      Lab Results   Component Value Date/Time    LIPOPROTA 12 07/10/2023 07:29 AM      No results found for: \"APOB\"           Lab Results   Component Value Date    SODIUM 136 2024    POTASSIUM 5.1 2024    CHLORIDE 96 2024    CO2 21 2024    GLUCOSE 91 2024    BUN 58 (H) 2024    CREATININE 2.56 (H) 2024        Lab Results   Component Value Date    WBC 9.1 2024    RBC 3.75 (L) 2024    HEMOGLOBIN 11.2 (L) 2024    HEMATOCRIT 35.4 (L) 2024    MCV 94.4 2024    MCH 29.9 2024    MCHC 31.6 (L) 2024    MPV 10.0 2024         Lab Results   Component Value Date/Time    MALBCRT 458 (H) 2024 09:34 AM    MICROALBUR 12.3 2024 09:34 AM    MICRALB 66.6 2021 10:18 AM      VASCULAR IMAGING:   Results for orders placed or performed during the hospital encounter of 10/21/24   EKG   Result Value Ref Range    Report       [facility]    Test Date:  2024-10-21  Pt Name:    AGUSTIN PORRAS                Department: [facility]  MRN:        [id number]                      [facility]  Gender:     Female                       Technician: [id number]  :        1945           "         Requested By:ER TRIAGE PROTOCOL  Order #:    [id number]                    Reading MD:    Measurements  Intervals                                Axis  Rate:       84                           P:          79  NE:         136                          QRS:        -21  QRSD:       122                          T:          76  QT:         415  QTc:        491    Interpretive Statements  Sinus rhythm  Atrial premature complex  Left bundle branch block  Compared to ECG 09/25/2024 17:09:03  Atrial premature complex(es) now present  Left bundle-branch block now present  ST (T wave) deviation no longer present  T-wave abnormality no longer present       *Note: Due to a large number of results and/or encounters for the requested time period, some results have not been displayed. A complete set of results can be found in Results Review.     OP report 2015 (Dr. Chirinos)   1.  Aortogram.  2.  Iliac artery angiogram.  3.  Bilateral common and external iliac artery angioplasty.  4.  Right common iliac artery stent with 7x29 mm Omnilink Elite.  5.  Left common and external iliac artery angioplasty and stent with 8x40 mm   Absolute Pro self-expanding stent crossing the iliac bifurcation.  6.  Right common and external iliac artery stent with 8x30 mm Absolute Pro   self-expanding stent postdilated with a 7 mm balloon.  7.  Left common femoral artery exploration and repair with endarterectomy and   Dacron patch angioplasty.    Carotid 2017   1.  There is an extensive amount of atherosclerotic plaque.  Plaque is located in carotid bulbs and proximal internal carotid arteries.  Plaque characterization:  Heterogeneous with calcifications   2.  There is no evidence of carotid occlusion.   3. Vertebral arteries demonstrate antegrade flow.   4. Diameter reduction in the internal carotid arteries: 50-69% in the right proximal internal carotid artery. Diameter reduction in the left ICA is between 0% 50%. There is right-sided  hemodynamically significant stenosis.    BLE venous 2019   1. Normal, but limited bilateral superficial and deep venous examination of    the lower extremities.     Echo 2020  No prior study is available for comparison.   Normal left ventricular size, wall thickness, and systolic function.  Left ventricular ejection fraction is visually estimated to be 70%.  Grade I diastolic dysfunction.  Normal right ventricular size and systolic function.  Mild mitral regurgitation.  Mild tricuspid regurgitation.  Right ventricular systolic pressure is estimated to be 45 mmHg.    5/25/23 Carotid Duplex      Vascular Laboratory   CONCLUSIONS   Severe appearing irregular plaque in the distal common carotid artery.     Velocities are consistent with greater than 50% stenosis right internal    carotid artery.      6/25/23 art/marla   Arterial Duplex Report      Vascular Laboratory   CONCLUSIONS   Right lower extremity:   There is 50-75% stenosis in the distal common femoral artery.   There is 50-75% stenosis in the mid superficial femoral artery.   Absent flow in the right posterior tibial artery.      Left lower extremity:   There is 50-75% stenosis in the distal superficial femoral artery.   Absent flow in the left peroneal artery and proximal  anterior tibial    artery.      The Doppler waveform of the right common femoral artery is abnormally    dampened, consistent with significant arterial disease in the aorto-iliac    segment.    The right toe-brachial indices are abnormally reduced.       Left   The Doppler waveform of the left common femoral artery is abnormally    dampened, consistent with significant arterial disease in the aorto-iliac    segment.    The left ankle-brachial index is severely reduced.       There is evidence of severe arterial disease demonstrated.    Echo 10/2024   Limited echocardiogram.  Normal left ventricular systolic function. The ejection fraction is   measured to be 57% by Payne's biplane.  Moderate  concentric left ventricular hypertrophy.  Normal right ventricular size and systolic function.  Moderate tricuspid regurgitation. Right ventricular systolic pressure   is estimated to be 68 mmHg (moderate pulmonary hypertension).  Compared to the prior study on 09/14/2024, pulmonary hypertension is   now moderate.  No other significant changes.      Carotid duplex 11/2024  1. There is < 50% stenosis involving bilateral internal carotid arteries.    Moderate degree of plaque involving right common carotid artery.      2.  Findings of elevated velocities in the right proximal subclavian artery    consistent with >50% stenosis.       3.  Findings of elevated velocities in the left proximal subclavian artery    consistent with >75% stenosis.       4. Bidirectional flow in the left vertebral artery, indicating pre-   subclavian steal.    LE art duplex/ROBERTA 2/2025  RIGHT LEG:.    50-75% tandem stenosis at the mid & mid-distal superficial femoral artery.   >75% stenosis in the popliteal artery.   No flow in the distal posterior tibial artery which appears to be occluded.   Two vessel runoff in the calf (peroneal & anterior tibial) with biphasic    Doppler waveforms.      LEFT LEG:.    The common iliac artery and proximal external iliac artery appear to be    occluded; no flow observed per sonographer   Patent common femoral artery with surgical changes observed consistent with    prior endarterectomy.   50-75% stenosis of the mid superficial femoral artery.   Three vessel runoff in the calf with diminished monophasic flow.      Compared to the images of the duplex dated 6/28/2023 - there is no    significant change, iliac stents are not imaged on either exam.    Consider dedicated aorto-iliac imaging of previous iliac stenting.  ROBERTA    RIGHT LEG:.    The ankle-brachial index is mildly reduced.    LEFT LEG:.    The ankle-brachial index is severely reduced (dorsalis pedis artery).   An arterial duplex was performed in  accordance with lower extremity    arterial evaluation protocol - see separate report.     Ao/iliac duplex 2/2025   Greater than 75% stenosis in the proximal celiac artery .    50-75% stenosis of the superior mesenteric artery.   50-75% stenosis in the RIGHT distal common iliac artery.   The LEFT common iliac and proximal external iliac arteries are occluded.    Flow  reconstitutes in the distal LEFT external iliac artery.   Compared to the images of the duplex dated 5/8/2023 - there is no significant change.           Medical Decision Making:  Today's Assessment / Status / Plan:     1. PAD (peripheral artery disease) ([facility])        2. Aortic atherosclerosis ([facility])        3. Celiac artery stenosis ([facility])        4. Iliac artery occlusion, left ([facility])        5. Right iliac artery stenosis ([facility])        6. Bilateral carotid artery stenosis        7. S/P insertion of iliac artery stent        8. Primary hypertension        9. Other hyperlipidemia        10. CKD stage G4/A3, GFR 15-29 and albumin creatinine ratio >300 mg/g ([facility])        11. Former smoker              Patient Type: Secondary Prevention, polyvascular, very high risk     Etiology of Established CVD if Present:     1) Lower extremity PAD:  inflow/outflow - no current sx   2015 -s/p bi-iliac aa stenting, L CFA endarterectomy   2/2025 BLE art/roberta = considerably decreased ROBERTA bilat - left worse than right.   On the right there is a 50-75% stenosis in the common iliac. There is 50-75% tandem stenoses in femoral artery and a 50-75% stenosis in the popliteal artery with 2V runoff to foot  On the left there is occlusion of the left common iliac. Previous common femoral endarterectomy site appears patent; there is a 50-75% stenosis in the mid femoral artery with 3 V runoff  [place] classification: 0-1  Change in Pain-free walking distance: n/a   Change in Maximum walking distance: n/a  Exam shows no CLI s/s at this time  No signs of neuropathy  - continue TLC measures  and encouraged complete cessation of tobacco products   - encouraged structured exercise therapy for 12 weeks as outlined in care instructions, monitor pain-free and total walking distance to monitor progress  - continue antiplatelet therapy, ARB, statin   - consider ASA + xarelto protocol as per COMPASS/VOYAGER studies if qualifies   - consider cilostazol 50 or 100mg BID (no signs of CHF) - reviewed common ADRs   - recommend annual surveillance; BLE art/roberta 2/2025    2) carotid stenosis: recent duplex with possible severe stenosis. Denies any lightheaded or dizziness. No changes in vision, no HAs.  Recommend CTA to more carefully delineate the degree of stenosis  Carotid 11/2024 = moderate to severe stenosis   - CTA neck, as previously ordered- never completed.  Will clarify with medical director on timing of repeat imaging, consider repeat carotid in future as GFR may not allow for CTA  - asa, statin as below    BLOOD PRESSURE MANAGEMENT:  ACC/AHA (2017) goal <130/80  Home BP at goal:  yes  Office BP at goal:  yes  24h ABPM: not ordered to date  Plan:   Monitoring:   - start/continue home BP monitoring, reviewed correct technique, provide BP log and instructions  - order 24h ABPM:  UNDECIDED  - monitor lytes/gfr routinely   - contact office if BP consistently >140/>90 for discussion of tx adjustments   - consider SEDRICK duplex in future   Medications:  - continue losartan 100mg daily   - continue amlodipine 10mg daily   - consider doxazosin as next agent   - consider stopping spironolactone due to gfr <30    LIPID MANAGEMENT:   Qualifies for Statin Therapy Based on 2018 ACC/AHA Guidelines: yes, Secondary Prevention - Very high risk ASCVD - 2 major events or 1 event with other high-risk conditions  The ASCVD Risk score (Sabina ROGERS, et al., 2019) failed to calculate.  Major ASCVD events: Symptomatic PAD (hx of claudication with ROBERTA <0.85, previous revascularization/amputation), Documented clinical evidence of ASCVD:,  and Carotid plaque, >50% stenosis  High-risk condition: >64yr old , Hypertension , CKD (egfr 15-59), and Current smoking   Risk-enhancers: N/A  Currently on Statin: Yes, started last visit  Tx goals: LDL-C <55 preferred   At goal? Yes 9/2024, excellent control   Plan:   - reinforced ongoing TLC measures as noted   - monitor labs as per other providers   Meds:   - continue rosuva 10mg daily     ANTITHROMBOTIC/ANTIPLATELET THERAPY: continue ASA 81mg   Consider DPI with ASA + xarelto 2.5mg BID due to PAD, CAD, low bleed risk     GLYCEMIC STATUS: Normal    LIFESTYLE INTERVENTIONS:    SMOKING: Stages of Change: Maintenance (sustained change >6mo)     reports that she has quit smoking. Her smoking use included cigarettes. She started smoking about 50 years ago. She has a 50.1 pack-year smoking history. She has never used smokeless tobacco.   She states she quit smoking !  Quit 9/2023!  Congratulated   Provided strong recommendation for complete cessation and informed this is the primary contributor to the majority of all ASCVD and cancer-related conditions and can result in significant morbidity and early mortality.   - reviewed resources for cessation including tobacco cessation clinic visit, pharmacotx meds, quit lines  - continue  complete cessation   - review at every visit     Provided 2 minutes of face-to-face counseling on above topics     PHYSICAL ACTIVITY: continue healthy activity to improve CV fitness.  In general, targeting >150min/week of moderate-level activity or as much as tolerated in light of functional status and co-morbidities     WEIGHT MANAGEMENT AND NUTRITION: Mediterranean style dietary approach       OTHER:    # CKD G4/A3, stable  - avoid nephrotoxins  - continue HTN control with RAAS blockade   - monitor lytes/gfr routinely  -continue close f/u with nephrology     Instructed to follow-up with PCP for remainder of adult medical needs: yes  We will partner with other providers in the management of  established vascular disease and cardiometabolic risk factors.    Studies to Be Obtained: ,  Ao/iliac, BLE ROBERTA/art duplex 2/2025, consider repeat carotid 11/2025 pending medical director recs   Labs to Be Obtained: per neph     Follow up in: 1 year to review imaging , sooner prn - is followed closely with multiple other specialties     JAY Guaman  [facility]   [facility]   [phone]      CC:  Dr. A Lynch Dr. Najjar

## 2025-04-08 ENCOUNTER — HOSPITAL ENCOUNTER (OUTPATIENT)
Facility: MEDICAL CENTER | Age: 80
End: 2025-04-08
Attending: INTERNAL MEDICINE
Payer: COMMERCIAL

## 2025-04-08 LAB
CREAT UR-MCNC: 18.5 MG/DL
MICROALBUMIN UR-MCNC: 21.1 MG/DL
MICROALBUMIN/CREAT UR: 1141 MG/G (ref 0–30)

## 2025-04-08 PROCEDURE — 82570 ASSAY OF URINE CREATININE: CPT

## 2025-04-08 PROCEDURE — 82043 UR ALBUMIN QUANTITATIVE: CPT

## 2025-04-16 ENCOUNTER — OFFICE VISIT (OUTPATIENT)
Dept: NEPHROLOGY | Facility: MEDICAL CENTER | Age: 80
End: 2025-04-16
Payer: COMMERCIAL

## 2025-04-16 VITALS
OXYGEN SATURATION: 93 % | SYSTOLIC BLOOD PRESSURE: 110 MMHG | DIASTOLIC BLOOD PRESSURE: 62 MMHG | RESPIRATION RATE: 12 BRPM | BODY MASS INDEX: 23.71 KG/M2 | WEIGHT: 133.8 LBS | TEMPERATURE: 97.8 F | HEART RATE: 69 BPM | HEIGHT: 63 IN

## 2025-04-16 DIAGNOSIS — I10 ESSENTIAL HYPERTENSION: ICD-10-CM

## 2025-04-16 DIAGNOSIS — N18.4 CKD (CHRONIC KIDNEY DISEASE) STAGE 4, GFR 15-29 ML/MIN (HCC): ICD-10-CM

## 2025-04-16 PROCEDURE — 3078F DIAST BP <80 MM HG: CPT | Performed by: INTERNAL MEDICINE

## 2025-04-16 PROCEDURE — 99214 OFFICE O/P EST MOD 30 MIN: CPT | Performed by: INTERNAL MEDICINE

## 2025-04-16 PROCEDURE — 3074F SYST BP LT 130 MM HG: CPT | Performed by: INTERNAL MEDICINE

## 2025-04-16 ASSESSMENT — ENCOUNTER SYMPTOMS
NAUSEA: 0
FEVER: 0
HYPERTENSION: 1
COUGH: 0
SHORTNESS OF BREATH: 0
CHILLS: 0
VOMITING: 0

## 2025-04-16 ASSESSMENT — FIBROSIS 4 INDEX: FIB4 SCORE: 1.767766952966368811

## 2025-04-16 NOTE — PROGRESS NOTES
"Subjective     Sangita Bolton is a 79 y.o. female who presents with Chronic Kidney Disease            Chronic Kidney Disease  This is a chronic problem. The current episode started more than 1 year ago. The problem occurs constantly. The problem has been unchanged. Pertinent negatives include no chest pain, chills, coughing, fever, nausea, urinary symptoms or vomiting.   Hypertension  This is a chronic problem. The current episode started more than 1 year ago. The problem is unchanged. The problem is controlled. Pertinent negatives include no chest pain, malaise/fatigue, peripheral edema or shortness of breath. Risk factors for coronary artery disease include post-menopausal state. Past treatments include angiotensin blockers. The current treatment provides significant improvement. There are no compliance problems.  Hypertensive end-organ damage includes kidney disease. Identifiable causes of hypertension include chronic renal disease.       Review of Systems   Constitutional:  Negative for chills, fever and malaise/fatigue.   Respiratory:  Negative for cough and shortness of breath.    Cardiovascular:  Negative for chest pain and leg swelling.   Gastrointestinal:  Negative for nausea and vomiting.   Genitourinary:  Negative for dysuria, frequency and urgency.              Objective     /62 (BP Location: Left arm, Patient Position: Sitting, BP Cuff Size: Adult)   Pulse 69   Temp 36.6 °C (97.8 °F) (Temporal)   Resp 12   Ht 1.6 m (5' 3\")   Wt 60.7 kg (133 lb 12.8 oz)   SpO2 93%   BMI 23.70 kg/m²      Physical Exam  Vitals and nursing note reviewed.   Constitutional:       General: She is not in acute distress.     Appearance: Normal appearance. She is well-developed. She is not diaphoretic.   HENT:      Head: Normocephalic and atraumatic.      Right Ear: External ear normal.      Left Ear: External ear normal.      Nose: Nose normal.   Eyes:      General: No scleral icterus.        Right eye: No discharge. "         Left eye: No discharge.      Conjunctiva/sclera: Conjunctivae normal.   Cardiovascular:      Rate and Rhythm: Normal rate and regular rhythm.      Heart sounds: No murmur heard.  Pulmonary:      Effort: Pulmonary effort is normal. No respiratory distress.      Breath sounds: Normal breath sounds.   Musculoskeletal:         General: No tenderness.      Right lower leg: No edema.      Left lower leg: No edema.   Skin:     General: Skin is warm and dry.      Findings: No erythema.   Neurological:      General: No focal deficit present.      Mental Status: She is alert and oriented to person, place, and time.      Cranial Nerves: No cranial nerve deficit.   Psychiatric:         Mood and Affect: Mood normal.         Behavior: Behavior normal.       Past Medical History:   Diagnosis Date    Acute decompensated heart failure ([facility]) 10/22/2024    Alcohol abuse 04/27/2019    Arthritis     generalized-Osteo    Carotid artery stenosis 12/31/2015    COPD (chronic obstructive pulmonary disease) ([facility]) 10/21/2024    Fall 11/25/2020    Gluten intolerance     Hyperlipidemia     Hypertension     Medicare annual wellness visit, subsequent 10/05/2023    OSTEOPOROSIS     Other specified symptom associated with female genital organs     post menaupausal bleeding    Pain 02/06/2015    bilateral legs-chronic>4/10    Stage 3 chronic kidney disease 11/25/2020    Unspecified cataract      Social History     Socioeconomic History    Marital status:      Spouse name: Not on file    Number of children: Not on file    Years of education: Not on file    Highest education level: 12th grade   Occupational History    Not on file   Tobacco Use    Smoking status: Former     Current packs/day: 1.00     Average packs/day: 1 pack/day for 50.2 years (50.2 ttl pk-yrs)     Types: Cigarettes     Start date: 2/2/1975    Smokeless tobacco: Never   Vaping Use    Vaping status: Never Used   Substance and Sexual Activity    Alcohol use: Not Currently     Drug use: No    Sexual activity: Not on file     Comment: ,    Other Topics Concern    Not on file   Social History Narrative    Not on file     Social Drivers of Health     Financial Resource Strain: Low Risk  (9/4/2024)    Overall Financial Resource Strain (CARDIA)     Difficulty of Paying Living Expenses: Not hard at all   Food Insecurity: No Food Insecurity (10/23/2024)    Hunger Vital Sign     Worried About Running Out of Food in the Last Year: Never true     Ran Out of Food in the Last Year: Never true   Transportation Needs: No Transportation Needs (10/23/2024)    PRAPARE - Transportation     Lack of Transportation (Medical): No     Lack of Transportation (Non-Medical): No   Physical Activity: Inactive (9/4/2024)    Exercise Vital Sign     Days of Exercise per Week: 0 days     Minutes of Exercise per Session: 0 min   Stress: No Stress Concern Present (9/4/2024)    [employer] [place] [employer] - Occupational Stress Questionnaire     Feeling of Stress : Only a little   Social Connections: Unknown (9/4/2024)    Social Connection and Isolation Panel [NHANES]     Frequency of Communication with Friends and Family: Patient declined     Frequency of Social Gatherings with Friends and Family: Patient declined     Attends Islam Services: Never     Active Member of Clubs or Organizations: No     Attends Club or Organization Meetings: Patient declined     Marital Status:    Intimate Partner Violence: Not At Risk (10/21/2024)    Humiliation, Afraid, Rape, and Kick questionnaire     Fear of Current or Ex-Partner: No     Emotionally Abused: No     Physically Abused: No     Sexually Abused: No   Housing Stability: Low Risk  (10/23/2024)    Housing Stability Vital Sign     Unable to Pay for Housing in the Last Year: No     Number of Times Moved in the Last Year: 1     Homeless in the Last Year: No     Family History   Problem Relation Age of Onset    Cancer Mother         leukemia    Heart  Disease Neg Hx     Heart Attack Neg Hx      Recent Labs     09/11/24  0245 09/12/24  0040 09/14/24  0025 09/15/24  0341 09/25/24  1627 09/27/24  1559 09/28/24  0138 10/22/24  0209 10/24/24  0433 10/25/24  0211 11/25/24  0934   ALBUMIN 2.9*   < > 3.2 3.1*   < > 2.8*   < > 3.4 3.4 3.5  --    HDL 24*  --   --   --   --   --   --   --   --   --   --    TRIGLYCERIDE 112  --   --   --   --   --   --   --   --   --   --    SODIUM 131*   < > 130* 130*   < > 141   < > 134* 133* 132* 136   POTASSIUM 3.7   < > 4.0 4.1   < > 4.2   < > 3.8 3.6 4.0 5.1   CHLORIDE 96   < > 96 96   < > 105   < > 100 92* 92* 96   CO2 14*   < > 16* 18*   < > 22   < > 20 23 22 21   *   < > 100* 97*   < > 52*   < > 40* 52* 60* 58*   CREATININE 3.86*   < > 2.96* 2.82*   < > 2.49*   < > 2.32* 2.80* 3.57* 2.56*   PHOSPHORUS  --    < > 4.1 4.1  --  3.5  --   --   --   --   --     < > = values in this interval not displayed.                                  Assessment & Plan  CKD (chronic kidney disease) stage 4, GFR 15-29 ml/min ([facility])  Unfortunately patient did not do labs  No uremic symptoms  Renal dose of medication  Avoid nephrotoxins  Continue same medication regimen  Patient was advised to call us if symptoms worsen  Check labs  Orders:    CBC WITHOUT DIFFERENTIAL; Future    Basic Metabolic Panel; Future    MICROALB/CREAT RATIO RAND. UR    Essential hypertension  Controlled  Continue same medication regimen  Continue low-sodium diet    Orders:    CBC WITHOUT DIFFERENTIAL; Future    Basic Metabolic Panel; Future    MICROALB/CREAT RATIO RAND. UR

## 2025-04-21 DIAGNOSIS — I10 PRIMARY HYPERTENSION: ICD-10-CM

## 2025-04-21 NOTE — TELEPHONE ENCOUNTER
Is the patient due for a refill? Yes    Was the patient seen the last 15 months? Yes    Date of last office visit: 2/24/2025    Does the patient have an upcoming appointment?  Yes   If yes, When? 7/11/25    Provider to refill:[facility]     Does the patient have nursing home Plus and need 100-day supply? (This applies to ALL medications) Patient does not have SCP

## 2025-04-22 RX ORDER — METOPROLOL SUCCINATE 25 MG/1
25 TABLET, EXTENDED RELEASE ORAL DAILY
Qty: 90 TABLET | Refills: 3 | Status: SHIPPED | OUTPATIENT
Start: 2025-04-22

## 2025-05-21 DIAGNOSIS — I73.9 PAD (PERIPHERAL ARTERY DISEASE) (HCC): ICD-10-CM

## 2025-05-22 RX ORDER — ROSUVASTATIN CALCIUM 10 MG/1
10 TABLET, COATED ORAL EVERY EVENING
Qty: 90 TABLET | Refills: 0 | Status: SHIPPED | OUTPATIENT
Start: 2025-05-22

## 2025-05-30 ENCOUNTER — HOSPITAL ENCOUNTER (OUTPATIENT)
Dept: CARDIOLOGY | Facility: MEDICAL CENTER | Age: 80
End: 2025-05-30
Attending: INTERNAL MEDICINE
Payer: COMMERCIAL

## 2025-05-30 DIAGNOSIS — I27.20 PULMONARY HYPERTENSION (HCC): Chronic | ICD-10-CM

## 2025-05-30 DIAGNOSIS — I36.1 NONRHEUMATIC TRICUSPID VALVE REGURGITATION: ICD-10-CM

## 2025-05-30 PROCEDURE — 93306 TTE W/DOPPLER COMPLETE: CPT

## 2025-05-31 ENCOUNTER — RESULTS FOLLOW-UP (OUTPATIENT)
Dept: CARDIOLOGY | Facility: MEDICAL CENTER | Age: 80
End: 2025-05-31

## 2025-05-31 LAB
LV EJECT FRACT  99904: 60
LV EJECT FRACT MOD 2C 99903: 59.74
LV EJECT FRACT MOD 4C 99902: 58.95
LV EJECT FRACT MOD BP 99901: 58.56

## 2025-06-07 DIAGNOSIS — E87.20 METABOLIC ACIDOSIS: ICD-10-CM

## 2025-06-07 DIAGNOSIS — N18.4 CKD STAGE G4/A3, GFR 15-29 AND ALBUMIN CREATININE RATIO >300 MG/G (HCC): ICD-10-CM

## 2025-06-07 NOTE — TELEPHONE ENCOUNTER
Received request via: Pharmacy    Was the patient seen in the last year in this department? Yes    Does the patient have an active prescription (recently filled or refills available) for medication(s) requested? No    Pharmacy Name: [facility]     Does the patient have longterm Plus and need 100-day supply? (This applies to ALL medications) Patient does not have SCP

## 2025-06-12 RX ORDER — SODIUM BICARBONATE 650 MG/1
TABLET ORAL
Qty: 180 TABLET | Refills: 3 | Status: SHIPPED | OUTPATIENT
Start: 2025-06-12

## 2025-07-11 ENCOUNTER — OFFICE VISIT (OUTPATIENT)
Dept: CARDIOLOGY | Facility: MEDICAL CENTER | Age: 80
End: 2025-07-11
Attending: INTERNAL MEDICINE
Payer: COMMERCIAL

## 2025-07-11 VITALS
SYSTOLIC BLOOD PRESSURE: 140 MMHG | RESPIRATION RATE: 14 BRPM | BODY MASS INDEX: 25.16 KG/M2 | DIASTOLIC BLOOD PRESSURE: 80 MMHG | HEART RATE: 73 BPM | OXYGEN SATURATION: 93 % | HEIGHT: 63 IN | WEIGHT: 142 LBS

## 2025-07-11 DIAGNOSIS — I36.1 NONRHEUMATIC TRICUSPID VALVE REGURGITATION: ICD-10-CM

## 2025-07-11 DIAGNOSIS — I25.10 CORONARY ARTERY DISEASE INVOLVING NATIVE CORONARY ARTERY OF NATIVE HEART WITHOUT ANGINA PECTORIS: Primary | ICD-10-CM

## 2025-07-11 DIAGNOSIS — E78.5 DYSLIPIDEMIA: ICD-10-CM

## 2025-07-11 DIAGNOSIS — I10 PRIMARY HYPERTENSION: ICD-10-CM

## 2025-07-11 DIAGNOSIS — I27.20 PULMONARY HYPERTENSION (HCC): Chronic | ICD-10-CM

## 2025-07-11 PROCEDURE — 3077F SYST BP >= 140 MM HG: CPT | Performed by: INTERNAL MEDICINE

## 2025-07-11 PROCEDURE — 99214 OFFICE O/P EST MOD 30 MIN: CPT | Performed by: INTERNAL MEDICINE

## 2025-07-11 PROCEDURE — 3079F DIAST BP 80-89 MM HG: CPT | Performed by: INTERNAL MEDICINE

## 2025-07-11 PROCEDURE — 99213 OFFICE O/P EST LOW 20 MIN: CPT | Performed by: INTERNAL MEDICINE

## 2025-07-11 ASSESSMENT — ENCOUNTER SYMPTOMS
WEAKNESS: 0
NERVOUS/ANXIOUS: 0
MUSCULOSKELETAL NEGATIVE: 1
GASTROINTESTINAL NEGATIVE: 1
CHILLS: 0
NAUSEA: 0
BLURRED VISION: 0
WEIGHT LOSS: 0
PSYCHIATRIC NEGATIVE: 1
DIZZINESS: 0
CARDIOVASCULAR NEGATIVE: 1
MYALGIAS: 0
SHORTNESS OF BREATH: 0
BRUISES/BLEEDS EASILY: 0
DOUBLE VISION: 0
ABDOMINAL PAIN: 0
PALPITATIONS: 0
CLAUDICATION: 0
RESPIRATORY NEGATIVE: 1
NEUROLOGICAL NEGATIVE: 1
EYES NEGATIVE: 1
FOCAL WEAKNESS: 0
DEPRESSION: 0
FEVER: 0
VOMITING: 0
HEADACHES: 0
COUGH: 0
CONSTITUTIONAL NEGATIVE: 1

## 2025-07-11 ASSESSMENT — FIBROSIS 4 INDEX: FIB4 SCORE: 1.767766952966368811

## 2025-07-11 NOTE — PROGRESS NOTES
Chief Complaint   Patient presents with    Coronary Artery Disease     F/V Dx: Coronary artery disease involving native coronary artery of native heart without angina pectoris    CHF (Acute)     F/V Dx: Acute decompensated heart failure (HCC)       Subjective     Sangita Bolton is a 79 y.o. female who presents today for follow up of coronary artery disease, tricuspid regurgitation, pulmonary hypertension, hypertension and hyperlipidemia.    Since the patient's last visit on 02/24/258, she has been doing well clinically from cardiac standpoint. She denies fatigue, chest pain, shortness of breath, palpitations, lower extremity edema, dizziness or syncope. She keeps active walking around the house.     Past Medical History[1]  Past Surgical History[2]  Family History   Problem Relation Age of Onset    Cancer Mother         leukemia    Heart Disease Neg Hx     Heart Attack Neg Hx      Social History     Socioeconomic History    Marital status:      Spouse name: Not on file    Number of children: Not on file    Years of education: Not on file    Highest education level: 12th grade   Occupational History    Not on file   Tobacco Use    Smoking status: Former     Current packs/day: 1.00     Average packs/day: 1 pack/day for 50.4 years (50.4 ttl pk-yrs)     Types: Cigarettes     Start date: 2/2/1975    Smokeless tobacco: Never   Vaping Use    Vaping status: Never Used   Substance and Sexual Activity    Alcohol use: Not Currently    Drug use: No    Sexual activity: Not on file     Comment: ,    Other Topics Concern    Not on file   Social History Narrative    Not on file     Social Drivers of Health     Financial Resource Strain: Low Risk  (9/4/2024)    Overall Financial Resource Strain (CARDIA)     Difficulty of Paying Living Expenses: Not hard at all   Food Insecurity: No Food Insecurity (10/23/2024)    Hunger Vital Sign     Worried About Running Out of Food in the Last Year: Never true     Ran Out of Food in  the Last Year: Never true   Transportation Needs: No Transportation Needs (10/23/2024)    PRAPARE - Transportation     Lack of Transportation (Medical): No     Lack of Transportation (Non-Medical): No   Physical Activity: Inactive (9/4/2024)    Exercise Vital Sign     Days of Exercise per Week: 0 days     Minutes of Exercise per Session: 0 min   Stress: No Stress Concern Present (9/4/2024)    [employer] [place] [employer] - Occupational Stress Questionnaire     Feeling of Stress : Only a little   Social Connections: Unknown (9/4/2024)    Social Connection and Isolation Panel [NHANES]     Frequency of Communication with Friends and Family: Patient declined     Frequency of Social Gatherings with Friends and Family: Patient declined     Attends Alevism Services: Never     Active Member of Clubs or Organizations: No     Attends Club or Organization Meetings: Patient declined     Marital Status:    Intimate Partner Violence: Not At Risk (10/21/2024)    Humiliation, Afraid, Rape, and Kick questionnaire     Fear of Current or Ex-Partner: No     Emotionally Abused: No     Physically Abused: No     Sexually Abused: No   Housing Stability: Low Risk  (10/23/2024)    Housing Stability Vital Sign     Unable to Pay for Housing in the Last Year: No     Number of Times Moved in the Last Year: 1     Homeless in the Last Year: No     Allergies[3]  Encounter Medications[4]  Review of Systems   Constitutional: Negative.  Negative for chills, fever, malaise/fatigue and weight loss.   HENT: Negative.  Negative for hearing loss.    Eyes: Negative.  Negative for blurred vision and double vision.   Respiratory: Negative.  Negative for cough and shortness of breath.    Cardiovascular: Negative.  Negative for chest pain, palpitations, claudication and leg swelling.   Gastrointestinal: Negative.  Negative for abdominal pain, nausea and vomiting.   Genitourinary: Negative.  Negative for dysuria and urgency.  "  Musculoskeletal: Negative.  Negative for joint pain and myalgias.   Skin: Negative.  Negative for itching and rash.   Neurological: Negative.  Negative for dizziness, focal weakness, weakness and headaches.   Endo/Heme/Allergies: Negative.  Does not bruise/bleed easily.   Psychiatric/Behavioral: Negative.  Negative for depression. The patient is not nervous/anxious.               Objective     BP (!) 140/80 (BP Location: Left arm, Patient Position: Sitting, BP Cuff Size: Adult)   Pulse 73   Resp 14   Ht 1.6 m (5' 2.99\")   Wt 64.4 kg (142 lb)   SpO2 93%   BMI 25.16 kg/m²     Physical Exam  Constitutional:       Appearance: Normal appearance. She is well-developed and normal weight.   HENT:      Head: Normocephalic and atraumatic.   Neck:      Vascular: No JVD.   Cardiovascular:      Rate and Rhythm: Normal rate and regular rhythm.      Heart sounds: Normal heart sounds.   Pulmonary:      Effort: Pulmonary effort is normal.      Breath sounds: Normal breath sounds.   Abdominal:      General: Bowel sounds are normal.      Palpations: Abdomen is soft.      Comments: No hepatosplenomegaly.   Musculoskeletal:         General: Normal range of motion.   Lymphadenopathy:      Cervical: No cervical adenopathy.   Skin:     General: Skin is warm and dry.   Neurological:      Mental Status: She is alert and oriented to person, place, and time.            CARDIAC STUDIES/PROCEDURES:    ANKLE BRACHIAL INDEX (02/14/25)  The ankle-brachial index is mildly reduced.   LEFT LEG:.   The ankle-brachial index is severely reduced (dorsalis pedis artery).  An arterial duplex was performed in accordance with lower extremity   arterial evaluation protocol - see separate report.   (study result reviewed)    ABDOMINAL ULTRASOUND (02/15/25)  Greater than 75% stenosis in the proximal celiac artery .   50-75% stenosis of the superior mesenteric artery.  50-75% stenosis in the RIGHT distal common iliac artery.  The LEFT common iliac and " proximal external iliac arteries are occluded.    Flow reconstitutes in the distal LEFT external iliac artery.  Compared to the images of the duplex dated 5/8/2023 - there is no significant change.  (study result reviewed)      ABDOMINAL ULTRASOUND (02/15/25)  Greater than 75% stenosis in the proximal celiac artery .   50-75% stenosis of the superior mesenteric artery.  50-75% stenosis in the RIGHT distal common iliac artery.  The LEFT common iliac and proximal external iliac arteries are occluded.    Flow reconstitutes in the distal LEFT external iliac artery.  Compared to the images of the duplex dated 5/8/2023 - there is no significantchange.    CAROTID ULTRASOUND (11/03/23)  Compared to the prior study on 5/25/2023, no major changes.  Right.   Moderate to severe appearing irregular plaque in the distal common carotid artery.   Velocities consistent with greater than >50% stenosis.      CT OF CHEST (10/25/23)  1.  Moderately advanced emphysematous changes.  2.   Tiny micronodules, couple of which were not seen on the prior study. Follow-up per Fleischner criteria as clinically indicated.  3.  Severe atherosclerosis.  4.  Partially visualized nonspecific pancreatic head low density lesion and nonspecific pancreatic ductal dilatation. Pancreatic protocol CT or MRI of the abdomen without and with contrast may BE performed to further evaluate.  5.  No evidence of interstitial pneumonitis/UIP.    ECHOCARDIOGRAM CONCLUSIONS (05/30/25)  Prior study on 10/23/2024, compared to the report of the prior study,   there has been reduction of both tricuspid regurgitation and estimated   right heart pressures.  Normal left ventricular systolic function.  The left ventricular ejection fraction is visually estimated to be 60%.  Mild mitral regurgitation.  Mild to moderate tricuspid regurgitation.  Estimated right ventricular systolic pressure is 45 mmHg.  (study result reviewed)      ECHOCARDIOGRAM CONCLUSIONS (10/23/24)  Limited  echocardiogram.  Normal left ventricular systolic function.   The ejection fraction is measured to be 57% by Payne's biplane.  Moderate concentric left ventricular hypertrophy.  Normal right ventricular size and systolic function.  Moderate tricuspid regurgitation.   Right ventricular systolic pressure is estimated to be 68 mmHg (moderate pulmonary hypertension).  Compared to the prior study on 09/14/2024, pulmonary hypertension is now moderate.    No other significant changes.  (study result reviewed)      ECHOCARDIOGRAM CONCLUSIONS (01/09/24)  Prior study on 11/26/20, compared to the report of the prior study,   there has been progression of tricuspid regurgitation.   Normal left ventricular systolic function.  The left ventricular ejection fraction is visually estimated to be 65%.  Aortic valve sclerosis without significant stenosis.  Mild mitral regurgitation.  Moderate tricuspid regurgitation.  Estimated right ventricular systolic pressure is 40 mmHg.     ECHOCARDIOGRAM CONCLUSIONS (09/14/24)  Normal left ventricular chamber size.  The left ventricular ejection fraction is visually estimated to be 60%.  Moderate concentric left ventricular hypertrophy.  Mild mitral regurgitation.  Normal right ventricular systolic function.  Moderate tricuspid regurgitation.  Estimated right ventricular systolic pressure is 50 mmHg.     ECHOCARDIOGRAM CONCLUSIONS (11/26/20)  No prior study is available for comparison.   Normal left ventricular size, wall thickness, and systolic function.  Left ventricular ejection fraction is visually estimated to be 70%.  Grade I diastolic dysfunction.  Normal right ventricular size and systolic function.  Mild mitral regurgitation.  Mild tricuspid regurgitation.  Right ventricular systolic pressure is estimated to be 45 mmHg.     EKG performed on (10/21/24) was reviewed: EKG personally interpreted shows sinus rhythm with left bundle branch block.   EKG performed on (11/16/23) EKG shows  sinus rhythm.     Laboratory results of (09/11/25) were reviewed. Cholesterol profile of 75/112/24/29 mg/dL noted.   Laboratory results of (07/10/23) Cholesterol profile of 109/91/64/27 mg/dL noted.      LOWER EXTREMITY ARTERIAL ULTRASOUND (02/15/25)  RIGHT LEG:.   50-75% tandem stenosis at the mid & mid-distal superficial femoral artery.  >75% stenosis in the popliteal artery.  No flow in the distal posterior tibial artery which appears to be occluded.  Two vessel runoff in the calf (peroneal & anterior tibial) with biphasic   Doppler waveforms.  LEFT LEG:.   The common iliac artery and proximal external iliac artery appear to be   occluded; no flow observed per sonographer  Patent common femoral artery with surgical changes observed consistent with prior endarterectomy.  50-75% stenosis of the mid superficial femoral artery.  Three vessel runoff in the calf with diminished monophasic flow.  Compared to the images of the duplex dated 6/28/2023 - there is no   significant change, iliac stents are not imaged on either exam.  Consider dedicated aorto-iliac imaging of previous iliac stenting.  (study result reviewed)     MYOCARDIAL PERFUSION STUDY CONCLUSIONS (12/04/23)  NUCLEAR IMAGING INTERPRETATION  Normal Lexiscan myocardial study.  No evidence of ischemia or infarct.  SDS 1.  LVEF 62%.  Sinus rhythm with anterior Q waves at rest.  No ischemic changes with Regadenoson.  Occasional PVCs.  No chest chest.  ECG INTERPRETATION  Negative stress ECG for ischemia.     PERIPHERAL INTERVENTION by Yuly Brennan (02/09/15)   1.  Aortogram.  2.  Iliac artery angiogram.  3.  Bilateral common and external iliac artery angioplasty.  4.  Right common iliac artery stent with 7x29 mm Omnilink Elite.  5.  Left common and external iliac artery angioplasty and stent with 8x40 mm   Absolute Pro self-expanding stent crossing the iliac bifurcation.  6.  Right common and external iliac artery stent with 8x30 mm Absolute Pro    self-expanding stent postdilated with a 7 mm balloon.  7.  Left common femoral artery exploration and repair with endarterectomy and   Dacron patch angioplasty.    Assessment & Plan     1. Coronary artery disease involving native coronary artery of native heart without angina pectoris        2. Nonrheumatic tricuspid valve regurgitation        3. Pulmonary hypertension (HCC)        4. Dyslipidemia        5. Primary hypertension            Medical Decision Making: Today's Assessment/Status/Plan:        Coronary artery disease: She is a 79 year old female with coronary artery disease, tricuspid regurgitation, pulmonary hypertension, hypertension and hyperlipidemia. She is clinically doing well from cardiac standpoint.   Tricuspid regurgitation: She remains asymptomatic with moderate tricuspid regurgitation. We will follow her clinically and repeat an echocardiogram in one year.   Hypertension: The blood pressure reading is high today, however, usually well controlled. She will continue to monitor her blood pressure and contact us if his blood pressure remains elevated. We will continue with metoprolol, losartan, rosuvastatin.  Hyperlipidemia: She is doing well on statin therapy without myalgia symptoms. (Managed by [facility])   Carotid artery stenosis and peripheral vascular disease: (Managed by [facility])     We will follow up the patient in 1 year.    CC Claire Harp                [1]   Past Medical History:  Diagnosis Date    Acute decompensated heart failure ([facility]) 10/22/2024    Alcohol abuse 04/27/2019    Arthritis     generalized-Osteo    Carotid artery stenosis 12/31/2015    COPD (chronic obstructive pulmonary disease) ([facility]) 10/21/2024    Fall 11/25/2020    Gluten intolerance     Hyperlipidemia     Hypertension     Medicare annual wellness visit, subsequent 10/05/2023    OSTEOPOROSIS     Other specified symptom associated with female genital organs     post menaupausal bleeding     Pain 02/06/2015    bilateral legs-chronic>4/10    Stage 3 chronic kidney disease 11/25/2020    Unspecified cataract    [2]   Past Surgical History:  Procedure Laterality Date    PB PARTIAL HIP REPLACEMENT  11/28/2020    Procedure: HEMIARTHROPLASTY, HIP REVISION;  Surgeon: Mohsen Thomas M.D.;  Location: SURGERY [facility];  Service: Orthopedics    PB PARTIAL HIP REPLACEMENT Right 4/28/2019    Procedure: HEMIARTHROPLASTY, HIP;  Surgeon: Evens Sarabia M.D.;  Location: SURGERY [facility];  Service: Orthopedics    RECOVERY  2/9/2015    Performed by [facility] at SURGERY SAME DAY [facility] ORS    FEMORAL ARTERY REPAIR  2/9/2015    Performed by Yuly Chirinos M.D. at SURGERY [facility] ORS    HYSTEROSCOPY WITH VIDEO DIAGNOSTIC  11/17/2010    Performed by ALIS DEGROOT at SURGERY SAME DAY [facility] ORS    DILATION AND CURETTAGE  11/17/2010    Performed by ALIS DEGROOT at SURGERY SAME DAY [facility] ORS    ORIF, FRACTURE, HUMERUS  9/5/08    Performed by LAURA CARTER at SURGERY [facility] ORS    COLONOSCOPY  2008    recheck 4 years    OTHER ORTHOPEDIC SURGERY      rt leg fx    OTHER ORTHOPEDIC SURGERY      broken right wrist   [3]   Allergies  Allergen Reactions    Hair Formula Extra Strength [Kdc:Cranberry Extract+Sambucus Nigra+Vegetable Enzyme+Yellow Dye+...] Hives     Hair Dye    Penicillins Hives     Hands; tolerates cephalosporins (Rocephin Nov 2020)   [4]   Outpatient Encounter Medications as of 7/11/2025   Medication Sig Dispense Refill    sodium bicarbonate (SODIUM BICARBONATE) 650 MG Tab TAKE TWO TABLETS BY MOUTH THREE TIMES A DAY (MORNING, NOON, AND BEDTIME) 180 Tablet 3    rosuvastatin (CRESTOR) 10 MG Tab TAKE ONE TABLET BY MOUTH EVERY EVENING 90 Tablet 0    metoprolol SR (TOPROL XL) 25 MG TABLET SR 24 HR Take 1 Tablet by mouth every day. 90 Tablet 3    losartan (COZAAR) 100 MG Tab Take 1 Tablet by mouth every day. 90 Tablet 2    aspirin 81 MG EC tablet Take 1 Tablet by  mouth every day.      Cholecalciferol (VITAMIN D3 PO) Take 1 Capsule by mouth every day.      Cyanocobalamin (VITAMIN B-12 PO) Take 1 Tablet by mouth every day.       No facility-administered encounter medications on file as of 7/11/2025.

## 2025-07-29 ENCOUNTER — OFFICE VISIT (OUTPATIENT)
Dept: MEDICAL GROUP | Facility: MEDICAL CENTER | Age: 80
End: 2025-07-29
Payer: COMMERCIAL

## 2025-07-29 VITALS
RESPIRATION RATE: 18 BRPM | TEMPERATURE: 98.1 F | WEIGHT: 142 LBS | HEIGHT: 63 IN | DIASTOLIC BLOOD PRESSURE: 68 MMHG | BODY MASS INDEX: 25.16 KG/M2 | SYSTOLIC BLOOD PRESSURE: 132 MMHG | OXYGEN SATURATION: 91 % | HEART RATE: 72 BPM

## 2025-07-29 DIAGNOSIS — I36.1 NONRHEUMATIC TRICUSPID VALVE REGURGITATION: ICD-10-CM

## 2025-07-29 DIAGNOSIS — N18.31 STAGE 3A CHRONIC KIDNEY DISEASE: Primary | ICD-10-CM

## 2025-07-29 PROBLEM — R09.02 HYPOXIA: Status: RESOLVED | Noted: 2024-09-10 | Resolved: 2025-07-29

## 2025-07-29 ASSESSMENT — FIBROSIS 4 INDEX: FIB4 SCORE: 1.767766952966368811

## 2025-07-29 NOTE — PROGRESS NOTES
This medical record contains text that has been entered with the assistance of computer voice recognition and dictation software.  Therefore, it may contain unintended errors in text, spelling, punctuation, or grammar        Chief Complaint   Patient presents with    Follow Up Care Management       Sangita Bolton is a 79 y.o. female here evaluation and management of:     COPD, CAD, CKD   History of Present Illness  Patient presents for routine follow-up. Reports feeling well overall and maintains regular contact with healthcare team. Consulted with cardiologist Dr. Blair and nephrologist Dr. Chou, no issues identified. Consulted with vascular specialist a few weeks ago. Able to drive independently and recently acquired a new car.        Current Medications[1]  Patient Active Problem List    Diagnosis Date Noted    Acute decompensated heart failure (HCC) 10/22/2024    COPD (chronic obstructive pulmonary disease) (HCC) 10/21/2024    Acute hypoxic respiratory failure (HCC) 10/21/2024    Hypomagnesemia 09/28/2024    Decubitus ulcers 09/25/2024    Dehydration 09/25/2024    Pressure injury of buttock, unstageable (HCC) 09/13/2024    Pulmonary hypertension (HCC) 09/13/2024    Henrandez catheter in place 09/12/2024    Hypoxia 09/10/2024    Acute hypoxemic respiratory failure (HCC) 09/10/2024    Elevated troponin 09/10/2024    ACP (advance care planning) 09/10/2024    Diarrhea 09/10/2024    Stress due to illness of family member 04/09/2024    Nonrheumatic tricuspid valve regurgitation 02/27/2024    COPD with centrilobular emphysema (HCC) 01/12/2024    Pulmonary nodules 01/12/2024    Undiagnosed cardiac murmurs 11/16/2023    Dyslipidemia 11/16/2023    Tobacco use 11/16/2023    Medicare annual wellness visit, subsequent 10/05/2023    Aortic atherosclerosis (HCC) 05/09/2023    Right iliac artery stenosis (HCC) 05/09/2023    Iliac artery occlusion, left (HCC) 05/09/2023    Superior mesenteric artery stenosis (HCC) 05/09/2023     "Celiac artery stenosis ([facility]) 05/09/2023    CKD stage G4/A3, GFR 15-29 and albumin creatinine ratio >300 mg/g ([facility]) 04/24/2023    Carotid atherosclerosis, bilateral 04/24/2023    Hyponatremia 07/14/2021    PAD (peripheral artery disease) ([facility]) 07/14/2021    RASHAD (acute kidney injury) ([facility]) 12/01/2020    Anemia 11/27/2020    Fall 11/25/2020    Stage 3 chronic kidney disease 11/25/2020    CAD (coronary artery disease) 11/25/2020    Other lack of coordination 05/02/2019    Muscle weakness (generalized) 05/02/2019    Hypo-osmolality and hyponatremia 05/02/2019    Difficulty in walking, not elsewhere classified 05/02/2019    Acidosis 05/02/2019    Closed fracture of neck of right femur ([facility]) 04/27/2019    Alcohol abuse 04/27/2019    Dependence on nicotine from cigarettes 04/27/2019    Leukocytosis 04/27/2019    Dry skin dermatitis 04/11/2018    Carotid stenosis, asymptomatic, right 12/31/2015    S/P insertion of iliac artery stent 12/31/2015    Hypertension 07/01/2013    DDD (degenerative disc disease), lumbar 01/09/2013    Osteoporosis, post-menopausal      Past Surgical History[2]   Social History[3]  Family History   Problem Relation Age of Onset    Cancer Mother         leukemia    Heart Disease Neg Hx     Heart Attack Neg Hx            ROS    all review of system completed and negative except for those listed above     Objective:     /68 (BP Location: Left arm, Patient Position: Sitting, BP Cuff Size: Adult)   Pulse 72   Temp 36.7 °C (98.1 °F) (Temporal)   Resp 18   Ht 1.6 m (5' 2.99\")   Wt 64.4 kg (142 lb)   SpO2 91%  Body mass index is 25.16 kg/m².  Physical Exam:        GEN: comfortable, alert and oriented, well nourished, well developed, in no apparent distress   HEENT: NCAT, eyes: pupils equal and reactive, sclera white, EOMIT, good dentition  HEART: limbs warm and well perfused, regular rate, no JVD, no lower extremity edema  LUNGS: speaking in full sentences, not in apparent respiratory distress, no " audible wheezes  MSK: normal tone and bulk, no swelling of the joints, gait steady and normal       Assessment and Plan:   The following treatment plan was discussed        Assessment & Plan  Stage 3a chronic kidney disease         Nonrheumatic tricuspid valve regurgitation             Assessment & Plan  Routine follow-up.  BP moderately well controlled at 132/68.  Encouraged social activities and active lifestyle, creative outlet, volunteering, avoiding sedentary habits.  Emphasized importance of gentle physical activity for mental and physical health.  No changes to current treatment plan.    Lifestyle modification: Encouraged social activities and active lifestyle, avoiding sedentary habits.    Follow-up: Scheduled in 6 months.    30+min spent       Instructed to follow up if symptoms worsen or fail to improve, ER/UC precautions discussed as well    Claire Hernández MD  [facility], [address]   [place] [address]  Phone: [phone]                    [1]   Current Outpatient Medications   Medication Sig Dispense Refill    sodium bicarbonate (SODIUM BICARBONATE) 650 MG Tab TAKE TWO TABLETS BY MOUTH THREE TIMES A DAY (MORNING, NOON, AND BEDTIME) 180 Tablet 3    rosuvastatin (CRESTOR) 10 MG Tab TAKE ONE TABLET BY MOUTH EVERY EVENING 90 Tablet 0    metoprolol SR (TOPROL XL) 25 MG TABLET SR 24 HR Take 1 Tablet by mouth every day. 90 Tablet 3    losartan (COZAAR) 100 MG Tab Take 1 Tablet by mouth every day. 90 Tablet 2    aspirin 81 MG EC tablet Take 1 Tablet by mouth every day.      Cholecalciferol (VITAMIN D3 PO) Take 1 Capsule by mouth every day.      Cyanocobalamin (VITAMIN B-12 PO) Take 1 Tablet by mouth every day.       No current facility-administered medications for this visit.   [2]   Past Surgical History:  Procedure Laterality Date    PB PARTIAL HIP REPLACEMENT  11/28/2020    Procedure: HEMIARTHROPLASTY, HIP REVISION;  Surgeon: Mohsen Thomas M.D.;  Location: SURGERY [facility]  [place];  Service: Orthopedics    PB PARTIAL HIP REPLACEMENT Right 4/28/2019    Procedure: HEMIARTHROPLASTY, HIP;  Surgeon: Evens Sarabia M.D.;  Location: SURGERY [facility];  Service: Orthopedics    RECOVERY  2/9/2015    Performed by -Recovery Surgery at SURGERY SAME DAY [facility]    FEMORAL ARTERY REPAIR  2/9/2015    Performed by Yuly Chirinos M.D. at SURGERY [facility]    HYSTEROSCOPY WITH VIDEO DIAGNOSTIC  11/17/2010    Performed by ALIS DEGROOT at SURGERY SAME DAY [facility]    DILATION AND CURETTAGE  11/17/2010    Performed by ALIS DEGROOT at SURGERY SAME DAY [facility] ORS    ORIF, FRACTURE, HUMERUS  9/5/08    Performed by LAURA CARTER at SURGERY [facility]    COLONOSCOPY  2008    recheck 4 years    OTHER ORTHOPEDIC SURGERY      rt leg fx    OTHER ORTHOPEDIC SURGERY      broken right wrist   [3]   Social History  Tobacco Use    Smoking status: Former     Current packs/day: 1.00     Average packs/day: 1 pack/day for 50.5 years (50.5 ttl pk-yrs)     Types: Cigarettes     Start date: 2/2/1975    Smokeless tobacco: Never   Vaping Use    Vaping status: Never Used   Substance Use Topics    Alcohol use: Not Currently    Drug use: No

## 2025-08-13 ENCOUNTER — HOSPITAL ENCOUNTER (OUTPATIENT)
Dept: LAB | Facility: MEDICAL CENTER | Age: 80
End: 2025-08-13
Attending: INTERNAL MEDICINE
Payer: COMMERCIAL

## 2025-08-13 DIAGNOSIS — N18.4 CKD (CHRONIC KIDNEY DISEASE) STAGE 4, GFR 15-29 ML/MIN (HCC): ICD-10-CM

## 2025-08-13 DIAGNOSIS — I10 HYPERTENSION, UNSPECIFIED TYPE: ICD-10-CM

## 2025-08-13 LAB
ANION GAP SERPL CALC-SCNC: 15 MMOL/L (ref 7–16)
BUN SERPL-MCNC: 52 MG/DL (ref 8–22)
CALCIUM SERPL-MCNC: 10 MG/DL (ref 8.5–10.5)
CHLORIDE SERPL-SCNC: 101 MMOL/L (ref 96–112)
CO2 SERPL-SCNC: 21 MMOL/L (ref 20–33)
CREAT SERPL-MCNC: 3.12 MG/DL (ref 0.5–1.4)
ERYTHROCYTE [DISTWIDTH] IN BLOOD BY AUTOMATED COUNT: 49.6 FL (ref 35.9–50)
FASTING STATUS PATIENT QL REPORTED: NORMAL
GFR SERPLBLD CREATININE-BSD FMLA CKD-EPI: 14 ML/MIN/1.73 M 2
GLUCOSE SERPL-MCNC: 91 MG/DL (ref 65–99)
HCT VFR BLD AUTO: 36.7 % (ref 37–47)
HGB BLD-MCNC: 12.2 G/DL (ref 12–16)
MCH RBC QN AUTO: 30.5 PG (ref 27–33)
MCHC RBC AUTO-ENTMCNC: 33.2 G/DL (ref 32.2–35.5)
MCV RBC AUTO: 91.8 FL (ref 81.4–97.8)
PLATELET # BLD AUTO: 313 K/UL (ref 164–446)
PMV BLD AUTO: 9.6 FL (ref 9–12.9)
POTASSIUM SERPL-SCNC: 5.1 MMOL/L (ref 3.6–5.5)
RBC # BLD AUTO: 4 M/UL (ref 4.2–5.4)
SODIUM SERPL-SCNC: 137 MMOL/L (ref 135–145)
WBC # BLD AUTO: 9.1 K/UL (ref 4.8–10.8)

## 2025-08-13 PROCEDURE — 85027 COMPLETE CBC AUTOMATED: CPT

## 2025-08-13 PROCEDURE — 36415 COLL VENOUS BLD VENIPUNCTURE: CPT

## 2025-08-13 PROCEDURE — 80048 BASIC METABOLIC PNL TOTAL CA: CPT

## 2025-08-19 ENCOUNTER — OFFICE VISIT (OUTPATIENT)
Dept: NEPHROLOGY | Facility: MEDICAL CENTER | Age: 80
End: 2025-08-19
Payer: COMMERCIAL

## 2025-08-19 VITALS
SYSTOLIC BLOOD PRESSURE: 140 MMHG | HEART RATE: 77 BPM | OXYGEN SATURATION: 92 % | WEIGHT: 141.9 LBS | HEIGHT: 63 IN | DIASTOLIC BLOOD PRESSURE: 70 MMHG | TEMPERATURE: 98.7 F | BODY MASS INDEX: 25.14 KG/M2

## 2025-08-19 DIAGNOSIS — I10 ESSENTIAL HYPERTENSION: Primary | ICD-10-CM

## 2025-08-19 DIAGNOSIS — N18.5 CKD (CHRONIC KIDNEY DISEASE) STAGE 5, GFR LESS THAN 15 ML/MIN (HCC): ICD-10-CM

## 2025-08-19 PROCEDURE — 3078F DIAST BP <80 MM HG: CPT | Performed by: INTERNAL MEDICINE

## 2025-08-19 PROCEDURE — 3077F SYST BP >= 140 MM HG: CPT | Performed by: INTERNAL MEDICINE

## 2025-08-19 PROCEDURE — 99214 OFFICE O/P EST MOD 30 MIN: CPT | Performed by: INTERNAL MEDICINE

## 2025-08-19 ASSESSMENT — LIFESTYLE VARIABLES
AUDIT-C TOTAL SCORE: 0
SKIP TO QUESTIONS 9-10: 1
HOW OFTEN DO YOU HAVE A DRINK CONTAINING ALCOHOL: NEVER
HOW MANY STANDARD DRINKS CONTAINING ALCOHOL DO YOU HAVE ON A TYPICAL DAY: PATIENT DOES NOT DRINK
HOW OFTEN DO YOU HAVE SIX OR MORE DRINKS ON ONE OCCASION: NEVER

## 2025-08-19 ASSESSMENT — ENCOUNTER SYMPTOMS
SHORTNESS OF BREATH: 0
HYPERTENSION: 1
VOMITING: 0
CHILLS: 0
FEVER: 0
COUGH: 0
NAUSEA: 0

## 2025-08-19 ASSESSMENT — FIBROSIS 4 INDEX: FIB4 SCORE: 1.85

## 2025-08-20 ENCOUNTER — APPOINTMENT (OUTPATIENT)
Dept: NEPHROLOGY | Facility: MEDICAL CENTER | Age: 80
End: 2025-08-20
Payer: COMMERCIAL

## (undated) DEVICE — CANISTER SUCTION 3000ML MECHANICAL FILTER AUTO SHUTOFF MEDI-VAC NONSTERILE LF DISP  (40EA/CA)

## (undated) DEVICE — ELECTRODE 850 FOAM ADHESIVE - HYDROGEL RADIOTRNSPRNT (50/PK)

## (undated) DEVICE — GLOVE SZ 7.5 BIOGEL PI MICRO - PF LF (50PR/BX)

## (undated) DEVICE — KIT EVACUATER 3 SPRING PVC LF 1/8 DRAIN SIZE (10EA/CA)"

## (undated) DEVICE — LACTATED RINGERS INJ 1000 ML - (14EA/CA 60CA/PF)

## (undated) DEVICE — TUBING CLEARLINK DUO-VENT - C-FLO (48EA/CA)

## (undated) DEVICE — GLOVE BIOGEL INDICATOR SZ 7.5 SURGICAL PF LTX - (50PR/BX 4BX/CA)

## (undated) DEVICE — LENS/HOOD FOR SPACESUIT - (32/PK) PEEL AWAY FACE

## (undated) DEVICE — SODIUM CHL IRRIGATION 0.9% 1000ML (12EA/CA)

## (undated) DEVICE — DRAPE SURG STERI-DRAPE 7X11OD - (40EA/CA)

## (undated) DEVICE — GLOVE SZ 8 BIOGEL PI MICRO - PF LF (50PR/BX)

## (undated) DEVICE — SUCTION INSTRUMENT YANKAUER BULBOUS TIP W/O VENT (50EA/CA)

## (undated) DEVICE — DRAPE LARGE 3 QUARTER - (20/CA)

## (undated) DEVICE — HEAD HOLDER JUNIOR/ADULT

## (undated) DEVICE — SUTURE 2-0 VICRYL PLUS CT-1 - 8 X 18 INCH(12/BX)

## (undated) DEVICE — MASK ANESTHESIA ADULT  - (100/CA)

## (undated) DEVICE — DRAPE STRLE REG TOWEL 18X24 - (10/BX 4BX/CA)"

## (undated) DEVICE — GLOVE BIOGEL PI INDICATOR SZ 8.0 SURGICAL PF LF -(50/BX 4BX/CA)

## (undated) DEVICE — SUTURE 1 VICRYL PLUS CTX - 8 X 18 INCH (12/BX)

## (undated) DEVICE — GLOVE BIOGEL SZ 7.5 SURGICAL PF LTX - (50PR/BX 4BX/CA)

## (undated) DEVICE — SUTURE 5 TI-CRON HOS-14 - (36/BX)

## (undated) DEVICE — PROTECTOR ULNA NERVE - (36PR/CA)

## (undated) DEVICE — HANDPIECE 10FT INTPLS SCT PLS IRRIGATION HAND CONTROL SET (6/PK)

## (undated) DEVICE — PACK TOTAL HIP - (1/CA)

## (undated) DEVICE — CHLORAPREP 26 ML APPLICATOR - ORANGE TINT(25/CA)

## (undated) DEVICE — SYSTEM PEEL & PLACE 13CM INCISIONS

## (undated) DEVICE — GLOVE BIOGEL PI ORTHO SZ 8 PF LF (40PR/BX)

## (undated) DEVICE — GOWN WARMING STANDARD FLEX - (30/CA)

## (undated) DEVICE — STAPLER SKIN DISP - (6/BX 10BX/CA) VISISTAT

## (undated) DEVICE — CONNECTOR Y TBG CRTY 5 IN 1 STERILE (50EA/CA)

## (undated) DEVICE — BEADS STIMULAN CALCIUM SULFATE: Type: IMPLANTABLE DEVICE | Site: HIP | Status: NON-FUNCTIONAL

## (undated) DEVICE — KIT ROOM DECONTAMINATION

## (undated) DEVICE — ELECTRODE DUAL RETURN W/ CORD - (50/PK)

## (undated) DEVICE — SET EXTENSION WITH 2 PORTS (48EA/CA) ***PART #2C8610 IS A SUBSTITUTE*****

## (undated) DEVICE — GLOVE BIOGEL INDICATOR SZ 8 SURGICAL PF LTX - (50/BX 4BX/CA)

## (undated) DEVICE — SENSOR SPO2 NEO LNCS ADHESIVE (20/BX) SEE USER NOTES

## (undated) DEVICE — TRAY SURESTEP FOLEY TEMP SENSING 16FR (10EA/CA) ORDER  #18764 FOR TEMP FOLEY ONLY

## (undated) DEVICE — SYSTEM PREVEAN CUSTOMIZABLE 90CM/150ML CANISTER INCIS

## (undated) DEVICE — SUTURE 3-0 MONOCRYL PLUS PS-1 - 27 INCH (36/BX)

## (undated) DEVICE — KIT ANESTHESIA W/CIRCUIT & 3/LT BAG W/FILTER (20EA/CA)

## (undated) DEVICE — SET LEADWIRE 5 LEAD BEDSIDE DISPOSABLE ECG (1SET OF 5/EA)

## (undated) DEVICE — SODIUM CHL. IRRIGATION 0.9% 3000ML (4EA/CA 65CA/PF)

## (undated) DEVICE — CELLSAVER PACK

## (undated) DEVICE — NEPTUNE 4 PORT MANIFOLD - (20/PK)

## (undated) DEVICE — GLOVE BIOGEL SZ 8 SURGICAL PF LTX - (50PR/BX 4BX/CA)

## (undated) DEVICE — GLOVE SZ 7 BIOGEL PI MICRO - PF LF (50PR/BX 4BX/CA)

## (undated) DEVICE — BLADE SAGITTAL SAW DUAL CUT 75.0 X 25.0MM (1/EA)

## (undated) DEVICE — TIP INTPLS HFLO ML ORFC BTRY - (12/CS)  FOR SURGILAV

## (undated) DEVICE — SUTURE GENERAL

## (undated) DEVICE — SLEEVE, VASO, THIGH, MED

## (undated) DEVICE — DRAPE U ORTHOPEDIC - (10/BX)

## (undated) DEVICE — GLOVE BIOGEL PI ORTHO SZ 7.5 PF LF (40PR/BX)

## (undated) DEVICE — BLOCK

## (undated) DEVICE — DRAPE IOBAN II 23 IN X 33 IN - (10/BX)

## (undated) DEVICE — GLOVE BIOGEL PI INDICATOR SZ 7.0 SURGICAL PF LF - (50/BX 4BX/CA)

## (undated) DEVICE — GOWN SURGEONS X-LARGE - DISP. (30/CA)

## (undated) DEVICE — CELLSAVER STAT

## (undated) DEVICE — WATER IRRIGATION STERILE 1000ML (12EA/CA)

## (undated) DEVICE — STERI STRIP COMPOUND BENZOIN - TINCTURE 0.6ML WITH APPLICATOR (40EA/BX)

## (undated) DEVICE — CLOSURE SKIN STRIP 1/2 X 4 IN - (STERI STRIP) (50/BX 4BX/CA)